# Patient Record
Sex: MALE | Race: WHITE | NOT HISPANIC OR LATINO | URBAN - METROPOLITAN AREA
[De-identification: names, ages, dates, MRNs, and addresses within clinical notes are randomized per-mention and may not be internally consistent; named-entity substitution may affect disease eponyms.]

---

## 2018-12-18 ENCOUNTER — INPATIENT (INPATIENT)
Facility: HOSPITAL | Age: 58
LOS: 8 days | Discharge: HOME | End: 2018-12-27
Attending: HOSPITALIST | Admitting: HOSPITALIST
Payer: COMMERCIAL

## 2018-12-18 VITALS
SYSTOLIC BLOOD PRESSURE: 127 MMHG | TEMPERATURE: 98 F | DIASTOLIC BLOOD PRESSURE: 80 MMHG | HEART RATE: 84 BPM | RESPIRATION RATE: 16 BRPM | OXYGEN SATURATION: 100 %

## 2018-12-18 LAB
ALBUMIN SERPL ELPH-MCNC: 3.3 G/DL — SIGNIFICANT CHANGE UP (ref 3.3–5)
ALP SERPL-CCNC: 82 U/L — SIGNIFICANT CHANGE UP (ref 40–120)
ALT FLD-CCNC: 21 U/L — SIGNIFICANT CHANGE UP (ref 4–41)
APTT BLD: 26.6 SEC — LOW (ref 27.5–36.3)
AST SERPL-CCNC: 19 U/L — SIGNIFICANT CHANGE UP (ref 4–40)
BASE EXCESS BLDV CALC-SCNC: 4.3 MMOL/L — SIGNIFICANT CHANGE UP
BASOPHILS # BLD AUTO: 0.05 K/UL — SIGNIFICANT CHANGE UP (ref 0–0.2)
BASOPHILS NFR BLD AUTO: 0.7 % — SIGNIFICANT CHANGE UP (ref 0–2)
BILIRUB SERPL-MCNC: < 0.2 MG/DL — LOW (ref 0.2–1.2)
BLOOD GAS VENOUS - CREATININE: 2.48 MG/DL — HIGH (ref 0.5–1.3)
BUN SERPL-MCNC: 49 MG/DL — HIGH (ref 7–23)
CALCIUM SERPL-MCNC: 8.9 MG/DL — SIGNIFICANT CHANGE UP (ref 8.4–10.5)
CHLORIDE BLDV-SCNC: 108 MMOL/L — SIGNIFICANT CHANGE UP (ref 96–108)
CHLORIDE SERPL-SCNC: 105 MMOL/L — SIGNIFICANT CHANGE UP (ref 98–107)
CO2 SERPL-SCNC: 25 MMOL/L — SIGNIFICANT CHANGE UP (ref 22–31)
CREAT SERPL-MCNC: 2.43 MG/DL — HIGH (ref 0.5–1.3)
CRP SERPL-MCNC: < 4 MG/L — SIGNIFICANT CHANGE UP
EOSINOPHIL # BLD AUTO: 0.25 K/UL — SIGNIFICANT CHANGE UP (ref 0–0.5)
EOSINOPHIL NFR BLD AUTO: 3.6 % — SIGNIFICANT CHANGE UP (ref 0–6)
ERYTHROCYTE [SEDIMENTATION RATE] IN BLOOD: 49 MM/HR — HIGH (ref 1–15)
GAS PNL BLDV: 137 MMOL/L — SIGNIFICANT CHANGE UP (ref 136–146)
GLUCOSE BLDV-MCNC: 178 — HIGH (ref 70–99)
GLUCOSE SERPL-MCNC: 176 MG/DL — HIGH (ref 70–99)
HCO3 BLDV-SCNC: 27 MMOL/L — SIGNIFICANT CHANGE UP (ref 20–27)
HCT VFR BLD CALC: 34.2 % — LOW (ref 39–50)
HCT VFR BLDV CALC: 33.5 % — LOW (ref 39–51)
HGB BLD-MCNC: 10.6 G/DL — LOW (ref 13–17)
HGB BLDV-MCNC: 10.9 G/DL — LOW (ref 13–17)
IMM GRANULOCYTES # BLD AUTO: 0.04 # — SIGNIFICANT CHANGE UP
IMM GRANULOCYTES NFR BLD AUTO: 0.6 % — SIGNIFICANT CHANGE UP (ref 0–1.5)
INR BLD: 1 — SIGNIFICANT CHANGE UP (ref 0.88–1.17)
LACTATE BLDV-MCNC: 1.5 MMOL/L — SIGNIFICANT CHANGE UP (ref 0.5–2)
LYMPHOCYTES # BLD AUTO: 1.35 K/UL — SIGNIFICANT CHANGE UP (ref 1–3.3)
LYMPHOCYTES # BLD AUTO: 19.2 % — SIGNIFICANT CHANGE UP (ref 13–44)
MCHC RBC-ENTMCNC: 30.3 PG — SIGNIFICANT CHANGE UP (ref 27–34)
MCHC RBC-ENTMCNC: 31 % — LOW (ref 32–36)
MCV RBC AUTO: 97.7 FL — SIGNIFICANT CHANGE UP (ref 80–100)
MONOCYTES # BLD AUTO: 0.93 K/UL — HIGH (ref 0–0.9)
MONOCYTES NFR BLD AUTO: 13.2 % — SIGNIFICANT CHANGE UP (ref 2–14)
NEUTROPHILS # BLD AUTO: 4.42 K/UL — SIGNIFICANT CHANGE UP (ref 1.8–7.4)
NEUTROPHILS NFR BLD AUTO: 62.7 % — SIGNIFICANT CHANGE UP (ref 43–77)
NRBC # FLD: 0 — SIGNIFICANT CHANGE UP
PCO2 BLDV: 51 MMHG — SIGNIFICANT CHANGE UP (ref 41–51)
PH BLDV: 7.38 PH — SIGNIFICANT CHANGE UP (ref 7.32–7.43)
PLATELET # BLD AUTO: 445 K/UL — HIGH (ref 150–400)
PMV BLD: 9.6 FL — SIGNIFICANT CHANGE UP (ref 7–13)
PO2 BLDV: < 24 MMHG — LOW (ref 35–40)
POTASSIUM BLDV-SCNC: 5 MMOL/L — HIGH (ref 3.4–4.5)
POTASSIUM SERPL-MCNC: 5.2 MMOL/L — SIGNIFICANT CHANGE UP (ref 3.5–5.3)
POTASSIUM SERPL-SCNC: 5.2 MMOL/L — SIGNIFICANT CHANGE UP (ref 3.5–5.3)
PROT SERPL-MCNC: 6.2 G/DL — SIGNIFICANT CHANGE UP (ref 6–8.3)
PROTHROM AB SERPL-ACNC: 11.1 SEC — SIGNIFICANT CHANGE UP (ref 9.8–13.1)
RBC # BLD: 3.5 M/UL — LOW (ref 4.2–5.8)
RBC # FLD: 18.5 % — HIGH (ref 10.3–14.5)
SAO2 % BLDV: 34.1 % — LOW (ref 60–85)
SODIUM SERPL-SCNC: 142 MMOL/L — SIGNIFICANT CHANGE UP (ref 135–145)
WBC # BLD: 7.08 K/UL — SIGNIFICANT CHANGE UP (ref 3.8–10.5)
WBC # FLD AUTO: 7.08 K/UL — SIGNIFICANT CHANGE UP (ref 3.8–10.5)

## 2018-12-18 PROCEDURE — 73620 X-RAY EXAM OF FOOT: CPT | Mod: 26,RT

## 2018-12-18 NOTE — ED ADULT NURSE NOTE - CHIEF COMPLAINT QUOTE
Pt was sent by podiatrist for evaluation of right foot ulcer.  Pt states that it has been there for at least a month, pt was admitted at [facility] for same and the wound was debrided and a bypass was done on leg, pt was initially told that the wound was healing well but today he was told that the wound is necrotic.  PMH CAD, DM

## 2018-12-18 NOTE — ED ADULT NURSE NOTE - CAS EDP DISCH DISPOSITION ADMI
[facility] O-T Advancement Flap Text: The defect edges were debeveled with a #15 scalpel blade.  Given the location of the defect, shape of the defect and the proximity to free margins an O-T advancement flap was deemed most appropriate.  Using a sterile surgical marker, an appropriate advancement flap was drawn incorporating the defect and placing the expected incisions within the relaxed skin tension lines where possible.    The area thus outlined was incised deep to adipose tissue with a #15 scalpel blade.  The skin margins were undermined to an appropriate distance in all directions utilizing iris scissors.

## 2018-12-18 NOTE — ED PROVIDER NOTE - MEDICAL DECISION MAKING DETAILS
Pt with clean dry gangrene ulcer, sent in by podiatrist for admission  - podiatry consult, xray, labs, likely admit Pt with clean dry gangrene ulcer, sent in by podiatrist for admission to OR.   - podiatry consult, xray, labs, likely admit

## 2018-12-18 NOTE — ED PROVIDER NOTE - PROGRESS NOTE DETAILS
Parmjit PGY-4: Pt seen by podiatry resident, hi attending is requesting admission. Admitted patient to [facility] hospitalist, text paged MAR. Parmjit PGY-4: Pt seen by podiatry resident, pods attending is requesting admission. [facility]

## 2018-12-18 NOTE — ED PROVIDER NOTE - OBJECTIVE STATEMENT
58M h/o CHF, cad, DM, HTN, HLD sent in by podiatrist for dry gangrene to R foot. Pt with bypass of R leg a month ago, foot healing well, went to podiatrist today and sent to ED. Denies fever, nausea, vomiting, purulence. 58M h/o CHF, cad, DM, HTN, HLD sent in by podiatrist for dry gangrene to R foot. Pt with bypass of R leg a month ago, foot healing well, went to podiatrist today and sent to ED. Denies fever, nausea, vomiting, purulence.    Podiatrist: Dr Sydnie Melissa 58M h/o CHF, cad, DM, HTN, HLD sent in by podiatrist for dry gangrene to R foot. Pt with bypass of R leg a month ago, foot healing well, went to podiatrist today and sent to ED for admission d/t OR planning. Denies fever, nausea, vomiting, purulence.  Well appearing and in NAD    Podiatrist: Dr Sydnie Melissa

## 2018-12-18 NOTE — ED PROVIDER NOTE - ATTENDING CONTRIBUTION TO CARE
Attending Attestation: Dr. Lara  I have personally performed a history and physical examination of the patient and discussed management with the resident as well as the patient.  I reviewed the resident's note and agree with the documented findings and plan of care.  I have authored and modified critical sections of the Provider Note, including but not limited to HPI, Physical Exam and MDM. Pt with clean dry gangrene ulcer, sent in by podiatrist for admission to OR.   - podiatry consult, xray, labs, likely admit

## 2018-12-18 NOTE — ED ADULT NURSE NOTE - OBJECTIVE STATEMENT
facilitator RN- pt sent in from podiatrist for necrotic wound to right lateral foot- had Bi-pass done on that last approx 1 month ago, on Eliquis- pt denies any fevers/chill, n/v/d, dizziness, weakness, palpitations. Wound is dry, no foul odor, dark in color, pt states he only has pain while walking on that foot. pt appears in NAD, vitals as noted, awake, a/ox3; IV 20g placed to left AC, blood work sent. MD at bedside, awaiting further orders from MD, will continue to monitor, pt safety maintained.

## 2018-12-18 NOTE — ED ADULT TRIAGE NOTE - CHIEF COMPLAINT QUOTE
Pt was sent by podiatrist for evaluation of right foot ulcer.  Pt states that it has been there for at least a month, pt was admitted at [facility] for same and the wound was debrided and a bypass was done on leg, pt was initially told that the wound was healing well but today he was told that the wound is necrotic.  PMH CAD, DM

## 2018-12-18 NOTE — ED PROVIDER NOTE - PMH
CHF (congestive heart failure)    DM (diabetes mellitus)    HLD (hyperlipidemia)    HTN (hypertension)

## 2018-12-19 DIAGNOSIS — T81.31XA DISRUPTION OF EXTERNAL OPERATION (SURGICAL) WOUND, NOT ELSEWHERE CLASSIFIED, INITIAL ENCOUNTER: ICD-10-CM

## 2018-12-19 DIAGNOSIS — I50.9 HEART FAILURE, UNSPECIFIED: ICD-10-CM

## 2018-12-19 DIAGNOSIS — I96 GANGRENE, NOT ELSEWHERE CLASSIFIED: ICD-10-CM

## 2018-12-19 DIAGNOSIS — I10 ESSENTIAL (PRIMARY) HYPERTENSION: ICD-10-CM

## 2018-12-19 DIAGNOSIS — E78.5 HYPERLIPIDEMIA, UNSPECIFIED: ICD-10-CM

## 2018-12-19 DIAGNOSIS — E11.8 TYPE 2 DIABETES MELLITUS WITH UNSPECIFIED COMPLICATIONS: ICD-10-CM

## 2018-12-19 DIAGNOSIS — Z79.899 OTHER LONG TERM (CURRENT) DRUG THERAPY: ICD-10-CM

## 2018-12-19 DIAGNOSIS — Z29.9 ENCOUNTER FOR PROPHYLACTIC MEASURES, UNSPECIFIED: ICD-10-CM

## 2018-12-19 DIAGNOSIS — L89.152 PRESSURE ULCER OF SACRAL REGION, STAGE 2: ICD-10-CM

## 2018-12-19 DIAGNOSIS — N18.4 CHRONIC KIDNEY DISEASE, STAGE 4 (SEVERE): ICD-10-CM

## 2018-12-19 DIAGNOSIS — Z95.828 PRESENCE OF OTHER VASCULAR IMPLANTS AND GRAFTS: Chronic | ICD-10-CM

## 2018-12-19 LAB
APPEARANCE UR: CLEAR — SIGNIFICANT CHANGE UP
BILIRUB UR-MCNC: NEGATIVE — SIGNIFICANT CHANGE UP
BLD GP AB SCN SERPL QL: NEGATIVE — SIGNIFICANT CHANGE UP
BLOOD UR QL VISUAL: NEGATIVE — SIGNIFICANT CHANGE UP
BUN SERPL-MCNC: 45 MG/DL — HIGH (ref 7–23)
CALCIUM SERPL-MCNC: 9.1 MG/DL — SIGNIFICANT CHANGE UP (ref 8.4–10.5)
CHLORIDE SERPL-SCNC: 109 MMOL/L — HIGH (ref 98–107)
CO2 SERPL-SCNC: 23 MMOL/L — SIGNIFICANT CHANGE UP (ref 22–31)
COLOR SPEC: COLORLESS — SIGNIFICANT CHANGE UP
CREAT ?TM UR-MCNC: 36.4 MG/DL — SIGNIFICANT CHANGE UP
CREAT SERPL-MCNC: 2.18 MG/DL — HIGH (ref 0.5–1.3)
GLUCOSE SERPL-MCNC: 91 MG/DL — SIGNIFICANT CHANGE UP (ref 70–99)
GLUCOSE UR-MCNC: NEGATIVE — SIGNIFICANT CHANGE UP
HCT VFR BLD CALC: 35.2 % — LOW (ref 39–50)
HGB BLD-MCNC: 11.1 G/DL — LOW (ref 13–17)
KETONES UR-MCNC: NEGATIVE — SIGNIFICANT CHANGE UP
LEUKOCYTE ESTERASE UR-ACNC: NEGATIVE — SIGNIFICANT CHANGE UP
MAGNESIUM SERPL-MCNC: 2.1 MG/DL — SIGNIFICANT CHANGE UP (ref 1.6–2.6)
MCHC RBC-ENTMCNC: 29.8 PG — SIGNIFICANT CHANGE UP (ref 27–34)
MCHC RBC-ENTMCNC: 31.5 % — LOW (ref 32–36)
MCV RBC AUTO: 94.6 FL — SIGNIFICANT CHANGE UP (ref 80–100)
NITRITE UR-MCNC: NEGATIVE — SIGNIFICANT CHANGE UP
NRBC # FLD: 0 — SIGNIFICANT CHANGE UP
PH UR: 6 — SIGNIFICANT CHANGE UP (ref 5–8)
PHOSPHATE SERPL-MCNC: 4.5 MG/DL — SIGNIFICANT CHANGE UP (ref 2.5–4.5)
PLATELET # BLD AUTO: 402 K/UL — HIGH (ref 150–400)
PMV BLD: 9.2 FL — SIGNIFICANT CHANGE UP (ref 7–13)
POTASSIUM SERPL-MCNC: 5 MMOL/L — SIGNIFICANT CHANGE UP (ref 3.5–5.3)
POTASSIUM SERPL-SCNC: 5 MMOL/L — SIGNIFICANT CHANGE UP (ref 3.5–5.3)
PROT UR-MCNC: NEGATIVE — SIGNIFICANT CHANGE UP
RBC # BLD: 3.72 M/UL — LOW (ref 4.2–5.8)
RBC # FLD: 18 % — HIGH (ref 10.3–14.5)
RH IG SCN BLD-IMP: POSITIVE — SIGNIFICANT CHANGE UP
SODIUM SERPL-SCNC: 145 MMOL/L — SIGNIFICANT CHANGE UP (ref 135–145)
SODIUM UR-SCNC: 87 MMOL/L — SIGNIFICANT CHANGE UP
SP GR SPEC: 1.01 — SIGNIFICANT CHANGE UP (ref 1–1.04)
UROBILINOGEN FLD QL: NORMAL — SIGNIFICANT CHANGE UP
WBC # BLD: 7.14 K/UL — SIGNIFICANT CHANGE UP (ref 3.8–10.5)
WBC # FLD AUTO: 7.14 K/UL — SIGNIFICANT CHANGE UP (ref 3.8–10.5)

## 2018-12-19 PROCEDURE — 93923 UPR/LXTR ART STDY 3+ LVLS: CPT | Mod: 26

## 2018-12-19 PROCEDURE — 99223 1ST HOSP IP/OBS HIGH 75: CPT

## 2018-12-19 RX ORDER — ATORVASTATIN CALCIUM 80 MG/1
40 TABLET, FILM COATED ORAL AT BEDTIME
Qty: 0 | Refills: 0 | Status: DISCONTINUED | OUTPATIENT
Start: 2018-12-19 | End: 2018-12-27

## 2018-12-19 RX ORDER — DEXTROSE 50 % IN WATER 50 %
15 SYRINGE (ML) INTRAVENOUS ONCE
Qty: 0 | Refills: 0 | Status: DISCONTINUED | OUTPATIENT
Start: 2018-12-19 | End: 2018-12-27

## 2018-12-19 RX ORDER — DEXTROSE 50 % IN WATER 50 %
25 SYRINGE (ML) INTRAVENOUS ONCE
Qty: 0 | Refills: 0 | Status: DISCONTINUED | OUTPATIENT
Start: 2018-12-19 | End: 2018-12-27

## 2018-12-19 RX ORDER — CARVEDILOL PHOSPHATE 80 MG/1
12.5 CAPSULE, EXTENDED RELEASE ORAL EVERY 12 HOURS
Qty: 0 | Refills: 0 | Status: DISCONTINUED | OUTPATIENT
Start: 2018-12-19 | End: 2018-12-27

## 2018-12-19 RX ORDER — DEXTROSE 50 % IN WATER 50 %
12.5 SYRINGE (ML) INTRAVENOUS ONCE
Qty: 0 | Refills: 0 | Status: DISCONTINUED | OUTPATIENT
Start: 2018-12-19 | End: 2018-12-27

## 2018-12-19 RX ORDER — INSULIN LISPRO 100/ML
VIAL (ML) SUBCUTANEOUS AT BEDTIME
Qty: 0 | Refills: 0 | Status: DISCONTINUED | OUTPATIENT
Start: 2018-12-19 | End: 2018-12-27

## 2018-12-19 RX ORDER — GLUCAGON INJECTION, SOLUTION 0.5 MG/.1ML
1 INJECTION, SOLUTION SUBCUTANEOUS ONCE
Qty: 0 | Refills: 0 | Status: DISCONTINUED | OUTPATIENT
Start: 2018-12-19 | End: 2018-12-27

## 2018-12-19 RX ORDER — INSULIN LISPRO 100/ML
VIAL (ML) SUBCUTANEOUS
Qty: 0 | Refills: 0 | Status: DISCONTINUED | OUTPATIENT
Start: 2018-12-19 | End: 2018-12-27

## 2018-12-19 RX ORDER — LISINOPRIL 2.5 MG/1
40 TABLET ORAL DAILY
Qty: 0 | Refills: 0 | Status: DISCONTINUED | OUTPATIENT
Start: 2018-12-19 | End: 2018-12-27

## 2018-12-19 RX ORDER — APIXABAN 2.5 MG/1
2.5 TABLET, FILM COATED ORAL EVERY 12 HOURS
Qty: 0 | Refills: 0 | Status: DISCONTINUED | OUTPATIENT
Start: 2018-12-19 | End: 2018-12-20

## 2018-12-19 RX ORDER — CIPROFLOXACIN LACTATE 400MG/40ML
500 VIAL (ML) INTRAVENOUS EVERY 12 HOURS
Qty: 0 | Refills: 0 | Status: DISCONTINUED | OUTPATIENT
Start: 2018-12-19 | End: 2018-12-19

## 2018-12-19 RX ORDER — ASPIRIN/CALCIUM CARB/MAGNESIUM 324 MG
81 TABLET ORAL DAILY
Qty: 0 | Refills: 0 | Status: DISCONTINUED | OUTPATIENT
Start: 2018-12-19 | End: 2018-12-27

## 2018-12-19 RX ORDER — GABAPENTIN 400 MG/1
100 CAPSULE ORAL DAILY
Qty: 0 | Refills: 0 | Status: DISCONTINUED | OUTPATIENT
Start: 2018-12-19 | End: 2018-12-27

## 2018-12-19 RX ORDER — SODIUM CHLORIDE 9 MG/ML
1000 INJECTION, SOLUTION INTRAVENOUS
Qty: 0 | Refills: 0 | Status: DISCONTINUED | OUTPATIENT
Start: 2018-12-19 | End: 2018-12-27

## 2018-12-19 RX ORDER — ACETAMINOPHEN 500 MG
650 TABLET ORAL ONCE
Qty: 0 | Refills: 0 | Status: COMPLETED | OUTPATIENT
Start: 2018-12-19 | End: 2018-12-19

## 2018-12-19 RX ADMIN — GABAPENTIN 100 MILLIGRAM(S): 400 CAPSULE ORAL at 12:00

## 2018-12-19 RX ADMIN — APIXABAN 2.5 MILLIGRAM(S): 2.5 TABLET, FILM COATED ORAL at 06:23

## 2018-12-19 RX ADMIN — LISINOPRIL 40 MILLIGRAM(S): 2.5 TABLET ORAL at 06:24

## 2018-12-19 RX ADMIN — CARVEDILOL PHOSPHATE 12.5 MILLIGRAM(S): 80 CAPSULE, EXTENDED RELEASE ORAL at 17:22

## 2018-12-19 RX ADMIN — Medication 650 MILLIGRAM(S): at 23:33

## 2018-12-19 RX ADMIN — Medication 81 MILLIGRAM(S): at 12:01

## 2018-12-19 RX ADMIN — CARVEDILOL PHOSPHATE 12.5 MILLIGRAM(S): 80 CAPSULE, EXTENDED RELEASE ORAL at 06:23

## 2018-12-19 RX ADMIN — APIXABAN 2.5 MILLIGRAM(S): 2.5 TABLET, FILM COATED ORAL at 17:22

## 2018-12-19 RX ADMIN — Medication 500 MILLIGRAM(S): at 09:24

## 2018-12-19 RX ADMIN — ATORVASTATIN CALCIUM 40 MILLIGRAM(S): 80 TABLET, FILM COATED ORAL at 22:17

## 2018-12-19 NOTE — PROGRESS NOTE ADULT - SUBJECTIVE AND OBJECTIVE BOX
Podiatry pager #: [phone]    Patient is a 58y old  Male who presents with a chief complaint of non-healing R foot wound (19 Dec 2018 04:00)       INTERVAL HPI/OVERNIGHT EVENTS:  Patient seen and evaluated at bedside.  Pt is resting comfortable in NAD. Denies N/V/F/C.  Pain rated at 0/10    Allergies    penicillin (Other)    Intolerances        Vital Signs Last 24 Hrs  T(C): 36.6 (19 Dec 2018 06:20), Max: 36.6 (19 Dec 2018 04:06)  T(F): 97.9 (19 Dec 2018 06:20), Max: 97.9 (19 Dec 2018 04:06)  HR: 80 (19 Dec 2018 06:20) (77 - 90)  BP: 153/94 (19 Dec 2018 06:20) (121/87 - 153/94)  BP(mean): --  RR: 17 (19 Dec 2018 06:20) (16 - 17)  SpO2: 100% (19 Dec 2018 06:20) (99% - 100%)    LABS:                        11.1   7.14  )-----------( 402      ( 19 Dec 2018 06:10 )             35.2     12-19    145  |  109<H>  |  45<H>  ----------------------------<  91  5.0   |  23  |  2.18<H>    Ca    9.1      19 Dec 2018 06:10  Phos  4.5     12-19  Mg     2.1     12-19    TPro  6.2  /  Alb  3.3  /  TBili  < 0.2<L>  /  DBili  x   /  AST  19  /  ALT  21  /  AlkPhos  82  12-18    PT/INR - ( 18 Dec 2018 22:15 )   PT: 11.1 SEC;   INR: 1.00          PTT - ( 18 Dec 2018 22:15 )  PTT:26.6 SEC    CAPILLARY BLOOD GLUCOSE      POCT Blood Glucose.: 150 mg/dL (19 Dec 2018 07:03)  POCT Blood Glucose.: 200 mg/dL (18 Dec 2018 19:23)      Lower Extremity Physical Exam:  Vasular: DP/PT non palp on right, DP palp on the left , B/L, CFT <3 seconds B/L, Temperature gradient WNL, B/L.   Neuro: Epicritic sensation intact to the level of toes, B/L  Skin: right foot ulceration on the dorsal lateral forefoot to subQ with overlying eschar, no probing to bone, no clinical signs of infection, no drainage    RADIOLOGY & ADDITIONAL TESTS:  < from: Xray Foot AP + Lateral, Right (12.18.18 @ 21:56) >    EXAM:  RAD FOOT 2 VIEWS RIGHT        PROCEDURE DATE:  Dec 18 2018         INTERPRETATION:  CLINICAL INFORMATION: Gangrene of the right foot.   Evaluate for osteomyelitis.    EXAM: 3 views of the right foot.    COMPARISON: No similar prior studies available for comparison.    FINDINGS:  No acute fracture or dislocation.     Soft tissue swelling is noted over the foot. A soft tissue defect is   noted over the lateral aspect of the midfoot consistent with ulceration.   No cortical erosion or periosteal reaction to suggest osteomyelitis.   There is no tracking soft tissue gas.Vascular calcifications are present.    IMPRESSION:  No cortical erosion or periosteal reactions to suggest osteomyelitis.   Ulceration noted along the lateral aspect of the midfoot.              MILTON MATHEWS M.D., RADIOLOGY RESIDENT  This document has been electronically signed.  AUDIE BROWNE M.D., ATTENDING RADIOLOGIST  This document has been electronically signed. Dec 19 2018  6:46AM    < end of copied text >

## 2018-12-19 NOTE — ADVANCED PRACTICE NURSE CONSULT - REASON FOR CONSULT
Patient seen on skin care rounds after wound care referral received for assessment of skin impairment and recommendations of topical management. Chart reviewed: Serum albumin 3.3g/dl, Ron 17-20, creatine 2.48, UA negative, WBC 7.14, SR 49. Patient interviewed, reports he had a bypass in [facility] in November 2018. Reports close f/u with vascular surgery for right groin surgical wound he was told by his surgeon to paint with Betadine. Patient reports receiving VNS three times a week for wound care.  Patient H/O of CHF (unknown EK), CAD (no stents), HTN, and HLD who presents to the hospital with complaints of a non-healing R foot wound. Said that this has been ongoing for a while. About 1 week prior to Thanksgiving, he had a RLE bypass surgery (at [facility]) to help improve the blood flow to his R foot wound and said that he was told his wound was healing well after that. Said that he continued to have pain in his R foot when ambulating and therefore he went to see a new podiatrist on 12/18 for an evaluation and while there was told he would need to come to the hospital for evaluation for possible OM of the R foot. Patient said that other than the pain on ambulation and the chronic foot he has not noted any significant fevers, chills, or discharge/foul smells from the R foot. Said that his RLE bypass incision site has dehisced a little and was told by his surgeon to continue with dressing changes. He also brought this up with his PCP and was prescribed a course of ciprofloxacin (currently on day 5/10). He denies any discharge from the RLE bypass incision site. Patient is being seeing by podiatry for right foot wound and vascular for non healing right foot wound and evaluation of vascular status.

## 2018-12-19 NOTE — H&P ADULT - HISTORY OF PRESENT ILLNESS
This is a 58M with history of CHF (unknown EK), CAD (no stents), HTN, and HLD who presents to the hospital with complaints of a non-healing R foot wound. Said that this has been ongoing for a while. About 1 week prior to Thanksgiving, he had a RLE bypass surgery (at [facility]) to help improve the blood flow to his R foot wound and said that he was told his wound was healing well after that. Said that he continued to have pain in his R foot when ambulating and therefore he went to see a new podiatrist on 12/18 for an evaluation and while there was told he would need to come to the hospital for evaluation for possible OM of the R foot. Patient said that other than the pain on ambulation and the chronic foot he has not noted any significant fevers, chills, or discharge/foul smells from the R foot. Said that his RLE bypass incision site has dehisced a little and was told by his surgeon to continue with dressing changes. He also brought this up with his PCP and was prescribed a course of ciprofloxacin (currently on day 5/10). He denies any discharge from the RLE bypass incision site. Of note, about 1 week after his bypass surgery he had an episode of melena and was rehospitalized at [facility] where he had an EGD with cauterization of a bleeding ulcer and was placed on triple therapy for 8 days (PPI, clarithromycin and metronidazole given his PCN allergy). He denies any further episodes of melena or hematochezia or BRBPR since then. No other complaints.    On arrival to the ED, his vitals were T T 97.7, P 84, /80, R 16, O2 sat 100% RA. His lab work showed elevated ESR with nl CRP, anemia and elevated BUN/Cr (unknown baseline). His lactate was 1.5. He had a xray of the foot that did not show any radiological signs of OM. He was evaluated by podiatry who did not find any signs of infection and said that the patient would need a possible wound debridement and graft placement. He was then admitted to medicine.

## 2018-12-19 NOTE — H&P ADULT - NSHPLABSRESULTS_GEN_ALL_CORE
LABS and ADDITIONAL STUDIES:                        10.6   7.08  )-----------( 445      ( 18 Dec 2018 22:15 )             34.2     Sedimentation Rate, Erythrocyte (12.18.18 @ 22:15)    Sedimentation Rate, Erythrocyte: 49 mm/hr  C-Reactive Protein, Serum (12.18.18 @ 22:15)    C-Reactive Protein, Serum: < 4.0 mg/L      12-18    142  |  105  |  49<H>  ----------------------------<  176<H>  5.2   |  25  |  2.43<H>    Ca    8.9      18 Dec 2018 22:15    TPro  6.2  /  Alb  3.3  /  TBili  < 0.2<L>  /  DBili  x   /  AST  19  /  ALT  21  /  AlkPhos  82  12-18    LIVER FUNCTIONS - ( 18 Dec 2018 22:15 )  Alb: 3.3 g/dL / Pro: 6.2 g/dL / ALK PHOS: 82 u/L / ALT: 21 u/L / AST: 19 u/L / GGT: x           PT/INR - ( 18 Dec 2018 22:15 )   PT: 11.1 SEC;   INR: 1.00     PTT - ( 18 Dec 2018 22:15 )  PTT:26.6 SEC    < from: Xray Foot AP + Lateral, Right (12.18.18 @ 21:56) >    ******PRELIMINARY REPORT******            INTERPRETATION:  soft tissue ulcer over the lateral aspesct of the   midfoot without radiographic evidence of osteomyelitis. No tracking soft   tissue gas away from ulceration .    < end of copied text >

## 2018-12-19 NOTE — PROGRESS NOTE ADULT - SUBJECTIVE AND OBJECTIVE BOX
VASCULAR SURGERY SERVICE (C Team - #[id number]) - CONSULT NOTE  --------------------------------------------------------------------------------------------  Patient is a 58y old  Male who presents with a chief complaint of non-healing R foot wound (19 Dec 2018 08:32)    HPI:  58M with history of CHF (unknown EK), CAD (no stents), HTN, and HLD who presents to the hospital with complaints of a non-healing R foot wound. Said that this has been ongoing for a while. About 1 week prior to , he had a RLE bypass surgery (at [facility]) to help improve the blood flow to his R foot wound and said that he was told his wound was healing well after that. Said that he continued to have pain in his R foot when ambulating and therefore he went to see a new podiatrist on  for an evaluation and while there was told he would need to come to the hospital for evaluation for possible OM of the R foot. Patient said that other than the pain on ambulation and the chronic foot he has not noted any significant fevers, chills, or discharge/foul smells from the R foot. Said that his RLE bypass incision site has dehisced a little and was told by his surgeon to continue with dressing changes. He also brought this up with his PCP and was prescribed a course of ciprofloxacin (currently on day 5/10). He denies any discharge from the RLE bypass incision site. Of note, about 1 week after his bypass surgery he had an episode of melena and was rehospitalized at [facility] where he had an EGD with cauterization of a bleeding ulcer and was placed on triple therapy for 8 days (PPI, clarithromycin and metronidazole given his PCN allergy). He denies any further episodes of melena or hematochezia or BRBPR since then. No other complaints.    On arrival to the ED, his vitals were T T 97.7, P 84, /80, R 16, O2 sat 100% RA. His lab work showed elevated ESR with nl CRP, anemia and elevated BUN/Cr (unknown baseline). His lactate was 1.5. He had a xray of the foot that did not show any radiological signs of OM. He was evaluated by podiatry who did not find any signs of infection and said that the patient would need a possible wound debridement and graft placement. He was then admitted to medicine. (19-Dec-2018 04:00)    Vascular consulted this morning for concern of nonpalpable pulses of right foot. As per patient, has right fem-pop bypass on 18 at [facility] by Dr. Omkar Bruce and last followed-up on 18 . At that time, pt states his groin incision was noted to be opening along distal edge so was told to continue dressing changes and his PMD started him on a 10 day course of Cipro. Otherwise was feeling well and recovering without issue at home and was sent in by his Podiatrist after being seen at the office yesterday and concern for osteomyelitis This morning patient stated he was just feeling paresthesia of right toes but this is unchanged from baseline otherwise denied changes in strength or sensation of RLE , fever, chills, CP or SOB.     ROS: 10-system review is otherwise negative except HPI above.      PAST MEDICAL & SURGICAL HISTORY:  CHF (congestive heart failure)  HLD (hyperlipidemia)  HTN (hypertension)  DM (diabetes mellitus)  S/P femoral-femoral bypass surgery    FAMILY HISTORY:  Family history of stroke (Father, Sibling): in father and sister    SOCIAL HISTORY: Admitted to smoking marijuana but quit 4 weeks ago. Never cigarette smoker, Non drinker.     ALLERGIES: penicillin (Other)      HOME MEDICATIONS:   apixaban 2.5 mg oral tablet: 1 tab(s) orally 2 times a day (19 Dec 2018 03:22)  aspirin 81 mg oral tablet: 1 tab(s) orally once a day (19 Dec 2018 03:22)  atorvastatin 40 mg oral tablet: 1 tab(s) orally once a day (19 Dec 2018 03:22)  carvedilol 12.5 mg oral tablet: 1 tab(s) orally 2 times a day (19 Dec 2018 03:22)  Cipro 500 mg oral tablet: 1 tab(s) orally every 12 hours (19 Dec 2018 03:22)  colchicine 0.6 mg oral tablet: 1 tab(s) orally 2 times a day (19 Dec 2018 03:22)  gabapentin 100 mg oral capsule: 1 cap(s) orally 3 times a day (19 Dec 2018 03:22)  Janumet 50 mg-1000 mg oral tablet: 1 tab(s) orally 2 times a day (19 Dec 2018 03:22)  Multiple Vitamins oral tablet: 1 tab(s) orally once a day (19 Dec 2018 03:22)  ramipril 10 mg oral capsule: 1 cap(s) orally once a day (19 Dec 2018 03:22)  saccharomyces boulardii lyo 250 mg oral capsule: 1 cap(s) orally 2 times a day (19 Dec 2018 03:22)  Zofran 4 mg oral tablet: 1 tab(s) orally every 8 hours, As Needed (19 Dec 2018 03:22)      CURRENT MEDICATIONS  MEDICATIONS (STANDING): apixaban 2.5 milliGRAM(s) Oral every 12 hours  aspirin enteric coated 81 milliGRAM(s) Oral daily  atorvastatin 40 milliGRAM(s) Oral at bedtime  carvedilol 12.5 milliGRAM(s) Oral every 12 hours  ciprofloxacin     Tablet 500 milliGRAM(s) Oral every 12 hours  dextrose 5%. 1000 milliLiter(s) IV Continuous <Continuous>  dextrose 50% Injectable 12.5 Gram(s) IV Push once  dextrose 50% Injectable 25 Gram(s) IV Push once  dextrose 50% Injectable 25 Gram(s) IV Push once  gabapentin 100 milliGRAM(s) Oral daily  insulin lispro (HumaLOG) corrective regimen sliding scale   SubCutaneous three times a day before meals  insulin lispro (HumaLOG) corrective regimen sliding scale   SubCutaneous at bedtime  lisinopril 40 milliGRAM(s) Oral daily    MEDICATIONS (PRN):dextrose 40% Gel 15 Gram(s) Oral once PRN Blood Glucose LESS THAN 70 milliGRAM(s)/deciliter  glucagon  Injectable 1 milliGRAM(s) IntraMuscular once PRN Glucose LESS THAN 70 milligrams/deciliter    --------------------------------------------------------------------------------------------    Vitals:   T(C): 36.6 (18 @ 06:20), Max: 36.6 (18 @ 04:06)  HR: 80 (18 @ 06:20) (77 - 90)  BP: 153/94 (18 @ 06:20) (121/87 - 153/94)  RR: 17 (18 @ 06:20) (16 - 17)  SpO2: 100% (18 @ 06:20) (99% - 100%)  CAPILLARY BLOOD GLUCOSE      POCT Blood Glucose.: 150 mg/dL (19 Dec 2018 07:03)  POCT Blood Glucose.: 200 mg/dL (18 Dec 2018 19:23)    CAPILLARY BLOOD GLUCOSE      POCT Blood Glucose.: 150 mg/dL (19 Dec 2018 07:03)  POCT Blood Glucose.: 200 mg/dL (18 Dec 2018 19:23)      Height (cm): 162.56 ( @ 08:42)  Weight (kg): 51.2 ( @ 08:42)  BMI (kg/m2): 19.4 ( @ 08:42)  BSA (m2): 1.53 ( 08:42)    PHYSICAL EXAM:   General: NAD  HEENT: Normocephalic, atraumatic, EOMI, PEERLA.  Neck: Soft, midline trachea.  Chest: No chest wall tenderness.   Cardiac: S1, S2, RRR  Respiratory: Breathing comfortably on RA   Abdomen: Soft, non-distended, non-tender   Groin: Normal appearing left groin,  right groin incision with dehiscence noted at 3 small areas, erythema noted along edge of incision but no drainage or tenderness to palpation .   Ext: R medial incision CDI. Right foot lateral ulcer with eschar overlying, no fluctuance or drainage PT and AT signal of right lower ext, PT signal of Left Lower ext only.     --------------------------------------------------------------------------------------------    LABS  CBC ( @ 06:10)                              11.1<L>                         7.14    )----------------(  402<H>     --    % Neutrophils, --    % Lymphocytes, ANC: --                                  35.2<L>  CBC ( 22:15)                              10.6<L>                         7.08    )----------------(  445<H>     62.7  % Neutrophils, 19.2  % Lymphocytes, ANC: 4.42                                34.2<L>    BMP ( @ 06:10)             145     |  109<H>  |  45<H> 		Ca++ --      Ca 9.1                ---------------------------------( 91    		Mg 2.1                5.0     |  23      |  2.18<H>			Ph 4.5     BMP (:15)             142     |  105     |  49<H> 		Ca++ --      Ca 8.9                ---------------------------------( 176<H>		Mg --                 5.2     |  25      |  2.43<H>			Ph --        LFTs ( 22:15)      TPro 6.2 / Alb 3.3 / TBili < 0.2<L> / DBili -- / AST 19 / ALT 21 / AlkPhos 82    Coags ( 22:15)  aPTT 26.6<L> / INR 1.00 / PT 11.1        VBG (:15)     7.38 / 51 / < 24<L> / 27 / 4.3 / 34.1<L>%     Lactate: 1.5    --------------------------------------------------------------------------------------------    MICROBIOLOGY  Urinalysis ( @ 09:00):     Color: COLORLESS / Appearance: CLEAR / S.011 / pH: 6.0 / Gluc: NEGATIVE / Ketones: NEGATIVE / Bili: NEGATIVE / Urobili: NORMAL / Protein :NEGATIVE / Nitrites: NEGATIVE / Leuk.Est: NEGATIVE / RBC:  / WBC:  / Sq Epi:  / Non Sq Epi:  / Bacteria          --------------------------------------------------------------------------------------------    IMAGING  < from: Xray Foot AP + Lateral, Right (12.18.18 @ 21:56) >  FINDINGS:  No acute fracture or dislocation.     Soft tissue swelling is noted over the foot. A soft tissue defect is   noted over the lateral aspect of the midfoot consistent with ulceration.   No cortical erosion or periosteal reaction to suggest osteomyelitis.   There is no tracking soft tissue gas.Vascular calcifications are present.    IMPRESSION:  No cortical erosion or periosteal reactions to suggest osteomyelitis.   Ulceration noted along the lateral aspect of the midfoot.    < end of copied text >      -------------------------------------------------------------------------------------------- VASCULAR SURGERY SERVICE (C Team - #[id number]) - CONSULT NOTE  --------------------------------------------------------------------------------------------  Patient is a 58y old  Male who presents with a chief complaint of non-healing R foot wound (19 Dec 2018 08:32)    HPI:  58M with history of CHF (unknown EK), CAD (no stents), HTN, and HLD who presents to the hospital with complaints of a non-healing R foot wound. Said that this has been ongoing for a while. About 1 week prior to , he had a RLE bypass surgery (at [facility]) to help improve the blood flow to his R foot wound and said that he was told his wound was healing well after that. Said that he continued to have pain in his R foot when ambulating and therefore he went to see a new podiatrist on  for an evaluation and while there was told he would need to come to the hospital for evaluation for possible OM of the R foot. Patient said that other than the pain on ambulation and the chronic foot he has not noted any significant fevers, chills, or discharge/foul smells from the R foot. Said that his RLE bypass incision site has dehisced a little and was told by his surgeon to continue with dressing changes. He also brought this up with his PCP and was prescribed a course of ciprofloxacin (currently on day 5/10). He denies any discharge from the RLE bypass incision site. Of note, about 1 week after his bypass surgery he had an episode of melena and was rehospitalized at [facility] where he had an EGD with cauterization of a bleeding ulcer and was placed on triple therapy for 8 days (PPI, clarithromycin and metronidazole given his PCN allergy). He denies any further episodes of melena or hematochezia or BRBPR since then. No other complaints.    On arrival to the ED, his vitals were T T 97.7, P 84, /80, R 16, O2 sat 100% RA. His lab work showed elevated ESR with nl CRP, anemia and elevated BUN/Cr (unknown baseline). His lactate was 1.5. He had a xray of the foot that did not show any radiological signs of OM. He was evaluated by podiatry who did not find any signs of infection and said that the patient would need a possible wound debridement and graft placement. He was then admitted to medicine. (19-Dec-2018 04:00)    Vascular consulted this morning for concern of nonpalpable pulses of right foot. As per patient, has right fem-pop bypass on 18 at [facility] by Dr. Omkar Bruce and last followed-up on 18 . At that time, pt states his groin incision was noted to be opening along distal edge so was told to continue dressing changes and his PMD started him on a 10 day course of Cipro. Otherwise was feeling well and recovering without issue at home and was sent in by his Podiatrist after being seen at the office yesterday and concern for osteomyelitis This morning patient stated he was just feeling paresthesia of right toes but this is unchanged from baseline otherwise denied changes in strength or sensation of RLE , fever, chills, CP or SOB.     ROS: 10-system review is otherwise negative except HPI above.      PAST MEDICAL & SURGICAL HISTORY:  CHF (congestive heart failure)  HLD (hyperlipidemia)  HTN (hypertension)  DM (diabetes mellitus)  S/P femoral-popliteal bypass surgery    FAMILY HISTORY:  Family history of stroke (Father, Sibling): in father and sister    SOCIAL HISTORY: Admitted to smoking marijuana but quit 4 weeks ago. Never cigarette smoker, Non drinker.     ALLERGIES: penicillin (Other)      HOME MEDICATIONS:   apixaban 2.5 mg oral tablet: 1 tab(s) orally 2 times a day (19 Dec 2018 03:22)  aspirin 81 mg oral tablet: 1 tab(s) orally once a day (19 Dec 2018 03:22)  atorvastatin 40 mg oral tablet: 1 tab(s) orally once a day (19 Dec 2018 03:22)  carvedilol 12.5 mg oral tablet: 1 tab(s) orally 2 times a day (19 Dec 2018 03:22)  Cipro 500 mg oral tablet: 1 tab(s) orally every 12 hours (19 Dec 2018 03:22)  colchicine 0.6 mg oral tablet: 1 tab(s) orally 2 times a day (19 Dec 2018 03:22)  gabapentin 100 mg oral capsule: 1 cap(s) orally 3 times a day (19 Dec 2018 03:22)  Janumet 50 mg-1000 mg oral tablet: 1 tab(s) orally 2 times a day (19 Dec 2018 03:22)  Multiple Vitamins oral tablet: 1 tab(s) orally once a day (19 Dec 2018 03:22)  ramipril 10 mg oral capsule: 1 cap(s) orally once a day (19 Dec 2018 03:22)  saccharomyces boulardii lyo 250 mg oral capsule: 1 cap(s) orally 2 times a day (19 Dec 2018 03:22)  Zofran 4 mg oral tablet: 1 tab(s) orally every 8 hours, As Needed (19 Dec 2018 03:22)      CURRENT MEDICATIONS  MEDICATIONS (STANDING): apixaban 2.5 milliGRAM(s) Oral every 12 hours  aspirin enteric coated 81 milliGRAM(s) Oral daily  atorvastatin 40 milliGRAM(s) Oral at bedtime  carvedilol 12.5 milliGRAM(s) Oral every 12 hours  ciprofloxacin     Tablet 500 milliGRAM(s) Oral every 12 hours  dextrose 5%. 1000 milliLiter(s) IV Continuous <Continuous>  dextrose 50% Injectable 12.5 Gram(s) IV Push once  dextrose 50% Injectable 25 Gram(s) IV Push once  dextrose 50% Injectable 25 Gram(s) IV Push once  gabapentin 100 milliGRAM(s) Oral daily  insulin lispro (HumaLOG) corrective regimen sliding scale   SubCutaneous three times a day before meals  insulin lispro (HumaLOG) corrective regimen sliding scale   SubCutaneous at bedtime  lisinopril 40 milliGRAM(s) Oral daily    MEDICATIONS (PRN):dextrose 40% Gel 15 Gram(s) Oral once PRN Blood Glucose LESS THAN 70 milliGRAM(s)/deciliter  glucagon  Injectable 1 milliGRAM(s) IntraMuscular once PRN Glucose LESS THAN 70 milligrams/deciliter    --------------------------------------------------------------------------------------------    Vitals:   T(C): 36.6 (18 @ 06:20), Max: 36.6 (18 @ 04:06)  HR: 80 (18 @ 06:20) (77 - 90)  BP: 153/94 (18 @ 06:20) (121/87 - 153/94)  RR: 17 (18 @ 06:20) (16 - 17)  SpO2: 100% (18 @ 06:20) (99% - 100%)  CAPILLARY BLOOD GLUCOSE      POCT Blood Glucose.: 150 mg/dL (19 Dec 2018 07:03)  POCT Blood Glucose.: 200 mg/dL (18 Dec 2018 19:23)    CAPILLARY BLOOD GLUCOSE      POCT Blood Glucose.: 150 mg/dL (19 Dec 2018 07:03)  POCT Blood Glucose.: 200 mg/dL (18 Dec 2018 19:23)      Height (cm): 162.56 ( @ 08:42)  Weight (kg): 51.2 ( @ 08:42)  BMI (kg/m2): 19.4 ( 08:42)  BSA (m2): 1.53 ( 08:42)    PHYSICAL EXAM:   General: NAD  HEENT: Normocephalic, atraumatic, EOMI, PEERLA.  Neck: Soft, midline trachea.  Chest: No chest wall tenderness.   Cardiac: S1, S2, RRR  Respiratory: Breathing comfortably on RA   Abdomen: Soft, non-distended, non-tender   Groin: Normal appearing left groin,  right groin incision with dehiscence noted at 3 small areas, erythema noted along edge of incision but no drainage or tenderness to palpation .   Ext: R medial incision CDI. Right foot lateral ulcer with eschar overlying, no fluctuance or drainage PT and AT signal of right lower ext, PT signal of Left Lower ext only.     --------------------------------------------------------------------------------------------    LABS  CBC ( @ 06:10)                              11.1<L>                         7.14    )----------------(  402<H>     --    % Neutrophils, --    % Lymphocytes, ANC: --                                  35.2<L>  CBC ( 22:15)                              10.6<L>                         7.08    )----------------(  445<H>     62.7  % Neutrophils, 19.2  % Lymphocytes, ANC: 4.42                                34.2<L>    BMP ( @ 06:10)             145     |  109<H>  |  45<H> 		Ca++ --      Ca 9.1                ---------------------------------( 91    		Mg 2.1                5.0     |  23      |  2.18<H>			Ph 4.5     BMP ( @ 22:15)             142     |  105     |  49<H> 		Ca++ --      Ca 8.9                ---------------------------------( 176<H>		Mg --                 5.2     |  25      |  2.43<H>			Ph --        LFTs ( 22:15)      TPro 6.2 / Alb 3.3 / TBili < 0.2<L> / DBili -- / AST 19 / ALT 21 / AlkPhos 82    Coags ( 22:15)  aPTT 26.6<L> / INR 1.00 / PT 11.1        VBG (:15)     7.38 / 51 / < 24<L> / 27 / 4.3 / 34.1<L>%     Lactate: 1.5    --------------------------------------------------------------------------------------------    MICROBIOLOGY  Urinalysis ( @ 09:00):     Color: COLORLESS / Appearance: CLEAR / S.011 / pH: 6.0 / Gluc: NEGATIVE / Ketones: NEGATIVE / Bili: NEGATIVE / Urobili: NORMAL / Protein :NEGATIVE / Nitrites: NEGATIVE / Leuk.Est: NEGATIVE / RBC:  / WBC:  / Sq Epi:  / Non Sq Epi:  / Bacteria          --------------------------------------------------------------------------------------------    IMAGING  < from: Xray Foot AP + Lateral, Right (1218.18 @ 21:56) >  FINDINGS:  No acute fracture or dislocation.     Soft tissue swelling is noted over the foot. A soft tissue defect is   noted over the lateral aspect of the midfoot consistent with ulceration.   No cortical erosion or periosteal reaction to suggest osteomyelitis.   There is no tracking soft tissue gas.Vascular calcifications are present.    IMPRESSION:  No cortical erosion or periosteal reactions to suggest osteomyelitis.   Ulceration noted along the lateral aspect of the midfoot.    < end of copied text >      --------------------------------------------------------------------------------------------

## 2018-12-19 NOTE — H&P ADULT - PROBLEM SELECTOR PLAN 4
- Unclear chronicity, would check a UA, urine sodium and urine creatinine for now  - Will monitor his BUN/Cr in AM

## 2018-12-19 NOTE — H&P ADULT - ASSESSMENT
This is a 58M with history as above who presents to the hospital with non-healing R foot wound concerning for dry gangrene. Also with dehiscence of his femoral bypass incision.

## 2018-12-19 NOTE — CONSULT NOTE ADULT - SUBJECTIVE AND OBJECTIVE BOX
Patient is a 58y old  Male who presents with a chief complaint of right foot ulcer    HPI:  58M h/o CHF, cad, DM, HTN, HLD sent in by podiatrist for dry gangrene to R foot. Pt with bypass of R leg a month ago, foot wound has been present for long time with no signs of healing, went to podiatrist today and sent to ED. Denies fever, nausea, vomiting, purulence. complains of pain around the wound site.    	Podiatrist: Dr Sydnie Melissa    PAST MEDICAL & SURGICAL HISTORY:  CHF (congestive heart failure)  HLD (hyperlipidemia)  HTN (hypertension)  DM (diabetes mellitus)      MEDICATIONS  (STANDING):    MEDICATIONS  (PRN):      Allergies    penicillin (Other)    Intolerances        VITALS:    Vital Signs Last 24 Hrs  T(C): 36.4 (18 Dec 2018 23:52), Max: 36.5 (18 Dec 2018 19:18)  T(F): 97.6 (18 Dec 2018 23:52), Max: 97.7 (18 Dec 2018 19:18)  HR: 90 (18 Dec 2018 23:52) (84 - 90)  BP: 121/87 (18 Dec 2018 23:52) (121/87 - 127/80)  BP(mean): --  RR: 17 (18 Dec 2018 23:52) (16 - 17)  SpO2: 99% (18 Dec 2018 23:52) (99% - 100%)    LABS:                          10.6   7.08  )-----------( 445      ( 18 Dec 2018 22:15 )             34.2       12-18    142  |  105  |  49<H>  ----------------------------<  176<H>  5.2   |  25  |  2.43<H>    Ca    8.9      18 Dec 2018 22:15    TPro  6.2  /  Alb  3.3  /  TBili  < 0.2<L>  /  DBili  x   /  AST  19  /  ALT  21  /  AlkPhos  82  12-18      CAPILLARY BLOOD GLUCOSE      POCT Blood Glucose.: 200 mg/dL (18 Dec 2018 19:23)      PT/INR - ( 18 Dec 2018 22:15 )   PT: 11.1 SEC;   INR: 1.00          PTT - ( 18 Dec 2018 22:15 )  PTT:26.6 SEC    LOWER EXTREMITY PHYSICAL EXAM:    Vasular: DP/PT non palp on right, DP palp on the left , B/L, CFT <3 seconds B/L, Temperature gradient WNL, B/L.   Neuro: Epicritic sensation intact to the level of toes, B/L  Skin: right foot ulceration on the dorsal lateral forefoot to subQ with overlying eschar, no probing to bone, no clinical signs of infection, no drainage    RADIOLOGY & ADDITIONAL STUDIES:  < from: Xray Foot AP + Lateral, Right (12.18.18 @ 21:56) >    ******PRELIMINARY REPORT******    ******PRELIMINARY REPORT******            EXAM:  RAD FOOT 2 VIEWS RIGHT        PROCEDURE DATE:  Dec 18 2018     ******PRELIMINARY REPORT******    ******PRELIMINARY REPORT******            INTERPRETATION:  soft tissue ulcer over the lateral aspesct of the   midfoot without radiographic evidence of osteomyelitis. No tracking soft   tissue gas away from ulceration .            ******PRELIMINARY REPORT******    ******PRELIMINARY REPORT******          MILTON MATHEWS M.D., RADIOLOGY RESIDENT    < end of copied text >

## 2018-12-19 NOTE — CONSULT NOTE ADULT - ASSESSMENT
Dry gangrene of foot  No acute infectious process evident  MRI being obtained, of limited value in absence of contrast  S/P Vascular bypass surgery with synthetic graft.  No concern of infection of either surgical site  No concern of systemic illness  S/P Rx vs. H. pylori, will need eventual OPD GI follow up for urea breath test  DM2 with neuropathy, PVD. ?Control. HbA1c will be artifically low after multiple units of PRBC for GIB  If there is underling osteomyelitis on MRI, unclear whether it would be active, or not. MRI can lag behind clinical picture, and patient did receive antibiotics presumably to treat osteomyelitis. Also unclear whether based on history it would be acute, or chronic osteomyelitis. The latter not curable with antibiotics alone.   Antibiotics will also not penetrate into dead tissue as present in the area of gangrene.  In a patient with a history of penicillin allergy, only 10-15% will have evidence of an IgE mediated allergy to penicillin when definitive skin testing is performed. These tend to dissipate over time (10 years later, 80% have resolved). He would eventually benefit from OPD allergy evaluation to risk-stratify.      Suggestions--  Stop Cipro  MRI  Vascular follow up  Podiatry follow up  Eventual OPD Gi f/u  Eventual OPD allergy eval re: PCN allergy  Need for any antibiotic TBD    Left messsage for Dr. Aragon.     Thank you for the courtesy of this referral.    Jaspreet Lazar MD  [phone]

## 2018-12-19 NOTE — H&P ADULT - PROBLEM SELECTOR PLAN 2
- Patient with dehiscence of his incision to subcutaneous tissue, was started on ciprofloxacin as outpatient, unclear currently if patient has an underlying infection, would therefore c/w cipro for now  - Consider ID eval in AM  - Will place wound care eval

## 2018-12-19 NOTE — H&P ADULT - PROBLEM SELECTOR PLAN 6
- Patient on janumet as outpatient, currently given his renal function he would not be the optimal candidate for this as an outpatient  - For now will hold the oral meds, will place on HISS, FS qAC  - c/w gabapentin but reduce dose to 100 daily given his current renal function, uptitrate as needed

## 2018-12-19 NOTE — ED ADULT NURSE REASSESSMENT NOTE - NS ED NURSE REASSESS COMMENT FT1
pt. asleep but arousable, seen by podiatrist and dressed wound. pt. to be admitted, awaits bed. will continue to monitor

## 2018-12-19 NOTE — ADVANCED PRACTICE NURSE CONSULT - ASSESSMENT
A&Ox4, currently bedbound, continent of urine and stool. Patient with bony prominence along dorsal spine, sacrum and bilateral trochanters. Skin warm, dry with increased moisture in intertriginous folds, scattered areas of hyperpigmentation and hypopigmentation, scattered areas of eccyhmosis on bilateral upper extremities. Blanchable erythema on bilateral heels. Reactive hyperemia in right trochanter. Right foot with intact dressing (follow by podiatry).     Sacral stage 3 pressure ulcer measuring 1.5czx7clz2.3cm. Measurements taking while patient laying in right side. Tissue type presents as 100% flat pale pink agranular and scattered fibrin tissue. Scant serosanguinous drainage. No odor. Periwound skin with blanching erythema that extends 0.3cm. No increased warmth, no erythema, no induration. Goals of care: monitor for tissue type changes, reduce/control bioburden, maintain a moist environment for wound healing. Linear scar in right medial lower thigh with two area of intact scab proximal measuring 6cmx0.0hss0up and distal intact scab measures 1.5cmx0.9gde9qs.) No increased warmth, no induration mild erythema circumferentially extends     Right groin extending to upper thigh surgical wound (from bypass in November, 2018). A&Ox4, currently bedbound, continent of urine and stool. Patient with bony prominence along dorsal spine, sacrum and bilateral trochanters. Skin warm, dry with increased moisture in intertriginous folds, scattered areas of hyperpigmentation and hypopigmentation, scattered areas of eccyhmosis on bilateral upper extremities. Blanchable erythema on bilateral heels. Reactive hyperemia in right trochanter. Right foot with intact dressing (follow by podiatry).     Sacral stage 3 pressure ulcer measuring 1.3kba3vpx0.3cm. Measurements taking while patient laying in right side. Tissue type presents as 100% flat pale pink agranular and scattered fibrin tissue. Scant serosanguinous drainage. No odor. Periwound skin with blanching erythema that extends 0.3cm. No increased warmth, no erythema, no induration. Goals of care: monitor for tissue type changes, reduce/control bioburden, maintain a moist environment for wound healing. Linear scar in right medial lower thigh with two area of intact scab proximal measuring 6cmx0.9dka9mm and distal intact scab measures 1.5cmx0.6ckb7gm.) No increased warmth, no induration mild erythema circumferentially extends     Right groin extending to upper thigh surgical wounds (from bypass in November, 2018) Entire area measures 8cmx1.8cmx2.4cm within entire area there 3 open ulcerations along surgical scar.   Most proximal surgical wound measures 2cmx0.0mry2ga. True anatomical depth unable to be determine secondary to necrotic tissue, 2 cm away ( by intact area of re-epithelization there is a second ulceration measuring 1cmx0.3cmx0.3cm. True anatomical depth unable to be determine secondary to necrotic tissue, 2cm away (over necrotic bridge) there is a third ulceration measuring 2cmx1.2cmx2.4cm. Tunneling at 3 o'clock extends 2.5cm. No drainage express. Scant serous drainage. No odor. All wounds presenting with 100% tan/brown yellow slough firmly attach to wound bed. In distal wound there is an area of necrotic tissue that is lifting hence the depth pf the wound. Periwound skin with palpated scar tissue and blanching erythema that extends 0.5cm. No increased warmth, no induration. Goals enzymatic debridement of necrotic tissue, maintain a moist environment for wound healing, monitor for tissue type changes, protect periwound skin. A&Ox4, currently bedbound, continent of urine and stool. Patient with bony prominence along dorsal spine, sacrum and bilateral trochanters. Skin warm, dry with increased moisture in intertriginous folds, scattered areas of hyperpigmentation and hypopigmentation, scattered areas of eccyhmosis on bilateral upper extremities. Blanchable erythema on bilateral heels. Reactive hyperemia in right trochanter. Right foot with intact dressing (follow by podiatry).     Sacral stage 3 pressure ulcer measuring 1.8nca1pab1.3cm. Measurements taking while patient laying in right side. Tissue type presents as 100% flat pale pink agranular and scattered fibrin tissue. Scant serosanguinous drainage. No odor. Periwound skin with blanching erythema that extends 0.3cm. No increased warmth, no erythema, no induration. Goals of care: monitor for tissue type changes, reduce/control bioburden, maintain a moist environment for wound healing. Linear scar in right medial lower thigh with two area of intact scab proximal measuring 6cmx0.7reu9vr and distal intact scab measures 1.5cmx0.0itd5sc.) No increased warmth, no induration mild erythema circumferentially extends     Right groin extending to upper thigh surgical wounds (from bypass in November, 2018) Entire area measures 8cmx1.8cmx2.4cm within entire area there 3 open ulcerations along surgical scar.   Most proximal surgical wound measures 2cmx0.2bcq2ny. True anatomical depth unable to be determine secondary to necrotic tissue, 2 cm away ( by intact area of re-epithelization there is a second ulceration measuring 1cmx0.3cmx0.3cm. True anatomical depth unable to be determine secondary to necrotic tissue, 2cm away (over necrotic bridge) there is a third ulceration measuring 2cmx1.2cmx2.4cm. Tunneling at 3 o'clock extends 2.5cm. No drainage express. Scant serous drainage. No odor. All wounds presenting with 100% tan/brown yellow slough firmly attach to wound bed. In distal wound there is an area of necrotic tissue that is lifting hence the depth pf the wound. Periwound skin with palpated scar tissue and blanching erythema that extends 0.5cm. No increased warmth, no induration. Goals enzymatic debridement of necrotic tissue, maintain a moist environment for wound healing, monitor for tissue type changes, protect periwound skin.     Findings discussed with NP

## 2018-12-19 NOTE — ADVANCED PRACTICE NURSE CONSULT - RECOMMEDATIONS
Pending CAMMY/PVR as per vascular recommendations   Obtain Hemoglobin A1C with next bloodwork  Patient has a follow up appointment with vascular surgeon on January 15, 2019    Topical Recommendations   Sacrum: Clean with NS. Pat dry. Apply Liquid barrier film to periwound skin. Apply Medihoney gel to wound bed. Cover with foam with borders. Change daily.     Right medial thigh: Betadine wipe, allow to dry. Apply daily. Pending CAMMY/PVR as per vascular recommendations   Obtain Hemoglobin A1C with next bloodwork  Patient has a follow up appointment with vascular surgeon on January 15, 2019  Podiatry should continue to follow right foot wound   Nutrition consult to optimize nutrition     Topical Recommendations   Sacrum: Clean with NS. Pat dry. Apply Liquid barrier film to periwound skin. Apply Medihoney gel to wound bed. Cover with foam with borders. Change daily.     Right medial thigh (over scab area): Betadine wipe, allow to dry. Apply daily.     Right groin extending to upper thigh: Clean with SAF-CLENS. Rinse with NS. Pat dry. Apply Liquid barrier film to periwound skin. Apply collagenase to wound bed, lightly pack hydrogel impregnated gauze, cover with foam with borders. Change daily or PRN.     Continue low air loss bed therapy, continue heel elevation with Z-flex fluidized positioning boots, continue to turn & reposition q2h with Z-anya fluidized positioning device, soft pillow between bony prominences, continue moisture management with barrier creams & single breathable pad, continue measures to decrease friction/shear/pressure. Continue with nutritional support as per dietary/orders.  Plan discussed with patient. Patient educated on topical wound therapy and on dietary recommendations (high protein, low sugar diet) to optimize wound healing. Questions answers.     Please contact Wound Care Service Line if we can be of further assistance (ext [phone]). Pending CAMMY/PVR as per vascular recommendations   Obtain Hemoglobin A1C with next bloodwork  Patient has a follow up appointment with vascular surgeon on January 15, 2019  Podiatry should continue to follow right foot wound   Nutrition consult to optimize nutrition     Topical Recommendations   Sacrum: Clean with NS. Pat dry. Apply Liquid barrier film to periwound skin. Apply Medihoney gel to wound bed. Cover with foam with borders. Change daily.     Right medial thigh (over scab area): Betadine wipe, allow to dry. Apply daily.     Right groin extending to upper thigh: Clean with SAF-CLENS. Rinse with NS. Pat dry. Apply Liquid barrier film to periwound skin. Apply collagenase to wound bed, lightly pack hydrogel impregnated gauze, cover with foam with borders. Change daily or PRN.     Bilateral trochanters and heels: Apply Liquid barrier film twice a day.     Continue low air loss bed therapy, continue heel elevation with Z-flex fluidized positioning boots, continue to turn & reposition q2h with Z-anya fluidized positioning device, soft pillow between bony prominences, continue moisture management with barrier creams & single breathable pad, continue measures to decrease friction/shear/pressure. Continue with nutritional support as per dietary/orders.  Plan discussed with patient. Patient educated on topical wound therapy and on dietary recommendations (high protein, low sugar diet) to optimize wound healing. Questions answers.     Please contact Wound Care Service Line if we can be of further assistance (ext [phone]).

## 2018-12-19 NOTE — ED ADULT NURSE REASSESSMENT NOTE - NS ED NURSE REASSESS COMMENT FT1
Break coverage RN: Pt is a/o x 3. Pt noted to have 6mxd9ny stage 2 pressure ulcer to sacrum. Ulcer was cleaned and bandaged. No complaints of chest pain, headache, nausea, dizziness, vomiting  SOB, fever, chills verbalized. Pt resting comfortably. NAD. Awaiting further orders Will continue to monitor. Break coverage RN: Pt is a/o x 3. Pt noted to have 6okx2zl stage 2 pressure ulcer to sacrum. As per pt he got the ulcer  from [facility]. Ulcer was cleaned and bandaged. No complaints of chest pain, headache, nausea, dizziness, vomiting  SOB, fever, chills verbalized. Pt resting comfortably. NAD. Awaiting further orders Will continue to monitor.

## 2018-12-19 NOTE — H&P ADULT - NSHPOUTPATIENTPROVIDERS_GEN_ALL_CORE
Vascular Surgeon - Dr. Omkar Bruce  PCP - Dr. Herson Feliciano  Cardiology - Dr. Gerald Sandra  GI - Dr. Rocael Chaidez Vascular Surgeon - Dr. Omkar Bruce [phone]  PCP - Dr. Herson Feliciano [phone]  Cardiology - Dr. Gerald Sandra [phone]  GI - Dr. Rocael Chaidez [phone]

## 2018-12-19 NOTE — CONSULT NOTE ADULT - SUBJECTIVE AND OBJECTIVE BOX
[facility], Division of Infectious Diseases  ARMINDA Wood A. Lee    DANNY, EVE  58y, Male  [id number]    HPI--  58M with DM2 x26 years, peripheral neuropathy, HTN, CHF injured his right foot "banging it on something I guess." He didn't notice anything with his neuropathy until he noted a large hematomna there that steadily worsened in appearance over "a couple of weeks." . He is a vague with respect to dates and events. He thinks all this started 6 weeks ago, but that doesn't quite correlate with him having RLE fem-pop bypass surgery (synthetic, not autologous/venous) at [facility] "a week before Thanksgiving." He states he recovered will from the surgery. He states that he was told he had no infection, but was sent home with IV antibiotics for "a couple of weeks." He doesn't know why. He does not know what IV medication he receive except that it was slightly yellowish in color once activated and he took it once a day. He was readmitted to [facility] with an UGIB, and sounds as if he was treated for Helicobacter pylori. He states he received 5U PRBC, and had a hemoglobin of "12" when he left [facility]. He has brown stools and denies any further hematochezia or melena. No abdominal pain.    He states he had followed up with the vascular surgeon, and that there was some breakdown of the incision in the groin. Local wound care was recommended. His PCP put him on PO cipro.    He had some discomfort in his foot after doing physical therapy. He then decided to see a new podiatrist who referred him to the ER for further workup.    He denies any fevers, chills, or rigors. Pain in the groin is not an issue. Foot without drainage or malodor.    He is being assessed by vascular surgery and MRI is plannned to exclude osteomyelitis.    PMH/PSH--  CHF (congestive heart failure)  HLD (hyperlipidemia)  HTN (hypertension)  DM (diabetes mellitus)  S/P femoral-femoral bypass surgery      Allergies-- Penicillin "as a baby... I turned green"      Medications--  Antibiotics: ciprofloxacin     Tablet 500 milliGRAM(s) Oral every 12 hours    Immunologic:   Other: apixaban  aspirin enteric coated  atorvastatin  carvedilol  dextrose 40% Gel PRN  dextrose 5%.  dextrose 50% Injectable  dextrose 50% Injectable  dextrose 50% Injectable  gabapentin  glucagon  Injectable PRN  insulin lispro (HumaLOG) corrective regimen sliding scale  insulin lispro (HumaLOG) corrective regimen sliding scale  lisinopril      Social History--  EtOH: denies   Tobacco: denies   Drug Use: denies     Family/Marital History--  Family history of stroke (Father, Sibling)    Remainder not relevant to clinical concern.      Review of Systems:  A >=10-point review of systems was obtained.     Pertinent positives and negatives--  Constitutional: No fevers. No Chills. No Rigors.   Eyes: denies.   ENMT: denies.  Cardiovascular: No chest pain. No palpitations.  Respiratory: No shortness of breath. No cough.  Gastrointestinal: No nausea or vomiting. Rare loose BM.   Genitourinary: denies.   Musculoskeletal: no further pain in foot  Skin: as above  Neurologic: as above  Psychiatric: Pleasant. Appropriate affect.  Endocrine: states compliance with DM, checks FS 3x/day 104-190 range. Does not know HbA1c  Heme/Lymphatic: denies.   Allergy/Immunologic: denies.     Review of systems otherwise negative except as previously noted.    Physical Exam--  Vital Signs: T(F): 97.9 (12-19-18 @ 06:20), Max: 97.9 (12-19-18 @ 04:06)  HR: 80 (12-19-18 @ 06:20)  BP: 153/94 (12-19-18 @ 06:20)  RR: 17 (12-19-18 @ 06:20)  SpO2: 100% (12-19-18 @ 06:20)  Wt(kg): --  General: Nontoxic-appearing Male in no acute distress.  HEENT: AT/NC. PERRL. EOMI. Anicteric. Conjunctiva pink and moist. Oropharynx clear. Dentition poor.  Neck: Not rigid. No sense of mass.  Nodes: None palpable.  Lungs: Clear bilaterally without rales, wheezing or rhonchi  Heart: Regular rate and rhythm. No Murmur. No rub. No gallop. No palpable thrill.  Abdomen: Bowel sounds present and normoactive. Soft. Nondistended. Nontender. No sense of mass. No organomegaly.  Back: No spinal tenderness. No costovertebral angle tenderness.   Extremities: No cyanosis or clubbing. R groin incision sl macerated with some areas of fibrinous material, but had been dressed with medihoney. Small areas of eschar remain. No pulsatile mass. No malodor. 1mm rim of nonblanching erythema around incision. Nontender. No increased calor. Distal incision dry eschar with 1mm rim of nonblanching erythema. Foot with dry gangrene ocer 5MTP area wrapping onto dorsum and plantar aspect. Relatively insensate (symmetric). No surrounding inflammatory changes.  Skin: Warm. Dry. Good turgor. No rash. No vasculitic stigmata.  Psychiatric: Appropriate affect and mood for situation.         Laboratory & Imaging Data--  CBC                        11.1   7.14  )-----------( 402      ( 19 Dec 2018 06:10 )             35.2     WBC Count: 7.08 K/uL (12-18-18 @ 22:15)    Sedimentation Rate, Erythrocyte: 49 mm/hr (12.18.18 @ 22:15)  C-Reactive Protein, Serum: < 4.0 mg/L (12.18.18 @ 22:15)      Chemistries  12-19    145  |  109<H>  |  45<H>  ----------------------------<  91  5.0   |  23  |  2.18<H>      Ca    9.1      19 Dec 2018 06:10  Phos  4.5     12-19  Mg     2.1     12-19    TPro  6.2  /  Alb  3.3  /  TBili  < 0.2<L>  /  DBili  x   /  AST  19  /  ALT  21  /  AlkPhos  82  12-18      Culture Data  None    < from: Xray Foot AP + Lateral, Right (12.18.18 @ 21:56) >    IMPRESSION:  No cortical erosion or periosteal reactions to suggest osteomyelitis.   Ulceration noted along the lateral aspect of the midfoot.    < end of copied text >

## 2018-12-19 NOTE — H&P ADULT - PROBLEM SELECTOR PLAN 7
- c/w lisinopril (as therapeutic interchange for his ramipril) and coreg with hold parameters - c/w lisinopril (as therapeutic interchange for his ramipril) and coreg with hold parameters  - Monitor BUN/Cr while on lisinopril

## 2018-12-19 NOTE — H&P ADULT - PROBLEM SELECTOR PLAN 5
- Patient states that he has a weak heart but currently is not on any diuretics  - Will need to clarify his history of CHF in AM with his cardiologist/PCP, for now continue with ACEI therapy and coreg  - Patient also states that he is supposed to have a heart cath later this month, clarify this with his cardiologist in AM

## 2018-12-19 NOTE — PROGRESS NOTE ADULT - ASSESSMENT
ASSESSMENT: Patient is a 58M w/ recent fem-pop bypass p/w chronic nonhealing RLE foot ulcer, otherwise hemodynamically stable. vascular consulted for nonpalp pulses, PT signals present blilaterally     PLAN:    - Obtain CAMMY/PVR  - f/u with pt vascualr surgeon to obtain outpatient records of prior vascular exam  - Plan discussed with vascular fellow on helf of Dr. Pandya  - Plan to be discussed with Dr. Elvie Enriquez PGY-2  C Team [id number] ASSESSMENT: Patient is a 58M w/ recent fem-pop bypass p/w chronic nonhealing RLE foot ulcer, otherwise hemodynamically stable. vascular consulted for nonpalp pulses, PT signals present blilaterally     PLAN:    - Obtain CAMMY/PVR  - f/u with pt vascualr surgeon to obtain outpatient records of prior vascular exam  - Pt seen and examined with vascular fellow  - Plan  discussed with Dr. Elvie Enriquez PGY-2  C Team [id number]

## 2018-12-19 NOTE — H&P ADULT - PROBLEM SELECTOR PLAN 1
- Dry gangrene of the R foot ulcer, seen by podiatry -> no signs of infection, would therefore hold off on IV abx for now  - Wound debridement and further management as per podiatry

## 2018-12-19 NOTE — H&P ADULT - NSHPPHYSICALEXAM_GEN_ALL_CORE
Vital Signs Last 24 Hrs  T(C): 36.6 (19 Dec 2018 04:06), Max: 36.6 (19 Dec 2018 04:06)  T(F): 97.9 (19 Dec 2018 04:06), Max: 97.9 (19 Dec 2018 04:06)  HR: 77 (19 Dec 2018 04:06) (77 - 90)  BP: 144/88 (19 Dec 2018 04:06) (121/87 - 144/88)  BP(mean): --  RR: 16 (19 Dec 2018 04:06) (16 - 17)  SpO2: 100% (19 Dec 2018 04:06) (99% - 100%)    GENERAL: No acute distress, well-developed  ENT: EOMI, PERRL, conjunctiva and sclera clear, Neck supple, No JVD, moist mucosa  CHEST/LUNG: Clear to auscultation bilaterally; No wheeze, equal breath sounds bilaterally   BACK: No spinal tenderness  HEART: Regular rate and rhythm; No murmurs, rubs, or gallops  ABDOMEN: Soft, Nontender, Nondistended; Bowel sounds present  EXTREMITIES:  No clubbing, cyanosis, or edema  PSYCH: Nl behavior, nl affect  NEUROLOGY: AAOx3, non-focal, FREEMAN x4  SKIN: R foot with large necrotic eschar without any surrounding erythema ro purulence, no TTP, no warmth, no foul odor; R groin with incision with 3 areas of wound dehiscence to subcutaneous tissue, some surrounding erythema but no warmth, no TTP, no discharge noted; Sacral stage 2 decubitus ulcer

## 2018-12-19 NOTE — CONSULT NOTE ADULT - ASSESSMENT
59 y/o male pt with right foot non healing ulcer  - pt seen and evaluated   - no signs of infection present at this time  - pt had bypass on RLE 1 month ago at outside hospital   - xray negative for osteo  - plan for possible wound debridement and graft application  - d/w attending

## 2018-12-19 NOTE — CHART NOTE - NSCHARTNOTEFT_GEN_A_CORE
attempted to call patients PCP at phone number listed, phone number is disconnected, will f/u with patient and family for PCP contact info

## 2018-12-19 NOTE — H&P ADULT - PROBLEM SELECTOR PLAN 9
- Patient on apixaban, states that he was started on this because of his "weak heart"  and prophylaxis but denies any knowledge clots in his heart or atrial fibrillation  - Will c/w apixaban for now but would need to clarify reasoning for this medication in AM with his cardiologist  - Patient s/p treatment of h pylori with 8 days of triple therapy (usual treatment length is 14 days so its possible he got the other days during his hospitalization at [facility]), denies any further bleeding episodes, will check a h pylori stool antigen to assess for clearance  - Patient on colchicine but no acute gout complaints at present, will hold for now

## 2018-12-20 LAB
APTT BLD: 30.5 SEC — SIGNIFICANT CHANGE UP (ref 27.5–36.3)
BUN SERPL-MCNC: 45 MG/DL — HIGH (ref 7–23)
CALCIUM SERPL-MCNC: 9.1 MG/DL — SIGNIFICANT CHANGE UP (ref 8.4–10.5)
CHLORIDE SERPL-SCNC: 103 MMOL/L — SIGNIFICANT CHANGE UP (ref 98–107)
CO2 SERPL-SCNC: 25 MMOL/L — SIGNIFICANT CHANGE UP (ref 22–31)
CREAT SERPL-MCNC: 1.86 MG/DL — HIGH (ref 0.5–1.3)
GLUCOSE SERPL-MCNC: 128 MG/DL — HIGH (ref 70–99)
HBA1C BLD-MCNC: 5.7 % — HIGH (ref 4–5.6)
HCT VFR BLD CALC: 34 % — LOW (ref 39–50)
HGB BLD-MCNC: 10.9 G/DL — LOW (ref 13–17)
INR BLD: 1.09 — SIGNIFICANT CHANGE UP (ref 0.88–1.17)
MCHC RBC-ENTMCNC: 29.9 PG — SIGNIFICANT CHANGE UP (ref 27–34)
MCHC RBC-ENTMCNC: 32.1 % — SIGNIFICANT CHANGE UP (ref 32–36)
MCV RBC AUTO: 93.2 FL — SIGNIFICANT CHANGE UP (ref 80–100)
NRBC # FLD: 0 — SIGNIFICANT CHANGE UP
PLATELET # BLD AUTO: 411 K/UL — HIGH (ref 150–400)
PMV BLD: 9.3 FL — SIGNIFICANT CHANGE UP (ref 7–13)
POTASSIUM SERPL-MCNC: 4.6 MMOL/L — SIGNIFICANT CHANGE UP (ref 3.5–5.3)
POTASSIUM SERPL-SCNC: 4.6 MMOL/L — SIGNIFICANT CHANGE UP (ref 3.5–5.3)
PROTHROM AB SERPL-ACNC: 12.1 SEC — SIGNIFICANT CHANGE UP (ref 9.8–13.1)
RBC # BLD: 3.65 M/UL — LOW (ref 4.2–5.8)
RBC # FLD: 17.4 % — HIGH (ref 10.3–14.5)
SODIUM SERPL-SCNC: 140 MMOL/L — SIGNIFICANT CHANGE UP (ref 135–145)
WBC # BLD: 7.46 K/UL — SIGNIFICANT CHANGE UP (ref 3.8–10.5)
WBC # FLD AUTO: 7.46 K/UL — SIGNIFICANT CHANGE UP (ref 3.8–10.5)

## 2018-12-20 PROCEDURE — 73718 MRI LOWER EXTREMITY W/O DYE: CPT | Mod: 26,RT

## 2018-12-20 RX ORDER — HEPARIN SODIUM 5000 [USP'U]/ML
4000 INJECTION INTRAVENOUS; SUBCUTANEOUS EVERY 6 HOURS
Qty: 0 | Refills: 0 | Status: DISCONTINUED | OUTPATIENT
Start: 2018-12-20 | End: 2018-12-26

## 2018-12-20 RX ORDER — HEPARIN SODIUM 5000 [USP'U]/ML
2000 INJECTION INTRAVENOUS; SUBCUTANEOUS EVERY 6 HOURS
Qty: 0 | Refills: 0 | Status: DISCONTINUED | OUTPATIENT
Start: 2018-12-20 | End: 2018-12-26

## 2018-12-20 RX ORDER — HEPARIN SODIUM 5000 [USP'U]/ML
4000 INJECTION INTRAVENOUS; SUBCUTANEOUS ONCE
Qty: 0 | Refills: 0 | Status: COMPLETED | OUTPATIENT
Start: 2018-12-20 | End: 2018-12-21

## 2018-12-20 RX ORDER — HEPARIN SODIUM 5000 [USP'U]/ML
INJECTION INTRAVENOUS; SUBCUTANEOUS
Qty: 25000 | Refills: 0 | Status: DISCONTINUED | OUTPATIENT
Start: 2018-12-20 | End: 2018-12-24

## 2018-12-20 RX ADMIN — APIXABAN 2.5 MILLIGRAM(S): 2.5 TABLET, FILM COATED ORAL at 17:04

## 2018-12-20 RX ADMIN — GABAPENTIN 100 MILLIGRAM(S): 400 CAPSULE ORAL at 11:14

## 2018-12-20 RX ADMIN — LISINOPRIL 40 MILLIGRAM(S): 2.5 TABLET ORAL at 05:35

## 2018-12-20 RX ADMIN — CARVEDILOL PHOSPHATE 12.5 MILLIGRAM(S): 80 CAPSULE, EXTENDED RELEASE ORAL at 17:04

## 2018-12-20 RX ADMIN — Medication 2: at 11:55

## 2018-12-20 RX ADMIN — Medication 81 MILLIGRAM(S): at 11:14

## 2018-12-20 RX ADMIN — Medication 1: at 17:03

## 2018-12-20 RX ADMIN — Medication 650 MILLIGRAM(S): at 01:00

## 2018-12-20 RX ADMIN — CARVEDILOL PHOSPHATE 12.5 MILLIGRAM(S): 80 CAPSULE, EXTENDED RELEASE ORAL at 05:32

## 2018-12-20 RX ADMIN — APIXABAN 2.5 MILLIGRAM(S): 2.5 TABLET, FILM COATED ORAL at 05:33

## 2018-12-20 NOTE — PROGRESS NOTE ADULT - SUBJECTIVE AND OBJECTIVE BOX
Podiatry pager #: [phone]    Patient is a 58y old  Male who presents with a chief complaint of non-healing R foot wound (19 Dec 2018 09:31)       INTERVAL HPI/OVERNIGHT EVENTS:  Patient seen and evaluated at bedside.  Pt is resting comfortable in NAD. Denies N/V/F/C.      Allergies    penicillin (Other)    Intolerances        Vital Signs Last 24 Hrs  T(C): 36.6 (20 Dec 2018 05:36), Max: 36.8 (19 Dec 2018 15:11)  T(F): 97.8 (20 Dec 2018 05:36), Max: 98.3 (19 Dec 2018 15:11)  HR: 66 (20 Dec 2018 05:36) (66 - 75)  BP: 148/83 (20 Dec 2018 05:36) (130/74 - 148/83)  BP(mean): --  RR: 17 (20 Dec 2018 05:36) (17 - 18)  SpO2: 100% (20 Dec 2018 05:36) (100% - 100%)    LABS:                        10.9   7.46  )-----------( 411      ( 20 Dec 2018 06:30 )             34.0     12-20    140  |  103  |  45<H>  ----------------------------<  128<H>  4.6   |  25  |  1.86<H>    Ca    9.1      20 Dec 2018 06:30  Phos  4.5     12-19  Mg     2.1     12-19    TPro  6.2  /  Alb  3.3  /  TBili  < 0.2<L>  /  DBili  x   /  AST  19  /  ALT  21  /  AlkPhos  82  12-18    PT/INR - ( 20 Dec 2018 06:30 )   PT: 12.1 SEC;   INR: 1.09          PTT - ( 20 Dec 2018 06:30 )  PTT:30.5 SEC  Urinalysis Basic - ( 19 Dec 2018 09:00 )    Color: COLORLESS / Appearance: CLEAR / S.011 / pH: 6.0  Gluc: NEGATIVE / Ketone: NEGATIVE  / Bili: NEGATIVE / Urobili: NORMAL   Blood: NEGATIVE / Protein: NEGATIVE / Nitrite: NEGATIVE   Leuk Esterase: NEGATIVE / RBC: x / WBC x   Sq Epi: x / Non Sq Epi: x / Bacteria: x      CAPILLARY BLOOD GLUCOSE      POCT Blood Glucose.: 201 mg/dL (20 Dec 2018 11:48)  POCT Blood Glucose.: 110 mg/dL (20 Dec 2018 07:46)  POCT Blood Glucose.: 144 mg/dL (19 Dec 2018 22:06)  POCT Blood Glucose.: 111 mg/dL (19 Dec 2018 16:51)      Lower Extremity Physical Exam:  Vasular: DP/PT non palp on right, DP palp on the left , B/L, CFT <3 seconds B/L, Temperature gradient WNL, B/L.   Neuro: Epicritic sensation intact to the level of toes, B/L  Skin: right foot ulceration on the dorsal lateral forefoot to subQ with overlying eschar, no probing to bone, no clinical signs of infection, no drainage    RADIOLOGY & ADDITIONAL TESTS:

## 2018-12-20 NOTE — PROGRESS NOTE ADULT - ASSESSMENT
· Assessment		  57 y/o male pt with right foot non healing ulcer  - pt seen and evaluated   - stable eschar, unstagable, no clinical signs of infection   - awaiting MRI to r/o OM, awaiting CAMMY/PVR  - non palpable R pulse, pt had bypass on RLE 1 month ago at outside hospital, recommend vascular consult.   - Possible OR pending MRI  - please document medical clearance for local with sedation   - d/w attending · Assessment		  57 y/o male pt with right foot non healing ulcer  - pt seen and evaluated   - stable eschar, unstagable, no clinical signs of infection   - awaiting MRI to r/o OM, awaiting CAMMY/PVR  - non palpable R pulse, pt had bypass on RLE 1 month ago at outside hospital   - Possible OR pending MRI  - please document medical clearance for local with sedation   - d/w attending

## 2018-12-20 NOTE — CHART NOTE - NSCHARTNOTEFT_GEN_A_CORE
Heparin drip ordered by Attending. As per Pharmacist Heparin drip should only be started 12 hrs after last dose of Apixaban which was administered at 5pm.

## 2018-12-20 NOTE — PROGRESS NOTE ADULT - SUBJECTIVE AND OBJECTIVE BOX
Patient is a 58y old  Male who presents with a chief complaint of non-healing R foot wound (20 Dec 2018 12:20)      SUBJECTIVE / OVERNIGHT EVENTS:  feels well.  no cp, no sob.  no leg pain.  no cp, no sob, no n/v/d. no abdominal pain.  no headache, no dizziness.   poor historian.       Vital Signs Last 24 Hrs  T(C): 36.9 (20 Dec 2018 14:28), Max: 36.9 (20 Dec 2018 14:28)  T(F): 98.4 (20 Dec 2018 14:28), Max: 98.4 (20 Dec 2018 14:28)  HR: 79 (20 Dec 2018 14:28) (66 - 79)  BP: 124/78 (20 Dec 2018 14:28) (124/78 - 148/83)  BP(mean): --  RR: 17 (20 Dec 2018 14:28) (17 - 18)  SpO2: 100% (20 Dec 2018 14:28) (100% - 100%)  I&O's Summary      PHYSICAL EXAM:  GENERAL: NAD, Comfortable  HEAD:  Atraumatic, Normocephalic  EYES: EOMI, PERRLA, conjunctiva and sclera clear  NECK: Supple, No JVD  CHEST/LUNG: Clear to auscultation bilaterally; No wheeze  HEART: Regular rate and rhythm; No murmurs, rubs, or gallops  ABDOMEN: Soft, Nontender, Nondistended; Bowel sounds present  Neuro: AAO x 3, no focal deficit, LE dressing d/c/i.  EXTREMITIES:  No clubbing, cyanosis, or edema, dressing in place. groin with a small dehiscence of his femoral bypass incision, no pus, no bleeding. healing. no erythema.   SKIN: No rashes or lesions    LABS:                        10.9   7.46  )-----------( 411      ( 20 Dec 2018 06:30 )             34.0     12-20    140  |  103  |  45<H>  ----------------------------<  128<H>  4.6   |  25  |  1.86<H>    Ca    9.1      20 Dec 2018 06:30  Phos  4.5     12-19  Mg     2.1     12-19    TPro  6.2  /  Alb  3.3  /  TBili  < 0.2<L>  /  DBili  x   /  AST  19  /  ALT  21  /  AlkPhos  82  12-18    PT/INR - ( 20 Dec 2018 06:30 )   PT: 12.1 SEC;   INR: 1.09          PTT - ( 20 Dec 2018 06:30 )  PTT:30.5 SEC  CAPILLARY BLOOD GLUCOSE      POCT Blood Glucose.: 176 mg/dL (20 Dec 2018 16:42)  POCT Blood Glucose.: 201 mg/dL (20 Dec 2018 11:48)  POCT Blood Glucose.: 110 mg/dL (20 Dec 2018 07:46)  POCT Blood Glucose.: 144 mg/dL (19 Dec 2018 22:06)        Urinalysis Basic - ( 19 Dec 2018 09:00 )    Color: COLORLESS / Appearance: CLEAR / S.011 / pH: 6.0  Gluc: NEGATIVE / Ketone: NEGATIVE  / Bili: NEGATIVE / Urobili: NORMAL   Blood: NEGATIVE / Protein: NEGATIVE / Nitrite: NEGATIVE   Leuk Esterase: NEGATIVE / RBC: x / WBC x   Sq Epi: x / Non Sq Epi: x / Bacteria: x        RADIOLOGY & ADDITIONAL TESTS:    Imaging Personally Reviewed:  [x] YES  [ ] NO    Consultant(s) Notes Reviewed:  [x] YES  [ ] NO      MEDICATIONS  (STANDING):  aspirin enteric coated 81 milliGRAM(s) Oral daily  atorvastatin 40 milliGRAM(s) Oral at bedtime  carvedilol 12.5 milliGRAM(s) Oral every 12 hours  dextrose 5%. 1000 milliLiter(s) (50 mL/Hr) IV Continuous <Continuous>  dextrose 50% Injectable 12.5 Gram(s) IV Push once  dextrose 50% Injectable 25 Gram(s) IV Push once  dextrose 50% Injectable 25 Gram(s) IV Push once  gabapentin 100 milliGRAM(s) Oral daily  heparin  Infusion.  Unit(s)/Hr (10 mL/Hr) IV Continuous <Continuous>  heparin  Injectable 4000 Unit(s) IV Push once  insulin lispro (HumaLOG) corrective regimen sliding scale   SubCutaneous three times a day before meals  insulin lispro (HumaLOG) corrective regimen sliding scale   SubCutaneous at bedtime  lisinopril 40 milliGRAM(s) Oral daily    MEDICATIONS  (PRN):  dextrose 40% Gel 15 Gram(s) Oral once PRN Blood Glucose LESS THAN 70 milliGRAM(s)/deciliter  glucagon  Injectable 1 milliGRAM(s) IntraMuscular once PRN Glucose LESS THAN 70 milligrams/deciliter  heparin  Injectable 4000 Unit(s) IV Push every 6 hours PRN For aPTT less than 40  heparin  Injectable 2000 Unit(s) IV Push every 6 hours PRN For aPTT between 40 - 57      Care Discussed with Consultants/Other Providers [x] YES  [ ] NO    HEALTH ISSUES - PROBLEM Dx:  Need for prophylactic measure: Need for prophylactic measure  Medication management: Medication management  Hyperlipidemia, unspecified hyperlipidemia type: Hyperlipidemia, unspecified hyperlipidemia type  Essential hypertension: Essential hypertension  Type 2 diabetes mellitus with complication, without long-term current use of insulin: Type 2 diabetes mellitus with complication, without long-term current use of insulin  Congestive heart failure, unspecified HF chronicity, unspecified heart failure type: Congestive heart failure, unspecified HF chronicity, unspecified heart failure type  CKD (chronic kidney disease) stage 4, GFR 15-29 ml/min: CKD (chronic kidney disease) stage 4, GFR 15-29 ml/min  Sacral decubitus ulcer, stage II: Sacral decubitus ulcer, stage II  Dehiscence of operative wound, initial encounter: Dehiscence of operative wound, initial encounter  Dry gangrene: Dry gangrene

## 2018-12-20 NOTE — PROGRESS NOTE ADULT - ASSESSMENT
58 M with PMHx of CHF (unknown EF), CAD (no stents, per pt recently had cath this year prior to his bypass procedure), HTN, and HLD who presents to the hospital with complaints of a non-healing R foot wound as well as small dehiscence of his femoral bypass incision.

## 2018-12-21 LAB
APTT BLD: 54.3 SEC — HIGH (ref 27.5–36.3)
APTT BLD: 64.6 SEC — HIGH (ref 27.5–36.3)
BUN SERPL-MCNC: 51 MG/DL — HIGH (ref 7–23)
CALCIUM SERPL-MCNC: 9.1 MG/DL — SIGNIFICANT CHANGE UP (ref 8.4–10.5)
CHLORIDE SERPL-SCNC: 105 MMOL/L — SIGNIFICANT CHANGE UP (ref 98–107)
CO2 SERPL-SCNC: 21 MMOL/L — LOW (ref 22–31)
CREAT SERPL-MCNC: 2.06 MG/DL — HIGH (ref 0.5–1.3)
GLUCOSE SERPL-MCNC: 156 MG/DL — HIGH (ref 70–99)
HCT VFR BLD CALC: 35.1 % — LOW (ref 39–50)
HCT VFR BLD CALC: 35.3 % — LOW (ref 39–50)
HGB BLD-MCNC: 11 G/DL — LOW (ref 13–17)
HGB BLD-MCNC: 11.1 G/DL — LOW (ref 13–17)
MCHC RBC-ENTMCNC: 29.9 PG — SIGNIFICANT CHANGE UP (ref 27–34)
MCHC RBC-ENTMCNC: 30.1 PG — SIGNIFICANT CHANGE UP (ref 27–34)
MCHC RBC-ENTMCNC: 31.3 % — LOW (ref 32–36)
MCHC RBC-ENTMCNC: 31.4 % — LOW (ref 32–36)
MCV RBC AUTO: 95.4 FL — SIGNIFICANT CHANGE UP (ref 80–100)
MCV RBC AUTO: 95.7 FL — SIGNIFICANT CHANGE UP (ref 80–100)
NRBC # FLD: 0 — SIGNIFICANT CHANGE UP
NRBC # FLD: 0 — SIGNIFICANT CHANGE UP
PLATELET # BLD AUTO: 373 K/UL — SIGNIFICANT CHANGE UP (ref 150–400)
PLATELET # BLD AUTO: 387 K/UL — SIGNIFICANT CHANGE UP (ref 150–400)
PMV BLD: 9.5 FL — SIGNIFICANT CHANGE UP (ref 7–13)
PMV BLD: 9.5 FL — SIGNIFICANT CHANGE UP (ref 7–13)
POTASSIUM SERPL-MCNC: 4.6 MMOL/L — SIGNIFICANT CHANGE UP (ref 3.5–5.3)
POTASSIUM SERPL-SCNC: 4.6 MMOL/L — SIGNIFICANT CHANGE UP (ref 3.5–5.3)
RBC # BLD: 3.68 M/UL — LOW (ref 4.2–5.8)
RBC # BLD: 3.69 M/UL — LOW (ref 4.2–5.8)
RBC # FLD: 17 % — HIGH (ref 10.3–14.5)
RBC # FLD: 17.2 % — HIGH (ref 10.3–14.5)
SODIUM SERPL-SCNC: 140 MMOL/L — SIGNIFICANT CHANGE UP (ref 135–145)
WBC # BLD: 7.47 K/UL — SIGNIFICANT CHANGE UP (ref 3.8–10.5)
WBC # BLD: 8.16 K/UL — SIGNIFICANT CHANGE UP (ref 3.8–10.5)
WBC # FLD AUTO: 7.47 K/UL — SIGNIFICANT CHANGE UP (ref 3.8–10.5)
WBC # FLD AUTO: 8.16 K/UL — SIGNIFICANT CHANGE UP (ref 3.8–10.5)

## 2018-12-21 PROCEDURE — 93306 TTE W/DOPPLER COMPLETE: CPT | Mod: 26

## 2018-12-21 PROCEDURE — 99222 1ST HOSP IP/OBS MODERATE 55: CPT

## 2018-12-21 RX ORDER — DOCUSATE SODIUM 100 MG
100 CAPSULE ORAL
Qty: 0 | Refills: 0 | Status: DISCONTINUED | OUTPATIENT
Start: 2018-12-21 | End: 2018-12-27

## 2018-12-21 RX ADMIN — CARVEDILOL PHOSPHATE 12.5 MILLIGRAM(S): 80 CAPSULE, EXTENDED RELEASE ORAL at 05:18

## 2018-12-21 RX ADMIN — HEPARIN SODIUM 4000 UNIT(S): 5000 INJECTION INTRAVENOUS; SUBCUTANEOUS at 05:18

## 2018-12-21 RX ADMIN — HEPARIN SODIUM 1100 UNIT(S)/HR: 5000 INJECTION INTRAVENOUS; SUBCUTANEOUS at 11:48

## 2018-12-21 RX ADMIN — ATORVASTATIN CALCIUM 40 MILLIGRAM(S): 80 TABLET, FILM COATED ORAL at 23:03

## 2018-12-21 RX ADMIN — CARVEDILOL PHOSPHATE 12.5 MILLIGRAM(S): 80 CAPSULE, EXTENDED RELEASE ORAL at 17:54

## 2018-12-21 RX ADMIN — GABAPENTIN 100 MILLIGRAM(S): 400 CAPSULE ORAL at 13:14

## 2018-12-21 RX ADMIN — ATORVASTATIN CALCIUM 40 MILLIGRAM(S): 80 TABLET, FILM COATED ORAL at 05:21

## 2018-12-21 RX ADMIN — Medication 2: at 17:24

## 2018-12-21 RX ADMIN — HEPARIN SODIUM 1100 UNIT(S)/HR: 5000 INJECTION INTRAVENOUS; SUBCUTANEOUS at 19:18

## 2018-12-21 RX ADMIN — Medication 3: at 11:46

## 2018-12-21 RX ADMIN — Medication 81 MILLIGRAM(S): at 13:14

## 2018-12-21 RX ADMIN — LISINOPRIL 40 MILLIGRAM(S): 2.5 TABLET ORAL at 05:17

## 2018-12-21 RX ADMIN — HEPARIN SODIUM 1000 UNIT(S)/HR: 5000 INJECTION INTRAVENOUS; SUBCUTANEOUS at 05:20

## 2018-12-21 RX ADMIN — Medication 100 MILLIGRAM(S): at 17:55

## 2018-12-21 NOTE — PROGRESS NOTE ADULT - SUBJECTIVE AND OBJECTIVE BOX
VASCULAR SURGERY DAILY PROGRESS NOTE:     Hospital Day: 2    Subjective: feels comfortable no acute events overnight       MEDICATIONS  (STANDING):  aspirin enteric coated 81 milliGRAM(s) Oral daily  atorvastatin 40 milliGRAM(s) Oral at bedtime  carvedilol 12.5 milliGRAM(s) Oral every 12 hours  dextrose 5%. 1000 milliLiter(s) (50 mL/Hr) IV Continuous <Continuous>  dextrose 50% Injectable 12.5 Gram(s) IV Push once  dextrose 50% Injectable 25 Gram(s) IV Push once  dextrose 50% Injectable 25 Gram(s) IV Push once  docusate sodium 100 milliGRAM(s) Oral two times a day  gabapentin 100 milliGRAM(s) Oral daily  heparin  Infusion.  Unit(s)/Hr (10 mL/Hr) IV Continuous <Continuous>  insulin lispro (HumaLOG) corrective regimen sliding scale   SubCutaneous three times a day before meals  insulin lispro (HumaLOG) corrective regimen sliding scale   SubCutaneous at bedtime  lisinopril 40 milliGRAM(s) Oral daily    MEDICATIONS  (PRN):  dextrose 40% Gel 15 Gram(s) Oral once PRN Blood Glucose LESS THAN 70 milliGRAM(s)/deciliter  glucagon  Injectable 1 milliGRAM(s) IntraMuscular once PRN Glucose LESS THAN 70 milligrams/deciliter  heparin  Injectable 4000 Unit(s) IV Push every 6 hours PRN For aPTT less than 40  heparin  Injectable 2000 Unit(s) IV Push every 6 hours PRN For aPTT between 40 - 57      Vital Signs Last 24 Hrs  T(C): 37.2 (21 Dec 2018 10:16), Max: 37.2 (21 Dec 2018 10:16)  T(F): 98.9 (21 Dec 2018 10:16), Max: 98.9 (21 Dec 2018 10:16)  HR: 69 (21 Dec 2018 10:16) (66 - 79)  BP: 133/77 (21 Dec 2018 10:16) (121/76 - 150/88)  BP(mean): --  RR: 18 (21 Dec 2018 10:16) (17 - 18)  SpO2: 100% (21 Dec 2018 10:16) (100% - 100%)    I&O's Detail    21 Dec 2018 07:01  -  21 Dec 2018 10:53  --------------------------------------------------------  IN:    Oral Fluid: 300 mL  Total IN: 300 mL    OUT:    Voided: 400 mL  Total OUT: 400 mL    Total NET: -100 mL    PHYSICAL EXAM:   General: NAD  Chest: No chest wall tenderness.   Cardiac: S1, S2, RRR  Respiratory: Breathing comfortably on RA   Abdomen: Soft, non-distended, non-tender   Groin: Normal appearing left groin,  right groin incision with dehiscence noted at 3 small areas, noted along edge of incision but no drainage or tenderness to palpation .   Ext: R medial incision CDI. Right foot lateral ulcer with eschar overlying, no fluctuance or drainage PT and AT signal of right lower ext, PT signal of Left Lower ext only.       Daily     LABS:                        11.0   8.16  )-----------( 387      ( 21 Dec 2018 05:40 )             35.1     12-21    140  |  105  |  51<H>  ----------------------------<  156<H>  4.6   |  21<L>  |  2.06<H>    Ca    9.1      21 Dec 2018 05:40      PT/INR - ( 20 Dec 2018 06:30 )   PT: 12.1 SEC;   INR: 1.09          PTT - ( 20 Dec 2018 06:30 )  PTT:30.5 SEC      RADIOLOGY & ADDITIONAL STUDIES:    CAMMY/PVR reviewed, normal waveform in RLE.       ASSESSMENT: Patient is a 58M w/ recent fem-pop bypass p/w chronic nonhealing RLE foot ulcer, otherwise hemodynamically stable. vascular consulted for nonpalp pulses, PT signals present blilaterally     PLAN:    - CAMMY/PVR reviewed, normal waveform in RLE. No need for further vascular surgery intervention, may proceed with podiatry intervention.    - Patient seen and examined with Dr. Pandya   [id number]

## 2018-12-21 NOTE — PROGRESS NOTE ADULT - SUBJECTIVE AND OBJECTIVE BOX
[facility], Division of Infectious Diseases  ARMINDA [place]. [place]  [phone]    Name: EVE DELGADO  Age: 58y  Gender: Male  MRN: [id number]    Interval History--  Notes reviewed. Wondering why he is on heparin drip. Eating well. No diarrhea. No fevers, chills, or rigors. Denies pain. Only complaint is that his michael phone is idead and that his  is not working.     Past Medical History--  CHF (congestive heart failure)  HLD (hyperlipidemia)  HTN (hypertension)  DM (diabetes mellitus)  S/P femoral-femoral bypass surgery      For details regarding the patient's social history, family history, and other miscellaneous elements, please refer the initial infectious diseases consultation and/or the admitting history and physical examination for this admission.    Allergies    penicillin (Other)    Intolerances        Medications--  Antibiotics:    Immunologic:    Other:  aspirin enteric coated  atorvastatin  carvedilol  dextrose 40% Gel PRN  dextrose 5%.  dextrose 50% Injectable  dextrose 50% Injectable  dextrose 50% Injectable  docusate sodium  gabapentin  glucagon  Injectable PRN  heparin  Infusion.  heparin  Injectable PRN  heparin  Injectable PRN  insulin lispro (HumaLOG) corrective regimen sliding scale  insulin lispro (HumaLOG) corrective regimen sliding scale  lisinopril      Review of Systems--  A 10-point review of systems was obtained.   Review of systems otherwise unchanged compared to prior visit except as previously noted.    Physical Examination--  Vital Signs: T(F): 98.1 (12-21-18 @ 05:09), Max: 98.4 (12-20-18 @ 14:28)  HR: 76 (12-21-18 @ 05:09)  BP: 150/88 (12-21-18 @ 05:09)  RR: 18 (12-21-18 @ 05:09)  SpO2: 100% (12-21-18 @ 05:09)  Wt(kg): --  General: Nontoxic-appearing Male in no acute distress.  HEENT: AT/NC. Anicteric. Conjunctiva pink and moist. Oropharynx clear. Dentition poor.  Neck: Not rigid. No sense of mass.  Nodes: None palpable.  Lungs: Clear bilaterally without rales, wheezing or rhonchi  Heart: Regular rate and rhythm. No Murmur. No rub. No gallop. No palpable thrill.  Abdomen: Bowel sounds present and normoactive. Soft. Nondistended. Nontender. No sense of mass. No organomegaly.  Extremities: No cyanosis or clubbing. R groin incision-areas of prior eschar are softening and detaching. No sign of periwound infection. Minimal serous discharge. Nontender.  No pulsatile mass. No malodor. 1mm rim of nonblanching erythema around incision. Nontender.  Distal incision dry eschar no change . Foot dressed.   Skin: Warm. Dry. Good turgor. No rash. No vasculitic stigmata.  Psychiatric: Appropriate affect and mood for situation.     Laboratory Studies--  CBC                        11.0   8.16  )-----------( 387      ( 21 Dec 2018 05:40 )             35.1       Chemistries  12-21    140  |  105  |  51<H>  ----------------------------<  156<H>  4.6   |  21<L>  |  2.06<H>    Ca    9.1      21 Dec 2018 05:40        Culture Data  None    < from: MR Foot No Cont, Right (12.20.18 @ 20:34) >  IMPRESSION: Large cutaneous ulceration along the dorsal and lateral   aspect of the fifth metatarsal and fifth metatarsal-phalangeal joint.   This extends down to the level of the bone and joint.    Scattered foci of serpiginous marrow signal alteration, likely   representing multifocal bone infarcts. No areas of confluent low T1   marrow signal to confidently suggest osteomyelitis, although, early   osteomyelitis cannot be excluded, particularly adjacent to the cutaneous   ulceration.    Muscular signal alteration, consistent with myositis and/or denervation.    < end of copied text >

## 2018-12-21 NOTE — PROGRESS NOTE ADULT - SUBJECTIVE AND OBJECTIVE BOX
Patient is a 58y old  Male who presents with a chief complaint of non-healing R foot wound (21 Dec 2018 08:22)       INTERVAL HPI/OVERNIGHT EVENTS:  Patient seen and evaluated at bedside.  Pt is resting comfortable in NAD. Denies N/V/F/C.      Allergies    penicillin (Other)    Intolerances        Vital Signs Last 24 Hrs  T(C): 36.7 (21 Dec 2018 05:09), Max: 36.9 (20 Dec 2018 14:28)  T(F): 98.1 (21 Dec 2018 05:09), Max: 98.4 (20 Dec 2018 14:28)  HR: 76 (21 Dec 2018 05:09) (66 - 79)  BP: 150/88 (21 Dec 2018 05:09) (121/76 - 150/88)  BP(mean): --  RR: 18 (21 Dec 2018 05:09) (17 - 18)  SpO2: 100% (21 Dec 2018 05:09) (100% - 100%)    LABS:                        11.0   8.16  )-----------( 387      ( 21 Dec 2018 05:40 )             35.1     12-21    140  |  105  |  51<H>  ----------------------------<  156<H>  4.6   |  21<L>  |  2.06<H>    Ca    9.1      21 Dec 2018 05:40      PT/INR - ( 20 Dec 2018 06:30 )   PT: 12.1 SEC;   INR: 1.09          PTT - ( 20 Dec 2018 06:30 )  PTT:30.5 SEC    CAPILLARY BLOOD GLUCOSE      POCT Blood Glucose.: 131 mg/dL (21 Dec 2018 07:55)  POCT Blood Glucose.: 210 mg/dL (20 Dec 2018 21:42)  POCT Blood Glucose.: 176 mg/dL (20 Dec 2018 16:42)  POCT Blood Glucose.: 201 mg/dL (20 Dec 2018 11:48)      Lower Extremity Physical Exam:  Vasular: DP/PT non palp on right, DP palp on the left , B/L, CFT <3 seconds B/L, Temperature gradient WNL, B/L.   Neuro: Epicritic sensation intact to the level of toes, B/L  Skin: right foot ulceration on the dorsal lateral forefoot to subQ with overlying eschar, no probing to bone, no clinical signs of infection, no drainage    RADIOLOGY & ADDITIONAL TESTS:  < from: MR Foot No Cont, Right (12.20.18 @ 20:34) >    EXAM:  MR FOOT RT        PROCEDURE DATE:  Dec 20 2018         INTERPRETATION:  EXAMINATION: MRI of the right foot without contrast    CLINICAL INFORMATION: Nonhealing foot wound    TECHNIQUE: Multiplanar, multisequential MR imaging was performed. Images   were obtained through the forefoot.    FINDINGS: There is a large cutaneous ulceration along the dorsal and   lateral aspect of the fifth metatarsal and fifth metatarsophalangeal   joint. This extends down to the level of the fifth metatarsal and fifth   MPJ which appear exposed. There are scattered foci of somewhat   serpiginous high T2 marrow signal alteration, having the appearance of   multifocal bone infarcts/areas of bone ischemia. There are no areas of   confluent low T1 marrow signal which confidently suggest osteomyelitis.   There is high T2 signal of the foot musculature, consistent with myositis   and/or denervation. There is nonspecific dorsal subcutaneous soft tissue   edema.      IMPRESSION: Large cutaneous ulceration along the dorsal and lateral   aspect of the fifth metatarsal and fifth metatarsal-phalangeal joint.   This extends down to the level of the bone and joint.    Scattered foci of serpiginous marrow signal alteration, likely   representing multifocal bone infarcts. No areas of confluent low T1   marrow signal to confidently suggest osteomyelitis, although, early   osteomyelitis cannot be excluded, particularly adjacent to the cutaneous   ulceration.    Muscular signal alteration, consistent with myositis and/or denervation.                  AUDREY SIMONS M.D., ATTENDING RADIOLOGIST  This document has been electronically signed. Dec 21 2018  7:39AM                  < end of copied text >

## 2018-12-21 NOTE — PROGRESS NOTE ADULT - ASSESSMENT
57 y/o male pt with right foot non healing ulcer  - pt seen and evaluated   - stable eschar, unstagable, no clinical signs of infection   - MRI reviewed - no OM   - Plan for OR this week - wound debridement w/ application of graft  - Pt consented   - please document medical clearance for local with sedation   - d/w attending

## 2018-12-21 NOTE — PROGRESS NOTE ADULT - ASSESSMENT
Dry gangrene of foot  No acute infectious process evident  No concern osteomyelitis at this time based on history and MR. Patient s/p course of IV antibiotics, would expect changes on MRI to be more pronounced if there was active infection, especially as changes of acute osteomyelitis can take months to resolve even after effective treatment.  S/P Vascular bypass surgery with synthetic graft.  No concern of infection of either surgical site  No concern of systemic illness  S/P Rx vs. H. pylori, will need eventual OPD GI follow up for urea breath test  DM2 with neuropathy, PVD. ?Control. HbA1c will be artifically low after multiple units of PRBC for GIB  In a patient with a history of penicillin allergy, only 10-15% will have evidence of an IgE mediated allergy to penicillin when definitive skin testing is performed. These tend to dissipate over time (10 years later, 80% have resolved). He would eventually benefit from OPD allergy evaluation to risk-stratify.      Suggestions--  Defer antibiotics at this time  Vascular follow up  Podiatry follow up  Eventual OPD Gi f/u  Eventual OPD allergy eval re: PCN allergy      Thank you for the courtesy of this referral.  I'll sign off at this time.     Jaspreet Lazar MD  [phone]

## 2018-12-21 NOTE — PROGRESS NOTE ADULT - SUBJECTIVE AND OBJECTIVE BOX
Patient is a 58y old  Male who presents with a chief complaint of non-healing R foot wound (20 Dec 2018 12:20)      SUBJECTIVE / OVERNIGHT EVENTS:  Pt seen and examined at bedside in [facility].  No overnight event.  no cp, no sob, no n/v/d.   pain controlled.       Vital Signs Last 24 Hrs  T(C): 36.9 (20 Dec 2018 14:28), Max: 36.9 (20 Dec 2018 14:28)  T(F): 98.4 (20 Dec 2018 14:28), Max: 98.4 (20 Dec 2018 14:28)  HR: 79 (20 Dec 2018 14:28) (66 - 79)  BP: 124/78 (20 Dec 2018 14:28) (124/78 - 148/83)  BP(mean): --  RR: 17 (20 Dec 2018 14:28) (17 - 18)  SpO2: 100% (20 Dec 2018 14:28) (100% - 100%)  I&O's Summary      PHYSICAL EXAM:  GENERAL: NAD, Comfortable  HEAD:  Atraumatic, Normocephalic  EYES: EOMI, PERRLA, conjunctiva and sclera clear  NECK: Supple, No JVD  CHEST/LUNG: Clear to auscultation bilaterally; No wheeze  HEART: Regular rate and rhythm; No murmurs, rubs, or gallops  ABDOMEN: Soft, Nontender, Nondistended; Bowel sounds present  Neuro: AAO x 3, no focal deficit, LE dressing d/c/i.  EXTREMITIES:  No clubbing, cyanosis, or edema, dressing in place. groin with a small dehiscence of his femoral bypass incision, no pus, no bleeding. healing. no erythema.   SKIN: No rashes or lesions    LABS:                        10.9   7.46  )-----------( 411      ( 20 Dec 2018 06:30 )             34.0     12-20    140  |  103  |  45<H>  ----------------------------<  128<H>  4.6   |  25  |  1.86<H>    Ca    9.1      20 Dec 2018 06:30  Phos  4.5     12-19  Mg     2.1     12-19    TPro  6.2  /  Alb  3.3  /  TBili  < 0.2<L>  /  DBili  x   /  AST  19  /  ALT  21  /  AlkPhos  82  12-18    PT/INR - ( 20 Dec 2018 06:30 )   PT: 12.1 SEC;   INR: 1.09          PTT - ( 20 Dec 2018 06:30 )  PTT:30.5 SEC  CAPILLARY BLOOD GLUCOSE      POCT Blood Glucose.: 176 mg/dL (20 Dec 2018 16:42)  POCT Blood Glucose.: 201 mg/dL (20 Dec 2018 11:48)  POCT Blood Glucose.: 110 mg/dL (20 Dec 2018 07:46)  POCT Blood Glucose.: 144 mg/dL (19 Dec 2018 22:06)        Urinalysis Basic - ( 19 Dec 2018 09:00 )    Color: COLORLESS / Appearance: CLEAR / S.011 / pH: 6.0  Gluc: NEGATIVE / Ketone: NEGATIVE  / Bili: NEGATIVE / Urobili: NORMAL   Blood: NEGATIVE / Protein: NEGATIVE / Nitrite: NEGATIVE   Leuk Esterase: NEGATIVE / RBC: x / WBC x   Sq Epi: x / Non Sq Epi: x / Bacteria: x        RADIOLOGY & ADDITIONAL TESTS:    Imaging Personally Reviewed:  [x] YES  [ ] NO    Consultant(s) Notes Reviewed:  [x] YES  [ ] NO      MEDICATIONS  (STANDING):  aspirin enteric coated 81 milliGRAM(s) Oral daily  atorvastatin 40 milliGRAM(s) Oral at bedtime  carvedilol 12.5 milliGRAM(s) Oral every 12 hours  dextrose 5%. 1000 milliLiter(s) (50 mL/Hr) IV Continuous <Continuous>  dextrose 50% Injectable 12.5 Gram(s) IV Push once  dextrose 50% Injectable 25 Gram(s) IV Push once  dextrose 50% Injectable 25 Gram(s) IV Push once  gabapentin 100 milliGRAM(s) Oral daily  heparin  Infusion.  Unit(s)/Hr (10 mL/Hr) IV Continuous <Continuous>  heparin  Injectable 4000 Unit(s) IV Push once  insulin lispro (HumaLOG) corrective regimen sliding scale   SubCutaneous three times a day before meals  insulin lispro (HumaLOG) corrective regimen sliding scale   SubCutaneous at bedtime  lisinopril 40 milliGRAM(s) Oral daily    MEDICATIONS  (PRN):  dextrose 40% Gel 15 Gram(s) Oral once PRN Blood Glucose LESS THAN 70 milliGRAM(s)/deciliter  glucagon  Injectable 1 milliGRAM(s) IntraMuscular once PRN Glucose LESS THAN 70 milligrams/deciliter  heparin  Injectable 4000 Unit(s) IV Push every 6 hours PRN For aPTT less than 40  heparin  Injectable 2000 Unit(s) IV Push every 6 hours PRN For aPTT between 40 - 57      Care Discussed with Consultants/Other Providers [x] YES  [ ] NO    HEALTH ISSUES - PROBLEM Dx:  Need for prophylactic measure: Need for prophylactic measure  Medication management: Medication management  Hyperlipidemia, unspecified hyperlipidemia type: Hyperlipidemia, unspecified hyperlipidemia type  Essential hypertension: Essential hypertension  Type 2 diabetes mellitus with complication, without long-term current use of insulin: Type 2 diabetes mellitus with complication, without long-term current use of insulin  Congestive heart failure, unspecified HF chronicity, unspecified heart failure type: Congestive heart failure, unspecified HF chronicity, unspecified heart failure type  CKD (chronic kidney disease) stage 4, GFR 15-29 ml/min: CKD (chronic kidney disease) stage 4, GFR 15-29 ml/min  Sacral decubitus ulcer, stage II: Sacral decubitus ulcer, stage II  Dehiscence of operative wound, initial encounter: Dehiscence of operative wound, initial encounter  Dry gangrene: Dry gangrene

## 2018-12-21 NOTE — CHART NOTE - NSCHARTNOTEFT_GEN_A_CORE
s/w podiatry patient planned for OR on monday pending cardiology clearance s/w podiatry patient planned for OR on monday pending cardiology clearance  HEPARIN DRIP needs to be held for 6 HOURS prior to OR as per podiatry resident

## 2018-12-21 NOTE — CONSULT NOTE ADULT - SUBJECTIVE AND OBJECTIVE BOX
CHIEF COMPLAINT: foot ulcer    HISTORY OF PRESENT ILLNESS:  58 M with PMH of CHF, CAD (70% lesion per patient, no stent), HTN, HLD and fem-pop bypass 11/2018 at OSH who presents for poorly healing R foot ulcer. Podiatry recommending debridement next week. Cardiology consulted for pre-op evaluation.     Patient's cardiologist is Dr. Gerald Sandra. Per patient, Dr. Sandra did a coronary angiogram before the fem-pop bypass in November to assess for CAD. He had a lesion, but it was not stented as it was not critical and the patient was not having CP. Patient has no chest pain, palpitations, SOB, JUAREZ, lightheadedness/dizziness, or LE edema. He is able to walk 1-2 blocks with a walker and climb 1 flight of stairs.     Allergies  penicillin (Other)  	  MEDICATIONS:  aspirin enteric coated 81 milliGRAM(s) Oral daily  carvedilol 12.5 milliGRAM(s) Oral every 12 hours  heparin  Infusion.  Unit(s)/Hr IV Continuous <Continuous>  heparin  Injectable 4000 Unit(s) IV Push every 6 hours PRN  heparin  Injectable 2000 Unit(s) IV Push every 6 hours PRN  lisinopril 40 milliGRAM(s) Oral daily  gabapentin 100 milliGRAM(s) Oral daily  docusate sodium 100 milliGRAM(s) Oral two times a day  atorvastatin 40 milliGRAM(s) Oral at bedtime  dextrose 40% Gel 15 Gram(s) Oral once PRN  dextrose 50% Injectable 12.5 Gram(s) IV Push once  dextrose 50% Injectable 25 Gram(s) IV Push once  dextrose 50% Injectable 25 Gram(s) IV Push once  glucagon  Injectable 1 milliGRAM(s) IntraMuscular once PRN  insulin lispro (HumaLOG) corrective regimen sliding scale   SubCutaneous three times a day before meals  insulin lispro (HumaLOG) corrective regimen sliding scale   SubCutaneous at bedtime  dextrose 5%. 1000 milliLiter(s) IV Continuous <Continuous>    PAST MEDICAL & SURGICAL HISTORY:  CHF (congestive heart failure)  HLD (hyperlipidemia)  HTN (hypertension)  DM (diabetes mellitus)  S/P femoral-femoral bypass surgery    FAMILY HISTORY:  Family history of stroke (Father, Sibling): in father and sister    SOCIAL HISTORY:    Non-smoker  Denies EtOH, illicit drugs    REVIEW OF SYSTEMS:  General: no fatigue/malaise, weight loss/gain.  Skin: foot ulcer  Ophthalmologic: no blurred vision, no loss of vision. 	  ENT: no sore throat, rhinorrhea, sinus congestion.  Respiratory: no SOB, cough or wheeze.  Gastrointestinal:  no N/V/D, no melena/hematemesis/hematochezia.  Genitourinary: no dysuria/hesitancy or hematuria.  Musculoskeletal: no myalgias or arthralgias.  Neurological: no changes in vision or hearing, no lightheadedness/dizziness, no syncope/near syncope	  Psychiatric: no unusual stress/anxiety.   Hematology/Lymphatics: no unusual bleeding, bruising and no lymphadenopathy.  Endocrine: no unusual thirst.   All others negative except as stated above and in HPI.    PHYSICAL EXAM:  T(C): 37.2 (12-21-18 @ 17:49), Max: 37.2 (12-21-18 @ 10:16)  HR: 78 (12-21-18 @ 17:49) (66 - 78)  BP: 132/83 (12-21-18 @ 17:49) (121/76 - 150/88)  RR: 18 (12-21-18 @ 17:49) (17 - 18)  SpO2: 100% (12-21-18 @ 17:49) (100% - 100%)  Wt(kg): --  I&O's Summary    21 Dec 2018 07:01  -  21 Dec 2018 20:35  --------------------------------------------------------  IN: 600 mL / OUT: 900 mL / NET: -300 mL        Appearance: Normal	  HEENT:   Normal oral mucosa, PERRL, EOMI	  Lymphatic: No lymphadenopathy  Cardiovascular: Normal S1 S2, No JVD, No murmurs, No edema  Respiratory: Lungs clear to auscultation	  Psychiatry: A & O x 3, Mood & affect appropriate  Gastrointestinal:  Soft, Non-tender, + BS	  Skin: R heel with dressing	  Neurologic: Non-focal  Extremities: Normal range of motion, No clubbing, cyanosis or edema  Vascular: Peripheral pulses palpable 2+ bilaterally      LABS:	 	    CBC Full  -  ( 21 Dec 2018 11:00 )  WBC Count : 7.47 K/uL  Hemoglobin : 11.1 g/dL  Hematocrit : 35.3 %  Platelet Count - Automated : 373 K/uL  Mean Cell Volume : 95.7 fL  Mean Cell Hemoglobin : 30.1 pg  Mean Cell Hemoglobin Concentration : 31.4 %  Auto Neutrophil # : x  Auto Lymphocyte # : x  Auto Monocyte # : x  Auto Eosinophil # : x  Auto Basophil # : x  Auto Neutrophil % : x  Auto Lymphocyte % : x  Auto Monocyte % : x  Auto Eosinophil % : x  Auto Basophil % : x    12-21    140  |  105  |  51<H>  ----------------------------<  156<H>  4.6   |  21<L>  |  2.06<H>  12-20    140  |  103  |  45<H>  ----------------------------<  128<H>  4.6   |  25  |  1.86<H>    Ca    9.1      21 Dec 2018 05:40  Ca    9.1      20 Dec 2018 06:30        EKG:  NSR, non-specific T wave flattening, no ischemic changes    ASSESSMENT/PLAN:  58 M with PMH of CHF, CAD (70% lesion per patient, no stent), HTN, HLD and fem-pop bypass 11/2018 at OSH who presents for poorly healing R foot ulcer. Podiatry recommending debridement next week. Cardiology consulted for pre-op evaluation. The patient has no chest pain, signs/symptoms of heart failure, arrhythmias, or valvular abnormalities (no murmur on exams). His RCRI is 1, that is he has 0.9% risk of a major cardiac event. No additional cardiac work-up is warranted and he may proceed with wound debridement.      GEOVANNI Mckeon  Cardiology Fellow   [id number]

## 2018-12-21 NOTE — CHART NOTE - NSCHARTNOTEFT_GEN_A_CORE
cardiology consult called for OR clearance , contacted patients outpatient cardiologist for cath records as per cardiology request awaiting call back

## 2018-12-22 LAB
APTT BLD: 63.4 SEC — HIGH (ref 27.5–36.3)
APTT BLD: 73.1 SEC — HIGH (ref 27.5–36.3)
BUN SERPL-MCNC: 59 MG/DL — HIGH (ref 7–23)
CALCIUM SERPL-MCNC: 9 MG/DL — SIGNIFICANT CHANGE UP (ref 8.4–10.5)
CHLORIDE SERPL-SCNC: 105 MMOL/L — SIGNIFICANT CHANGE UP (ref 98–107)
CO2 SERPL-SCNC: 23 MMOL/L — SIGNIFICANT CHANGE UP (ref 22–31)
CREAT SERPL-MCNC: 2.12 MG/DL — HIGH (ref 0.5–1.3)
GLUCOSE SERPL-MCNC: 180 MG/DL — HIGH (ref 70–99)
HCT VFR BLD CALC: 33.3 % — LOW (ref 39–50)
HGB BLD-MCNC: 10.7 G/DL — LOW (ref 13–17)
MAGNESIUM SERPL-MCNC: 2.1 MG/DL — SIGNIFICANT CHANGE UP (ref 1.6–2.6)
MCHC RBC-ENTMCNC: 29.9 PG — SIGNIFICANT CHANGE UP (ref 27–34)
MCHC RBC-ENTMCNC: 32.1 % — SIGNIFICANT CHANGE UP (ref 32–36)
MCV RBC AUTO: 93 FL — SIGNIFICANT CHANGE UP (ref 80–100)
NRBC # FLD: 0 — SIGNIFICANT CHANGE UP
PLATELET # BLD AUTO: 367 K/UL — SIGNIFICANT CHANGE UP (ref 150–400)
PMV BLD: 9.7 FL — SIGNIFICANT CHANGE UP (ref 7–13)
POTASSIUM SERPL-MCNC: 4.9 MMOL/L — SIGNIFICANT CHANGE UP (ref 3.5–5.3)
POTASSIUM SERPL-SCNC: 4.9 MMOL/L — SIGNIFICANT CHANGE UP (ref 3.5–5.3)
RBC # BLD: 3.58 M/UL — LOW (ref 4.2–5.8)
RBC # FLD: 17.2 % — HIGH (ref 10.3–14.5)
SODIUM SERPL-SCNC: 138 MMOL/L — SIGNIFICANT CHANGE UP (ref 135–145)
WBC # BLD: 8.99 K/UL — SIGNIFICANT CHANGE UP (ref 3.8–10.5)
WBC # FLD AUTO: 8.99 K/UL — SIGNIFICANT CHANGE UP (ref 3.8–10.5)

## 2018-12-22 RX ORDER — COLLAGENASE CLOSTRIDIUM HIST. 250 UNIT/G
1 OINTMENT (GRAM) TOPICAL DAILY
Qty: 0 | Refills: 0 | Status: DISCONTINUED | OUTPATIENT
Start: 2018-12-22 | End: 2018-12-27

## 2018-12-22 RX ADMIN — Medication 1 APPLICATION(S): at 12:54

## 2018-12-22 RX ADMIN — ATORVASTATIN CALCIUM 40 MILLIGRAM(S): 80 TABLET, FILM COATED ORAL at 21:40

## 2018-12-22 RX ADMIN — Medication 100 MILLIGRAM(S): at 06:16

## 2018-12-22 RX ADMIN — Medication 81 MILLIGRAM(S): at 12:54

## 2018-12-22 RX ADMIN — Medication 1: at 17:59

## 2018-12-22 RX ADMIN — Medication 2: at 12:54

## 2018-12-22 RX ADMIN — CARVEDILOL PHOSPHATE 12.5 MILLIGRAM(S): 80 CAPSULE, EXTENDED RELEASE ORAL at 06:16

## 2018-12-22 RX ADMIN — HEPARIN SODIUM 1100 UNIT(S)/HR: 5000 INJECTION INTRAVENOUS; SUBCUTANEOUS at 07:19

## 2018-12-22 RX ADMIN — HEPARIN SODIUM 1100 UNIT(S)/HR: 5000 INJECTION INTRAVENOUS; SUBCUTANEOUS at 01:33

## 2018-12-22 RX ADMIN — GABAPENTIN 100 MILLIGRAM(S): 400 CAPSULE ORAL at 12:54

## 2018-12-22 RX ADMIN — Medication 100 MILLIGRAM(S): at 17:20

## 2018-12-22 RX ADMIN — LISINOPRIL 40 MILLIGRAM(S): 2.5 TABLET ORAL at 06:16

## 2018-12-22 RX ADMIN — CARVEDILOL PHOSPHATE 12.5 MILLIGRAM(S): 80 CAPSULE, EXTENDED RELEASE ORAL at 17:20

## 2018-12-22 NOTE — PROGRESS NOTE ADULT - ASSESSMENT
· Assessment		  57 y/o male pt with right foot non healing ulcer  - pt seen and evaluated   - MRI reviewed - no OM   - Plan for OR Monday - wound debridement w/ application of graft  - Pt consented. medical cleared   - d/w attending

## 2018-12-22 NOTE — PROGRESS NOTE ADULT - SUBJECTIVE AND OBJECTIVE BOX
Patient is a 58y old  Male who presents with a chief complaint of non-healing R foot wound (22 Dec 2018 07:31)      SUBJECTIVE / OVERNIGHT EVENTS:  No overnight events.  Pt feels well.  Denied cp, sob, n/v/d.  no abdominal pain. No HA/dizziness.   await wound debridement on Monday.    Vital Signs Last 24 Hrs  T(C): 36.6 (22 Dec 2018 05:30), Max: 37.2 (21 Dec 2018 17:49)  T(F): 97.9 (22 Dec 2018 05:30), Max: 98.9 (21 Dec 2018 17:49)  HR: 69 (22 Dec 2018 05:30) (69 - 78)  BP: 121/79 (22 Dec 2018 05:30) (121/79 - 135/78)  BP(mean): --  RR: 17 (22 Dec 2018 05:30) (17 - 18)  SpO2: 100% (22 Dec 2018 05:30) (100% - 100%)  I&O's Summary    21 Dec 2018 07:01  -  22 Dec 2018 07:00  --------------------------------------------------------  IN: 600 mL / OUT: 1150 mL / NET: -550 mL      PHYSICAL EXAM:  GENERAL: NAD, Comfortable  HEAD:  Atraumatic, Normocephalic  EYES: EOMI, PERRLA, conjunctiva and sclera clear  NECK: Supple, No JVD  CHEST/LUNG: Clear to auscultation bilaterally; No wheeze  HEART: Regular rate and rhythm; No murmurs, rubs, or gallops  ABDOMEN: Soft, Nontender, Nondistended; Bowel sounds present  Neuro: AAO x 3, no focal deficit, LE dressing d/c/i.  EXTREMITIES:  No clubbing, cyanosis, or edema, dressing in place. right groin with a small dehiscence of his femoral bypass incision, elongated, no pus, no bleeding. healing. no erythema.   SKIN: No rashes or lesions      LABS:                        10.7   8.99  )-----------( 367      ( 22 Dec 2018 06:00 )             33.3     12-22    138  |  105  |  59<H>  ----------------------------<  180<H>  4.9   |  23  |  2.12<H>    Ca    9.0      22 Dec 2018 06:00  Mg     2.1     12-22      PTT - ( 22 Dec 2018 06:00 )  PTT:73.1 SEC  CAPILLARY BLOOD GLUCOSE      POCT Blood Glucose.: 149 mg/dL (22 Dec 2018 08:32)  POCT Blood Glucose.: 216 mg/dL (21 Dec 2018 22:12)  POCT Blood Glucose.: 228 mg/dL (21 Dec 2018 16:59)            RADIOLOGY & ADDITIONAL TESTS:    Imaging Personally Reviewed:  [x] YES  [ ] NO    Consultant(s) Notes Reviewed:  [x] YES  [ ] NO      MEDICATIONS  (STANDING):  aspirin enteric coated 81 milliGRAM(s) Oral daily  atorvastatin 40 milliGRAM(s) Oral at bedtime  carvedilol 12.5 milliGRAM(s) Oral every 12 hours  collagenase Ointment 1 Application(s) Topical daily  dextrose 5%. 1000 milliLiter(s) (50 mL/Hr) IV Continuous <Continuous>  dextrose 50% Injectable 12.5 Gram(s) IV Push once  dextrose 50% Injectable 25 Gram(s) IV Push once  dextrose 50% Injectable 25 Gram(s) IV Push once  docusate sodium 100 milliGRAM(s) Oral two times a day  gabapentin 100 milliGRAM(s) Oral daily  heparin  Infusion.  Unit(s)/Hr (10 mL/Hr) IV Continuous <Continuous>  insulin lispro (HumaLOG) corrective regimen sliding scale   SubCutaneous three times a day before meals  insulin lispro (HumaLOG) corrective regimen sliding scale   SubCutaneous at bedtime  lisinopril 40 milliGRAM(s) Oral daily    MEDICATIONS  (PRN):  dextrose 40% Gel 15 Gram(s) Oral once PRN Blood Glucose LESS THAN 70 milliGRAM(s)/deciliter  glucagon  Injectable 1 milliGRAM(s) IntraMuscular once PRN Glucose LESS THAN 70 milligrams/deciliter  heparin  Injectable 4000 Unit(s) IV Push every 6 hours PRN For aPTT less than 40  heparin  Injectable 2000 Unit(s) IV Push every 6 hours PRN For aPTT between 40 - 57      Care Discussed with Consultants/Other Providers [x] YES  [ ] NO    HEALTH ISSUES - PROBLEM Dx:  Need for prophylactic measure: Need for prophylactic measure  Medication management: Medication management  Hyperlipidemia, unspecified hyperlipidemia type: Hyperlipidemia, unspecified hyperlipidemia type  Essential hypertension: Essential hypertension  Type 2 diabetes mellitus with complication, without long-term current use of insulin: Type 2 diabetes mellitus with complication, without long-term current use of insulin  Congestive heart failure, unspecified HF chronicity, unspecified heart failure type: Congestive heart failure, unspecified HF chronicity, unspecified heart failure type  CKD (chronic kidney disease) stage 4, GFR 15-29 ml/min: CKD (chronic kidney disease) stage 4, GFR 15-29 ml/min  Sacral decubitus ulcer, stage II: Sacral decubitus ulcer, stage II  Dehiscence of operative wound, initial encounter: Dehiscence of operative wound, initial encounter  Dry gangrene: Dry gangrene

## 2018-12-22 NOTE — PROGRESS NOTE ADULT - ATTENDING COMMENTS
- Dr. PRATEEK Aragon ([facility])  - [phone]
Full note as above; discussed with surgery resident and vascular fellow.  The patient was also seen and examined with the surgery resident.  He has a deep and nonhealing wound in his right lateral foot.  He recently had a right femoral to popliteal artery bypass.  There was superficial breakdown of the groin incision.  It does not need any intervention.  The popliteal incision was healed.  There was a palpable popliteal pulse.  Pedal pulses were not palpable, but a PVR study noted a good blood supply in the right lower leg and foot.  He has enough blood supply to heal the foot wound and will undergo foot surgery per podiatry.
- Dr. PRATEEK Aragon ([facility])  - [phone]
Kenneth Anderson will be covering for me starting 12/23/18. He can be reached at  if needed.     - Dr. PRATEEK Aragon ([facility])  - [phone]

## 2018-12-22 NOTE — PROGRESS NOTE ADULT - SUBJECTIVE AND OBJECTIVE BOX
Podiatry pager #: [phone]/ [phone]    Patient is a 58y old  Male who presents with a chief complaint of non-healing R foot wound (21 Dec 2018 20:33)       INTERVAL HPI/OVERNIGHT EVENTS:  Patient seen and evaluated at bedside.  Pt is resting comfortable in NAD. Denies N/V/F/C.   Allergies    penicillin (Other)    Intolerances        Vital Signs Last 24 Hrs  T(C): 36.6 (22 Dec 2018 05:30), Max: 37.2 (21 Dec 2018 10:16)  T(F): 97.9 (22 Dec 2018 05:30), Max: 98.9 (21 Dec 2018 10:16)  HR: 69 (22 Dec 2018 05:30) (69 - 78)  BP: 121/79 (22 Dec 2018 05:30) (121/79 - 135/78)  BP(mean): --  RR: 17 (22 Dec 2018 05:30) (17 - 18)  SpO2: 100% (22 Dec 2018 05:30) (100% - 100%)    LABS:                        10.7   8.99  )-----------( 367      ( 22 Dec 2018 06:00 )             33.3     12-22    138  |  105  |  59<H>  ----------------------------<  180<H>  4.9   |  23  |  2.12<H>    Ca    9.0      22 Dec 2018 06:00  Mg     2.1     12-22      PTT - ( 22 Dec 2018 06:00 )  PTT:73.1 SEC    CAPILLARY BLOOD GLUCOSE      POCT Blood Glucose.: 216 mg/dL (21 Dec 2018 22:12)  POCT Blood Glucose.: 228 mg/dL (21 Dec 2018 16:59)  POCT Blood Glucose.: 251 mg/dL (21 Dec 2018 11:42)  POCT Blood Glucose.: 131 mg/dL (21 Dec 2018 07:55)      Lower Extremity Physical Exam:  dressing left CDI, wound care order in.     RADIOLOGY & ADDITIONAL TESTS:

## 2018-12-23 ENCOUNTER — TRANSCRIPTION ENCOUNTER (OUTPATIENT)
Age: 58
End: 2018-12-23

## 2018-12-23 LAB
APTT BLD: 70.5 SEC — HIGH (ref 27.5–36.3)
BUN SERPL-MCNC: 65 MG/DL — HIGH (ref 7–23)
CALCIUM SERPL-MCNC: 9 MG/DL — SIGNIFICANT CHANGE UP (ref 8.4–10.5)
CHLORIDE SERPL-SCNC: 105 MMOL/L — SIGNIFICANT CHANGE UP (ref 98–107)
CO2 SERPL-SCNC: 21 MMOL/L — LOW (ref 22–31)
CREAT SERPL-MCNC: 1.96 MG/DL — HIGH (ref 0.5–1.3)
GLUCOSE SERPL-MCNC: 185 MG/DL — HIGH (ref 70–99)
HCT VFR BLD CALC: 33.2 % — LOW (ref 39–50)
HGB BLD-MCNC: 10.5 G/DL — LOW (ref 13–17)
MAGNESIUM SERPL-MCNC: 2.4 MG/DL — SIGNIFICANT CHANGE UP (ref 1.6–2.6)
MCHC RBC-ENTMCNC: 30.3 PG — SIGNIFICANT CHANGE UP (ref 27–34)
MCHC RBC-ENTMCNC: 31.6 % — LOW (ref 32–36)
MCV RBC AUTO: 96 FL — SIGNIFICANT CHANGE UP (ref 80–100)
NRBC # FLD: 0 — SIGNIFICANT CHANGE UP
PLATELET # BLD AUTO: 340 K/UL — SIGNIFICANT CHANGE UP (ref 150–400)
PMV BLD: 9.8 FL — SIGNIFICANT CHANGE UP (ref 7–13)
POTASSIUM SERPL-MCNC: 4.5 MMOL/L — SIGNIFICANT CHANGE UP (ref 3.5–5.3)
POTASSIUM SERPL-SCNC: 4.5 MMOL/L — SIGNIFICANT CHANGE UP (ref 3.5–5.3)
RBC # BLD: 3.46 M/UL — LOW (ref 4.2–5.8)
RBC # FLD: 17.2 % — HIGH (ref 10.3–14.5)
SODIUM SERPL-SCNC: 138 MMOL/L — SIGNIFICANT CHANGE UP (ref 135–145)
WBC # BLD: 10.17 K/UL — SIGNIFICANT CHANGE UP (ref 3.8–10.5)
WBC # FLD AUTO: 10.17 K/UL — SIGNIFICANT CHANGE UP (ref 3.8–10.5)

## 2018-12-23 RX ORDER — SODIUM CHLORIDE 9 MG/ML
1000 INJECTION INTRAMUSCULAR; INTRAVENOUS; SUBCUTANEOUS
Qty: 0 | Refills: 0 | Status: DISCONTINUED | OUTPATIENT
Start: 2018-12-24 | End: 2018-12-25

## 2018-12-23 RX ADMIN — ATORVASTATIN CALCIUM 40 MILLIGRAM(S): 80 TABLET, FILM COATED ORAL at 22:55

## 2018-12-23 RX ADMIN — Medication 2: at 18:13

## 2018-12-23 RX ADMIN — CARVEDILOL PHOSPHATE 12.5 MILLIGRAM(S): 80 CAPSULE, EXTENDED RELEASE ORAL at 05:21

## 2018-12-23 RX ADMIN — Medication 1 APPLICATION(S): at 12:22

## 2018-12-23 RX ADMIN — LISINOPRIL 40 MILLIGRAM(S): 2.5 TABLET ORAL at 05:21

## 2018-12-23 RX ADMIN — Medication 1: at 08:53

## 2018-12-23 RX ADMIN — CARVEDILOL PHOSPHATE 12.5 MILLIGRAM(S): 80 CAPSULE, EXTENDED RELEASE ORAL at 17:21

## 2018-12-23 RX ADMIN — HEPARIN SODIUM 1100 UNIT(S)/HR: 5000 INJECTION INTRAVENOUS; SUBCUTANEOUS at 07:18

## 2018-12-23 RX ADMIN — Medication 81 MILLIGRAM(S): at 12:22

## 2018-12-23 RX ADMIN — GABAPENTIN 100 MILLIGRAM(S): 400 CAPSULE ORAL at 12:22

## 2018-12-23 NOTE — PROGRESS NOTE ADULT - ASSESSMENT
59 y/o male pt with right foot non healing ulcer  - pt seen and evaluated   - MRI reviewed - no OM   - Plan for OR Monday - wound debridement w/ application of graft  - cardiac cleared since 12/22  - d/w attending 59 y/o male pt with right foot non healing ulcer  - pt seen and evaluated   - MRI reviewed - no OM   - Plan for OR Monday @1:30 PM - wound debridement w/ application of graft  - cardiac cleared since 12/22  -Please stop heparin drip at 6:30 AM tomorrow 12/24  - d/w attending

## 2018-12-23 NOTE — PROGRESS NOTE ADULT - ASSESSMENT
57 yo male with Dry gangrene of foot  No acute infectious process evident  No concern osteomyelitis at this time based on history and MR. Patient s/p course of IV antibiotics, would expect changes on MRI to be more pronounced if there was active infection, especially as changes of acute osteomyelitis can take months to resolve even after effective treatment.  S/P Vascular bypass surgery with synthetic graft.  No concern of infection of either surgical site  No concern of systemic illness  S/P Rx vs. H. pylori, will need eventual OPD GI follow up for urea breath test  DM2 with neuropathy, PVD. ?Control. HbA1c will be artifically low after multiple units of PRBC for GIB  In a patient with a history of penicillin allergy, only 10-15% will have evidence of an IgE mediated allergy to penicillin when definitive skin testing is performed. These tend to dissipate over time (10 years later, 80% have resolved). He would eventually benefit from OPD allergy evaluation to risk-stratify.      Suggestions--  Defer antibiotics at this time-wounds do not appear as though antibiotics would be beneficiaal  Vascular follow up  Podiatry follow up  Eventual OPD Gi f/u  Eventual OPD allergy eval re: PCN allergy      Thank you for the courtesy of this referral.  I'll sign off at this time.

## 2018-12-23 NOTE — PROGRESS NOTE ADULT - SUBJECTIVE AND OBJECTIVE BOX
infectious diseases progress note:    EVE DELGADO is a 58y y. o. Male patient    Patient reports: no new issues    ROS:    EYES:  Negative  blurry vision or double vision  GASTROINTESTINAL:  Negative for nausea, vomiting, diarrhea  -otherwise negative except for subjective    Allergies    penicillin (Other)    Intolerances        ANTIBIOTICS/RELEVANT:  antimicrobials    immunologic:    OTHER:  aspirin enteric coated 81 milliGRAM(s) Oral daily  atorvastatin 40 milliGRAM(s) Oral at bedtime  carvedilol 12.5 milliGRAM(s) Oral every 12 hours  collagenase Ointment 1 Application(s) Topical daily  dextrose 40% Gel 15 Gram(s) Oral once PRN  dextrose 5%. 1000 milliLiter(s) IV Continuous <Continuous>  dextrose 50% Injectable 12.5 Gram(s) IV Push once  dextrose 50% Injectable 25 Gram(s) IV Push once  dextrose 50% Injectable 25 Gram(s) IV Push once  docusate sodium 100 milliGRAM(s) Oral two times a day  gabapentin 100 milliGRAM(s) Oral daily  glucagon  Injectable 1 milliGRAM(s) IntraMuscular once PRN  heparin  Infusion.  Unit(s)/Hr IV Continuous <Continuous>  heparin  Injectable 4000 Unit(s) IV Push every 6 hours PRN  heparin  Injectable 2000 Unit(s) IV Push every 6 hours PRN  insulin lispro (HumaLOG) corrective regimen sliding scale   SubCutaneous three times a day before meals  insulin lispro (HumaLOG) corrective regimen sliding scale   SubCutaneous at bedtime  lisinopril 40 milliGRAM(s) Oral daily      Objective:  Vital Signs Last 24 Hrs  T(C): 36.6 (23 Dec 2018 05:17), Max: 36.7 (22 Dec 2018 21:39)  T(F): 97.8 (23 Dec 2018 05:17), Max: 98 (22 Dec 2018 21:39)  HR: 63 (23 Dec 2018 05:17) (63 - 77)  BP: 126/78 (23 Dec 2018 05:17) (100/70 - 126/78)  BP(mean): --  RR: 18 (23 Dec 2018 05:17) (18 - 18)  SpO2: 100% (23 Dec 2018 05:17) (100% - 100%)    T(C): 36.6 (12-23-18 @ 05:17), Max: 37.2 (12-21-18 @ 17:49)  T(C): 36.6 (12-23-18 @ 05:17), Max: 37.2 (12-21-18 @ 10:16)  T(C): 36.6 (12-23-18 @ 05:17), Max: 37.2 (12-21-18 @ 10:16)    PHYSICAL EXAM:  Constitutional: Well-developed, well nourished  Eyes: PERRLA, EOMI  Ear/Nose/Throat: oropharynx normal	  Neck: no JVD, no lymphadenopathy, supple  Respiratory: no accessory muscle use  Cardiovascular: RRR,   Gastrointestinal: soft, NT  Extremities: no clubbing, no cyanosis, edema absent  Skin:  dry scabbed area on foot, skin breakdown on right anterior upper leg and buttocks with no sig drainage or surrounding erythema      LABS:                        10.5   10.17 )-----------( 340      ( 23 Dec 2018 06:20 )             33.2       10.17 12-23 @ 06:20  8.99 12-22 @ 06:00  7.47 12-21 @ 11:00  8.16 12-21 @ 05:40  7.46 12-20 @ 06:30  7.14 12-19 @ 06:10  7.08 12-18 @ 22:15      12-23    138  |  105  |  65<H>  ----------------------------<  185<H>  4.5   |  21<L>  |  1.96<H>    Ca    9.0      23 Dec 2018 06:20  Mg     2.4     12-23        Creatinine, Serum: 1.96 mg/dL (12-23-18 @ 06:20)  Creatinine, Serum: 2.12 mg/dL (12-22-18 @ 06:00)  Creatinine, Serum: 2.06 mg/dL (12-21-18 @ 05:40)  Creatinine, Serum: 1.86 mg/dL (12-20-18 @ 06:30)  Creatinine, Serum: 2.18 mg/dL (12-19-18 @ 06:10)  Creatinine, Serum: 2.43 mg/dL (12-18-18 @ 22:15)      PTT - ( 23 Dec 2018 06:20 )  PTT:70.5 SEC          MICROBIOLOGY:              RADIOLOGY & ADDITIONAL STUDIES:

## 2018-12-23 NOTE — PROGRESS NOTE ADULT - SUBJECTIVE AND OBJECTIVE BOX
Ambulating without too much difficulty or pain    Vital Signs Last 24 Hrs  T(C): 36.6 (23 Dec 2018 05:17), Max: 36.7 (22 Dec 2018 21:39)  T(F): 97.8 (23 Dec 2018 05:17), Max: 98 (22 Dec 2018 21:39)  HR: 63 (23 Dec 2018 05:17) (63 - 77)  BP: 126/78 (23 Dec 2018 05:17) (100/70 - 126/78)  BP(mean): --  RR: 18 (23 Dec 2018 05:17) (18 - 18)  SpO2: 100% (23 Dec 2018 05:17) (100% - 100%)    GENERAL: NAD, Comfortable  HEAD:  Atraumatic, Normocephalic  EYES: EOMI, PERRLA, conjunctiva and sclera clear  NECK: Supple, No JVD  CHEST/LUNG: Clear to auscultation bilaterally; No wheeze  HEART: Regular rate and rhythm; No murmurs, rubs, or gallops  ABDOMEN: Soft, Nontender, Nondistended; Bowel sounds present  Neuro: AAO x 3, no focal deficits, LE dressing d/c/i.  EXTREMITIES:  No clubbing, cyanosis, or edema, dressing in place.  no pus, no bleeding. healing. no erythema.   SKIN: No rashes or lesions    LABS:                        10.5   10.17 )-----------( 340      ( 23 Dec 2018 06:20 )             33.2     12-23    138  |  105  |  65<H>  ----------------------------<  185<H>  4.5   |  21<L>  |  1.96<H>    Ca    9.0      23 Dec 2018 06:20  Mg     2.4     12-23      PTT - ( 23 Dec 2018 06:20 )  PTT:70.5 SEC  CAPILLARY BLOOD GLUCOSE      POCT Blood Glucose.: 160 mg/dL (23 Dec 2018 08:40)  POCT Blood Glucose.: 188 mg/dL (22 Dec 2018 22:45)  POCT Blood Glucose.: 153 mg/dL (22 Dec 2018 17:50)  POCT Blood Glucose.: 218 mg/dL (22 Dec 2018 12:35)

## 2018-12-23 NOTE — PROGRESS NOTE ADULT - SUBJECTIVE AND OBJECTIVE BOX
Patient is a 58y old  Male who presents with a chief complaint of non-healing R foot wound (22 Dec 2018 11:50)       INTERVAL HPI/OVERNIGHT EVENTS:  Patient seen and evaluated at bedside.  Pt is resting comfortable in NAD. Denies N/V/F/C.     Allergies    penicillin (Other)    Intolerances        Vital Signs Last 24 Hrs  T(C): 36.6 (23 Dec 2018 05:17), Max: 36.7 (22 Dec 2018 21:39)  T(F): 97.8 (23 Dec 2018 05:17), Max: 98 (22 Dec 2018 21:39)  HR: 63 (23 Dec 2018 05:17) (63 - 77)  BP: 126/78 (23 Dec 2018 05:17) (100/70 - 126/78)  BP(mean): --  RR: 18 (23 Dec 2018 05:17) (18 - 18)  SpO2: 100% (23 Dec 2018 05:17) (100% - 100%)    LABS:                        10.5   10.17 )-----------( 340      ( 23 Dec 2018 06:20 )             33.2     12-23    138  |  105  |  65<H>  ----------------------------<  185<H>  4.5   |  21<L>  |  1.96<H>    Ca    9.0      23 Dec 2018 06:20  Mg     2.4     12-23      PTT - ( 23 Dec 2018 06:20 )  PTT:70.5 SEC    CAPILLARY BLOOD GLUCOSE      POCT Blood Glucose.: 160 mg/dL (23 Dec 2018 08:40)  POCT Blood Glucose.: 188 mg/dL (22 Dec 2018 22:45)  POCT Blood Glucose.: 153 mg/dL (22 Dec 2018 17:50)  POCT Blood Glucose.: 218 mg/dL (22 Dec 2018 12:35)      Lower Extremity Physical Exam:    dressing left CDI     RADIOLOGY & ADDITIONAL TESTS:

## 2018-12-24 ENCOUNTER — RESULT REVIEW (OUTPATIENT)
Age: 58
End: 2018-12-24

## 2018-12-24 LAB
APTT BLD: 64.7 SEC — HIGH (ref 27.5–36.3)
BLD GP AB SCN SERPL QL: NEGATIVE — SIGNIFICANT CHANGE UP
BUN SERPL-MCNC: 73 MG/DL — HIGH (ref 7–23)
CALCIUM SERPL-MCNC: 9.3 MG/DL — SIGNIFICANT CHANGE UP (ref 8.4–10.5)
CHLORIDE SERPL-SCNC: 106 MMOL/L — SIGNIFICANT CHANGE UP (ref 98–107)
CO2 SERPL-SCNC: 20 MMOL/L — LOW (ref 22–31)
CREAT SERPL-MCNC: 2.2 MG/DL — HIGH (ref 0.5–1.3)
GLUCOSE SERPL-MCNC: 173 MG/DL — HIGH (ref 70–99)
HCT VFR BLD CALC: 35 % — LOW (ref 39–50)
HGB BLD-MCNC: 10.7 G/DL — LOW (ref 13–17)
INR BLD: 0.95 — SIGNIFICANT CHANGE UP (ref 0.88–1.17)
MAGNESIUM SERPL-MCNC: 2.2 MG/DL — SIGNIFICANT CHANGE UP (ref 1.6–2.6)
MCHC RBC-ENTMCNC: 29.8 PG — SIGNIFICANT CHANGE UP (ref 27–34)
MCHC RBC-ENTMCNC: 30.6 % — LOW (ref 32–36)
MCV RBC AUTO: 97.5 FL — SIGNIFICANT CHANGE UP (ref 80–100)
NRBC # FLD: 0 — SIGNIFICANT CHANGE UP
PLATELET # BLD AUTO: 354 K/UL — SIGNIFICANT CHANGE UP (ref 150–400)
PMV BLD: 10.5 FL — SIGNIFICANT CHANGE UP (ref 7–13)
POTASSIUM SERPL-MCNC: 5 MMOL/L — SIGNIFICANT CHANGE UP (ref 3.5–5.3)
POTASSIUM SERPL-SCNC: 5 MMOL/L — SIGNIFICANT CHANGE UP (ref 3.5–5.3)
PROTHROM AB SERPL-ACNC: 10.5 SEC — SIGNIFICANT CHANGE UP (ref 9.8–13.1)
RBC # BLD: 3.59 M/UL — LOW (ref 4.2–5.8)
RBC # FLD: 17.4 % — HIGH (ref 10.3–14.5)
RH IG SCN BLD-IMP: POSITIVE — SIGNIFICANT CHANGE UP
SODIUM SERPL-SCNC: 138 MMOL/L — SIGNIFICANT CHANGE UP (ref 135–145)
WBC # BLD: 9.97 K/UL — SIGNIFICANT CHANGE UP (ref 3.8–10.5)
WBC # FLD AUTO: 9.97 K/UL — SIGNIFICANT CHANGE UP (ref 3.8–10.5)

## 2018-12-24 PROCEDURE — 88311 DECALCIFY TISSUE: CPT | Mod: 26

## 2018-12-24 PROCEDURE — 73630 X-RAY EXAM OF FOOT: CPT | Mod: 26,RT

## 2018-12-24 PROCEDURE — 88304 TISSUE EXAM BY PATHOLOGIST: CPT | Mod: 26

## 2018-12-24 RX ORDER — MORPHINE SULFATE 50 MG/1
2 CAPSULE, EXTENDED RELEASE ORAL EVERY 6 HOURS
Qty: 0 | Refills: 0 | Status: DISCONTINUED | OUTPATIENT
Start: 2018-12-24 | End: 2018-12-27

## 2018-12-24 RX ORDER — ACETAMINOPHEN 500 MG
650 TABLET ORAL EVERY 6 HOURS
Qty: 0 | Refills: 0 | Status: DISCONTINUED | OUTPATIENT
Start: 2018-12-24 | End: 2018-12-27

## 2018-12-24 RX ORDER — HEPARIN SODIUM 5000 [USP'U]/ML
INJECTION INTRAVENOUS; SUBCUTANEOUS
Qty: 25000 | Refills: 0 | Status: DISCONTINUED | OUTPATIENT
Start: 2018-12-24 | End: 2018-12-26

## 2018-12-24 RX ORDER — SODIUM CHLORIDE 9 MG/ML
1000 INJECTION INTRAMUSCULAR; INTRAVENOUS; SUBCUTANEOUS
Qty: 0 | Refills: 0 | Status: DISCONTINUED | OUTPATIENT
Start: 2018-12-24 | End: 2018-12-25

## 2018-12-24 RX ORDER — OXYCODONE AND ACETAMINOPHEN 5; 325 MG/1; MG/1
1 TABLET ORAL EVERY 6 HOURS
Qty: 0 | Refills: 0 | Status: DISCONTINUED | OUTPATIENT
Start: 2018-12-24 | End: 2018-12-27

## 2018-12-24 RX ORDER — OXYCODONE AND ACETAMINOPHEN 5; 325 MG/1; MG/1
1 TABLET ORAL ONCE
Qty: 0 | Refills: 0 | Status: DISCONTINUED | OUTPATIENT
Start: 2018-12-24 | End: 2018-12-24

## 2018-12-24 RX ADMIN — CARVEDILOL PHOSPHATE 12.5 MILLIGRAM(S): 80 CAPSULE, EXTENDED RELEASE ORAL at 18:26

## 2018-12-24 RX ADMIN — GABAPENTIN 100 MILLIGRAM(S): 400 CAPSULE ORAL at 11:54

## 2018-12-24 RX ADMIN — Medication 1: at 18:23

## 2018-12-24 RX ADMIN — SODIUM CHLORIDE 25 MILLILITER(S): 9 INJECTION INTRAMUSCULAR; INTRAVENOUS; SUBCUTANEOUS at 08:57

## 2018-12-24 RX ADMIN — MORPHINE SULFATE 2 MILLIGRAM(S): 50 CAPSULE, EXTENDED RELEASE ORAL at 22:06

## 2018-12-24 RX ADMIN — ATORVASTATIN CALCIUM 40 MILLIGRAM(S): 80 TABLET, FILM COATED ORAL at 21:51

## 2018-12-24 RX ADMIN — CARVEDILOL PHOSPHATE 12.5 MILLIGRAM(S): 80 CAPSULE, EXTENDED RELEASE ORAL at 06:13

## 2018-12-24 RX ADMIN — SODIUM CHLORIDE 25 MILLILITER(S): 9 INJECTION INTRAMUSCULAR; INTRAVENOUS; SUBCUTANEOUS at 00:53

## 2018-12-24 RX ADMIN — Medication 1: at 08:57

## 2018-12-24 RX ADMIN — MORPHINE SULFATE 2 MILLIGRAM(S): 50 CAPSULE, EXTENDED RELEASE ORAL at 21:51

## 2018-12-24 RX ADMIN — LISINOPRIL 40 MILLIGRAM(S): 2.5 TABLET ORAL at 06:13

## 2018-12-24 RX ADMIN — HEPARIN SODIUM 1000 UNIT(S)/HR: 5000 INJECTION INTRAVENOUS; SUBCUTANEOUS at 20:33

## 2018-12-24 RX ADMIN — Medication 1 APPLICATION(S): at 11:54

## 2018-12-24 NOTE — PROGRESS NOTE ADULT - PROBLEM SELECTOR PLAN 10
- on Hep drip.  - Fall precautions
- on Hep drip.  - Fall precautions
- Hep drip on hold for OR today  - Fall precautions
- on Hep drip.  - Fall precautions
- on Hep drip.  - Fall precautions

## 2018-12-24 NOTE — BRIEF OPERATIVE NOTE - OPERATION/FINDINGS
s/p RF wound debridement with application of stravix graft. Intr-op bone was necrotic and 5th metatarsal head was resected and sent to pathology. Low concern for residual infection.

## 2018-12-24 NOTE — PROGRESS NOTE ADULT - SUBJECTIVE AND OBJECTIVE BOX
Slept fitfully through the night but no CP Or SOB    Vital Signs Last 24 Hrs  T(C): 36.8 (24 Dec 2018 06:10), Max: 36.8 (23 Dec 2018 21:07)  T(F): 98.2 (24 Dec 2018 06:10), Max: 98.2 (23 Dec 2018 21:07)  HR: 67 (24 Dec 2018 06:10) (67 - 85)  BP: 119/60 (24 Dec 2018 06:10) (100/48 - 126/78)  BP(mean): --  RR: 18 (24 Dec 2018 06:10) (18 - 18)  SpO2: 100% (24 Dec 2018 06:10) (100% - 100%)    GENERAL: NAD, Comfortable  HEAD:  Atraumatic, Normocephalic  EYES: EOMI, PERRLA, conjunctiva and sclera clear  NECK: Supple, No JVD  CHEST/LUNG: Clear to auscultation bilaterally; No wheeze  HEART: Regular rate and rhythm; No murmurs, rubs, or gallops  ABDOMEN: Soft, Nontender, Nondistended; Bowel sounds present  Neuro: AAO x 3, no focal deficits, LE dressing d/c/i.  EXTREMITIES:  No clubbing, cyanosis, or edema, dressing in place.  no pus, no bleeding. healing. no erythema.   SKIN: No rashes or lesions    LABS:                        10.7   9.97  )-----------( 354      ( 24 Dec 2018 07:35 )             35.0     12-24    138  |  106  |  73<H>  ----------------------------<  173<H>  5.0   |  20<L>  |  2.20<H>    Ca    9.3      24 Dec 2018 07:35  Mg     2.2     12-24      PT/INR - ( 24 Dec 2018 07:35 )   PT: 10.5 SEC;   INR: 0.95          PTT - ( 24 Dec 2018 07:35 )  PTT:64.7 SEC  CAPILLARY BLOOD GLUCOSE      POCT Blood Glucose.: 157 mg/dL (24 Dec 2018 08:39)  POCT Blood Glucose.: 172 mg/dL (23 Dec 2018 22:18)  POCT Blood Glucose.: 207 mg/dL (23 Dec 2018 17:45)  POCT Blood Glucose.: 140 mg/dL (23 Dec 2018 12:31)

## 2018-12-24 NOTE — PROGRESS NOTE ADULT - ASSESSMENT
Plan:   To OR today  for right foot wound debridement with application of biologic graft with Dr. Melissa at 1:30 pm.  EKG on sunrise.  Cardiac clearance since 12/21 and documented in chart.  Consent signed and in chart.  Procedure was explained to patient in detail. All alternatives, risks and complications were discussed. All questions answered.

## 2018-12-25 LAB
ALBUMIN SERPL ELPH-MCNC: 3.3 G/DL — SIGNIFICANT CHANGE UP (ref 3.3–5)
ALP SERPL-CCNC: 102 U/L — SIGNIFICANT CHANGE UP (ref 40–120)
ALT FLD-CCNC: 42 U/L — HIGH (ref 4–41)
APTT BLD: 44.7 SEC — HIGH (ref 27.5–36.3)
APTT BLD: 62.1 SEC — HIGH (ref 27.5–36.3)
APTT BLD: 75 SEC — HIGH (ref 27.5–36.3)
AST SERPL-CCNC: 23 U/L — SIGNIFICANT CHANGE UP (ref 4–40)
BILIRUB SERPL-MCNC: < 0.2 MG/DL — LOW (ref 0.2–1.2)
BUN SERPL-MCNC: 66 MG/DL — HIGH (ref 7–23)
BUN SERPL-MCNC: 66 MG/DL — HIGH (ref 7–23)
CALCIUM SERPL-MCNC: 9 MG/DL — SIGNIFICANT CHANGE UP (ref 8.4–10.5)
CALCIUM SERPL-MCNC: 9 MG/DL — SIGNIFICANT CHANGE UP (ref 8.4–10.5)
CHLORIDE SERPL-SCNC: 106 MMOL/L — SIGNIFICANT CHANGE UP (ref 98–107)
CHLORIDE SERPL-SCNC: 106 MMOL/L — SIGNIFICANT CHANGE UP (ref 98–107)
CO2 SERPL-SCNC: 23 MMOL/L — SIGNIFICANT CHANGE UP (ref 22–31)
CO2 SERPL-SCNC: 23 MMOL/L — SIGNIFICANT CHANGE UP (ref 22–31)
CREAT SERPL-MCNC: 1.9 MG/DL — HIGH (ref 0.5–1.3)
CREAT SERPL-MCNC: 1.9 MG/DL — HIGH (ref 0.5–1.3)
GLUCOSE SERPL-MCNC: 140 MG/DL — HIGH (ref 70–99)
GLUCOSE SERPL-MCNC: 140 MG/DL — HIGH (ref 70–99)
HCT VFR BLD CALC: 33.1 % — LOW (ref 39–50)
HGB BLD-MCNC: 10.3 G/DL — LOW (ref 13–17)
MAGNESIUM SERPL-MCNC: 2.1 MG/DL — SIGNIFICANT CHANGE UP (ref 1.6–2.6)
MCHC RBC-ENTMCNC: 30.1 PG — SIGNIFICANT CHANGE UP (ref 27–34)
MCHC RBC-ENTMCNC: 31.1 % — LOW (ref 32–36)
MCV RBC AUTO: 96.8 FL — SIGNIFICANT CHANGE UP (ref 80–100)
NRBC # FLD: 0 — SIGNIFICANT CHANGE UP
PLATELET # BLD AUTO: 337 K/UL — SIGNIFICANT CHANGE UP (ref 150–400)
PMV BLD: 10.4 FL — SIGNIFICANT CHANGE UP (ref 7–13)
POTASSIUM SERPL-MCNC: 4.7 MMOL/L — SIGNIFICANT CHANGE UP (ref 3.5–5.3)
POTASSIUM SERPL-MCNC: 4.7 MMOL/L — SIGNIFICANT CHANGE UP (ref 3.5–5.3)
POTASSIUM SERPL-SCNC: 4.7 MMOL/L — SIGNIFICANT CHANGE UP (ref 3.5–5.3)
POTASSIUM SERPL-SCNC: 4.7 MMOL/L — SIGNIFICANT CHANGE UP (ref 3.5–5.3)
PROT SERPL-MCNC: 6.2 G/DL — SIGNIFICANT CHANGE UP (ref 6–8.3)
RBC # BLD: 3.42 M/UL — LOW (ref 4.2–5.8)
RBC # FLD: 17.3 % — HIGH (ref 10.3–14.5)
SODIUM SERPL-SCNC: 142 MMOL/L — SIGNIFICANT CHANGE UP (ref 135–145)
SODIUM SERPL-SCNC: 142 MMOL/L — SIGNIFICANT CHANGE UP (ref 135–145)
WBC # BLD: 11.28 K/UL — HIGH (ref 3.8–10.5)
WBC # FLD AUTO: 11.28 K/UL — HIGH (ref 3.8–10.5)

## 2018-12-25 RX ADMIN — OXYCODONE AND ACETAMINOPHEN 1 TABLET(S): 5; 325 TABLET ORAL at 15:49

## 2018-12-25 RX ADMIN — HEPARIN SODIUM 1100 UNIT(S)/HR: 5000 INJECTION INTRAVENOUS; SUBCUTANEOUS at 03:15

## 2018-12-25 RX ADMIN — Medication 1 APPLICATION(S): at 11:03

## 2018-12-25 RX ADMIN — OXYCODONE AND ACETAMINOPHEN 1 TABLET(S): 5; 325 TABLET ORAL at 00:09

## 2018-12-25 RX ADMIN — OXYCODONE AND ACETAMINOPHEN 1 TABLET(S): 5; 325 TABLET ORAL at 08:00

## 2018-12-25 RX ADMIN — Medication 2: at 08:49

## 2018-12-25 RX ADMIN — ATORVASTATIN CALCIUM 40 MILLIGRAM(S): 80 TABLET, FILM COATED ORAL at 21:13

## 2018-12-25 RX ADMIN — OXYCODONE AND ACETAMINOPHEN 1 TABLET(S): 5; 325 TABLET ORAL at 22:47

## 2018-12-25 RX ADMIN — OXYCODONE AND ACETAMINOPHEN 1 TABLET(S): 5; 325 TABLET ORAL at 21:47

## 2018-12-25 RX ADMIN — CARVEDILOL PHOSPHATE 12.5 MILLIGRAM(S): 80 CAPSULE, EXTENDED RELEASE ORAL at 17:20

## 2018-12-25 RX ADMIN — HEPARIN SODIUM 1100 UNIT(S)/HR: 5000 INJECTION INTRAVENOUS; SUBCUTANEOUS at 17:19

## 2018-12-25 RX ADMIN — Medication 2: at 12:17

## 2018-12-25 RX ADMIN — Medication 81 MILLIGRAM(S): at 11:03

## 2018-12-25 RX ADMIN — CARVEDILOL PHOSPHATE 12.5 MILLIGRAM(S): 80 CAPSULE, EXTENDED RELEASE ORAL at 06:22

## 2018-12-25 RX ADMIN — GABAPENTIN 100 MILLIGRAM(S): 400 CAPSULE ORAL at 11:03

## 2018-12-25 RX ADMIN — HEPARIN SODIUM 1100 UNIT(S)/HR: 5000 INJECTION INTRAVENOUS; SUBCUTANEOUS at 10:47

## 2018-12-25 RX ADMIN — LISINOPRIL 40 MILLIGRAM(S): 2.5 TABLET ORAL at 06:22

## 2018-12-25 RX ADMIN — HEPARIN SODIUM 2000 UNIT(S): 5000 INJECTION INTRAVENOUS; SUBCUTANEOUS at 03:18

## 2018-12-25 RX ADMIN — Medication 100 MILLIGRAM(S): at 17:20

## 2018-12-25 RX ADMIN — OXYCODONE AND ACETAMINOPHEN 1 TABLET(S): 5; 325 TABLET ORAL at 07:29

## 2018-12-25 RX ADMIN — Medication 2: at 18:08

## 2018-12-25 RX ADMIN — OXYCODONE AND ACETAMINOPHEN 1 TABLET(S): 5; 325 TABLET ORAL at 01:05

## 2018-12-25 RX ADMIN — OXYCODONE AND ACETAMINOPHEN 1 TABLET(S): 5; 325 TABLET ORAL at 14:48

## 2018-12-25 NOTE — PROVIDER CONTACT NOTE (OTHER) - REASON
Patient has positive anterior tibial and posterior tibial pulse with doppler R foot
Pt c/o 5/10 pain in right foot
Pt's pain unresolved- 5/10

## 2018-12-25 NOTE — PROVIDER CONTACT NOTE (OTHER) - ACTION/TREATMENT ORDERED:
NP Ellie Jordan notified and ordered 1x dose 1 tab Percocet PO. Will administer and reassess.
JAYNE Buck notified. Awaiting further orders at this time.
nothing new ordered at this time

## 2018-12-25 NOTE — PROGRESS NOTE ADULT - SUBJECTIVE AND OBJECTIVE BOX
Patient is a 58y old  Male who presents with a chief complaint of non-healing R foot wound (25 Dec 2018 08:47)       INTERVAL HPI/OVERNIGHT EVENTS:  Patient seen and evaluated at bedside.  Pt is resting comfortable in NAD. Denies N/V/F/C.      Allergies    penicillin (Other)    Intolerances        Vital Signs Last 24 Hrs  T(C): 36.6 (25 Dec 2018 05:21), Max: 36.7 (24 Dec 2018 12:59)  T(F): 97.9 (25 Dec 2018 05:21), Max: 98.1 (24 Dec 2018 12:59)  HR: 70 (25 Dec 2018 05:21) (56 - 74)  BP: 120/69 (25 Dec 2018 05:21) (105/50 - 146/75)  BP(mean): 76 (24 Dec 2018 16:00) (65 - 79)  RR: 18 (25 Dec 2018 05:21) (13 - 22)  SpO2: 100% (25 Dec 2018 05:21) (99% - 100%)    LABS:                        10.3   11.28 )-----------( 337      ( 25 Dec 2018 02:03 )             33.1     12-25    142  |  106  |  66<H>  ----------------------------<  140<H>  4.7   |  23  |  1.90<H>    Ca    9.0      25 Dec 2018 02:10  Mg     2.1     12-25    TPro  6.2  /  Alb  3.3  /  TBili  < 0.2<L>  /  DBili  x   /  AST  23  /  ALT  42<H>  /  AlkPhos  102  12-25    PT/INR - ( 24 Dec 2018 07:35 )   PT: 10.5 SEC;   INR: 0.95          PTT - ( 25 Dec 2018 08:45 )  PTT:75.0 SEC    CAPILLARY BLOOD GLUCOSE      POCT Blood Glucose.: 222 mg/dL (25 Dec 2018 08:36)  POCT Blood Glucose.: 227 mg/dL (24 Dec 2018 22:09)  POCT Blood Glucose.: 177 mg/dL (24 Dec 2018 18:08)  POCT Blood Glucose.: 113 mg/dL (24 Dec 2018 12:34)      Lower Extremity Physical Exam:  Dressing left clean dry and intact    RADIOLOGY & ADDITIONAL TESTS:  < from: Xray Foot AP + Lateral + Oblique, Right (12.24.18 @ 15:38) >    EXAM:  RAD FOOT MIN 3 VIEWS RIGHT        PROCEDURE DATE:  Dec 24 2018         INTERPRETATION:  CLINICAL INDICATION: Baseline postoperative evaluation   status post partial right 5th ray resection for infection    EXAM:  Portable frontal oblique andlateral right foot from 12/24/2018 at 1530.   Compared to preoperative right foot radiograph from 12/18/2018.    IMPRESSION:  Status post resection of distal half of right 5th metatarsal and base of   5th proximal phalanx with post surgical change inthe adjacent soft   tissues. Extensive bandaging material surrounds the foot.    No proximally tracking gas collections beyond the amputation margins and   no focal areas of osteomyelitis.    Remainder of foot unchanged.    Correlate with intraoperative findings.                  ERI PHILLIPS M.D., ATTENDING RADIOLOGIST  This document has been electronically signed. Dec 24 2018  4:32PM                  < end of copied text >

## 2018-12-25 NOTE — PROGRESS NOTE ADULT - ASSESSMENT
59 y/o male s/p RF wound debridement w/ stravix + 5th MPJ resection (DOS 12/24/18)   ·	Pt seen and evaluated   ·	dressing left CDI   ·	VAC likely to be placed tomorrow   ·	low concern for residual infxn  ·	appreciate ID recs   ·	discussed w/ attending

## 2018-12-25 NOTE — PROGRESS NOTE ADULT - SUBJECTIVE AND OBJECTIVE BOX
GENERAL: NAD, Comfortable  HEAD:  Atraumatic, Normocephalic  EYES: EOMI, PERRLA, conjunctiva and sclera clear  NECK: Supple, No JVD  CHEST/LUNG: Clear to auscultation bilaterally; No wheeze  HEART: Regular rate and rhythm; No murmurs, rubs, or gallops  ABDOMEN: Soft, Nontender, Nondistended; Bowel sounds present  Neuro: AAO x 3, no focal deficits, LE dressing d/c/i.  EXTREMITIES:  No clubbing, cyanosis, or edema, dressing in place.  no pus, no bleeding. healing. no erythema.   SKIN: No rashes or lesions Minimal pain right foot  NO fevers or chills    afebrile VSS    GENERAL: NAD, Comfortable  HEAD:  Atraumatic, Normocephalic  EYES: EOMI, PERRLA, conjunctiva and sclera clear  NECK: Supple, No JVD  CHEST/LUNG: Clear to auscultation bilaterally; No wheeze  HEART: Regular rate and rhythm; No murmurs, rubs, or gallops  ABDOMEN: Soft, Nontender, Nondistended; Bowel sounds present  Neuro: AAO x 3, no focal deficits, LE dressing d/c/i.  EXTREMITIES:  No clubbing, cyanosis, or edema, rt foot dressing in place.  no pus, no bleeding. healing. no erythema.   SKIN: No rashes or lesions    Labs reviewed

## 2018-12-25 NOTE — PROVIDER CONTACT NOTE (OTHER) - ASSESSMENT
Pt c/o persistent right foot pain 5/10.
Pt c/o moderate pain in right foot. Pt previously received 2mg Morphine IVP 1hr prior. Pt states, "Morphine relieved the pain, but now the pain is resurfacing."
Patient has positive anterior tibial and posterior tibial pulse with doppler R foot

## 2018-12-26 ENCOUNTER — TRANSCRIPTION ENCOUNTER (OUTPATIENT)
Age: 58
End: 2018-12-26

## 2018-12-26 LAB
APTT BLD: 64.2 SEC — HIGH (ref 27.5–36.3)
BUN SERPL-MCNC: 67 MG/DL — HIGH (ref 7–23)
CALCIUM SERPL-MCNC: 9.3 MG/DL — SIGNIFICANT CHANGE UP (ref 8.4–10.5)
CHLORIDE SERPL-SCNC: 110 MMOL/L — HIGH (ref 98–107)
CO2 SERPL-SCNC: 21 MMOL/L — LOW (ref 22–31)
CREAT SERPL-MCNC: 1.69 MG/DL — HIGH (ref 0.5–1.3)
GLUCOSE SERPL-MCNC: 174 MG/DL — HIGH (ref 70–99)
HCT VFR BLD CALC: 31.5 % — LOW (ref 39–50)
HGB BLD-MCNC: 9.9 G/DL — LOW (ref 13–17)
MAGNESIUM SERPL-MCNC: 2 MG/DL — SIGNIFICANT CHANGE UP (ref 1.6–2.6)
MCHC RBC-ENTMCNC: 30.4 PG — SIGNIFICANT CHANGE UP (ref 27–34)
MCHC RBC-ENTMCNC: 31.4 % — LOW (ref 32–36)
MCV RBC AUTO: 96.6 FL — SIGNIFICANT CHANGE UP (ref 80–100)
NRBC # FLD: 0 — SIGNIFICANT CHANGE UP
PLATELET # BLD AUTO: 280 K/UL — SIGNIFICANT CHANGE UP (ref 150–400)
PMV BLD: 10.1 FL — SIGNIFICANT CHANGE UP (ref 7–13)
POTASSIUM SERPL-MCNC: 4.9 MMOL/L — SIGNIFICANT CHANGE UP (ref 3.5–5.3)
POTASSIUM SERPL-SCNC: 4.9 MMOL/L — SIGNIFICANT CHANGE UP (ref 3.5–5.3)
RBC # BLD: 3.26 M/UL — LOW (ref 4.2–5.8)
RBC # FLD: 17.2 % — HIGH (ref 10.3–14.5)
SODIUM SERPL-SCNC: 142 MMOL/L — SIGNIFICANT CHANGE UP (ref 135–145)
WBC # BLD: 9.61 K/UL — SIGNIFICANT CHANGE UP (ref 3.8–10.5)
WBC # FLD AUTO: 9.61 K/UL — SIGNIFICANT CHANGE UP (ref 3.8–10.5)

## 2018-12-26 PROCEDURE — 99232 SBSQ HOSP IP/OBS MODERATE 35: CPT

## 2018-12-26 RX ORDER — APIXABAN 2.5 MG/1
2.5 TABLET, FILM COATED ORAL EVERY 12 HOURS
Qty: 0 | Refills: 0 | Status: DISCONTINUED | OUTPATIENT
Start: 2018-12-26 | End: 2018-12-27

## 2018-12-26 RX ADMIN — LISINOPRIL 40 MILLIGRAM(S): 2.5 TABLET ORAL at 05:23

## 2018-12-26 RX ADMIN — Medication 81 MILLIGRAM(S): at 11:24

## 2018-12-26 RX ADMIN — HEPARIN SODIUM 1100 UNIT(S)/HR: 5000 INJECTION INTRAVENOUS; SUBCUTANEOUS at 07:41

## 2018-12-26 RX ADMIN — APIXABAN 2.5 MILLIGRAM(S): 2.5 TABLET, FILM COATED ORAL at 21:26

## 2018-12-26 RX ADMIN — CARVEDILOL PHOSPHATE 12.5 MILLIGRAM(S): 80 CAPSULE, EXTENDED RELEASE ORAL at 05:23

## 2018-12-26 RX ADMIN — Medication 2: at 12:45

## 2018-12-26 RX ADMIN — ATORVASTATIN CALCIUM 40 MILLIGRAM(S): 80 TABLET, FILM COATED ORAL at 21:26

## 2018-12-26 RX ADMIN — Medication 1 APPLICATION(S): at 10:28

## 2018-12-26 RX ADMIN — Medication 100 MILLIGRAM(S): at 05:23

## 2018-12-26 RX ADMIN — Medication 1: at 17:58

## 2018-12-26 RX ADMIN — GABAPENTIN 100 MILLIGRAM(S): 400 CAPSULE ORAL at 11:24

## 2018-12-26 RX ADMIN — CARVEDILOL PHOSPHATE 12.5 MILLIGRAM(S): 80 CAPSULE, EXTENDED RELEASE ORAL at 17:58

## 2018-12-26 RX ADMIN — Medication 1: at 08:41

## 2018-12-26 NOTE — DISCHARGE NOTE ADULT - ADDITIONAL INSTRUCTIONS
Please follow up with your PCP within 1 week of discharge from hospital   Please follow up with Cardiologist  Dr. Gerald Sandra [phone] within 1 week of discharge from hospital  Please follow up with podiatry within 1 week of discharge from hospital, call to make an appointment

## 2018-12-26 NOTE — PROGRESS NOTE ADULT - PROBLEM SELECTOR PROBLEM 3
Sacral decubitus ulcer, stage II

## 2018-12-26 NOTE — DISCHARGE NOTE ADULT - SECONDARY DIAGNOSIS.
Essential hypertension CKD (chronic kidney disease) stage 4, GFR 15-29 ml/min Congestive heart failure, unspecified HF chronicity, unspecified heart failure type Hyperlipidemia, unspecified hyperlipidemia type S/P femoral-femoral bypass surgery

## 2018-12-26 NOTE — DISCHARGE NOTE ADULT - MEDICATION SUMMARY - MEDICATIONS TO STOP TAKING
I will STOP taking the medications listed below when I get home from the hospital:    Cipro 500 mg oral tablet  -- 1 tab(s) by mouth every 12 hours    colchicine 0.6 mg oral tablet  -- 1 tab(s) by mouth 2 times a day    Zofran 4 mg oral tablet  -- 1 tab(s) by mouth every 8 hours, As Needed    saccharomyces boulardii lyo 250 mg oral capsule  -- 1 cap(s) by mouth 2 times a day

## 2018-12-26 NOTE — PROGRESS NOTE ADULT - PROBLEM SELECTOR PROBLEM 2
Dehiscence of operative wound, initial encounter

## 2018-12-26 NOTE — PHYSICAL THERAPY INITIAL EVALUATION ADULT - CRITERIA FOR SKILLED THERAPEUTIC INTERVENTIONS
impairments found/therapy frequency/predicted duration of therapy intervention/anticipated discharge recommendation/risk reduction/prevention/rehab potential

## 2018-12-26 NOTE — PHYSICAL THERAPY INITIAL EVALUATION ADULT - PERTINENT HX OF CURRENT PROBLEM, REHAB EVAL
Pt. admitted for non-healing right foot wound. s/p right foot wound debridement with stravix + 5th MPJ resection on 12/24/18. PMH of CHF, CAD, HTN, and HLD.

## 2018-12-26 NOTE — PROGRESS NOTE ADULT - PROBLEM SELECTOR PLAN 9
- Patient on apixaban, states that he was started on this because of his "weak heart"  and prophylaxis but denies any knowledge clots in his heart or atrial fibrillation  c/w Apixaban on discharge, switch to Hep drip temporarily for wound debridgement.  - Patient s/p treatment of h pylori with 8 days of triple therapy (usual treatment length is 14 days so its possible he got the other days during his hospitalization at [facility]), denies any further bleeding episodes, will check a h pylori stool antigen to assess for clearance  - Patient on colchicine but no acute gout complaints at present, will hold for now
- Patient on apixaban, states that he was started on this because of his "weak heart"  and prophylaxis but denies any knowledge clots in his heart or atrial fibrillation  c/w Apixaban on discharge, switch to Hep drip temporarily for wound debridgement.  - Patient s/p treatment of h pylori with 8 days of triple therapy (usual treatment length is 14 days so its possible he got the other days during his hospitalization at [facility]), denies any further bleeding episodes, will check a h pylori stool antigen to assess for clearance  - Patient on colchicine but no acute gout complaints at present, will hold for now
- Patient on apixaban, states that he was started on this because of his "weak heart"  and prophylaxis but denies any knowledge clots in his heart or atrial fibrillation  c/w Apixaban on discharge, switch to Hep drip temporarily for wound debridgement.  - Patient s/p treatment of h pylori with 8 days of triple therapy (usual treatment length is 14 days so its possible he got the other days during his hospitalization at [facility]), denies any further bleeding episodes, will check a h pylori stool antigen to assess for clearance  - Patient on colchicine but no acute gout complaints at present, will hold for now
- Patient on apixaban, states that he was started on this because of his "weak heart"  and prophylaxis but denies any knowledge clots in his heart or atrial fibrillation  c/w Apixaban on discharge, switch to Hep drip temporarily for wound debridgement.  - Patient s/p treatment of h pylori with 8 days of triple therapy (usual treatment length is 14 days so its possible he got the other days during his hospitalization at [facility]), denies any further bleeding episodes, will check a h pylori stool antigen to assess for clearance  - Patient on colchicine but no acute gout complaints at present, will hold for now
- Patient on apixaban, states that he was started on this because of his "weak heart"  and prophylaxis but denies any knowledge clots in his heart or atrial fibrillation  c/w Apixaban on discharge, switch to Hep drip temporarily for wound debridgement.  - Patient s/p treatment of h pylori with 8 days of triple therapy (usual treatment length is 14 days so its possible he got the other days during his hospitalization at [facility]), denies any further bleeding episodes, will check a h pylori stool antigen to assess for clearance  - Patient on colchicine but no acute gout complaints at present, will hold for now
- continue heparin; will switch to apixaban likely tomorrow morning  - Fall precautions
- switch over to eliquis tonight

## 2018-12-26 NOTE — PHYSICAL THERAPY INITIAL EVALUATION ADULT - DISCHARGE DISPOSITION, PT EVAL
Home with Home Physical Therapy secondary to decreased functional mobility, impairments in balance and strength.

## 2018-12-26 NOTE — DISCHARGE NOTE ADULT - CARE PLAN
Goal:	healing of surgical wound  Assessment and plan of treatment:	Podiatry Discharge Instructions:  Follow up: Please follow up with Dr. tao within 1 week of discharge from the hospital, please call [phone] for appointment and discuss that you recently were seen in the hospital.  Wound Care: Please have visiting nurse to change the wound vac 3 times a week   Weight bearing: Please weight bear as tolerated in a surgical shoe.  Antibiotics: Please continue as instructed. Principal Discharge DX:	Dry gangrene  Goal:	healing of surgical wound  Assessment and plan of treatment:	Podiatry Discharge Instructions:  Follow up: Please follow up with Dr. tao within 1 week of discharge from the hospital, please call [phone] for appointment and discuss that you recently were seen in the hospital.  Wound Care: Please have visiting nurse to change the wound vac 3 times a week   Weight bearing: Please weight bear as tolerated in a surgical shoe.  Antibiotics: Please continue as instructed.  Secondary Diagnosis:	Essential hypertension  Assessment and plan of treatment:	Continue current blood pressure medication regimen as directed. Monitor for any visual changes, headaches or dizziness.  Monitor blood pressure regularly.  Follow up with your PCP for further management for high blood pressure, please call to make appointment within 1 week of discharge  Secondary Diagnosis:	CKD (chronic kidney disease) stage 4, GFR 15-29 ml/min  Assessment and plan of treatment:	Please follow up with your PCP within 1 week of discharge from hospital.  Secondary Diagnosis:	Congestive heart failure, unspecified HF chronicity, unspecified heart failure type  Assessment and plan of treatment:	Continue recommended medication regimen. Monitor for signs/symptoms of fluid overload and electrolyte abnormalities, such as, shortness of breath, cough, swelling, chest discomfort, changes in heart rate, dizziness, fainting, or changes in menrtal status. Follow-up with your PCP/cardiologist outpatient after you've been discharged from the hospital.  Secondary Diagnosis:	Hyperlipidemia, unspecified hyperlipidemia type  Assessment and plan of treatment:	Continue cholesterol control medications. Continue DASH diet. Follow up with your PCP within 1 week of discharge for further management and monitoring of lipid and cholesterol panels. Please call to make an appointment  Secondary Diagnosis:	S/P femoral-femoral bypass surgery  Assessment and plan of treatment:	Continue eliquis. Please follow up with outpatient cardiologist Principal Discharge DX:	Dry gangrene  Goal:	healing of surgical wound  Assessment and plan of treatment:	Podiatry Discharge Instructions:  Follow up: Please follow up with Dr. tao within 1 week of discharge from the hospital, please call [phone] for appointment and discuss that you recently were seen in the hospital.  Wound Care: Please have visiting nurse to change the wound vac 3 times a week   Weight bearing: Please weight bear as tolerated in a surgical shoe.  Secondary Diagnosis:	Essential hypertension  Assessment and plan of treatment:	Continue current blood pressure medication regimen as directed. Monitor for any visual changes, headaches or dizziness.  Monitor blood pressure regularly.  Follow up with your PCP for further management for high blood pressure, please call to make appointment within 1 week of discharge  Secondary Diagnosis:	CKD (chronic kidney disease) stage 4, GFR 15-29 ml/min  Assessment and plan of treatment:	Please follow up with your PCP within 1 week of discharge from hospital.  Secondary Diagnosis:	Congestive heart failure, unspecified HF chronicity, unspecified heart failure type  Assessment and plan of treatment:	Continue recommended medication regimen. Monitor for signs/symptoms of fluid overload and electrolyte abnormalities, such as, shortness of breath, cough, swelling, chest discomfort, changes in heart rate, dizziness, fainting, or changes in menrtal status. Follow-up with your PCP/cardiologist outpatient after you've been discharged from the hospital.  Secondary Diagnosis:	Hyperlipidemia, unspecified hyperlipidemia type  Assessment and plan of treatment:	Continue cholesterol control medications. Continue DASH diet. Follow up with your PCP within 1 week of discharge for further management and monitoring of lipid and cholesterol panels. Please call to make an appointment  Secondary Diagnosis:	S/P femoral-femoral bypass surgery  Assessment and plan of treatment:	Continue eliquis. Please follow up with outpatient cardiologist

## 2018-12-26 NOTE — DISCHARGE NOTE ADULT - PLAN OF CARE
healing of surgical wound Podiatry Discharge Instructions:  Follow up: Please follow up with Dr. tao within 1 week of discharge from the hospital, please call [phone] for appointment and discuss that you recently were seen in the hospital.  Wound Care: Please have visiting nurse to change the wound vac 3 times a week   Weight bearing: Please weight bear as tolerated in a surgical shoe.  Antibiotics: Please continue as instructed. Continue current blood pressure medication regimen as directed. Monitor for any visual changes, headaches or dizziness.  Monitor blood pressure regularly.  Follow up with your PCP for further management for high blood pressure, please call to make appointment within 1 week of discharge Please follow up with your PCP within 1 week of discharge from hospital. Continue recommended medication regimen. Monitor for signs/symptoms of fluid overload and electrolyte abnormalities, such as, shortness of breath, cough, swelling, chest discomfort, changes in heart rate, dizziness, fainting, or changes in menrtal status. Follow-up with your PCP/cardiologist outpatient after you've been discharged from the hospital. Continue cholesterol control medications. Continue DASH diet. Follow up with your PCP within 1 week of discharge for further management and monitoring of lipid and cholesterol panels. Please call to make an appointment Continue eliquis. Please follow up with outpatient cardiologist Podiatry Discharge Instructions:  Follow up: Please follow up with Dr. tao within 1 week of discharge from the hospital, please call [phone] for appointment and discuss that you recently were seen in the hospital.  Wound Care: Please have visiting nurse to change the wound vac 3 times a week   Weight bearing: Please weight bear as tolerated in a surgical shoe.

## 2018-12-26 NOTE — PROGRESS NOTE ADULT - PROBLEM SELECTOR PLAN 4
- Unclear chronicity, but Cr trending down.   - continue to monitor. f/u outpt with PCP Dr. Feliciano

## 2018-12-26 NOTE — PROGRESS NOTE ADULT - PROBLEM SELECTOR PLAN 3
- Wound care f/u
- Wound care f/u appreciated.
- Wound care eval in AM
- Wound care f/u appreciated.

## 2018-12-26 NOTE — PROGRESS NOTE ADULT - ASSESSMENT
· Assessment		  57 y/o male s/p RF wound debridement w/ stravix + 5th MPJ resection (DOS 12/24/18)   ·	Pt seen and evaluated   ·	graft intact, no signs of infection, no periwound cellulitis  ·	VAC paperwork in chart, patient is ready for wound vac application   ·	low concern for residual infxn  ·	appreciate ID recs   ·	patient is stable to be discharged from podiatry's standpoint once wound vac is applied   ·	patient to follow up with Dr. Melissa in office next week (detailed in discharge paperwork)  ·	discussed w/ attending · Assessment		  57 y/o male s/p RF wound debridement w/ stravix + 5th MPJ resection (DOS 12/24/18)   ·	Pt seen and evaluated   ·	graft intact, no signs of infection, no periwound cellulitis  ·	VAC paperwork in chart, patient is ready for wound vac application   ·	low concern for residual infxn  ·	appreciate ID recs   ·	patient is stable to be discharged from podiatry's standpoint once wound vac is applied   ·	patient to need home nursing to change the wound vac 3 times a week   ·	patient to follow up with Dr. Melissa in office next week (detailed in discharge paperwork)  ·	discussed w/ attending

## 2018-12-26 NOTE — PROGRESS NOTE ADULT - PROBLEM SELECTOR PROBLEM 6
Type 2 diabetes mellitus with complication, without long-term current use of insulin

## 2018-12-26 NOTE — PROGRESS NOTE ADULT - REASON FOR ADMISSION
non-healing R foot wound

## 2018-12-26 NOTE — PROGRESS NOTE ADULT - SUBJECTIVE AND OBJECTIVE BOX
No acute symptoms or events overnight    Vital Signs Last 24 Hrs  T(C): 36.7 (26 Dec 2018 13:35), Max: 36.9 (25 Dec 2018 21:41)  T(F): 98.1 (26 Dec 2018 13:35), Max: 98.4 (25 Dec 2018 21:41)  HR: 73 (26 Dec 2018 13:35) (68 - 81)  BP: 129/73 (26 Dec 2018 13:35) (124/70 - 129/73)  BP(mean): --  RR: 18 (26 Dec 2018 13:35) (18 - 18)  SpO2: 100% (26 Dec 2018 13:35) (99% - 100%)    GENERAL: NAD, Comfortable  HEAD:  Atraumatic, Normocephalic  EYES: EOMI, PERRLA, conjunctiva and sclera clear  NECK: Supple, No JVD  CHEST/LUNG: Clear to auscultation bilaterally; No wheeze  HEART: Regular rate and rhythm; No murmurs, rubs, or gallops  ABDOMEN: Soft, Nontender, Nondistended; Bowel sounds present  Neuro: AAO x 3, no focal deficits, LE dressing d/c/i.  EXTREMITIES:  No clubbing, cyanosis, or edema, rt foot dressing in place, c/d/i  SKIN: No rashes or lesions    LABS:                        9.9    9.61  )-----------( 280      ( 26 Dec 2018 06:00 )             31.5     12-26    142  |  110<H>  |  67<H>  ----------------------------<  174<H>  4.9   |  21<L>  |  1.69<H>    Ca    9.3      26 Dec 2018 06:00  Mg     2.0     12-26    TPro  6.2  /  Alb  3.3  /  TBili  < 0.2<L>  /  DBili  x   /  AST  23  /  ALT  42<H>  /  AlkPhos  102  12-25    PTT - ( 26 Dec 2018 06:00 )  PTT:64.2 SEC  CAPILLARY BLOOD GLUCOSE      POCT Blood Glucose.: 167 mg/dL (26 Dec 2018 17:43)  POCT Blood Glucose.: 218 mg/dL (26 Dec 2018 12:35)  POCT Blood Glucose.: 161 mg/dL (26 Dec 2018 08:34)  POCT Blood Glucose.: 174 mg/dL (25 Dec 2018 21:53)

## 2018-12-26 NOTE — PHYSICAL THERAPY INITIAL EVALUATION ADULT - ADDITIONAL COMMENTS
Pt. reports owning DME of straight cane, rolling walker.    Pt. was left supine in bed post PT Evaluation, NAD, all lines intact, call monson within reach. NICK Montilla made aware of pt. status and participation in PT.

## 2018-12-26 NOTE — PROGRESS NOTE ADULT - PROBLEM SELECTOR PROBLEM 5
Congestive heart failure, unspecified HF chronicity, unspecified heart failure type

## 2018-12-26 NOTE — PROGRESS NOTE ADULT - ASSESSMENT
58 M with PMH of CHF, CAD (70% lesion per patient, no stent), HTN, HLD and fem-pop bypass 11/2018 at OSH who presents for poorly healing R foot ulcer. Cardiology consulted for pre-op evaluation. He is s/p wound debridement and remains hemodynamically stable.     - Patient should f/u with his outpatient cardiologist  - Cardiology will sign off. Please call if additional questions arise.     Discussed with Dr. Zheng.     GEOVANNI Mckeon  Cardiology Fellow   [id number]

## 2018-12-26 NOTE — DISCHARGE NOTE ADULT - MEDICATION SUMMARY - MEDICATIONS TO TAKE
I will START or STAY ON the medications listed below when I get home from the hospital:    aspirin 81 mg oral tablet  -- 1 tab(s) by mouth once a day  -- Indication: For Need for prophylactic measure    acetaminophen 325 mg oral tablet  -- 2 tab(s) by mouth every 6 hours, As needed, Mild Pain (1 - 3)  -- Indication: For Mild pain     oxycodone-acetaminophen 5 mg-325 mg oral tablet  -- 1 tab(s) by mouth every 8 hours, As Needed -Moderate Pain (4 - 6) MDD:3 tab, hold for oversedation/lethargy ISTOP ref: [id number]   -- Indication: For  severe pain     ramipril 10 mg oral capsule  -- 1 cap(s) by mouth once a day, hold if SBP <110   -- Indication: For HTN (hypertension)    apixaban 2.5 mg oral tablet  -- 1 tab(s) by mouth 2 times a day  -- Indication: For S/P femoral-femoral bypass surgery    gabapentin 100 mg oral capsule  -- 1 cap(s) by mouth once a day  -- Indication: For Neuropathic pain     Janumet 50 mg-1000 mg oral tablet  -- 1 tab(s) by mouth 2 times a day  -- Indication: For Type 2 diabetes mellitus with complication, without long-term current use of insulin    atorvastatin 40 mg oral tablet  -- 1 tab(s) by mouth once a day  -- Indication: For Hyperlipidemia, unspecified hyperlipidemia type    carvedilol 12.5 mg oral tablet  -- 1 tab(s) by mouth 2 times a day, hold if SBP <110 or HR <60  -- Indication: For HTN (hypertension)/CHF     docusate sodium 100 mg oral capsule  -- 1 cap(s) by mouth 2 times a day  -- Indication: For Constipation     Multiple Vitamins oral tablet  -- 1 tab(s) by mouth once a day  -- Indication: For Supplement

## 2018-12-26 NOTE — DISCHARGE NOTE ADULT - MEDICATION SUMMARY - MEDICATIONS TO CHANGE
I will SWITCH the dose or number of times a day I take the medications listed below when I get home from the hospital:    gabapentin 100 mg oral capsule  -- 1 cap(s) by mouth 3 times a day

## 2018-12-26 NOTE — PROGRESS NOTE ADULT - PROBLEM SELECTOR PLAN 1
- Dry gangrene of the R foot ulcer  - s/p right foot wound debridement with application of biologic graft 12/24, podiatric intervention appreciated  - await VAC placement  - PT in progress
- Dry gangrene of the R foot ulcer, seen by podiatry -> no signs of infection, would therefore hold off on IV abx for now  - Wound debridement and further management as per podiatry     ***** Risk Stratification for planned procedure: ****  Vitals/Labs/Chart reviewed. EKG reviewed NSR.   Pt feels well. No Chest pain, No shortness of breath. Euvolemic.   No signs of acute ischemia nor acute cardio/pulmonary decompensation.    Patient is at intermediate risk for intermediate risk procedure.  No further medical workup needed for debridement under local sedation.   (TTE is ordered but can be done as outpt)  c/w Apixaban after procedure but will switch to Hep drip temporarily for planned wound debridement.  His outpt cardio is Dr. Gerald Sandra [phone] in [place] who recently cleared him for his bypass surgery. Left messages with [place] office.
- Dry gangrene of the R foot ulcer, seen by podiatry -> no signs of infection, would therefore hold off on IV abx for now  - Wound debridement and further management as per podiatry     ***** Risk Stratification for planned procedure: ****  Vitals/Labs/Chart reviewed. EKG reviewed NSR.   Pt feels well. No Chest pain, No shortness of breath. Euvolemic.   No signs of acute ischemia nor acute cardio/pulmonary decompensation.    Patient is at intermediate risk for intermediate risk procedure.  No further medical workup needed for debridement under local sedation.   TTE reviewed. normal LV. EF 60-65%.  c/w Apixaban after procedure but will switch to Hep drip temporarily for planned wound debridement.  His outpt cardio is Dr. Gerald Sandra [phone] in [place] who recently cleared him for his bypass surgery. Left messages with [place] office. await return call.
- Dry gangrene of the R foot ulcer, seen by podiatry -> no signs of infection, would therefore hold off on IV abx for now  - Wound debridement and further management as per podiatry   - pt is medically cleared for procedure today    ***** Risk Stratification for planned procedure: ****  Vitals/Labs/Chart reviewed. EKG reviewed NSR.   Pt feels well. No Chest pain, No shortness of breath. Euvolemic.   No signs of acute ischemia nor acute cardio/pulmonary decompensation.    Patient is at intermediate risk for intermediate risk procedure.  No further medical workup needed for debridement under local sedation.   TTE reviewed. normal LV. EF 60-65%.  c/w Apixaban after procedure but will continue with Heparin drip temporarily for planned wound debridement.  His outpt cardio is Dr. Gerald Sandra [phone] in [place]
- Dry gangrene of the R foot ulcer, seen by podiatry -> no signs of infection, would therefore hold off on IV abx for now  - s/p right foot wound debridement with application of biologic graft 12/24, podiatric intervention appreciated
- Dry gangrene of the R foot ulcer, seen by podiatry -> no signs of infection, would therefore hold off on IV abx for now  - Wound debridement and further management as per podiatry     ***** Risk Stratification for planned procedure: ****  Vitals/Labs/Chart reviewed. EKG reviewed NSR.   Pt feels well. No Chest pain, No shortness of breath. Euvolemic.   No signs of acute ischemia nor acute cardio/pulmonary decompensation.    Patient is at intermediate risk for intermediate risk procedure.  No further medical workup needed for debridement under local sedation.   (TTE is ordered but can be done as outpt)  c/w Apixaban after procedure but willswitch to Hep drip temporarily for planned wound debridement.  His outpt cardio is Dr. Gerald Sandra [phone] in [place] who recently cleared him for his bypass surgery. Left messages with [place] office.
- Dry gangrene of the R foot ulcer, seen by podiatry -> no signs of infection, would therefore hold off on IV abx for now  - Wound debridement and further management as per podiatry     ***** Risk Stratification for planned procedure: ****  Vitals/Labs/Chart reviewed. EKG reviewed NSR.   Pt feels well. No Chest pain, No shortness of breath. Euvolemic.   No signs of acute ischemia nor acute cardio/pulmonary decompensation.    Patient is at intermediate risk for intermediate risk procedure.  No further medical workup needed for debridement under local sedation.   TTE reviewed. normal LV. EF 60-65%.  c/w Apixaban after procedure but will continue with Heparin drip temporarily for planned wound debridement.  His outpt cardio is Dr. Gerald Sandra [phone] in [place]

## 2018-12-26 NOTE — PROGRESS NOTE ADULT - PROBLEM SELECTOR PROBLEM 9
Medication management
Need for prophylactic measure
Need for prophylactic measure
Medication management

## 2018-12-26 NOTE — PROGRESS NOTE ADULT - PROBLEM SELECTOR PROBLEM 4
CKD (chronic kidney disease) stage 4, GFR 15-29 ml/min

## 2018-12-26 NOTE — PROGRESS NOTE ADULT - PROBLEM SELECTOR PLAN 5
- Patient states that he has a weak heart but currently is not on any diuretics  - Dr. Aragon paged his cardiologist Dr. Gerald Sandra [phone] numerous times. He also paged Dr. Feliciano, spoke with him who states he would call back after reviewing his chart, but has not returned calls.   - per pt, he recently had cath this year and has some coronary disease in LAD but no stent required.  - TTE reviewed. as above. normal LV. not on diuretics.
- Patient states that he has a weak heart but currently is not on any diuretics  - I paged his cardiologist Dr. Gerald Sandra [phone] numerous times. I also paged Dr. Feliciano, spoke with him who states he will call me back after reviewing his chart, but has not returned calls.   - per pt, he recently had cath this year and has some coronary disease in LAD but no stent required.
- Patient states that he has a weak heart but currently is not on any diuretics  - I paged his cardiologist Dr. Gerald Sandra [phone] numerous times. I also paged Dr. Feliciano, spoke with him who states he will call me back after reviewing his chart, but has not returned calls.   - per pt, he recently had cath this year and has some coronary disease in LAD but no stent required.  - TTE reviewed. as above. normal LV. not on diuretics.
- Patient states that he has a weak heart but currently is not on any diuretics  - outside cardiologist is Dr. Gerald Sandra [phone] numerous times.   - per pt, he recently had cath this year and has some coronary disease in LAD but no stent required.  - TTE reviewed. normal LV. Remains euvolemic
- Patient states that he has a weak heart but currently is not on any diuretics  - outside cardiologist is Dr. Gerald Sandra [phone] numerous times.   - per pt, he recently had cath this year and has some coronary disease in LAD but no stent required.  - TTE reviewed. normal LV. Remains euvolemic
- Patient states that he has a weak heart but currently is not on any diuretics  - Dr. Aragon paged his cardiologist Dr. Gerald Sandra [phone] numerous times. He also paged Dr. Feliciano, spoke with him who states he would call back after reviewing his chart, but has not returned calls.   - per pt, he recently had cath this year and has some coronary disease in LAD but no stent required.  - TTE reviewed. as above. normal LV. not on diuretics.
- Patient states that he has a weak heart but currently is not on any diuretics  - I paged his cardiologist Dr. Gerald Sandra [phone] numerous times. I also paged Dr. Feliciano, spoke with him who states he will call me back after reviewing his chart, but has not returned calls.   - per pt, he recently had cath this year and has some coronary disease in LAD but no stent required.

## 2018-12-26 NOTE — DISCHARGE NOTE ADULT - HOSPITAL COURSE
59yo h/o recent fem-pop bypass with PTFE for nonhealing RLE ulcer (11/12) at [facility] (Dr. Omkar Bruce). Admitted from PODS d/t concern for osteo    Hospital course:     Dry gangrene  - Dry gangrene of the R foot ulcer, seen by podiatry -> no signs of infection  - held off on IV abx   - 12/24 pt s/p RF wound debridement with application of stravix graft. Intr-op bone was necrotic and 5th metatarsal head was resected and sent to pathology  - Podiatry recs MRI R. foot-No OM  - Vascular consulted- CAMMY/PVR-CAMMY/PVR reviewed, normal waveform in RLE. No need for further vascular surgery intervention.    Dehiscence of operative wound, initial encounter  - Pt with dehiscence of his incision to subcutaneous tissue, was started on ciprofloxacin as outpatient, unclear currently if patient has an underlying infection  - ID consulted      Sacral decubitus ulcer, stage II  - Wound eval-->Local wound care as per recommendation  - podiatry placed wound vac on 12/26, wound care services to be set up at home by case mgmt     CKD (chronic kidney disease) stage 4, GFR 15-29 ml/min  - Unclear chronicity,   - Monitored BUN / Cr    Congestive heart failure  - Patient states that he has a weak heart but currently is not on any diuretics  - c/w  with ACEI therapy and coreg  - Patient also states that he is supposed to have a heart cath later this month   - Echo-N.LV function, elevated Left ventricular filling pressure, EF 60-65%  - outpatient follow up with cardiology     DM2  - Patient on janumet as outpatient, currently given his renal function he would not be the optimal candidate for this as an outpatient  - For now will hold the oral meds, will place on HISS, FS qAC  - c/w gabapentin but reduce dose to 100 daily given his current renal function  - A1C 5.7%    Essential hypertension  - c/w lisinopril (as therapeutic interchange for his ramipril) and coreg with hold parameters  - Monitor BUN/Cr while on lisinopril.     Hyperlipidemia  - c/w home lipitor    Medication management.     - Patient on apixaban, states that he was started on this because of his "weak heart"  and prophylaxis but denies any knowledge clots in his heart or atrial fibrillation  - Will c/w apixaban for now but would need to clarify reasoning for this medication in AM with his cardiologist  - Patient s/p treatment of h pylori with 8 days of triple therapy (usual treatment length is 14 days so its possible he got the other days during his hospitalization at [facility]), denies any further bleeding episodes  - Patient on colchicine but no acute gout complaints at present, will hold for now.     Need for prophylactic measure.    - On eliquis  - Fall precautions.    Dispo: 59yo h/o recent fem-pop bypass with PTFE for nonhealing RLE ulcer (11/12) at [facility] (Dr. Omkar Bruce). Admitted from PODS d/t concern for osteo    Hospital course:     Dry gangrene  - Dry gangrene of the R foot ulcer, seen by podiatry -> no signs of infection  - held off on IV abx   - 12/24 pt s/p RF wound debridement with application of stravix graft. Intr-op bone was necrotic and 5th metatarsal head was resected and sent to pathology  - Podiatry recs MRI R. foot-No OM  - Vascular consulted- CAMMY/PVR-CAMMY/PVR reviewed, normal waveform in RLE. No need for further vascular surgery intervention.  - podiatry placed wound vac on 12/26, wound care services set up at home by case mgmt   - pain control-- ISTOP ref: [id number] sent pt on 3 day supply of 5-325mg  percocet prn q8h x 3 days       Dehiscence of operative wound, initial encounter  - Pt with dehiscence of his incision to subcutaneous tissue, was started on ciprofloxacin as outpatient, unclear currently if patient has an underlying infection  - ID consulted      Sacral decubitus ulcer, stage II  - Wound eval-->Local wound care as per recommendation      CKD (chronic kidney disease) stage 4, GFR 15-29 ml/min  - Unclear chronicity,   - Monitored BUN / Cr    Congestive heart failure  - Patient states that he has a weak heart but currently is not on any diuretics  - c/w  with ACEI therapy and coreg  - Patient also states that he is supposed to have a heart cath later this month   - Echo-N.LV function, elevated Left ventricular filling pressure, EF 60-65%  - outpatient follow up with cardiology     Dm2  - on janumet as outpatient, may continue on d/c per medical attg   - For now will hold the oral meds, will place on HISS, FS qAC  - c/w gabapentin but reduce dose to 100 daily given his current renal function  - A1C 5.7%    Essential hypertension  - c/w lisinopril (as therapeutic interchange for his ramipril) and coreg with hold parameters  - Monitor BUN/Cr while on lisinopril.     Hyperlipidemia  - c/w home lipitor    Medication management.     - Patient on apixaban, states that he was started on this because of his "weak heart"  and prophylaxis but denies any knowledge clots in his heart or atrial fibrillation  - Will c/w apixaban for now but would need to clarify reasoning for this medication in AM with his cardiologist  - Patient s/p treatment of h pylori with 8 days of triple therapy (usual treatment length is 14 days so its possible he got the other days during his hospitalization at [facility]), denies any further bleeding episodes  - Patient on colchicine but no acute gout complaints at present, will hold for now.     Need for prophylactic measure.    - Cont. Eliquis  - Fall precautions.    Dispo: Pt medically stable for discharge with wound care services per Dr. Zhou. Outpatient

## 2018-12-26 NOTE — PROGRESS NOTE ADULT - PROBLEM SELECTOR PLAN 7
- c/w lisinopril (as therapeutic interchange for his ramipril) and coreg with hold parameters
- c/w lisinopril (as therapeutic interchange for his ramipril) and coreg with hold parameters  - Monitor BUN/Cr while on lisinopril
- c/w lisinopril (as therapeutic interchange for his ramipril) and coreg with hold parameters
- c/w lisinopril (as therapeutic interchange for his ramipril) and coreg with hold parameters  - Monitor BUN/Cr while on lisinopril

## 2018-12-26 NOTE — PROGRESS NOTE ADULT - PROBLEM SELECTOR PLAN 8
- c/w home lipitor

## 2018-12-26 NOTE — PROGRESS NOTE ADULT - PROBLEM SELECTOR PLAN 2
- Patient with dehiscence of his incision to subcutaneous tissue, was started on ciprofloxacin as outpatient, unclear currently if patient has an underlying infection.  - ID on the case. off abx.   - c/w dressing care per wound care   - ID f/u
- Patient with dehiscence of his incision to subcutaneous tissue, was started on ciprofloxacin as outpatient, unclear currently if patient has an underlying infection.  - ID on the case. will hold off abx for now.   - wound care eval
- Patient with dehiscence of his incision to subcutaneous tissue, was started on ciprofloxacin as outpatient, unclear currently if patient has an underlying infection.  - off abx.   - c/w dressing care per wound care   - ID f/u appreciated
- Patient with dehiscence of his incision to subcutaneous tissue, was started on ciprofloxacin as outpatient, unclear currently if patient has an underlying infection.  - ID on the case. off abx.   - c/w dressing care per wound care   - ID f/u appreciated
- Patient with dehiscence of his incision to subcutaneous tissue, was started on ciprofloxacin as outpatient, unclear currently if patient has an underlying infection.  - ID on the case. will hold off abx for now.   - wound care eval

## 2018-12-26 NOTE — DISCHARGE NOTE ADULT - CARE PROVIDER_API CALL
Sydnie Melissa (DPM), Podiatric Medicine and Surgery  [address]  [place] [address]  Phone: [phone]  Fax: [phone]    Dr. Gerald Sandra,   [phone] Cardiologist  Phone: (   )    -  Fax: (   )    -    PCP,   Dr. Bains  Phone: (   )    -  Fax: (   )    -

## 2018-12-26 NOTE — DISCHARGE NOTE ADULT - PATIENT PORTAL LINK FT
You can access the [facility] Patient Portal, offered by [facility], by registering with the following website: [email or web address]

## 2018-12-26 NOTE — PROGRESS NOTE ADULT - PROVIDER SPECIALTY LIST ADULT
Cardiology
Infectious Disease
Infectious Disease
Internal Medicine
Podiatry
Vascular Surgery
Vascular Surgery
Internal Medicine
Internal Medicine

## 2018-12-26 NOTE — PHYSICAL THERAPY INITIAL EVALUATION ADULT - GENERAL OBSERVATIONS, REHAB EVAL
Consult received, chart reviewed. Patient received supine in bed, NAD, ace wrap right foot c/d/i, +wound vac. Patient agreed to Evaluation from Physical Therapist.

## 2018-12-26 NOTE — PROGRESS NOTE ADULT - SUBJECTIVE AND OBJECTIVE BOX
Podiatry pager #:  [phone]    Patient is a 58y old  Male who presents with a chief complaint of non-healing R foot wound (25 Dec 2018 10:55)       INTERVAL HPI/OVERNIGHT EVENTS:  Patient seen and evaluated at bedside.  Pt is resting comfortable in NAD. Denies N/V/F/C.      Allergies    penicillin (Other)    Intolerances        Vital Signs Last 24 Hrs  T(C): 36.6 (26 Dec 2018 06:41), Max: 36.9 (25 Dec 2018 21:41)  T(F): 97.8 (26 Dec 2018 06:41), Max: 98.4 (25 Dec 2018 21:41)  HR: 68 (26 Dec 2018 06:41) (68 - 84)  BP: 127/76 (26 Dec 2018 06:41) (115/69 - 133/85)  BP(mean): --  RR: 18 (26 Dec 2018 06:41) (18 - 18)  SpO2: 100% (26 Dec 2018 06:41) (99% - 100%)    LABS:                        9.9    9.61  )-----------( 280      ( 26 Dec 2018 06:00 )             31.5     12-26    142  |  110<H>  |  67<H>  ----------------------------<  174<H>  4.9   |  21<L>  |  1.69<H>    Ca    9.3      26 Dec 2018 06:00  Mg     2.0     12-26    TPro  6.2  /  Alb  3.3  /  TBili  < 0.2<L>  /  DBili  x   /  AST  23  /  ALT  42<H>  /  AlkPhos  102  12-25    PTT - ( 26 Dec 2018 06:00 )  PTT:64.2 SEC    CAPILLARY BLOOD GLUCOSE      POCT Blood Glucose.: 161 mg/dL (26 Dec 2018 08:34)  POCT Blood Glucose.: 174 mg/dL (25 Dec 2018 21:53)  POCT Blood Glucose.: 215 mg/dL (25 Dec 2018 18:00)      Lower Extremity Physical Exam:  right foot lateral foot wound, no drainage, graft intact. No signs of infection. No periwound cellulitis.     RADIOLOGY & ADDITIONAL TESTS:

## 2018-12-26 NOTE — PROGRESS NOTE ADULT - PROBLEM SELECTOR PLAN 6
- Patient on Janumet as outpatient, currently given his renal function he would not be the optimal candidate for this as an outpatient  - For now will hold the oral meds, will continue with HISS, FS qAC  - c/w gabapentin but reduced dose to 100 daily given his current renal function, uptitrate as needed
- Patient on Janumet as outpatient, currently given his renal function he would not be the optimal candidate for this as an outpatient  - For now will hold the oral meds, will place on HISS, FS qAC  - c/w gabapentin but reduce dose to 100 daily given his current renal function, uptitrate as needed

## 2018-12-26 NOTE — PROGRESS NOTE ADULT - SUBJECTIVE AND OBJECTIVE BOX
Patient seen and examined at bedside.    Overnight Events: Doing well. Denies CP, SOB, LE edema, palpitations.       REVIEW OF SYSTEMS:  Constitutional:     No fevers, chills, weight loss, weight gain  HEENT:                  No dry eyes, nasal congestion, postnasal drip  CV:                         No chest pain, palpitations, orthopnea, PND  Resp:                     No cough, SOB, dyspnea, wheezing, sputum  GI:                          No nausea, vomiting, abdominal pain, diarrhea, constipation  :                        No dysuria, nocturia, hematuria, increased urinary frequency  Musculoskeletal: No back pain, myalgias, arthralgias   Skin:                       No rash, pruritus, ecchymoses  Neurological:        No headache, dizziness, syncope, weakness, numbness  Psychiatric:           No anxiety, depression   Endocrine:            No hot/cold intolerance, polydipsia  Heme/Lymph:      No bleeding, easy bruising  Allergic/Immune: No itchy eyes, rhinorrhea, hives angioedema      Current Meds:  acetaminophen   Tablet .. 650 milliGRAM(s) Oral every 6 hours PRN  apixaban 2.5 milliGRAM(s) Oral every 12 hours  aspirin enteric coated 81 milliGRAM(s) Oral daily  atorvastatin 40 milliGRAM(s) Oral at bedtime  carvedilol 12.5 milliGRAM(s) Oral every 12 hours  collagenase Ointment 1 Application(s) Topical daily  dextrose 40% Gel 15 Gram(s) Oral once PRN  dextrose 5%. 1000 milliLiter(s) IV Continuous <Continuous>  dextrose 50% Injectable 12.5 Gram(s) IV Push once  dextrose 50% Injectable 25 Gram(s) IV Push once  dextrose 50% Injectable 25 Gram(s) IV Push once  docusate sodium 100 milliGRAM(s) Oral two times a day  gabapentin 100 milliGRAM(s) Oral daily  glucagon  Injectable 1 milliGRAM(s) IntraMuscular once PRN  insulin lispro (HumaLOG) corrective regimen sliding scale   SubCutaneous three times a day before meals  insulin lispro (HumaLOG) corrective regimen sliding scale   SubCutaneous at bedtime  lisinopril 40 milliGRAM(s) Oral daily  morphine  - Injectable 2 milliGRAM(s) IV Push every 6 hours PRN  oxyCODONE    5 mG/acetaminophen 325 mG 1 Tablet(s) Oral every 6 hours PRN      PAST MEDICAL & SURGICAL HISTORY:  CHF (congestive heart failure)  HLD (hyperlipidemia)  HTN (hypertension)  DM (diabetes mellitus)  S/P femoral-femoral bypass surgery      Vitals:  T(F): 98.1 (12-26), Max: 98.4 (12-25)  HR: 73 (12-26) (68 - 82)  BP: 129/73 (12-26) (124/70 - 133/85)  RR: 18 (12-26)  SpO2: 100% (12-26)  I&O's Summary      Physical Exam:  Appearance: No acute distress; well appearing  Eyes: PERRL, EOMI, pink conjunctiva  HENT: Normal oral mucosa  Cardiovascular: RRR, S1, S2, no murmurs, rubs, or gallops; no edema; no JVD  Respiratory: Clear to auscultation bilaterally  Gastrointestinal: soft, non-tender, non-distended with normal bowel sounds  Musculoskeletal: No clubbing; no joint deformity   Neurologic: Non-focal  Lymphatic: No lymphadenopathy  Psychiatry: AAOx3, mood & affect appropriate  Skin: R foot with ACE bandage                          9.9    9.61  )-----------( 280      ( 26 Dec 2018 06:00 )             31.5     12-26    142  |  110<H>  |  67<H>  ----------------------------<  174<H>  4.9   |  21<L>  |  1.69<H>    Ca    9.3      26 Dec 2018 06:00  Mg     2.0     12-26    TPro  6.2  /  Alb  3.3  /  TBili  < 0.2<L>  /  DBili  x   /  AST  23  /  ALT  42<H>  /  AlkPhos  102  12-25    PTT - ( 26 Dec 2018 06:00 )  PTT:64.2 SEC    < from: Transthoracic Echocardiogram (12.21.18 @ 20:39) >  DIMENSIONS:  Dimensions:     Normal Values:  LA:     3.0 cm    2.0 - 4.0 cm  Ao:     3.5 cm    2.0 - 3.8 cm  SEPTUM: 0.7 cm    0.6 - 1.2 cm  PWT:    0.9 cm    0.6 - 1.1 cm  LVIDd:  4.3 cm    3.0 - 5.6 cm  LVIDs:  2.8 cm    1.8 - 4.0 cm  Derived Variables:  LVMI: 68 g/m2  RWT: 0.41  Fractional short: 35 %  Ejection Fraction (Visual Estimate): 60-65 %  Ejection Fraction (Teicholtz): 64 %  Peak Velocity (m/sec): AoV=1.1  ------------------------------------------------------------------------    CONCLUSIONS:  1. Normal left ventricular internal dimensions and wall  thicknesses.  2. Normal left ventricular systolic function. No segmental  wall motion abnormalities.  3. Increased E/e'  is consistent with elevated left  ventricular filling pressure.  4. Normal right ventricular size and function.  *** No previous Echo exam.    < end of copied text >

## 2018-12-26 NOTE — DISCHARGE NOTE ADULT - PROVIDER TOKENS
TOKEN:'[id number]',FREE:[LAST:[Dr. Gerald Sandra],PHONE:[(   )    -],FAX:[(   )    -],ADDRESS:[[phone] Cardiologist]],FREE:[LAST:[PCP],PHONE:[(   )    -],FAX:[(   )    -],ADDRESS:[Dr. Bains]]

## 2018-12-27 VITALS — HEART RATE: 74 BPM | SYSTOLIC BLOOD PRESSURE: 122 MMHG | DIASTOLIC BLOOD PRESSURE: 74 MMHG

## 2018-12-27 LAB
APTT BLD: 27.9 SEC — SIGNIFICANT CHANGE UP (ref 27.5–36.3)
BUN SERPL-MCNC: 61 MG/DL — HIGH (ref 7–23)
CALCIUM SERPL-MCNC: 9.4 MG/DL — SIGNIFICANT CHANGE UP (ref 8.4–10.5)
CHLORIDE SERPL-SCNC: 107 MMOL/L — SIGNIFICANT CHANGE UP (ref 98–107)
CO2 SERPL-SCNC: 22 MMOL/L — SIGNIFICANT CHANGE UP (ref 22–31)
CREAT SERPL-MCNC: 1.51 MG/DL — HIGH (ref 0.5–1.3)
GLUCOSE SERPL-MCNC: 111 MG/DL — HIGH (ref 70–99)
H PYLORI AG STL QL: SIGNIFICANT CHANGE UP
HCT VFR BLD CALC: 31.8 % — LOW (ref 39–50)
HGB BLD-MCNC: 9.8 G/DL — LOW (ref 13–17)
MCHC RBC-ENTMCNC: 29.9 PG — SIGNIFICANT CHANGE UP (ref 27–34)
MCHC RBC-ENTMCNC: 30.8 % — LOW (ref 32–36)
MCV RBC AUTO: 97 FL — SIGNIFICANT CHANGE UP (ref 80–100)
NRBC # FLD: 0 — SIGNIFICANT CHANGE UP
PLATELET # BLD AUTO: 296 K/UL — SIGNIFICANT CHANGE UP (ref 150–400)
PMV BLD: 10.2 FL — SIGNIFICANT CHANGE UP (ref 7–13)
POTASSIUM SERPL-MCNC: 5.1 MMOL/L — SIGNIFICANT CHANGE UP (ref 3.5–5.3)
POTASSIUM SERPL-SCNC: 5.1 MMOL/L — SIGNIFICANT CHANGE UP (ref 3.5–5.3)
RBC # BLD: 3.28 M/UL — LOW (ref 4.2–5.8)
RBC # FLD: 17.2 % — HIGH (ref 10.3–14.5)
SODIUM SERPL-SCNC: 139 MMOL/L — SIGNIFICANT CHANGE UP (ref 135–145)
WBC # BLD: 8.67 K/UL — SIGNIFICANT CHANGE UP (ref 3.8–10.5)
WBC # FLD AUTO: 8.67 K/UL — SIGNIFICANT CHANGE UP (ref 3.8–10.5)

## 2018-12-27 RX ORDER — MOXIFLOXACIN HYDROCHLORIDE TABLETS, 400 MG 400 MG/1
1 TABLET, FILM COATED ORAL
Qty: 0 | Refills: 0 | COMMUNITY

## 2018-12-27 RX ORDER — GABAPENTIN 400 MG/1
1 CAPSULE ORAL
Qty: 0 | Refills: 0 | COMMUNITY

## 2018-12-27 RX ORDER — SACCHAROMYCES BOULARDII 250 MG
1 POWDER IN PACKET (EA) ORAL
Qty: 0 | Refills: 0 | COMMUNITY

## 2018-12-27 RX ORDER — RAMIPRIL 5 MG
1 CAPSULE ORAL
Qty: 0 | Refills: 0 | COMMUNITY

## 2018-12-27 RX ORDER — CARVEDILOL PHOSPHATE 80 MG/1
1 CAPSULE, EXTENDED RELEASE ORAL
Qty: 0 | Refills: 0 | COMMUNITY

## 2018-12-27 RX ORDER — OXYCODONE AND ACETAMINOPHEN 5; 325 MG/1; MG/1
1 TABLET ORAL EVERY 6 HOURS
Qty: 0 | Refills: 0 | Status: DISCONTINUED | OUTPATIENT
Start: 2018-12-27 | End: 2018-12-27

## 2018-12-27 RX ORDER — GABAPENTIN 400 MG/1
1 CAPSULE ORAL
Qty: 0 | Refills: 0 | DISCHARGE
Start: 2018-12-27

## 2018-12-27 RX ORDER — ONDANSETRON 8 MG/1
1 TABLET, FILM COATED ORAL
Qty: 0 | Refills: 0 | COMMUNITY

## 2018-12-27 RX ORDER — COLCHICINE 0.6 MG
1 TABLET ORAL
Qty: 0 | Refills: 0 | COMMUNITY

## 2018-12-27 RX ORDER — ACETAMINOPHEN 500 MG
2 TABLET ORAL
Qty: 0 | Refills: 0 | DISCHARGE
Start: 2018-12-27

## 2018-12-27 RX ORDER — DOCUSATE SODIUM 100 MG
1 CAPSULE ORAL
Qty: 0 | Refills: 0 | DISCHARGE
Start: 2018-12-27

## 2018-12-27 RX ORDER — MORPHINE SULFATE 50 MG/1
2 CAPSULE, EXTENDED RELEASE ORAL EVERY 6 HOURS
Qty: 0 | Refills: 0 | Status: DISCONTINUED | OUTPATIENT
Start: 2018-12-27 | End: 2018-12-27

## 2018-12-27 RX ADMIN — APIXABAN 2.5 MILLIGRAM(S): 2.5 TABLET, FILM COATED ORAL at 10:31

## 2018-12-27 RX ADMIN — GABAPENTIN 100 MILLIGRAM(S): 400 CAPSULE ORAL at 11:39

## 2018-12-27 RX ADMIN — OXYCODONE AND ACETAMINOPHEN 1 TABLET(S): 5; 325 TABLET ORAL at 00:34

## 2018-12-27 RX ADMIN — LISINOPRIL 40 MILLIGRAM(S): 2.5 TABLET ORAL at 06:37

## 2018-12-27 RX ADMIN — Medication 1 APPLICATION(S): at 11:39

## 2018-12-27 RX ADMIN — CARVEDILOL PHOSPHATE 12.5 MILLIGRAM(S): 80 CAPSULE, EXTENDED RELEASE ORAL at 18:12

## 2018-12-27 RX ADMIN — Medication 1: at 12:59

## 2018-12-27 RX ADMIN — CARVEDILOL PHOSPHATE 12.5 MILLIGRAM(S): 80 CAPSULE, EXTENDED RELEASE ORAL at 06:37

## 2018-12-27 RX ADMIN — OXYCODONE AND ACETAMINOPHEN 1 TABLET(S): 5; 325 TABLET ORAL at 01:25

## 2018-12-27 RX ADMIN — Medication 81 MILLIGRAM(S): at 11:39

## 2019-12-10 NOTE — H&P ADULT - NSHPREVIEWOFSYSTEMS_GEN_ALL_CORE
REVIEW OF SYSTEMS:    CONSTITUTIONAL: No weakness, fevers or chills  EYES: No visual changes or eye discharge  ENT: No rhinorrhea or sore throat  NECK: No pain or stiffness  RESPIRATORY: No cough, wheezing, hemoptysis; No shortness of breath  CARDIOVASCULAR: No chest pain or palpitations; No lower extremity edema  GASTROINTESTINAL: No abdominal or epigastric pain. No nausea, vomiting, or hematemesis; No diarrhea or constipation. No melena or hematochezia.  MSK: R foot pain on ambulating, no back pain  GENITOURINARY: No dysuria, frequency or hematuria  NEUROLOGICAL: No numbness or weakness  SKIN: R foot nonhealing wound, R groin/thigh bypass incision with some wound dehiscence
No

## 2021-09-25 ENCOUNTER — INPATIENT (INPATIENT)
Facility: HOSPITAL | Age: 61
LOS: 23 days | Discharge: NURSING, REHAB OR LONG-TERM CARE | End: 2021-10-19
Attending: SURGERY | Admitting: SURGERY
Payer: MEDICARE

## 2021-09-25 VITALS
OXYGEN SATURATION: 100 % | RESPIRATION RATE: 16 BRPM | DIASTOLIC BLOOD PRESSURE: 100 MMHG | HEIGHT: 64 IN | TEMPERATURE: 97 F | HEART RATE: 68 BPM | SYSTOLIC BLOOD PRESSURE: 148 MMHG

## 2021-09-25 DIAGNOSIS — L08.9 LOCAL INFECTION OF THE SKIN AND SUBCUTANEOUS TISSUE, UNSPECIFIED: ICD-10-CM

## 2021-09-25 DIAGNOSIS — I50.9 HEART FAILURE, UNSPECIFIED: ICD-10-CM

## 2021-09-25 DIAGNOSIS — N18.9 CHRONIC KIDNEY DISEASE, UNSPECIFIED: ICD-10-CM

## 2021-09-25 DIAGNOSIS — I10 ESSENTIAL (PRIMARY) HYPERTENSION: ICD-10-CM

## 2021-09-25 DIAGNOSIS — Z95.828 PRESENCE OF OTHER VASCULAR IMPLANTS AND GRAFTS: Chronic | ICD-10-CM

## 2021-09-25 DIAGNOSIS — E11.9 TYPE 2 DIABETES MELLITUS WITHOUT COMPLICATIONS: ICD-10-CM

## 2021-09-25 DIAGNOSIS — E78.5 HYPERLIPIDEMIA, UNSPECIFIED: ICD-10-CM

## 2021-09-25 DIAGNOSIS — I73.9 PERIPHERAL VASCULAR DISEASE, UNSPECIFIED: ICD-10-CM

## 2021-09-25 DIAGNOSIS — Z29.9 ENCOUNTER FOR PROPHYLACTIC MEASURES, UNSPECIFIED: ICD-10-CM

## 2021-09-25 LAB
ALBUMIN SERPL ELPH-MCNC: 4.2 G/DL — SIGNIFICANT CHANGE UP (ref 3.3–5)
ALP SERPL-CCNC: 88 U/L — SIGNIFICANT CHANGE UP (ref 40–120)
ALT FLD-CCNC: 6 U/L — SIGNIFICANT CHANGE UP (ref 4–41)
ANION GAP SERPL CALC-SCNC: 16 MMOL/L — HIGH (ref 7–14)
APPEARANCE UR: CLEAR — SIGNIFICANT CHANGE UP
APTT BLD: 30 SEC — SIGNIFICANT CHANGE UP (ref 27–36.3)
AST SERPL-CCNC: 14 U/L — SIGNIFICANT CHANGE UP (ref 4–40)
BACTERIA # UR AUTO: NEGATIVE — SIGNIFICANT CHANGE UP
BASE EXCESS BLDV CALC-SCNC: -1.5 MMOL/L — SIGNIFICANT CHANGE UP (ref -2–3)
BASOPHILS # BLD AUTO: 0.05 K/UL — SIGNIFICANT CHANGE UP (ref 0–0.2)
BASOPHILS NFR BLD AUTO: 0.4 % — SIGNIFICANT CHANGE UP (ref 0–2)
BILIRUB SERPL-MCNC: 0.3 MG/DL — SIGNIFICANT CHANGE UP (ref 0.2–1.2)
BILIRUB UR-MCNC: NEGATIVE — SIGNIFICANT CHANGE UP
BLD GP AB SCN SERPL QL: NEGATIVE — SIGNIFICANT CHANGE UP
BLOOD GAS VENOUS COMPREHENSIVE RESULT: SIGNIFICANT CHANGE UP
BUN SERPL-MCNC: 42 MG/DL — HIGH (ref 7–23)
CALCIUM SERPL-MCNC: 9.8 MG/DL — SIGNIFICANT CHANGE UP (ref 8.4–10.5)
CHLORIDE BLDV-SCNC: 103 MMOL/L — SIGNIFICANT CHANGE UP (ref 96–108)
CHLORIDE SERPL-SCNC: 101 MMOL/L — SIGNIFICANT CHANGE UP (ref 98–107)
CO2 BLDV-SCNC: 27.2 MMOL/L — HIGH (ref 22–26)
CO2 SERPL-SCNC: 20 MMOL/L — LOW (ref 22–31)
COLOR SPEC: YELLOW — SIGNIFICANT CHANGE UP
CREAT SERPL-MCNC: 1.99 MG/DL — HIGH (ref 0.5–1.3)
CRP SERPL-MCNC: 9 MG/L — HIGH
DIFF PNL FLD: NEGATIVE — SIGNIFICANT CHANGE UP
EOSINOPHIL # BLD AUTO: 0.13 K/UL — SIGNIFICANT CHANGE UP (ref 0–0.5)
EOSINOPHIL NFR BLD AUTO: 1 % — SIGNIFICANT CHANGE UP (ref 0–6)
EPI CELLS # UR: 1 /HPF — SIGNIFICANT CHANGE UP (ref 0–5)
ERYTHROCYTE [SEDIMENTATION RATE] IN BLOOD: 23 MM/HR — HIGH (ref 1–15)
GAS PNL BLDV: 136 MMOL/L — SIGNIFICANT CHANGE UP (ref 136–145)
GAS PNL BLDV: SIGNIFICANT CHANGE UP
GLUCOSE BLDV-MCNC: 87 MG/DL — SIGNIFICANT CHANGE UP (ref 70–99)
GLUCOSE SERPL-MCNC: 96 MG/DL — SIGNIFICANT CHANGE UP (ref 70–99)
GLUCOSE UR QL: NEGATIVE — SIGNIFICANT CHANGE UP
HCO3 BLDV-SCNC: 26 MMOL/L — SIGNIFICANT CHANGE UP (ref 22–29)
HCT VFR BLD CALC: 40.9 % — SIGNIFICANT CHANGE UP (ref 39–50)
HCT VFR BLDA CALC: 40 % — SIGNIFICANT CHANGE UP (ref 39–51)
HGB BLD CALC-MCNC: 13.4 G/DL — SIGNIFICANT CHANGE UP (ref 13–17)
HGB BLD-MCNC: 14 G/DL — SIGNIFICANT CHANGE UP (ref 13–17)
HYALINE CASTS # UR AUTO: 1 /LPF — SIGNIFICANT CHANGE UP (ref 0–7)
IANC: 9.74 K/UL — HIGH (ref 1.5–8.5)
IMM GRANULOCYTES NFR BLD AUTO: 0.6 % — SIGNIFICANT CHANGE UP (ref 0–1.5)
INR BLD: 0.95 RATIO — SIGNIFICANT CHANGE UP (ref 0.88–1.16)
KETONES UR-MCNC: NEGATIVE — SIGNIFICANT CHANGE UP
LACTATE BLDV-MCNC: 1.3 MMOL/L — SIGNIFICANT CHANGE UP (ref 0.5–2)
LEUKOCYTE ESTERASE UR-ACNC: NEGATIVE — SIGNIFICANT CHANGE UP
LYMPHOCYTES # BLD AUTO: 1.43 K/UL — SIGNIFICANT CHANGE UP (ref 1–3.3)
LYMPHOCYTES # BLD AUTO: 11.3 % — LOW (ref 13–44)
MCHC RBC-ENTMCNC: 31.5 PG — SIGNIFICANT CHANGE UP (ref 27–34)
MCHC RBC-ENTMCNC: 34.2 GM/DL — SIGNIFICANT CHANGE UP (ref 32–36)
MCV RBC AUTO: 91.9 FL — SIGNIFICANT CHANGE UP (ref 80–100)
MONOCYTES # BLD AUTO: 1.25 K/UL — HIGH (ref 0–0.9)
MONOCYTES NFR BLD AUTO: 9.9 % — SIGNIFICANT CHANGE UP (ref 2–14)
NEUTROPHILS # BLD AUTO: 9.74 K/UL — HIGH (ref 1.8–7.4)
NEUTROPHILS NFR BLD AUTO: 76.8 % — SIGNIFICANT CHANGE UP (ref 43–77)
NITRITE UR-MCNC: NEGATIVE — SIGNIFICANT CHANGE UP
NRBC # BLD: 0 /100 WBCS — SIGNIFICANT CHANGE UP
NRBC # FLD: 0 K/UL — SIGNIFICANT CHANGE UP
PCO2 BLDV: 52 MMHG — SIGNIFICANT CHANGE UP (ref 42–55)
PH BLDV: 7.3 — LOW (ref 7.32–7.43)
PH UR: 5.5 — SIGNIFICANT CHANGE UP (ref 5–8)
PLATELET # BLD AUTO: 253 K/UL — SIGNIFICANT CHANGE UP (ref 150–400)
PO2 BLDV: 27 MMHG — SIGNIFICANT CHANGE UP
POTASSIUM BLDV-SCNC: 3.8 MMOL/L — SIGNIFICANT CHANGE UP (ref 3.5–5.1)
POTASSIUM SERPL-MCNC: 3.9 MMOL/L — SIGNIFICANT CHANGE UP (ref 3.5–5.3)
POTASSIUM SERPL-SCNC: 3.9 MMOL/L — SIGNIFICANT CHANGE UP (ref 3.5–5.3)
PROT SERPL-MCNC: 7.3 G/DL — SIGNIFICANT CHANGE UP (ref 6–8.3)
PROT UR-MCNC: ABNORMAL
PROTHROM AB SERPL-ACNC: 11 SEC — SIGNIFICANT CHANGE UP (ref 10.6–13.6)
RBC # BLD: 4.45 M/UL — SIGNIFICANT CHANGE UP (ref 4.2–5.8)
RBC # FLD: 13.5 % — SIGNIFICANT CHANGE UP (ref 10.3–14.5)
RBC CASTS # UR COMP ASSIST: 2 /HPF — SIGNIFICANT CHANGE UP (ref 0–4)
RH IG SCN BLD-IMP: POSITIVE — SIGNIFICANT CHANGE UP
SAO2 % BLDV: 42 % — SIGNIFICANT CHANGE UP
SARS-COV-2 RNA SPEC QL NAA+PROBE: SIGNIFICANT CHANGE UP
SODIUM SERPL-SCNC: 137 MMOL/L — SIGNIFICANT CHANGE UP (ref 135–145)
SP GR SPEC: 1.02 — SIGNIFICANT CHANGE UP (ref 1–1.05)
UROBILINOGEN FLD QL: SIGNIFICANT CHANGE UP
WBC # BLD: 12.67 K/UL — HIGH (ref 3.8–10.5)
WBC # FLD AUTO: 12.67 K/UL — HIGH (ref 3.8–10.5)
WBC UR QL: 3 /HPF — SIGNIFICANT CHANGE UP (ref 0–5)

## 2021-09-25 PROCEDURE — 99285 EMERGENCY DEPT VISIT HI MDM: CPT

## 2021-09-25 PROCEDURE — 73660 X-RAY EXAM OF TOE(S): CPT | Mod: 26,RT

## 2021-09-25 PROCEDURE — 71046 X-RAY EXAM CHEST 2 VIEWS: CPT | Mod: 26

## 2021-09-25 PROCEDURE — 99223 1ST HOSP IP/OBS HIGH 75: CPT

## 2021-09-25 PROCEDURE — 99221 1ST HOSP IP/OBS SF/LOW 40: CPT

## 2021-09-25 RX ORDER — INSULIN LISPRO 100/ML
VIAL (ML) SUBCUTANEOUS
Refills: 0 | Status: DISCONTINUED | OUTPATIENT
Start: 2021-09-25 | End: 2021-10-14

## 2021-09-25 RX ORDER — DEXTROSE 50 % IN WATER 50 %
12.5 SYRINGE (ML) INTRAVENOUS ONCE
Refills: 0 | Status: DISCONTINUED | OUTPATIENT
Start: 2021-09-25 | End: 2021-10-14

## 2021-09-25 RX ORDER — SODIUM CHLORIDE 9 MG/ML
1000 INJECTION, SOLUTION INTRAVENOUS
Refills: 0 | Status: DISCONTINUED | OUTPATIENT
Start: 2021-09-25 | End: 2021-10-08

## 2021-09-25 RX ORDER — ASPIRIN/CALCIUM CARB/MAGNESIUM 324 MG
81 TABLET ORAL DAILY
Refills: 0 | Status: DISCONTINUED | OUTPATIENT
Start: 2021-09-25 | End: 2021-10-19

## 2021-09-25 RX ORDER — ATORVASTATIN CALCIUM 80 MG/1
40 TABLET, FILM COATED ORAL AT BEDTIME
Refills: 0 | Status: DISCONTINUED | OUTPATIENT
Start: 2021-09-25 | End: 2021-10-19

## 2021-09-25 RX ORDER — INSULIN LISPRO 100/ML
VIAL (ML) SUBCUTANEOUS AT BEDTIME
Refills: 0 | Status: DISCONTINUED | OUTPATIENT
Start: 2021-09-25 | End: 2021-10-14

## 2021-09-25 RX ORDER — ACETAMINOPHEN 500 MG
650 TABLET ORAL ONCE
Refills: 0 | Status: COMPLETED | OUTPATIENT
Start: 2021-09-25 | End: 2021-09-25

## 2021-09-25 RX ORDER — DEXTROSE 50 % IN WATER 50 %
25 SYRINGE (ML) INTRAVENOUS ONCE
Refills: 0 | Status: DISCONTINUED | OUTPATIENT
Start: 2021-09-25 | End: 2021-10-14

## 2021-09-25 RX ORDER — INFLUENZA VIRUS VACCINE 15; 15; 15; 15 UG/.5ML; UG/.5ML; UG/.5ML; UG/.5ML
0.5 SUSPENSION INTRAMUSCULAR ONCE
Refills: 0 | Status: DISCONTINUED | OUTPATIENT
Start: 2021-09-25 | End: 2021-10-19

## 2021-09-25 RX ORDER — CARVEDILOL PHOSPHATE 80 MG/1
25 CAPSULE, EXTENDED RELEASE ORAL EVERY 12 HOURS
Refills: 0 | Status: DISCONTINUED | OUTPATIENT
Start: 2021-09-25 | End: 2021-10-19

## 2021-09-25 RX ORDER — DEXTROSE 50 % IN WATER 50 %
15 SYRINGE (ML) INTRAVENOUS ONCE
Refills: 0 | Status: DISCONTINUED | OUTPATIENT
Start: 2021-09-25 | End: 2021-10-08

## 2021-09-25 RX ORDER — ACETAMINOPHEN 500 MG
650 TABLET ORAL EVERY 6 HOURS
Refills: 0 | Status: DISCONTINUED | OUTPATIENT
Start: 2021-09-25 | End: 2021-10-08

## 2021-09-25 RX ORDER — APIXABAN 2.5 MG/1
2.5 TABLET, FILM COATED ORAL
Refills: 0 | Status: DISCONTINUED | OUTPATIENT
Start: 2021-09-25 | End: 2021-09-26

## 2021-09-25 RX ORDER — GLUCAGON INJECTION, SOLUTION 0.5 MG/.1ML
1 INJECTION, SOLUTION SUBCUTANEOUS ONCE
Refills: 0 | Status: DISCONTINUED | OUTPATIENT
Start: 2021-09-25 | End: 2021-10-08

## 2021-09-25 RX ORDER — LISINOPRIL 2.5 MG/1
10 TABLET ORAL DAILY
Refills: 0 | Status: DISCONTINUED | OUTPATIENT
Start: 2021-09-25 | End: 2021-10-19

## 2021-09-25 RX ORDER — CARVEDILOL PHOSPHATE 80 MG/1
1 CAPSULE, EXTENDED RELEASE ORAL
Qty: 0 | Refills: 0 | DISCHARGE

## 2021-09-25 RX ORDER — RAMIPRIL 5 MG
1 CAPSULE ORAL
Qty: 0 | Refills: 0 | DISCHARGE

## 2021-09-25 RX ORDER — CADEXOMER IODINE 0.9 %
1 PADS, MEDICATED (EA) TOPICAL DAILY
Refills: 0 | Status: DISCONTINUED | OUTPATIENT
Start: 2021-09-25 | End: 2021-10-19

## 2021-09-25 RX ADMIN — Medication 650 MILLIGRAM(S): at 20:02

## 2021-09-25 RX ADMIN — Medication 600 MILLIGRAM(S): at 14:08

## 2021-09-25 RX ADMIN — Medication 100 MILLIGRAM(S): at 22:52

## 2021-09-25 RX ADMIN — CARVEDILOL PHOSPHATE 25 MILLIGRAM(S): 80 CAPSULE, EXTENDED RELEASE ORAL at 17:23

## 2021-09-25 RX ADMIN — Medication 1 TABLET(S): at 17:23

## 2021-09-25 RX ADMIN — LISINOPRIL 10 MILLIGRAM(S): 2.5 TABLET ORAL at 17:23

## 2021-09-25 RX ADMIN — Medication 100 MILLIGRAM(S): at 13:47

## 2021-09-25 RX ADMIN — Medication 81 MILLIGRAM(S): at 17:23

## 2021-09-25 RX ADMIN — APIXABAN 2.5 MILLIGRAM(S): 2.5 TABLET, FILM COATED ORAL at 17:23

## 2021-09-25 RX ADMIN — Medication 650 MILLIGRAM(S): at 12:25

## 2021-09-25 RX ADMIN — Medication 650 MILLIGRAM(S): at 21:02

## 2021-09-25 RX ADMIN — Medication 650 MILLIGRAM(S): at 14:08

## 2021-09-25 RX ADMIN — ATORVASTATIN CALCIUM 40 MILLIGRAM(S): 80 TABLET, FILM COATED ORAL at 22:52

## 2021-09-25 NOTE — ED PROVIDER NOTE - NSICDXFAMILYHX_GEN_ALL_CORE_FT
FAMILY HISTORY:  Father  Still living? Unknown  Family history of stroke, Age at diagnosis: Age Unknown    Sibling  Still living? Unknown  Family history of stroke, Age at diagnosis: Age Unknown

## 2021-09-25 NOTE — PATIENT PROFILE ADULT - FALL HARM RISK
coagulation(Bleeding disorder R/T clinical cond/anti-coags) coagulation(Bleeding disorder R/T clinical cond/anti-coags)/other

## 2021-09-25 NOTE — H&P ADULT - NSICDXFAMILYHX_GEN_ALL_CORE_FT
FAMILY HISTORY:  Family history of depression, Brother  Family history of MI (myocardial infarction), Father  FH: heart disease, Mother  FH: renal cell carcinoma, Sister - s/p nephrectomy  FHx: diabetes mellitus, Paternal Grandfather

## 2021-09-25 NOTE — ED PROVIDER NOTE - ATTENDING CONTRIBUTION TO CARE
agree with resident note    "60 y.o M CHF (unknown EF), CAD (no stents), HTN, and HLD, DM, PVD p/w right toe pain x 1 week. pt states he was intially told it was gout by PMD 1 week ago, pain worseend and went to podiatrist today. was told that he may have necrosis of right toe and to come to ed.   	pt denies any fever, cp, palpitations, sob, abdominal pain, n/v/d, trauma or falls  +pain with walking +decrease sensation over right 5th toe."    PE: well appearing; VSS: CTAB/L; s1 s2 no m/r/g abd soft/NT/ND ext: right necrotic 1st toe; dry gangrene, slightly moist interdigital space; no palpable pulse; normal cap refill; sensation intact    Imp: dry gangrene of 5th toe; pre op labs, podiatry/vascular for possible amputation

## 2021-09-25 NOTE — H&P ADULT - ASSESSMENT
59 y/o male, with a PmHx of CHF, HTN, CAD, HLD, DM, CKD, PVD with a Right Femoral-Femoral Bypass in 2018 on apixiban, presented to the [facility] ED with worsening Right Toe pain x 1 week. Admitted to medicine for gangrene of right foot 5th digit.

## 2021-09-25 NOTE — H&P ADULT - NSICDXPASTMEDICALHX_GEN_ALL_CORE_FT
PAST MEDICAL HISTORY:  CHF (congestive heart failure)     CKD (chronic kidney disease)     DM (diabetes mellitus)     HLD (hyperlipidemia)     HTN (hypertension)     PVD (peripheral vascular disease)

## 2021-09-25 NOTE — CONSULT NOTE ADULT - ASSESSMENT
59yo M with Hx CHF, CAD (no stents), HTN, HLD, DM, PVD who presents with non-healing R toe wound, found to have dry gangrene with small area of wet conversion. Vascular surgery consulted for possible revascularization.     PLAN:   - Per pods, no emergent need for OR despite small area of dry gangrene with wet conversion, as patient is hemodynamically stable and has a WBC of 12.67  - Patient has palpable pedal pulses, no urgent need for revascularization intervention at this time  - Please obtain an arterial Duplex of the bypass to r/o bypass stenosis  - No need for CAMMY/PVRs at this time    Discussed with vascular surgery fellow, Dr. Tao, on behalf of Dr. Lundberg & Dr. Pandya    full note to follow      Tennille Galan, PGY-2  C Team Surgery  #[id number]  61yo M with Hx CHF, CAD (no stents), HTN, HLD, DM, PVD who presents with non-healing R toe wound, found to have dry gangrene with small area of wet conversion. Vascular surgery consulted for possible revascularization.     PLAN:   - Per pods, no emergent need for OR for small area of dry gangrene with wet conversion, as patient is hemodynamically stable and has a WBC of 12.67; bedside debridement performed   - Patient has palpable pedal pulses, no urgent need for revascularization intervention at this time  - Please obtain an arterial Duplex of the bypass to r/o bypass stenosis  - No need for CAMMY/PVRs at this time    Discussed with vascular surgery fellow, Dr. Tao, on behalf of Dr. Lundberg & Dr. Ya Galan, PGY-2  C Team Surgery  #[id number]  61yo M with Hx CHF, CAD (no stents), HTN, HLD, DM, PVD who presents with non-healing R toe wound, found to have dry gangrene with small area of wet conversion. Concern for right femoral-popliteal bypass stenosis or occlusion    PLAN:   -Change AC from eliquis to heparin drip with bolus  - Per pods, no emergent need for OR for small area of dry gangrene with wet conversion, as patient is hemodynamically stable and has a WBC of 12.67; bedside debridement performed   - Please obtain an arterial Duplex of the bypass to r/o bypass stenosis; or CTA       Discussed with vascular surgery fellow, Dr. Tao, on behalf of Dr. Lundberg & Dr. Ya Galan, PGY-2  C Team Surgery  #[id number]

## 2021-09-25 NOTE — ED ADULT TRIAGE NOTE - CHIEF COMPLAINT QUOTE
c/o pain to right foot 5th toe x 3 weeks. Seen by podiatry and dx with gout but then returned to podiatry today and dx with gangrene to toe. Redness noted to right foot. Toes discolored. Ulcerated wound noted to area between 4th /5th toe. [id number]

## 2021-09-25 NOTE — ED ADULT NURSE NOTE - OBJECTIVE STATEMENT
pt is a 60 yr old male with known right toe necrotic wound. pulses dopplered by mds, pt ambulatory with cane at baseline. pt denies fever/ chills/ n/v/d, changes in resp/ gi/gu. piv placed, labs sent, see emar for tx. will ctm

## 2021-09-25 NOTE — ED PROVIDER NOTE - CLINICAL SUMMARY MEDICAL DECISION MAKING FREE TEXT BOX
60 y.o M CHF (unknown EF), CAD (no stents), HTN, and HLD, DM, PVD p/w right toe pain x 1 week.   vss, on exam area of dry and wet necrosis between webspace of 4th and 5th digit. ddx dry vs wet grangrene. eval for osteo, nec fasc with imaging, labs, and speak with podiatry regarding intervention

## 2021-09-25 NOTE — CONSULT NOTE ADULT - ASSESSMENT
61 y/o M presents with R foot 4th interspace wound  - Patient seen and evaluated   - Afebile, WBC 12.67  - right foot wound to 4th interspace to subQ, tracking distally 1cm to 4th digit, malodor noted, scant purulence expressed, periwound eschar noted, mild wet conversion noted to the lateral 4th digit, erythema noted from lateral forefoot to anterior ankle  - Right foot XR: no gas, no OM   - Excisional debridement of right foot 4th interspace eschar to subQ and not beyond subQ. Irrigated wound with copious amount of sterile saline and applied betadine, followed by DSD.   - Rec admit to medicine   - Rec IV ABx Clinda   - Rec vascular consult   - Ordered CAMMY/PVRs  - Right foot wound culture obtained   - Pod plan for right foot partial 5th ray resection pending [facility] recs   - Discussed with attending  59 y/o M presents with R foot 4th interspace wound  - Patient seen and evaluated   - Afebile, WBC 12.67  - right foot wound to 4th interspace to subQ, tracking distally 1cm to 4th digit, malodor noted, scant purulence expressed, periwound eschar noted, early wet conversion noted to the lateral 4th digit, erythema noted from lateral forefoot to anterior ankle  - Right foot XR: no gas, no OM   - Excisional debridement of right foot 4th interspace eschar to subQ and not beyond subQ. Irrigated wound with copious amount of sterile saline and applied betadine, followed by DSD.   - Rec admit to medicine   - Rec IV ABx Clinda   - Rec vascular consult   - Ordered CAMMY/PVRs  - Right foot wound culture obtained   - Pod plan for right foot partial 5th ray resection pending [facility] recs   - Discussed with attending

## 2021-09-25 NOTE — CONSULT NOTE ADULT - SUBJECTIVE AND OBJECTIVE BOX
Podiatry pager #: [phone]/ [phone]    Patient is a 60y old  Male who presents with a chief complaint of right 5th digit wound    HPI:  60 y.o M CHF (unknown EF), CAD (no stents), HTN, and HLD, DM, PVD p/w right toe pain x 1 week. pt states he was intially told it was gout by PMD 1 week ago, pain worseend and went to podiatrist today. was told that he may have necrosis of right toe and to come to ed.   	pt denies any fever, cp, palpitations, sob, abdominal pain, n/v/d, trauma or falls  +pain with walking +decrease sensation over right 5th toe.    PAST MEDICAL & SURGICAL HISTORY:  DM (diabetes mellitus)    HTN (hypertension)    HLD (hyperlipidemia)    CHF (congestive heart failure)    S/P femoral-femoral bypass surgery        MEDICATIONS  (STANDING):  clindamycin IVPB 600 milliGRAM(s) IV Intermittent once    MEDICATIONS  (PRN):      Allergies    penicillin (Other)    Intolerances        VITALS:    Vital Signs Last 24 Hrs  T(C): 36 (25 Sep 2021 13:02), Max: 36.2 (25 Sep 2021 11:18)  T(F): 96.8 (25 Sep 2021 13:02), Max: 97.1 (25 Sep 2021 11:18)  HR: 74 (25 Sep 2021 13:02) (68 - 82)  BP: 146/79 (25 Sep 2021 13:02) (146/79 - 176/87)  BP(mean): --  RR: 15 (25 Sep 2021 13:02) (15 - 16)  SpO2: 100% (25 Sep 2021 13:02) (100% - 100%)    LABS:                          14.0   12.67 )-----------( 253      ( 25 Sep 2021 12:42 )             40.9               CAPILLARY BLOOD GLUCOSE      POCT Blood Glucose.: 127 mg/dL (25 Sep 2021 10:55)      PT/INR - ( 25 Sep 2021 12:42 )   PT: 11.0 sec;   INR: 0.95 ratio         PTT - ( 25 Sep 2021 12:42 )  PTT:30.0 sec    LOWER EXTREMITY PHYSICAL EXAM:    Vasular: DP/PT 0/4, B/L, CFT <3 seconds B/L, Temperature gradient warm to warm on left, warm to cold on right   Neuro: Epicritic sensation diminished to the level of ankle, B/L.  Musculoskeletal/Ortho: s/p right foot 5th base of proximal phalanx and met head resection, pain on palpation to the right 5th digit   Skin: right foot wound to 4th interspace to subQ, tracking distally 1cm to 4th digit, malodor noted, scant purulence expressed, periwound eschar noted, mild wet conversion noted to the lateral 4th digit, erythem noted from lateral forefoot to anterior ankle      RADIOLOGY & ADDITIONAL STUDIES:    < from: Xray Toes, Right Foot (09.25.21 @ 12:02) >    EXAM:  XR TOE(S) MIN 2 VIEWS RT        PROCEDURE DATE:  Sep 25 2021         INTERPRETATION:  Radiographs of the fifth digit of the right foot    CPT 62160    CLINICAL INFORMATION: Toe pain.    TECHNIQUE:  Frontal and lateral views of the foot were obtained.    FINDINGS:   Prior study dated 12/24/2018 was available for review.    There is resection of the fifth metatarsal head and proximal shaft of the proximal phalanx of the fifth digit of the right foot. There is mild soft tissue swelling of the fifth digit of the right foot. No periosteal reaction or soft tissue air lucency is seen to suspect osteomyelitis. If clinically suspect, further evaluation with MRI examination is recommended. Dystrophic calcifications are seen in the region of the fifth digit of the right foot and along the shaft of the fifth metatarsal. Vascular calcifications are present.        IMPRESSION:    Postsurgical changes noted involving the fifth digit of the right foot and fifth metatarsal head as noted above. Noradiographic evidence for osteomyelitis at this time. If however, clinically suspect, further evaluation with MRI examination of the right foot can be obtained.    --- End of Report ---              ARCHANA NGUYEN MD; Attending Radiologist  This document has been electronically signed. Sep 25 2021 12:27PM    < end of copied text >     Podiatry pager #: [phone]/ [phone]    Patient is a 60y old  Male who presents with a chief complaint of right 5th digit wound    HPI:  60 y.o M CHF (unknown EF), CAD (no stents), HTN, and HLD, DM, PVD p/w right toe pain x 1 week. pt states he was intially told it was gout by PMD 1 week ago, pain worseend and went to podiatrist today. was told that he may have necrosis of right toe and to come to ed.   	pt denies any fever, cp, palpitations, sob, abdominal pain, n/v/d, trauma or falls  +pain with walking +decrease sensation over right 5th toe.    PAST MEDICAL & SURGICAL HISTORY:  DM (diabetes mellitus)    HTN (hypertension)    HLD (hyperlipidemia)    CHF (congestive heart failure)    S/P femoral-femoral bypass surgery        MEDICATIONS  (STANDING):  clindamycin IVPB 600 milliGRAM(s) IV Intermittent once    MEDICATIONS  (PRN):      Allergies    penicillin (Other)    Intolerances        VITALS:    Vital Signs Last 24 Hrs  T(C): 36 (25 Sep 2021 13:02), Max: 36.2 (25 Sep 2021 11:18)  T(F): 96.8 (25 Sep 2021 13:02), Max: 97.1 (25 Sep 2021 11:18)  HR: 74 (25 Sep 2021 13:02) (68 - 82)  BP: 146/79 (25 Sep 2021 13:02) (146/79 - 176/87)  BP(mean): --  RR: 15 (25 Sep 2021 13:02) (15 - 16)  SpO2: 100% (25 Sep 2021 13:02) (100% - 100%)    LABS:                          14.0   12.67 )-----------( 253      ( 25 Sep 2021 12:42 )             40.9               CAPILLARY BLOOD GLUCOSE      POCT Blood Glucose.: 127 mg/dL (25 Sep 2021 10:55)      PT/INR - ( 25 Sep 2021 12:42 )   PT: 11.0 sec;   INR: 0.95 ratio         PTT - ( 25 Sep 2021 12:42 )  PTT:30.0 sec    LOWER EXTREMITY PHYSICAL EXAM:    Vasular: DP/PT 0/4, B/L, CFT <3 seconds B/L, Temperature gradient warm to warm on left, warm to cold on right   Neuro: Epicritic sensation diminished to the level of ankle, B/L.  Musculoskeletal/Ortho: s/p right foot 5th base of proximal phalanx and met head resection, pain on palpation to the right 5th digit   Skin: right foot wound to 4th interspace to subQ, tracking distally 1cm to 4th digit, malodor noted, scant purulence expressed, periwound eschar noted, early wet conversion noted to the lateral 4th digit, erythem noted from lateral forefoot to anterior ankle      RADIOLOGY & ADDITIONAL STUDIES:    < from: Xray Toes, Right Foot (09.25.21 @ 12:02) >    EXAM:  XR TOE(S) MIN 2 VIEWS RT        PROCEDURE DATE:  Sep 25 2021         INTERPRETATION:  Radiographs of the fifth digit of the right foot    CPT 63764    CLINICAL INFORMATION: Toe pain.    TECHNIQUE:  Frontal and lateral views of the foot were obtained.    FINDINGS:   Prior study dated 12/24/2018 was available for review.    There is resection of the fifth metatarsal head and proximal shaft of the proximal phalanx of the fifth digit of the right foot. There is mild soft tissue swelling of the fifth digit of the right foot. No periosteal reaction or soft tissue air lucency is seen to suspect osteomyelitis. If clinically suspect, further evaluation with MRI examination is recommended. Dystrophic calcifications are seen in the region of the fifth digit of the right foot and along the shaft of the fifth metatarsal. Vascular calcifications are present.        IMPRESSION:    Postsurgical changes noted involving the fifth digit of the right foot and fifth metatarsal head as noted above. Noradiographic evidence for osteomyelitis at this time. If however, clinically suspect, further evaluation with MRI examination of the right foot can be obtained.    --- End of Report ---              ARCHANA NGUYEN MD; Attending Radiologist  This document has been electronically signed. Sep 25 2021 12:27PM    < end of copied text >

## 2021-09-25 NOTE — CONSULT NOTE ADULT - SUBJECTIVE AND OBJECTIVE BOX
GENERAL SURGERY CONSULT NOTE  Consulting surgical team: C Team Surgery (Vascular)  Consulting attending: Dr. Pandya    HPI:  61 y/o male, with a PmHx of CHF, HTN, CAD, HLD, DM, CKD, PVD with a Right Femoral-Femoral Bypass in 2018 on apixiban, presented to the [facility] ED with worsening Right Toe pain x 1 week.  Pt states about 1 month ago he started to get pain in his right foot 5th digit. He states he wife had  from Lung Cancer a few days prior but waited to see his PCP until a week later. He states when he finally went into see his PCP, his doctor states he was likely Gout and was prescribed Indomethacin. Pt states his foot wasn't getting any better and the other day he noticed some blood coming from the bottom of his toe so he had gone today to see his Podiatrist (had trouble getting an appointment over this past week). In the Podiatrist office, he was told to go to the ED because he could have a necrotic toe and since he had a history of a bypass in that leg before, there is a high risk of it getting worse. Pt denies any fever, chills, chest pain, HA, dizziness, blurred vision, abd pain, sick contacts, recent travel. The only complaint he was having is pain to the right 5th toe. In the [facility] ED today, he was seen by Podiatry and a bedside debridement was done and then wrapped in a dry sterile dressing. 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 Pt was started on Clindamycin and was then admitted to medicine for further medical management and treatment. (25 Sep 2021 15:50)    full note to follow      PAST MEDICAL HISTORY:  DM (diabetes mellitus)  HTN (hypertension)  HLD (hyperlipidemia)  CHF (congestive heart failure)  CKD (chronic kidney disease)  PVD (peripheral vascular disease)      PAST SURGICAL HISTORY:  S/P femoral-femoral bypass surgery      MEDICATIONS:  acetaminophen   Tablet .. 650 milliGRAM(s) Oral every 6 hours PRN  apixaban 2.5 milliGRAM(s) Oral two times a day  aspirin enteric coated 81 milliGRAM(s) Oral daily  atorvastatin 40 milliGRAM(s) Oral at bedtime  cadexomer iodine 0.9% Gel 1 Application(s) Topical daily  carvedilol 25 milliGRAM(s) Oral every 12 hours  clindamycin IVPB 600 milliGRAM(s) IV Intermittent every 8 hours  dextrose 40% Gel 15 Gram(s) Oral once  dextrose 5%. 1000 milliLiter(s) IV Continuous <Continuous>  dextrose 5%. 1000 milliLiter(s) IV Continuous <Continuous>  dextrose 50% Injectable 25 Gram(s) IV Push once  dextrose 50% Injectable 12.5 Gram(s) IV Push once  dextrose 50% Injectable 25 Gram(s) IV Push once  glucagon  Injectable 1 milliGRAM(s) IntraMuscular once  insulin lispro (ADMELOG) corrective regimen sliding scale   SubCutaneous three times a day before meals  insulin lispro (ADMELOG) corrective regimen sliding scale   SubCutaneous at bedtime  lisinopril 10 milliGRAM(s) Oral daily  multivitamin 1 Tablet(s) Oral daily      ALLERGIES:  penicillin (Other)      VITALS & I/Os:  Vital Signs Last 24 Hrs  T(C): 36 (25 Sep 2021 13:02), Max: 36.2 (25 Sep 2021 11:18)  T(F): 96.8 (25 Sep 2021 13:02), Max: 97.1 (25 Sep 2021 11:18)  HR: 74 (25 Sep 2021 13:02) (68 - 82)  BP: 146/79 (25 Sep 2021 13:02) (146/79 - 176/87)  BP(mean): --  RR: 15 (25 Sep 2021 13:02) (15 - 16)  SpO2: 100% (25 Sep 2021 13:02) (100% - 100%)    I&O's Summary      PHYSICAL EXAM:  GEN: resting in bed comfortably in NAD  RESP: no increased WOB  ABD: soft, non-distended, non-tender without rebound tenderness or guarding  EXTR: RLE warm, well-perfused; R 5th toe with dry gangrene, small area of wet conversion in the interdigit space between the 4th & 5th digits; palpable DP, PT, femoral pulses, pulsatile flow palpable over distal bypass scar   NEURO: awake, alert       LABS:             14.0   12.67 )-----------( 253      ( 25 Sep 2021 12:42 )             40.9     137  |  101  |  42<H>  ----------------------------<  96  3.9   |  20<L>  |  1.99<H>    Ca    9.8      25 Sep 2021 12:42  Phos  3.2       Mg     1.90         TPro  7.3  /  Alb  4.2  /  TBili  0.3  /  DBili  x   /  AST  14  /  ALT  6   /  AlkPhos  88      Lactate:   @ 12:42  1.3    PT/INR - ( 25 Sep 2021 12:42 )   PT: 11.0 sec;   INR: 0.95 ratio    PTT - ( 25 Sep 2021 12:42 )  PTT:30.0 sec    Urinalysis Basic - ( 25 Sep 2021 15:23 )    Color: Yellow / Appearance: Clear / S.021 / pH: x  Gluc: x / Ketone: Negative  / Bili: Negative / Urobili: <2 mg/dL   Blood: x / Protein: Trace / Nitrite: Negative   Leuk Esterase: Negative / RBC: 2 /HPF / WBC 3 /HPF   Sq Epi: x / Non Sq Epi: 1 /HPF / Bacteria: Negative        IMAGING:   GENERAL SURGERY CONSULT NOTE  Consulting surgical team: C Team Surgery (Vascular)  Consulting attending: Dr. Pandya    HPI:  59yo M with Hx CHF, HTN, CAD, HLD, DM, CKD, PVD (s/p R fem-pop bypass in 2018, on Eliquis), who presented with worsening R 5th toe pain for 1 week.     61 y/o male, with a PmHx of CHF, HTN, CAD, HLD, DM, CKD, PVD with a Right Femoral-Femoral Bypass in 2018 on apixiban, presented to the [facility] ED with worsening Right Toe pain x 1 week.  Pt states about 1 month ago he started to get pain in his right foot 5th digit. He states he wife had  from Lung Cancer a few days prior but waited to see his PCP until a week later. He states when he finally went into see his PCP, his doctor states he was likely Gout and was prescribed Indomethacin. Pt states his foot wasn't getting any better and the other day he noticed some blood coming from the bottom of his toe so he had gone today to see his Podiatrist (had trouble getting an appointment over this past week). In the Podiatrist office, he was told to go to the ED because he could have a necrotic toe and since he had a history of a bypass in that leg before, there is a high risk of it getting worse. Pt denies any fever, chills, chest pain, HA, dizziness, blurred vision, abd pain, sick contacts, recent travel. The only complaint he was having is pain to the right 5th toe. In the [facility] ED today, he was seen by Podiatry and a bedside debridement was done and then wrapped in a dry sterile dressing. 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 Pt was started on Clindamycin and was then admitted to medicine for further medical management and treatment. (25 Sep 2021 15:50)    full note to follow      PAST MEDICAL HISTORY:  DM (diabetes mellitus)  HTN (hypertension)  HLD (hyperlipidemia)  CHF (congestive heart failure)  CKD (chronic kidney disease)  PVD (peripheral vascular disease)      PAST SURGICAL HISTORY:  S/P femoral-femoral bypass surgery      MEDICATIONS:  acetaminophen   Tablet .. 650 milliGRAM(s) Oral every 6 hours PRN  apixaban 2.5 milliGRAM(s) Oral two times a day  aspirin enteric coated 81 milliGRAM(s) Oral daily  atorvastatin 40 milliGRAM(s) Oral at bedtime  cadexomer iodine 0.9% Gel 1 Application(s) Topical daily  carvedilol 25 milliGRAM(s) Oral every 12 hours  clindamycin IVPB 600 milliGRAM(s) IV Intermittent every 8 hours  dextrose 40% Gel 15 Gram(s) Oral once  dextrose 5%. 1000 milliLiter(s) IV Continuous <Continuous>  dextrose 5%. 1000 milliLiter(s) IV Continuous <Continuous>  dextrose 50% Injectable 25 Gram(s) IV Push once  dextrose 50% Injectable 12.5 Gram(s) IV Push once  dextrose 50% Injectable 25 Gram(s) IV Push once  glucagon  Injectable 1 milliGRAM(s) IntraMuscular once  insulin lispro (ADMELOG) corrective regimen sliding scale   SubCutaneous three times a day before meals  insulin lispro (ADMELOG) corrective regimen sliding scale   SubCutaneous at bedtime  lisinopril 10 milliGRAM(s) Oral daily  multivitamin 1 Tablet(s) Oral daily      ALLERGIES:  penicillin (Other)      VITALS & I/Os:  Vital Signs Last 24 Hrs  T(C): 36 (25 Sep 2021 13:02), Max: 36.2 (25 Sep 2021 11:18)  T(F): 96.8 (25 Sep 2021 13:02), Max: 97.1 (25 Sep 2021 11:18)  HR: 74 (25 Sep 2021 13:02) (68 - 82)  BP: 146/79 (25 Sep 2021 13:02) (146/79 - 176/87)  BP(mean): --  RR: 15 (25 Sep 2021 13:02) (15 - 16)  SpO2: 100% (25 Sep 2021 13:02) (100% - 100%)    I&O's Summary      PHYSICAL EXAM:  GEN: resting in bed comfortably in NAD  RESP: no increased WOB  ABD: soft, non-distended, non-tender without rebound tenderness or guarding  EXTR: RLE warm, well-perfused; R 5th toe with dry gangrene, small area of wet conversion in the interdigit space between the 4th & 5th digits; palpable DP, PT, femoral pulses, pulsatile flow palpable over distal bypass scar   NEURO: awake, alert       LABS:             14.0   12.67 )-----------( 253      ( 25 Sep 2021 12:42 )             40.9     137  |  101  |  42<H>  ----------------------------<  96  3.9   |  20<L>  |  1.99<H>    Ca    9.8      25 Sep 2021 12:42  Phos  3.2       Mg     1.90         TPro  7.3  /  Alb  4.2  /  TBili  0.3  /  DBili  x   /  AST  14  /  ALT  6   /  AlkPhos  88      Lactate:   @ 12:42  1.3    PT/INR - ( 25 Sep 2021 12:42 )   PT: 11.0 sec;   INR: 0.95 ratio    PTT - ( 25 Sep 2021 12:42 )  PTT:30.0 sec    Urinalysis Basic - ( 25 Sep 2021 15:23 )    Color: Yellow / Appearance: Clear / S.021 / pH: x  Gluc: x / Ketone: Negative  / Bili: Negative / Urobili: <2 mg/dL   Blood: x / Protein: Trace / Nitrite: Negative   Leuk Esterase: Negative / RBC: 2 /HPF / WBC 3 /HPF   Sq Epi: x / Non Sq Epi: 1 /HPF / Bacteria: Negative        IMAGING:   GENERAL SURGERY CONSULT NOTE  Consulting surgical team: C Team Surgery (Vascular)  Consulting attending: Dr. Pandya    HPI:  59yo M with Hx CHF, HTN, CAD, HLD, DM, CKD, PVD (s/p R fem-pop bypass in 2018, on Eliquis), who presented with worsening R 5th toe pain for 1 week. He saw his Podiatrist today, who sent him to the ED due to c/f necrotic R toe. On presentation, patient was afebrile, hemodynamically stable. Labs significant for leukocytosis to 12.67. XR R foot negative for osteomyelitis. Podiatry evaluated patient at bedside s/p excisional debridement of R foot 4th interspace eschar to subQ space. Vascular surgery consulted for possible revascularization.     PAST MEDICAL HISTORY:  DM (diabetes mellitus)  HTN (hypertension)  HLD (hyperlipidemia)  CHF (congestive heart failure)  CKD (chronic kidney disease)  PVD (peripheral vascular disease)    PAST SURGICAL HISTORY:  S/P femoral-pop bypass surgery    MEDICATIONS:  acetaminophen   Tablet .. 650 milliGRAM(s) Oral every 6 hours PRN  apixaban 2.5 milliGRAM(s) Oral two times a day  aspirin enteric coated 81 milliGRAM(s) Oral daily  atorvastatin 40 milliGRAM(s) Oral at bedtime  cadexomer iodine 0.9% Gel 1 Application(s) Topical daily  carvedilol 25 milliGRAM(s) Oral every 12 hours  clindamycin IVPB 600 milliGRAM(s) IV Intermittent every 8 hours  dextrose 40% Gel 15 Gram(s) Oral once  dextrose 5%. 1000 milliLiter(s) IV Continuous <Continuous>  dextrose 5%. 1000 milliLiter(s) IV Continuous <Continuous>  dextrose 50% Injectable 25 Gram(s) IV Push once  dextrose 50% Injectable 12.5 Gram(s) IV Push once  dextrose 50% Injectable 25 Gram(s) IV Push once  glucagon  Injectable 1 milliGRAM(s) IntraMuscular once  insulin lispro (ADMELOG) corrective regimen sliding scale   SubCutaneous three times a day before meals  insulin lispro (ADMELOG) corrective regimen sliding scale   SubCutaneous at bedtime  lisinopril 10 milliGRAM(s) Oral daily  multivitamin 1 Tablet(s) Oral daily      ALLERGIES:  penicillin (Other)      VITALS & I/Os:  Vital Signs Last 24 Hrs  T(C): 36 (25 Sep 2021 13:02), Max: 36.2 (25 Sep 2021 11:18)  T(F): 96.8 (25 Sep 2021 13:02), Max: 97.1 (25 Sep 2021 11:18)  HR: 74 (25 Sep 2021 13:02) (68 - 82)  BP: 146/79 (25 Sep 2021 13:02) (146/79 - 176/87)  RR: 15 (25 Sep 2021 13:02) (15 - 16)  SpO2: 100% (25 Sep 2021 13:02) (100% - 100%)      PHYSICAL EXAM:  GEN: resting in bed comfortably in NAD  RESP: no increased WOB  ABD: soft, non-distended, non-tender without rebound tenderness or guarding  EXTR: RLE warm, well-perfused; R 5th toe with dry gangrene, small area of wet conversion in the interdigit space between the 4th & 5th digits; palpable DP, PT, femoral pulses, pulsatile flow palpable over distal bypass scar   NEURO: awake, alert       LABS:             14.0   12.67 )-----------( 253      ( 25 Sep 2021 12:42 )             40.9     137  |  101  |  42<H>  ----------------------------<  96  3.9   |  20<L>  |  1.99<H>    Ca    9.8      25 Sep 2021 12:42  Phos  3.2       Mg     1.90     -25    TPro  7.3  /  Alb  4.2  /  TBili  0.3  /  DBili  x   /  AST  14  /  ALT  6   /  AlkPhos  88  09-25    Lactate:  - @ 12:42  1.3    PT/INR - ( 25 Sep 2021 12:42 )   PT: 11.0 sec;   INR: 0.95 ratio    PTT - ( 25 Sep 2021 12:42 )  PTT:30.0 sec    Urinalysis Basic - ( 25 Sep 2021 15:23 )    Color: Yellow / Appearance: Clear / S.021 / pH: x  Gluc: x / Ketone: Negative  / Bili: Negative / Urobili: <2 mg/dL   Blood: x / Protein: Trace / Nitrite: Negative   Leuk Esterase: Negative / RBC: 2 /HPF / WBC 3 /HPF   Sq Epi: x / Non Sq Epi: 1 /HPF / Bacteria: Negative      IMAGING:    XR R Foot - :     FINDINGS:   Prior study dated 2018 was available for review.    There is resection of the fifth metatarsal head and proximal shaft of the proximal phalanx of the fifth digit of the right foot. There is mild soft tissue swelling of the fifth digit of the right foot. No periosteal reaction or soft tissue air lucency is seen to suspect osteomyelitis. If clinically suspect, further evaluation with MRI examination is recommended. Dystrophic calcifications are seen in the region of the fifth digit of the right foot and along the shaft of the fifth metatarsal. Vascular calcifications are present.      IMPRESSION:    Postsurgical changes noted involving the fifth digit of the right foot and fifth metatarsal head as noted above. No radiographic evidence for osteomyelitis at this time. If however, clinically suspect, further evaluation with MRI examination of the right foot can be obtained. GENERAL SURGERY CONSULT NOTE  Consulting surgical team: C Team Surgery (Vascular)  Consulting attending: Dr. Pandya    HPI:  61yo M with Hx CHF, HTN, CAD, HLD, DM, CKD, PVD (s/p R fem-pop bypass in 2018, on Eliquis), who presented with worsening R 5th toe pain for 1 week. He saw his Podiatrist today, who sent him to the ED due to c/f necrotic R toe. He notes that for the last month of so has been having intermittent claudication (>1 city block) but some days its worse. Denies rest pain, sensory changes and motor changes.  He regularly follows with his vascular surgeon at [facility] last CAMMY/PVI and duplex was done 1 year ago he is due for a follow up soon. He states he's missed a few of his medical appointments because of recent death of his wife one month ago. On presentation, patient was afebrile, hemodynamically stable. Labs significant for leukocytosis to 12.67. XR R foot negative for osteomyelitis. Podiatry evaluated patient at bedside s/p excisional debridement of R foot 4th interspace eschar to subQ space.     PAST MEDICAL HISTORY:  DM (diabetes mellitus)  HTN (hypertension)  HLD (hyperlipidemia)  CHF (congestive heart failure)  CKD (chronic kidney disease)  PVD (peripheral vascular disease)    PAST SURGICAL HISTORY:  S/P femoral-pop bypass surgery 2018 in [facility]    MEDICATIONS:  acetaminophen   Tablet .. 650 milliGRAM(s) Oral every 6 hours PRN  apixaban 2.5 milliGRAM(s) Oral two times a day  aspirin enteric coated 81 milliGRAM(s) Oral daily  atorvastatin 40 milliGRAM(s) Oral at bedtime  cadexomer iodine 0.9% Gel 1 Application(s) Topical daily  carvedilol 25 milliGRAM(s) Oral every 12 hours  clindamycin IVPB 600 milliGRAM(s) IV Intermittent every 8 hours  dextrose 40% Gel 15 Gram(s) Oral once  dextrose 5%. 1000 milliLiter(s) IV Continuous <Continuous>  dextrose 5%. 1000 milliLiter(s) IV Continuous <Continuous>  dextrose 50% Injectable 25 Gram(s) IV Push once  dextrose 50% Injectable 12.5 Gram(s) IV Push once  dextrose 50% Injectable 25 Gram(s) IV Push once  glucagon  Injectable 1 milliGRAM(s) IntraMuscular once  insulin lispro (ADMELOG) corrective regimen sliding scale   SubCutaneous three times a day before meals  insulin lispro (ADMELOG) corrective regimen sliding scale   SubCutaneous at bedtime  lisinopril 10 milliGRAM(s) Oral daily  multivitamin 1 Tablet(s) Oral daily      ALLERGIES:  penicillin (Other)      VITALS & I/Os:  Vital Signs Last 24 Hrs  T(C): 36 (25 Sep 2021 13:02), Max: 36.2 (25 Sep 2021 11:18)  T(F): 96.8 (25 Sep 2021 13:02), Max: 97.1 (25 Sep 2021 11:18)  HR: 74 (25 Sep 2021 13:02) (68 - 82)  BP: 146/79 (25 Sep 2021 13:02) (146/79 - 176/87)  RR: 15 (25 Sep 2021 13:02) (15 - 16)  SpO2: 100% (25 Sep 2021 13:02) (100% - 100%)      PHYSICAL EXAM:  GEN: resting in bed comfortably in NAD  RESP: no increased WOB  ABD: soft, non-distended, non-tender without rebound tenderness or guarding  EXTR: RLE PULSES: PALPABLE femoral, weak doppler PT, 0 DP, weak signal peroneal; signal over bypass graft stopps approx 3-4cm distal to anastomosis; R 5th toe with dry gangrene, small area of wet conversion in the interdigit space between the 4th & 5th digits  NEURO: awake, alert       LABS:             14.0   12.67 )-----------( 253      ( 25 Sep 2021 12:42 )             40.9     137  |  101  |  42<H>  ----------------------------<  96  3.9   |  20<L>  |  1.99<H>    Ca    9.8      25 Sep 2021 12:42  Phos  3.2     09-  Mg     1.90         TPro  7.3  /  Alb  4.2  /  TBili  0.3  /  DBili  x   /  AST  14  /  ALT  6   /  AlkPhos  88  09-25    Lactate:  09-25 @ 12:42  1.3    PT/INR - ( 25 Sep 2021 12:42 )   PT: 11.0 sec;   INR: 0.95 ratio    PTT - ( 25 Sep 2021 12:42 )  PTT:30.0 sec    Urinalysis Basic - ( 25 Sep 2021 15:23 )    Color: Yellow / Appearance: Clear / S.021 / pH: x  Gluc: x / Ketone: Negative  / Bili: Negative / Urobili: <2 mg/dL   Blood: x / Protein: Trace / Nitrite: Negative   Leuk Esterase: Negative / RBC: 2 /HPF / WBC 3 /HPF   Sq Epi: x / Non Sq Epi: 1 /HPF / Bacteria: Negative      IMAGING:    XR R Foot - :     FINDINGS:   Prior study dated 2018 was available for review.    There is resection of the fifth metatarsal head and proximal shaft of the proximal phalanx of the fifth digit of the right foot. There is mild soft tissue swelling of the fifth digit of the right foot. No periosteal reaction or soft tissue air lucency is seen to suspect osteomyelitis. If clinically suspect, further evaluation with MRI examination is recommended. Dystrophic calcifications are seen in the region of the fifth digit of the right foot and along the shaft of the fifth metatarsal. Vascular calcifications are present.      IMPRESSION:    Postsurgical changes noted involving the fifth digit of the right foot and fifth metatarsal head as noted above. No radiographic evidence for osteomyelitis at this time. If however, clinically suspect, further evaluation with MRI examination of the right foot can be obtained.

## 2021-09-25 NOTE — H&P ADULT - PROBLEM SELECTOR PLAN 4
- currently not in heart failure at this time  - Echocardiogram ordered to evaluate EF for poss cardiac clearance for the OR for a possible toe amputation  - daily wts, strict I & O's

## 2021-09-25 NOTE — H&P ADULT - ATTENDING COMMENTS
Patient is 60 years old male with PMHx of DM2, severe PAD, s/p bypass graft 2018, HTN, HLD with c/o RT toe vascular wound, seen by Podiatry.  Wound infected, started on atb by ED, Clindamycin , R/O osteomyelitis, low suspicion per Podiatry.   F/u vascular surgery recommendations  Consider ID consult.  CKD 3, avoid nephrotoxics

## 2021-09-25 NOTE — ED PROVIDER NOTE - NS ED ROS FT
Constitutional: No fever, chills.  Eyes:  No visual changes  ENMT:  No neck pain  Cardiac:  No chest pain  Respiratory:  No cough, SOB  GI:  No nausea, vomiting, diarrhea, abdominal pain.  :  No dysuria, hematuria  MS:  +right toe pain   Neuro:  No headache or lightheadedness  Skin:  +necrosis

## 2021-09-25 NOTE — ED PROVIDER NOTE - NSICDXPASTMEDICALHX_GEN_ALL_CORE_FT
PAST MEDICAL HISTORY:  CHF (congestive heart failure)     DM (diabetes mellitus)     HLD (hyperlipidemia)     HTN (hypertension)

## 2021-09-25 NOTE — H&P ADULT - NSHPSOCIALHISTORY_GEN_ALL_CORE
Marital Status:  - wife  x 1 month ago from Lung Cancer    Occupation: Retired - used to work for a [employer]    Tobacco Use: d/c smoking x 40 years ago - used to smoke 1 pk/day x 1 year    ETOH Use: denies    Drug Use: denies    Flu Vaccine: 2020       Pneumonia Vaccine:      neg               COVID Vaccine: Completed Pfizer Vaccine 21

## 2021-09-25 NOTE — H&P ADULT - NSHPPHYSICALEXAM_GEN_ALL_CORE
Vital Signs Last 24 Hrs  T(C): 36 (25 Sep 2021 13:02), Max: 36.2 (25 Sep 2021 11:18)  T(F): 96.8 (25 Sep 2021 13:02), Max: 97.1 (25 Sep 2021 11:18)  HR: 74 (25 Sep 2021 13:02) (68 - 82)  BP: 146/79 (25 Sep 2021 13:02) (146/79 - 176/87)  BP(mean): --  RR: 15 (25 Sep 2021 13:02) (15 - 16)  SpO2: 100% (25 Sep 2021 13:02) (100% - 100%)

## 2021-09-25 NOTE — PATIENT PROFILE ADULT - NSPROPTRIGHTSUPPORTPERSON_GEN_A_NUR
The patient returned call to the clinic. He states his face and nose is scaling again for the last week. It is almost as bad as it was when he saw Dr. Morgan on 08/20/2018.  He states the end of August 2018 his face was completely clear. Redness, scaling and pimples were clear. He states he looked normal for the first time in a long time. But over the last week his face has worsened.  He notes he has a few pimples on the forehead but he is not concerned about the pimples. He is concerned about the scaling and redness.      He is currently taking Prednisone 10 mg taking 3 tablets each morning which equals Prednisone 30 mg daily.   (Monday 9/17/2018 is when he would decrease to Prednisone 10 mg taking 2 tablets each morning.)    The patient uses [facility] in [place].      The patient experienced the following side effects from Prednisone:    Upset stomach  []  YES    [x]  NO  Mood changes  []  YES    [x]  NO  Vaginal yeast infection []  YES    []  NO   [x]  NOT APPLICABLE    The patient is done with work at 4 pm today or it is okay to leave a detailed message.       Dr. Morgan please advise.        yes

## 2021-09-25 NOTE — H&P ADULT - SKIN COMMENTS
unable to assess right foot due to bandages in place - s/p surgical debridement at bedside by Podiatry

## 2021-09-25 NOTE — H&P ADULT - PROBLEM SELECTOR PLAN 1
- Podiatry c/s following - s/p excisional debridement at bedside  - dry dressing in place  - Vascular c/s following  - Arterial Duplex of RLE ordered  - BCX testing  - Right Foot Xray done - Postsurgical changes noted involving the fifth digit of the right foot and fifth metatarsal head as noted above. No radiographic evidence for osteomyelitis at this time. If however, clinically suspect, further evaluation with MRI examination of the right foot can be obtained.  - On clindamycin iv  - will eventually need to hold Apixaban and Aspirin If needed to go to the OR

## 2021-09-25 NOTE — H&P ADULT - HISTORY OF PRESENT ILLNESS
61 y/o male, with a PmHx of CHF, HTN, CAD, HLD, DM, CKD, PVD with a Right Femoral-Femoral Bypass in 2018 on apixiban, presented to the [facility] ED with worsening Right Toe pain x 1 week.  Pt states about 1 month ago he started to get pain in his right foot 5th digit. He states he wife had  from Lung Cancer a few days prior but waited to see his PCP until a week later. He states when he finally went into see his PCP, his doctor states he was likely Gout and was prescribed Indomethacin. Pt states his foot wasn't getting any better and the other day he noticed some blood coming from the bottom of his toe so he had gone today to see his Podiatrist (had trouble getting an appointment over this past week). In the Podiatrist office, he was told to go to the ED because he could have a necrotic toe and since he had a history of a bypass in that leg before, there is a high risk of it getting worse. Pt denies any fever, chills, chest pain, HA, dizziness, blurred vision, abd pain, sick contacts, recent travel. The only complaint he was having is pain to the right 5th toe. In the [facility] ED today, he was seen by Podiatry and a bedside debridement was done and then wrapped in a dry sterile dressing. 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 Pt was started on Clindamycin and was then admitted to medicine for further medical management and treatment.

## 2021-09-25 NOTE — ED ADULT NURSE NOTE - CHIEF COMPLAINT QUOTE
c/o pain to right foot 5th toe x 3 weeks. Seen by podiatry and dx with gout but then returned to podiatry today and dx with gangrene to toe. Redness noted to right foot. Toes discolored. Ulcerated wound noted to area between 4th /5th toe. [id number]

## 2021-09-25 NOTE — ED PROVIDER NOTE - PROGRESS NOTE DETAILS
Sky MACKEY (PGY-3): Patient resting comfortably in bed, vascular evaluated patient, appreciated distal pulses, will follow-up on floor, appreciate recs.

## 2021-09-25 NOTE — ED ADULT NURSE NOTE - NSIMPLEMENTINTERV_GEN_ALL_ED
Implemented All Fall Risk Interventions:  Fred to call system. Call bell, personal items and telephone within reach. Instruct patient to call for assistance. Room bathroom lighting operational. Non-slip footwear when patient is off stretcher. Physically safe environment: no spills, clutter or unnecessary equipment. Stretcher in lowest position, wheels locked, appropriate side rails in place. Provide visual cue, wrist band, yellow gown, etc. Monitor gait and stability. Monitor for mental status changes and reorient to person, place, and time. Review medications for side effects contributing to fall risk. Reinforce activity limits and safety measures with patient and family.

## 2021-09-26 LAB
ALBUMIN SERPL ELPH-MCNC: 3.9 G/DL — SIGNIFICANT CHANGE UP (ref 3.3–5)
ALP SERPL-CCNC: 85 U/L — SIGNIFICANT CHANGE UP (ref 40–120)
ALT FLD-CCNC: 8 U/L — SIGNIFICANT CHANGE UP (ref 4–41)
ANION GAP SERPL CALC-SCNC: 13 MMOL/L — SIGNIFICANT CHANGE UP (ref 7–14)
APTT BLD: 32.7 SEC — SIGNIFICANT CHANGE UP (ref 27–36.3)
AST SERPL-CCNC: 14 U/L — SIGNIFICANT CHANGE UP (ref 4–40)
BASOPHILS # BLD AUTO: 0.04 K/UL — SIGNIFICANT CHANGE UP (ref 0–0.2)
BASOPHILS NFR BLD AUTO: 0.4 % — SIGNIFICANT CHANGE UP (ref 0–2)
BILIRUB SERPL-MCNC: 0.2 MG/DL — SIGNIFICANT CHANGE UP (ref 0.2–1.2)
BUN SERPL-MCNC: 32 MG/DL — HIGH (ref 7–23)
CALCIUM SERPL-MCNC: 9.5 MG/DL — SIGNIFICANT CHANGE UP (ref 8.4–10.5)
CHLORIDE SERPL-SCNC: 102 MMOL/L — SIGNIFICANT CHANGE UP (ref 98–107)
CHOLEST SERPL-MCNC: 139 MG/DL — SIGNIFICANT CHANGE UP
CO2 SERPL-SCNC: 23 MMOL/L — SIGNIFICANT CHANGE UP (ref 22–31)
COVID-19 SPIKE DOMAIN AB INTERP: POSITIVE
COVID-19 SPIKE DOMAIN ANTIBODY RESULT: 148 U/ML — HIGH
CREAT SERPL-MCNC: 1.27 MG/DL — SIGNIFICANT CHANGE UP (ref 0.5–1.3)
EOSINOPHIL # BLD AUTO: 0.07 K/UL — SIGNIFICANT CHANGE UP (ref 0–0.5)
EOSINOPHIL NFR BLD AUTO: 0.7 % — SIGNIFICANT CHANGE UP (ref 0–6)
GLUCOSE SERPL-MCNC: 126 MG/DL — HIGH (ref 70–99)
HCT VFR BLD CALC: 38.3 % — LOW (ref 39–50)
HDLC SERPL-MCNC: 26 MG/DL — LOW
HGB BLD-MCNC: 13.4 G/DL — SIGNIFICANT CHANGE UP (ref 13–17)
IANC: 7.96 K/UL — SIGNIFICANT CHANGE UP (ref 1.5–8.5)
IMM GRANULOCYTES NFR BLD AUTO: 0.3 % — SIGNIFICANT CHANGE UP (ref 0–1.5)
LIPID PNL WITH DIRECT LDL SERPL: 77 MG/DL — SIGNIFICANT CHANGE UP
LYMPHOCYTES # BLD AUTO: 1.22 K/UL — SIGNIFICANT CHANGE UP (ref 1–3.3)
LYMPHOCYTES # BLD AUTO: 11.8 % — LOW (ref 13–44)
MAGNESIUM SERPL-MCNC: 1.8 MG/DL — SIGNIFICANT CHANGE UP (ref 1.6–2.6)
MCHC RBC-ENTMCNC: 31.8 PG — SIGNIFICANT CHANGE UP (ref 27–34)
MCHC RBC-ENTMCNC: 35 GM/DL — SIGNIFICANT CHANGE UP (ref 32–36)
MCV RBC AUTO: 91 FL — SIGNIFICANT CHANGE UP (ref 80–100)
MONOCYTES # BLD AUTO: 0.99 K/UL — HIGH (ref 0–0.9)
MONOCYTES NFR BLD AUTO: 9.6 % — SIGNIFICANT CHANGE UP (ref 2–14)
NEUTROPHILS # BLD AUTO: 7.96 K/UL — HIGH (ref 1.8–7.4)
NEUTROPHILS NFR BLD AUTO: 77.2 % — HIGH (ref 43–77)
NON HDL CHOLESTEROL: 113 MG/DL — SIGNIFICANT CHANGE UP
NRBC # BLD: 0 /100 WBCS — SIGNIFICANT CHANGE UP
NRBC # FLD: 0 K/UL — SIGNIFICANT CHANGE UP
PHOSPHATE SERPL-MCNC: 2.8 MG/DL — SIGNIFICANT CHANGE UP (ref 2.5–4.5)
PLATELET # BLD AUTO: 249 K/UL — SIGNIFICANT CHANGE UP (ref 150–400)
POTASSIUM SERPL-MCNC: 3.4 MMOL/L — LOW (ref 3.5–5.3)
POTASSIUM SERPL-SCNC: 3.4 MMOL/L — LOW (ref 3.5–5.3)
PROT SERPL-MCNC: 6.9 G/DL — SIGNIFICANT CHANGE UP (ref 6–8.3)
RBC # BLD: 4.21 M/UL — SIGNIFICANT CHANGE UP (ref 4.2–5.8)
RBC # FLD: 13.2 % — SIGNIFICANT CHANGE UP (ref 10.3–14.5)
SARS-COV-2 IGG+IGM SERPL QL IA: 148 U/ML — HIGH
SARS-COV-2 IGG+IGM SERPL QL IA: POSITIVE
SODIUM SERPL-SCNC: 138 MMOL/L — SIGNIFICANT CHANGE UP (ref 135–145)
TRIGL SERPL-MCNC: 182 MG/DL — HIGH
WBC # BLD: 10.31 K/UL — SIGNIFICANT CHANGE UP (ref 3.8–10.5)
WBC # FLD AUTO: 10.31 K/UL — SIGNIFICANT CHANGE UP (ref 3.8–10.5)

## 2021-09-26 PROCEDURE — 75635 CT ANGIO ABDOMINAL ARTERIES: CPT | Mod: 26

## 2021-09-26 RX ORDER — POTASSIUM CHLORIDE 20 MEQ
40 PACKET (EA) ORAL ONCE
Refills: 0 | Status: COMPLETED | OUTPATIENT
Start: 2021-09-26 | End: 2021-09-26

## 2021-09-26 RX ORDER — SODIUM CHLORIDE 9 MG/ML
1000 INJECTION INTRAMUSCULAR; INTRAVENOUS; SUBCUTANEOUS
Refills: 0 | Status: COMPLETED | OUTPATIENT
Start: 2021-09-26 | End: 2021-09-26

## 2021-09-26 RX ORDER — SODIUM CHLORIDE 9 MG/ML
1000 INJECTION INTRAMUSCULAR; INTRAVENOUS; SUBCUTANEOUS
Refills: 0 | Status: DISCONTINUED | OUTPATIENT
Start: 2021-09-26 | End: 2021-09-26

## 2021-09-26 RX ORDER — HEPARIN SODIUM 5000 [USP'U]/ML
INJECTION INTRAVENOUS; SUBCUTANEOUS
Qty: 25000 | Refills: 0 | Status: DISCONTINUED | OUTPATIENT
Start: 2021-09-26 | End: 2021-09-29

## 2021-09-26 RX ORDER — KETOROLAC TROMETHAMINE 30 MG/ML
15 SYRINGE (ML) INJECTION ONCE
Refills: 0 | Status: DISCONTINUED | OUTPATIENT
Start: 2021-09-26 | End: 2021-09-26

## 2021-09-26 RX ORDER — POTASSIUM CHLORIDE 20 MEQ
40 PACKET (EA) ORAL ONCE
Refills: 0 | Status: DISCONTINUED | OUTPATIENT
Start: 2021-09-26 | End: 2021-09-26

## 2021-09-26 RX ORDER — HEPARIN SODIUM 5000 [USP'U]/ML
2000 INJECTION INTRAVENOUS; SUBCUTANEOUS EVERY 6 HOURS
Refills: 0 | Status: DISCONTINUED | OUTPATIENT
Start: 2021-09-26 | End: 2021-09-29

## 2021-09-26 RX ORDER — HEPARIN SODIUM 5000 [USP'U]/ML
4000 INJECTION INTRAVENOUS; SUBCUTANEOUS EVERY 6 HOURS
Refills: 0 | Status: DISCONTINUED | OUTPATIENT
Start: 2021-09-26 | End: 2021-09-29

## 2021-09-26 RX ADMIN — Medication 100 MILLIGRAM(S): at 05:57

## 2021-09-26 RX ADMIN — Medication 650 MILLIGRAM(S): at 15:55

## 2021-09-26 RX ADMIN — Medication 15 MILLIGRAM(S): at 21:32

## 2021-09-26 RX ADMIN — Medication 650 MILLIGRAM(S): at 16:40

## 2021-09-26 RX ADMIN — Medication 100 MILLIGRAM(S): at 13:30

## 2021-09-26 RX ADMIN — Medication 1 APPLICATION(S): at 05:00

## 2021-09-26 RX ADMIN — Medication 1 TABLET(S): at 11:17

## 2021-09-26 RX ADMIN — SODIUM CHLORIDE 75 MILLILITER(S): 9 INJECTION INTRAMUSCULAR; INTRAVENOUS; SUBCUTANEOUS at 14:05

## 2021-09-26 RX ADMIN — CARVEDILOL PHOSPHATE 25 MILLIGRAM(S): 80 CAPSULE, EXTENDED RELEASE ORAL at 05:57

## 2021-09-26 RX ADMIN — Medication 81 MILLIGRAM(S): at 11:16

## 2021-09-26 RX ADMIN — HEPARIN SODIUM 900 UNIT(S)/HR: 5000 INJECTION INTRAVENOUS; SUBCUTANEOUS at 18:03

## 2021-09-26 RX ADMIN — LISINOPRIL 10 MILLIGRAM(S): 2.5 TABLET ORAL at 05:56

## 2021-09-26 RX ADMIN — ATORVASTATIN CALCIUM 40 MILLIGRAM(S): 80 TABLET, FILM COATED ORAL at 21:03

## 2021-09-26 RX ADMIN — APIXABAN 2.5 MILLIGRAM(S): 2.5 TABLET, FILM COATED ORAL at 05:56

## 2021-09-26 RX ADMIN — Medication 1: at 12:34

## 2021-09-26 RX ADMIN — CARVEDILOL PHOSPHATE 25 MILLIGRAM(S): 80 CAPSULE, EXTENDED RELEASE ORAL at 17:18

## 2021-09-26 RX ADMIN — SODIUM CHLORIDE 75 MILLILITER(S): 9 INJECTION INTRAMUSCULAR; INTRAVENOUS; SUBCUTANEOUS at 15:52

## 2021-09-26 RX ADMIN — Medication 40 MILLIEQUIVALENT(S): at 10:34

## 2021-09-26 RX ADMIN — SODIUM CHLORIDE 60 MILLILITER(S): 9 INJECTION INTRAMUSCULAR; INTRAVENOUS; SUBCUTANEOUS at 10:35

## 2021-09-26 RX ADMIN — Medication 15 MILLIGRAM(S): at 21:02

## 2021-09-26 RX ADMIN — Medication 100 MILLIGRAM(S): at 21:02

## 2021-09-26 NOTE — PROGRESS NOTE ADULT - ASSESSMENT
59yo M with Hx CHF, CAD (no stents), HTN, HLD, DM, PVD who presents with non-healing R toe wound, found to have dry gangrene with small area of wet conversion. Concern for right femoral-popliteal bypass stenosis or occlusion    PLAN:   - start heparin drip today  - CTA urgently  concerning for  bypass occlusion in RLE  -Pod plan for right foot partial 5th ray resection, will discuss after the CTA result  - vascular surgery will follow up    C Team Vascular Surgery  [id number]

## 2021-09-26 NOTE — PROGRESS NOTE ADULT - SUBJECTIVE AND OBJECTIVE BOX
Surgery Progress Note    SUBJECTIVE: Pt seen and examined at bedside. Patient complains of right foot. Podiatry changed dressing this morning.  No nausea, vomiting, diarrhea.  Tolerating diet.    Vital Signs Last 24 Hrs  T(C): 36.9 (26 Sep 2021 05:54), Max: 36.9 (26 Sep 2021 05:54)  T(F): 98.5 (26 Sep 2021 05:54), Max: 98.5 (26 Sep 2021 05:54)  HR: 70 (26 Sep 2021 05:54) (65 - 76)  BP: 150/76 (26 Sep 2021 05:54) (137/75 - 167/98)  BP(mean): --  RR: 17 (26 Sep 2021 05:54) (15 - 17)  SpO2: 100% (26 Sep 2021 05:54) (99% - 100%)    Physical Exam:  GEN: resting in bed comfortably in NAD  RESP: no increased WOB  ABD: soft, non-distended, non-tender without rebound tenderness or guarding  EXTR: RLE PULSES: PALPABLE femoral, no doppler PT, 0 DP, 0;  R 5th toe with dry gangrene, small area of wet conversion in the interdigit space between the 4th & 5th digits  NEURO: awake, alert     LABS:                        13.4   10.31 )-----------( 249      ( 26 Sep 2021 08:30 )             38.3     09-    138  |  102  |  32<H>  ----------------------------<  126<H>  3.4<L>   |  23  |  1.27    Ca    9.5      26 Sep 2021 08:30  Phos  2.8       Mg     1.80         TPro  6.9  /  Alb  3.9  /  TBili  0.2  /  DBili  x   /  AST  14  /  ALT  8   /  AlkPhos  85      PT/INR - ( 25 Sep 2021 12:42 )   PT: 11.0 sec;   INR: 0.95 ratio         PTT - ( 26 Sep 2021 11:13 )  PTT:32.7 sec  Urinalysis Basic - ( 25 Sep 2021 15:23 )    Color: Yellow / Appearance: Clear / S.021 / pH: x  Gluc: x / Ketone: Negative  / Bili: Negative / Urobili: <2 mg/dL   Blood: x / Protein: Trace / Nitrite: Negative   Leuk Esterase: Negative / RBC: 2 /HPF / WBC 3 /HPF   Sq Epi: x / Non Sq Epi: 1 /HPF / Bacteria: Negative        INs and OUTs:    21 @ 07:01  -  21 @ 13:09  --------------------------------------------------------  IN: 240 mL / OUT: 0 mL / NET: 240 mL

## 2021-09-26 NOTE — PROGRESS NOTE ADULT - ASSESSMENT
59 y/o M presents with R foot 4th interspace wound  - Patient seen and evaluated   - Afebrile, WBC 12.67, ESR 23, 9.0  - right foot wound to 4th interspace to subQ, tracking distally 1cm to 4th digit, no mal odor, no purulence expressed today, periwound eschar noted, early wet conversion noted to the lateral 4th digit, improving erythema noted from lateral forefoot to anterior ankle  - Pending CAMMY/PVR and culture results   - Pod plan for right foot partial 5th ray resection pending [facility]s   - Discussed with attending

## 2021-09-26 NOTE — CHART NOTE - NSCHARTNOTEFT_GEN_A_CORE
CTA RLE w/ IV contrast pending per vascular recs to r/o acute limb. Also recommend transitioning to heparin gtt. Last dose of Eliquis this am, per pharmacy, rec initiating heparin gtt when next scheduled dose of Eliquis is due. Will obtain baseline PTT.     Pt with hx of CKD- Cr 1.29 today. Also hx of CHF- last echo on file 2018 EF 64% with normal LV function. Per Dr. Vaughan- recommend initiating IVF NS 60cc/h pending cta and attending to call nephro consult for further recommendations. Will continue to monitor closely.

## 2021-09-26 NOTE — PROGRESS NOTE ADULT - SUBJECTIVE AND OBJECTIVE BOX
Patient is a 60y old  Male who presents with a chief complaint of Right Toe Necrosis (26 Sep 2021 10:35)      HPI:  Right little toe painful    PAST MEDICAL & SURGICAL HISTORY:  DM (diabetes mellitus)    HTN (hypertension)    HLD (hyperlipidemia)    CHF (congestive heart failure)    CKD (chronic kidney disease)    PVD (peripheral vascular disease)    S/P femoral-femoral bypass surgery  2018 - right leg        Review of Systems:   CONSTITUTIONAL: No fever, weight loss, or fatigue  EYES: No eye pain, visual disturbances, or discharge  ENMT:  No difficulty hearing, tinnitus, vertigo; No sinus or throat pain  NECK: No pain or stiffness  BREASTS: No pain, masses, or nipple discharge  RESPIRATORY: No cough, wheezing, chills or hemoptysis; No shortness of breath  CARDIOVASCULAR: No chest pain, palpitations, dizziness, or leg swelling  GASTROINTESTINAL: No abdominal or epigastric pain. No nausea, vomiting, or hematemesis; No diarrhea or constipation. No melena or hematochezia.  GENITOURINARY: No dysuria, frequency, hematuria, or incontinence  NEUROLOGICAL: No headaches, memory loss, loss of strength, numbness, or tremors  SKIN: No itching, burning, rashes, or lesions   LYMPH NODES: No enlarged glands  ENDOCRINE: No heat or cold intolerance; No hair loss  MUSCULOSKELETAL: No joint pain or swelling; Right foot dressed  PSYCHIATRIC: No depression, anxiety, mood swings, or difficulty sleeping  HEME/LYMPH: No easy bruising, or bleeding gums  ALLERY AND IMMUNOLOGIC: No hives or eczema    Allergies    penicillin (Other)    Intolerances        Social History:     FAMILY HISTORY:  Family history of MI (myocardial infarction)  Father    FH: renal cell carcinoma  Sister - s/p nephrectomy    Family history of depression  Brother    FHx: diabetes mellitus  Paternal Grandfather    FH: heart disease  Mother        MEDICATIONS  (STANDING):  aspirin enteric coated 81 milliGRAM(s) Oral daily  atorvastatin 40 milliGRAM(s) Oral at bedtime  cadexomer iodine 0.9% Gel 1 Application(s) Topical daily  carvedilol 25 milliGRAM(s) Oral every 12 hours  clindamycin IVPB 600 milliGRAM(s) IV Intermittent every 8 hours  dextrose 40% Gel 15 Gram(s) Oral once  dextrose 5%. 1000 milliLiter(s) (50 mL/Hr) IV Continuous <Continuous>  dextrose 5%. 1000 milliLiter(s) (100 mL/Hr) IV Continuous <Continuous>  dextrose 50% Injectable 25 Gram(s) IV Push once  dextrose 50% Injectable 12.5 Gram(s) IV Push once  dextrose 50% Injectable 25 Gram(s) IV Push once  glucagon  Injectable 1 milliGRAM(s) IntraMuscular once  heparin  Infusion.  Unit(s)/Hr (9 mL/Hr) IV Continuous <Continuous>  influenza   Vaccine 0.5 milliLiter(s) IntraMuscular once  insulin lispro (ADMELOG) corrective regimen sliding scale   SubCutaneous three times a day before meals  insulin lispro (ADMELOG) corrective regimen sliding scale   SubCutaneous at bedtime  lisinopril 10 milliGRAM(s) Oral daily  multivitamin 1 Tablet(s) Oral daily  sodium chloride 0.9%. 1000 milliLiter(s) (60 mL/Hr) IV Continuous <Continuous>    MEDICATIONS  (PRN):  acetaminophen   Tablet .. 650 milliGRAM(s) Oral every 6 hours PRN Temp greater or equal to 38C (100.4F), Mild Pain (1 - 3), Moderate Pain (4 - 6)  heparin   Injectable 4000 Unit(s) IV Push every 6 hours PRN For aPTT less than 40  heparin   Injectable 2000 Unit(s) IV Push every 6 hours PRN For aPTT between 40 - 57        CAPILLARY BLOOD GLUCOSE      POCT Blood Glucose.: 170 mg/dL (26 Sep 2021 12:31)  POCT Blood Glucose.: 116 mg/dL (26 Sep 2021 08:25)  POCT Blood Glucose.: 114 mg/dL (25 Sep 2021 22:52)  POCT Blood Glucose.: 101 mg/dL (25 Sep 2021 17:19)    I&O's Summary    26 Sep 2021 07:01  -  26 Sep 2021 12:36  --------------------------------------------------------  IN: 240 mL / OUT: 0 mL / NET: 240 mL        PHYSICAL EXAM:  Vital Signs Last 24 Hrs  T(C): 36.9 (26 Sep 2021 05:54), Max: 36.9 (26 Sep 2021 05:54)  T(F): 98.5 (26 Sep 2021 05:54), Max: 98.5 (26 Sep 2021 05:54)  HR: 70 (26 Sep 2021 05:54) (65 - 76)  BP: 150/76 (26 Sep 2021 05:54) (137/75 - 167/98)  BP(mean): --  RR: 17 (26 Sep 2021 05:54) (15 - 17)  SpO2: 100% (26 Sep 2021 05:54) (99% - 100%)    GENERAL: NAD, well-developed  HEAD:  Atraumatic, Normocephalic  EYES: EOMI, PERRLA, conjunctiva and sclera clear  NECK: Supple, No JVD  CHEST/LUNG: Clear to auscultation bilaterally; No wheeze  HEART: Regular rate and rhythm; No murmurs, rubs, or gallops  ABDOMEN: Soft, Nontender, Nondistended; Bowel sounds present  EXTREMITIES:  2+ Peripheral Pulses, No clubbing, cyanosis, or edema. right foot dressed  PSYCH: AAOx3  NEUROLOGY: non-focal  SKIN: No rashes or lesions    LABS:                        13.4   10.31 )-----------( 249      ( 26 Sep 2021 08:30 )             38.3     09-26    138  |  102  |  32<H>  ----------------------------<  126<H>  3.4<L>   |  23  |  1.27    Ca    9.5      26 Sep 2021 08:30  Phos  2.8       Mg     1.80         TPro  6.9  /  Alb  3.9  /  TBili  0.2  /  DBili  x   /  AST  14  /  ALT  8   /  AlkPhos  85  26    PT/INR - ( 25 Sep 2021 12:42 )   PT: 11.0 sec;   INR: 0.95 ratio         PTT - ( 26 Sep 2021 11:13 )  PTT:32.7 sec      Urinalysis Basic - ( 25 Sep 2021 15:23 )    Color: Yellow / Appearance: Clear / S.021 / pH: x  Gluc: x / Ketone: Negative  / Bili: Negative / Urobili: <2 mg/dL   Blood: x / Protein: Trace / Nitrite: Negative   Leuk Esterase: Negative / RBC: 2 /HPF / WBC 3 /HPF   Sq Epi: x / Non Sq Epi: 1 /HPF / Bacteria: Negative        RADIOLOGY & ADDITIONAL TESTS:    Imaging Personally Reviewed:    Consultant(s) Notes Reviewed:      Care Discussed with Consultants/Other Providers:

## 2021-09-26 NOTE — PROGRESS NOTE ADULT - SUBJECTIVE AND OBJECTIVE BOX
Patient is a 60y old  Male who presents with a chief complaint of Right Toe Necrosis (25 Sep 2021 15:50)       INTERVAL HPI/OVERNIGHT EVENTS:  Patient seen and evaluated at bedside.  Pt is resting comfortable in NAD. Denies N/V/F/C.  Pain rated at X/10    Allergies    penicillin (Other)    Intolerances        Vital Signs Last 24 Hrs  T(C): 36.9 (26 Sep 2021 05:54), Max: 36.9 (26 Sep 2021 05:54)  T(F): 98.5 (26 Sep 2021 05:54), Max: 98.5 (26 Sep 2021 05:54)  HR: 70 (26 Sep 2021 05:54) (65 - 82)  BP: 150/76 (26 Sep 2021 05:54) (137/75 - 176/87)  BP(mean): --  RR: 17 (26 Sep 2021 05:54) (15 - 17)  SpO2: 100% (26 Sep 2021 05:54) (99% - 100%)    LABS:                        14.0   12.67 )-----------( 253      ( 25 Sep 2021 12:42 )             40.9     09-25    137  |  101  |  42<H>  ----------------------------<  96  3.9   |  20<L>  |  1.99<H>    Ca    9.8      25 Sep 2021 12:42  Phos  3.2     09-25  Mg     1.90     09-25    TPro  7.3  /  Alb  4.2  /  TBili  0.3  /  DBili  x   /  AST  14  /  ALT  6   /  AlkPhos  88  09-25    PT/INR - ( 25 Sep 2021 12:42 )   PT: 11.0 sec;   INR: 0.95 ratio         PTT - ( 25 Sep 2021 12:42 )  PTT:30.0 sec  Urinalysis Basic - ( 25 Sep 2021 15:23 )    Color: Yellow / Appearance: Clear / S.021 / pH: x  Gluc: x / Ketone: Negative  / Bili: Negative / Urobili: <2 mg/dL   Blood: x / Protein: Trace / Nitrite: Negative   Leuk Esterase: Negative / RBC: 2 /HPF / WBC 3 /HPF   Sq Epi: x / Non Sq Epi: 1 /HPF / Bacteria: Negative      CAPILLARY BLOOD GLUCOSE      POCT Blood Glucose.: 116 mg/dL (26 Sep 2021 08:25)  POCT Blood Glucose.: 114 mg/dL (25 Sep 2021 22:52)  POCT Blood Glucose.: 101 mg/dL (25 Sep 2021 17:19)  POCT Blood Glucose.: 127 mg/dL (25 Sep 2021 10:55)      Lower Extremity Physical Exam:  Vascular: DP/PT 0/4, B/L, CFT <3 seconds B/L, Temperature gradient warm to warm on left, warm to cold on right   Neuro: Epicritic sensation diminished to the level of ankle, B/L.  Musculoskeletal/Ortho: s/p right foot 5th base of proximal phalanx and met head resection, pain on palpation to the right 5th digit   Skin: right foot wound to 4th interspace to subQ, tracking distally 1cm to 4th digit, no mal odor, no purulence expressed today, periwound eschar noted, early wet conversion noted to the lateral 4th digit, improving erythema noted from lateral forefoot to anterior ankle      RADIOLOGY & ADDITIONAL TESTS:

## 2021-09-26 NOTE — CONSULT NOTE ADULT - SUBJECTIVE AND OBJECTIVE BOX
[facility]   MD RAKAN SHEPHERD MD RUORU WONG, PA    TEL:  OFFICE: [phone]  DR FELICIANO CELL: [phone]  JAYNE WILDER CELL: [phone]  DR. PATIÑO CELL: [phone]      FROM 5 PM- 7 AM PLEASE CALL ANSWERING SERVICE AT [phone]    -- INITIAL RENAL CONSULT NOTE --- Date Of service 09-26-21 @ 10:36  --------------------------------------------------------------------------------  HPI:    59 y/o male, with a PmHx of CHF, HTN, CAD, HLD, DM, CKD, PVD with a Right Femoral-Femoral Bypass in 2018 on apixiban, presented to the [facility] ED with worsening Right Toe pain x 1 week.        PAST HISTORY  --------------------------------------------------------------------------------  PAST MEDICAL & SURGICAL HISTORY:  DM (diabetes mellitus)    HTN (hypertension)    HLD (hyperlipidemia)    CHF (congestive heart failure)    CKD (chronic kidney disease)    PVD (peripheral vascular disease)    S/P femoral-femoral bypass surgery  2018 - right leg      FAMILY HISTORY:  Family history of MI (myocardial infarction)  Father    FH: renal cell carcinoma  Sister - s/p nephrectomy    Family history of depression  Brother    FHx: diabetes mellitus  Paternal Grandfather    FH: heart disease  Mother      PAST SOCIAL HISTORY:    ALLERGIES & MEDICATIONS  --------------------------------------------------------------------------------  Allergies    penicillin (Other)    Intolerances      Standing Inpatient Medications  apixaban 2.5 milliGRAM(s) Oral two times a day  aspirin enteric coated 81 milliGRAM(s) Oral daily  atorvastatin 40 milliGRAM(s) Oral at bedtime  cadexomer iodine 0.9% Gel 1 Application(s) Topical daily  carvedilol 25 milliGRAM(s) Oral every 12 hours  clindamycin IVPB 600 milliGRAM(s) IV Intermittent every 8 hours  dextrose 40% Gel 15 Gram(s) Oral once  dextrose 5%. 1000 milliLiter(s) IV Continuous <Continuous>  dextrose 5%. 1000 milliLiter(s) IV Continuous <Continuous>  dextrose 50% Injectable 25 Gram(s) IV Push once  dextrose 50% Injectable 12.5 Gram(s) IV Push once  dextrose 50% Injectable 25 Gram(s) IV Push once  glucagon  Injectable 1 milliGRAM(s) IntraMuscular once  influenza   Vaccine 0.5 milliLiter(s) IntraMuscular once  insulin lispro (ADMELOG) corrective regimen sliding scale   SubCutaneous three times a day before meals  insulin lispro (ADMELOG) corrective regimen sliding scale   SubCutaneous at bedtime  lisinopril 10 milliGRAM(s) Oral daily  multivitamin 1 Tablet(s) Oral daily  sodium chloride 0.9%. 1000 milliLiter(s) IV Continuous <Continuous>    PRN Inpatient Medications  acetaminophen   Tablet .. 650 milliGRAM(s) Oral every 6 hours PRN      REVIEW OF SYSTEMS  --------------------------------------------------------------------------------  Gen: No fevers/chills  Skin: No rashes  Head/Eyes/Ears: Normal hearing,  Normal vision   Respiratory: No dyspnea, cough  CV: No chest pain  GI: No abdominal pain, diarrhea, constipation, nausea, vomiting  : No dysuria, hematuria  MSK: No  edema  Heme: No easy bruising or bleeding  Psych: No significant depression    All other systems were reviewed and are negative, except as noted.    VITALS/PHYSICAL EXAM  --------------------------------------------------------------------------------  T(C): 36.9 (09-26-21 @ 05:54), Max: 36.9 (09-26-21 @ 05:54)  HR: 70 (09-26-21 @ 05:54) (65 - 82)  BP: 150/76 (09-26-21 @ 05:54) (137/75 - 176/87)  RR: 17 (09-26-21 @ 05:54) (15 - 17)  SpO2: 100% (09-26-21 @ 05:54) (99% - 100%)  Wt(kg): --  Height (cm): 162.6 (09-25-21 @ 15:50)  Weight (kg): 50 (09-25-21 @ 17:58)  BMI (kg/m2): 18.9 (09-25-21 @ 17:58)  BSA (m2): 1.52 (09-25-21 @ 17:58)      Physical Exam:  	Gen: NAD  	HEENT: MMM  	Pulm: CTA B/L  	CV: S1S2  	Abd: Soft, +BS   	Ext: No LE edema B/L  	Neuro: Awake, alert  	Skin: Warm and dry  	Vascular access: No HD catheter           : roly  LABS/STUDIES  --------------------------------------------------------------------------------              13.4   10.31 >-----------<  249      [09-26-21 @ 08:30]              38.3     138  |  102  |  32  ----------------------------<  126      [09-26-21 @ 08:30]  3.4   |  23  |  1.27        Ca     9.5     [09-26-21 @ 08:30]      Mg     1.80     [09-26-21 @ 08:30]      Phos  2.8     [09-26-21 @ 08:30]    TPro  6.9  /  Alb  3.9  /  TBili  0.2  /  DBili  x   /  AST  14  /  ALT  8   /  AlkPhos  85  [09-26-21 @ 08:30]    PT/INR: PT 11.0 , INR 0.95       [09-25-21 @ 12:42]  PTT: 30.0       [09-25-21 @ 12:42]      Creatinine Trend:  SCr 1.27 [09-26 @ 08:30]  SCr 1.99 [09-25 @ 12:42]    Urinalysis - [09-25-21 @ 15:23]      Color Yellow / Appearance Clear / SG 1.021 / pH 5.5      Gluc Negative / Ketone Negative  / Bili Negative / Urobili <2 mg/dL       Blood Negative / Protein Trace / Leuk Est Negative / Nitrite Negative      RBC 2 / WBC 3 / Hyaline 1 / Gran  / Sq Epi  / Non Sq Epi 1 / Bacteria Negative      HbA1c 5.7      [12-20-18 @ 06:30]  TSH 0.76      [09-25-21 @ 12:52]  Lipid: chol 139, , HDL 26, LDL --      [09-26-21 @ 08:30]       [facility]   MD RAKAN SHEPHERD MD RUORU WONG, PA    TEL:  OFFICE: [phone]  DR FELICIANO CELL: [phone]  JAYNE WILDER CELL: [phone]  DR. PATIÑO CELL: [phone]      FROM 5 PM- 7 AM PLEASE CALL ANSWERING SERVICE AT [phone]    -- INITIAL RENAL CONSULT NOTE --- Date Of service 09-26-21 @ 10:36  --------------------------------------------------------------------------------  HPI:    61 y/o male, with a PmHx of CHF, HTN, CAD, HLD, DM, CKD, PVD with a Right Femoral-Femoral Bypass in 2018 on apixiban, presented to the [facility] ED with worsening Right Toe pain x 1 week.        PAST HISTORY  --------------------------------------------------------------------------------  PAST MEDICAL & SURGICAL HISTORY:  DM (diabetes mellitus)    HTN (hypertension)    HLD (hyperlipidemia)    CHF (congestive heart failure)    CKD (chronic kidney disease)    PVD (peripheral vascular disease)    S/P femoral-femoral bypass surgery  2018 - right leg      FAMILY HISTORY:  Family history of MI (myocardial infarction)  Father    FH: renal cell carcinoma  Sister - s/p nephrectomy    Family history of depression  Brother    FHx: diabetes mellitus  Paternal Grandfather    FH: heart disease  Mother      PAST SOCIAL HISTORY:    ALLERGIES & MEDICATIONS  --------------------------------------------------------------------------------  Allergies    penicillin (Other)    Intolerances      Standing Inpatient Medications  apixaban 2.5 milliGRAM(s) Oral two times a day  aspirin enteric coated 81 milliGRAM(s) Oral daily  atorvastatin 40 milliGRAM(s) Oral at bedtime  cadexomer iodine 0.9% Gel 1 Application(s) Topical daily  carvedilol 25 milliGRAM(s) Oral every 12 hours  clindamycin IVPB 600 milliGRAM(s) IV Intermittent every 8 hours  dextrose 40% Gel 15 Gram(s) Oral once  dextrose 5%. 1000 milliLiter(s) IV Continuous <Continuous>  dextrose 5%. 1000 milliLiter(s) IV Continuous <Continuous>  dextrose 50% Injectable 25 Gram(s) IV Push once  dextrose 50% Injectable 12.5 Gram(s) IV Push once  dextrose 50% Injectable 25 Gram(s) IV Push once  glucagon  Injectable 1 milliGRAM(s) IntraMuscular once  influenza   Vaccine 0.5 milliLiter(s) IntraMuscular once  insulin lispro (ADMELOG) corrective regimen sliding scale   SubCutaneous three times a day before meals  insulin lispro (ADMELOG) corrective regimen sliding scale   SubCutaneous at bedtime  lisinopril 10 milliGRAM(s) Oral daily  multivitamin 1 Tablet(s) Oral daily  sodium chloride 0.9%. 1000 milliLiter(s) IV Continuous <Continuous>    PRN Inpatient Medications  acetaminophen   Tablet .. 650 milliGRAM(s) Oral every 6 hours PRN      REVIEW OF SYSTEMS  --------------------------------------------------------------------------------  Gen: No fevers/chills  Skin: No rashes  Head/Eyes/Ears: Normal hearing,  Normal vision   Respiratory: No dyspnea, cough  CV: No chest pain  GI: No abdominal pain, diarrhea, constipation, nausea, vomiting  : No dysuria, hematuria  MSK: No  edema  Heme: No easy bruising or bleeding  Psych: No significant depression    All other systems were reviewed and are negative, except as noted.    VITALS/PHYSICAL EXAM  --------------------------------------------------------------------------------  T(C): 36.9 (09-26-21 @ 05:54), Max: 36.9 (09-26-21 @ 05:54)  HR: 70 (09-26-21 @ 05:54) (65 - 82)  BP: 150/76 (09-26-21 @ 05:54) (137/75 - 176/87)  RR: 17 (09-26-21 @ 05:54) (15 - 17)  SpO2: 100% (09-26-21 @ 05:54) (99% - 100%)  Wt(kg): --  Height (cm): 162.6 (09-25-21 @ 15:50)  Weight (kg): 50 (09-25-21 @ 17:58)  BMI (kg/m2): 18.9 (09-25-21 @ 17:58)  BSA (m2): 1.52 (09-25-21 @ 17:58)      Physical Exam:  	Gen: NAD  	HEENT: MMM  	Pulm: CTA B/L  	CV: S1S2  	Abd: Soft, +BS   	Ext: No LE edema B/L  	Neuro: Awake, alert  	Skin: Warm and dry  	Vascular access: No HD catheter           : no  roly  LABS/STUDIES  --------------------------------------------------------------------------------              13.4   10.31 >-----------<  249      [09-26-21 @ 08:30]              38.3     138  |  102  |  32  ----------------------------<  126      [09-26-21 @ 08:30]  3.4   |  23  |  1.27        Ca     9.5     [09-26-21 @ 08:30]      Mg     1.80     [09-26-21 @ 08:30]      Phos  2.8     [09-26-21 @ 08:30]    TPro  6.9  /  Alb  3.9  /  TBili  0.2  /  DBili  x   /  AST  14  /  ALT  8   /  AlkPhos  85  [09-26-21 @ 08:30]    PT/INR: PT 11.0 , INR 0.95       [09-25-21 @ 12:42]  PTT: 30.0       [09-25-21 @ 12:42]      Creatinine Trend:  SCr 1.27 [09-26 @ 08:30]  SCr 1.99 [09-25 @ 12:42]    Urinalysis - [09-25-21 @ 15:23]      Color Yellow / Appearance Clear / SG 1.021 / pH 5.5      Gluc Negative / Ketone Negative  / Bili Negative / Urobili <2 mg/dL       Blood Negative / Protein Trace / Leuk Est Negative / Nitrite Negative      RBC 2 / WBC 3 / Hyaline 1 / Gran  / Sq Epi  / Non Sq Epi 1 / Bacteria Negative      HbA1c 5.7      [12-20-18 @ 06:30]  TSH 0.76      [09-25-21 @ 12:52]  Lipid: chol 139, , HDL 26, LDL --      [09-26-21 @ 08:30]

## 2021-09-26 NOTE — PROGRESS NOTE ADULT - ASSESSMENT
59 y/o male, with a PmHx of CHF, HTN, CAD, HLD, DM, CKD, PVD with a Right Femoral-Femoral Bypass in 2018 on apixiban, presented to the [facility] ED with worsening Right Toe pain x 1 week. Admitted to medicine for gangrene of right foot 5th digit.

## 2021-09-26 NOTE — CONSULT NOTE ADULT - ASSESSMENT
61 y/o male, with a PmHx of CHF, HTN, CAD, HLD, DM, CKD, PVD with a Right Femoral-Femoral Bypass in 2018 on apixiban, presented to the [facility] ED with worsening Right Toe pain x 1 week.      Ravin on CKD  BAseline unclear, pt with multiple Ravin in past   Renal function improving   Monitor BMP  AVoid further nephrotoxics, NSAID RCA    Hypokalemia  Repelte KCl   MOnitor  serum K    Acidosis   improving   MOnitor serum Co2 59 y/o male, with a PmHx of CHF, HTN, CAD, HLD, DM, CKD, PVD with a Right Femoral-Femoral Bypass in 2018 on apixiban, presented to the [facility] ED with worsening Right Toe pain x 1 week.      Ravin on CKD  BAseline unclear, pt with multiple Ravin in past   CKD likely sec to lognstandg HTN/DM  Renal function improving   On ACE Monitor closley  Monitor BMP  AVoid further nephrotoxics, NSAID RCA    Hypokalemia  Repelte KCl  today  MOnitor  serum K    Acidosis   improving   MOnitor serum Co2

## 2021-09-27 LAB
-  AMIKACIN: SIGNIFICANT CHANGE UP
-  AMIKACIN: SIGNIFICANT CHANGE UP
-  AMOXICILLIN/CLAVULANIC ACID: SIGNIFICANT CHANGE UP
-  AMPICILLIN/SULBACTAM: SIGNIFICANT CHANGE UP
-  AMPICILLIN: SIGNIFICANT CHANGE UP
-  AZTREONAM: SIGNIFICANT CHANGE UP
-  AZTREONAM: SIGNIFICANT CHANGE UP
-  CEFAZOLIN: SIGNIFICANT CHANGE UP
-  CEFEPIME: SIGNIFICANT CHANGE UP
-  CEFEPIME: SIGNIFICANT CHANGE UP
-  CEFOXITIN: SIGNIFICANT CHANGE UP
-  CEFTAZIDIME: SIGNIFICANT CHANGE UP
-  CEFTRIAXONE: SIGNIFICANT CHANGE UP
-  CIPROFLOXACIN: SIGNIFICANT CHANGE UP
-  CIPROFLOXACIN: SIGNIFICANT CHANGE UP
-  ERTAPENEM: SIGNIFICANT CHANGE UP
-  GENTAMICIN: SIGNIFICANT CHANGE UP
-  GENTAMICIN: SIGNIFICANT CHANGE UP
-  IMIPENEM: SIGNIFICANT CHANGE UP
-  IMIPENEM: SIGNIFICANT CHANGE UP
-  LEVOFLOXACIN: SIGNIFICANT CHANGE UP
-  LEVOFLOXACIN: SIGNIFICANT CHANGE UP
-  MEROPENEM: SIGNIFICANT CHANGE UP
-  MEROPENEM: SIGNIFICANT CHANGE UP
-  PIPERACILLIN/TAZOBACTAM: SIGNIFICANT CHANGE UP
-  PIPERACILLIN/TAZOBACTAM: SIGNIFICANT CHANGE UP
-  TOBRAMYCIN: SIGNIFICANT CHANGE UP
-  TOBRAMYCIN: SIGNIFICANT CHANGE UP
-  TRIMETHOPRIM/SULFAMETHOXAZOLE: SIGNIFICANT CHANGE UP
ANION GAP SERPL CALC-SCNC: 12 MMOL/L — SIGNIFICANT CHANGE UP (ref 7–14)
APTT BLD: 67.1 SEC — HIGH (ref 27–36.3)
APTT BLD: 67.4 SEC — HIGH (ref 27–36.3)
BUN SERPL-MCNC: 21 MG/DL — SIGNIFICANT CHANGE UP (ref 7–23)
CALCIUM SERPL-MCNC: 8.7 MG/DL — SIGNIFICANT CHANGE UP (ref 8.4–10.5)
CHLORIDE SERPL-SCNC: 100 MMOL/L — SIGNIFICANT CHANGE UP (ref 98–107)
CO2 SERPL-SCNC: 23 MMOL/L — SIGNIFICANT CHANGE UP (ref 22–31)
CREAT SERPL-MCNC: 0.84 MG/DL — SIGNIFICANT CHANGE UP (ref 0.5–1.3)
CULTURE RESULTS: SIGNIFICANT CHANGE UP
GLUCOSE SERPL-MCNC: 139 MG/DL — HIGH (ref 70–99)
HCT VFR BLD CALC: 36 % — LOW (ref 39–50)
HCT VFR BLD CALC: 39.3 % — SIGNIFICANT CHANGE UP (ref 39–50)
HCV AB S/CO SERPL IA: 0.09 S/CO — SIGNIFICANT CHANGE UP (ref 0–0.99)
HCV AB SERPL-IMP: SIGNIFICANT CHANGE UP
HGB BLD-MCNC: 12.8 G/DL — LOW (ref 13–17)
HGB BLD-MCNC: 13.7 G/DL — SIGNIFICANT CHANGE UP (ref 13–17)
MAGNESIUM SERPL-MCNC: 1.5 MG/DL — LOW (ref 1.6–2.6)
MCHC RBC-ENTMCNC: 31.5 PG — SIGNIFICANT CHANGE UP (ref 27–34)
MCHC RBC-ENTMCNC: 32 PG — SIGNIFICANT CHANGE UP (ref 27–34)
MCHC RBC-ENTMCNC: 34.9 GM/DL — SIGNIFICANT CHANGE UP (ref 32–36)
MCHC RBC-ENTMCNC: 35.6 GM/DL — SIGNIFICANT CHANGE UP (ref 32–36)
MCV RBC AUTO: 88.7 FL — SIGNIFICANT CHANGE UP (ref 80–100)
MCV RBC AUTO: 91.8 FL — SIGNIFICANT CHANGE UP (ref 80–100)
METHOD TYPE: SIGNIFICANT CHANGE UP
METHOD TYPE: SIGNIFICANT CHANGE UP
NRBC # BLD: 0 /100 WBCS — SIGNIFICANT CHANGE UP
NRBC # BLD: 0 /100 WBCS — SIGNIFICANT CHANGE UP
NRBC # FLD: 0 K/UL — SIGNIFICANT CHANGE UP
NRBC # FLD: 0 K/UL — SIGNIFICANT CHANGE UP
ORGANISM # SPEC MICROSCOPIC CNT: SIGNIFICANT CHANGE UP
PHOSPHATE SERPL-MCNC: 2.1 MG/DL — LOW (ref 2.5–4.5)
PLATELET # BLD AUTO: 213 K/UL — SIGNIFICANT CHANGE UP (ref 150–400)
PLATELET # BLD AUTO: 219 K/UL — SIGNIFICANT CHANGE UP (ref 150–400)
POTASSIUM SERPL-MCNC: 3.7 MMOL/L — SIGNIFICANT CHANGE UP (ref 3.5–5.3)
POTASSIUM SERPL-SCNC: 3.7 MMOL/L — SIGNIFICANT CHANGE UP (ref 3.5–5.3)
RBC # BLD: 4.06 M/UL — LOW (ref 4.2–5.8)
RBC # BLD: 4.28 M/UL — SIGNIFICANT CHANGE UP (ref 4.2–5.8)
RBC # FLD: 13.1 % — SIGNIFICANT CHANGE UP (ref 10.3–14.5)
RBC # FLD: 13.2 % — SIGNIFICANT CHANGE UP (ref 10.3–14.5)
SODIUM SERPL-SCNC: 135 MMOL/L — SIGNIFICANT CHANGE UP (ref 135–145)
SPECIMEN SOURCE: SIGNIFICANT CHANGE UP
WBC # BLD: 10.4 K/UL — SIGNIFICANT CHANGE UP (ref 3.8–10.5)
WBC # BLD: 13.85 K/UL — HIGH (ref 3.8–10.5)
WBC # FLD AUTO: 10.4 K/UL — SIGNIFICANT CHANGE UP (ref 3.8–10.5)
WBC # FLD AUTO: 13.85 K/UL — HIGH (ref 3.8–10.5)

## 2021-09-27 PROCEDURE — 93010 ELECTROCARDIOGRAM REPORT: CPT

## 2021-09-27 RX ORDER — SENNA PLUS 8.6 MG/1
2 TABLET ORAL AT BEDTIME
Refills: 0 | Status: DISCONTINUED | OUTPATIENT
Start: 2021-09-27 | End: 2021-10-11

## 2021-09-27 RX ORDER — OXYCODONE HYDROCHLORIDE 5 MG/1
5 TABLET ORAL ONCE
Refills: 0 | Status: DISCONTINUED | OUTPATIENT
Start: 2021-09-27 | End: 2021-09-27

## 2021-09-27 RX ORDER — POLYETHYLENE GLYCOL 3350 17 G/17G
17 POWDER, FOR SOLUTION ORAL DAILY
Refills: 0 | Status: DISCONTINUED | OUTPATIENT
Start: 2021-09-27 | End: 2021-10-11

## 2021-09-27 RX ORDER — MAGNESIUM SULFATE 500 MG/ML
1 VIAL (ML) INJECTION ONCE
Refills: 0 | Status: COMPLETED | OUTPATIENT
Start: 2021-09-27 | End: 2021-09-27

## 2021-09-27 RX ADMIN — HEPARIN SODIUM 900 UNIT(S)/HR: 5000 INJECTION INTRAVENOUS; SUBCUTANEOUS at 09:17

## 2021-09-27 RX ADMIN — Medication 100 MILLIGRAM(S): at 05:20

## 2021-09-27 RX ADMIN — Medication 650 MILLIGRAM(S): at 23:21

## 2021-09-27 RX ADMIN — Medication 100 MILLIGRAM(S): at 14:11

## 2021-09-27 RX ADMIN — Medication 1 TABLET(S): at 12:40

## 2021-09-27 RX ADMIN — Medication 650 MILLIGRAM(S): at 15:49

## 2021-09-27 RX ADMIN — OXYCODONE HYDROCHLORIDE 5 MILLIGRAM(S): 5 TABLET ORAL at 12:39

## 2021-09-27 RX ADMIN — Medication 100 GRAM(S): at 10:24

## 2021-09-27 RX ADMIN — ATORVASTATIN CALCIUM 40 MILLIGRAM(S): 80 TABLET, FILM COATED ORAL at 22:28

## 2021-09-27 RX ADMIN — Medication 100 MILLIGRAM(S): at 22:28

## 2021-09-27 RX ADMIN — OXYCODONE HYDROCHLORIDE 5 MILLIGRAM(S): 5 TABLET ORAL at 19:54

## 2021-09-27 RX ADMIN — LISINOPRIL 10 MILLIGRAM(S): 2.5 TABLET ORAL at 05:20

## 2021-09-27 RX ADMIN — Medication 1: at 17:47

## 2021-09-27 RX ADMIN — SENNA PLUS 2 TABLET(S): 8.6 TABLET ORAL at 22:28

## 2021-09-27 RX ADMIN — Medication 63.75 MILLIMOLE(S): at 14:15

## 2021-09-27 RX ADMIN — HEPARIN SODIUM 900 UNIT(S)/HR: 5000 INJECTION INTRAVENOUS; SUBCUTANEOUS at 01:24

## 2021-09-27 RX ADMIN — Medication 81 MILLIGRAM(S): at 12:40

## 2021-09-27 RX ADMIN — Medication 650 MILLIGRAM(S): at 16:40

## 2021-09-27 RX ADMIN — OXYCODONE HYDROCHLORIDE 5 MILLIGRAM(S): 5 TABLET ORAL at 13:30

## 2021-09-27 RX ADMIN — Medication 1: at 12:40

## 2021-09-27 RX ADMIN — OXYCODONE HYDROCHLORIDE 5 MILLIGRAM(S): 5 TABLET ORAL at 20:54

## 2021-09-27 RX ADMIN — CARVEDILOL PHOSPHATE 25 MILLIGRAM(S): 80 CAPSULE, EXTENDED RELEASE ORAL at 17:47

## 2021-09-27 RX ADMIN — CARVEDILOL PHOSPHATE 25 MILLIGRAM(S): 80 CAPSULE, EXTENDED RELEASE ORAL at 05:20

## 2021-09-27 NOTE — CONSULT NOTE ADULT - SUBJECTIVE AND OBJECTIVE BOX
Date of service 2021    HISTORY OF PRESENT ILLNESS: HPI:    59 y/o male, with a PmHx of HTN, CAD, reported years ago, no hx PCI, managed medically, HLD, DM, CKD, PVD with a Right Femoral-Femoral Bypass in 2018 on apixiban, presented to the [facility] ED with worsening Right Toe pain x 1 week.      Pt states about 1 month ago he started to get pain in his right foot 5th digit. He states he wife had  from Lung Cancer a few days prior but waited to see his PCP until a week later. He states when he finally went into see his PCP, his doctor states he was likely Gout and was prescribed Indomethacin. Pt states his foot wasn't getting any better and the other day he noticed some blood coming from the bottom of his toe so he had gone today to see his Podiatrist (had trouble getting an appointment over this past week). In the Podiatrist office, he was told to go to the ED because he could have a necrotic toe and since he had a history of a bypass in that leg before, there is a high risk of it getting worse. Pt denies any fever, chills, chest pain, HA, dizziness, blurred vision, abd pain, sick contacts, recent travel. The only complaint he was having is pain to the right 5th toe. In the [facility] ED today, he was seen by Podiatry and a bedside debridement was done and then wrapped in a dry sterile dressing. 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 Pt was started on Clindamycin and was then admitted to medicine for further medical management and treatment. (25 Sep 2021 15:50)    Pt reports OP Cardiologist is Dr french from [facility].  His last visit and work up with him was prior to covid pandemic, so >2 years ago.    PAST MEDICAL & SURGICAL HISTORY:  DM (diabetes mellitus)    HTN (hypertension)    HLD (hyperlipidemia)    CHF (congestive heart failure)    CKD (chronic kidney disease)    PVD (peripheral vascular disease)    S/P femoral-femoral bypass surgery  2018 - right leg      MEDICATIONS  (STANDING):  aspirin enteric coated 81 milliGRAM(s) Oral daily  atorvastatin 40 milliGRAM(s) Oral at bedtime  cadexomer iodine 0.9% Gel 1 Application(s) Topical daily  carvedilol 25 milliGRAM(s) Oral every 12 hours  clindamycin IVPB 600 milliGRAM(s) IV Intermittent every 8 hours  dextrose 40% Gel 15 Gram(s) Oral once  dextrose 5%. 1000 milliLiter(s) (50 mL/Hr) IV Continuous <Continuous>  dextrose 5%. 1000 milliLiter(s) (100 mL/Hr) IV Continuous <Continuous>  dextrose 50% Injectable 25 Gram(s) IV Push once  dextrose 50% Injectable 12.5 Gram(s) IV Push once  dextrose 50% Injectable 25 Gram(s) IV Push once  glucagon  Injectable 1 milliGRAM(s) IntraMuscular once  heparin  Infusion.  Unit(s)/Hr (9 mL/Hr) IV Continuous <Continuous>  influenza   Vaccine 0.5 milliLiter(s) IntraMuscular once  insulin lispro (ADMELOG) corrective regimen sliding scale   SubCutaneous three times a day before meals  insulin lispro (ADMELOG) corrective regimen sliding scale   SubCutaneous at bedtime  lisinopril 10 milliGRAM(s) Oral daily  multivitamin 1 Tablet(s) Oral daily  polyethylene glycol 3350 17 Gram(s) Oral daily  senna 2 Tablet(s) Oral at bedtime  sodium chloride 0.9%. 1000 milliLiter(s) (75 mL/Hr) IV Continuous <Continuous>      Allergies  penicillin (Other)      FAMILY HISTORY:  Family history of MI (myocardial infarction)  Father  FH: renal cell carcinoma  Sister - s/p nephrectomy  Family history of depression  Brother  FHx: diabetes mellitus  Paternal Grandfather  FH: heart disease  Mother    SOCIAL HISTORY:    [z ] Non-smoker  [ ] Smoker  [ ] Alcohol    FLU VACCINE THIS YEAR STARTS IN AUGUST:  [ ] Yes    [ ] No    IF OVER 65 HAVE YOU EVER HAD A PNA VACCINE:  [ ] Yes    [ ] No       [ ] N/A      REVIEW OF SYSTEMS:  [ ]chest pain  [  ]shortness of breath  [  ]palpitations  [  ]syncope  [ ]near syncope [ ]upper extremity weakness   [ ] lower extremity weakness  [  ]diplopia  [  ]altered mental status   [  ]fevers  [ ]chills [ ]nausea  [ ]vomiting  [  ]dysphagia    [ ]abdominal pain  [ ]melena  [ ]BRBPR    [  ]epistaxis  [  ]rash    [ ]lower extremity edema        [x ] All others negative	  [ ] Unable to obtain      LABS:	 	                        13.7   13.85 )-----------( 213      ( 27 Sep 2021 08:06 )             39.3     135  |  100  |  21  ----------------------------<  139<H>  3.7   |  23  |  0.84    Ca    8.7      27 Sep 2021 08:06  Phos  2.1       Mg     1.50         TPro  6.9  /  Alb  3.9  /  TBili  0.2  /  DBili  x   /  AST  14  /  ALT  8   /  AlkPhos  85      Creatinine Trend: 0.84<--, 1.27<--, 1.99<--    Coags:  PTT - ( 27 Sep 2021 08:06 )  PTT:67.4 sec      PHYSICAL EXAM:  T(C): 36.8 (21 @ 13:39), Max: 36.8 (21 @ 13:39)  HR: 72 (21 @ 13:39) (67 - 75)  BP: 139/75 (21 @ 13:39) (131/78 - 166/71)  RR: 16 (21 @ 05:18) (16 - 17)  SpO2: 100% (21 @ 13:39) (97% - 100%)  Wt(kg): --   BMI (kg/m2): 18.9 (21 @ 17:58)    Gen: Appears well in NAD  HEENT:  (-)icterus (-)pallor  CV: N S1 S2 1/6 GIANA (+)2 Pulses B/l  Resp:  Clear to ausculatation B/L, normal effort  GI: (+) BS Soft, NT, ND  Lymph:  (-)Edema, (-)obvious lymphadenopathy  Skin: Warm to touch, Normal turgor  Psych: Appropriate mood and affect      ECG:  pending	    < from: CT Angio Abd Aorta w/run-off w/ IV Cont (21 @ 15:37) >  IMPRESSION:  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    Occlusion of both the right SFA and a femoropopliteal bypass. Occlusion of the proximal right popliteal artery.    Occlusion of the left SFA.    Two-vessel runoff in both calves.    < end of copied text >      ASSESSMENT/PLAN: 	60y Male with a PmHx of HTN, CAD, reported years ago, no hx PCI, managed medically, HLD, DM, CKD, PVD with a Right Femoral-Femoral Bypass in 2018 on apixiban, presented to the [facility] ED with worsening Right Toe pain x 1 week, CT with occluded bypass.  Now awaiting LE angiogram and further work up from Vascular.    --check 12 lead EKG  --Pt optimized for LE angiogram  --if vascular or pod surgery is planned, recommend check TTE and NST

## 2021-09-27 NOTE — PROGRESS NOTE ADULT - ASSESSMENT
61yo M with Hx CHF, CAD (no stents), HTN, HLD, DM, PVD with h/o right fem-pop bypass ([facility] 2018) who presents with non-healing R toe wound, found to have dry gangrene with small area of wet conversion and bypass occlusion.    Plan/Recommendations:  - CTA results reviewed. Bypass occluded.  - Will start with RLE angiogram, will update primary team when there is availability for procedure  - Please document medical and cardiac optimization for procedure    JORGE Sol, PGY2  C Team Surgery   [id number]      61yo M with Hx CHF, CAD (no stents), HTN, HLD, DM, PVD with h/o right fem-pop bypass ([facility] 2018) who presents with non-healing R toe wound, found to have dry gangrene with small area of wet conversion and bypass occlusion.    Plan/Recommendations:  - CTA results reviewed. Bypass occluded.  - Will start with RLE angiogram, will update primary team when there is availability for procedure  - Please document medical and cardiac optimization for procedure    JORGE Sol, PGY2  C Team Surgery   [id number]     UPDATE:  - Patient scheduled for angiogram on WEDNESDAY 9/29.

## 2021-09-27 NOTE — PROGRESS NOTE ADULT - SUBJECTIVE AND OBJECTIVE BOX
Patient is a 60y old  Male who presents with a chief complaint of Right Toe Necrosis (26 Sep 2021 10:35)      HPI:  Right little toe painful  Afebrile.     PAST MEDICAL & SURGICAL HISTORY:  DM (diabetes mellitus)    HTN (hypertension)    HLD (hyperlipidemia)    CHF (congestive heart failure)    CKD (chronic kidney disease)    PVD (peripheral vascular disease)    S/P femoral-femoral bypass surgery  2018 - right leg        Review of Systems:   CONSTITUTIONAL: No fever, weight loss, or fatigue  EYES: No eye pain, visual disturbances, or discharge  ENMT:  No difficulty hearing, tinnitus, vertigo; No sinus or throat pain  NECK: No pain or stiffness  BREASTS: No pain, masses, or nipple discharge  RESPIRATORY: No cough, wheezing, chills or hemoptysis; No shortness of breath  CARDIOVASCULAR: No chest pain, palpitations, dizziness, or leg swelling  GASTROINTESTINAL: No abdominal or epigastric pain. No nausea, vomiting, or hematemesis; No diarrhea or constipation. No melena or hematochezia.  GENITOURINARY: No dysuria, frequency, hematuria, or incontinence  NEUROLOGICAL: No headaches, memory loss, loss of strength, numbness, or tremors  SKIN: No itching, burning, rashes, or lesions   LYMPH NODES: No enlarged glands  ENDOCRINE: No heat or cold intolerance; No hair loss  MUSCULOSKELETAL: No joint pain or swelling; Right foot dressed  PSYCHIATRIC: No depression, anxiety, mood swings, or difficulty sleeping  HEME/LYMPH: No easy bruising, or bleeding gums  ALLERY AND IMMUNOLOGIC: No hives or eczema    Allergies    penicillin (Other)    Intolerances        Social History:     FAMILY HISTORY:  Family history of MI (myocardial infarction)  Father    FH: renal cell carcinoma  Sister - s/p nephrectomy    Family history of depression  Brother    FHx: diabetes mellitus  Paternal Grandfather    FH: heart disease  Mother        MEDICATIONS  (STANDING):  aspirin enteric coated 81 milliGRAM(s) Oral daily  atorvastatin 40 milliGRAM(s) Oral at bedtime  cadexomer iodine 0.9% Gel 1 Application(s) Topical daily  carvedilol 25 milliGRAM(s) Oral every 12 hours  clindamycin IVPB 600 milliGRAM(s) IV Intermittent every 8 hours  dextrose 40% Gel 15 Gram(s) Oral once  dextrose 5%. 1000 milliLiter(s) (50 mL/Hr) IV Continuous <Continuous>  dextrose 5%. 1000 milliLiter(s) (100 mL/Hr) IV Continuous <Continuous>  dextrose 50% Injectable 25 Gram(s) IV Push once  dextrose 50% Injectable 12.5 Gram(s) IV Push once  dextrose 50% Injectable 25 Gram(s) IV Push once  glucagon  Injectable 1 milliGRAM(s) IntraMuscular once  heparin  Infusion.  Unit(s)/Hr (9 mL/Hr) IV Continuous <Continuous>  influenza   Vaccine 0.5 milliLiter(s) IntraMuscular once  insulin lispro (ADMELOG) corrective regimen sliding scale   SubCutaneous three times a day before meals  insulin lispro (ADMELOG) corrective regimen sliding scale   SubCutaneous at bedtime  lisinopril 10 milliGRAM(s) Oral daily  multivitamin 1 Tablet(s) Oral daily  sodium chloride 0.9%. 1000 milliLiter(s) (60 mL/Hr) IV Continuous <Continuous>    MEDICATIONS  (PRN):  acetaminophen   Tablet .. 650 milliGRAM(s) Oral every 6 hours PRN Temp greater or equal to 38C (100.4F), Mild Pain (1 - 3), Moderate Pain (4 - 6)  heparin   Injectable 4000 Unit(s) IV Push every 6 hours PRN For aPTT less than 40  heparin   Injectable 2000 Unit(s) IV Push every 6 hours PRN For aPTT between 40 - 57        CAPILLARY BLOOD GLUCOSE      POCT Blood Glucose.: 170 mg/dL (26 Sep 2021 12:31)  POCT Blood Glucose.: 116 mg/dL (26 Sep 2021 08:25)  POCT Blood Glucose.: 114 mg/dL (25 Sep 2021 22:52)  POCT Blood Glucose.: 101 mg/dL (25 Sep 2021 17:19)    I&O's Summary    26 Sep 2021 07:01  -  26 Sep 2021 12:36  --------------------------------------------------------  IN: 240 mL / OUT: 0 mL / NET: 240 mL        PHYSICAL EXAM:  Vital Signs Last 24 Hrs  T(C): 36.9 (26 Sep 2021 05:54), Max: 36.9 (26 Sep 2021 05:54)  T(F): 98.5 (26 Sep 2021 05:54), Max: 98.5 (26 Sep 2021 05:54)  HR: 70 (26 Sep 2021 05:54) (65 - 76)  BP: 150/76 (26 Sep 2021 05:54) (137/75 - 167/98)  BP(mean): --  RR: 17 (26 Sep 2021 05:54) (15 - 17)  SpO2: 100% (26 Sep 2021 05:54) (99% - 100%)    GENERAL: NAD, well-developed  HEAD:  Atraumatic, Normocephalic  EYES: EOMI, PERRLA, conjunctiva and sclera clear  NECK: Supple, No JVD  CHEST/LUNG: Clear to auscultation bilaterally; No wheeze  HEART: Regular rate and rhythm; No murmurs, rubs, or gallops  ABDOMEN: Soft, Nontender, Nondistended; Bowel sounds present  EXTREMITIES:  2+ Peripheral Pulses, No clubbing, cyanosis, or edema. right foot dressed  PSYCH: AAOx3  NEUROLOGY: non-focal  SKIN: No rashes or lesions    LABS:                        13.4   10.31 )-----------( 249      ( 26 Sep 2021 08:30 )             38.3     09-26    138  |  102  |  32<H>  ----------------------------<  126<H>  3.4<L>   |  23  |  1.27    Ca    9.5      26 Sep 2021 08:30  Phos  2.8       Mg     1.80         TPro  6.9  /  Alb  3.9  /  TBili  0.2  /  DBili  x   /  AST  14  /  ALT  8   /  AlkPhos  85  09-26    PT/INR - ( 25 Sep 2021 12:42 )   PT: 11.0 sec;   INR: 0.95 ratio         PTT - ( 26 Sep 2021 11:13 )  PTT:32.7 sec      Urinalysis Basic - ( 25 Sep 2021 15:23 )    Color: Yellow / Appearance: Clear / S.021 / pH: x  Gluc: x / Ketone: Negative  / Bili: Negative / Urobili: <2 mg/dL   Blood: x / Protein: Trace / Nitrite: Negative   Leuk Esterase: Negative / RBC: 2 /HPF / WBC 3 /HPF   Sq Epi: x / Non Sq Epi: 1 /HPF / Bacteria: Negative        RADIOLOGY & ADDITIONAL TESTS:    Imaging Personally Reviewed:    Consultant(s) Notes Reviewed:      Care Discussed with Consultants/Other Providers:

## 2021-09-27 NOTE — PROGRESS NOTE ADULT - ASSESSMENT
61 y/o male, with a PmHx of CHF, HTN, CAD, HLD, DM, CKD, PVD with a Right Femoral-Femoral Bypass in 2018 on apixiban, presented to the [facility] ED with worsening Right Toe pain x 1 week. Admitted to medicine for gangrene of right foot 5th digit.

## 2021-09-27 NOTE — PROGRESS NOTE ADULT - SUBJECTIVE AND OBJECTIVE BOX
Patient is a 60y old  Male who presents with a chief complaint of Right Toe Necrosis (26 Sep 2021 13:08)       INTERVAL HPI/OVERNIGHT EVENTS:  Patient seen and evaluated at bedside.  Pt is resting comfortable in NAD. Denies N/V/F/C.  Pain rated at X/10    Allergies    penicillin (Other)    Intolerances        Vital Signs Last 24 Hrs  T(C): 36.7 (26 Sep 2021 20:51), Max: 37 (26 Sep 2021 14:15)  T(F): 98.1 (26 Sep 2021 20:51), Max: 98.6 (26 Sep 2021 14:15)  HR: 67 (27 Sep 2021 05:18) (67 - 75)  BP: 166/71 (27 Sep 2021 05:18) (131/78 - 166/71)  BP(mean): --  RR: 16 (27 Sep 2021 05:18) (16 - 17)  SpO2: 100% (27 Sep 2021 05:18) (97% - 100%)    LABS:                        13.7   13.85 )-----------( 213      ( 27 Sep 2021 08:06 )             39.3     09-27    135  |  100  |  21  ----------------------------<  139<H>  3.7   |  23  |  0.84    Ca    8.7      27 Sep 2021 08:06  Phos  2.1     09-27  Mg     1.50     09-27    TPro  6.9  /  Alb  3.9  /  TBili  0.2  /  DBili  x   /  AST  14  /  ALT  8   /  AlkPhos  85  09-26    PT/INR - ( 25 Sep 2021 12:42 )   PT: 11.0 sec;   INR: 0.95 ratio         PTT - ( 27 Sep 2021 08:06 )  PTT:67.4 sec  Urinalysis Basic - ( 25 Sep 2021 15:23 )    Color: Yellow / Appearance: Clear / S.021 / pH: x  Gluc: x / Ketone: Negative  / Bili: Negative / Urobili: <2 mg/dL   Blood: x / Protein: Trace / Nitrite: Negative   Leuk Esterase: Negative / RBC: 2 /HPF / WBC 3 /HPF   Sq Epi: x / Non Sq Epi: 1 /HPF / Bacteria: Negative      CAPILLARY BLOOD GLUCOSE      POCT Blood Glucose.: 146 mg/dL (27 Sep 2021 08:23)  POCT Blood Glucose.: 131 mg/dL (26 Sep 2021 23:43)  POCT Blood Glucose.: 122 mg/dL (26 Sep 2021 18:01)  POCT Blood Glucose.: 170 mg/dL (26 Sep 2021 12:31)      Lower Extremity Physical Exam:  Vascular: DP/PT 0/4, B/L, CFT <3 seconds B/L, Temperature gradient warm to warm on left, warm to cold on right   Neuro: Epicritic sensation diminished to the level of ankle, B/L.  Musculoskeletal/Ortho: s/p right foot 5th base of proximal phalanx and met head resection, pain on palpation to the right 5th digit   Skin: right foot wound to 4th interspace to subQ, tracking distally 1cm to 4th digit, no mal odor, improving erythema today       RADIOLOGY & ADDITIONAL TESTS:

## 2021-09-27 NOTE — PROGRESS NOTE ADULT - SUBJECTIVE AND OBJECTIVE BOX
[facility]   MD RAKAN SHEPHERD PA    TEL:  OFFICE: [phone]  DR FELICIANO CELL: [phone]  DR. PATIÑO CELL: [phone]  JANNETH SÁNCHEZ CELL: [phone]    From 5pm-7am Answering Service [phone]    -- RENAL FOLLOW UP NOTE ---Date of Service 09-27-21 @ 12:02    Patient is a 60y old  Male who presents with a chief complaint of Right Toe Necrosis (27 Sep 2021 11:59)      Patient seen and examined at bedside. No chest pain/sob    VITALS:  T(F): 98.1 (09-26-21 @ 20:51), Max: 98.6 (09-26-21 @ 14:15)  HR: 75 (09-27-21 @ 10:26)  BP: 135/75 (09-27-21 @ 10:26)  RR: 16 (09-27-21 @ 05:18)  SpO2: 100% (09-27-21 @ 05:18)  Wt(kg): --    09-26 @ 07:01  -  09-27 @ 07:00  --------------------------------------------------------  IN: 1574 mL / OUT: 975 mL / NET: 599 mL          PHYSICAL EXAM:  Constitutional: NAD  Neck: No JVD  Respiratory: CTAB, no wheezes, rales or rhonchi  Cardiovascular: S1, S2, RRR  Gastrointestinal: BS+, soft, NT/ND  Extremities: No peripheral edema    Hospital Medications:   MEDICATIONS  (STANDING):  aspirin enteric coated 81 milliGRAM(s) Oral daily  atorvastatin 40 milliGRAM(s) Oral at bedtime  cadexomer iodine 0.9% Gel 1 Application(s) Topical daily  carvedilol 25 milliGRAM(s) Oral every 12 hours  clindamycin IVPB 600 milliGRAM(s) IV Intermittent every 8 hours  dextrose 40% Gel 15 Gram(s) Oral once  dextrose 5%. 1000 milliLiter(s) (50 mL/Hr) IV Continuous <Continuous>  dextrose 5%. 1000 milliLiter(s) (100 mL/Hr) IV Continuous <Continuous>  dextrose 50% Injectable 25 Gram(s) IV Push once  dextrose 50% Injectable 12.5 Gram(s) IV Push once  dextrose 50% Injectable 25 Gram(s) IV Push once  glucagon  Injectable 1 milliGRAM(s) IntraMuscular once  heparin  Infusion.  Unit(s)/Hr (9 mL/Hr) IV Continuous <Continuous>  influenza   Vaccine 0.5 milliLiter(s) IntraMuscular once  insulin lispro (ADMELOG) corrective regimen sliding scale   SubCutaneous three times a day before meals  insulin lispro (ADMELOG) corrective regimen sliding scale   SubCutaneous at bedtime  lisinopril 10 milliGRAM(s) Oral daily  multivitamin 1 Tablet(s) Oral daily  oxyCODONE    IR 5 milliGRAM(s) Oral once  polyethylene glycol 3350 17 Gram(s) Oral daily  senna 2 Tablet(s) Oral at bedtime  sodium chloride 0.9%. 1000 milliLiter(s) (75 mL/Hr) IV Continuous <Continuous>  sodium phosphate IVPB 15 milliMole(s) IV Intermittent once      LABS:  09-27    135  |  100  |  21  ----------------------------<  139<H>  3.7   |  23  |  0.84    Ca    8.7      27 Sep 2021 08:06  Phos  2.1     09-27  Mg     1.50     09-27    TPro  6.9  /  Alb  3.9  /  TBili  0.2  /  DBili      /  AST  14  /  ALT  8   /  AlkPhos  85  09-26    Creatinine Trend: 0.84 <--, 1.27 <--, 1.99 <--    Phosphorus Level, Serum: 2.1 mg/dL (09-27 @ 08:06)                              13.7   13.85 )-----------( 213      ( 27 Sep 2021 08:06 )             39.3     Urine Studies:  Urinalysis - [09-25-21 @ 15:23]      Color Yellow / Appearance Clear / SG 1.021 / pH 5.5      Gluc Negative / Ketone Negative  / Bili Negative / Urobili <2 mg/dL       Blood Negative / Protein Trace / Leuk Est Negative / Nitrite Negative      RBC 2 / WBC 3 / Hyaline 1 / Gran  / Sq Epi  / Non Sq Epi 1 / Bacteria Negative      HbA1c 5.7      [12-20-18 @ 06:30]  TSH 0.76      [09-25-21 @ 12:52]  Lipid: chol 139, , HDL 26, LDL --      [09-26-21 @ 08:30]    HCV 0.09, Nonreact      [09-26-21 @ 19:55]      RADIOLOGY & ADDITIONAL STUDIES:

## 2021-09-27 NOTE — PROGRESS NOTE ADULT - ASSESSMENT
61 y/o male, with a PmHx of CHF, HTN, CAD, HLD, DM, CKD, PVD with a Right Femoral-Femoral Bypass in 2018 on apixiban, presented to the [facility] ED with worsening Right Toe pain x 1 week.      Ravin on CKD  BAseline unclear, pt with multiple Ravin in past   CKD likely sec to lognstandg HTN/DM  RAVIN possible prerenal as SG is high s/p IVF  Renal function improving   On ACE Monitor closley  Monitor BMP  AVoid further nephrotoxics, NSAID RCA    Hypokalemia  Repelted KCl  e  MOnitor  serum K    Acidosis   improving   MOnitor serum Co2    hypomagnesemia  supplemented by team  monitor    hypophosphatemia  supplemented by team  monitor

## 2021-09-27 NOTE — CONSULT NOTE ADULT - ATTENDING COMMENTS
Patient seen and examined.  Agree with above.   Pt. with no clinical heart failure or anginal symptoms  Optimized from cv perspective for planned LE angio     Imani Cesar MD
Patient seen/examined. P/w RLE PAD, with occluded bypass with tissue loss. Femoral pulses palpable, distal pulses non-palpable. Patient scheduled for angiography on 9/29. Anticoagulate for iliac thrombus. Needs hypercoagulable workup.

## 2021-09-27 NOTE — PROGRESS NOTE ADULT - SUBJECTIVE AND OBJECTIVE BOX
Overnight events:   - No acute events    SUBJECTIVE:  Denies foot pain. Overall feels ok.    OBJECTIVE:  Vital Signs Last 24 Hrs  T(C): 36.7 (26 Sep 2021 20:51), Max: 37 (26 Sep 2021 14:15)  T(F): 98.1 (26 Sep 2021 20:51), Max: 98.6 (26 Sep 2021 14:15)  HR: 67 (27 Sep 2021 05:18) (67 - 75)  BP: 166/71 (27 Sep 2021 05:18) (131/78 - 166/71)  BP(mean): --  RR: 16 (27 Sep 2021 05:18) (16 - 17)  SpO2: 100% (27 Sep 2021 05:18) (97% - 100%)      09-26-21 @ 07:01  -  09-27-21 @ 07:00  --------------------------------------------------------  IN: 1574 mL / OUT: 975 mL / NET: 599 mL        Physical Examination:  GEN: NAD, resting quietly  PULM: symmetric chest rise bilaterally, no increased WOB  ABD: soft, nontender, nondistended, no rebound or guarding  EXTR: R 5th toe with dry gangrene, small area of wet conversion in the interdigit space between the 4th & 5th digits, slightly malodorous with small amount of drainage      LABS:                        13.7   13.85 )-----------( 213      ( 27 Sep 2021 08:06 )             39.3       09-27    135  |  100  |  21  ----------------------------<  139<H>  3.7   |  23  |  0.84    Ca    8.7      27 Sep 2021 08:06  Phos  2.1     09-27  Mg     1.50     09-27    TPro  6.9  /  Alb  3.9  /  TBili  0.2  /  DBili  x   /  AST  14  /  ALT  8   /  AlkPhos  85  09-26

## 2021-09-27 NOTE — PROGRESS NOTE ADULT - ASSESSMENT
61 y/o M presents with R foot 4th interspace wound  - Patient seen and evaluated   - Afebrile, WBC 12.67, ESR 23, 9.0  - right foot wound to 4th interspace to subQ, tracking distally 1cm to 4th digit, no mal odor, improving erythema today   - Pending CAMMY/PVR  - Prelim cultures pseudomonas and providencia   - Pod plan for right foot partial 5th ray resection pending vasc recs   - Discussed with attending

## 2021-09-28 LAB
ANION GAP SERPL CALC-SCNC: 11 MMOL/L — SIGNIFICANT CHANGE UP (ref 7–14)
APTT BLD: 72.1 SEC — HIGH (ref 27–36.3)
BASOPHILS # BLD AUTO: 0.05 K/UL — SIGNIFICANT CHANGE UP (ref 0–0.2)
BASOPHILS NFR BLD AUTO: 0.5 % — SIGNIFICANT CHANGE UP (ref 0–2)
BLD GP AB SCN SERPL QL: NEGATIVE — SIGNIFICANT CHANGE UP
BUN SERPL-MCNC: 12 MG/DL — SIGNIFICANT CHANGE UP (ref 7–23)
CALCIUM SERPL-MCNC: 8.3 MG/DL — LOW (ref 8.4–10.5)
CHLORIDE SERPL-SCNC: 99 MMOL/L — SIGNIFICANT CHANGE UP (ref 98–107)
CO2 SERPL-SCNC: 25 MMOL/L — SIGNIFICANT CHANGE UP (ref 22–31)
CREAT SERPL-MCNC: 0.74 MG/DL — SIGNIFICANT CHANGE UP (ref 0.5–1.3)
EOSINOPHIL # BLD AUTO: 0.06 K/UL — SIGNIFICANT CHANGE UP (ref 0–0.5)
EOSINOPHIL NFR BLD AUTO: 0.6 % — SIGNIFICANT CHANGE UP (ref 0–6)
GLUCOSE SERPL-MCNC: 129 MG/DL — HIGH (ref 70–99)
HCT VFR BLD CALC: 35.6 % — LOW (ref 39–50)
HGB BLD-MCNC: 12.8 G/DL — LOW (ref 13–17)
IANC: 6.97 K/UL — SIGNIFICANT CHANGE UP (ref 1.5–8.5)
IMM GRANULOCYTES NFR BLD AUTO: 0.4 % — SIGNIFICANT CHANGE UP (ref 0–1.5)
LYMPHOCYTES # BLD AUTO: 1.66 K/UL — SIGNIFICANT CHANGE UP (ref 1–3.3)
LYMPHOCYTES # BLD AUTO: 17.1 % — SIGNIFICANT CHANGE UP (ref 13–44)
MAGNESIUM SERPL-MCNC: 1.6 MG/DL — SIGNIFICANT CHANGE UP (ref 1.6–2.6)
MCHC RBC-ENTMCNC: 32 PG — SIGNIFICANT CHANGE UP (ref 27–34)
MCHC RBC-ENTMCNC: 36 GM/DL — SIGNIFICANT CHANGE UP (ref 32–36)
MCV RBC AUTO: 89 FL — SIGNIFICANT CHANGE UP (ref 80–100)
MONOCYTES # BLD AUTO: 0.93 K/UL — HIGH (ref 0–0.9)
MONOCYTES NFR BLD AUTO: 9.6 % — SIGNIFICANT CHANGE UP (ref 2–14)
NEUTROPHILS # BLD AUTO: 6.97 K/UL — SIGNIFICANT CHANGE UP (ref 1.8–7.4)
NEUTROPHILS NFR BLD AUTO: 71.8 % — SIGNIFICANT CHANGE UP (ref 43–77)
NRBC # BLD: 0 /100 WBCS — SIGNIFICANT CHANGE UP
NRBC # FLD: 0 K/UL — SIGNIFICANT CHANGE UP
PHOSPHATE SERPL-MCNC: 1.9 MG/DL — LOW (ref 2.5–4.5)
PLATELET # BLD AUTO: 225 K/UL — SIGNIFICANT CHANGE UP (ref 150–400)
POTASSIUM SERPL-MCNC: 3.4 MMOL/L — LOW (ref 3.5–5.3)
POTASSIUM SERPL-SCNC: 3.4 MMOL/L — LOW (ref 3.5–5.3)
RBC # BLD: 4 M/UL — LOW (ref 4.2–5.8)
RBC # FLD: 13.1 % — SIGNIFICANT CHANGE UP (ref 10.3–14.5)
RH IG SCN BLD-IMP: POSITIVE — SIGNIFICANT CHANGE UP
SARS-COV-2 RNA SPEC QL NAA+PROBE: SIGNIFICANT CHANGE UP
SODIUM SERPL-SCNC: 135 MMOL/L — SIGNIFICANT CHANGE UP (ref 135–145)
WBC # BLD: 9.71 K/UL — SIGNIFICANT CHANGE UP (ref 3.8–10.5)
WBC # FLD AUTO: 9.71 K/UL — SIGNIFICANT CHANGE UP (ref 3.8–10.5)

## 2021-09-28 PROCEDURE — 93010 ELECTROCARDIOGRAM REPORT: CPT

## 2021-09-28 RX ORDER — POTASSIUM CHLORIDE 20 MEQ
40 PACKET (EA) ORAL ONCE
Refills: 0 | Status: COMPLETED | OUTPATIENT
Start: 2021-09-28 | End: 2021-09-28

## 2021-09-28 RX ORDER — OXYCODONE HYDROCHLORIDE 5 MG/1
5 TABLET ORAL ONCE
Refills: 0 | Status: DISCONTINUED | OUTPATIENT
Start: 2021-09-28 | End: 2021-09-28

## 2021-09-28 RX ORDER — OXYCODONE HYDROCHLORIDE 5 MG/1
5 TABLET ORAL EVERY 4 HOURS
Refills: 0 | Status: DISCONTINUED | OUTPATIENT
Start: 2021-09-28 | End: 2021-09-30

## 2021-09-28 RX ORDER — METRONIDAZOLE 500 MG
500 TABLET ORAL EVERY 8 HOURS
Refills: 0 | Status: DISCONTINUED | OUTPATIENT
Start: 2021-09-28 | End: 2021-10-13

## 2021-09-28 RX ORDER — SODIUM CHLORIDE 9 MG/ML
1000 INJECTION INTRAMUSCULAR; INTRAVENOUS; SUBCUTANEOUS
Refills: 0 | Status: DISCONTINUED | OUTPATIENT
Start: 2021-09-29 | End: 2021-10-05

## 2021-09-28 RX ORDER — POTASSIUM PHOSPHATE, MONOBASIC POTASSIUM PHOSPHATE, DIBASIC 236; 224 MG/ML; MG/ML
30 INJECTION, SOLUTION INTRAVENOUS ONCE
Refills: 0 | Status: COMPLETED | OUTPATIENT
Start: 2021-09-28 | End: 2021-09-28

## 2021-09-28 RX ORDER — CEFEPIME 1 G/1
2000 INJECTION, POWDER, FOR SOLUTION INTRAMUSCULAR; INTRAVENOUS EVERY 8 HOURS
Refills: 0 | Status: DISCONTINUED | OUTPATIENT
Start: 2021-09-28 | End: 2021-10-13

## 2021-09-28 RX ADMIN — Medication 1: at 12:52

## 2021-09-28 RX ADMIN — SENNA PLUS 2 TABLET(S): 8.6 TABLET ORAL at 22:03

## 2021-09-28 RX ADMIN — Medication 100 MILLIGRAM(S): at 05:42

## 2021-09-28 RX ADMIN — OXYCODONE HYDROCHLORIDE 5 MILLIGRAM(S): 5 TABLET ORAL at 05:42

## 2021-09-28 RX ADMIN — Medication 1: at 18:04

## 2021-09-28 RX ADMIN — POTASSIUM PHOSPHATE, MONOBASIC POTASSIUM PHOSPHATE, DIBASIC 83.33 MILLIMOLE(S): 236; 224 INJECTION, SOLUTION INTRAVENOUS at 12:56

## 2021-09-28 RX ADMIN — HEPARIN SODIUM 900 UNIT(S)/HR: 5000 INJECTION INTRAVENOUS; SUBCUTANEOUS at 10:41

## 2021-09-28 RX ADMIN — OXYCODONE HYDROCHLORIDE 5 MILLIGRAM(S): 5 TABLET ORAL at 22:01

## 2021-09-28 RX ADMIN — OXYCODONE HYDROCHLORIDE 5 MILLIGRAM(S): 5 TABLET ORAL at 10:30

## 2021-09-28 RX ADMIN — CARVEDILOL PHOSPHATE 25 MILLIGRAM(S): 80 CAPSULE, EXTENDED RELEASE ORAL at 18:06

## 2021-09-28 RX ADMIN — Medication 650 MILLIGRAM(S): at 00:21

## 2021-09-28 RX ADMIN — Medication 500 MILLIGRAM(S): at 22:07

## 2021-09-28 RX ADMIN — OXYCODONE HYDROCHLORIDE 5 MILLIGRAM(S): 5 TABLET ORAL at 06:42

## 2021-09-28 RX ADMIN — Medication 650 MILLIGRAM(S): at 15:24

## 2021-09-28 RX ADMIN — Medication 1 TABLET(S): at 12:51

## 2021-09-28 RX ADMIN — Medication 40 MILLIEQUIVALENT(S): at 12:51

## 2021-09-28 RX ADMIN — OXYCODONE HYDROCHLORIDE 5 MILLIGRAM(S): 5 TABLET ORAL at 17:35

## 2021-09-28 RX ADMIN — CARVEDILOL PHOSPHATE 25 MILLIGRAM(S): 80 CAPSULE, EXTENDED RELEASE ORAL at 05:42

## 2021-09-28 RX ADMIN — Medication 1 APPLICATION(S): at 18:05

## 2021-09-28 RX ADMIN — Medication 650 MILLIGRAM(S): at 23:40

## 2021-09-28 RX ADMIN — OXYCODONE HYDROCHLORIDE 5 MILLIGRAM(S): 5 TABLET ORAL at 23:00

## 2021-09-28 RX ADMIN — ATORVASTATIN CALCIUM 40 MILLIGRAM(S): 80 TABLET, FILM COATED ORAL at 22:03

## 2021-09-28 RX ADMIN — OXYCODONE HYDROCHLORIDE 5 MILLIGRAM(S): 5 TABLET ORAL at 09:59

## 2021-09-28 RX ADMIN — Medication 650 MILLIGRAM(S): at 14:41

## 2021-09-28 RX ADMIN — CEFEPIME 100 MILLIGRAM(S): 1 INJECTION, POWDER, FOR SOLUTION INTRAMUSCULAR; INTRAVENOUS at 23:04

## 2021-09-28 RX ADMIN — POLYETHYLENE GLYCOL 3350 17 GRAM(S): 17 POWDER, FOR SOLUTION ORAL at 12:50

## 2021-09-28 RX ADMIN — OXYCODONE HYDROCHLORIDE 5 MILLIGRAM(S): 5 TABLET ORAL at 16:57

## 2021-09-28 RX ADMIN — LISINOPRIL 10 MILLIGRAM(S): 2.5 TABLET ORAL at 05:42

## 2021-09-28 RX ADMIN — Medication 81 MILLIGRAM(S): at 12:51

## 2021-09-28 NOTE — CONSULT NOTE ADULT - ASSESSMENT
Patient is a 60 year old male, with PMH of CHF, HTN, CAD, HLD, DM, CKD, PVD with a Right Femoral-Femoral Bypass in 2018 on apixiban, presented to the [facility] ED with worsening R toe pain for 1 week and was admitted for R 5th toe gangrene.     R toe gangrene d/t PVD with cellulitis  Leukocytosis due to above    - had wound for a month, worsening over last week with erythema and pain - outpt podiatrist started clindamycin and referred to ER   - R foot xray reviewed - no evidence of OM noted, consider MRI   - CTA reviewed as above, noted with stenosis and occlusion   - Vascular surgery following - planning for angiogram tomorrow    - Podiatry following - s/p excisional debridement at bedside    -- plan for partial 5th ray resection pending vascular recs - please send for bone culture and biopsy    - R foot wound culture with CRE Pseudomonas, Providencia and Bacteroides - sensitivities reviewed as above    - discontinue clindamycin   - start on cefepime 2g IV 8h and metronidazole 500mg PO Q8h    -- h/o PCN allergy - d/w pt and brother - exact reaction unknown, reports graying of skin, denies anaphylaxis. D/w above regimen and to notify staff if any symptoms develop such as rash or difficulty breathing, patient agrees    - monitor clinically, temps, wbc    CKD    - monitor Cr, renally adjusted meds/abx    DM   - Blood glucose management per primary team.      D/w RN at bedside.  Brett Kruse M.D.  [facility], Division of Infectious Diseases  [phone]  After 5pm on weekdays and all day on weekends - please call [phone]

## 2021-09-28 NOTE — PROGRESS NOTE ADULT - SUBJECTIVE AND OBJECTIVE BOX
[facility]   MD RAKAN SHEPHERD PA    TEL:  OFFICE: [phone]  DR FELICIANO CELL: [phone]  DR. PATIÑO CELL: [phone]  JANNETH SÁNCHEZ CELL: [phone]    From 5pm-7am Answering Service [phone]    -- RENAL FOLLOW UP NOTE ---Date of Service 09-28-21 @ 13:00    Patient is a 60y old  Male who presents with a chief complaint of Right Toe Necrosis (28 Sep 2021 07:27)      Patient seen and examined at bedside. No chest pain/sob    VITALS:  T(F): 98.7 (09-28-21 @ 05:38), Max: 98.7 (09-28-21 @ 05:38)  HR: 67 (09-28-21 @ 05:38)  BP: 147/75 (09-28-21 @ 05:38)  RR: 16 (09-28-21 @ 05:38)  SpO2: 100% (09-28-21 @ 05:38)  Wt(kg): --    09-27 @ 07:01  -  09-28 @ 07:00  --------------------------------------------------------  IN: 0 mL / OUT: 400 mL / NET: -400 mL          PHYSICAL EXAM:  Constitutional: NAD  Neck: No JVD  Respiratory: CTAB, no wheezes, rales or rhonchi  Cardiovascular: S1, S2, RRR  Gastrointestinal: BS+, soft, NT/ND  Extremities: No peripheral edema    Hospital Medications:   MEDICATIONS  (STANDING):  aspirin enteric coated 81 milliGRAM(s) Oral daily  atorvastatin 40 milliGRAM(s) Oral at bedtime  cadexomer iodine 0.9% Gel 1 Application(s) Topical daily  carvedilol 25 milliGRAM(s) Oral every 12 hours  clindamycin IVPB 600 milliGRAM(s) IV Intermittent every 8 hours  dextrose 40% Gel 15 Gram(s) Oral once  dextrose 5%. 1000 milliLiter(s) (50 mL/Hr) IV Continuous <Continuous>  dextrose 5%. 1000 milliLiter(s) (100 mL/Hr) IV Continuous <Continuous>  dextrose 50% Injectable 25 Gram(s) IV Push once  dextrose 50% Injectable 12.5 Gram(s) IV Push once  dextrose 50% Injectable 25 Gram(s) IV Push once  glucagon  Injectable 1 milliGRAM(s) IntraMuscular once  heparin  Infusion.  Unit(s)/Hr (9 mL/Hr) IV Continuous <Continuous>  influenza   Vaccine 0.5 milliLiter(s) IntraMuscular once  insulin lispro (ADMELOG) corrective regimen sliding scale   SubCutaneous three times a day before meals  insulin lispro (ADMELOG) corrective regimen sliding scale   SubCutaneous at bedtime  lisinopril 10 milliGRAM(s) Oral daily  multivitamin 1 Tablet(s) Oral daily  polyethylene glycol 3350 17 Gram(s) Oral daily  potassium phosphate IVPB 30 milliMole(s) IV Intermittent once  senna 2 Tablet(s) Oral at bedtime  sodium chloride 0.9%. 1000 milliLiter(s) (75 mL/Hr) IV Continuous <Continuous>      LABS:  09-28    135  |  99  |  12  ----------------------------<  129<H>  3.4<L>   |  25  |  0.74    Ca    8.3<L>      28 Sep 2021 08:39  Phos  1.9     09-28  Mg     1.60     09-28      Creatinine Trend: 0.74 <--, 0.84 <--, 1.27 <--, 1.99 <--    Phosphorus Level, Serum: 1.9 mg/dL (09-28 @ 08:39)                              12.8   9.71  )-----------( 225      ( 28 Sep 2021 08:39 )             35.6     Urine Studies:  Urinalysis - [09-25-21 @ 15:23]      Color Yellow / Appearance Clear / SG 1.021 / pH 5.5      Gluc Negative / Ketone Negative  / Bili Negative / Urobili <2 mg/dL       Blood Negative / Protein Trace / Leuk Est Negative / Nitrite Negative      RBC 2 / WBC 3 / Hyaline 1 / Gran  / Sq Epi  / Non Sq Epi 1 / Bacteria Negative      HbA1c 5.7      [12-20-18 @ 06:30]  TSH 0.76      [09-25-21 @ 12:52]  Lipid: chol 139, , HDL 26, LDL --      [09-26-21 @ 08:30]    HCV 0.09, Nonreact      [09-26-21 @ 19:55]      RADIOLOGY & ADDITIONAL STUDIES:

## 2021-09-28 NOTE — CHART NOTE - NSCHARTNOTEFT_GEN_A_CORE
Pre-operative Note    Preop Dx: Right Toe Necrosis  Surgeon: Dr. Lundberg  Procedure: RLL angiogram, possible angioplasty, possible stent    Vital Signs Last 24 Hrs  T(C): 37.1 (28 Sep 2021 05:38), Max: 37.1 (28 Sep 2021 05:38)  T(F): 98.7 (28 Sep 2021 05:38), Max: 98.7 (28 Sep 2021 05:38)  HR: 67 (28 Sep 2021 05:38) (63 - 72)  BP: 147/75 (28 Sep 2021 05:38) (139/75 - 147/75)  BP(mean): --  RR: 16 (28 Sep 2021 05:38) (16 - 17)  SpO2: 100% (28 Sep 2021 05:38) (100% - 100%)                        12.8   9.71  )-----------( 225      ( 28 Sep 2021 08:39 )             35.6     09-28    135  |  99  |  12  ----------------------------<  129<H>  3.4<L>   |  25  |  0.74    Ca    8.3<L>      28 Sep 2021 08:39  Phos  1.9     09-28  Mg     1.60     09-28      PTT - ( 28 Sep 2021 08:39 )  PTT:72.1 sec  Daily     Daily     EKG: pending.  CXR: 9/25 Clear lungs.  Type and Screen: done, two result 9/25 and 9/28.        A/P: 60y Male, complains of right lower leg and foot pain, hemodynamically table, tolerating diet, ready for OR tomorrow.    - OR 09/29/21 for _RLL angiogram, possible angioplasty, possible stent____________ with  _Tamika____  - NPO past midnight, except medications  - IVF while NPO  - Consent signed and in chart    C team vascular  [id number]

## 2021-09-28 NOTE — PROGRESS NOTE ADULT - ASSESSMENT
61 y/o male, with a PmHx of CHF, HTN, CAD, HLD, DM, CKD, PVD with a Right Femoral-Femoral Bypass in 2018 on apixiban, presented to the [facility] ED with worsening Right Toe pain x 1 week. Admitted to medicine for gangrene of right foot 5th digit.

## 2021-09-28 NOTE — PROGRESS NOTE ADULT - SUBJECTIVE AND OBJECTIVE BOX
Date of service: 09/28/21    chief complaint:  right toe pain    extended hpi: 59 y/o male, with a PmHx of HTN, CAD, reported years ago, no hx PCI, managed medically, HLD, DM, CKD, PVD with a Right Femoral-Femoral Bypass in 2018 on apixiban, presented to the [facility] ED with worsening Right Toe pain x 1 week.      S: no chest pain or sob; ros otherwise negative.     Review of Systems:   Constitutional: [ ] fevers, [ ] chills.   Skin: [ ] dry skin. [ ] rashes.  Psychiatric: [ ] depression, [ ] anxiety.   Gastrointestinal: [ ] BRBPR, [ ] melena.   Neurological: [ ] confusion. [ ] seizures. [ ] shuffling gait.   Ears,Nose,Mouth and Throat: [ ] ear pain [ ] sore throat.   Eyes: [ ] diplopia.   Respiratory: [ ] hemoptysis. [ ] shortness of breath  Cardiovascular: See HPI above  Hematologic/Lymphatic: [ ] anemia. [ ] painful nodes. [ ] prolonged bleeding.   Genitourinary: [ ] hematuria. [ ] flank pain.   Endocrine: [ ] significant change in weight. [ ] intolerance to heat and cold.     Review of systems [x ] otherwise negative, [ ] otherwise unable to obtain    FH: no family history of sudden cardiac death in first degree relatives    SH: [ ] tobacco, [ ] alcohol, [ ] drugs    acetaminophen   Tablet .. 650 milliGRAM(s) Oral every 6 hours PRN  aspirin enteric coated 81 milliGRAM(s) Oral daily  atorvastatin 40 milliGRAM(s) Oral at bedtime  cadexomer iodine 0.9% Gel 1 Application(s) Topical daily  carvedilol 25 milliGRAM(s) Oral every 12 hours  clindamycin IVPB 600 milliGRAM(s) IV Intermittent every 8 hours  dextrose 40% Gel 15 Gram(s) Oral once  dextrose 5%. 1000 milliLiter(s) IV Continuous <Continuous>  dextrose 5%. 1000 milliLiter(s) IV Continuous <Continuous>  dextrose 50% Injectable 25 Gram(s) IV Push once  dextrose 50% Injectable 12.5 Gram(s) IV Push once  dextrose 50% Injectable 25 Gram(s) IV Push once  glucagon  Injectable 1 milliGRAM(s) IntraMuscular once  heparin   Injectable 4000 Unit(s) IV Push every 6 hours PRN  heparin   Injectable 2000 Unit(s) IV Push every 6 hours PRN  heparin  Infusion.  Unit(s)/Hr IV Continuous <Continuous>  influenza   Vaccine 0.5 milliLiter(s) IntraMuscular once  insulin lispro (ADMELOG) corrective regimen sliding scale   SubCutaneous three times a day before meals  insulin lispro (ADMELOG) corrective regimen sliding scale   SubCutaneous at bedtime  lisinopril 10 milliGRAM(s) Oral daily  multivitamin 1 Tablet(s) Oral daily  oxyCODONE    IR 5 milliGRAM(s) Oral every 4 hours PRN  polyethylene glycol 3350 17 Gram(s) Oral daily  senna 2 Tablet(s) Oral at bedtime  sodium chloride 0.9%. 1000 milliLiter(s) IV Continuous <Continuous>                            12.8   9.71  )-----------( 225      ( 28 Sep 2021 08:39 )             35.6       09-28    135  |  99  |  12  ----------------------------<  129<H>  3.4<L>   |  25  |  0.74    Ca    8.3<L>      28 Sep 2021 08:39  Phos  1.9     09-28  Mg     1.60     09-28              T(C): 37.1 (09-28-21 @ 05:38), Max: 37.1 (09-28-21 @ 05:38)  HR: 67 (09-28-21 @ 05:38) (63 - 72)  BP: 147/75 (09-28-21 @ 05:38) (139/75 - 147/75)  RR: 16 (09-28-21 @ 05:38) (16 - 17)  SpO2: 100% (09-28-21 @ 05:38) (100% - 100%)  Wt(kg): --    I&O's Summary    27 Sep 2021 07:01  -  28 Sep 2021 07:00  --------------------------------------------------------  IN: 0 mL / OUT: 400 mL / NET: -400 mL        General: Well nourished in no acute distress. Alert and Oriented * 3.   Head: Normocephalic and atraumatic.   Neck: No JVD. No bruits. Supple. Does not appear to be enlarged.   Cardiovascular: + S1,S2 ; RRR Soft systolic murmur at the left lower sternal border. No rubs noted.    Lungs: CTA b/l. No rhonchi, rales or wheezes.   Abdomen: + BS, soft. Non tender. Non distended. No rebound. No guarding.   Extremities: No clubbing/cyanosis/edema.   Neurologic: Moves all four extremities. Full range of motion.   Skin: Warm and moist. The patient's skin has normal elasticity and good skin turgor.   Psychiatric: Appropriate mood and affect.  Musculoskeletal: Normal range of motion, normal strength    Tele: not on tele    TTE: < from: Transthoracic Echocardiogram (12.21.18 @ 20:39) >  1. Normal left ventricular internal dimensions and wall  thicknesses.  2. Normal left ventricular systolic function. No segmental  wall motion abnormalities.  3. Increased E/e'  is consistent with elevated left  ventricular filling pressure.  4. Normal right ventricular size and function.  *** No previous Echo exam.  -------------------------------------    < end of copied text >    A/P: 59 yo male with a PmHx of HTN, CAD, reported years ago, no hx PCI, managed medically, HLD, DM, CKD, PVD with a Right Femoral-Femoral Bypass in 2018 on apixiban, presented to the [facility] ED with worsening Right Toe pain x 1 week, CT with occluded bypass.  Now awaiting LE angiogram and further work up from Vascular.    --check 12 lead EKG  --Pt optimized for LE angiogram  --if vascular or pod surgery is planned, recommend check TTE and NST   --follow up vascular and podiatry     Imani Cesar MD

## 2021-09-28 NOTE — CONSULT NOTE ADULT - SUBJECTIVE AND OBJECTIVE BOX
[facility], Division of Infectious Diseases  CHINEDU Fernandes, TRAVIS Harris  [phone]  ([phone] - weekdays after 5pm and weekends)    EVE DELGADO  60y, Male  [id number]    HPI:  Patient is a 60 year old male, with PMH of CHF, HTN, CAD, HLD, DM, CKD, PVD with a Right Femoral-Femoral Bypass in 2018 on apixiban, presented to the [facility] ED with worsening Right Toe pain x 1 week.  Pt states about 1 month ago he started to get pain in his right foot 5th digit. He states he wife had  from Lung Cancer a few days prior but waited to see his PCP until a week later. He states when he finally went into see his PCP, his doctor states he was likely Gout and was prescribed Indomethacin. Pt states his foot wasn't getting any better and the other day he noticed some blood coming from the bottom of his toe so he had gone today to see his Podiatrist (had trouble getting an appointment over this past week). In the Podiatrist office, he was told to go to the ED because he could have a necrotic toe and since he had a history of a bypass in that leg before, there is a high risk of it getting worse. Pt denies any fever, chills, chest pain, HA, dizziness, blurred vision, abd pain, sick contacts, recent travel. The only complaint he was having is pain to the right 5th toe. In the [facility] ED today, he was seen by Podiatry and a bedside debridement was done and then wrapped in a dry sterile dressing. 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 Pt was started on Clindamycin and was then admitted to medicine for further medical management and treatment. (25 Sep 2021 15:50)  Patient seen and examined at bedside this afternoon, brother and RN at bedside. Patient confirms above history, states right foot erythema and pain has improved. Denies fever, chills, cough, dyspnea, abdominal pain, nausea, vomiting, diarrhea.   ROS: 14 point review of systems completed, pertinent positives and negatives as per HPI.    Allergies: penicillin (Other)    PMH -- DM (diabetes mellitus)  HTN (hypertension)  HLD (hyperlipidemia)  CHF (congestive heart failure)  CKD (chronic kidney disease)  PVD (peripheral vascular disease)    PSH -- S/P femoral-femoral bypass surgery    FH -- Family history of stroke (Father, Sibling)  Family history of MI (myocardial infarction)  FH: renal cell carcinoma  Family history of depression  FHx: diabetes mellitus  FH: heart disease    Social History -- former smoker, quit 40 years ago, denies alcohol or illicit drug use, recently , retired, used to work for [employer]     Physical Exam--  Vital Signs Last 24 Hrs  T(F): 98.7 (28 Sep 2021 05:38), Max: 98.7 (28 Sep 2021 05:38)  HR: 67 (28 Sep 2021 05:38) (63 - 70)  BP: 147/75 (28 Sep 2021 05:38) (140/66 - 147/75)  RR: 16 (28 Sep 2021 05:38) (16 - 17)  SpO2: 100% (28 Sep 2021 05:38) (100% - 100%)  General: no acute distress  HEENT: NC/AT, EOMI, anicteric, neck supple  Lungs: Clear bilaterally without rales, wheezing or rhonchi  Heart: S1 and S2 present, normal rate   Abdomen: Soft. Nondistended. Nontender.  Neuro: AAOx3, no obvious focal deficits   Extremities: R foot clean dressing. No edema.   Skin: Warm. Dry. Good turgor. Mild R foot erythema with no TTP  Psychiatric: Appropriate affect and mood for situation.   Lines: PIV    Laboratory & Imaging Data--  CBC:                       12.8   9.71  )-----------( 225      ( 28 Sep 2021 08:39 )             35.6     WBC Count: 9.71 K/uL (21 @ 08:39)  WBC Count: 13.85 K/uL (21 @ 08:06)  WBC Count: 10.40 K/uL (21 @ 23:52)  WBC Count: 10.31 K/uL (21 @ 08:30)  WBC Count: 12.67 K/uL (21 @ 12:42)    CMP:     135  |  99  |  12  ----------------------------<  129<H>  3.4<L>   |  25  |  0.74    Ca    8.3<L>      28 Sep 2021 08:39  Phos  1.9       Mg     1.60         Microbiology:   Culture - Abscess with Gram Stain (collected 21 @ 18:21)  Source: .Abscess Right foot 5th digit wound  Final Report (21 @ 22:07):    Few Pseudomonas aeruginosa (Carbapenem Resistant)    Moderate Providencia rettgeri    Moderate Bacteroides fragilis "Susceptibilities not performed"    Normal skin uriah isolated  Organism: Pseudomonas aeruginosa (Carbapenem Resistant)  Providencia rettgeri (21 @ 22:07)  Organism: Providencia rettgeri (21 @ 22:07)      -  Amikacin: S <=16      -  Amoxicillin/Clavulanic Acid: R 16/8      -  Ampicillin: R >16 These ampicillin results predict results for amoxicillin      -  Ampicillin/Sulbactam: S <=4/2 Enterobacter, Citrobacter, and Serratia may develop resistance during prolonged therapy (3-4 days)      -  Aztreonam: I 16      -  Cefazolin: R >16 Enterobacter, Citrobacter, and Serratia may develop resistance during prolonged therapy (3-4 days)      -  Cefepime: S <=2      -  Cefoxitin: S <=8      -  Ceftriaxone: S <=1 Enterobacter, Citrobacter, and Serratia may develop resistance during prolonged therapy      -  Ciprofloxacin: S <=0.25      -  Ertapenem: S <=0.5      -  Gentamicin: S <=2      -  Imipenem: S <=1      -  Levofloxacin: S <=0.5      -  Meropenem: S <=1      -  Piperacillin/Tazobactam: S <=8      -  Tobramycin: S <=2      -  Trimethoprim/Sulfamethoxazole: S <=0.5/9.5      Method Type: CAM  Organism: Pseudomonas aeruginosa (Carbapenem Resistant) (21 @ 22:07)      -  Amikacin: S <=16      -  Aztreonam: I 16      -  Cefepime: S 8      -  Ceftazidime: S 4      -  Ciprofloxacin: S 0.5      -  Gentamicin: S <=2      -  Imipenem: R >8      -  Levofloxacin: I 2      -  Meropenem: R >8      -  Piperacillin/Tazobactam: S 16      -  Tobramycin: S <=2      Method Type: CAM    Culture - Blood (collected 21 @ 16:19)  Source: .Blood Blood-Peripheral  Preliminary Report (21 @ 17:01):    No growth to date.    Culture - Blood (collected 21 @ 16:19)  Source: .Blood Blood-Peripheral  Preliminary Report (21 @ 17:01):    No growth to date.    Radiology--  CT Angio Abd Aorta w/run-off w/ IV Cont (21 @ 15:37) >IMPRESSION: 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 Occlusion of both the right SFA and a femoropopliteal bypass. Occlusion of the proximal right popliteal artery. Occlusion of the left SFA. Two-vessel runoff in both calves.    Xray Chest 2 Views PA/Lat (21 @ 17:07) >IMPRESSION:  Clear lungs.    Xray Toes, Right Foot (21 @ 12:02) >IMPRESSION:    Postsurgical changes noted involving the fifth digit of the right foot and fifth metatarsal head as noted above. No radiographic evidence for osteomyelitis at this time. If however, clinically suspect, further evaluation with MRI examination of the right foot can be obtained.    Active Medications--  acetaminophen   Tablet .. 650 milliGRAM(s) Oral every 6 hours PRN  aspirin enteric coated 81 milliGRAM(s) Oral daily  atorvastatin 40 milliGRAM(s) Oral at bedtime  cadexomer iodine 0.9% Gel 1 Application(s) Topical daily  carvedilol 25 milliGRAM(s) Oral every 12 hours  cefepime   IVPB 2000 milliGRAM(s) IV Intermittent every 8 hours  dextrose 40% Gel 15 Gram(s) Oral once  dextrose 5%. 1000 milliLiter(s) IV Continuous <Continuous>  dextrose 5%. 1000 milliLiter(s) IV Continuous <Continuous>  dextrose 50% Injectable 25 Gram(s) IV Push once  dextrose 50% Injectable 12.5 Gram(s) IV Push once  dextrose 50% Injectable 25 Gram(s) IV Push once  glucagon  Injectable 1 milliGRAM(s) IntraMuscular once  heparin   Injectable 4000 Unit(s) IV Push every 6 hours PRN  heparin   Injectable 2000 Unit(s) IV Push every 6 hours PRN  heparin  Infusion.  Unit(s)/Hr IV Continuous <Continuous>  influenza   Vaccine 0.5 milliLiter(s) IntraMuscular once  insulin lispro (ADMELOG) corrective regimen sliding scale   SubCutaneous three times a day before meals  insulin lispro (ADMELOG) corrective regimen sliding scale   SubCutaneous at bedtime  lisinopril 10 milliGRAM(s) Oral daily  metroNIDAZOLE    Tablet 500 milliGRAM(s) Oral every 8 hours  multivitamin 1 Tablet(s) Oral daily  oxyCODONE    IR 5 milliGRAM(s) Oral every 4 hours PRN  polyethylene glycol 3350 17 Gram(s) Oral daily  senna 2 Tablet(s) Oral at bedtime  sodium chloride 0.9%. 1000 milliLiter(s) IV Continuous <Continuous>    Antimicrobials:   s/p clindamycin - discontinued   cefepime   IVPB 2000 milliGRAM(s) IV Intermittent every 8 hours  metroNIDAZOLE    Tablet 500 milliGRAM(s) Oral every 8 hours    Immunologic: influenza   Vaccine 0.5 milliLiter(s) IntraMuscular once

## 2021-09-28 NOTE — PROGRESS NOTE ADULT - SUBJECTIVE AND OBJECTIVE BOX
Patient is a 60y old  Male who presents with a chief complaint of Right Toe Necrosis (26 Sep 2021 10:35)  2021    HPI:  Right little toe painful  Afebrile.     PAST MEDICAL & SURGICAL HISTORY:  DM (diabetes mellitus)    HTN (hypertension)    HLD (hyperlipidemia)    CHF (congestive heart failure)    CKD (chronic kidney disease)    PVD (peripheral vascular disease)    S/P femoral-femoral bypass surgery  2018 - right leg        Review of Systems:   CONSTITUTIONAL: No fever, weight loss, or fatigue  EYES: No eye pain, visual disturbances, or discharge  ENMT:  No difficulty hearing, tinnitus, vertigo; No sinus or throat pain  NECK: No pain or stiffness  BREASTS: No pain, masses, or nipple discharge  RESPIRATORY: No cough, wheezing, chills or hemoptysis; No shortness of breath  CARDIOVASCULAR: No chest pain, palpitations, dizziness, or leg swelling  GASTROINTESTINAL: No abdominal or epigastric pain. No nausea, vomiting, or hematemesis; No diarrhea or constipation. No melena or hematochezia.  GENITOURINARY: No dysuria, frequency, hematuria, or incontinence  NEUROLOGICAL: No headaches, memory loss, loss of strength, numbness, or tremors  SKIN: No itching, burning, rashes, or lesions   LYMPH NODES: No enlarged glands  ENDOCRINE: No heat or cold intolerance; No hair loss  MUSCULOSKELETAL: No joint pain or swelling; Right foot dressed  PSYCHIATRIC: No depression, anxiety, mood swings, or difficulty sleeping  HEME/LYMPH: No easy bruising, or bleeding gums  ALLERY AND IMMUNOLOGIC: No hives or eczema    Allergies    penicillin (Other)    Intolerances        Social History:     FAMILY HISTORY:  Family history of MI (myocardial infarction)  Father    FH: renal cell carcinoma  Sister - s/p nephrectomy    Family history of depression  Brother    FHx: diabetes mellitus  Paternal Grandfather    FH: heart disease  Mother        MEDICATIONS  (STANDING):  aspirin enteric coated 81 milliGRAM(s) Oral daily  atorvastatin 40 milliGRAM(s) Oral at bedtime  cadexomer iodine 0.9% Gel 1 Application(s) Topical daily  carvedilol 25 milliGRAM(s) Oral every 12 hours  clindamycin IVPB 600 milliGRAM(s) IV Intermittent every 8 hours  dextrose 40% Gel 15 Gram(s) Oral once  dextrose 5%. 1000 milliLiter(s) (50 mL/Hr) IV Continuous <Continuous>  dextrose 5%. 1000 milliLiter(s) (100 mL/Hr) IV Continuous <Continuous>  dextrose 50% Injectable 25 Gram(s) IV Push once  dextrose 50% Injectable 12.5 Gram(s) IV Push once  dextrose 50% Injectable 25 Gram(s) IV Push once  glucagon  Injectable 1 milliGRAM(s) IntraMuscular once  heparin  Infusion.  Unit(s)/Hr (9 mL/Hr) IV Continuous <Continuous>  influenza   Vaccine 0.5 milliLiter(s) IntraMuscular once  insulin lispro (ADMELOG) corrective regimen sliding scale   SubCutaneous three times a day before meals  insulin lispro (ADMELOG) corrective regimen sliding scale   SubCutaneous at bedtime  lisinopril 10 milliGRAM(s) Oral daily  multivitamin 1 Tablet(s) Oral daily  sodium chloride 0.9%. 1000 milliLiter(s) (60 mL/Hr) IV Continuous <Continuous>    MEDICATIONS  (PRN):  acetaminophen   Tablet .. 650 milliGRAM(s) Oral every 6 hours PRN Temp greater or equal to 38C (100.4F), Mild Pain (1 - 3), Moderate Pain (4 - 6)  heparin   Injectable 4000 Unit(s) IV Push every 6 hours PRN For aPTT less than 40  heparin   Injectable 2000 Unit(s) IV Push every 6 hours PRN For aPTT between 40 - 57        CAPILLARY BLOOD GLUCOSE      POCT Blood Glucose.: 170 mg/dL (26 Sep 2021 12:31)  POCT Blood Glucose.: 116 mg/dL (26 Sep 2021 08:25)  POCT Blood Glucose.: 114 mg/dL (25 Sep 2021 22:52)  POCT Blood Glucose.: 101 mg/dL (25 Sep 2021 17:19)    I&O's Summary    26 Sep 2021 07:01  -  26 Sep 2021 12:36  --------------------------------------------------------  IN: 240 mL / OUT: 0 mL / NET: 240 mL        PHYSICAL EXAM:  Vital Signs Last 24 Hrs  T(C): 36.9 (26 Sep 2021 05:54), Max: 36.9 (26 Sep 2021 05:54)  T(F): 98.5 (26 Sep 2021 05:54), Max: 98.5 (26 Sep 2021 05:54)  HR: 70 (26 Sep 2021 05:54) (65 - 76)  BP: 150/76 (26 Sep 2021 05:54) (137/75 - 167/98)  BP(mean): --  RR: 17 (26 Sep 2021 05:54) (15 - 17)  SpO2: 100% (26 Sep 2021 05:54) (99% - 100%)    GENERAL: NAD, well-developed  HEAD:  Atraumatic, Normocephalic  EYES: EOMI, PERRLA, conjunctiva and sclera clear  NECK: Supple, No JVD  CHEST/LUNG: Clear to auscultation bilaterally; No wheeze  HEART: Regular rate and rhythm; No murmurs, rubs, or gallops  ABDOMEN: Soft, Nontender, Nondistended; Bowel sounds present  EXTREMITIES:  2+ Peripheral Pulses, No clubbing, cyanosis, or edema. right foot dressed  PSYCH: AAOx3  NEUROLOGY: non-focal  SKIN: No rashes or lesions    LABS:                        13.4   10.31 )-----------( 249      ( 26 Sep 2021 08:30 )             38.3     09-26    138  |  102  |  32<H>  ----------------------------<  126<H>  3.4<L>   |  23  |  1.27    Ca    9.5      26 Sep 2021 08:30  Phos  2.8     -  Mg     1.80     -    TPro  6.9  /  Alb  3.9  /  TBili  0.2  /  DBili  x   /  AST  14  /  ALT  8   /  AlkPhos  85  09-26    PT/INR - ( 25 Sep 2021 12:42 )   PT: 11.0 sec;   INR: 0.95 ratio         PTT - ( 26 Sep 2021 11:13 )  PTT:32.7 sec      Urinalysis Basic - ( 25 Sep 2021 15:23 )    Color: Yellow / Appearance: Clear / S.021 / pH: x  Gluc: x / Ketone: Negative  / Bili: Negative / Urobili: <2 mg/dL   Blood: x / Protein: Trace / Nitrite: Negative   Leuk Esterase: Negative / RBC: 2 /HPF / WBC 3 /HPF   Sq Epi: x / Non Sq Epi: 1 /HPF / Bacteria: Negative        RADIOLOGY & ADDITIONAL TESTS:    Imaging Personally Reviewed:    Consultant(s) Notes Reviewed:      Care Discussed with Consultants/Other Providers:

## 2021-09-28 NOTE — PROGRESS NOTE ADULT - SUBJECTIVE AND OBJECTIVE BOX
Overnight events:   - No acute events    SUBJECTIVE:  Feels well. No acute complaints.    OBJECTIVE:  Vital Signs Last 24 Hrs  T(C): 37.1 (28 Sep 2021 05:38), Max: 37.1 (28 Sep 2021 05:38)  T(F): 98.7 (28 Sep 2021 05:38), Max: 98.7 (28 Sep 2021 05:38)  HR: 67 (28 Sep 2021 05:38) (63 - 75)  BP: 147/75 (28 Sep 2021 05:38) (135/75 - 147/75)  BP(mean): --  RR: 16 (28 Sep 2021 05:38) (16 - 17)  SpO2: 100% (28 Sep 2021 05:38) (100% - 100%)      09-27-21 @ 07:01  -  09-28-21 @ 07:00  --------------------------------------------------------  IN: 0 mL / OUT: 400 mL / NET: -400 mL        Physical Examination:  GEN: NAD, resting quietly  PULM: symmetric chest rise bilaterally, no increased WOB  ABD: soft, nontender  EXTR:       LABS:                        13.7   13.85 )-----------( 213      ( 27 Sep 2021 08:06 )             39.3       09-27    135  |  100  |  21  ----------------------------<  139<H>  3.7   |  23  |  0.84    Ca    8.7      27 Sep 2021 08:06  Phos  2.1     09-27  Mg     1.50     09-27    TPro  6.9  /  Alb  3.9  /  TBili  0.2  /  DBili  x   /  AST  14  /  ALT  8   /  AlkPhos  85  09-26         Overnight events:   - No acute events    SUBJECTIVE:  Feels well. No acute complaints.    OBJECTIVE:  Vital Signs Last 24 Hrs  T(C): 37.1 (28 Sep 2021 05:38), Max: 37.1 (28 Sep 2021 05:38)  T(F): 98.7 (28 Sep 2021 05:38), Max: 98.7 (28 Sep 2021 05:38)  HR: 67 (28 Sep 2021 05:38) (63 - 75)  BP: 147/75 (28 Sep 2021 05:38) (135/75 - 147/75)  BP(mean): --  RR: 16 (28 Sep 2021 05:38) (16 - 17)  SpO2: 100% (28 Sep 2021 05:38) (100% - 100%)      09-27-21 @ 07:01  -  09-28-21 @ 07:00  --------------------------------------------------------  IN: 0 mL / OUT: 400 mL / NET: -400 mL        Physical Examination:  GEN: NAD, resting quietly  PULM: symmetric chest rise bilaterally, no increased WOB  ABD: soft, nontender  EXTR: R foot with discoloration of toes, 5th toe with dry gangrene and small area of wet conversion in the interdigital space, no malodor or drainage      LABS:                        13.7   13.85 )-----------( 213      ( 27 Sep 2021 08:06 )             39.3       09-27    135  |  100  |  21  ----------------------------<  139<H>  3.7   |  23  |  0.84    Ca    8.7      27 Sep 2021 08:06  Phos  2.1     09-27  Mg     1.50     09-27    TPro  6.9  /  Alb  3.9  /  TBili  0.2  /  DBili  x   /  AST  14  /  ALT  8   /  AlkPhos  85  09-26

## 2021-09-28 NOTE — PROGRESS NOTE ADULT - ASSESSMENT
61yo M with Hx CHF, CAD (no stents), HTN, HLD, DM, PVD with h/o right fem-pop bypass ([facility] 2018) who presents with non-healing R toe wound, found to have dry gangrene with small area of wet conversion and bypass occlusion.    Plan/Recommendations:  - CTA results reviewed. Bypass occluded.  - Plan for RLE angiogram tomorrow  - Appreciate cardiac optimization for procedure: ok to proceed with angio however if further interventions warranted, will require TTE and NST  - Please document medical optimization for procedure as well  - Preop orders: repeat COVID swab STAT, NPO@MN, AM CBC, BMP, mg, phos, T&S    C Team Surgery   [id number]

## 2021-09-28 NOTE — PROGRESS NOTE ADULT - ASSESSMENT
61 y/o male, with a PmHx of CHF, HTN, CAD, HLD, DM, CKD, PVD with a Right Femoral-Femoral Bypass in 2018 on apixiban, presented to the [facility] ED with worsening Right Toe pain x 1 week.      Ravin   BAseline unclear, pt with multiple Ravin in past   RAVIN possible prerenal as SG is high s/p IVF  Renal function improving   On ACE Monitor tia  planning for angio tmr. start NS @ 75cc/hr 6 hrs before and after cath  Monitor BMP  AVoid further nephrotoxics, NSAID RCA    Hypokalemia  Repelted KCl    MOnitor  serum K    Acidosis   improving   MOnitor serum Co2    hypomagnesemia  supplemented by team  monitor    hypophosphatemia  supplemented by team  monitor

## 2021-09-29 LAB
ANION GAP SERPL CALC-SCNC: 11 MMOL/L — SIGNIFICANT CHANGE UP (ref 7–14)
APTT BLD: 54.6 SEC — HIGH (ref 27–36.3)
APTT BLD: 81.9 SEC — HIGH (ref 27–36.3)
BASOPHILS # BLD AUTO: 0.04 K/UL — SIGNIFICANT CHANGE UP (ref 0–0.2)
BASOPHILS NFR BLD AUTO: 0.3 % — SIGNIFICANT CHANGE UP (ref 0–2)
BLD GP AB SCN SERPL QL: NEGATIVE — SIGNIFICANT CHANGE UP
BUN SERPL-MCNC: 16 MG/DL — SIGNIFICANT CHANGE UP (ref 7–23)
CALCIUM SERPL-MCNC: 9.1 MG/DL — SIGNIFICANT CHANGE UP (ref 8.4–10.5)
CHLORIDE SERPL-SCNC: 97 MMOL/L — LOW (ref 98–107)
CO2 SERPL-SCNC: 27 MMOL/L — SIGNIFICANT CHANGE UP (ref 22–31)
CREAT SERPL-MCNC: 0.83 MG/DL — SIGNIFICANT CHANGE UP (ref 0.5–1.3)
EOSINOPHIL # BLD AUTO: 0.08 K/UL — SIGNIFICANT CHANGE UP (ref 0–0.5)
EOSINOPHIL NFR BLD AUTO: 0.7 % — SIGNIFICANT CHANGE UP (ref 0–6)
GLUCOSE SERPL-MCNC: 145 MG/DL — HIGH (ref 70–99)
HCT VFR BLD CALC: 36.9 % — LOW (ref 39–50)
HCT VFR BLD CALC: 38.5 % — LOW (ref 39–50)
HGB BLD-MCNC: 13.2 G/DL — SIGNIFICANT CHANGE UP (ref 13–17)
HGB BLD-MCNC: 13.4 G/DL — SIGNIFICANT CHANGE UP (ref 13–17)
IANC: 9.26 K/UL — HIGH (ref 1.5–8.5)
IMM GRANULOCYTES NFR BLD AUTO: 0.5 % — SIGNIFICANT CHANGE UP (ref 0–1.5)
INR BLD: 1.01 RATIO — SIGNIFICANT CHANGE UP (ref 0.88–1.16)
LYMPHOCYTES # BLD AUTO: 1.38 K/UL — SIGNIFICANT CHANGE UP (ref 1–3.3)
LYMPHOCYTES # BLD AUTO: 11.6 % — LOW (ref 13–44)
MAGNESIUM SERPL-MCNC: 1.7 MG/DL — SIGNIFICANT CHANGE UP (ref 1.6–2.6)
MCHC RBC-ENTMCNC: 31.2 PG — SIGNIFICANT CHANGE UP (ref 27–34)
MCHC RBC-ENTMCNC: 31.7 PG — SIGNIFICANT CHANGE UP (ref 27–34)
MCHC RBC-ENTMCNC: 34.8 GM/DL — SIGNIFICANT CHANGE UP (ref 32–36)
MCHC RBC-ENTMCNC: 35.8 GM/DL — SIGNIFICANT CHANGE UP (ref 32–36)
MCV RBC AUTO: 88.7 FL — SIGNIFICANT CHANGE UP (ref 80–100)
MCV RBC AUTO: 89.7 FL — SIGNIFICANT CHANGE UP (ref 80–100)
MONOCYTES # BLD AUTO: 1.06 K/UL — HIGH (ref 0–0.9)
MONOCYTES NFR BLD AUTO: 8.9 % — SIGNIFICANT CHANGE UP (ref 2–14)
NEUTROPHILS # BLD AUTO: 9.26 K/UL — HIGH (ref 1.8–7.4)
NEUTROPHILS NFR BLD AUTO: 78 % — HIGH (ref 43–77)
NRBC # BLD: 0 /100 WBCS — SIGNIFICANT CHANGE UP
NRBC # BLD: 0 /100 WBCS — SIGNIFICANT CHANGE UP
NRBC # FLD: 0 K/UL — SIGNIFICANT CHANGE UP
NRBC # FLD: 0 K/UL — SIGNIFICANT CHANGE UP
PHOSPHATE SERPL-MCNC: 2.1 MG/DL — LOW (ref 2.5–4.5)
PLATELET # BLD AUTO: 204 K/UL — SIGNIFICANT CHANGE UP (ref 150–400)
PLATELET # BLD AUTO: 232 K/UL — SIGNIFICANT CHANGE UP (ref 150–400)
POTASSIUM SERPL-MCNC: 4.3 MMOL/L — SIGNIFICANT CHANGE UP (ref 3.5–5.3)
POTASSIUM SERPL-SCNC: 4.3 MMOL/L — SIGNIFICANT CHANGE UP (ref 3.5–5.3)
PROTHROM AB SERPL-ACNC: 11.5 SEC — SIGNIFICANT CHANGE UP (ref 10.6–13.6)
RBC # BLD: 4.16 M/UL — LOW (ref 4.2–5.8)
RBC # BLD: 4.29 M/UL — SIGNIFICANT CHANGE UP (ref 4.2–5.8)
RBC # FLD: 13 % — SIGNIFICANT CHANGE UP (ref 10.3–14.5)
RBC # FLD: 13.1 % — SIGNIFICANT CHANGE UP (ref 10.3–14.5)
RH IG SCN BLD-IMP: POSITIVE — SIGNIFICANT CHANGE UP
SODIUM SERPL-SCNC: 135 MMOL/L — SIGNIFICANT CHANGE UP (ref 135–145)
WBC # BLD: 11.7 K/UL — HIGH (ref 3.8–10.5)
WBC # BLD: 11.88 K/UL — HIGH (ref 3.8–10.5)
WBC # FLD AUTO: 11.7 K/UL — HIGH (ref 3.8–10.5)
WBC # FLD AUTO: 11.88 K/UL — HIGH (ref 3.8–10.5)

## 2021-09-29 PROCEDURE — 76937 US GUIDE VASCULAR ACCESS: CPT | Mod: 26

## 2021-09-29 PROCEDURE — 93306 TTE W/DOPPLER COMPLETE: CPT | Mod: 26

## 2021-09-29 PROCEDURE — 99152 MOD SED SAME PHYS/QHP 5/>YRS: CPT

## 2021-09-29 PROCEDURE — 75710 ARTERY X-RAYS ARM/LEG: CPT | Mod: 26

## 2021-09-29 RX ORDER — HEPARIN SODIUM 5000 [USP'U]/ML
INJECTION INTRAVENOUS; SUBCUTANEOUS
Qty: 25000 | Refills: 0 | Status: DISCONTINUED | OUTPATIENT
Start: 2021-09-29 | End: 2021-10-06

## 2021-09-29 RX ORDER — HEPARIN SODIUM 5000 [USP'U]/ML
2000 INJECTION INTRAVENOUS; SUBCUTANEOUS EVERY 6 HOURS
Refills: 0 | Status: DISCONTINUED | OUTPATIENT
Start: 2021-09-29 | End: 2021-10-06

## 2021-09-29 RX ORDER — HEPARIN SODIUM 5000 [USP'U]/ML
4000 INJECTION INTRAVENOUS; SUBCUTANEOUS EVERY 6 HOURS
Refills: 0 | Status: DISCONTINUED | OUTPATIENT
Start: 2021-09-29 | End: 2021-10-06

## 2021-09-29 RX ADMIN — OXYCODONE HYDROCHLORIDE 5 MILLIGRAM(S): 5 TABLET ORAL at 04:20

## 2021-09-29 RX ADMIN — Medication 500 MILLIGRAM(S): at 14:49

## 2021-09-29 RX ADMIN — Medication 85 MILLIMOLE(S): at 08:59

## 2021-09-29 RX ADMIN — CARVEDILOL PHOSPHATE 25 MILLIGRAM(S): 80 CAPSULE, EXTENDED RELEASE ORAL at 17:45

## 2021-09-29 RX ADMIN — HEPARIN SODIUM 900 UNIT(S)/HR: 5000 INJECTION INTRAVENOUS; SUBCUTANEOUS at 14:48

## 2021-09-29 RX ADMIN — CEFEPIME 100 MILLIGRAM(S): 1 INJECTION, POWDER, FOR SOLUTION INTRAMUSCULAR; INTRAVENOUS at 22:17

## 2021-09-29 RX ADMIN — HEPARIN SODIUM 2000 UNIT(S): 5000 INJECTION INTRAVENOUS; SUBCUTANEOUS at 22:19

## 2021-09-29 RX ADMIN — OXYCODONE HYDROCHLORIDE 5 MILLIGRAM(S): 5 TABLET ORAL at 14:50

## 2021-09-29 RX ADMIN — HEPARIN SODIUM 1000 UNIT(S)/HR: 5000 INJECTION INTRAVENOUS; SUBCUTANEOUS at 22:18

## 2021-09-29 RX ADMIN — SODIUM CHLORIDE 75 MILLILITER(S): 9 INJECTION INTRAMUSCULAR; INTRAVENOUS; SUBCUTANEOUS at 14:53

## 2021-09-29 RX ADMIN — OXYCODONE HYDROCHLORIDE 5 MILLIGRAM(S): 5 TABLET ORAL at 05:20

## 2021-09-29 RX ADMIN — Medication 2: at 17:46

## 2021-09-29 RX ADMIN — LISINOPRIL 10 MILLIGRAM(S): 2.5 TABLET ORAL at 05:12

## 2021-09-29 RX ADMIN — Medication 81 MILLIGRAM(S): at 14:49

## 2021-09-29 RX ADMIN — SENNA PLUS 2 TABLET(S): 8.6 TABLET ORAL at 22:18

## 2021-09-29 RX ADMIN — OXYCODONE HYDROCHLORIDE 5 MILLIGRAM(S): 5 TABLET ORAL at 23:27

## 2021-09-29 RX ADMIN — CEFEPIME 100 MILLIGRAM(S): 1 INJECTION, POWDER, FOR SOLUTION INTRAMUSCULAR; INTRAVENOUS at 13:58

## 2021-09-29 RX ADMIN — Medication 500 MILLIGRAM(S): at 22:17

## 2021-09-29 RX ADMIN — CEFEPIME 100 MILLIGRAM(S): 1 INJECTION, POWDER, FOR SOLUTION INTRAMUSCULAR; INTRAVENOUS at 05:13

## 2021-09-29 RX ADMIN — OXYCODONE HYDROCHLORIDE 5 MILLIGRAM(S): 5 TABLET ORAL at 19:58

## 2021-09-29 RX ADMIN — Medication 1 TABLET(S): at 14:50

## 2021-09-29 RX ADMIN — OXYCODONE HYDROCHLORIDE 5 MILLIGRAM(S): 5 TABLET ORAL at 19:25

## 2021-09-29 RX ADMIN — SODIUM CHLORIDE 75 MILLILITER(S): 9 INJECTION INTRAMUSCULAR; INTRAVENOUS; SUBCUTANEOUS at 14:46

## 2021-09-29 RX ADMIN — Medication 500 MILLIGRAM(S): at 05:12

## 2021-09-29 RX ADMIN — OXYCODONE HYDROCHLORIDE 5 MILLIGRAM(S): 5 TABLET ORAL at 13:55

## 2021-09-29 RX ADMIN — SODIUM CHLORIDE 75 MILLILITER(S): 9 INJECTION INTRAMUSCULAR; INTRAVENOUS; SUBCUTANEOUS at 06:01

## 2021-09-29 RX ADMIN — Medication 650 MILLIGRAM(S): at 00:25

## 2021-09-29 RX ADMIN — ATORVASTATIN CALCIUM 40 MILLIGRAM(S): 80 TABLET, FILM COATED ORAL at 22:18

## 2021-09-29 RX ADMIN — CARVEDILOL PHOSPHATE 25 MILLIGRAM(S): 80 CAPSULE, EXTENDED RELEASE ORAL at 05:12

## 2021-09-29 NOTE — PROGRESS NOTE ADULT - SUBJECTIVE AND OBJECTIVE BOX
[facility]   MD RAKAN SHEPHERD PA    TEL:  OFFICE: [phone]  DR FELICIANO CELL: [phone]  DR. PATIÑO CELL: [phone]  JANNETH SÁNCHEZ CELL: [phone]    From 5pm-7am Answering Service [phone]    -- RENAL FOLLOW UP NOTE ---Date of Service 09-29-21 @ 11:34    Patient is a 60y old  Male who presents with a chief complaint of Right Toe Necrosis (28 Sep 2021 14:35)      Patient seen and examined at bedside. No chest pain/sob    VITALS:  T(F): 98.8 (09-29-21 @ 05:15), Max: 98.8 (09-28-21 @ 13:33)  HR: 74 (09-29-21 @ 05:15)  BP: 150/74 (09-29-21 @ 05:15)  RR: 17 (09-29-21 @ 05:15)  SpO2: 98% (09-29-21 @ 05:15)  Wt(kg): --    09-28 @ 07:01  -  09-29 @ 07:00  --------------------------------------------------------  IN: 708 mL / OUT: 350 mL / NET: 358 mL    09-29 @ 07:01  -  09-29 @ 11:34  --------------------------------------------------------  IN: 0 mL / OUT: 100 mL / NET: -100 mL          PHYSICAL EXAM:  Constitutional: NAD  Neck: No JVD  Respiratory: CTAB, no wheezes, rales or rhonchi  Cardiovascular: S1, S2, RRR  Gastrointestinal: BS+, soft, NT/ND  Extremities: No peripheral edema    Hospital Medications:   MEDICATIONS  (STANDING):  aspirin enteric coated 81 milliGRAM(s) Oral daily  atorvastatin 40 milliGRAM(s) Oral at bedtime  cadexomer iodine 0.9% Gel 1 Application(s) Topical daily  carvedilol 25 milliGRAM(s) Oral every 12 hours  cefepime   IVPB 2000 milliGRAM(s) IV Intermittent every 8 hours  dextrose 40% Gel 15 Gram(s) Oral once  dextrose 5%. 1000 milliLiter(s) (50 mL/Hr) IV Continuous <Continuous>  dextrose 5%. 1000 milliLiter(s) (100 mL/Hr) IV Continuous <Continuous>  dextrose 50% Injectable 25 Gram(s) IV Push once  dextrose 50% Injectable 12.5 Gram(s) IV Push once  dextrose 50% Injectable 25 Gram(s) IV Push once  glucagon  Injectable 1 milliGRAM(s) IntraMuscular once  influenza   Vaccine 0.5 milliLiter(s) IntraMuscular once  insulin lispro (ADMELOG) corrective regimen sliding scale   SubCutaneous three times a day before meals  insulin lispro (ADMELOG) corrective regimen sliding scale   SubCutaneous at bedtime  lisinopril 10 milliGRAM(s) Oral daily  metroNIDAZOLE    Tablet 500 milliGRAM(s) Oral every 8 hours  multivitamin 1 Tablet(s) Oral daily  polyethylene glycol 3350 17 Gram(s) Oral daily  senna 2 Tablet(s) Oral at bedtime  sodium chloride 0.9%. 1000 milliLiter(s) (75 mL/Hr) IV Continuous <Continuous>      LABS:  09-29    135  |  97<L>  |  16  ----------------------------<  145<H>  4.3   |  27  |  0.83    Ca    9.1      29 Sep 2021 04:50  Phos  2.1     09-29  Mg     1.70     09-29      Creatinine Trend: 0.83 <--, 0.74 <--, 0.84 <--, 1.27 <--, 1.99 <--    Phosphorus Level, Serum: 2.1 mg/dL (09-29 @ 04:50)                              13.4   11.88 )-----------( 232      ( 29 Sep 2021 04:50 )             38.5     Urine Studies:  Urinalysis - [09-25-21 @ 15:23]      Color Yellow / Appearance Clear / SG 1.021 / pH 5.5      Gluc Negative / Ketone Negative  / Bili Negative / Urobili <2 mg/dL       Blood Negative / Protein Trace / Leuk Est Negative / Nitrite Negative      RBC 2 / WBC 3 / Hyaline 1 / Gran  / Sq Epi  / Non Sq Epi 1 / Bacteria Negative      HbA1c 5.7      [12-20-18 @ 06:30]  TSH 0.76      [09-25-21 @ 12:52]  Lipid: chol 139, , HDL 26, LDL --      [09-26-21 @ 08:30]    HCV 0.09, Nonreact      [09-26-21 @ 19:55]      RADIOLOGY & ADDITIONAL STUDIES:

## 2021-09-29 NOTE — PROGRESS NOTE ADULT - SUBJECTIVE AND OBJECTIVE BOX
Date of service: 09/29/21    chief complaint:  right toe pain    extended hpi: 61 y/o male, with a PmHx of HTN, CAD, reported years ago, no hx PCI, managed medically, HLD, DM, CKD, PVD with a Right Femoral-Femoral Bypass in 2018 on apixiban, presented to the [facility] ED with worsening Right Toe pain x 1 week.      S: no chest pain or sob; ros otherwise negative.     Review of Systems:   Constitutional: [ ] fevers, [ ] chills.   Skin: [ ] dry skin. [ ] rashes.  Psychiatric: [ ] depression, [ ] anxiety.   Gastrointestinal: [ ] BRBPR, [ ] melena.   Neurological: [ ] confusion. [ ] seizures. [ ] shuffling gait.   Ears,Nose,Mouth and Throat: [ ] ear pain [ ] sore throat.   Eyes: [ ] diplopia.   Respiratory: [ ] hemoptysis. [ ] shortness of breath  Cardiovascular: See HPI above  Hematologic/Lymphatic: [ ] anemia. [ ] painful nodes. [ ] prolonged bleeding.   Genitourinary: [ ] hematuria. [ ] flank pain.   Endocrine: [ ] significant change in weight. [ ] intolerance to heat and cold.     Review of systems [x ] otherwise negative, [ ] otherwise unable to obtain    FH: no family history of sudden cardiac death in first degree relatives    SH: [ ] tobacco, [ ] alcohol, [ ] drugs    acetaminophen   Tablet .. 650 milliGRAM(s) Oral every 6 hours PRN  aspirin enteric coated 81 milliGRAM(s) Oral daily  atorvastatin 40 milliGRAM(s) Oral at bedtime  cadexomer iodine 0.9% Gel 1 Application(s) Topical daily  carvedilol 25 milliGRAM(s) Oral every 12 hours  cefepime   IVPB 2000 milliGRAM(s) IV Intermittent every 8 hours  dextrose 40% Gel 15 Gram(s) Oral once  dextrose 5%. 1000 milliLiter(s) IV Continuous <Continuous>  dextrose 5%. 1000 milliLiter(s) IV Continuous <Continuous>  dextrose 50% Injectable 25 Gram(s) IV Push once  dextrose 50% Injectable 12.5 Gram(s) IV Push once  dextrose 50% Injectable 25 Gram(s) IV Push once  glucagon  Injectable 1 milliGRAM(s) IntraMuscular once  heparin   Injectable 4000 Unit(s) IV Push every 6 hours PRN  heparin   Injectable 2000 Unit(s) IV Push every 6 hours PRN  heparin  Infusion.  Unit(s)/Hr IV Continuous <Continuous>  influenza   Vaccine 0.5 milliLiter(s) IntraMuscular once  insulin lispro (ADMELOG) corrective regimen sliding scale   SubCutaneous three times a day before meals  insulin lispro (ADMELOG) corrective regimen sliding scale   SubCutaneous at bedtime  lisinopril 10 milliGRAM(s) Oral daily  metroNIDAZOLE    Tablet 500 milliGRAM(s) Oral every 8 hours  multivitamin 1 Tablet(s) Oral daily  oxyCODONE    IR 5 milliGRAM(s) Oral every 4 hours PRN  polyethylene glycol 3350 17 Gram(s) Oral daily  senna 2 Tablet(s) Oral at bedtime  sodium chloride 0.9%. 1000 milliLiter(s) IV Continuous <Continuous>                            13.4   11.88 )-----------( 232      ( 29 Sep 2021 04:50 )             38.5       09-29    135  |  97<L>  |  16  ----------------------------<  145<H>  4.3   |  27  |  0.83    Ca    9.1      29 Sep 2021 04:50  Phos  2.1     09-29  Mg     1.70     09-29      T(C): 36.9 (09-29-21 @ 15:29), Max: 37.1 (09-29-21 @ 05:15)  HR: 75 (09-29-21 @ 15:29) (74 - 77)  BP: 145/80 (09-29-21 @ 15:29) (120/69 - 150/74)  RR: 18 (09-29-21 @ 15:29) (17 - 18)  SpO2: 98% (09-29-21 @ 15:29) (98% - 100%)  Wt(kg): --    General: Well nourished in no acute distress. Alert and Oriented * 3.   Head: Normocephalic and atraumatic.   Neck: No JVD. No bruits. Supple. Does not appear to be enlarged.   Cardiovascular: + S1,S2 ; RRR Soft systolic murmur at the left lower sternal border. No rubs noted.    Lungs: CTA b/l. No rhonchi, rales or wheezes.   Abdomen: + BS, soft. Non tender. Non distended. No rebound. No guarding.   Extremities: No clubbing/cyanosis/edema.   Neurologic: Moves all four extremities. Full range of motion.   Skin: Warm and moist. The patient's skin has normal elasticity and good skin turgor.   Psychiatric: Appropriate mood and affect.  Musculoskeletal: Normal range of motion, normal strength    Tele: not on tele    TTE: < from: Transthoracic Echocardiogram (12.21.18 @ 20:39) >  1. Normal left ventricular internal dimensions and wall  thicknesses.  2. Normal left ventricular systolic function. No segmental  wall motion abnormalities.  3. Increased E/e'  is consistent with elevated left  ventricular filling pressure.  4. Normal right ventricular size and function.  *** No previous Echo exam.  -------------------------------------    < end of copied text >    A/P: 59 yo male with a PmHx of HTN, CAD, reported years ago, no hx PCI, managed medically, HLD, DM, CKD, PVD with a Right Femoral-Femoral Bypass in 2018 on apixiban, presented to the [facility] ED with worsening Right Toe pain x 1 week, CT with occluded bypass.  Now awaiting LE angiogram and further work up from Vascular.    --s/p LE angio  --vascular sx is being planned (Le bypass)  --recommend check TTE and NST

## 2021-09-29 NOTE — PROGRESS NOTE ADULT - SUBJECTIVE AND OBJECTIVE BOX
Patient is a 60y old  Male who presents with a chief complaint of Right Toe Necrosis (29 Sep 2021 16:32)       INTERVAL HPI/OVERNIGHT EVENTS:  Patient seen and evaluated at bedside.  Pt is resting comfortable in NAD. Denies N/V/F/C.    Allergies    penicillin (Other)    Intolerances        Vital Signs Last 24 Hrs  T(C): 37.1 (29 Sep 2021 17:45), Max: 37.1 (29 Sep 2021 05:15)  T(F): 98.7 (29 Sep 2021 17:45), Max: 98.8 (29 Sep 2021 05:15)  HR: 87 (29 Sep 2021 17:45) (74 - 87)  BP: 137/80 (29 Sep 2021 17:45) (120/69 - 150/74)  BP(mean): --  RR: 17 (29 Sep 2021 17:45) (17 - 18)  SpO2: 100% (29 Sep 2021 17:45) (98% - 100%)    LABS:                        13.4   11.88 )-----------( 232      ( 29 Sep 2021 04:50 )             38.5     09-29    135  |  97<L>  |  16  ----------------------------<  145<H>  4.3   |  27  |  0.83    Ca    9.1      29 Sep 2021 04:50  Phos  2.1     09-29  Mg     1.70     09-29      PT/INR - ( 29 Sep 2021 04:50 )   PT: 11.5 sec;   INR: 1.01 ratio         PTT - ( 29 Sep 2021 04:50 )  PTT:81.9 sec    CAPILLARY BLOOD GLUCOSE      POCT Blood Glucose.: 212 mg/dL (29 Sep 2021 17:33)  POCT Blood Glucose.: 162 mg/dL (29 Sep 2021 10:57)  POCT Blood Glucose.: 150 mg/dL (29 Sep 2021 08:22)  POCT Blood Glucose.: 168 mg/dL (28 Sep 2021 21:59)      Lower Extremity Physical Exam:  Vascular: DP/PT 0/4, B/L, CFT <3 seconds B/L, Temperature gradient warm to warm on left, warm to cold on right   Neuro: Epicritic sensation diminished to the level of ankle, B/L.  Musculoskeletal/Ortho: s/p right foot 5th base of proximal phalanx and met head resection, pain on palpation to the right 5th digit   Skin: right foot wound to 4th interspace to subQ, tracking distally 1cm to 4th digit, no mal odor, improving erythema today     RADIOLOGY & ADDITIONAL TESTS:

## 2021-09-29 NOTE — CHART NOTE - NSCHARTNOTEFT_GEN_A_CORE
Pt s/p right lower extremity angiogram via right femoral artery access, closed with a Mynx closure device. Spoke with Dr. Lundberg and vascular resident, Miley, who recommend resuming Heparin gtt 1 hour post procedure. Right femoral site was checked. Dressing remains clean, dry and intact with no evidence of bleeding, edema or hematoma. Site is soft and non-tender. Right lower extremity with good pulses and sensation. Heparin gtt resumed at same dosing as prior to procedure with no bolus. Signout given to medicine ACP service, right femoral site should be monitored closely while on Heparin gtt. Case discussed with vascular team.

## 2021-09-29 NOTE — PROGRESS NOTE ADULT - ASSESSMENT
59 y/o M presents with R foot 4th interspace wound  - Patient seen and evaluated   - Afebrile, WBC 11.8, ESR 23, 9.0  - right foot wound to 4th interspace to subQ, tracking distally 1cm to 4th digit, no mal odor, improving erythema today   - Pending CAMMY/PVR  - Prelim cultures pseudomonas and providencia   - Vasc RLE Angio shows SFA, Pop, Ant Tib, Tib-Per, PT, Peroneal occluded, Com Fem patent, vasc plans illiac stent + bypass on 10/6.  - Pod plan for right foot partial 5th ray resection pending vasc recs/ bypass  - Discussed with attending

## 2021-09-29 NOTE — PROGRESS NOTE ADULT - ASSESSMENT
59 y/o male, with a PmHx of CHF, HTN, CAD, HLD, DM, CKD, PVD with a Right Femoral-Femoral Bypass in 2018 on apixiban, presented to the [facility] ED with worsening Right Toe pain x 1 week.      Ravin   BAseline unclear, pt with multiple Ravin in past   RAVIN possible prerenal as SG is high s/p IVF  Renal function improving   On ACE Monitor tia  planning for angio today. IVF as ordered  Monitor BMP  AVoid further nephrotoxics, NSAID RCA    Hypokalemia  Repelted KCl    MOnitor  serum K    Acidosis   improving   MOnitor serum Co2    hypomagnesemia  supplemented by team  monitor    hypophosphatemia  supplemented by team  monitor

## 2021-09-29 NOTE — PROGRESS NOTE ADULT - ASSESSMENT
59 y/o male, with a PmHx of CHF, HTN, CAD, HLD, DM, CKD, PVD with a Right Femoral-Femoral Bypass in 2018 on apixiban, presented to the [facility] ED with worsening Right Toe pain x 1 week. Admitted to medicine for gangrene of right foot 5th digit.

## 2021-09-29 NOTE — CHART NOTE - NSCHARTNOTEFT_GEN_A_CORE
POST-OPERATIVE NOTE    Patient is s/p RLE angiogram     Subjective:  Patient reports mild pain at the right foot, stable from prior. Denies pain of groin.     Vital Signs Last 24 Hrs  T(C): 36.9 (29 Sep 2021 15:29), Max: 37.1 (29 Sep 2021 05:15)  T(F): 98.5 (29 Sep 2021 15:29), Max: 98.8 (29 Sep 2021 05:15)  HR: 75 (29 Sep 2021 15:29) (74 - 77)  BP: 145/80 (29 Sep 2021 15:29) (120/69 - 150/74)  BP(mean): --  RR: 18 (29 Sep 2021 15:29) (17 - 18)  SpO2: 98% (29 Sep 2021 15:29) (98% - 100%)    I&O's Detail    28 Sep 2021 07:01  -  29 Sep 2021 07:00  --------------------------------------------------------  IN:    Oral Fluid: 708 mL  Total IN: 708 mL    OUT:    Voided (mL): 350 mL  Total OUT: 350 mL    Total NET: 358 mL      29 Sep 2021 07:01  -  29 Sep 2021 16:57  --------------------------------------------------------  IN:  Total IN: 0 mL    OUT:    Voided (mL): 100 mL  Total OUT: 100 mL    Total NET: -100 mL          Physical Exam:  General: NAD, resting comfortably in bed  Pulmonary: Nonlabored breathing, no respiratory distress  Abdominal: soft, nontender, nondistended  Extremities: right groin puncture site soft, nontender with no hematoma or bleeding, dressing c/d/i, palpable femoral pulse, right foot cool to touch, with dressing in place, toes slightly discolored      LABS:                        13.4   11.88 )-----------( 232      ( 29 Sep 2021 04:50 )             38.5     09-29    135  |  97<L>  |  16  ----------------------------<  145<H>  4.3   |  27  |  0.83    Ca    9.1      29 Sep 2021 04:50  Phos  2.1     09-29  Mg     1.70     09-29      PT/INR - ( 29 Sep 2021 04:50 )   PT: 11.5 sec;   INR: 1.01 ratio         PTT - ( 29 Sep 2021 04:50 )  PTT:81.9 sec      Assessment:  The patient is a 60y Male who is now several hours post-op from a RLE diagnostic angiogram. Patient stable with no bleeding or hematoma at the right groin access site.    Plan:  - Angiogram findings:  CFA patent, SFA occluded, profunda patent, popliteal occluded, AT occluded, TP trunk occluded, PT occluded, peroneal occluded  - Groin site without hematoma or bleeding. Please continue heparin gtt without initial bolus to achieve therapeutic anticoagulation  - Tentatively scheduled for iliac stents and possible bypass next Wednesday (10/6)  - Please obtain cardiac optimization for procedure. Will require TTE and NST per cards note.  - Ordered lower extremity vein mapping    Plan discussed with primary team.    C Team Surgery   [id number]

## 2021-09-29 NOTE — PROGRESS NOTE ADULT - SUBJECTIVE AND OBJECTIVE BOX
Patient is a 60y old  Male who presents with a chief complaint of Right Toe Necrosis (26 Sep 2021 10:35)  2021    HPI:    Afebrile.     PAST MEDICAL & SURGICAL HISTORY:  DM (diabetes mellitus)    HTN (hypertension)    HLD (hyperlipidemia)    CHF (congestive heart failure)    CKD (chronic kidney disease)    PVD (peripheral vascular disease)    S/P femoral-femoral bypass surgery  2018 - right leg        Review of Systems:   CONSTITUTIONAL: No fever, weight loss, or fatigue  EYES: No eye pain, visual disturbances, or discharge  ENMT:  No difficulty hearing, tinnitus, vertigo; No sinus or throat pain  NECK: No pain or stiffness  BREASTS: No pain, masses, or nipple discharge  RESPIRATORY: No cough, wheezing, chills or hemoptysis; No shortness of breath  CARDIOVASCULAR: No chest pain, palpitations, dizziness, or leg swelling  GASTROINTESTINAL: No abdominal or epigastric pain. No nausea, vomiting, or hematemesis; No diarrhea or constipation. No melena or hematochezia.  GENITOURINARY: No dysuria, frequency, hematuria, or incontinence  NEUROLOGICAL: No headaches, memory loss, loss of strength, numbness, or tremors  SKIN: No itching, burning, rashes, or lesions   LYMPH NODES: No enlarged glands  ENDOCRINE: No heat or cold intolerance; No hair loss  MUSCULOSKELETAL: No joint pain or swelling; Right foot dressed  PSYCHIATRIC: No depression, anxiety, mood swings, or difficulty sleeping  HEME/LYMPH: No easy bruising, or bleeding gums  ALLERY AND IMMUNOLOGIC: No hives or eczema    Allergies    penicillin (Other)    Intolerances        Social History:     FAMILY HISTORY:  Family history of MI (myocardial infarction)  Father    FH: renal cell carcinoma  Sister - s/p nephrectomy    Family history of depression  Brother    FHx: diabetes mellitus  Paternal Grandfather    FH: heart disease  Mother        MEDICATIONS  (STANDING):  aspirin enteric coated 81 milliGRAM(s) Oral daily  atorvastatin 40 milliGRAM(s) Oral at bedtime  cadexomer iodine 0.9% Gel 1 Application(s) Topical daily  carvedilol 25 milliGRAM(s) Oral every 12 hours  clindamycin IVPB 600 milliGRAM(s) IV Intermittent every 8 hours  dextrose 40% Gel 15 Gram(s) Oral once  dextrose 5%. 1000 milliLiter(s) (50 mL/Hr) IV Continuous <Continuous>  dextrose 5%. 1000 milliLiter(s) (100 mL/Hr) IV Continuous <Continuous>  dextrose 50% Injectable 25 Gram(s) IV Push once  dextrose 50% Injectable 12.5 Gram(s) IV Push once  dextrose 50% Injectable 25 Gram(s) IV Push once  glucagon  Injectable 1 milliGRAM(s) IntraMuscular once  heparin  Infusion.  Unit(s)/Hr (9 mL/Hr) IV Continuous <Continuous>  influenza   Vaccine 0.5 milliLiter(s) IntraMuscular once  insulin lispro (ADMELOG) corrective regimen sliding scale   SubCutaneous three times a day before meals  insulin lispro (ADMELOG) corrective regimen sliding scale   SubCutaneous at bedtime  lisinopril 10 milliGRAM(s) Oral daily  multivitamin 1 Tablet(s) Oral daily  sodium chloride 0.9%. 1000 milliLiter(s) (60 mL/Hr) IV Continuous <Continuous>    MEDICATIONS  (PRN):  acetaminophen   Tablet .. 650 milliGRAM(s) Oral every 6 hours PRN Temp greater or equal to 38C (100.4F), Mild Pain (1 - 3), Moderate Pain (4 - 6)  heparin   Injectable 4000 Unit(s) IV Push every 6 hours PRN For aPTT less than 40  heparin   Injectable 2000 Unit(s) IV Push every 6 hours PRN For aPTT between 40 - 57        CAPILLARY BLOOD GLUCOSE      POCT Blood Glucose.: 170 mg/dL (26 Sep 2021 12:31)  POCT Blood Glucose.: 116 mg/dL (26 Sep 2021 08:25)  POCT Blood Glucose.: 114 mg/dL (25 Sep 2021 22:52)  POCT Blood Glucose.: 101 mg/dL (25 Sep 2021 17:19)    I&O's Summary    26 Sep 2021 07:01  -  26 Sep 2021 12:36  --------------------------------------------------------  IN: 240 mL / OUT: 0 mL / NET: 240 mL        PHYSICAL EXAM:  Vital Signs Last 24 Hrs  T(C): 36.9 (26 Sep 2021 05:54), Max: 36.9 (26 Sep 2021 05:54)  T(F): 98.5 (26 Sep 2021 05:54), Max: 98.5 (26 Sep 2021 05:54)  HR: 70 (26 Sep 2021 05:54) (65 - 76)  BP: 150/76 (26 Sep 2021 05:54) (137/75 - 167/98)  BP(mean): --  RR: 17 (26 Sep 2021 05:54) (15 - 17)  SpO2: 100% (26 Sep 2021 05:54) (99% - 100%)    GENERAL: NAD, well-developed  HEAD:  Atraumatic, Normocephalic  EYES: EOMI, PERRLA, conjunctiva and sclera clear  NECK: Supple, No JVD  CHEST/LUNG: Clear to auscultation bilaterally; No wheeze  HEART: Regular rate and rhythm; No murmurs, rubs, or gallops  ABDOMEN: Soft, Nontender, Nondistended; Bowel sounds present  EXTREMITIES:  2+ Peripheral Pulses, No clubbing, cyanosis, or edema. right foot dressed  PSYCH: AAOx3  NEUROLOGY: non-focal  SKIN: No rashes or lesions    LABS:                        13.4   10.31 )-----------( 249      ( 26 Sep 2021 08:30 )             38.3     09-26    138  |  102  |  32<H>  ----------------------------<  126<H>  3.4<L>   |  23  |  1.27    Ca    9.5      26 Sep 2021 08:30  Phos  2.8       Mg     1.80         TPro  6.9  /  Alb  3.9  /  TBili  0.2  /  DBili  x   /  AST  14  /  ALT  8   /  AlkPhos  85  -26    PT/INR - ( 25 Sep 2021 12:42 )   PT: 11.0 sec;   INR: 0.95 ratio         PTT - ( 26 Sep 2021 11:13 )  PTT:32.7 sec      Urinalysis Basic - ( 25 Sep 2021 15:23 )    Color: Yellow / Appearance: Clear / S.021 / pH: x  Gluc: x / Ketone: Negative  / Bili: Negative / Urobili: <2 mg/dL   Blood: x / Protein: Trace / Nitrite: Negative   Leuk Esterase: Negative / RBC: 2 /HPF / WBC 3 /HPF   Sq Epi: x / Non Sq Epi: 1 /HPF / Bacteria: Negative        RADIOLOGY & ADDITIONAL TESTS:    Imaging Personally Reviewed:    Consultant(s) Notes Reviewed:      Care Discussed with Consultants/Other Providers:

## 2021-09-29 NOTE — CHART NOTE - NSCHARTNOTEFT_GEN_A_CORE
R. side groin check done. No erythema, bleeding or hematoma at site. Femoral pulse palpated. Toes wrapped.

## 2021-09-30 LAB
ANION GAP SERPL CALC-SCNC: 14 MMOL/L — SIGNIFICANT CHANGE UP (ref 7–14)
APTT BLD: 135.9 SEC — CRITICAL HIGH (ref 27–36.3)
APTT BLD: 81.4 SEC — HIGH (ref 27–36.3)
APTT BLD: 81.7 SEC — HIGH (ref 27–36.3)
BASOPHILS # BLD AUTO: 0.05 K/UL — SIGNIFICANT CHANGE UP (ref 0–0.2)
BASOPHILS NFR BLD AUTO: 0.4 % — SIGNIFICANT CHANGE UP (ref 0–2)
BUN SERPL-MCNC: 15 MG/DL — SIGNIFICANT CHANGE UP (ref 7–23)
CALCIUM SERPL-MCNC: 8.9 MG/DL — SIGNIFICANT CHANGE UP (ref 8.4–10.5)
CHLORIDE SERPL-SCNC: 97 MMOL/L — LOW (ref 98–107)
CO2 SERPL-SCNC: 23 MMOL/L — SIGNIFICANT CHANGE UP (ref 22–31)
CREAT SERPL-MCNC: 0.79 MG/DL — SIGNIFICANT CHANGE UP (ref 0.5–1.3)
CULTURE RESULTS: SIGNIFICANT CHANGE UP
CULTURE RESULTS: SIGNIFICANT CHANGE UP
EOSINOPHIL # BLD AUTO: 0.08 K/UL — SIGNIFICANT CHANGE UP (ref 0–0.5)
EOSINOPHIL NFR BLD AUTO: 0.7 % — SIGNIFICANT CHANGE UP (ref 0–6)
GLUCOSE SERPL-MCNC: 127 MG/DL — HIGH (ref 70–99)
HCT VFR BLD CALC: 37.3 % — LOW (ref 39–50)
HGB BLD-MCNC: 13.3 G/DL — SIGNIFICANT CHANGE UP (ref 13–17)
IANC: 9.06 K/UL — HIGH (ref 1.5–8.5)
IMM GRANULOCYTES NFR BLD AUTO: 0.4 % — SIGNIFICANT CHANGE UP (ref 0–1.5)
LYMPHOCYTES # BLD AUTO: 1.6 K/UL — SIGNIFICANT CHANGE UP (ref 1–3.3)
LYMPHOCYTES # BLD AUTO: 13.3 % — SIGNIFICANT CHANGE UP (ref 13–44)
MAGNESIUM SERPL-MCNC: 1.7 MG/DL — SIGNIFICANT CHANGE UP (ref 1.6–2.6)
MCHC RBC-ENTMCNC: 31.7 PG — SIGNIFICANT CHANGE UP (ref 27–34)
MCHC RBC-ENTMCNC: 35.7 GM/DL — SIGNIFICANT CHANGE UP (ref 32–36)
MCV RBC AUTO: 88.8 FL — SIGNIFICANT CHANGE UP (ref 80–100)
MONOCYTES # BLD AUTO: 1.17 K/UL — HIGH (ref 0–0.9)
MONOCYTES NFR BLD AUTO: 9.7 % — SIGNIFICANT CHANGE UP (ref 2–14)
NEUTROPHILS # BLD AUTO: 9.06 K/UL — HIGH (ref 1.8–7.4)
NEUTROPHILS NFR BLD AUTO: 75.5 % — SIGNIFICANT CHANGE UP (ref 43–77)
NRBC # BLD: 0 /100 WBCS — SIGNIFICANT CHANGE UP
NRBC # FLD: 0 K/UL — SIGNIFICANT CHANGE UP
PHOSPHATE SERPL-MCNC: 2 MG/DL — LOW (ref 2.5–4.5)
PLATELET # BLD AUTO: 209 K/UL — SIGNIFICANT CHANGE UP (ref 150–400)
POTASSIUM SERPL-MCNC: 3.9 MMOL/L — SIGNIFICANT CHANGE UP (ref 3.5–5.3)
POTASSIUM SERPL-SCNC: 3.9 MMOL/L — SIGNIFICANT CHANGE UP (ref 3.5–5.3)
RBC # BLD: 4.2 M/UL — SIGNIFICANT CHANGE UP (ref 4.2–5.8)
RBC # FLD: 13 % — SIGNIFICANT CHANGE UP (ref 10.3–14.5)
SODIUM SERPL-SCNC: 134 MMOL/L — LOW (ref 135–145)
SPECIMEN SOURCE: SIGNIFICANT CHANGE UP
SPECIMEN SOURCE: SIGNIFICANT CHANGE UP
WBC # BLD: 12.01 K/UL — HIGH (ref 3.8–10.5)
WBC # FLD AUTO: 12.01 K/UL — HIGH (ref 3.8–10.5)

## 2021-09-30 PROCEDURE — 93016 CV STRESS TEST SUPVJ ONLY: CPT | Mod: GC

## 2021-09-30 PROCEDURE — 99231 SBSQ HOSP IP/OBS SF/LOW 25: CPT

## 2021-09-30 PROCEDURE — 78452 HT MUSCLE IMAGE SPECT MULT: CPT | Mod: 26

## 2021-09-30 PROCEDURE — 93018 CV STRESS TEST I&R ONLY: CPT | Mod: GC

## 2021-09-30 PROCEDURE — 36246 INS CATH ABD/L-EXT ART 2ND: CPT

## 2021-09-30 RX ORDER — OXYCODONE HYDROCHLORIDE 5 MG/1
5 TABLET ORAL EVERY 4 HOURS
Refills: 0 | Status: DISCONTINUED | OUTPATIENT
Start: 2021-09-30 | End: 2021-10-07

## 2021-09-30 RX ADMIN — HEPARIN SODIUM 0 UNIT(S)/HR: 5000 INJECTION INTRAVENOUS; SUBCUTANEOUS at 05:42

## 2021-09-30 RX ADMIN — HEPARIN SODIUM 900 UNIT(S)/HR: 5000 INJECTION INTRAVENOUS; SUBCUTANEOUS at 19:51

## 2021-09-30 RX ADMIN — OXYCODONE HYDROCHLORIDE 5 MILLIGRAM(S): 5 TABLET ORAL at 19:00

## 2021-09-30 RX ADMIN — Medication 2: at 18:12

## 2021-09-30 RX ADMIN — OXYCODONE HYDROCHLORIDE 5 MILLIGRAM(S): 5 TABLET ORAL at 00:27

## 2021-09-30 RX ADMIN — CEFEPIME 100 MILLIGRAM(S): 1 INJECTION, POWDER, FOR SOLUTION INTRAMUSCULAR; INTRAVENOUS at 13:16

## 2021-09-30 RX ADMIN — HEPARIN SODIUM 900 UNIT(S)/HR: 5000 INJECTION INTRAVENOUS; SUBCUTANEOUS at 13:31

## 2021-09-30 RX ADMIN — OXYCODONE HYDROCHLORIDE 5 MILLIGRAM(S): 5 TABLET ORAL at 18:11

## 2021-09-30 RX ADMIN — CARVEDILOL PHOSPHATE 25 MILLIGRAM(S): 80 CAPSULE, EXTENDED RELEASE ORAL at 18:11

## 2021-09-30 RX ADMIN — OXYCODONE HYDROCHLORIDE 5 MILLIGRAM(S): 5 TABLET ORAL at 06:41

## 2021-09-30 RX ADMIN — OXYCODONE HYDROCHLORIDE 5 MILLIGRAM(S): 5 TABLET ORAL at 23:00

## 2021-09-30 RX ADMIN — Medication 500 MILLIGRAM(S): at 21:28

## 2021-09-30 RX ADMIN — CARVEDILOL PHOSPHATE 25 MILLIGRAM(S): 80 CAPSULE, EXTENDED RELEASE ORAL at 05:42

## 2021-09-30 RX ADMIN — CEFEPIME 100 MILLIGRAM(S): 1 INJECTION, POWDER, FOR SOLUTION INTRAMUSCULAR; INTRAVENOUS at 05:41

## 2021-09-30 RX ADMIN — ATORVASTATIN CALCIUM 40 MILLIGRAM(S): 80 TABLET, FILM COATED ORAL at 21:28

## 2021-09-30 RX ADMIN — Medication 1 TABLET(S): at 13:16

## 2021-09-30 RX ADMIN — OXYCODONE HYDROCHLORIDE 5 MILLIGRAM(S): 5 TABLET ORAL at 13:16

## 2021-09-30 RX ADMIN — Medication 2: at 13:16

## 2021-09-30 RX ADMIN — Medication 500 MILLIGRAM(S): at 13:16

## 2021-09-30 RX ADMIN — LISINOPRIL 10 MILLIGRAM(S): 2.5 TABLET ORAL at 05:42

## 2021-09-30 RX ADMIN — Medication 500 MILLIGRAM(S): at 05:42

## 2021-09-30 RX ADMIN — OXYCODONE HYDROCHLORIDE 5 MILLIGRAM(S): 5 TABLET ORAL at 05:41

## 2021-09-30 RX ADMIN — Medication 81 MILLIGRAM(S): at 13:16

## 2021-09-30 RX ADMIN — SENNA PLUS 2 TABLET(S): 8.6 TABLET ORAL at 21:28

## 2021-09-30 RX ADMIN — HEPARIN SODIUM 900 UNIT(S)/HR: 5000 INJECTION INTRAVENOUS; SUBCUTANEOUS at 06:42

## 2021-09-30 RX ADMIN — OXYCODONE HYDROCHLORIDE 5 MILLIGRAM(S): 5 TABLET ORAL at 22:12

## 2021-09-30 RX ADMIN — CEFEPIME 100 MILLIGRAM(S): 1 INJECTION, POWDER, FOR SOLUTION INTRAMUSCULAR; INTRAVENOUS at 21:29

## 2021-09-30 RX ADMIN — OXYCODONE HYDROCHLORIDE 5 MILLIGRAM(S): 5 TABLET ORAL at 14:16

## 2021-09-30 NOTE — PROVIDER CONTACT NOTE (CRITICAL VALUE NOTIFICATION) - SITUATION
Positive wound culture on right foot collected 9/25 grew Pseudomonas aeruginosa (Carbapenem Resistant), Providencia rettgeri, Few Pseudomonas aeruginosa (Carbapenem Resistant), Moderate Providencia rettgeri, Moderate Bacteroides fragilis
aPTT 135.9

## 2021-09-30 NOTE — PROGRESS NOTE ADULT - SUBJECTIVE AND OBJECTIVE BOX
[facility] NEPHROLOGY [facility]ACT[facility]   MD RAKAN SHEPHERD PA    TEL:  OFFICE: [phone]  DR FELICIANO CELL: [phone]  DR. PATIÑO CELL: [phone]  JANNETH SÁNCHEZ CELL: [phone]    From 5pm-7am Answering Service [phone]    -- RENAL FOLLOW UP NOTE ---Date of Service 09-30-21 @ 11:57    Patient is a 60y old  Male who presents with a chief complaint of Right Toe Necrosis (29 Sep 2021 18:01)      Patient seen and examined at bedside. No chest pain/sob    VITALS:  T(F): 98.2 (09-30-21 @ 05:40), Max: 98.7 (09-29-21 @ 17:45)  HR: 67 (09-30-21 @ 05:40)  BP: 150/67 (09-30-21 @ 05:40)  RR: 16 (09-30-21 @ 05:40)  SpO2: 100% (09-30-21 @ 05:40)  Wt(kg): --    09-29 @ 07:01  -  09-30 @ 07:00  --------------------------------------------------------  IN: 701 mL / OUT: 1100 mL / NET: -399 mL          PHYSICAL EXAM:  Constitutional: NAD  Neck: No JVD  Respiratory: CTAB, no wheezes, rales or rhonchi  Cardiovascular: S1, S2, RRR  Gastrointestinal: BS+, soft, NT/ND  Extremities: No peripheral edema, right foot dressing d/c/i    Hospital Medications:   MEDICATIONS  (STANDING):  aspirin enteric coated 81 milliGRAM(s) Oral daily  atorvastatin 40 milliGRAM(s) Oral at bedtime  cadexomer iodine 0.9% Gel 1 Application(s) Topical daily  carvedilol 25 milliGRAM(s) Oral every 12 hours  cefepime   IVPB 2000 milliGRAM(s) IV Intermittent every 8 hours  dextrose 40% Gel 15 Gram(s) Oral once  dextrose 5%. 1000 milliLiter(s) (50 mL/Hr) IV Continuous <Continuous>  dextrose 5%. 1000 milliLiter(s) (100 mL/Hr) IV Continuous <Continuous>  dextrose 50% Injectable 25 Gram(s) IV Push once  dextrose 50% Injectable 12.5 Gram(s) IV Push once  dextrose 50% Injectable 25 Gram(s) IV Push once  glucagon  Injectable 1 milliGRAM(s) IntraMuscular once  heparin  Infusion.  Unit(s)/Hr (9 mL/Hr) IV Continuous <Continuous>  influenza   Vaccine 0.5 milliLiter(s) IntraMuscular once  insulin lispro (ADMELOG) corrective regimen sliding scale   SubCutaneous three times a day before meals  insulin lispro (ADMELOG) corrective regimen sliding scale   SubCutaneous at bedtime  lisinopril 10 milliGRAM(s) Oral daily  metroNIDAZOLE    Tablet 500 milliGRAM(s) Oral every 8 hours  multivitamin 1 Tablet(s) Oral daily  polyethylene glycol 3350 17 Gram(s) Oral daily  senna 2 Tablet(s) Oral at bedtime  sodium chloride 0.9%. 1000 milliLiter(s) (75 mL/Hr) IV Continuous <Continuous>      LABS:  09-30    134<L>  |  97<L>  |  15  ----------------------------<  127<H>  3.9   |  23  |  0.79    Ca    8.9      30 Sep 2021 04:43  Phos  2.0     09-30  Mg     1.70     09-30      Creatinine Trend: 0.79 <--, 0.83 <--, 0.74 <--, 0.84 <--, 1.27 <--, 1.99 <--    Phosphorus Level, Serum: 2.0 mg/dL (09-30 @ 04:43)                              13.3   12.01 )-----------( 209      ( 30 Sep 2021 04:43 )             37.3     Urine Studies:  Urinalysis - [09-25-21 @ 15:23]      Color Yellow / Appearance Clear / SG 1.021 / pH 5.5      Gluc Negative / Ketone Negative  / Bili Negative / Urobili <2 mg/dL       Blood Negative / Protein Trace / Leuk Est Negative / Nitrite Negative      RBC 2 / WBC 3 / Hyaline 1 / Gran  / Sq Epi  / Non Sq Epi 1 / Bacteria Negative      HbA1c 5.7      [12-20-18 @ 06:30]  TSH 0.76      [09-25-21 @ 12:52]  Lipid: chol 139, , HDL 26, LDL --      [09-26-21 @ 08:30]    HCV 0.09, Nonreact      [09-26-21 @ 19:55]      RADIOLOGY & ADDITIONAL STUDIES:

## 2021-09-30 NOTE — PROGRESS NOTE ADULT - ASSESSMENT
59yo M with Hx CHF, CAD (no stents), HTN, HLD, DM, PVD with h/o right fem-pop bypass ([facility] 2018) who presents with non-healing R toe wound, found to have dry gangrene with small area of wet conversion and bypass occlusion now PPD1 s/p diagnostic RLE angiogram.    Plan/Recommendations:  - Angiogram findings:  CFA patent, SFA occluded, profunda patent, popliteal occluded, AT occluded, TP trunk occluded, PT occluded, peroneal occluded  - Continue heparin gtt for therapeutic AC  - Tentatively scheduled for iliac stents and possible bypass next Wednesday (10/6)  - Undergoing cardiac evaluation for preoperative risk stratification for surgery. TTE and NST pending.  - Lower extremity vein mapping pending    C Team Surgery   [id number]

## 2021-09-30 NOTE — PROGRESS NOTE ADULT - ASSESSMENT
Attending addendum:   Agree with above  Plans for LE bypass per vascular surgery  Check TTE and NST to assess cardiac risk  Further workup pending above    Imani Cesar MD

## 2021-09-30 NOTE — PROGRESS NOTE ADULT - SUBJECTIVE AND OBJECTIVE BOX
[facility], Division of Infectious Diseases  CHINEDU Fernandes Y. Patel, S. Shah  [phone]  ([phone] - weekdays after 5pm and weekends)    Name: EVE DELGADO  Age/Gender: 60y Male  MRN: [id number]    Interval History:  Patient seen this morning, feels fine.   No new complaints. Notes reviewed  Afebrile. S/p angiogram yesterday.     Allergies: penicillin (Other)    Objective:  Vitals:   T(F): 98.3 (09-30-21 @ 13:13), Max: 98.7 (09-29-21 @ 17:45)  HR: 84 (09-30-21 @ 13:13) (67 - 87)  BP: 113/65 (09-30-21 @ 13:13) (113/65 - 150/67)  RR: 16 (09-30-21 @ 13:13) (16 - 18)  SpO2: 100% (09-30-21 @ 13:13) (98% - 100%)  Physical Examination:  General: no acute distress, nontoxic  HEENT: NC/AT, anicteric, neck supple  Respiratory: no acc muscle use, breathing comfortably  Cardiovascular: S1 and S2 present  Gastrointestinal: normal appearing, nondistended  Extremities: R foot with clean dressing, no edema  Skin: no visible rash    Laboratory Studies:  CBC:                       13.3   12.01 )-----------( 209      ( 30 Sep 2021 04:43 )             37.3     WBC Trend:  12.01 09-30-21 @ 04:43  11.70 09-29-21 @ 21:14  11.88 09-29-21 @ 04:50  9.71 09-28-21 @ 08:39  13.85 09-27-21 @ 08:06  10.40 09-26-21 @ 23:52  10.31 09-26-21 @ 08:30  12.67 09-25-21 @ 12:42    CMP: 09-30    134<L>  |  97<L>  |  15  ----------------------------<  127<H>  3.9   |  23  |  0.79    Ca    8.9      30 Sep 2021 04:43  Phos  2.0     09-30  Mg     1.70     09-30    Microbiology: reviewed   Culture - Abscess with Gram Stain (collected 09-25-21 @ 18:21)  Source: .Abscess Right foot 5th digit wound  Final Report (09-27-21 @ 22:07):    Few Pseudomonas aeruginosa (Carbapenem Resistant)    Moderate Providencia rettgeri    Moderate Bacteroides fragilis "Susceptibilities not performed"    Normal skin uriah isolated  Organism: Pseudomonas aeruginosa (Carbapenem Resistant)  Providencia rettgeri (09-27-21 @ 22:07)  Organism: Providencia rettgeri (09-27-21 @ 22:07)      -  Amikacin: S <=16      -  Amoxicillin/Clavulanic Acid: R 16/8      -  Ampicillin: R >16 These ampicillin results predict results for amoxicillin      -  Ampicillin/Sulbactam: S <=4/2 Enterobacter, Citrobacter, and Serratia may develop resistance during prolonged therapy (3-4 days)      -  Aztreonam: I 16      -  Cefazolin: R >16 Enterobacter, Citrobacter, and Serratia may develop resistance during prolonged therapy (3-4 days)      -  Cefepime: S <=2      -  Cefoxitin: S <=8      -  Ceftriaxone: S <=1 Enterobacter, Citrobacter, and Serratia may develop resistance during prolonged therapy      -  Ciprofloxacin: S <=0.25      -  Ertapenem: S <=0.5      -  Gentamicin: S <=2      -  Imipenem: S <=1      -  Levofloxacin: S <=0.5      -  Meropenem: S <=1      -  Piperacillin/Tazobactam: S <=8      -  Tobramycin: S <=2      -  Trimethoprim/Sulfamethoxazole: S <=0.5/9.5      Method Type: CAM  Organism: Pseudomonas aeruginosa (Carbapenem Resistant) (09-27-21 @ 22:07)      -  Amikacin: S <=16      -  Aztreonam: I 16      -  Cefepime: S 8      -  Ceftazidime: S 4      -  Ciprofloxacin: S 0.5      -  Gentamicin: S <=2      -  Imipenem: R >8      -  Levofloxacin: I 2      -  Meropenem: R >8      -  Piperacillin/Tazobactam: S 16      -  Tobramycin: S <=2      Method Type: CAM    Culture - Blood (collected 09-25-21 @ 16:19)  Source: .Blood Blood-Peripheral  Preliminary Report (09-26-21 @ 17:01):    No growth to date.    Culture - Blood (collected 09-25-21 @ 16:19)  Source: .Blood Blood-Peripheral  Preliminary Report (09-26-21 @ 17:01):    No growth to date.    Radiology: reviewed     Medications:  acetaminophen   Tablet .. 650 milliGRAM(s) Oral every 6 hours PRN  aspirin enteric coated 81 milliGRAM(s) Oral daily  atorvastatin 40 milliGRAM(s) Oral at bedtime  cadexomer iodine 0.9% Gel 1 Application(s) Topical daily  carvedilol 25 milliGRAM(s) Oral every 12 hours  cefepime   IVPB 2000 milliGRAM(s) IV Intermittent every 8 hours  dextrose 40% Gel 15 Gram(s) Oral once  dextrose 5%. 1000 milliLiter(s) IV Continuous <Continuous>  dextrose 5%. 1000 milliLiter(s) IV Continuous <Continuous>  dextrose 50% Injectable 25 Gram(s) IV Push once  dextrose 50% Injectable 12.5 Gram(s) IV Push once  dextrose 50% Injectable 25 Gram(s) IV Push once  glucagon  Injectable 1 milliGRAM(s) IntraMuscular once  heparin   Injectable 4000 Unit(s) IV Push every 6 hours PRN  heparin   Injectable 2000 Unit(s) IV Push every 6 hours PRN  heparin  Infusion.  Unit(s)/Hr IV Continuous <Continuous>  influenza   Vaccine 0.5 milliLiter(s) IntraMuscular once  insulin lispro (ADMELOG) corrective regimen sliding scale   SubCutaneous three times a day before meals  insulin lispro (ADMELOG) corrective regimen sliding scale   SubCutaneous at bedtime  lisinopril 10 milliGRAM(s) Oral daily  metroNIDAZOLE    Tablet 500 milliGRAM(s) Oral every 8 hours  multivitamin 1 Tablet(s) Oral daily  oxyCODONE    IR 5 milliGRAM(s) Oral every 4 hours PRN  polyethylene glycol 3350 17 Gram(s) Oral daily  senna 2 Tablet(s) Oral at bedtime  sodium chloride 0.9%. 1000 milliLiter(s) IV Continuous <Continuous>    Antimicrobials:  cefepime   IVPB 2000 milliGRAM(s) IV Intermittent every 8 hours  metroNIDAZOLE    Tablet 500 milliGRAM(s) Oral every 8 hours

## 2021-09-30 NOTE — PROVIDER CONTACT NOTE (CRITICAL VALUE NOTIFICATION) - BACKGROUND
Pt on heparin drip, currently running at 10cc/hr
Patient admitted with right gangrene. Currently on antibiotics

## 2021-09-30 NOTE — PROGRESS NOTE ADULT - ASSESSMENT
59 y/o male, with a PmHx of CHF, HTN, CAD, HLD, DM, CKD, PVD with a Right Femoral-Femoral Bypass in 2018 on apixiban, presented to the [facility] ED with worsening Right Toe pain x 1 week. Admitted to medicine for gangrene of right foot 5th digit.

## 2021-09-30 NOTE — PROGRESS NOTE ADULT - SUBJECTIVE AND OBJECTIVE BOX
Patient is a 60y old  Male who presents with a chief complaint of Right Toe Necrosis (26 Sep 2021 10:35)  2021    HPI:    Afebrile.   S/p peripheral angiogram.    PAST MEDICAL & SURGICAL HISTORY:  DM (diabetes mellitus)    HTN (hypertension)    HLD (hyperlipidemia)    CHF (congestive heart failure)    CKD (chronic kidney disease)    PVD (peripheral vascular disease)    S/P femoral-femoral bypass surgery  2018 - right leg        Review of Systems:   CONSTITUTIONAL: No fever, weight loss, or fatigue  EYES: No eye pain, visual disturbances, or discharge  ENMT:  No difficulty hearing, tinnitus, vertigo; No sinus or throat pain  NECK: No pain or stiffness  BREASTS: No pain, masses, or nipple discharge  RESPIRATORY: No cough, wheezing, chills or hemoptysis; No shortness of breath  CARDIOVASCULAR: No chest pain, palpitations, dizziness, or leg swelling  GASTROINTESTINAL: No abdominal or epigastric pain. No nausea, vomiting, or hematemesis; No diarrhea or constipation. No melena or hematochezia.  GENITOURINARY: No dysuria, frequency, hematuria, or incontinence  NEUROLOGICAL: No headaches, memory loss, loss of strength, numbness, or tremors  SKIN: No itching, burning, rashes, or lesions   LYMPH NODES: No enlarged glands  ENDOCRINE: No heat or cold intolerance; No hair loss  MUSCULOSKELETAL: No joint pain or swelling; Right foot dressed  PSYCHIATRIC: No depression, anxiety, mood swings, or difficulty sleeping  HEME/LYMPH: No easy bruising, or bleeding gums  ALLERY AND IMMUNOLOGIC: No hives or eczema    Allergies    penicillin (Other)    Intolerances        Social History:     FAMILY HISTORY:  Family history of MI (myocardial infarction)  Father    FH: renal cell carcinoma  Sister - s/p nephrectomy    Family history of depression  Brother    FHx: diabetes mellitus  Paternal Grandfather    FH: heart disease  Mother        MEDICATIONS  (STANDING):  aspirin enteric coated 81 milliGRAM(s) Oral daily  atorvastatin 40 milliGRAM(s) Oral at bedtime  cadexomer iodine 0.9% Gel 1 Application(s) Topical daily  carvedilol 25 milliGRAM(s) Oral every 12 hours  clindamycin IVPB 600 milliGRAM(s) IV Intermittent every 8 hours  dextrose 40% Gel 15 Gram(s) Oral once  dextrose 5%. 1000 milliLiter(s) (50 mL/Hr) IV Continuous <Continuous>  dextrose 5%. 1000 milliLiter(s) (100 mL/Hr) IV Continuous <Continuous>  dextrose 50% Injectable 25 Gram(s) IV Push once  dextrose 50% Injectable 12.5 Gram(s) IV Push once  dextrose 50% Injectable 25 Gram(s) IV Push once  glucagon  Injectable 1 milliGRAM(s) IntraMuscular once  heparin  Infusion.  Unit(s)/Hr (9 mL/Hr) IV Continuous <Continuous>  influenza   Vaccine 0.5 milliLiter(s) IntraMuscular once  insulin lispro (ADMELOG) corrective regimen sliding scale   SubCutaneous three times a day before meals  insulin lispro (ADMELOG) corrective regimen sliding scale   SubCutaneous at bedtime  lisinopril 10 milliGRAM(s) Oral daily  multivitamin 1 Tablet(s) Oral daily  sodium chloride 0.9%. 1000 milliLiter(s) (60 mL/Hr) IV Continuous <Continuous>    MEDICATIONS  (PRN):  acetaminophen   Tablet .. 650 milliGRAM(s) Oral every 6 hours PRN Temp greater or equal to 38C (100.4F), Mild Pain (1 - 3), Moderate Pain (4 - 6)  heparin   Injectable 4000 Unit(s) IV Push every 6 hours PRN For aPTT less than 40  heparin   Injectable 2000 Unit(s) IV Push every 6 hours PRN For aPTT between 40 - 57        CAPILLARY BLOOD GLUCOSE      POCT Blood Glucose.: 170 mg/dL (26 Sep 2021 12:31)  POCT Blood Glucose.: 116 mg/dL (26 Sep 2021 08:25)  POCT Blood Glucose.: 114 mg/dL (25 Sep 2021 22:52)  POCT Blood Glucose.: 101 mg/dL (25 Sep 2021 17:19)    I&O's Summary    26 Sep 2021 07:01  -  26 Sep 2021 12:36  --------------------------------------------------------  IN: 240 mL / OUT: 0 mL / NET: 240 mL        PHYSICAL EXAM:  Vital Signs Last 24 Hrs  T(C): 36.9 (26 Sep 2021 05:54), Max: 36.9 (26 Sep 2021 05:54)  T(F): 98.5 (26 Sep 2021 05:54), Max: 98.5 (26 Sep 2021 05:54)  HR: 70 (26 Sep 2021 05:54) (65 - 76)  BP: 150/76 (26 Sep 2021 05:54) (137/75 - 167/98)  BP(mean): --  RR: 17 (26 Sep 2021 05:54) (15 - 17)  SpO2: 100% (26 Sep 2021 05:54) (99% - 100%)    GENERAL: NAD, well-developed  HEAD:  Atraumatic, Normocephalic  EYES: EOMI, PERRLA, conjunctiva and sclera clear  NECK: Supple, No JVD  CHEST/LUNG: Clear to auscultation bilaterally; No wheeze  HEART: Regular rate and rhythm; No murmurs, rubs, or gallops  ABDOMEN: Soft, Nontender, Nondistended; Bowel sounds present  EXTREMITIES:  2+ Peripheral Pulses, No clubbing, cyanosis, or edema. right foot dressed  PSYCH: AAOx3  NEUROLOGY: non-focal  SKIN: No rashes or lesions    LABS:                        13.4   10.31 )-----------( 249      ( 26 Sep 2021 08:30 )             38.3     09-    138  |  102  |  32<H>  ----------------------------<  126<H>  3.4<L>   |  23  |  1.27    Ca    9.5      26 Sep 2021 08:30  Phos  2.8       Mg     1.80         TPro  6.9  /  Alb  3.9  /  TBili  0.2  /  DBili  x   /  AST  14  /  ALT  8   /  AlkPhos  85  09-26    PT/INR - ( 25 Sep 2021 12:42 )   PT: 11.0 sec;   INR: 0.95 ratio         PTT - ( 26 Sep 2021 11:13 )  PTT:32.7 sec      Urinalysis Basic - ( 25 Sep 2021 15:23 )    Color: Yellow / Appearance: Clear / S.021 / pH: x  Gluc: x / Ketone: Negative  / Bili: Negative / Urobili: <2 mg/dL   Blood: x / Protein: Trace / Nitrite: Negative   Leuk Esterase: Negative / RBC: 2 /HPF / WBC 3 /HPF   Sq Epi: x / Non Sq Epi: 1 /HPF / Bacteria: Negative        RADIOLOGY & ADDITIONAL TESTS:    Imaging Personally Reviewed:    Consultant(s) Notes Reviewed:      Care Discussed with Consultants/Other Providers:

## 2021-09-30 NOTE — PROGRESS NOTE ADULT - SUBJECTIVE AND OBJECTIVE BOX
POD #1    SUBJECTIVE:  No acute complaints. Endorses mild R foot pain. Denies groin pain.    OBJECTIVE:  Vital Signs Last 24 Hrs  T(C): 36.8 (30 Sep 2021 13:13), Max: 36.8 (29 Sep 2021 21:50)  T(F): 98.3 (30 Sep 2021 13:13), Max: 98.3 (29 Sep 2021 21:50)  HR: 84 (30 Sep 2021 13:13) (67 - 84)  BP: 113/65 (30 Sep 2021 13:13) (113/65 - 150/67)  BP(mean): --  RR: 16 (30 Sep 2021 13:13) (16 - 18)  SpO2: 100% (30 Sep 2021 13:13) (100% - 100%)      09-29-21 @ 07:01  -  09-30-21 @ 07:00  --------------------------------------------------------  IN: 701 mL / OUT: 1100 mL / NET: -399 mL        Physical Examination:  GEN: NAD, resting quietly  PULM: symmetric chest rise bilaterally, no increased WOB  ABD: soft, nontender  EXTR: right groin access site c/d/i without hematoma or palpable mass, palpable femoral pulse      LABS:                        13.3   12.01 )-----------( 209      ( 30 Sep 2021 04:43 )             37.3       09-30    134<L>  |  97<L>  |  15  ----------------------------<  127<H>  3.9   |  23  |  0.79    Ca    8.9      30 Sep 2021 04:43  Phos  2.0     09-30  Mg     1.70     09-30

## 2021-09-30 NOTE — PROGRESS NOTE ADULT - ASSESSMENT
59 y/o male, with a PmHx of CHF, HTN, CAD, HLD, DM, CKD, PVD with a Right Femoral-Femoral Bypass in 2018 on apixiban, presented to the [facility] ED with worsening Right Toe pain x 1 week.      Ravin   pt with multiple Ravin in past   RAVIN possible prerenal as SG is high s/p IVF  Renal function improving   On ACE Monitor closley  s/p angio 9/29. renal function stable  Monitor BMP  AVoid further nephrotoxics, NSAID RCA    Hypokalemia  Repelted KCl    MOnitor  serum K    Acidosis   improving   MOnitor serum Co2    hypomagnesemia  supplemented by team  monitor    hypophosphatemia  supplemented by team  monitor    hyponatremia  monitor for know  further work up if worsen

## 2021-09-30 NOTE — PROGRESS NOTE ADULT - SUBJECTIVE AND OBJECTIVE BOX
Date of service: 09/30/21    chief complaint:  right toe pain    extended hpi: 59 y/o male, with a PmHx of HTN, CAD, reported years ago, no hx PCI, managed medically, HLD, DM, CKD, PVD with a Right Femoral-Femoral Bypass in 2018 on apixiban, presented to the [facility] ED with worsening Right Toe pain x 1 week.      S: no chest pain or sob; ros otherwise negative.     Review of Systems:   Constitutional: [ ] fevers, [ ] chills.   Skin: [ ] dry skin. [ ] rashes.  Psychiatric: [ ] depression, [ ] anxiety.   Gastrointestinal: [ ] BRBPR, [ ] melena.   Neurological: [ ] confusion. [ ] seizures. [ ] shuffling gait.   Ears,Nose,Mouth and Throat: [ ] ear pain [ ] sore throat.   Eyes: [ ] diplopia.   Respiratory: [ ] hemoptysis. [ ] shortness of breath  Cardiovascular: See HPI above  Hematologic/Lymphatic: [ ] anemia. [ ] painful nodes. [ ] prolonged bleeding.   Genitourinary: [ ] hematuria. [ ] flank pain.   Endocrine: [ ] significant change in weight. [ ] intolerance to heat and cold.     Review of systems [x ] otherwise negative, [ ] otherwise unable to obtain    FH: no family history of sudden cardiac death in first degree relatives    SH: [ ] tobacco, [ ] alcohol, [ ] drugs    acetaminophen   Tablet .. 650 milliGRAM(s) Oral every 6 hours PRN  aspirin enteric coated 81 milliGRAM(s) Oral daily  atorvastatin 40 milliGRAM(s) Oral at bedtime  cadexomer iodine 0.9% Gel 1 Application(s) Topical daily  carvedilol 25 milliGRAM(s) Oral every 12 hours  cefepime   IVPB 2000 milliGRAM(s) IV Intermittent every 8 hours  dextrose 40% Gel 15 Gram(s) Oral once  dextrose 5%. 1000 milliLiter(s) IV Continuous <Continuous>  dextrose 5%. 1000 milliLiter(s) IV Continuous <Continuous>  dextrose 50% Injectable 25 Gram(s) IV Push once  dextrose 50% Injectable 12.5 Gram(s) IV Push once  dextrose 50% Injectable 25 Gram(s) IV Push once  glucagon  Injectable 1 milliGRAM(s) IntraMuscular once  heparin   Injectable 4000 Unit(s) IV Push every 6 hours PRN  heparin   Injectable 2000 Unit(s) IV Push every 6 hours PRN  heparin  Infusion.  Unit(s)/Hr IV Continuous <Continuous>  influenza   Vaccine 0.5 milliLiter(s) IntraMuscular once  insulin lispro (ADMELOG) corrective regimen sliding scale   SubCutaneous three times a day before meals  insulin lispro (ADMELOG) corrective regimen sliding scale   SubCutaneous at bedtime  lisinopril 10 milliGRAM(s) Oral daily  metroNIDAZOLE    Tablet 500 milliGRAM(s) Oral every 8 hours  multivitamin 1 Tablet(s) Oral daily  oxyCODONE    IR 5 milliGRAM(s) Oral every 4 hours PRN  polyethylene glycol 3350 17 Gram(s) Oral daily  senna 2 Tablet(s) Oral at bedtime  sodium chloride 0.9%. 1000 milliLiter(s) IV Continuous <Continuous>                            13.4   11.88 )-----------( 232      ( 29 Sep 2021 04:50 )             38.5       09-29    135  |  97<L>  |  16  ----------------------------<  145<H>  4.3   |  27  |  0.83    Ca    9.1      29 Sep 2021 04:50  Phos  2.1     09-29  Mg     1.70     09-29      T(C): 36.9 (09-29-21 @ 15:29), Max: 37.1 (09-29-21 @ 05:15)  HR: 75 (09-29-21 @ 15:29) (74 - 77)  BP: 145/80 (09-29-21 @ 15:29) (120/69 - 150/74)  RR: 18 (09-29-21 @ 15:29) (17 - 18)  SpO2: 98% (09-29-21 @ 15:29) (98% - 100%)  Wt(kg): --    General: Well nourished in no acute distress. Alert and Oriented * 3.   Head: Normocephalic and atraumatic.   Neck: No JVD. No bruits. Supple. Does not appear to be enlarged.   Cardiovascular: + S1,S2 ; RRR Soft systolic murmur at the left lower sternal border. No rubs noted.    Lungs: CTA b/l. No rhonchi, rales or wheezes.   Abdomen: + BS, soft. Non tender. Non distended. No rebound. No guarding.   Extremities: No clubbing/cyanosis/edema.   Neurologic: Moves all four extremities. Full range of motion.   Skin: Warm and moist. The patient's skin has normal elasticity and good skin turgor.   Psychiatric: Appropriate mood and affect.  Musculoskeletal: Normal range of motion, normal strength    Tele: not on tele    TTE: < from: Transthoracic Echocardiogram (12.21.18 @ 20:39) >  1. Normal left ventricular internal dimensions and wall  thicknesses.  2. Normal left ventricular systolic function. No segmental  wall motion abnormalities.  3. Increased E/e'  is consistent with elevated left  ventricular filling pressure.  4. Normal right ventricular size and function.  *** No previous Echo exam.  -------------------------------------    < end of copied text >    A/P: 59 yo male with a PmHx of HTN, CAD, reported years ago, no hx PCI, managed medically, HLD, DM, CKD, PVD with a Right Femoral-Femoral Bypass in 2018 on apixiban, presented to the [facility] ED with worsening Right Toe pain x 1 week, CT with occluded bypass.  Now awaiting LE angiogram and further work up from Vascular.    --s/p LE angio  --vascular sx is being planned (Le bypass)  --recommend check TTE and NST

## 2021-09-30 NOTE — PROGRESS NOTE ADULT - ASSESSMENT
Patient is a 60 year old male, with PMH of CHF, HTN, CAD, HLD, DM, CKD, PVD with a Right Femoral-Femoral Bypass in 2018 on apixiban, presented to the [facility] ED with worsening R toe pain for 1 week and was admitted for R 5th toe gangrene.     R toe gangrene d/t PVD with cellulitis  Leukocytosis due to above    - had wound for a month, worsening over last week with erythema and pain - outpt podiatrist started clindamycin and referred to ER   - R foot xray reviewed - no evidence of OM noted, consider MRI   - R foot wound culture with CRE Pseudomonas, Providencia and Bacteroides - sensitivities as above    - CTA reviewed as above, noted with stenosis and occlusion   - Vascular surgery following - s/p angiogram 9/29 -- plan for iliac stents and possible bypass next Wed 10/6   - Podiatry following - s/p excisional debridement at bedside    -- plan for partial 5th ray resection pending vascular recs - please send for bone culture and biopsy    - continue cefepime 2g IV 8h and metronidazole 500mg PO Q8h   - monitor clinically, temps, wbc    CKD    - monitor Cr, renally adjusted meds/abx    DM   - Blood glucose management per primary team.     H/o PCN allergy    - d/w pt and brother - exact reaction unknown, reports graying of skin, denies anaphylaxis   - tolerated cefepime without issues         Brett Kruse M.D.  [facility], Division of Infectious Diseases  [phone]  After 5pm on weekdays and all day on weekends - please call [phone]

## 2021-10-01 LAB
ANION GAP SERPL CALC-SCNC: 13 MMOL/L — SIGNIFICANT CHANGE UP (ref 7–14)
APTT BLD: 62.7 SEC — HIGH (ref 27–36.3)
BUN SERPL-MCNC: 22 MG/DL — SIGNIFICANT CHANGE UP (ref 7–23)
CALCIUM SERPL-MCNC: 9 MG/DL — SIGNIFICANT CHANGE UP (ref 8.4–10.5)
CHLORIDE SERPL-SCNC: 93 MMOL/L — LOW (ref 98–107)
CO2 SERPL-SCNC: 25 MMOL/L — SIGNIFICANT CHANGE UP (ref 22–31)
CREAT SERPL-MCNC: 0.98 MG/DL — SIGNIFICANT CHANGE UP (ref 0.5–1.3)
GLUCOSE SERPL-MCNC: 157 MG/DL — HIGH (ref 70–99)
HCT VFR BLD CALC: 37.1 % — LOW (ref 39–50)
HGB BLD-MCNC: 12.9 G/DL — LOW (ref 13–17)
MAGNESIUM SERPL-MCNC: 2 MG/DL — SIGNIFICANT CHANGE UP (ref 1.6–2.6)
MCHC RBC-ENTMCNC: 31.9 PG — SIGNIFICANT CHANGE UP (ref 27–34)
MCHC RBC-ENTMCNC: 34.8 GM/DL — SIGNIFICANT CHANGE UP (ref 32–36)
MCV RBC AUTO: 91.6 FL — SIGNIFICANT CHANGE UP (ref 80–100)
NRBC # BLD: 0 /100 WBCS — SIGNIFICANT CHANGE UP
NRBC # FLD: 0 K/UL — SIGNIFICANT CHANGE UP
OSMOLALITY UR: 708 MOSM/KG — SIGNIFICANT CHANGE UP (ref 50–1200)
PHOSPHATE SERPL-MCNC: 1.7 MG/DL — LOW (ref 2.5–4.5)
PLATELET # BLD AUTO: 224 K/UL — SIGNIFICANT CHANGE UP (ref 150–400)
POTASSIUM SERPL-MCNC: 3.5 MMOL/L — SIGNIFICANT CHANGE UP (ref 3.5–5.3)
POTASSIUM SERPL-SCNC: 3.5 MMOL/L — SIGNIFICANT CHANGE UP (ref 3.5–5.3)
RBC # BLD: 4.05 M/UL — LOW (ref 4.2–5.8)
RBC # FLD: 13.2 % — SIGNIFICANT CHANGE UP (ref 10.3–14.5)
SODIUM SERPL-SCNC: 131 MMOL/L — LOW (ref 135–145)
WBC # BLD: 13.14 K/UL — HIGH (ref 3.8–10.5)
WBC # FLD AUTO: 13.14 K/UL — HIGH (ref 3.8–10.5)

## 2021-10-01 PROCEDURE — 93970 EXTREMITY STUDY: CPT | Mod: 26

## 2021-10-01 RX ADMIN — OXYCODONE HYDROCHLORIDE 5 MILLIGRAM(S): 5 TABLET ORAL at 04:00

## 2021-10-01 RX ADMIN — OXYCODONE HYDROCHLORIDE 5 MILLIGRAM(S): 5 TABLET ORAL at 18:14

## 2021-10-01 RX ADMIN — Medication 2: at 12:37

## 2021-10-01 RX ADMIN — Medication 1: at 09:00

## 2021-10-01 RX ADMIN — Medication 500 MILLIGRAM(S): at 05:12

## 2021-10-01 RX ADMIN — OXYCODONE HYDROCHLORIDE 5 MILLIGRAM(S): 5 TABLET ORAL at 23:22

## 2021-10-01 RX ADMIN — HEPARIN SODIUM 900 UNIT(S)/HR: 5000 INJECTION INTRAVENOUS; SUBCUTANEOUS at 07:30

## 2021-10-01 RX ADMIN — Medication 500 MILLIGRAM(S): at 21:13

## 2021-10-01 RX ADMIN — Medication 500 MILLIGRAM(S): at 14:43

## 2021-10-01 RX ADMIN — OXYCODONE HYDROCHLORIDE 5 MILLIGRAM(S): 5 TABLET ORAL at 07:30

## 2021-10-01 RX ADMIN — ATORVASTATIN CALCIUM 40 MILLIGRAM(S): 80 TABLET, FILM COATED ORAL at 21:13

## 2021-10-01 RX ADMIN — Medication 85 MILLIMOLE(S): at 18:46

## 2021-10-01 RX ADMIN — OXYCODONE HYDROCHLORIDE 5 MILLIGRAM(S): 5 TABLET ORAL at 12:36

## 2021-10-01 RX ADMIN — LISINOPRIL 10 MILLIGRAM(S): 2.5 TABLET ORAL at 05:12

## 2021-10-01 RX ADMIN — Medication 81 MILLIGRAM(S): at 14:43

## 2021-10-01 RX ADMIN — SENNA PLUS 2 TABLET(S): 8.6 TABLET ORAL at 21:13

## 2021-10-01 RX ADMIN — OXYCODONE HYDROCHLORIDE 5 MILLIGRAM(S): 5 TABLET ORAL at 13:20

## 2021-10-01 RX ADMIN — CARVEDILOL PHOSPHATE 25 MILLIGRAM(S): 80 CAPSULE, EXTENDED RELEASE ORAL at 05:12

## 2021-10-01 RX ADMIN — Medication 1 TABLET(S): at 14:43

## 2021-10-01 RX ADMIN — CEFEPIME 100 MILLIGRAM(S): 1 INJECTION, POWDER, FOR SOLUTION INTRAMUSCULAR; INTRAVENOUS at 05:12

## 2021-10-01 RX ADMIN — OXYCODONE HYDROCHLORIDE 5 MILLIGRAM(S): 5 TABLET ORAL at 08:00

## 2021-10-01 RX ADMIN — OXYCODONE HYDROCHLORIDE 5 MILLIGRAM(S): 5 TABLET ORAL at 18:55

## 2021-10-01 RX ADMIN — Medication 1 APPLICATION(S): at 14:45

## 2021-10-01 RX ADMIN — CARVEDILOL PHOSPHATE 25 MILLIGRAM(S): 80 CAPSULE, EXTENDED RELEASE ORAL at 18:19

## 2021-10-01 RX ADMIN — POLYETHYLENE GLYCOL 3350 17 GRAM(S): 17 POWDER, FOR SOLUTION ORAL at 14:44

## 2021-10-01 RX ADMIN — OXYCODONE HYDROCHLORIDE 5 MILLIGRAM(S): 5 TABLET ORAL at 03:10

## 2021-10-01 RX ADMIN — CEFEPIME 100 MILLIGRAM(S): 1 INJECTION, POWDER, FOR SOLUTION INTRAMUSCULAR; INTRAVENOUS at 17:16

## 2021-10-01 NOTE — PROGRESS NOTE ADULT - SUBJECTIVE AND OBJECTIVE BOX
CARDIOLOGY ATTENDING    he denies palpitations, syncope, nor angina, ROS otherwise -    DATE OF SERVICE - 10-01-21     Review of Systems:   Constitutional: [ ] fevers, [ ] chills.   Skin: [ ] dry skin. [ ] rashes.  Psychiatric: [ ] depression, [ ] anxiety.   Gastrointestinal: [ ] BRBPR, [ ] melena.   Neurological: [ ] confusion. [ ] seizures. [ ] shuffling gait.   Ears,Nose,Mouth and Throat: [ ] ear pain [ ] sore throat.   Eyes: [ ] diplopia.   Respiratory: [ ] hemoptysis. [ ] shortness of breath  Cardiovascular: See HPI above  Hematologic/Lymphatic: [ ] anemia. [ ] painful nodes. [ ] prolonged bleeding.   Genitourinary: [ ] hematuria. [ ] flank pain.   Endocrine: [ ] significant change in weight. [ ] intolerance to heat and cold.     Review of systems [ x] otherwise negative, [ ] otherwise unable to obtain    FH: no family history of sudden cardiac death in first degree relatives    SH: [ ] tobacco, [ ] alcohol, [ ] drugs    acetaminophen   Tablet .. 650 milliGRAM(s) Oral every 6 hours PRN  aspirin enteric coated 81 milliGRAM(s) Oral daily  atorvastatin 40 milliGRAM(s) Oral at bedtime  cadexomer iodine 0.9% Gel 1 Application(s) Topical daily  carvedilol 25 milliGRAM(s) Oral every 12 hours  cefepime   IVPB 2000 milliGRAM(s) IV Intermittent every 8 hours  dextrose 40% Gel 15 Gram(s) Oral once  dextrose 5%. 1000 milliLiter(s) IV Continuous <Continuous>  dextrose 5%. 1000 milliLiter(s) IV Continuous <Continuous>  dextrose 50% Injectable 25 Gram(s) IV Push once  dextrose 50% Injectable 12.5 Gram(s) IV Push once  dextrose 50% Injectable 25 Gram(s) IV Push once  glucagon  Injectable 1 milliGRAM(s) IntraMuscular once  heparin   Injectable 4000 Unit(s) IV Push every 6 hours PRN  heparin   Injectable 2000 Unit(s) IV Push every 6 hours PRN  heparin  Infusion.  Unit(s)/Hr IV Continuous <Continuous>  influenza   Vaccine 0.5 milliLiter(s) IntraMuscular once  insulin lispro (ADMELOG) corrective regimen sliding scale   SubCutaneous three times a day before meals  insulin lispro (ADMELOG) corrective regimen sliding scale   SubCutaneous at bedtime  lisinopril 10 milliGRAM(s) Oral daily  metroNIDAZOLE    Tablet 500 milliGRAM(s) Oral every 8 hours  multivitamin 1 Tablet(s) Oral daily  oxyCODONE    IR 5 milliGRAM(s) Oral every 4 hours PRN  polyethylene glycol 3350 17 Gram(s) Oral daily  senna 2 Tablet(s) Oral at bedtime  sodium chloride 0.9%. 1000 milliLiter(s) IV Continuous <Continuous>                            12.9   13.14 )-----------( 224      ( 01 Oct 2021 06:55 )             37.1       10-01    131<L>  |  93<L>  |  22  ----------------------------<  157<H>  3.5   |  25  |  0.98    Ca    9.0      01 Oct 2021 06:55  Phos  1.7     10-01  Mg     2.00     10-01              T(C): 36.9 (10-01-21 @ 13:04), Max: 36.9 (10-01-21 @ 13:04)  HR: 90 (10-01-21 @ 13:04) (78 - 90)  BP: 141/73 (10-01-21 @ 13:04) (107/68 - 143/79)  RR: 17 (10-01-21 @ 13:04) (17 - 17)  SpO2: 100% (10-01-21 @ 13:04) (100% - 100%)  Wt(kg): --    I&O's Summary      General: Well nourished in no acute distress. Alert and Oriented * 3.   Head: Normocephalic and atraumatic.   Neck: No JVD. No bruits. Supple. Does not appear to be enlarged.   Cardiovascular: + S1,S2 ; RRR Soft systolic murmur at the left lower sternal border. No rubs noted.    Lungs: CTA b/l. No rhonchi, rales or wheezes.   Abdomen: + BS, soft. Non tender. Non distended. No rebound. No guarding.   Extremities: No clubbing/cyanosis/edema.   Neurologic: Moves all four extremities. Full range of motion.   Skin: Warm and moist. The patient's skin has normal elasticity and good skin turgor.   Psychiatric: Appropriate mood and affect.  Musculoskeletal: Normal range of motion, normal strength      echo normal  NST pending      A/P) 59 y/o male PMH HTN, HLD, DM, CKD, PVD with a Right Femoral-Femoral Bypass in 2018 on apixiban, presented to the [facility] ED with worsening Right Toe pain x 1 week, CT with occluded bypass.  Now awaiting further work up from Vascular. Cardiology called for preop evaluation.    -f/u NST  -regardless of NST results his RCRI score is already 3 making him high risk for intermediate risk surgery, and would continue perioperative asa, lipitor, coreg  -no further inpatient cardiac workup expected if NST unremarkable

## 2021-10-01 NOTE — PROGRESS NOTE ADULT - SUBJECTIVE AND OBJECTIVE BOX
[facility], Division of Infectious Diseases  CHINEDU Fernandes Y. Patel, S. Shah  [phone]  ([phone] - weekdays after 5pm and weekends)    Name: EVE DELGADO  Age/Gender: 60y Male  MRN: [id number]    Interval History:  Patient seen this afternoon, no new complaints.   Notes reviewed. No concerning overnight events  Afebrile     Allergies: penicillin (Other)    Objective:  Vitals:   T(F): 98.5 (10-01-21 @ 13:04), Max: 98.5 (10-01-21 @ 13:04)  HR: 90 (10-01-21 @ 13:04) (78 - 90)  BP: 141/73 (10-01-21 @ 13:04) (107/68 - 143/79)  RR: 17 (10-01-21 @ 13:04) (16 - 17)  SpO2: 100% (10-01-21 @ 13:04) (100% - 100%)  Physical Examination:  General: no acute distress  HEENT: NC/AT, anicteric, neck supple  Respiratory: no acc muscle use, breathing comfortably  Cardiovascular: S1 and S2 present  Gastrointestinal: normal appearing, nondistended  Extremities: RLE in dressing, no edema, no cyanosis  Skin: no visible rash    Laboratory Studies:  CBC:                       12.9   13.14 )-----------( 224      ( 01 Oct 2021 06:55 )             37.1     WBC Trend:  13.14 10-01-21 @ 06:55  12.01 09-30-21 @ 04:43  11.70 09-29-21 @ 21:14  11.88 09-29-21 @ 04:50  9.71 09-28-21 @ 08:39  13.85 09-27-21 @ 08:06  10.40 09-26-21 @ 23:52  10.31 09-26-21 @ 08:30  12.67 09-25-21 @ 12:42    CMP: 10-01    131<L>  |  93<L>  |  22  ----------------------------<  157<H>  3.5   |  25  |  0.98    Ca    9.0      01 Oct 2021 06:55  Phos  1.7     10-01  Mg     2.00     10-01    Microbiology: reviewed   Culture - Abscess with Gram Stain (collected 09-25-21 @ 18:21)  Source: .Abscess Right foot 5th digit wound  Final Report (09-27-21 @ 22:07):    Few Pseudomonas aeruginosa (Carbapenem Resistant)    Moderate Providencia rettgeri    Moderate Bacteroides fragilis "Susceptibilities not performed"    Normal skin uriah isolated  Organism: Pseudomonas aeruginosa (Carbapenem Resistant)  Providencia rettgeri (09-27-21 @ 22:07)  Organism: Providencia rettgeri (09-27-21 @ 22:07)      -  Amikacin: S <=16      -  Amoxicillin/Clavulanic Acid: R 16/8      -  Ampicillin: R >16 These ampicillin results predict results for amoxicillin      -  Ampicillin/Sulbactam: S <=4/2 Enterobacter, Citrobacter, and Serratia may develop resistance during prolonged therapy (3-4 days)      -  Aztreonam: I 16      -  Cefazolin: R >16 Enterobacter, Citrobacter, and Serratia may develop resistance during prolonged therapy (3-4 days)      -  Cefepime: S <=2      -  Cefoxitin: S <=8      -  Ceftriaxone: S <=1 Enterobacter, Citrobacter, and Serratia may develop resistance during prolonged therapy      -  Ciprofloxacin: S <=0.25      -  Ertapenem: S <=0.5      -  Gentamicin: S <=2      -  Imipenem: S <=1      -  Levofloxacin: S <=0.5      -  Meropenem: S <=1      -  Piperacillin/Tazobactam: S <=8      -  Tobramycin: S <=2      -  Trimethoprim/Sulfamethoxazole: S <=0.5/9.5      Method Type: CAM  Organism: Pseudomonas aeruginosa (Carbapenem Resistant) (09-27-21 @ 22:07)      -  Amikacin: S <=16      -  Aztreonam: I 16      -  Cefepime: S 8      -  Ceftazidime: S 4      -  Ciprofloxacin: S 0.5      -  Gentamicin: S <=2      -  Imipenem: R >8      -  Levofloxacin: I 2      -  Meropenem: R >8      -  Piperacillin/Tazobactam: S 16      -  Tobramycin: S <=2      Method Type: CAM    Culture - Blood (collected 09-25-21 @ 11:40)  Source: .Blood Blood-Peripheral  Final Report (09-30-21 @ 17:01):    No Growth Final    Culture - Blood (collected 09-25-21 @ 11:40)  Source: .Blood Blood-Peripheral  Final Report (09-30-21 @ 17:01):    No Growth Final      Radiology: reviewed     Medications:  acetaminophen   Tablet .. 650 milliGRAM(s) Oral every 6 hours PRN  aspirin enteric coated 81 milliGRAM(s) Oral daily  atorvastatin 40 milliGRAM(s) Oral at bedtime  cadexomer iodine 0.9% Gel 1 Application(s) Topical daily  carvedilol 25 milliGRAM(s) Oral every 12 hours  cefepime   IVPB 2000 milliGRAM(s) IV Intermittent every 8 hours  dextrose 40% Gel 15 Gram(s) Oral once  dextrose 5%. 1000 milliLiter(s) IV Continuous <Continuous>  dextrose 5%. 1000 milliLiter(s) IV Continuous <Continuous>  dextrose 50% Injectable 25 Gram(s) IV Push once  dextrose 50% Injectable 12.5 Gram(s) IV Push once  dextrose 50% Injectable 25 Gram(s) IV Push once  glucagon  Injectable 1 milliGRAM(s) IntraMuscular once  heparin   Injectable 4000 Unit(s) IV Push every 6 hours PRN  heparin   Injectable 2000 Unit(s) IV Push every 6 hours PRN  heparin  Infusion.  Unit(s)/Hr IV Continuous <Continuous>  influenza   Vaccine 0.5 milliLiter(s) IntraMuscular once  insulin lispro (ADMELOG) corrective regimen sliding scale   SubCutaneous three times a day before meals  insulin lispro (ADMELOG) corrective regimen sliding scale   SubCutaneous at bedtime  lisinopril 10 milliGRAM(s) Oral daily  metroNIDAZOLE    Tablet 500 milliGRAM(s) Oral every 8 hours  multivitamin 1 Tablet(s) Oral daily  oxyCODONE    IR 5 milliGRAM(s) Oral every 4 hours PRN  polyethylene glycol 3350 17 Gram(s) Oral daily  senna 2 Tablet(s) Oral at bedtime  sodium chloride 0.9%. 1000 milliLiter(s) IV Continuous <Continuous>    Antimicrobials:  cefepime   IVPB 2000 milliGRAM(s) IV Intermittent every 8 hours  metroNIDAZOLE    Tablet 500 milliGRAM(s) Oral every 8 hours

## 2021-10-01 NOTE — PROGRESS NOTE ADULT - ASSESSMENT
Patient is a 60 year old male, with PMH of CHF, HTN, CAD, HLD, DM, CKD, PVD with a Right Femoral-Femoral Bypass in 2018 on apixiban, presented to the [facility] ED with worsening R toe pain for 1 week and was admitted for R 5th toe gangrene.     R toe gangrene d/t PVD with cellulitis  Leukocytosis likely reactive and due to above    - had wound for a month, worsening over last week with erythema and pain - outpt podiatrist started clindamycin and referred to ER   - R foot xray reviewed - no evidence of OM, consider MRI   - R foot wound culture with CRE Pseudomonas, Providencia and Bacteroides - sensitivities as above    - CTA reviewed as above, noted with stenosis and occlusion   - Vascular surgery following - s/p angiogram 9/29 -- plan for iliac stents and possible bypass next Wed 10/6   - Podiatry following - s/p excisional debridement at bedside    -- plan for partial 5th ray resection pending vascular recs    -- please send for bone culture and biopsy    - continue cefepime and metronidazole   - monitor clinically, temps, wbc    CKD    - monitor Cr, renally adjusted meds/abx    DM   - Blood glucose management per primary team.     H/o PCN allergy    - d/w pt and brother - exact reaction unknown, reports graying of skin, denies anaphylaxis   - tolerating cefepime without issues         Brett Kruse M.D.  [facility], Division of Infectious Diseases  [phone]  After 5pm on weekdays and all day on weekends - please call [phone]

## 2021-10-01 NOTE — PROGRESS NOTE ADULT - ASSESSMENT
59 y/o male, with a PmHx of CHF, HTN, CAD, HLD, DM, CKD, PVD with a Right Femoral-Femoral Bypass in 2018 on apixiban, presented to the [facility] ED with worsening Right Toe pain x 1 week.      Ravin   pt with multiple Ravin in past   RAVIN possible prerenal as SG is high s/p IVF  Renal function improving   On ACE Monitor closley  s/p angio 9/29. renal function stable  Monitor BMP  AVoid further nephrotoxics, NSAID RCA    Hypokalemia  Repelted KCl    MOnitor  serum K    Acidosis   improving   MOnitor serum Co2    hypomagnesemia  supplemented by team  monitor    hypophosphatemia  supplemented by team  monitor    hyponatremia  worsen today  sent urine osmo, na  sent cortisol and tsh level  free water restriction <1.2L  monitor

## 2021-10-01 NOTE — PROGRESS NOTE ADULT - SUBJECTIVE AND OBJECTIVE BOX
[facility]   MD RAKAN SHEPHERD PA    TEL:  OFFICE: [phone]  DR FELICIANO CELL: [phone]  DR. PATIÑO CELL: [phone]  JANNETH SÁNCHEZ CELL: [phone]    From 5pm-7am Answering Service [phone]    -- RENAL FOLLOW UP NOTE ---Date of Service 10-01-21 @ 13:25    Patient is a 60y old  Male who presents with a chief complaint of Right Toe Necrosis (01 Oct 2021 13:11)      Patient seen and examined at bedside. No chest pain/sob    VITALS:  T(F): 98.5 (10-01-21 @ 13:04), Max: 98.5 (10-01-21 @ 13:04)  HR: 90 (10-01-21 @ 13:04)  BP: 141/73 (10-01-21 @ 13:04)  RR: 17 (10-01-21 @ 13:04)  SpO2: 100% (10-01-21 @ 13:04)  Wt(kg): --        PHYSICAL EXAM:  Constitutional: NAD  Neck: No JVD  Respiratory: CTAB, no wheezes, rales or rhonchi  Cardiovascular: S1, S2, RRR  Gastrointestinal: BS+, soft, NT/ND  Extremities: No peripheral edema    Hospital Medications:   MEDICATIONS  (STANDING):  aspirin enteric coated 81 milliGRAM(s) Oral daily  atorvastatin 40 milliGRAM(s) Oral at bedtime  cadexomer iodine 0.9% Gel 1 Application(s) Topical daily  carvedilol 25 milliGRAM(s) Oral every 12 hours  cefepime   IVPB 2000 milliGRAM(s) IV Intermittent every 8 hours  dextrose 40% Gel 15 Gram(s) Oral once  dextrose 5%. 1000 milliLiter(s) (50 mL/Hr) IV Continuous <Continuous>  dextrose 5%. 1000 milliLiter(s) (100 mL/Hr) IV Continuous <Continuous>  dextrose 50% Injectable 25 Gram(s) IV Push once  dextrose 50% Injectable 12.5 Gram(s) IV Push once  dextrose 50% Injectable 25 Gram(s) IV Push once  glucagon  Injectable 1 milliGRAM(s) IntraMuscular once  heparin  Infusion.  Unit(s)/Hr (9 mL/Hr) IV Continuous <Continuous>  influenza   Vaccine 0.5 milliLiter(s) IntraMuscular once  insulin lispro (ADMELOG) corrective regimen sliding scale   SubCutaneous three times a day before meals  insulin lispro (ADMELOG) corrective regimen sliding scale   SubCutaneous at bedtime  lisinopril 10 milliGRAM(s) Oral daily  metroNIDAZOLE    Tablet 500 milliGRAM(s) Oral every 8 hours  multivitamin 1 Tablet(s) Oral daily  polyethylene glycol 3350 17 Gram(s) Oral daily  senna 2 Tablet(s) Oral at bedtime  sodium chloride 0.9%. 1000 milliLiter(s) (75 mL/Hr) IV Continuous <Continuous>      LABS:  10-01    131<L>  |  93<L>  |  22  ----------------------------<  157<H>  3.5   |  25  |  0.98    Ca    9.0      01 Oct 2021 06:55  Phos  1.7     10-01  Mg     2.00     10-01      Creatinine Trend: 0.98 <--, 0.79 <--, 0.83 <--, 0.74 <--, 0.84 <--, 1.27 <--, 1.99 <--    Phosphorus Level, Serum: 1.7 mg/dL (10-01 @ 06:55)                              12.9   13.14 )-----------( 224      ( 01 Oct 2021 06:55 )             37.1     Urine Studies:  Urinalysis - [09-25-21 @ 15:23]      Color Yellow / Appearance Clear / SG 1.021 / pH 5.5      Gluc Negative / Ketone Negative  / Bili Negative / Urobili <2 mg/dL       Blood Negative / Protein Trace / Leuk Est Negative / Nitrite Negative      RBC 2 / WBC 3 / Hyaline 1 / Gran  / Sq Epi  / Non Sq Epi 1 / Bacteria Negative      HbA1c 5.7      [12-20-18 @ 06:30]  TSH 0.76      [09-25-21 @ 12:52]  Lipid: chol 139, , HDL 26, LDL --      [09-26-21 @ 08:30]    HCV 0.09, Nonreact      [09-26-21 @ 19:55]      RADIOLOGY & ADDITIONAL STUDIES:

## 2021-10-01 NOTE — PROGRESS NOTE ADULT - ASSESSMENT
61 y/o male, with a PmHx of CHF, HTN, CAD, HLD, DM, CKD, PVD with a Right Femoral-Femoral Bypass in 2018 on apixiban, presented to the [facility] ED with worsening Right Toe pain x 1 week. Admitted to medicine for gangrene of right foot 5th digit.

## 2021-10-01 NOTE — PROGRESS NOTE ADULT - SUBJECTIVE AND OBJECTIVE BOX
Patient is a 60y old  Male who presents with a chief complaint of Right Toe Necrosis (26 Sep 2021 10:35)  10/1/2021    HPI:    Afebrile.   Scheduled for bypass grafting of PE next wekk    PAST MEDICAL & SURGICAL HISTORY:  DM (diabetes mellitus)    HTN (hypertension)    HLD (hyperlipidemia)    CHF (congestive heart failure)    CKD (chronic kidney disease)    PVD (peripheral vascular disease)    S/P femoral-femoral bypass surgery  2018 - right leg        Review of Systems:   CONSTITUTIONAL: No fever, weight loss, or fatigue  EYES: No eye pain, visual disturbances, or discharge  ENMT:  No difficulty hearing, tinnitus, vertigo; No sinus or throat pain  NECK: No pain or stiffness  BREASTS: No pain, masses, or nipple discharge  RESPIRATORY: No cough, wheezing, chills or hemoptysis; No shortness of breath  CARDIOVASCULAR: No chest pain, palpitations, dizziness, or leg swelling  GASTROINTESTINAL: No abdominal or epigastric pain. No nausea, vomiting, or hematemesis; No diarrhea or constipation. No melena or hematochezia.  GENITOURINARY: No dysuria, frequency, hematuria, or incontinence  NEUROLOGICAL: No headaches, memory loss, loss of strength, numbness, or tremors  SKIN: No itching, burning, rashes, or lesions   LYMPH NODES: No enlarged glands  ENDOCRINE: No heat or cold intolerance; No hair loss  MUSCULOSKELETAL: No joint pain or swelling; Right foot dressed  PSYCHIATRIC: No depression, anxiety, mood swings, or difficulty sleeping  HEME/LYMPH: No easy bruising, or bleeding gums  ALLERY AND IMMUNOLOGIC: No hives or eczema    Allergies    penicillin (Other)    Intolerances        Social History:     FAMILY HISTORY:  Family history of MI (myocardial infarction)  Father    FH: renal cell carcinoma  Sister - s/p nephrectomy    Family history of depression  Brother    FHx: diabetes mellitus  Paternal Grandfather    FH: heart disease  Mother        MEDICATIONS  (STANDING):  aspirin enteric coated 81 milliGRAM(s) Oral daily  atorvastatin 40 milliGRAM(s) Oral at bedtime  cadexomer iodine 0.9% Gel 1 Application(s) Topical daily  carvedilol 25 milliGRAM(s) Oral every 12 hours  clindamycin IVPB 600 milliGRAM(s) IV Intermittent every 8 hours  dextrose 40% Gel 15 Gram(s) Oral once  dextrose 5%. 1000 milliLiter(s) (50 mL/Hr) IV Continuous <Continuous>  dextrose 5%. 1000 milliLiter(s) (100 mL/Hr) IV Continuous <Continuous>  dextrose 50% Injectable 25 Gram(s) IV Push once  dextrose 50% Injectable 12.5 Gram(s) IV Push once  dextrose 50% Injectable 25 Gram(s) IV Push once  glucagon  Injectable 1 milliGRAM(s) IntraMuscular once  heparin  Infusion.  Unit(s)/Hr (9 mL/Hr) IV Continuous <Continuous>  influenza   Vaccine 0.5 milliLiter(s) IntraMuscular once  insulin lispro (ADMELOG) corrective regimen sliding scale   SubCutaneous three times a day before meals  insulin lispro (ADMELOG) corrective regimen sliding scale   SubCutaneous at bedtime  lisinopril 10 milliGRAM(s) Oral daily  multivitamin 1 Tablet(s) Oral daily  sodium chloride 0.9%. 1000 milliLiter(s) (60 mL/Hr) IV Continuous <Continuous>    MEDICATIONS  (PRN):  acetaminophen   Tablet .. 650 milliGRAM(s) Oral every 6 hours PRN Temp greater or equal to 38C (100.4F), Mild Pain (1 - 3), Moderate Pain (4 - 6)  heparin   Injectable 4000 Unit(s) IV Push every 6 hours PRN For aPTT less than 40  heparin   Injectable 2000 Unit(s) IV Push every 6 hours PRN For aPTT between 40 - 57        CAPILLARY BLOOD GLUCOSE      POCT Blood Glucose.: 170 mg/dL (26 Sep 2021 12:31)  POCT Blood Glucose.: 116 mg/dL (26 Sep 2021 08:25)  POCT Blood Glucose.: 114 mg/dL (25 Sep 2021 22:52)  POCT Blood Glucose.: 101 mg/dL (25 Sep 2021 17:19)    I&O's Summary    26 Sep 2021 07:01  -  26 Sep 2021 12:36  --------------------------------------------------------  IN: 240 mL / OUT: 0 mL / NET: 240 mL        PHYSICAL EXAM:  Vital Signs Last 24 Hrs  T(C): 36.9 (26 Sep 2021 05:54), Max: 36.9 (26 Sep 2021 05:54)  T(F): 98.5 (26 Sep 2021 05:54), Max: 98.5 (26 Sep 2021 05:54)  HR: 70 (26 Sep 2021 05:54) (65 - 76)  BP: 150/76 (26 Sep 2021 05:54) (137/75 - 167/98)  BP(mean): --  RR: 17 (26 Sep 2021 05:54) (15 - 17)  SpO2: 100% (26 Sep 2021 05:54) (99% - 100%)    GENERAL: NAD, well-developed  HEAD:  Atraumatic, Normocephalic  EYES: EOMI, PERRLA, conjunctiva and sclera clear  NECK: Supple, No JVD  CHEST/LUNG: Clear to auscultation bilaterally; No wheeze  HEART: Regular rate and rhythm; No murmurs, rubs, or gallops  ABDOMEN: Soft, Nontender, Nondistended; Bowel sounds present  EXTREMITIES:  2+ Peripheral Pulses, No clubbing, cyanosis, or edema. right foot dressed  PSYCH: AAOx3  NEUROLOGY: non-focal  SKIN: No rashes or lesions    LABS:                        13.4   10.31 )-----------( 249      ( 26 Sep 2021 08:30 )             38.3     09-26    138  |  102  |  32<H>  ----------------------------<  126<H>  3.4<L>   |  23  |  1.27    Ca    9.5      26 Sep 2021 08:30  Phos  2.8       Mg     1.80         TPro  6.9  /  Alb  3.9  /  TBili  0.2  /  DBili  x   /  AST  14  /  ALT  8   /  AlkPhos  85  09-26    PT/INR - ( 25 Sep 2021 12:42 )   PT: 11.0 sec;   INR: 0.95 ratio         PTT - ( 26 Sep 2021 11:13 )  PTT:32.7 sec      Urinalysis Basic - ( 25 Sep 2021 15:23 )    Color: Yellow / Appearance: Clear / S.021 / pH: x  Gluc: x / Ketone: Negative  / Bili: Negative / Urobili: <2 mg/dL   Blood: x / Protein: Trace / Nitrite: Negative   Leuk Esterase: Negative / RBC: 2 /HPF / WBC 3 /HPF   Sq Epi: x / Non Sq Epi: 1 /HPF / Bacteria: Negative        RADIOLOGY & ADDITIONAL TESTS:    Imaging Personally Reviewed:    Consultant(s) Notes Reviewed:      Care Discussed with Consultants/Other Providers:

## 2021-10-02 LAB
ANION GAP SERPL CALC-SCNC: 12 MMOL/L — SIGNIFICANT CHANGE UP (ref 7–14)
APTT BLD: 75.1 SEC — HIGH (ref 27–36.3)
BUN SERPL-MCNC: 18 MG/DL — SIGNIFICANT CHANGE UP (ref 7–23)
CALCIUM SERPL-MCNC: 8.6 MG/DL — SIGNIFICANT CHANGE UP (ref 8.4–10.5)
CHLORIDE SERPL-SCNC: 97 MMOL/L — LOW (ref 98–107)
CO2 SERPL-SCNC: 25 MMOL/L — SIGNIFICANT CHANGE UP (ref 22–31)
CREAT SERPL-MCNC: 0.83 MG/DL — SIGNIFICANT CHANGE UP (ref 0.5–1.3)
GLUCOSE SERPL-MCNC: 150 MG/DL — HIGH (ref 70–99)
HCT VFR BLD CALC: 32.3 % — LOW (ref 39–50)
HGB BLD-MCNC: 11 G/DL — LOW (ref 13–17)
MAGNESIUM SERPL-MCNC: 1.8 MG/DL — SIGNIFICANT CHANGE UP (ref 1.6–2.6)
MCHC RBC-ENTMCNC: 31.3 PG — SIGNIFICANT CHANGE UP (ref 27–34)
MCHC RBC-ENTMCNC: 34.1 GM/DL — SIGNIFICANT CHANGE UP (ref 32–36)
MCV RBC AUTO: 92 FL — SIGNIFICANT CHANGE UP (ref 80–100)
NRBC # BLD: 0 /100 WBCS — SIGNIFICANT CHANGE UP
NRBC # FLD: 0 K/UL — SIGNIFICANT CHANGE UP
PHOSPHATE SERPL-MCNC: 2.5 MG/DL — SIGNIFICANT CHANGE UP (ref 2.5–4.5)
PLATELET # BLD AUTO: 206 K/UL — SIGNIFICANT CHANGE UP (ref 150–400)
POTASSIUM SERPL-MCNC: 3.5 MMOL/L — SIGNIFICANT CHANGE UP (ref 3.5–5.3)
POTASSIUM SERPL-SCNC: 3.5 MMOL/L — SIGNIFICANT CHANGE UP (ref 3.5–5.3)
RBC # BLD: 3.51 M/UL — LOW (ref 4.2–5.8)
RBC # FLD: 13.6 % — SIGNIFICANT CHANGE UP (ref 10.3–14.5)
SODIUM SERPL-SCNC: 134 MMOL/L — LOW (ref 135–145)
WBC # BLD: 11.61 K/UL — HIGH (ref 3.8–10.5)
WBC # FLD AUTO: 11.61 K/UL — HIGH (ref 3.8–10.5)

## 2021-10-02 RX ADMIN — OXYCODONE HYDROCHLORIDE 5 MILLIGRAM(S): 5 TABLET ORAL at 23:30

## 2021-10-02 RX ADMIN — CEFEPIME 100 MILLIGRAM(S): 1 INJECTION, POWDER, FOR SOLUTION INTRAMUSCULAR; INTRAVENOUS at 01:22

## 2021-10-02 RX ADMIN — Medication 1: at 08:50

## 2021-10-02 RX ADMIN — OXYCODONE HYDROCHLORIDE 5 MILLIGRAM(S): 5 TABLET ORAL at 00:15

## 2021-10-02 RX ADMIN — CEFEPIME 100 MILLIGRAM(S): 1 INJECTION, POWDER, FOR SOLUTION INTRAMUSCULAR; INTRAVENOUS at 17:38

## 2021-10-02 RX ADMIN — OXYCODONE HYDROCHLORIDE 5 MILLIGRAM(S): 5 TABLET ORAL at 10:30

## 2021-10-02 RX ADMIN — CEFEPIME 100 MILLIGRAM(S): 1 INJECTION, POWDER, FOR SOLUTION INTRAMUSCULAR; INTRAVENOUS at 09:20

## 2021-10-02 RX ADMIN — Medication 500 MILLIGRAM(S): at 13:06

## 2021-10-02 RX ADMIN — Medication 1 APPLICATION(S): at 12:53

## 2021-10-02 RX ADMIN — ATORVASTATIN CALCIUM 40 MILLIGRAM(S): 80 TABLET, FILM COATED ORAL at 21:13

## 2021-10-02 RX ADMIN — Medication 2: at 12:54

## 2021-10-02 RX ADMIN — LISINOPRIL 10 MILLIGRAM(S): 2.5 TABLET ORAL at 05:50

## 2021-10-02 RX ADMIN — Medication 500 MILLIGRAM(S): at 05:50

## 2021-10-02 RX ADMIN — OXYCODONE HYDROCHLORIDE 5 MILLIGRAM(S): 5 TABLET ORAL at 10:09

## 2021-10-02 RX ADMIN — Medication 81 MILLIGRAM(S): at 12:53

## 2021-10-02 RX ADMIN — HEPARIN SODIUM 900 UNIT(S)/HR: 5000 INJECTION INTRAVENOUS; SUBCUTANEOUS at 09:31

## 2021-10-02 RX ADMIN — OXYCODONE HYDROCHLORIDE 5 MILLIGRAM(S): 5 TABLET ORAL at 18:30

## 2021-10-02 RX ADMIN — Medication 1 TABLET(S): at 12:53

## 2021-10-02 RX ADMIN — OXYCODONE HYDROCHLORIDE 5 MILLIGRAM(S): 5 TABLET ORAL at 22:36

## 2021-10-02 RX ADMIN — CARVEDILOL PHOSPHATE 25 MILLIGRAM(S): 80 CAPSULE, EXTENDED RELEASE ORAL at 05:50

## 2021-10-02 RX ADMIN — CARVEDILOL PHOSPHATE 25 MILLIGRAM(S): 80 CAPSULE, EXTENDED RELEASE ORAL at 17:38

## 2021-10-02 RX ADMIN — Medication 500 MILLIGRAM(S): at 21:13

## 2021-10-02 RX ADMIN — OXYCODONE HYDROCHLORIDE 5 MILLIGRAM(S): 5 TABLET ORAL at 18:01

## 2021-10-02 NOTE — PROGRESS NOTE ADULT - ASSESSMENT
59 y/o male, with a PmHx of CHF, HTN, CAD, HLD, DM, CKD, PVD with a Right Femoral-Femoral Bypass in 2018 on apixiban, presented to the [facility] ED with worsening Right Toe pain x 1 week. Admitted to medicine for gangrene of right foot 5th digit.

## 2021-10-02 NOTE — PROGRESS NOTE ADULT - SUBJECTIVE AND OBJECTIVE BOX
CARDIOLOGY    he denies palpitations, syncope, nor angina, ROS otherwise -    DATE OF SERVICE - 10-02-21     Review of Systems:   Constitutional: [ ] fevers, [ ] chills.   Skin: [ ] dry skin. [ ] rashes.  Psychiatric: [ ] depression, [ ] anxiety.   Gastrointestinal: [ ] BRBPR, [ ] melena.   Neurological: [ ] confusion. [ ] seizures. [ ] shuffling gait.   Ears,Nose,Mouth and Throat: [ ] ear pain [ ] sore throat.   Eyes: [ ] diplopia.   Respiratory: [ ] hemoptysis. [ ] shortness of breath  Cardiovascular: See HPI above  Hematologic/Lymphatic: [ ] anemia. [ ] painful nodes. [ ] prolonged bleeding.   Genitourinary: [ ] hematuria. [ ] flank pain.   Endocrine: [ ] significant change in weight. [ ] intolerance to heat and cold.     Review of systems [x ] otherwise negative, [ ] otherwise unable to obtain    FH: no family history of sudden cardiac death in first degree relatives    SH: [ ] tobacco, [ ] alcohol, [ ] drugs    acetaminophen   Tablet .. 650 milliGRAM(s) Oral every 6 hours PRN  aspirin enteric coated 81 milliGRAM(s) Oral daily  atorvastatin 40 milliGRAM(s) Oral at bedtime  cadexomer iodine 0.9% Gel 1 Application(s) Topical daily  carvedilol 25 milliGRAM(s) Oral every 12 hours  cefepime   IVPB 2000 milliGRAM(s) IV Intermittent every 8 hours  dextrose 40% Gel 15 Gram(s) Oral once  dextrose 5%. 1000 milliLiter(s) IV Continuous <Continuous>  dextrose 5%. 1000 milliLiter(s) IV Continuous <Continuous>  dextrose 50% Injectable 25 Gram(s) IV Push once  dextrose 50% Injectable 12.5 Gram(s) IV Push once  dextrose 50% Injectable 25 Gram(s) IV Push once  glucagon  Injectable 1 milliGRAM(s) IntraMuscular once  heparin   Injectable 4000 Unit(s) IV Push every 6 hours PRN  heparin   Injectable 2000 Unit(s) IV Push every 6 hours PRN  heparin  Infusion.  Unit(s)/Hr IV Continuous <Continuous>  influenza   Vaccine 0.5 milliLiter(s) IntraMuscular once  insulin lispro (ADMELOG) corrective regimen sliding scale   SubCutaneous three times a day before meals  insulin lispro (ADMELOG) corrective regimen sliding scale   SubCutaneous at bedtime  lisinopril 10 milliGRAM(s) Oral daily  metroNIDAZOLE    Tablet 500 milliGRAM(s) Oral every 8 hours  multivitamin 1 Tablet(s) Oral daily  oxyCODONE    IR 5 milliGRAM(s) Oral every 4 hours PRN  polyethylene glycol 3350 17 Gram(s) Oral daily  senna 2 Tablet(s) Oral at bedtime  sodium chloride 0.9%. 1000 milliLiter(s) IV Continuous <Continuous>                          11.0   11.61 )-----------( 206      ( 02 Oct 2021 08:47 )             32.3       134<L>  |  97<L>  |  18  ----------------------------<  150<H>  3.5   |  25  |  0.83    Ca    8.6      02 Oct 2021 08:47  Phos  2.5     10-02  Mg     1.80     10-02    T(C): 36.9 (10-02-21 @ 13:06), Max: 36.9 (10-02-21 @ 05:49)  HR: 87 (10-02-21 @ 13:06) (76 - 87)  BP: 148/80 (10-02-21 @ 13:06) (127/76 - 148/80)  RR: 18 (10-02-21 @ 13:06) (18 - 18)  SpO2: 100% (10-02-21 @ 13:06) (100% - 100%)      General: Well nourished in no acute distress. Alert and Oriented * 3.   Head: Normocephalic and atraumatic.   Neck: No JVD. No bruits. Supple. Does not appear to be enlarged.   Cardiovascular: + S1,S2 ; RRR Soft systolic murmur at the left lower sternal border. No rubs noted.    Lungs: CTA b/l. No rhonchi, rales or wheezes.   Abdomen: + BS, soft. Non tender. Non distended. No rebound. No guarding.   Extremities: No clubbing/cyanosis/edema.   Neurologic: Moves all four extremities. Full range of motion.   Skin: Warm and moist. The patient's skin has normal elasticity and good skin turgor.   Psychiatric: Appropriate mood and affect.  Musculoskeletal: Normal range of motion, normal strength      General: Well nourished in no acute distress. Alert and Oriented * 3.   Head: Normocephalic and atraumatic.   Neck: No JVD. No bruits. Supple. Does not appear to be enlarged.   Cardiovascular: + S1,S2 ; RRR Soft systolic murmur at the left lower sternal border. No rubs noted.    Lungs: CTA b/l. No rhonchi, rales or wheezes.   Abdomen: + BS, soft. Non tender. Non distended. No rebound. No guarding.   Extremities: No clubbing/cyanosis/edema.   Neurologic: Moves all four extremities. Full range of motion.   Skin: Warm and moist. The patient's skin has normal elasticity and good skin turgor.   Psychiatric: Appropriate mood and affect.  Musculoskeletal: Normal range of motion, normal strength    < from: Transthoracic Echocardiogram (09.29.21 @ 17:35) >  CONCLUSIONS:  1. Calcified trileaflet aortic valve with normal opening.  2. Normal left ventricular internal dimensions and wall  thicknesses.  3. Normal left ventricular systolic function. No segmental  wall motion abnormalities.  4. Normal right ventricular size and function.  ------------------------------------------------------------------------  Confirmed on  9/29/2021 - 18:38:12 by MARIELOS Valencia    < end of copied text >    < from: Nuclear Stress Test-Pharmacologic (Nuclear Stress Test-Pharmacologic .) (09.30.21 @ 16:16) >  ------------------------------------------------------------------------  GATED ANALYSIS:  Post-stress gated wall motion analysis was performed (LVEF  = 66 %;LVEDV = 66 ml.), revealing normal LV function.  There was no segmental wall motion abnormality. Septal  wall motion appeared normal. RV function appeared normal.  ------------------------------------------------------------------------  IMPRESSIONS:Mildly Abnormal Study  * Myocardial Perfusion SPECT results are mildly abnormal.  * Review of raw data shows: The study is of fair technical  quality with adjacent bowel tracer uptake  * The left ventricle was normal in size. There is a small,  mild defect in septal wall that is fixed, suggestive of  mild scarring  * No clear evidence of ischemia.  * Post-stress gated wall motion analysis was performed  (LVEF = 66 %;LVEDV = 66 ml.), revealing normal LV  function. There was no segmental wall motion abnormality.  Septal wall motion appeared normal. RV function appeared  normal.  ------------------------------------------------------------------------  Confirmed on  10/1/2021 - 17:43:39 by Momo Deluna M.D.  < end of copied text >      A/P) 61 y/o male PMH HTN, HLD, DM, CKD, PVD with a Right Femoral-Femoral Bypass in 2018 on apixiban, presented to the [facility] ED with worsening Right Toe pain x 1 week, CT with occluded bypass.  Now awaiting further work up from Vascular. Cardiology called for preop evaluation.    -NST noted, no ischemia or infarct  -regardless of NST results his RCRI score is already 3 making him high risk for intermediate risk surgery, and would continue perioperative asa, lipitor, coreg  -no further inpatient cardiac workup planned

## 2021-10-02 NOTE — PROGRESS NOTE ADULT - SUBJECTIVE AND OBJECTIVE BOX
Patient is a 60y old  Male who presents with a chief complaint of Right Toe Necrosis (26 Sep 2021 10:35)  10/2/2021    HPI:    Afebrile.   Scheduled for bypass grafting next week.    PAST MEDICAL & SURGICAL HISTORY:  DM (diabetes mellitus)    HTN (hypertension)    HLD (hyperlipidemia)    CHF (congestive heart failure)    CKD (chronic kidney disease)    PVD (peripheral vascular disease)    S/P femoral-femoral bypass surgery  2018 - right leg        Review of Systems:   CONSTITUTIONAL: No fever, weight loss, or fatigue  EYES: No eye pain, visual disturbances, or discharge  ENMT:  No difficulty hearing, tinnitus, vertigo; No sinus or throat pain  NECK: No pain or stiffness  BREASTS: No pain, masses, or nipple discharge  RESPIRATORY: No cough, wheezing, chills or hemoptysis; No shortness of breath  CARDIOVASCULAR: No chest pain, palpitations, dizziness, or leg swelling  GASTROINTESTINAL: No abdominal or epigastric pain. No nausea, vomiting, or hematemesis; No diarrhea or constipation. No melena or hematochezia.  GENITOURINARY: No dysuria, frequency, hematuria, or incontinence  NEUROLOGICAL: No headaches, memory loss, loss of strength, numbness, or tremors  SKIN: No itching, burning, rashes, or lesions   LYMPH NODES: No enlarged glands  ENDOCRINE: No heat or cold intolerance; No hair loss  MUSCULOSKELETAL: No joint pain or swelling; Right foot dressed  PSYCHIATRIC: No depression, anxiety, mood swings, or difficulty sleeping  HEME/LYMPH: No easy bruising, or bleeding gums  ALLERY AND IMMUNOLOGIC: No hives or eczema    Allergies    penicillin (Other)    Intolerances        Social History:     FAMILY HISTORY:  Family history of MI (myocardial infarction)  Father    FH: renal cell carcinoma  Sister - s/p nephrectomy    Family history of depression  Brother    FHx: diabetes mellitus  Paternal Grandfather    FH: heart disease  Mother        MEDICATIONS  (STANDING):  aspirin enteric coated 81 milliGRAM(s) Oral daily  atorvastatin 40 milliGRAM(s) Oral at bedtime  cadexomer iodine 0.9% Gel 1 Application(s) Topical daily  carvedilol 25 milliGRAM(s) Oral every 12 hours  clindamycin IVPB 600 milliGRAM(s) IV Intermittent every 8 hours  dextrose 40% Gel 15 Gram(s) Oral once  dextrose 5%. 1000 milliLiter(s) (50 mL/Hr) IV Continuous <Continuous>  dextrose 5%. 1000 milliLiter(s) (100 mL/Hr) IV Continuous <Continuous>  dextrose 50% Injectable 25 Gram(s) IV Push once  dextrose 50% Injectable 12.5 Gram(s) IV Push once  dextrose 50% Injectable 25 Gram(s) IV Push once  glucagon  Injectable 1 milliGRAM(s) IntraMuscular once  heparin  Infusion.  Unit(s)/Hr (9 mL/Hr) IV Continuous <Continuous>  influenza   Vaccine 0.5 milliLiter(s) IntraMuscular once  insulin lispro (ADMELOG) corrective regimen sliding scale   SubCutaneous three times a day before meals  insulin lispro (ADMELOG) corrective regimen sliding scale   SubCutaneous at bedtime  lisinopril 10 milliGRAM(s) Oral daily  multivitamin 1 Tablet(s) Oral daily  sodium chloride 0.9%. 1000 milliLiter(s) (60 mL/Hr) IV Continuous <Continuous>    MEDICATIONS  (PRN):  acetaminophen   Tablet .. 650 milliGRAM(s) Oral every 6 hours PRN Temp greater or equal to 38C (100.4F), Mild Pain (1 - 3), Moderate Pain (4 - 6)  heparin   Injectable 4000 Unit(s) IV Push every 6 hours PRN For aPTT less than 40  heparin   Injectable 2000 Unit(s) IV Push every 6 hours PRN For aPTT between 40 - 57        CAPILLARY BLOOD GLUCOSE      POCT Blood Glucose.: 170 mg/dL (26 Sep 2021 12:31)  POCT Blood Glucose.: 116 mg/dL (26 Sep 2021 08:25)  POCT Blood Glucose.: 114 mg/dL (25 Sep 2021 22:52)  POCT Blood Glucose.: 101 mg/dL (25 Sep 2021 17:19)    I&O's Summary    26 Sep 2021 07:01  -  26 Sep 2021 12:36  --------------------------------------------------------  IN: 240 mL / OUT: 0 mL / NET: 240 mL        PHYSICAL EXAM:  Vital Signs Last 24 Hrs  T(C): 36.9 (26 Sep 2021 05:54), Max: 36.9 (26 Sep 2021 05:54)  T(F): 98.5 (26 Sep 2021 05:54), Max: 98.5 (26 Sep 2021 05:54)  HR: 70 (26 Sep 2021 05:54) (65 - 76)  BP: 150/76 (26 Sep 2021 05:54) (137/75 - 167/98)  BP(mean): --  RR: 17 (26 Sep 2021 05:54) (15 - 17)  SpO2: 100% (26 Sep 2021 05:54) (99% - 100%)    GENERAL: NAD, well-developed  HEAD:  Atraumatic, Normocephalic  EYES: EOMI, PERRLA, conjunctiva and sclera clear  NECK: Supple, No JVD  CHEST/LUNG: Clear to auscultation bilaterally; No wheeze  HEART: Regular rate and rhythm; No murmurs, rubs, or gallops  ABDOMEN: Soft, Nontender, Nondistended; Bowel sounds present  EXTREMITIES:  2+ Peripheral Pulses, No clubbing, cyanosis, or edema. right foot dressed  PSYCH: AAOx3  NEUROLOGY: non-focal  SKIN: No rashes or lesions    LABS:                        13.4   10.31 )-----------( 249      ( 26 Sep 2021 08:30 )             38.3     09-    138  |  102  |  32<H>  ----------------------------<  126<H>  3.4<L>   |  23  |  1.27    Ca    9.5      26 Sep 2021 08:30  Phos  2.8       Mg     1.80         TPro  6.9  /  Alb  3.9  /  TBili  0.2  /  DBili  x   /  AST  14  /  ALT  8   /  AlkPhos  85  09-26    PT/INR - ( 25 Sep 2021 12:42 )   PT: 11.0 sec;   INR: 0.95 ratio         PTT - ( 26 Sep 2021 11:13 )  PTT:32.7 sec      Urinalysis Basic - ( 25 Sep 2021 15:23 )    Color: Yellow / Appearance: Clear / S.021 / pH: x  Gluc: x / Ketone: Negative  / Bili: Negative / Urobili: <2 mg/dL   Blood: x / Protein: Trace / Nitrite: Negative   Leuk Esterase: Negative / RBC: 2 /HPF / WBC 3 /HPF   Sq Epi: x / Non Sq Epi: 1 /HPF / Bacteria: Negative        RADIOLOGY & ADDITIONAL TESTS:    Imaging Personally Reviewed:    Consultant(s) Notes Reviewed:      Care Discussed with Consultants/Other Providers:

## 2021-10-03 LAB
ANION GAP SERPL CALC-SCNC: 12 MMOL/L — SIGNIFICANT CHANGE UP (ref 7–14)
APTT BLD: 66 SEC — HIGH (ref 27–36.3)
BUN SERPL-MCNC: 17 MG/DL — SIGNIFICANT CHANGE UP (ref 7–23)
CALCIUM SERPL-MCNC: 8.9 MG/DL — SIGNIFICANT CHANGE UP (ref 8.4–10.5)
CHLORIDE SERPL-SCNC: 95 MMOL/L — LOW (ref 98–107)
CO2 SERPL-SCNC: 24 MMOL/L — SIGNIFICANT CHANGE UP (ref 22–31)
CREAT SERPL-MCNC: 0.86 MG/DL — SIGNIFICANT CHANGE UP (ref 0.5–1.3)
GLUCOSE SERPL-MCNC: 147 MG/DL — HIGH (ref 70–99)
HCT VFR BLD CALC: 32.5 % — LOW (ref 39–50)
HGB BLD-MCNC: 11.3 G/DL — LOW (ref 13–17)
MAGNESIUM SERPL-MCNC: 1.8 MG/DL — SIGNIFICANT CHANGE UP (ref 1.6–2.6)
MCHC RBC-ENTMCNC: 31.5 PG — SIGNIFICANT CHANGE UP (ref 27–34)
MCHC RBC-ENTMCNC: 34.8 GM/DL — SIGNIFICANT CHANGE UP (ref 32–36)
MCV RBC AUTO: 90.5 FL — SIGNIFICANT CHANGE UP (ref 80–100)
NRBC # BLD: 0 /100 WBCS — SIGNIFICANT CHANGE UP
NRBC # FLD: 0 K/UL — SIGNIFICANT CHANGE UP
PHOSPHATE SERPL-MCNC: 2 MG/DL — LOW (ref 2.5–4.5)
PLATELET # BLD AUTO: 243 K/UL — SIGNIFICANT CHANGE UP (ref 150–400)
POTASSIUM SERPL-MCNC: 3.8 MMOL/L — SIGNIFICANT CHANGE UP (ref 3.5–5.3)
POTASSIUM SERPL-SCNC: 3.8 MMOL/L — SIGNIFICANT CHANGE UP (ref 3.5–5.3)
RBC # BLD: 3.59 M/UL — LOW (ref 4.2–5.8)
RBC # FLD: 13.4 % — SIGNIFICANT CHANGE UP (ref 10.3–14.5)
SODIUM SERPL-SCNC: 131 MMOL/L — LOW (ref 135–145)
WBC # BLD: 11.78 K/UL — HIGH (ref 3.8–10.5)
WBC # FLD AUTO: 11.78 K/UL — HIGH (ref 3.8–10.5)

## 2021-10-03 RX ADMIN — CEFEPIME 100 MILLIGRAM(S): 1 INJECTION, POWDER, FOR SOLUTION INTRAMUSCULAR; INTRAVENOUS at 17:33

## 2021-10-03 RX ADMIN — OXYCODONE HYDROCHLORIDE 5 MILLIGRAM(S): 5 TABLET ORAL at 19:45

## 2021-10-03 RX ADMIN — Medication 1 TABLET(S): at 13:05

## 2021-10-03 RX ADMIN — OXYCODONE HYDROCHLORIDE 5 MILLIGRAM(S): 5 TABLET ORAL at 04:29

## 2021-10-03 RX ADMIN — HEPARIN SODIUM 900 UNIT(S)/HR: 5000 INJECTION INTRAVENOUS; SUBCUTANEOUS at 08:28

## 2021-10-03 RX ADMIN — CARVEDILOL PHOSPHATE 25 MILLIGRAM(S): 80 CAPSULE, EXTENDED RELEASE ORAL at 17:34

## 2021-10-03 RX ADMIN — Medication 1: at 08:49

## 2021-10-03 RX ADMIN — LISINOPRIL 10 MILLIGRAM(S): 2.5 TABLET ORAL at 05:29

## 2021-10-03 RX ADMIN — OXYCODONE HYDROCHLORIDE 5 MILLIGRAM(S): 5 TABLET ORAL at 18:53

## 2021-10-03 RX ADMIN — Medication 1: at 17:34

## 2021-10-03 RX ADMIN — CARVEDILOL PHOSPHATE 25 MILLIGRAM(S): 80 CAPSULE, EXTENDED RELEASE ORAL at 05:30

## 2021-10-03 RX ADMIN — Medication 500 MILLIGRAM(S): at 05:30

## 2021-10-03 RX ADMIN — Medication 1: at 13:04

## 2021-10-03 RX ADMIN — ATORVASTATIN CALCIUM 40 MILLIGRAM(S): 80 TABLET, FILM COATED ORAL at 21:06

## 2021-10-03 RX ADMIN — CEFEPIME 100 MILLIGRAM(S): 1 INJECTION, POWDER, FOR SOLUTION INTRAMUSCULAR; INTRAVENOUS at 08:49

## 2021-10-03 RX ADMIN — OXYCODONE HYDROCHLORIDE 5 MILLIGRAM(S): 5 TABLET ORAL at 14:35

## 2021-10-03 RX ADMIN — Medication 1 APPLICATION(S): at 13:06

## 2021-10-03 RX ADMIN — OXYCODONE HYDROCHLORIDE 5 MILLIGRAM(S): 5 TABLET ORAL at 05:30

## 2021-10-03 RX ADMIN — OXYCODONE HYDROCHLORIDE 5 MILLIGRAM(S): 5 TABLET ORAL at 15:05

## 2021-10-03 RX ADMIN — OXYCODONE HYDROCHLORIDE 5 MILLIGRAM(S): 5 TABLET ORAL at 10:39

## 2021-10-03 RX ADMIN — Medication 500 MILLIGRAM(S): at 13:05

## 2021-10-03 RX ADMIN — Medication 81 MILLIGRAM(S): at 13:05

## 2021-10-03 RX ADMIN — CEFEPIME 100 MILLIGRAM(S): 1 INJECTION, POWDER, FOR SOLUTION INTRAMUSCULAR; INTRAVENOUS at 02:01

## 2021-10-03 RX ADMIN — OXYCODONE HYDROCHLORIDE 5 MILLIGRAM(S): 5 TABLET ORAL at 10:02

## 2021-10-03 RX ADMIN — Medication 500 MILLIGRAM(S): at 21:06

## 2021-10-03 NOTE — PROGRESS NOTE ADULT - ASSESSMENT
61 y/o male, with a PmHx of CHF, HTN, CAD, HLD, DM, CKD, PVD with a Right Femoral-Femoral Bypass in 2018 on apixiban, presented to the [facility] ED with worsening Right Toe pain x 1 week.      Ravin   Improved now.  - Monitor BMP  - AVoid further nephrotoxics, NSAID RCA    Hypokalemia  Improved.  - Monitor BMP.    Acidosis   Improved.    Hypomagnesemia  supplemented by team  monitor    Hypophosphatemia  supplemented by team  monitor    Hyponatremia  Minimal and asymptomatic.  - Monitor BMP

## 2021-10-03 NOTE — CHART NOTE - NSCHARTNOTEFT_GEN_A_CORE
Chart reviewed.    Planning for OR Wednesday for kissing iliac stents and possible bypass.    Cardiac evaluation noted. Patient high risk for moderate risk procedure.   Please document medical optimization.  Vein mapping results noted.    C Team Surgery   [id number] Chart reviewed.    Planning for OR Wednesday for kissing iliac stents and possible bypass.    Cardiac evaluation noted. Patient high risk for moderate risk procedure.   Please document medical optimization.  Vein mapping results noted.  Will obtain serum Pro-BNP in AM.    C Team Surgery   [id number]

## 2021-10-03 NOTE — PROGRESS NOTE ADULT - ASSESSMENT
59 y/o male, with a PmHx of CHF, HTN, CAD, HLD, DM, CKD, PVD with a Right Femoral-Femoral Bypass in 2018 on apixiban, presented to the [facility] ED with worsening Right Toe pain x 1 week. Admitted to medicine for gangrene of right foot 5th digit.

## 2021-10-03 NOTE — PROGRESS NOTE ADULT - SUBJECTIVE AND OBJECTIVE BOX
Patient is a 60y old  Male who presents with a chief complaint of Right Toe Necrosis (26 Sep 2021 10:35)  10/3/2021    HPI:    Afebrile. No new symptoms  Scheduled for bypass grafting next week.    PAST MEDICAL & SURGICAL HISTORY:  DM (diabetes mellitus)    HTN (hypertension)    HLD (hyperlipidemia)    CHF (congestive heart failure)    CKD (chronic kidney disease)    PVD (peripheral vascular disease)    S/P femoral-femoral bypass surgery  2018 - right leg        Review of Systems:   CONSTITUTIONAL: No fever, weight loss, or fatigue  EYES: No eye pain, visual disturbances, or discharge  ENMT:  No difficulty hearing, tinnitus, vertigo; No sinus or throat pain  NECK: No pain or stiffness  BREASTS: No pain, masses, or nipple discharge  RESPIRATORY: No cough, wheezing, chills or hemoptysis; No shortness of breath  CARDIOVASCULAR: No chest pain, palpitations, dizziness, or leg swelling  GASTROINTESTINAL: No abdominal or epigastric pain. No nausea, vomiting, or hematemesis; No diarrhea or constipation. No melena or hematochezia.  GENITOURINARY: No dysuria, frequency, hematuria, or incontinence  NEUROLOGICAL: No headaches, memory loss, loss of strength, numbness, or tremors  SKIN: No itching, burning, rashes, or lesions   LYMPH NODES: No enlarged glands  ENDOCRINE: No heat or cold intolerance; No hair loss  MUSCULOSKELETAL: No joint pain or swelling; Right foot dressed  PSYCHIATRIC: No depression, anxiety, mood swings, or difficulty sleeping  HEME/LYMPH: No easy bruising, or bleeding gums  ALLERY AND IMMUNOLOGIC: No hives or eczema    Allergies    penicillin (Other)    Intolerances        Social History:     FAMILY HISTORY:  Family history of MI (myocardial infarction)  Father    FH: renal cell carcinoma  Sister - s/p nephrectomy    Family history of depression  Brother    FHx: diabetes mellitus  Paternal Grandfather    FH: heart disease  Mother        MEDICATIONS  (STANDING):  aspirin enteric coated 81 milliGRAM(s) Oral daily  atorvastatin 40 milliGRAM(s) Oral at bedtime  cadexomer iodine 0.9% Gel 1 Application(s) Topical daily  carvedilol 25 milliGRAM(s) Oral every 12 hours  clindamycin IVPB 600 milliGRAM(s) IV Intermittent every 8 hours  dextrose 40% Gel 15 Gram(s) Oral once  dextrose 5%. 1000 milliLiter(s) (50 mL/Hr) IV Continuous <Continuous>  dextrose 5%. 1000 milliLiter(s) (100 mL/Hr) IV Continuous <Continuous>  dextrose 50% Injectable 25 Gram(s) IV Push once  dextrose 50% Injectable 12.5 Gram(s) IV Push once  dextrose 50% Injectable 25 Gram(s) IV Push once  glucagon  Injectable 1 milliGRAM(s) IntraMuscular once  heparin  Infusion.  Unit(s)/Hr (9 mL/Hr) IV Continuous <Continuous>  influenza   Vaccine 0.5 milliLiter(s) IntraMuscular once  insulin lispro (ADMELOG) corrective regimen sliding scale   SubCutaneous three times a day before meals  insulin lispro (ADMELOG) corrective regimen sliding scale   SubCutaneous at bedtime  lisinopril 10 milliGRAM(s) Oral daily  multivitamin 1 Tablet(s) Oral daily  sodium chloride 0.9%. 1000 milliLiter(s) (60 mL/Hr) IV Continuous <Continuous>    MEDICATIONS  (PRN):  acetaminophen   Tablet .. 650 milliGRAM(s) Oral every 6 hours PRN Temp greater or equal to 38C (100.4F), Mild Pain (1 - 3), Moderate Pain (4 - 6)  heparin   Injectable 4000 Unit(s) IV Push every 6 hours PRN For aPTT less than 40  heparin   Injectable 2000 Unit(s) IV Push every 6 hours PRN For aPTT between 40 - 57        CAPILLARY BLOOD GLUCOSE      POCT Blood Glucose.: 170 mg/dL (26 Sep 2021 12:31)  POCT Blood Glucose.: 116 mg/dL (26 Sep 2021 08:25)  POCT Blood Glucose.: 114 mg/dL (25 Sep 2021 22:52)  POCT Blood Glucose.: 101 mg/dL (25 Sep 2021 17:19)    I&O's Summary    26 Sep 2021 07:01  -  26 Sep 2021 12:36  --------------------------------------------------------  IN: 240 mL / OUT: 0 mL / NET: 240 mL        PHYSICAL EXAM:  Vital Signs Last 24 Hrs  T(C): 36.9 (26 Sep 2021 05:54), Max: 36.9 (26 Sep 2021 05:54)  T(F): 98.5 (26 Sep 2021 05:54), Max: 98.5 (26 Sep 2021 05:54)  HR: 70 (26 Sep 2021 05:54) (65 - 76)  BP: 150/76 (26 Sep 2021 05:54) (137/75 - 167/98)  BP(mean): --  RR: 17 (26 Sep 2021 05:54) (15 - 17)  SpO2: 100% (26 Sep 2021 05:54) (99% - 100%)    GENERAL: NAD, well-developed  HEAD:  Atraumatic, Normocephalic  EYES: EOMI, PERRLA, conjunctiva and sclera clear  NECK: Supple, No JVD  CHEST/LUNG: Clear to auscultation bilaterally; No wheeze  HEART: Regular rate and rhythm; No murmurs, rubs, or gallops  ABDOMEN: Soft, Nontender, Nondistended; Bowel sounds present  EXTREMITIES:  2+ Peripheral Pulses, No clubbing, cyanosis, or edema. right foot dressed  PSYCH: AAOx3  NEUROLOGY: non-focal  SKIN: No rashes or lesions    LABS:                        13.4   10.31 )-----------( 249      ( 26 Sep 2021 08:30 )             38.3     09-26    138  |  102  |  32<H>  ----------------------------<  126<H>  3.4<L>   |  23  |  1.27    Ca    9.5      26 Sep 2021 08:30  Phos  2.8       Mg     1.80         TPro  6.9  /  Alb  3.9  /  TBili  0.2  /  DBili  x   /  AST  14  /  ALT  8   /  AlkPhos  85  09-26    PT/INR - ( 25 Sep 2021 12:42 )   PT: 11.0 sec;   INR: 0.95 ratio         PTT - ( 26 Sep 2021 11:13 )  PTT:32.7 sec      Urinalysis Basic - ( 25 Sep 2021 15:23 )    Color: Yellow / Appearance: Clear / S.021 / pH: x  Gluc: x / Ketone: Negative  / Bili: Negative / Urobili: <2 mg/dL   Blood: x / Protein: Trace / Nitrite: Negative   Leuk Esterase: Negative / RBC: 2 /HPF / WBC 3 /HPF   Sq Epi: x / Non Sq Epi: 1 /HPF / Bacteria: Negative        RADIOLOGY & ADDITIONAL TESTS:    Imaging Personally Reviewed:    Consultant(s) Notes Reviewed:      Care Discussed with Consultants/Other Providers:

## 2021-10-03 NOTE — PROGRESS NOTE ADULT - SUBJECTIVE AND OBJECTIVE BOX
CARDIOLOGY    DATE OF SERVICE - 10-03-21     Review of Systems:   Constitutional: [ ] fevers, [ ] chills.   Skin: [ ] dry skin. [ ] rashes.  Psychiatric: [ ] depression, [ ] anxiety.   Gastrointestinal: [ ] BRBPR, [ ] melena.   Neurological: [ ] confusion. [ ] seizures. [ ] shuffling gait.   Ears,Nose,Mouth and Throat: [ ] ear pain [ ] sore throat.   Eyes: [ ] diplopia.   Respiratory: [ ] hemoptysis. [ ] shortness of breath  Cardiovascular: See HPI above  Hematologic/Lymphatic: [ ] anemia. [ ] painful nodes. [ ] prolonged bleeding.   Genitourinary: [ ] hematuria. [ ] flank pain.   Endocrine: [ ] significant change in weight. [ ] intolerance to heat and cold.     Review of systems [x ] otherwise negative, [ ] otherwise unable to obtain    FH: no family history of sudden cardiac death in first degree relatives    SH: [ ] tobacco, [ ] alcohol, [ ] drugs          acetaminophen   Tablet .. 650 milliGRAM(s) Oral every 6 hours PRN  aspirin enteric coated 81 milliGRAM(s) Oral daily  atorvastatin 40 milliGRAM(s) Oral at bedtime  cadexomer iodine 0.9% Gel 1 Application(s) Topical daily  carvedilol 25 milliGRAM(s) Oral every 12 hours  cefepime   IVPB 2000 milliGRAM(s) IV Intermittent every 8 hours  dextrose 40% Gel 15 Gram(s) Oral once  dextrose 5%. 1000 milliLiter(s) IV Continuous <Continuous>  dextrose 5%. 1000 milliLiter(s) IV Continuous <Continuous>  dextrose 50% Injectable 25 Gram(s) IV Push once  dextrose 50% Injectable 12.5 Gram(s) IV Push once  dextrose 50% Injectable 25 Gram(s) IV Push once  glucagon  Injectable 1 milliGRAM(s) IntraMuscular once  heparin   Injectable 4000 Unit(s) IV Push every 6 hours PRN  heparin   Injectable 2000 Unit(s) IV Push every 6 hours PRN  heparin  Infusion.  Unit(s)/Hr IV Continuous <Continuous>  influenza   Vaccine 0.5 milliLiter(s) IntraMuscular once  insulin lispro (ADMELOG) corrective regimen sliding scale   SubCutaneous three times a day before meals  insulin lispro (ADMELOG) corrective regimen sliding scale   SubCutaneous at bedtime  lisinopril 10 milliGRAM(s) Oral daily  metroNIDAZOLE    Tablet 500 milliGRAM(s) Oral every 8 hours  multivitamin 1 Tablet(s) Oral daily  oxyCODONE    IR 5 milliGRAM(s) Oral every 4 hours PRN  polyethylene glycol 3350 17 Gram(s) Oral daily  senna 2 Tablet(s) Oral at bedtime  sodium chloride 0.9%. 1000 milliLiter(s) IV Continuous <Continuous>                            11.3   11.78 )-----------( 243      ( 03 Oct 2021 07:36 )             32.5       Hemoglobin: 11.3 g/dL (10-03 @ 07:36)  Hemoglobin: 11.0 g/dL (10-02 @ 08:47)  Hemoglobin: 12.9 g/dL (10-01 @ 06:55)  Hemoglobin: 13.3 g/dL (09-30 @ 04:43)  Hemoglobin: 13.2 g/dL (09-29 @ 21:14)      10-03    131<L>  |  95<L>  |  17  ----------------------------<  147<H>  3.8   |  24  |  0.86    Ca    8.9      03 Oct 2021 07:34  Phos  2.0     10-03  Mg     1.80     10-03      Creatinine Trend: 0.86<--, 0.83<--, 0.98<--, 0.79<--, 0.83<--, 0.74<--    COAGS: PTT - ( 03 Oct 2021 08:08 )  PTT:66.0 sec          T(C): 36.7 (10-03-21 @ 05:26), Max: 36.9 (10-02-21 @ 13:06)  HR: 79 (10-03-21 @ 05:26) (79 - 87)  BP: 122/75 (10-03-21 @ 05:26) (122/75 - 148/80)  RR: 18 (10-03-21 @ 05:26) (18 - 18)  SpO2: 100% (10-03-21 @ 05:26) (100% - 100%)  Wt(kg): --    I&O's Summary        General: Well nourished in no acute distress. Alert and Oriented * 3.   Head: Normocephalic and atraumatic.   Neck: No JVD. No bruits. Supple. Does not appear to be enlarged.   Cardiovascular: + S1,S2 ; RRR Soft systolic murmur at the left lower sternal border. No rubs noted.    Lungs: CTA b/l. No rhonchi, rales or wheezes.   Abdomen: + BS, soft. Non tender. Non distended. No rebound. No guarding.   Extremities: No clubbing/cyanosis/edema.   Neurologic: Moves all four extremities. Full range of motion.   Skin: Warm and moist. The patient's skin has normal elasticity and good skin turgor.   Psychiatric: Appropriate mood and affect.  Musculoskeletal: Normal range of motion, normal strength      General: Well nourished in no acute distress. Alert and Oriented * 3.   Head: Normocephalic and atraumatic.   Neck: No JVD. No bruits. Supple. Does not appear to be enlarged.   Cardiovascular: + S1,S2 ; RRR Soft systolic murmur at the left lower sternal border. No rubs noted.    Lungs: CTA b/l. No rhonchi, rales or wheezes.   Abdomen: + BS, soft. Non tender. Non distended. No rebound. No guarding.   Extremities: No clubbing/cyanosis/edema.   Neurologic: Moves all four extremities. Full range of motion.   Skin: Warm and moist. The patient's skin has normal elasticity and good skin turgor.   Psychiatric: Appropriate mood and affect.  Musculoskeletal: Normal range of motion, normal strength    < from: Transthoracic Echocardiogram (09.29.21 @ 17:35) >  CONCLUSIONS:  1. Calcified trileaflet aortic valve with normal opening.  2. Normal left ventricular internal dimensions and wall  thicknesses.  3. Normal left ventricular systolic function. No segmental  wall motion abnormalities.  4. Normal right ventricular size and function.  ------------------------------------------------------------------------  Confirmed on  9/29/2021 - 18:38:12 by MARIELOS Valencia    < end of copied text >    < from: Nuclear Stress Test-Pharmacologic (Nuclear Stress Test-Pharmacologic .) (09.30.21 @ 16:16) >  ------------------------------------------------------------------------  GATED ANALYSIS:  Post-stress gated wall motion analysis was performed (LVEF  = 66 %;LVEDV = 66 ml.), revealing normal LV function.  There was no segmental wall motion abnormality. Septal  wall motion appeared normal. RV function appeared normal.  ------------------------------------------------------------------------  IMPRESSIONS:Mildly Abnormal Study  * Myocardial Perfusion SPECT results are mildly abnormal.  * Review of raw data shows: The study is of fair technical  quality with adjacent bowel tracer uptake  * The left ventricle was normal in size. There is a small,  mild defect in septal wall that is fixed, suggestive of  mild scarring  * No clear evidence of ischemia.  * Post-stress gated wall motion analysis was performed  (LVEF = 66 %;LVEDV = 66 ml.), revealing normal LV  function. There was no segmental wall motion abnormality.  Septal wall motion appeared normal. RV function appeared  normal.  ------------------------------------------------------------------------  Confirmed on  10/1/2021 - 17:43:39 by Momo Deluna M.D.  < end of copied text >      A/P) 61 y/o male PMH HTN, HLD, DM, CKD, PVD with a Right Femoral-Femoral Bypass in 2018 on apixiban, presented to the [facility] ED with worsening Right Toe pain x 1 week, CT with occluded bypass.  Now awaiting further work up from Vascular. Cardiology called for preop evaluation.    -NST noted, no ischemia or infarct  -regardless of NST results his RCRI score is already 3 making him high risk for intermediate risk surgery, and would continue perioperative asa, lipitor, coreg  -no further inpatient cardiac workup planned

## 2021-10-03 NOTE — PROGRESS NOTE ADULT - SUBJECTIVE AND OBJECTIVE BOX
EVE DELGADO  60y  Patient is a 60y old  Male who presents with a chief complaint of Right Toe Necrosis (03 Oct 2021 10:05)    HPI:  Admitted with foot infection. No new complaints.    HEALTH ISSUES - PROBLEM Dx:  HTN (hypertension)    DM (diabetes mellitus)    HLD (hyperlipidemia)    CHF (congestive heart failure)    CKD (chronic kidney disease)    Need for prophylactic measure    Gangrene due to peripheral vascular disease          MEDICATIONS  (STANDING):  aspirin enteric coated 81 milliGRAM(s) Oral daily  atorvastatin 40 milliGRAM(s) Oral at bedtime  cadexomer iodine 0.9% Gel 1 Application(s) Topical daily  carvedilol 25 milliGRAM(s) Oral every 12 hours  cefepime   IVPB 2000 milliGRAM(s) IV Intermittent every 8 hours  dextrose 40% Gel 15 Gram(s) Oral once  dextrose 5%. 1000 milliLiter(s) (50 mL/Hr) IV Continuous <Continuous>  dextrose 5%. 1000 milliLiter(s) (100 mL/Hr) IV Continuous <Continuous>  dextrose 50% Injectable 25 Gram(s) IV Push once  dextrose 50% Injectable 12.5 Gram(s) IV Push once  dextrose 50% Injectable 25 Gram(s) IV Push once  glucagon  Injectable 1 milliGRAM(s) IntraMuscular once  heparin  Infusion.  Unit(s)/Hr (9 mL/Hr) IV Continuous <Continuous>  influenza   Vaccine 0.5 milliLiter(s) IntraMuscular once  insulin lispro (ADMELOG) corrective regimen sliding scale   SubCutaneous three times a day before meals  insulin lispro (ADMELOG) corrective regimen sliding scale   SubCutaneous at bedtime  lisinopril 10 milliGRAM(s) Oral daily  metroNIDAZOLE    Tablet 500 milliGRAM(s) Oral every 8 hours  multivitamin 1 Tablet(s) Oral daily  polyethylene glycol 3350 17 Gram(s) Oral daily  senna 2 Tablet(s) Oral at bedtime  sodium chloride 0.9%. 1000 milliLiter(s) (75 mL/Hr) IV Continuous <Continuous>    MEDICATIONS  (PRN):  acetaminophen   Tablet .. 650 milliGRAM(s) Oral every 6 hours PRN Temp greater or equal to 38C (100.4F), Mild Pain (1 - 3), Moderate Pain (4 - 6)  heparin   Injectable 4000 Unit(s) IV Push every 6 hours PRN For aPTT less than 40  heparin   Injectable 2000 Unit(s) IV Push every 6 hours PRN For aPTT between 40 - 57  oxyCODONE    IR 5 milliGRAM(s) Oral every 4 hours PRN Severe Pain (7 - 10)    Vital Signs Last 24 Hrs  T(C): 36.6 (03 Oct 2021 13:10), Max: 36.9 (02 Oct 2021 21:04)  T(F): 97.8 (03 Oct 2021 13:10), Max: 98.5 (02 Oct 2021 21:04)  HR: 82 (03 Oct 2021 13:10) (79 - 82)  BP: 132/74 (03 Oct 2021 13:10) (122/75 - 133/82)  BP(mean): --  RR: 18 (03 Oct 2021 13:10) (18 - 18)  SpO2: 100% (03 Oct 2021 13:10) (100% - 100%)  Daily     Daily     PHYSICAL EXAM:  Constitutional:  he appears comfortable and not distressed. Not diaphoretic.    Respiratory: The lungs are clear to auscultation. No dullness and expansion is normal.    Cardiovascular: S1 and S2 are normal. No mummurs, rubs or gallops are present.    Gastrointestinal: The abdomen is soft. No tenderness is present. No masses are present. Bowel sounds are normal.    Genitourinary: The bladder is not distended. No CVA tenderness is present.    Extremities: No edema is noted. Dressing to foot.    Neurological: Cognition is normal. Tone, power and sensation are normal. Gait is steady.    Skin: No lesions are seen  or palpated.    Lymph Nodes: No lymphadenopathy is present.                              11.3   11.78 )-----------( 243      ( 03 Oct 2021 07:36 )             32.5     10-03    131<L>  |  95<L>  |  17  ----------------------------<  147<H>  3.8   |  24  |  0.86    Ca    8.9      03 Oct 2021 07:34  Phos  2.0     10-03  Mg     1.80     10-03

## 2021-10-04 LAB
ANION GAP SERPL CALC-SCNC: 18 MMOL/L — HIGH (ref 7–14)
APTT BLD: 66.6 SEC — HIGH (ref 27–36.3)
BUN SERPL-MCNC: 17 MG/DL — SIGNIFICANT CHANGE UP (ref 7–23)
CALCIUM SERPL-MCNC: 7.7 MG/DL — LOW (ref 8.4–10.5)
CHLORIDE SERPL-SCNC: 93 MMOL/L — LOW (ref 98–107)
CO2 SERPL-SCNC: 24 MMOL/L — SIGNIFICANT CHANGE UP (ref 22–31)
CREAT SERPL-MCNC: <0.2 MG/DL — LOW (ref 0.5–1.3)
GLUCOSE SERPL-MCNC: 134 MG/DL — HIGH (ref 70–99)
HCT VFR BLD CALC: 33.2 % — LOW (ref 39–50)
HGB BLD-MCNC: 11.7 G/DL — LOW (ref 13–17)
MAGNESIUM SERPL-MCNC: 1.9 MG/DL — SIGNIFICANT CHANGE UP (ref 1.6–2.6)
MCHC RBC-ENTMCNC: 32.1 PG — SIGNIFICANT CHANGE UP (ref 27–34)
MCHC RBC-ENTMCNC: 35.2 GM/DL — SIGNIFICANT CHANGE UP (ref 32–36)
MCV RBC AUTO: 91 FL — SIGNIFICANT CHANGE UP (ref 80–100)
NRBC # BLD: 0 /100 WBCS — SIGNIFICANT CHANGE UP
NRBC # FLD: 0 K/UL — SIGNIFICANT CHANGE UP
NT-PROBNP SERPL-SCNC: 270 PG/ML — SIGNIFICANT CHANGE UP
PHOSPHATE SERPL-MCNC: 2 MG/DL — LOW (ref 2.5–4.5)
PLATELET # BLD AUTO: 267 K/UL — SIGNIFICANT CHANGE UP (ref 150–400)
POTASSIUM SERPL-MCNC: 3.9 MMOL/L — SIGNIFICANT CHANGE UP (ref 3.5–5.3)
POTASSIUM SERPL-SCNC: 3.9 MMOL/L — SIGNIFICANT CHANGE UP (ref 3.5–5.3)
RBC # BLD: 3.65 M/UL — LOW (ref 4.2–5.8)
RBC # FLD: 13.4 % — SIGNIFICANT CHANGE UP (ref 10.3–14.5)
SODIUM SERPL-SCNC: 135 MMOL/L — SIGNIFICANT CHANGE UP (ref 135–145)
WBC # BLD: 10.61 K/UL — HIGH (ref 3.8–10.5)
WBC # FLD AUTO: 10.61 K/UL — HIGH (ref 3.8–10.5)

## 2021-10-04 RX ADMIN — CARVEDILOL PHOSPHATE 25 MILLIGRAM(S): 80 CAPSULE, EXTENDED RELEASE ORAL at 05:20

## 2021-10-04 RX ADMIN — OXYCODONE HYDROCHLORIDE 5 MILLIGRAM(S): 5 TABLET ORAL at 18:47

## 2021-10-04 RX ADMIN — Medication 3: at 12:37

## 2021-10-04 RX ADMIN — CEFEPIME 100 MILLIGRAM(S): 1 INJECTION, POWDER, FOR SOLUTION INTRAMUSCULAR; INTRAVENOUS at 08:54

## 2021-10-04 RX ADMIN — Medication 1: at 08:55

## 2021-10-04 RX ADMIN — Medication 81 MILLIGRAM(S): at 11:35

## 2021-10-04 RX ADMIN — OXYCODONE HYDROCHLORIDE 5 MILLIGRAM(S): 5 TABLET ORAL at 05:20

## 2021-10-04 RX ADMIN — Medication 500 MILLIGRAM(S): at 05:20

## 2021-10-04 RX ADMIN — OXYCODONE HYDROCHLORIDE 5 MILLIGRAM(S): 5 TABLET ORAL at 01:30

## 2021-10-04 RX ADMIN — Medication 500 MILLIGRAM(S): at 13:41

## 2021-10-04 RX ADMIN — Medication 1 TABLET(S): at 11:36

## 2021-10-04 RX ADMIN — LISINOPRIL 10 MILLIGRAM(S): 2.5 TABLET ORAL at 05:20

## 2021-10-04 RX ADMIN — CARVEDILOL PHOSPHATE 25 MILLIGRAM(S): 80 CAPSULE, EXTENDED RELEASE ORAL at 17:23

## 2021-10-04 RX ADMIN — OXYCODONE HYDROCHLORIDE 5 MILLIGRAM(S): 5 TABLET ORAL at 13:41

## 2021-10-04 RX ADMIN — OXYCODONE HYDROCHLORIDE 5 MILLIGRAM(S): 5 TABLET ORAL at 14:20

## 2021-10-04 RX ADMIN — CEFEPIME 100 MILLIGRAM(S): 1 INJECTION, POWDER, FOR SOLUTION INTRAMUSCULAR; INTRAVENOUS at 01:54

## 2021-10-04 RX ADMIN — ATORVASTATIN CALCIUM 40 MILLIGRAM(S): 80 TABLET, FILM COATED ORAL at 22:53

## 2021-10-04 RX ADMIN — OXYCODONE HYDROCHLORIDE 5 MILLIGRAM(S): 5 TABLET ORAL at 06:20

## 2021-10-04 RX ADMIN — Medication 1 APPLICATION(S): at 11:36

## 2021-10-04 RX ADMIN — OXYCODONE HYDROCHLORIDE 5 MILLIGRAM(S): 5 TABLET ORAL at 22:52

## 2021-10-04 RX ADMIN — Medication 1: at 17:22

## 2021-10-04 RX ADMIN — OXYCODONE HYDROCHLORIDE 5 MILLIGRAM(S): 5 TABLET ORAL at 00:38

## 2021-10-04 RX ADMIN — Medication 500 MILLIGRAM(S): at 22:53

## 2021-10-04 RX ADMIN — CEFEPIME 100 MILLIGRAM(S): 1 INJECTION, POWDER, FOR SOLUTION INTRAMUSCULAR; INTRAVENOUS at 17:22

## 2021-10-04 RX ADMIN — Medication 1: at 22:54

## 2021-10-04 RX ADMIN — HEPARIN SODIUM 900 UNIT(S)/HR: 5000 INJECTION INTRAVENOUS; SUBCUTANEOUS at 08:55

## 2021-10-04 RX ADMIN — OXYCODONE HYDROCHLORIDE 5 MILLIGRAM(S): 5 TABLET ORAL at 23:49

## 2021-10-04 NOTE — DIETITIAN INITIAL EVALUATION ADULT. - OTHER INFO
Per chart, 59 y/o male, with a PmHx of CHF, HTN, CAD, HLD, DM, CKD, PVD with a Right Femoral-Femoral Bypass in 2018 on apixiban, presented to the [facility] ED with worsening Right Toe pain x 1 week. Admitted to medicine for gangrene of right foot 5th digit.    Per patient, appetite decreased with poor-fair PO intake over the past 2-3 weeks in setting of R foot pain, usually took Glucerna Shake x2 bottles at home; however, current appetite "much improved", with PO % on in-house meals past 4 days per flowsheet record. Denies chewing/swallowing difficulties at meals. Denies GI distress (nausea/vomiting/diarrhea/constipation) at this time. NKFA per patient. No food preferences reported at this time. RDN encouraged PO intake as tolerated, patient verbalized understanding.     # reported. Current wt: 49.3kg (10/4), 48.4kg (9/27), 48.6kg (9/26), 50kg (9/25). Wt loss ~15% in a month likely related to decreased po intake PTA.    Skin: necrotic R toe - 5th digit, podiatry following. Also noted Vascular is following, planning for OR Wednesday for kissing iliac stents and possible bypass.    Edema: R foot 1+.

## 2021-10-04 NOTE — DIETITIAN INITIAL EVALUATION ADULT. - ADD RECOMMEND
1. Recommend liberalize diet and d/c Low Fat restriction. 2. Continue with [employer] Shake 240mls 2x daily (440kcals, 20g protein). 3. C/w Multivitamin for micronutrient coverage. 4. Monitor weights, labs, BM's, skin integrity, PO intake/tolerance. 5. Encourage po intake, assist with meals and menu selections, provide alternatives PRN.

## 2021-10-04 NOTE — PROGRESS NOTE ADULT - ASSESSMENT
59 y/o male, with a PmHx of CHF, HTN, CAD, HLD, DM, CKD, PVD with a Right Femoral-Femoral Bypass in 2018 on apixiban, presented to the [facility] ED with worsening Right Toe pain x 1 week.      Ravin   pt with multiple Ravin in past   RAVIN possible prerenal as SG is high s/p IVF  Renal function improved  On ACE Monitor closley  s/p angio 9/29. renal function stable  Monitor BMP  AVoid further nephrotoxics, NSAID RCA    Hypokalemia  Repelted KCl    MOnitor  serum K    Acidosis   improving   MOnitor serum Co2    hypomagnesemia  supplemented by team  monitor    hypophosphatemia  supplemented by team  monitor    hyponatremia  work up suggested SIADH  Na stable  monitor level  free water restriction <1.2L    hypocalcemia  check pth, vit d 25  monitor

## 2021-10-04 NOTE — PROGRESS NOTE ADULT - SUBJECTIVE AND OBJECTIVE BOX
CARDIOLOGY    he denies palpitations, syncope, nor angina, ROS otherwise -    DATE OF SERVICE - 10-04-21     Review of Systems:   Constitutional: [ ] fevers, [ ] chills.   Skin: [ ] dry skin. [ ] rashes.  Psychiatric: [ ] depression, [ ] anxiety.   Gastrointestinal: [ ] BRBPR, [ ] melena.   Neurological: [ ] confusion. [ ] seizures. [ ] shuffling gait.   Ears,Nose,Mouth and Throat: [ ] ear pain [ ] sore throat.   Eyes: [ ] diplopia.   Respiratory: [ ] hemoptysis. [ ] shortness of breath  Cardiovascular: See HPI above  Hematologic/Lymphatic: [ ] anemia. [ ] painful nodes. [ ] prolonged bleeding.   Genitourinary: [ ] hematuria. [ ] flank pain.   Endocrine: [ ] significant change in weight. [ ] intolerance to heat and cold.     Review of systems [x ] otherwise negative, [ ] otherwise unable to obtain    FH: no family history of sudden cardiac death in first degree relatives    SH: [ ] tobacco, [ ] alcohol, [ ] drugs    acetaminophen   Tablet .. 650 milliGRAM(s) Oral every 6 hours PRN  aspirin enteric coated 81 milliGRAM(s) Oral daily  atorvastatin 40 milliGRAM(s) Oral at bedtime  cadexomer iodine 0.9% Gel 1 Application(s) Topical daily  carvedilol 25 milliGRAM(s) Oral every 12 hours  cefepime   IVPB 2000 milliGRAM(s) IV Intermittent every 8 hours  dextrose 40% Gel 15 Gram(s) Oral once  dextrose 5%. 1000 milliLiter(s) IV Continuous <Continuous>  dextrose 5%. 1000 milliLiter(s) IV Continuous <Continuous>  dextrose 50% Injectable 25 Gram(s) IV Push once  dextrose 50% Injectable 12.5 Gram(s) IV Push once  dextrose 50% Injectable 25 Gram(s) IV Push once  glucagon  Injectable 1 milliGRAM(s) IntraMuscular once  heparin   Injectable 4000 Unit(s) IV Push every 6 hours PRN  heparin   Injectable 2000 Unit(s) IV Push every 6 hours PRN  heparin  Infusion.  Unit(s)/Hr IV Continuous <Continuous>  influenza   Vaccine 0.5 milliLiter(s) IntraMuscular once  insulin lispro (ADMELOG) corrective regimen sliding scale   SubCutaneous three times a day before meals  insulin lispro (ADMELOG) corrective regimen sliding scale   SubCutaneous at bedtime  lisinopril 10 milliGRAM(s) Oral daily  metroNIDAZOLE    Tablet 500 milliGRAM(s) Oral every 8 hours  multivitamin 1 Tablet(s) Oral daily  oxyCODONE    IR 5 milliGRAM(s) Oral every 4 hours PRN  polyethylene glycol 3350 17 Gram(s) Oral daily  senna 2 Tablet(s) Oral at bedtime  sodium chloride 0.9%. 1000 milliLiter(s) IV Continuous <Continuous>                            11.7   10.61 )-----------( 267      ( 04 Oct 2021 07:12 )             33.2       10-04    135  |  93<L>  |  17  ----------------------------<  134<H>  3.9   |  24  |  <0.20<L>    Ca    7.7<L>      04 Oct 2021 07:12  Phos  2.0     10-04  Mg     1.90     10-04      T(C): 36.8 (10-04-21 @ 11:41), Max: 37.1 (10-03-21 @ 20:57)  HR: 80 (10-04-21 @ 11:41) (76 - 81)  BP: 152/83 (10-04-21 @ 11:41) (134/76 - 152/83)  RR: 17 (10-04-21 @ 11:41) (17 - 18)  SpO2: 100% (10-04-21 @ 11:41) (100% - 100%)    General: Well nourished in no acute distress. Alert and Oriented * 3.   Head: Normocephalic and atraumatic.   Neck: No JVD. No bruits. Supple. Does not appear to be enlarged.   Cardiovascular: + S1,S2 ; RRR Soft systolic murmur at the left lower sternal border. No rubs noted.    Lungs: CTA b/l. No rhonchi, rales or wheezes.   Abdomen: + BS, soft. Non tender. Non distended. No rebound. No guarding.   Extremities: No clubbing/cyanosis/edema.   Neurologic: Moves all four extremities. Full range of motion.   Skin: Warm and moist. The patient's skin has normal elasticity and good skin turgor.   Psychiatric: Appropriate mood and affect.  Musculoskeletal: Normal range of motion, normal strength      < from: Transthoracic Echocardiogram (09.29.21 @ 17:35) >  CONCLUSIONS:  1. Calcified trileaflet aortic valve with normal opening.  2. Normal left ventricular internal dimensions and wall  thicknesses.  3. Normal left ventricular systolic function. No segmental  wall motion abnormalities.  4. Normal right ventricular size and function.  ------------------------------------------------------------------------  Confirmed on  9/29/2021 - 18:38:12 by Gage Garibay M.D. RPVI    < end of copied text >    < from: Nuclear Stress Test-Pharmacologic (Nuclear Stress Test-Pharmacologic .) (09.30.21 @ 16:16) >  ------------------------------------------------------------------------  GATED ANALYSIS:  Post-stress gated wall motion analysis was performed (LVEF  = 66 %;LVEDV = 66 ml.), revealing normal LV function.  There was no segmental wall motion abnormality. Septal  wall motion appeared normal. RV function appeared normal.  ------------------------------------------------------------------------  IMPRESSIONS:Mildly Abnormal Study  * Myocardial Perfusion SPECT results are mildly abnormal.  * Review of raw data shows: The study is of fair technical  quality with adjacent bowel tracer uptake  * The left ventricle was normal in size. There is a small,  mild defect in septal wall that is fixed, suggestive of  mild scarring  * No clear evidence of ischemia.  * Post-stress gated wall motion analysis was performed  (LVEF = 66 %;LVEDV = 66 ml.), revealing normal LV  function. There was no segmental wall motion abnormality.  Septal wall motion appeared normal. RV function appeared  normal.  ------------------------------------------------------------------------  Confirmed on  10/1/2021 - 17:43:39 by Momo Deluna M.D.  < end of copied text >      A/P) 61 y/o male PMH HTN, HLD, DM, CKD, PVD with a Right Femoral-Femoral Bypass in 2018 on apixiban, presented to the [facility] ED with worsening Right Toe pain x 1 week, CT with occluded bypass.  Now awaiting further work up from Vascular. Cardiology called for preop evaluation.    -NST noted, no ischemia or infarct  -regardless of NST results his RCRI score is already 3 making him high risk for intermediate risk surgery, and would continue perioperative asa, lipitor, coreg  -no further inpatient cardiac workup planned

## 2021-10-04 NOTE — DIETITIAN INITIAL EVALUATION ADULT. - PERTINENT LABORATORY DATA
10-04 Na135 mmol/L Glu 134 mg/dL<H> K+ 3.9 mmol/L Cr  <0.20 mg/dL<L> BUN 17 mg/dL 10-04 Phos 2.0 mg/dL<L> 09-26 Chol 139 mg/dL LDL --    HDL 26 mg/dL<L> Trig 182 mg/dL<H>  9/25/21 HgbA1C 5.7%

## 2021-10-04 NOTE — PROGRESS NOTE ADULT - SUBJECTIVE AND OBJECTIVE BOX
[facility]   MD RAKAN SHEPHERD PA    TEL:  OFFICE: [phone]  DR FELICIANO CELL: [phone]  DR. PATIÑO CELL: [phone]  JANNETH SÁNCHEZ CELL: [phone]    From 5pm-7am Answering Service [phone]    -- RENAL FOLLOW UP NOTE ---Date of Service 10-04-21 @ 12:53    Patient is a 60y old  Male who presents with a chief complaint of Right Toe Necrosis (04 Oct 2021 11:48)      Patient seen and examined at bedside. No chest pain/sob    VITALS:  T(F): 98.3 (10-04-21 @ 11:41), Max: 98.7 (10-03-21 @ 20:57)  HR: 80 (10-04-21 @ 11:41)  BP: 152/83 (10-04-21 @ 11:41)  RR: 17 (10-04-21 @ 11:41)  SpO2: 100% (10-04-21 @ 11:41)  Wt(kg): --        PHYSICAL EXAM:  Constitutional: NAD  Neck: No JVD  Respiratory: CTAB, no wheezes, rales or rhonchi  Cardiovascular: S1, S2, RRR  Gastrointestinal: BS+, soft, NT/ND  Extremities: No peripheral edema    Hospital Medications:   MEDICATIONS  (STANDING):  aspirin enteric coated 81 milliGRAM(s) Oral daily  atorvastatin 40 milliGRAM(s) Oral at bedtime  cadexomer iodine 0.9% Gel 1 Application(s) Topical daily  carvedilol 25 milliGRAM(s) Oral every 12 hours  cefepime   IVPB 2000 milliGRAM(s) IV Intermittent every 8 hours  dextrose 40% Gel 15 Gram(s) Oral once  dextrose 5%. 1000 milliLiter(s) (50 mL/Hr) IV Continuous <Continuous>  dextrose 5%. 1000 milliLiter(s) (100 mL/Hr) IV Continuous <Continuous>  dextrose 50% Injectable 25 Gram(s) IV Push once  dextrose 50% Injectable 12.5 Gram(s) IV Push once  dextrose 50% Injectable 25 Gram(s) IV Push once  glucagon  Injectable 1 milliGRAM(s) IntraMuscular once  heparin  Infusion.  Unit(s)/Hr (9 mL/Hr) IV Continuous <Continuous>  influenza   Vaccine 0.5 milliLiter(s) IntraMuscular once  insulin lispro (ADMELOG) corrective regimen sliding scale   SubCutaneous three times a day before meals  insulin lispro (ADMELOG) corrective regimen sliding scale   SubCutaneous at bedtime  lisinopril 10 milliGRAM(s) Oral daily  metroNIDAZOLE    Tablet 500 milliGRAM(s) Oral every 8 hours  multivitamin 1 Tablet(s) Oral daily  polyethylene glycol 3350 17 Gram(s) Oral daily  senna 2 Tablet(s) Oral at bedtime  sodium chloride 0.9%. 1000 milliLiter(s) (75 mL/Hr) IV Continuous <Continuous>      LABS:  10-04    135  |  93<L>  |  17  ----------------------------<  134<H>  3.9   |  24  |  <0.20<L>    Ca    7.7<L>      04 Oct 2021 07:12  Phos  2.0     10-04  Mg     1.90     10-04      Creatinine Trend: <0.20 <--, 0.86 <--, 0.83 <--, 0.98 <--, 0.79 <--, 0.83 <--, 0.74 <--    Phosphorus Level, Serum: 2.0 mg/dL (10-04 @ 07:12)                              11.7   10.61 )-----------( 267      ( 04 Oct 2021 07:12 )             33.2     Urine Studies:  Urinalysis - [09-25-21 @ 15:23]      Color Yellow / Appearance Clear / SG 1.021 / pH 5.5      Gluc Negative / Ketone Negative  / Bili Negative / Urobili <2 mg/dL       Blood Negative / Protein Trace / Leuk Est Negative / Nitrite Negative      RBC 2 / WBC 3 / Hyaline 1 / Gran  / Sq Epi  / Non Sq Epi 1 / Bacteria Negative    Urine Sodium 61      [10-01-21 @ 15:09]  Urine Osmolality 708      [10-01-21 @ 15:09]    HbA1c 5.7      [12-20-18 @ 06:30]  TSH 0.76      [09-25-21 @ 12:52]  Lipid: chol 139, , HDL 26, LDL --      [09-26-21 @ 08:30]    HCV 0.09, Nonreact      [09-26-21 @ 19:55]      RADIOLOGY & ADDITIONAL STUDIES:

## 2021-10-04 NOTE — PROGRESS NOTE ADULT - SUBJECTIVE AND OBJECTIVE BOX
[facility], Division of Infectious Diseases  CHINEDU Fernandes Y. Patel, S. Shah  [phone]  ([phone] - weekdays after 5pm and weekends)    Name: EVE DELGADO  Age/Gender: 60y Male  MRN: [id number]    Interval History:  Patient seen this morning.   Denies fever, pain or any new complaints.   Notes reviewed. No concerning overnight events  Afebrile     Allergies: penicillin (Other)    Objective:  Vitals:   T(F): 98.3 (10-04-21 @ 05:10), Max: 98.7 (10-03-21 @ 20:57)  HR: 76 (10-04-21 @ 05:10) (76 - 82)  BP: 134/76 (10-04-21 @ 05:10) (132/74 - 139/86)  RR: 18 (10-04-21 @ 05:10) (18 - 18)  SpO2: 100% (10-04-21 @ 05:10) (100% - 100%)  Physical Examination:  General: no acute distress  HEENT: NC/AT, anicteric, neck supple  Respiratory: no acc muscle use, breathing comfortably  Cardiovascular: S1 and S2 present  Gastrointestinal: normal appearing, nondistended  Extremities: no edema, no cyanosis  Skin: RLE in clean dressing     Laboratory Studies:  CBC:                       11.7   10.61 )-----------( 267      ( 04 Oct 2021 07:12 )             33.2     WBC Trend:  10.61 10-04-21 @ 07:12  11.78 10-03-21 @ 07:36  11.61 10-02-21 @ 08:47  13.14 10-01-21 @ 06:55  12.01 09-30-21 @ 04:43  11.70 09-29-21 @ 21:14  11.88 09-29-21 @ 04:50  9.71 09-28-21 @ 08:39    CMP: 10-04    135  |  93<L>  |  17  ----------------------------<  134<H>  3.9   |  24  |  <0.20<L>    Ca    7.7<L>      04 Oct 2021 07:12  Phos  2.0     10-04  Mg     1.90     10-04    Microbiology: reviewed   Culture - Abscess with Gram Stain (collected 09-25-21 @ 18:21)  Source: .Abscess Right foot 5th digit wound  Final Report (09-27-21 @ 22:07):    Few Pseudomonas aeruginosa (Carbapenem Resistant)    Moderate Providencia rettgeri    Moderate Bacteroides fragilis "Susceptibilities not performed"    Normal skin uriah isolated  Organism: Pseudomonas aeruginosa (Carbapenem Resistant)  Providencia rettgeri (09-27-21 @ 22:07)  Organism: Providencia rettgeri (09-27-21 @ 22:07)      -  Amikacin: S <=16      -  Amoxicillin/Clavulanic Acid: R 16/8      -  Ampicillin: R >16 These ampicillin results predict results for amoxicillin      -  Ampicillin/Sulbactam: S <=4/2 Enterobacter, Citrobacter, and Serratia may develop resistance during prolonged therapy (3-4 days)      -  Aztreonam: I 16      -  Cefazolin: R >16 Enterobacter, Citrobacter, and Serratia may develop resistance during prolonged therapy (3-4 days)      -  Cefepime: S <=2      -  Cefoxitin: S <=8      -  Ceftriaxone: S <=1 Enterobacter, Citrobacter, and Serratia may develop resistance during prolonged therapy      -  Ciprofloxacin: S <=0.25      -  Ertapenem: S <=0.5      -  Gentamicin: S <=2      -  Imipenem: S <=1      -  Levofloxacin: S <=0.5      -  Meropenem: S <=1      -  Piperacillin/Tazobactam: S <=8      -  Tobramycin: S <=2      -  Trimethoprim/Sulfamethoxazole: S <=0.5/9.5      Method Type: CAM  Organism: Pseudomonas aeruginosa (Carbapenem Resistant) (09-27-21 @ 22:07)      -  Amikacin: S <=16      -  Aztreonam: I 16      -  Cefepime: S 8      -  Ceftazidime: S 4      -  Ciprofloxacin: S 0.5      -  Gentamicin: S <=2      -  Imipenem: R >8      -  Levofloxacin: I 2      -  Meropenem: R >8      -  Piperacillin/Tazobactam: S 16      -  Tobramycin: S <=2      Method Type: CAM    Culture - Blood (collected 09-25-21 @ 11:40)  Source: .Blood Blood-Peripheral  Final Report (09-30-21 @ 17:01):    No Growth Final    Culture - Blood (collected 09-25-21 @ 11:40)  Source: .Blood Blood-Peripheral  Final Report (09-30-21 @ 17:01):    No Growth Final    Radiology: reviewed     Medications:  acetaminophen   Tablet .. 650 milliGRAM(s) Oral every 6 hours PRN  aspirin enteric coated 81 milliGRAM(s) Oral daily  atorvastatin 40 milliGRAM(s) Oral at bedtime  cadexomer iodine 0.9% Gel 1 Application(s) Topical daily  carvedilol 25 milliGRAM(s) Oral every 12 hours  cefepime   IVPB 2000 milliGRAM(s) IV Intermittent every 8 hours  dextrose 40% Gel 15 Gram(s) Oral once  dextrose 5%. 1000 milliLiter(s) IV Continuous <Continuous>  dextrose 5%. 1000 milliLiter(s) IV Continuous <Continuous>  dextrose 50% Injectable 25 Gram(s) IV Push once  dextrose 50% Injectable 25 Gram(s) IV Push once  dextrose 50% Injectable 12.5 Gram(s) IV Push once  glucagon  Injectable 1 milliGRAM(s) IntraMuscular once  heparin   Injectable 4000 Unit(s) IV Push every 6 hours PRN  heparin   Injectable 2000 Unit(s) IV Push every 6 hours PRN  heparin  Infusion.  Unit(s)/Hr IV Continuous <Continuous>  influenza   Vaccine 0.5 milliLiter(s) IntraMuscular once  insulin lispro (ADMELOG) corrective regimen sliding scale   SubCutaneous three times a day before meals  insulin lispro (ADMELOG) corrective regimen sliding scale   SubCutaneous at bedtime  lisinopril 10 milliGRAM(s) Oral daily  metroNIDAZOLE    Tablet 500 milliGRAM(s) Oral every 8 hours  multivitamin 1 Tablet(s) Oral daily  oxyCODONE    IR 5 milliGRAM(s) Oral every 4 hours PRN  polyethylene glycol 3350 17 Gram(s) Oral daily  senna 2 Tablet(s) Oral at bedtime  sodium chloride 0.9%. 1000 milliLiter(s) IV Continuous <Continuous>    Antimicrobials:  cefepime   IVPB 2000 milliGRAM(s) IV Intermittent every 8 hours  metroNIDAZOLE    Tablet 500 milliGRAM(s) Oral every 8 hours

## 2021-10-04 NOTE — PROGRESS NOTE ADULT - SUBJECTIVE AND OBJECTIVE BOX
Patient is a 60y old  Male who presents with a chief complaint of Right Toe Necrosis (26 Sep 2021 10:35)  10/4/2021    HPI:    Afebrile. No new symptoms  Scheduled for bypass grafting this week    PAST MEDICAL & SURGICAL HISTORY:  DM (diabetes mellitus)    HTN (hypertension)    HLD (hyperlipidemia)    CHF (congestive heart failure)    CKD (chronic kidney disease)    PVD (peripheral vascular disease)    S/P femoral-femoral bypass surgery  2018 - right leg        Review of Systems:   CONSTITUTIONAL: No fever, weight loss, or fatigue  EYES: No eye pain, visual disturbances, or discharge  ENMT:  No difficulty hearing, tinnitus, vertigo; No sinus or throat pain  NECK: No pain or stiffness  BREASTS: No pain, masses, or nipple discharge  RESPIRATORY: No cough, wheezing, chills or hemoptysis; No shortness of breath  CARDIOVASCULAR: No chest pain, palpitations, dizziness, or leg swelling  GASTROINTESTINAL: No abdominal or epigastric pain. No nausea, vomiting, or hematemesis; No diarrhea or constipation. No melena or hematochezia.  GENITOURINARY: No dysuria, frequency, hematuria, or incontinence  NEUROLOGICAL: No headaches, memory loss, loss of strength, numbness, or tremors  SKIN: No itching, burning, rashes, or lesions   LYMPH NODES: No enlarged glands  ENDOCRINE: No heat or cold intolerance; No hair loss  MUSCULOSKELETAL: No joint pain or swelling; Right foot dressed  PSYCHIATRIC: No depression, anxiety, mood swings, or difficulty sleeping  HEME/LYMPH: No easy bruising, or bleeding gums  ALLERY AND IMMUNOLOGIC: No hives or eczema    Allergies    penicillin (Other)    Intolerances        Social History:     FAMILY HISTORY:  Family history of MI (myocardial infarction)  Father    FH: renal cell carcinoma  Sister - s/p nephrectomy    Family history of depression  Brother    FHx: diabetes mellitus  Paternal Grandfather    FH: heart disease  Mother        MEDICATIONS  (STANDING):  aspirin enteric coated 81 milliGRAM(s) Oral daily  atorvastatin 40 milliGRAM(s) Oral at bedtime  cadexomer iodine 0.9% Gel 1 Application(s) Topical daily  carvedilol 25 milliGRAM(s) Oral every 12 hours  clindamycin IVPB 600 milliGRAM(s) IV Intermittent every 8 hours  dextrose 40% Gel 15 Gram(s) Oral once  dextrose 5%. 1000 milliLiter(s) (50 mL/Hr) IV Continuous <Continuous>  dextrose 5%. 1000 milliLiter(s) (100 mL/Hr) IV Continuous <Continuous>  dextrose 50% Injectable 25 Gram(s) IV Push once  dextrose 50% Injectable 12.5 Gram(s) IV Push once  dextrose 50% Injectable 25 Gram(s) IV Push once  glucagon  Injectable 1 milliGRAM(s) IntraMuscular once  heparin  Infusion.  Unit(s)/Hr (9 mL/Hr) IV Continuous <Continuous>  influenza   Vaccine 0.5 milliLiter(s) IntraMuscular once  insulin lispro (ADMELOG) corrective regimen sliding scale   SubCutaneous three times a day before meals  insulin lispro (ADMELOG) corrective regimen sliding scale   SubCutaneous at bedtime  lisinopril 10 milliGRAM(s) Oral daily  multivitamin 1 Tablet(s) Oral daily  sodium chloride 0.9%. 1000 milliLiter(s) (60 mL/Hr) IV Continuous <Continuous>    MEDICATIONS  (PRN):  acetaminophen   Tablet .. 650 milliGRAM(s) Oral every 6 hours PRN Temp greater or equal to 38C (100.4F), Mild Pain (1 - 3), Moderate Pain (4 - 6)  heparin   Injectable 4000 Unit(s) IV Push every 6 hours PRN For aPTT less than 40  heparin   Injectable 2000 Unit(s) IV Push every 6 hours PRN For aPTT between 40 - 57        CAPILLARY BLOOD GLUCOSE      POCT Blood Glucose.: 170 mg/dL (26 Sep 2021 12:31)  POCT Blood Glucose.: 116 mg/dL (26 Sep 2021 08:25)  POCT Blood Glucose.: 114 mg/dL (25 Sep 2021 22:52)  POCT Blood Glucose.: 101 mg/dL (25 Sep 2021 17:19)    I&O's Summary    26 Sep 2021 07:01  -  26 Sep 2021 12:36  --------------------------------------------------------  IN: 240 mL / OUT: 0 mL / NET: 240 mL        PHYSICAL EXAM:  Vital Signs Last 24 Hrs  T(C): 36.9 (26 Sep 2021 05:54), Max: 36.9 (26 Sep 2021 05:54)  T(F): 98.5 (26 Sep 2021 05:54), Max: 98.5 (26 Sep 2021 05:54)  HR: 70 (26 Sep 2021 05:54) (65 - 76)  BP: 150/76 (26 Sep 2021 05:54) (137/75 - 167/98)  BP(mean): --  RR: 17 (26 Sep 2021 05:54) (15 - 17)  SpO2: 100% (26 Sep 2021 05:54) (99% - 100%)    GENERAL: NAD, well-developed  HEAD:  Atraumatic, Normocephalic  EYES: EOMI, PERRLA, conjunctiva and sclera clear  NECK: Supple, No JVD  CHEST/LUNG: Clear to auscultation bilaterally; No wheeze  HEART: Regular rate and rhythm; No murmurs, rubs, or gallops  ABDOMEN: Soft, Nontender, Nondistended; Bowel sounds present  EXTREMITIES:  2+ Peripheral Pulses, No clubbing, cyanosis, or edema. right foot dressed  PSYCH: AAOx3  NEUROLOGY: non-focal  SKIN: No rashes or lesions    LABS:                        13.4   10.31 )-----------( 249      ( 26 Sep 2021 08:30 )             38.3     09-26    138  |  102  |  32<H>  ----------------------------<  126<H>  3.4<L>   |  23  |  1.27    Ca    9.5      26 Sep 2021 08:30  Phos  2.8       Mg     1.80         TPro  6.9  /  Alb  3.9  /  TBili  0.2  /  DBili  x   /  AST  14  /  ALT  8   /  AlkPhos  85  09-26    PT/INR - ( 25 Sep 2021 12:42 )   PT: 11.0 sec;   INR: 0.95 ratio         PTT - ( 26 Sep 2021 11:13 )  PTT:32.7 sec      Urinalysis Basic - ( 25 Sep 2021 15:23 )    Color: Yellow / Appearance: Clear / S.021 / pH: x  Gluc: x / Ketone: Negative  / Bili: Negative / Urobili: <2 mg/dL   Blood: x / Protein: Trace / Nitrite: Negative   Leuk Esterase: Negative / RBC: 2 /HPF / WBC 3 /HPF   Sq Epi: x / Non Sq Epi: 1 /HPF / Bacteria: Negative        RADIOLOGY & ADDITIONAL TESTS:    Imaging Personally Reviewed:    Consultant(s) Notes Reviewed:      Care Discussed with Consultants/Other Providers:

## 2021-10-04 NOTE — DIETITIAN INITIAL EVALUATION ADULT. - PERTINENT MEDS FT
MEDICATIONS  (STANDING):  aspirin enteric coated 81 milliGRAM(s) Oral daily  atorvastatin 40 milliGRAM(s) Oral at bedtime  cadexomer iodine 0.9% Gel 1 Application(s) Topical daily  carvedilol 25 milliGRAM(s) Oral every 12 hours  cefepime   IVPB 2000 milliGRAM(s) IV Intermittent every 8 hours  dextrose 40% Gel 15 Gram(s) Oral once  dextrose 5%. 1000 milliLiter(s) (50 mL/Hr) IV Continuous <Continuous>  dextrose 5%. 1000 milliLiter(s) (100 mL/Hr) IV Continuous <Continuous>  dextrose 50% Injectable 25 Gram(s) IV Push once  dextrose 50% Injectable 12.5 Gram(s) IV Push once  dextrose 50% Injectable 25 Gram(s) IV Push once  glucagon  Injectable 1 milliGRAM(s) IntraMuscular once  heparin  Infusion.  Unit(s)/Hr (9 mL/Hr) IV Continuous <Continuous>  influenza   Vaccine 0.5 milliLiter(s) IntraMuscular once  insulin lispro (ADMELOG) corrective regimen sliding scale   SubCutaneous three times a day before meals  insulin lispro (ADMELOG) corrective regimen sliding scale   SubCutaneous at bedtime  lisinopril 10 milliGRAM(s) Oral daily  metroNIDAZOLE    Tablet 500 milliGRAM(s) Oral every 8 hours  multivitamin 1 Tablet(s) Oral daily  polyethylene glycol 3350 17 Gram(s) Oral daily  senna 2 Tablet(s) Oral at bedtime  sodium chloride 0.9%. 1000 milliLiter(s) (75 mL/Hr) IV Continuous <Continuous>    MEDICATIONS  (PRN):  acetaminophen   Tablet .. 650 milliGRAM(s) Oral every 6 hours PRN Temp greater or equal to 38C (100.4F), Mild Pain (1 - 3), Moderate Pain (4 - 6)  heparin   Injectable 4000 Unit(s) IV Push every 6 hours PRN For aPTT less than 40  heparin   Injectable 2000 Unit(s) IV Push every 6 hours PRN For aPTT between 40 - 57  oxyCODONE    IR 5 milliGRAM(s) Oral every 4 hours PRN Severe Pain (7 - 10)

## 2021-10-04 NOTE — PROGRESS NOTE ADULT - SUBJECTIVE AND OBJECTIVE BOX
SUBJECTIVE:  Endorses right foot pain, stable from prior. Denies groin pain.    OBJECTIVE:  Vital Signs Last 24 Hrs  T(C): 36.8 (04 Oct 2021 11:41), Max: 37.1 (03 Oct 2021 20:57)  T(F): 98.3 (04 Oct 2021 11:41), Max: 98.7 (03 Oct 2021 20:57)  HR: 80 (04 Oct 2021 11:41) (76 - 81)  BP: 152/83 (04 Oct 2021 11:41) (134/76 - 152/83)  BP(mean): 101 (04 Oct 2021 11:41) (101 - 101)  RR: 17 (04 Oct 2021 11:41) (17 - 18)  SpO2: 100% (04 Oct 2021 11:41) (100% - 100%)        Physical Examination:  GEN: NAD, resting quietly  PULM: symmetric chest rise bilaterally, no increased WOB  EXTR: left groin soft, nontender, no hematoma or mass palpated over angio access site, right foot wrapped with dressing c/d/i      LABS:                        11.7   10.61 )-----------( 267      ( 04 Oct 2021 07:12 )             33.2       10-04    135  |  93<L>  |  17  ----------------------------<  134<H>  3.9   |  24  |  <0.20<L>    Ca    7.7<L>      04 Oct 2021 07:12  Phos  2.0     10-04  Mg     1.90     10-04

## 2021-10-04 NOTE — PROGRESS NOTE ADULT - ASSESSMENT
Patient is a 60 year old male, with PMH of CHF, HTN, CAD, HLD, DM, CKD, PVD with a Right Femoral-Femoral Bypass in 2018 on apixiban, presented to the [facility] ED with worsening R toe pain for 1 week and was admitted for R 5th toe gangrene.     R toe gangrene d/t PVD with cellulitis  Leukocytosis likely reactive and due to above, improving   - had wound for a month, worsening over last week with erythema and pain - outpt podiatrist started clindamycin and referred to ER   - R foot xray reviewed - no evidence of OM, consider MRI   - R foot wound culture with CRE Pseudomonas, Providencia and Bacteroides - sensitivities reviewed    - CTA reviewed as above, noted with stenosis and occlusion   - Vascular surgery following - s/p angiogram 9/29 -- plan for iliac stents and possible bypass Wed 10/6   - Podiatry following - s/p excisional debridement at bedside    -- plan for partial 5th ray resection pending vascular recs    -- please send for bone culture and biopsy    - continue cefepime and metronidazole   - monitor clinically, temps, wbc    CKD    - monitor Cr, renally adjusted meds/abx    DM   - Blood glucose management per primary team.     H/o PCN allergy    - d/w pt and brother - exact reaction unknown, reports graying of skin, denies anaphylaxis   - tolerating cefepime without issues         Brett Kruse M.D.  [facility], Division of Infectious Diseases  [phone]  After 5pm on weekdays and all day on weekends - please call [phone]

## 2021-10-04 NOTE — PROGRESS NOTE ADULT - ASSESSMENT
59yo M with Hx CHF, CAD (no stents), HTN, HLD, DM, PVD with h/o right fem-pop bypass ([facility] 2018) who presents with non-healing R toe wound, found to have dry gangrene with small area of wet conversion and bypass occlusion now s/p diagnostic RLE angiogram.    Plan/Recommendations:  - Kissing iliac stents and possible RLE bypass planned for Thursday 10/7  - Medical and cardiac optimization noted  - Will preop and consent for procedure    C Team Surgery   [id number]    59yo M with Hx CHF, CAD (no stents), HTN, HLD, DM, PVD with h/o right fem-pop bypass ([facility] 2018) who presents with non-healing R toe wound, found to have dry gangrene with small area of wet conversion and bypass occlusion now s/p diagnostic RLE angiogram.    Plan/Recommendations:  - Bilateral iliac stents and possible right femoral to PT bypass planned for Wednesday 10/6  - Medical and cardiac optimization noted  - Will preop and consent for procedure    C Team Surgery   [id number]

## 2021-10-04 NOTE — PROGRESS NOTE ADULT - ASSESSMENT
59 y/o male, with a PmHx of CHF, HTN, CAD, HLD, DM, CKD, PVD with a Right Femoral-Femoral Bypass in 2018 on apixiban, presented to the [facility] ED with worsening Right Toe pain x 1 week. Admitted to medicine for gangrene of right foot 5th digit.

## 2021-10-05 ENCOUNTER — TRANSCRIPTION ENCOUNTER (OUTPATIENT)
Age: 61
End: 2021-10-05

## 2021-10-05 LAB
APTT BLD: 87 SEC — HIGH (ref 27–36.3)
HCT VFR BLD CALC: 34.7 % — LOW (ref 39–50)
HGB BLD-MCNC: 12.2 G/DL — LOW (ref 13–17)
MCHC RBC-ENTMCNC: 32.2 PG — SIGNIFICANT CHANGE UP (ref 27–34)
MCHC RBC-ENTMCNC: 35.2 GM/DL — SIGNIFICANT CHANGE UP (ref 32–36)
MCV RBC AUTO: 91.6 FL — SIGNIFICANT CHANGE UP (ref 80–100)
NRBC # BLD: 0 /100 WBCS — SIGNIFICANT CHANGE UP
NRBC # FLD: 0 K/UL — SIGNIFICANT CHANGE UP
PLATELET # BLD AUTO: 291 K/UL — SIGNIFICANT CHANGE UP (ref 150–400)
RBC # BLD: 3.79 M/UL — LOW (ref 4.2–5.8)
RBC # FLD: 13.6 % — SIGNIFICANT CHANGE UP (ref 10.3–14.5)
SARS-COV-2 RNA SPEC QL NAA+PROBE: SIGNIFICANT CHANGE UP
WBC # BLD: 11.6 K/UL — HIGH (ref 3.8–10.5)
WBC # FLD AUTO: 11.6 K/UL — HIGH (ref 3.8–10.5)

## 2021-10-05 RX ORDER — LANOLIN ALCOHOL/MO/W.PET/CERES
3 CREAM (GRAM) TOPICAL AT BEDTIME
Refills: 0 | Status: DISCONTINUED | OUTPATIENT
Start: 2021-10-05 | End: 2021-10-19

## 2021-10-05 RX ORDER — ACETAMINOPHEN 500 MG
1000 TABLET ORAL ONCE
Refills: 0 | Status: COMPLETED | OUTPATIENT
Start: 2021-10-05 | End: 2021-10-05

## 2021-10-05 RX ADMIN — Medication 500 MILLIGRAM(S): at 05:40

## 2021-10-05 RX ADMIN — OXYCODONE HYDROCHLORIDE 5 MILLIGRAM(S): 5 TABLET ORAL at 15:46

## 2021-10-05 RX ADMIN — CEFEPIME 100 MILLIGRAM(S): 1 INJECTION, POWDER, FOR SOLUTION INTRAMUSCULAR; INTRAVENOUS at 00:57

## 2021-10-05 RX ADMIN — Medication 3: at 12:27

## 2021-10-05 RX ADMIN — OXYCODONE HYDROCHLORIDE 5 MILLIGRAM(S): 5 TABLET ORAL at 06:30

## 2021-10-05 RX ADMIN — HEPARIN SODIUM 900 UNIT(S)/HR: 5000 INJECTION INTRAVENOUS; SUBCUTANEOUS at 07:42

## 2021-10-05 RX ADMIN — Medication 3 MILLIGRAM(S): at 23:18

## 2021-10-05 RX ADMIN — Medication 500 MILLIGRAM(S): at 12:26

## 2021-10-05 RX ADMIN — Medication 1: at 21:51

## 2021-10-05 RX ADMIN — Medication 1000 MILLIGRAM(S): at 01:15

## 2021-10-05 RX ADMIN — CARVEDILOL PHOSPHATE 25 MILLIGRAM(S): 80 CAPSULE, EXTENDED RELEASE ORAL at 18:01

## 2021-10-05 RX ADMIN — OXYCODONE HYDROCHLORIDE 5 MILLIGRAM(S): 5 TABLET ORAL at 16:20

## 2021-10-05 RX ADMIN — Medication 1: at 17:15

## 2021-10-05 RX ADMIN — OXYCODONE HYDROCHLORIDE 5 MILLIGRAM(S): 5 TABLET ORAL at 22:45

## 2021-10-05 RX ADMIN — ATORVASTATIN CALCIUM 40 MILLIGRAM(S): 80 TABLET, FILM COATED ORAL at 21:52

## 2021-10-05 RX ADMIN — OXYCODONE HYDROCHLORIDE 5 MILLIGRAM(S): 5 TABLET ORAL at 22:02

## 2021-10-05 RX ADMIN — CEFEPIME 100 MILLIGRAM(S): 1 INJECTION, POWDER, FOR SOLUTION INTRAMUSCULAR; INTRAVENOUS at 18:01

## 2021-10-05 RX ADMIN — Medication 81 MILLIGRAM(S): at 12:27

## 2021-10-05 RX ADMIN — Medication 1: at 08:37

## 2021-10-05 RX ADMIN — Medication 1 TABLET(S): at 12:27

## 2021-10-05 RX ADMIN — CEFEPIME 100 MILLIGRAM(S): 1 INJECTION, POWDER, FOR SOLUTION INTRAMUSCULAR; INTRAVENOUS at 10:29

## 2021-10-05 RX ADMIN — Medication 1 APPLICATION(S): at 12:26

## 2021-10-05 RX ADMIN — OXYCODONE HYDROCHLORIDE 5 MILLIGRAM(S): 5 TABLET ORAL at 05:40

## 2021-10-05 RX ADMIN — Medication 400 MILLIGRAM(S): at 00:26

## 2021-10-05 RX ADMIN — LISINOPRIL 10 MILLIGRAM(S): 2.5 TABLET ORAL at 05:41

## 2021-10-05 RX ADMIN — Medication 500 MILLIGRAM(S): at 21:52

## 2021-10-05 RX ADMIN — CARVEDILOL PHOSPHATE 25 MILLIGRAM(S): 80 CAPSULE, EXTENDED RELEASE ORAL at 05:40

## 2021-10-05 NOTE — PROGRESS NOTE ADULT - ASSESSMENT
61 y/o male, with a PmHx of CHF, HTN, CAD, HLD, DM, CKD, PVD with a Right Femoral-Femoral Bypass in 2018 on apixiban, presented to the [facility] ED with worsening Right Toe pain x 1 week.      Ravin   pt with multiple Ravin in past   RAVIN possible prerenal as SG is high s/p IVF  Renal function improved  On ACE Monitor closley  s/p angio 9/29. renal function stable  Monitor BMP  AVoid further nephrotoxics, NSAID RCA    Hypokalemia  Repelted KCl    MOnitor  serum K    Acidosis   improving   MOnitor serum Co2    hypomagnesemia  supplemented by team  monitor    hypophosphatemia  supplemented by team  monitor    hyponatremia  work up suggested SIADH  Na stable  monitor level  free water restriction <1.2L    hypocalcemia  check pth, vit d 25  monitor

## 2021-10-05 NOTE — PROGRESS NOTE ADULT - ASSESSMENT
59yo M with Hx CHF, CAD (no stents), HTN, HLD, DM, PVD with h/o right fem-pop bypass ([facility] 2018) who presents with non-healing R toe wound, found to have dry gangrene with small area of wet conversion and bypass occlusion now s/p diagnostic RLE angiogram.    Plan/Recommendations:  - Bilateral iliac stents and possible right femoral to PT bypass planned for Wednesday 10/6  - Medical and cardiac optimization noted  - consented, document in chart  - covid pending  - preop today, NPO after midnight    C Team Surgery   [id number]

## 2021-10-05 NOTE — PROGRESS NOTE ADULT - ASSESSMENT
Patient is a 60 year old male, with PMH of CHF, HTN, CAD, HLD, DM, CKD, PVD with a Right Femoral-Femoral Bypass in 2018 on apixiban, presented to the [facility] ED with worsening R toe pain for 1 week and was admitted for R 5th toe gangrene.     R toe gangrene d/t PVD with cellulitis  Leukocytosis likely reactive and due to above, stable   - had wound for a month, worsening over last week with erythema and pain - outpt podiatrist started clindamycin and referred to ER   - R foot xray reviewed - no evidence of OM   - R foot wound culture with CRE Pseudomonas, Providencia and Bacteroides - sensitivities reviewed    - Vascular surgery following - s/p angiogram 9/29      -- plan for iliac stents and possible bypass Wed 10/6   - Podiatry following - s/p excisional debridement at bedside    -- plan for partial 5th ray resection pending vascular recs    -- please send for bone culture and biopsy    - continue cefepime and metronidazole   - monitor clinically, temps, wbc    CKD    - monitor Cr, renally adjusted meds/abx    DM   - Blood glucose management per primary team.     H/o PCN allergy    - d/w pt and brother - exact reaction unknown, reports graying of skin, denies anaphylaxis   - tolerating cefepime without issues         Brett Kruse M.D.  [facility], Division of Infectious Diseases  [phone]  After 5pm on weekdays and all day on weekends - please call [phone]

## 2021-10-05 NOTE — PROGRESS NOTE ADULT - SUBJECTIVE AND OBJECTIVE BOX
Patient is a 60y old  Male who presents with a chief complaint of Right Toe Necrosis (26 Sep 2021 10:35)    10/5/2021    HPI:  Afebrile. No new symptoms  Scheduled for bypass grafting     PAST MEDICAL & SURGICAL HISTORY:  DM (diabetes mellitus)    HTN (hypertension)    HLD (hyperlipidemia)    CHF (congestive heart failure)    CKD (chronic kidney disease)    PVD (peripheral vascular disease)    S/P femoral-femoral bypass surgery  2018 - right leg        Review of Systems:   CONSTITUTIONAL: No fever, weight loss, or fatigue  EYES: No eye pain, visual disturbances, or discharge  ENMT:  No difficulty hearing, tinnitus, vertigo; No sinus or throat pain  NECK: No pain or stiffness  BREASTS: No pain, masses, or nipple discharge  RESPIRATORY: No cough, wheezing, chills or hemoptysis; No shortness of breath  CARDIOVASCULAR: No chest pain, palpitations, dizziness, or leg swelling  GASTROINTESTINAL: No abdominal or epigastric pain. No nausea, vomiting, or hematemesis; No diarrhea or constipation. No melena or hematochezia.  GENITOURINARY: No dysuria, frequency, hematuria, or incontinence  NEUROLOGICAL: No headaches, memory loss, loss of strength, numbness, or tremors  SKIN: No itching, burning, rashes, or lesions   LYMPH NODES: No enlarged glands  ENDOCRINE: No heat or cold intolerance; No hair loss  MUSCULOSKELETAL: No joint pain or swelling; Right foot dressed  PSYCHIATRIC: No depression, anxiety, mood swings, or difficulty sleeping  HEME/LYMPH: No easy bruising, or bleeding gums  ALLERY AND IMMUNOLOGIC: No hives or eczema    Allergies    penicillin (Other)    Intolerances        Social History:     FAMILY HISTORY:  Family history of MI (myocardial infarction)  Father    FH: renal cell carcinoma  Sister - s/p nephrectomy    Family history of depression  Brother    FHx: diabetes mellitus  Paternal Grandfather    FH: heart disease  Mother        MEDICATIONS  (STANDING):  aspirin enteric coated 81 milliGRAM(s) Oral daily  atorvastatin 40 milliGRAM(s) Oral at bedtime  cadexomer iodine 0.9% Gel 1 Application(s) Topical daily  carvedilol 25 milliGRAM(s) Oral every 12 hours  clindamycin IVPB 600 milliGRAM(s) IV Intermittent every 8 hours  dextrose 40% Gel 15 Gram(s) Oral once  dextrose 5%. 1000 milliLiter(s) (50 mL/Hr) IV Continuous <Continuous>  dextrose 5%. 1000 milliLiter(s) (100 mL/Hr) IV Continuous <Continuous>  dextrose 50% Injectable 25 Gram(s) IV Push once  dextrose 50% Injectable 12.5 Gram(s) IV Push once  dextrose 50% Injectable 25 Gram(s) IV Push once  glucagon  Injectable 1 milliGRAM(s) IntraMuscular once  heparin  Infusion.  Unit(s)/Hr (9 mL/Hr) IV Continuous <Continuous>  influenza   Vaccine 0.5 milliLiter(s) IntraMuscular once  insulin lispro (ADMELOG) corrective regimen sliding scale   SubCutaneous three times a day before meals  insulin lispro (ADMELOG) corrective regimen sliding scale   SubCutaneous at bedtime  lisinopril 10 milliGRAM(s) Oral daily  multivitamin 1 Tablet(s) Oral daily  sodium chloride 0.9%. 1000 milliLiter(s) (60 mL/Hr) IV Continuous <Continuous>    MEDICATIONS  (PRN):  acetaminophen   Tablet .. 650 milliGRAM(s) Oral every 6 hours PRN Temp greater or equal to 38C (100.4F), Mild Pain (1 - 3), Moderate Pain (4 - 6)  heparin   Injectable 4000 Unit(s) IV Push every 6 hours PRN For aPTT less than 40  heparin   Injectable 2000 Unit(s) IV Push every 6 hours PRN For aPTT between 40 - 57        CAPILLARY BLOOD GLUCOSE      POCT Blood Glucose.: 170 mg/dL (26 Sep 2021 12:31)  POCT Blood Glucose.: 116 mg/dL (26 Sep 2021 08:25)  POCT Blood Glucose.: 114 mg/dL (25 Sep 2021 22:52)  POCT Blood Glucose.: 101 mg/dL (25 Sep 2021 17:19)    I&O's Summary    26 Sep 2021 07:01  -  26 Sep 2021 12:36  --------------------------------------------------------  IN: 240 mL / OUT: 0 mL / NET: 240 mL        PHYSICAL EXAM:  Vital Signs Last 24 Hrs  T(C): 36.9 (26 Sep 2021 05:54), Max: 36.9 (26 Sep 2021 05:54)  T(F): 98.5 (26 Sep 2021 05:54), Max: 98.5 (26 Sep 2021 05:54)  HR: 70 (26 Sep 2021 05:54) (65 - 76)  BP: 150/76 (26 Sep 2021 05:54) (137/75 - 167/98)  BP(mean): --  RR: 17 (26 Sep 2021 05:54) (15 - 17)  SpO2: 100% (26 Sep 2021 05:54) (99% - 100%)    GENERAL: NAD, well-developed  HEAD:  Atraumatic, Normocephalic  EYES: EOMI, PERRLA, conjunctiva and sclera clear  NECK: Supple, No JVD  CHEST/LUNG: Clear to auscultation bilaterally; No wheeze  HEART: Regular rate and rhythm; No murmurs, rubs, or gallops  ABDOMEN: Soft, Nontender, Nondistended; Bowel sounds present  EXTREMITIES:  2+ Peripheral Pulses, No clubbing, cyanosis, or edema. right foot dressed  PSYCH: AAOx3  NEUROLOGY: non-focal  SKIN: No rashes or lesions    LABS:                        13.4   10.31 )-----------( 249      ( 26 Sep 2021 08:30 )             38.3     09-    138  |  102  |  32<H>  ----------------------------<  126<H>  3.4<L>   |  23  |  1.27    Ca    9.5      26 Sep 2021 08:30  Phos  2.8       Mg     1.80         TPro  6.9  /  Alb  3.9  /  TBili  0.2  /  DBili  x   /  AST  14  /  ALT  8   /  AlkPhos  85  09-26    PT/INR - ( 25 Sep 2021 12:42 )   PT: 11.0 sec;   INR: 0.95 ratio         PTT - ( 26 Sep 2021 11:13 )  PTT:32.7 sec      Urinalysis Basic - ( 25 Sep 2021 15:23 )    Color: Yellow / Appearance: Clear / S.021 / pH: x  Gluc: x / Ketone: Negative  / Bili: Negative / Urobili: <2 mg/dL   Blood: x / Protein: Trace / Nitrite: Negative   Leuk Esterase: Negative / RBC: 2 /HPF / WBC 3 /HPF   Sq Epi: x / Non Sq Epi: 1 /HPF / Bacteria: Negative        RADIOLOGY & ADDITIONAL TESTS:    Imaging Personally Reviewed:    Consultant(s) Notes Reviewed:      Care Discussed with Consultants/Other Providers:

## 2021-10-05 NOTE — CHART NOTE - NSCHARTNOTEFT_GEN_A_CORE
Surgery Preop Note    Diagnosis: right foot wound, occluded RLE bypass  Procedure: bilateral iliac stents, possible right femoral to posterior tibial bypass  Surgeon: Dr. Lundberg  Time: 10/6/2021 at 8AM                          12.2   11.60 )-----------( 291      ( 05 Oct 2021 06:46 )             34.7     10-04    135  |  93<L>  |  17  ----------------------------<  134<H>  3.9   |  24  |  <0.20<L>    Ca    7.7<L>      04 Oct 2021 07:12  Phos  2.0     10-04  Mg     1.90     10-04      PTT - ( 05 Oct 2021 06:46 )  PTT:87.0 sec    Preop Checklist:    [x]NPO after midnight  []IVF - per primary team  [x]AM labs (CBC, BMP, coags)  [x]Type and Screen x2 - on in chart, second for AM  []COVID - pending 10/5  [x]EKG  [x]Chest xray  [x]2u pRBC on hold  [x]Diabetes orders - adjust to q6h for NPO status  [x]Anticoagulation - stop heparin gtt at 4AM  [x]Medical/cardiac clearance  [x]Consent    C Team Surgery   [phone]

## 2021-10-05 NOTE — PROGRESS NOTE ADULT - SUBJECTIVE AND OBJECTIVE BOX
[facility], Division of Infectious Diseases  CHINEDU Fernandes Y. Patel, S. Shah  [phone]  ([phone] - weekdays after 5pm and weekends)    Name: EVE DELGADO  Age/Gender: 60y Male  MRN: [id number]    Interval History:  Patient seen this morning.   Feels fine, denies new complaints.  Notes reviewed  No concerning overnight events  Afebrile     Allergies: penicillin (Other)    Objective:  Vitals:   T(F): 98 (10-05-21 @ 05:44), Max: 98.4 (10-04-21 @ 23:06)  HR: 70 (10-05-21 @ 05:44) (70 - 80)  BP: 140/80 (10-05-21 @ 05:44) (136/73 - 152/83)  RR: 17 (10-05-21 @ 05:44) (17 - 17)  SpO2: 100% (10-05-21 @ 05:44) (99% - 100%)  Physical Examination:  General: no acute distress  HEENT: NC/AT, anicteric, neck supple  Respiratory: no acc muscle use, breathing comfortably  Cardiovascular: S1 and S2 present  Gastrointestinal: normal appearing, nondistended  Extremities: RLE with dressing  Skin: no visible rash    Laboratory Studies:  CBC:                       12.2   11.60 )-----------( 291      ( 05 Oct 2021 06:46 )             34.7     WBC Trend:  11.60 10-05-21 @ 06:46  10.61 10-04-21 @ 07:12  11.78 10-03-21 @ 07:36  11.61 10-02-21 @ 08:47  13.14 10-01-21 @ 06:55  12.01 09-30-21 @ 04:43  11.70 09-29-21 @ 21:14  11.88 09-29-21 @ 04:50    CMP: 10-04    135  |  93<L>  |  17  ----------------------------<  134<H>  3.9   |  24  |  <0.20<L>    Ca    7.7<L>      04 Oct 2021 07:12  Phos  2.0     10-04  Mg     1.90     10-04    Microbiology: reviewed   Culture - Abscess with Gram Stain (collected 09-25-21 @ 18:21)  Source: .Abscess Right foot 5th digit wound  Final Report (09-27-21 @ 22:07):    Few Pseudomonas aeruginosa (Carbapenem Resistant)    Moderate Providencia rettgeri    Moderate Bacteroides fragilis "Susceptibilities not performed"    Normal skin uriah isolated  Organism: Pseudomonas aeruginosa (Carbapenem Resistant)  Providencia rettgeri (09-27-21 @ 22:07)  Organism: Providencia rettgeri (09-27-21 @ 22:07)      -  Amikacin: S <=16      -  Amoxicillin/Clavulanic Acid: R 16/8      -  Ampicillin: R >16 These ampicillin results predict results for amoxicillin      -  Ampicillin/Sulbactam: S <=4/2 Enterobacter, Citrobacter, and Serratia may develop resistance during prolonged therapy (3-4 days)      -  Aztreonam: I 16      -  Cefazolin: R >16 Enterobacter, Citrobacter, and Serratia may develop resistance during prolonged therapy (3-4 days)      -  Cefepime: S <=2      -  Cefoxitin: S <=8      -  Ceftriaxone: S <=1 Enterobacter, Citrobacter, and Serratia may develop resistance during prolonged therapy      -  Ciprofloxacin: S <=0.25      -  Ertapenem: S <=0.5      -  Gentamicin: S <=2      -  Imipenem: S <=1      -  Levofloxacin: S <=0.5      -  Meropenem: S <=1      -  Piperacillin/Tazobactam: S <=8      -  Tobramycin: S <=2      -  Trimethoprim/Sulfamethoxazole: S <=0.5/9.5      Method Type: CAM  Organism: Pseudomonas aeruginosa (Carbapenem Resistant) (09-27-21 @ 22:07)      -  Amikacin: S <=16      -  Aztreonam: I 16      -  Cefepime: S 8      -  Ceftazidime: S 4      -  Ciprofloxacin: S 0.5      -  Gentamicin: S <=2      -  Imipenem: R >8      -  Levofloxacin: I 2      -  Meropenem: R >8      -  Piperacillin/Tazobactam: S 16      -  Tobramycin: S <=2      Method Type: CAM    Culture - Blood (collected 09-25-21 @ 11:40)  Source: .Blood Blood-Peripheral  Final Report (09-30-21 @ 17:01):    No Growth Final    Culture - Blood (collected 09-25-21 @ 11:40)  Source: .Blood Blood-Peripheral  Final Report (09-30-21 @ 17:01):    No Growth Final    Radiology: reviewed     Medications:  acetaminophen   Tablet .. 650 milliGRAM(s) Oral every 6 hours PRN  aspirin enteric coated 81 milliGRAM(s) Oral daily  atorvastatin 40 milliGRAM(s) Oral at bedtime  cadexomer iodine 0.9% Gel 1 Application(s) Topical daily  carvedilol 25 milliGRAM(s) Oral every 12 hours  cefepime   IVPB 2000 milliGRAM(s) IV Intermittent every 8 hours  dextrose 40% Gel 15 Gram(s) Oral once  dextrose 5%. 1000 milliLiter(s) IV Continuous <Continuous>  dextrose 5%. 1000 milliLiter(s) IV Continuous <Continuous>  dextrose 50% Injectable 25 Gram(s) IV Push once  dextrose 50% Injectable 12.5 Gram(s) IV Push once  dextrose 50% Injectable 25 Gram(s) IV Push once  glucagon  Injectable 1 milliGRAM(s) IntraMuscular once  heparin   Injectable 4000 Unit(s) IV Push every 6 hours PRN  heparin   Injectable 2000 Unit(s) IV Push every 6 hours PRN  heparin  Infusion.  Unit(s)/Hr IV Continuous <Continuous>  influenza   Vaccine 0.5 milliLiter(s) IntraMuscular once  insulin lispro (ADMELOG) corrective regimen sliding scale   SubCutaneous three times a day before meals  insulin lispro (ADMELOG) corrective regimen sliding scale   SubCutaneous at bedtime  lisinopril 10 milliGRAM(s) Oral daily  metroNIDAZOLE    Tablet 500 milliGRAM(s) Oral every 8 hours  multivitamin 1 Tablet(s) Oral daily  oxyCODONE    IR 5 milliGRAM(s) Oral every 4 hours PRN  polyethylene glycol 3350 17 Gram(s) Oral daily  senna 2 Tablet(s) Oral at bedtime  sodium chloride 0.9%. 1000 milliLiter(s) IV Continuous <Continuous>    Antimicrobials:  cefepime   IVPB 2000 milliGRAM(s) IV Intermittent every 8 hours  metroNIDAZOLE    Tablet 500 milliGRAM(s) Oral every 8 hours

## 2021-10-05 NOTE — PROGRESS NOTE ADULT - SUBJECTIVE AND OBJECTIVE BOX
Date of service: 10/05/21    chief complaint: right toe pain     extended hpi:  59 y/o male PMH HTN, HLD, DM, CKD, PVD with a Right Femoral-Femoral Bypass in 2018 on apixiban, presented to the [facility] ED with worsening Right Toe pain x 1 week, CT with occluded bypass.  Now awaiting further work up from Vascular. Cardiology called for preop evaluation.    S: no chest pain or sob; ros otherwise negative.     Review of Systems:   Constitutional: [ ] fevers, [ ] chills.   Skin: [ ] dry skin. [ ] rashes.  Psychiatric: [ ] depression, [ ] anxiety.   Gastrointestinal: [ ] BRBPR, [ ] melena.   Neurological: [ ] confusion. [ ] seizures. [ ] shuffling gait.   Ears,Nose,Mouth and Throat: [ ] ear pain [ ] sore throat.   Eyes: [ ] diplopia.   Respiratory: [ ] hemoptysis. [ ] shortness of breath  Cardiovascular: See HPI above  Hematologic/Lymphatic: [ ] anemia. [ ] painful nodes. [ ] prolonged bleeding.   Genitourinary: [ ] hematuria. [ ] flank pain.   Endocrine: [ ] significant change in weight. [ ] intolerance to heat and cold.     Review of systems [ x] otherwise negative, [ ] otherwise unable to obtain    FH: no family history of sudden cardiac death in first degree relatives    SH: [ ] tobacco, [ ] alcohol, [ ] drugs    acetaminophen   Tablet .. 650 milliGRAM(s) Oral every 6 hours PRN  aspirin enteric coated 81 milliGRAM(s) Oral daily  atorvastatin 40 milliGRAM(s) Oral at bedtime  cadexomer iodine 0.9% Gel 1 Application(s) Topical daily  carvedilol 25 milliGRAM(s) Oral every 12 hours  cefepime   IVPB 2000 milliGRAM(s) IV Intermittent every 8 hours  dextrose 40% Gel 15 Gram(s) Oral once  dextrose 5%. 1000 milliLiter(s) IV Continuous <Continuous>  dextrose 5%. 1000 milliLiter(s) IV Continuous <Continuous>  dextrose 50% Injectable 25 Gram(s) IV Push once  dextrose 50% Injectable 12.5 Gram(s) IV Push once  dextrose 50% Injectable 25 Gram(s) IV Push once  glucagon  Injectable 1 milliGRAM(s) IntraMuscular once  heparin   Injectable 4000 Unit(s) IV Push every 6 hours PRN  heparin   Injectable 2000 Unit(s) IV Push every 6 hours PRN  heparin  Infusion.  Unit(s)/Hr IV Continuous <Continuous>  influenza   Vaccine 0.5 milliLiter(s) IntraMuscular once  insulin lispro (ADMELOG) corrective regimen sliding scale   SubCutaneous three times a day before meals  insulin lispro (ADMELOG) corrective regimen sliding scale   SubCutaneous at bedtime  lisinopril 10 milliGRAM(s) Oral daily  metroNIDAZOLE    Tablet 500 milliGRAM(s) Oral every 8 hours  multivitamin 1 Tablet(s) Oral daily  oxyCODONE    IR 5 milliGRAM(s) Oral every 4 hours PRN  polyethylene glycol 3350 17 Gram(s) Oral daily  senna 2 Tablet(s) Oral at bedtime  sodium chloride 0.9%. 1000 milliLiter(s) IV Continuous <Continuous>                            12.2   11.60 )-----------( 291      ( 05 Oct 2021 06:46 )             34.7       10-04    135  |  93<L>  |  17  ----------------------------<  134<H>  3.9   |  24  |  <0.20<L>    Ca    7.7<L>      04 Oct 2021 07:12  Phos  2.0     10-04  Mg     1.90     10-04              T(C): 36.7 (10-05-21 @ 05:44), Max: 36.9 (10-04-21 @ 23:06)  HR: 76 (10-05-21 @ 12:34) (70 - 78)  BP: 130/72 (10-05-21 @ 12:34) (130/72 - 147/79)  RR: 18 (10-05-21 @ 12:34) (17 - 18)  SpO2: 100% (10-05-21 @ 12:34) (99% - 100%)  Wt(kg): --    I&O's Summary      General: Well nourished in no acute distress. Alert and Oriented * 3.   Head: Normocephalic and atraumatic.   Neck: No JVD. No bruits. Supple. Does not appear to be enlarged.   Cardiovascular: + S1,S2 ; RRR Soft systolic murmur at the left lower sternal border. No rubs noted.    Lungs: CTA b/l. No rhonchi, rales or wheezes.   Abdomen: + BS, soft. Non tender. Non distended. No rebound. No guarding.   Extremities: No clubbing/cyanosis/edema.     TTE: < from: Transthoracic Echocardiogram (09.29.21 @ 17:35) >  1. Calcified trileaflet aortic valve with normal opening.  2. Normal left ventricular internal dimensions and wall  thicknesses.  3. Normal left ventricular systolic function. No segmental  wall motion abnormalities.  4. Normal right ventricular size and function.    < end of copied text >      NST: < from: Nuclear Stress Test-Pharmacologic (Nuclear Stress Test-Pharmacologic .) (09.30.21 @ 16:16) >  * Myocardial Perfusion SPECT results are mildly abnormal.  * Review of raw data shows: The study is of fair technical  quality with adjacent bowel tracer uptake  * The left ventricle was normal in size. There is a small,  mild defect in septal wall that is fixed, suggestive of  mild scarring  * No clear evidence of ischemia.  * Post-stress gated wall motion analysis was performed  (LVEF = 66 %;LVEDV = 66 ml.), revealing normal LV  function. There was no segmental wall motion abnormality.  Septal wall motion appeared normal. RV function appeared  normal.    < end of copied text >      A/P: 59 y/o male PMH HTN, HLD, DM, CKD, PVD with a Right Femoral-Femoral Bypass in 2018 on apixiban, presented to the [facility] ED with worsening Right Toe pain x 1 week, CT with occluded bypass.  Now awaiting further work up from Vascular. Cardiology called for preop evaluation.    -Pt. with no chest pain or anginal symptoms  -No high risk features prior to OR  -TTE with normal LV functinon   -NST with no ischemia   -Would consider patient high risk based on RCRI score but otherwise optimized from cv perspective for surgery tomorrow  -Follow up vascular surgery    Imani Cesar MD

## 2021-10-05 NOTE — PROGRESS NOTE ADULT - SUBJECTIVE AND OBJECTIVE BOX
Surgery Progress Note    SUBJECTIVE: Patient seen and examined at bedside. Patient mentions mild pain in the right foot. The same as yesterday. No other complains. Tolerating diet. Regular bowel movement.     Vital Signs Last 24 Hrs  T(C): 36.7 (05 Oct 2021 05:44), Max: 36.9 (04 Oct 2021 23:06)  T(F): 98 (05 Oct 2021 05:44), Max: 98.4 (04 Oct 2021 23:06)  HR: 76 (05 Oct 2021 12:34) (70 - 78)  BP: 130/72 (05 Oct 2021 12:34) (130/72 - 147/79)  BP(mean): --  RR: 18 (05 Oct 2021 12:34) (17 - 18)  SpO2: 100% (05 Oct 2021 12:34) (99% - 100%)    Physical Exam:  GEN: NAD, resting quietly  PULM: symmetric chest rise bilaterally, no increased WOB  EXTR: left groin soft, nontender, no hematoma or mass palpated over angio access site, right foot wrapped with dressing c/d/i. No dopplerable PT/DT on the right foot.     LABS:                        12.2   11.60 )-----------( 291      ( 05 Oct 2021 06:46 )             34.7     10-04    135  |  93<L>  |  17  ----------------------------<  134<H>  3.9   |  24  |  <0.20<L>    Ca    7.7<L>      04 Oct 2021 07:12  Phos  2.0     10-04  Mg     1.90     10-04      PTT - ( 05 Oct 2021 06:46 )  PTT:87.0 sec      INs and OUTs:

## 2021-10-05 NOTE — PROGRESS NOTE ADULT - ASSESSMENT
59 y/o male, with a PmHx of CHF, HTN, CAD, HLD, DM, CKD, PVD with a Right Femoral-Femoral Bypass in 2018 on apixiban, presented to the [facility] ED with worsening Right Toe pain x 1 week. Admitted to medicine for gangrene of right foot 5th digit.

## 2021-10-05 NOTE — PROGRESS NOTE ADULT - SUBJECTIVE AND OBJECTIVE BOX
[facility]   MD RAKAN SHEPHERD PA    TEL:  OFFICE: [phone]  DR FELICIANO CELL: [phone]  DR. PATIÑO CELL: [phone]  JANNETH SÁNCHEZ CELL: [phone]    From 5pm-7am Answering Service [phone]    -- RENAL FOLLOW UP NOTE ---Date of Service 10-05-21 @ 14:02    Patient is a 60y old  Male who presents with a chief complaint of Right Toe Necrosis (05 Oct 2021 13:13)      Patient seen and examined at bedside. No chest pain/sob    VITALS:  T(F): 98 (10-05-21 @ 05:44), Max: 98.4 (10-04-21 @ 23:06)  HR: 76 (10-05-21 @ 12:34)  BP: 130/72 (10-05-21 @ 12:34)  RR: 18 (10-05-21 @ 12:34)  SpO2: 100% (10-05-21 @ 12:34)  Wt(kg): --        PHYSICAL EXAM:  Constitutional: NAD  Neck: No JVD  Respiratory: CTAB, no wheezes, rales or rhonchi  Cardiovascular: S1, S2, RRR  Gastrointestinal: BS+, soft, NT/ND  Extremities: No peripheral edema    Hospital Medications:   MEDICATIONS  (STANDING):  aspirin enteric coated 81 milliGRAM(s) Oral daily  atorvastatin 40 milliGRAM(s) Oral at bedtime  cadexomer iodine 0.9% Gel 1 Application(s) Topical daily  carvedilol 25 milliGRAM(s) Oral every 12 hours  cefepime   IVPB 2000 milliGRAM(s) IV Intermittent every 8 hours  dextrose 40% Gel 15 Gram(s) Oral once  dextrose 5%. 1000 milliLiter(s) (50 mL/Hr) IV Continuous <Continuous>  dextrose 5%. 1000 milliLiter(s) (100 mL/Hr) IV Continuous <Continuous>  dextrose 50% Injectable 25 Gram(s) IV Push once  dextrose 50% Injectable 12.5 Gram(s) IV Push once  dextrose 50% Injectable 25 Gram(s) IV Push once  glucagon  Injectable 1 milliGRAM(s) IntraMuscular once  heparin  Infusion.  Unit(s)/Hr (9 mL/Hr) IV Continuous <Continuous>  influenza   Vaccine 0.5 milliLiter(s) IntraMuscular once  insulin lispro (ADMELOG) corrective regimen sliding scale   SubCutaneous three times a day before meals  insulin lispro (ADMELOG) corrective regimen sliding scale   SubCutaneous at bedtime  lisinopril 10 milliGRAM(s) Oral daily  metroNIDAZOLE    Tablet 500 milliGRAM(s) Oral every 8 hours  multivitamin 1 Tablet(s) Oral daily  polyethylene glycol 3350 17 Gram(s) Oral daily  senna 2 Tablet(s) Oral at bedtime  sodium chloride 0.9%. 1000 milliLiter(s) (75 mL/Hr) IV Continuous <Continuous>      LABS:  10-04    135  |  93<L>  |  17  ----------------------------<  134<H>  3.9   |  24  |  <0.20<L>    Ca    7.7<L>      04 Oct 2021 07:12  Phos  2.0     10-04  Mg     1.90     10-04      Creatinine Trend: <0.20 <--, 0.86 <--, 0.83 <--, 0.98 <--, 0.79 <--, 0.83 <--                                12.2   11.60 )-----------( 291      ( 05 Oct 2021 06:46 )             34.7     Urine Studies:  Urinalysis - [09-25-21 @ 15:23]      Color Yellow / Appearance Clear / SG 1.021 / pH 5.5      Gluc Negative / Ketone Negative  / Bili Negative / Urobili <2 mg/dL       Blood Negative / Protein Trace / Leuk Est Negative / Nitrite Negative      RBC 2 / WBC 3 / Hyaline 1 / Gran  / Sq Epi  / Non Sq Epi 1 / Bacteria Negative    Urine Sodium 61      [10-01-21 @ 15:09]  Urine Osmolality 708      [10-01-21 @ 15:09]    HbA1c 5.7      [12-20-18 @ 06:30]  TSH 0.76      [09-25-21 @ 12:52]  Lipid: chol 139, , HDL 26, LDL --      [09-26-21 @ 08:30]    HCV 0.09, Nonreact      [09-26-21 @ 19:55]      RADIOLOGY & ADDITIONAL STUDIES:

## 2021-10-06 LAB
ANION GAP SERPL CALC-SCNC: 14 MMOL/L — SIGNIFICANT CHANGE UP (ref 7–14)
APTT BLD: 71.1 SEC — HIGH (ref 27–36.3)
BLD GP AB SCN SERPL QL: NEGATIVE — SIGNIFICANT CHANGE UP
BUN SERPL-MCNC: 20 MG/DL — SIGNIFICANT CHANGE UP (ref 7–23)
CALCIUM SERPL-MCNC: 9.4 MG/DL — SIGNIFICANT CHANGE UP (ref 8.4–10.5)
CHLORIDE SERPL-SCNC: 95 MMOL/L — LOW (ref 98–107)
CO2 SERPL-SCNC: 23 MMOL/L — SIGNIFICANT CHANGE UP (ref 22–31)
CREAT SERPL-MCNC: 0.89 MG/DL — SIGNIFICANT CHANGE UP (ref 0.5–1.3)
GLUCOSE SERPL-MCNC: 161 MG/DL — HIGH (ref 70–99)
HCT VFR BLD CALC: 33.7 % — LOW (ref 39–50)
HGB BLD-MCNC: 11.9 G/DL — LOW (ref 13–17)
INR BLD: 1.28 RATIO — HIGH (ref 0.88–1.16)
MAGNESIUM SERPL-MCNC: 2 MG/DL — SIGNIFICANT CHANGE UP (ref 1.6–2.6)
MCHC RBC-ENTMCNC: 32.2 PG — SIGNIFICANT CHANGE UP (ref 27–34)
MCHC RBC-ENTMCNC: 35.3 GM/DL — SIGNIFICANT CHANGE UP (ref 32–36)
MCV RBC AUTO: 91.1 FL — SIGNIFICANT CHANGE UP (ref 80–100)
NRBC # BLD: 0 /100 WBCS — SIGNIFICANT CHANGE UP
NRBC # FLD: 0 K/UL — SIGNIFICANT CHANGE UP
PHOSPHATE SERPL-MCNC: 2.6 MG/DL — SIGNIFICANT CHANGE UP (ref 2.5–4.5)
PLATELET # BLD AUTO: 318 K/UL — SIGNIFICANT CHANGE UP (ref 150–400)
POTASSIUM SERPL-MCNC: 4.1 MMOL/L — SIGNIFICANT CHANGE UP (ref 3.5–5.3)
POTASSIUM SERPL-SCNC: 4.1 MMOL/L — SIGNIFICANT CHANGE UP (ref 3.5–5.3)
PROTHROM AB SERPL-ACNC: 14.4 SEC — HIGH (ref 10.6–13.6)
RBC # BLD: 3.7 M/UL — LOW (ref 4.2–5.8)
RBC # FLD: 13.8 % — SIGNIFICANT CHANGE UP (ref 10.3–14.5)
RH IG SCN BLD-IMP: POSITIVE — SIGNIFICANT CHANGE UP
SODIUM SERPL-SCNC: 132 MMOL/L — LOW (ref 135–145)
WBC # BLD: 14.37 K/UL — HIGH (ref 3.8–10.5)
WBC # FLD AUTO: 14.37 K/UL — HIGH (ref 3.8–10.5)

## 2021-10-06 PROCEDURE — 37220: CPT | Mod: 50

## 2021-10-06 PROCEDURE — 75625 CONTRAST EXAM ABDOMINL AORTA: CPT | Mod: 26

## 2021-10-06 PROCEDURE — 76937 US GUIDE VASCULAR ACCESS: CPT | Mod: 26

## 2021-10-06 PROCEDURE — 97605 NEG PRS WND THER DME<=50SQCM: CPT

## 2021-10-06 RX ORDER — FENTANYL CITRATE 50 UG/ML
25 INJECTION INTRAVENOUS
Refills: 0 | Status: DISCONTINUED | OUTPATIENT
Start: 2021-10-06 | End: 2021-10-06

## 2021-10-06 RX ORDER — OXYCODONE HYDROCHLORIDE 5 MG/1
5 TABLET ORAL EVERY 4 HOURS
Refills: 0 | Status: DISCONTINUED | OUTPATIENT
Start: 2021-10-06 | End: 2021-10-06

## 2021-10-06 RX ORDER — OXYCODONE HYDROCHLORIDE 5 MG/1
10 TABLET ORAL EVERY 4 HOURS
Refills: 0 | Status: DISCONTINUED | OUTPATIENT
Start: 2021-10-06 | End: 2021-10-13

## 2021-10-06 RX ORDER — HEPARIN SODIUM 5000 [USP'U]/ML
5000 INJECTION INTRAVENOUS; SUBCUTANEOUS EVERY 8 HOURS
Refills: 0 | Status: DISCONTINUED | OUTPATIENT
Start: 2021-10-06 | End: 2021-10-19

## 2021-10-06 RX ORDER — ONDANSETRON 8 MG/1
4 TABLET, FILM COATED ORAL ONCE
Refills: 0 | Status: DISCONTINUED | OUTPATIENT
Start: 2021-10-06 | End: 2021-10-06

## 2021-10-06 RX ADMIN — Medication 1: at 22:49

## 2021-10-06 RX ADMIN — OXYCODONE HYDROCHLORIDE 5 MILLIGRAM(S): 5 TABLET ORAL at 04:22

## 2021-10-06 RX ADMIN — OXYCODONE HYDROCHLORIDE 5 MILLIGRAM(S): 5 TABLET ORAL at 16:12

## 2021-10-06 RX ADMIN — CARVEDILOL PHOSPHATE 25 MILLIGRAM(S): 80 CAPSULE, EXTENDED RELEASE ORAL at 21:47

## 2021-10-06 RX ADMIN — Medication 500 MILLIGRAM(S): at 05:45

## 2021-10-06 RX ADMIN — ATORVASTATIN CALCIUM 40 MILLIGRAM(S): 80 TABLET, FILM COATED ORAL at 21:46

## 2021-10-06 RX ADMIN — OXYCODONE HYDROCHLORIDE 10 MILLIGRAM(S): 5 TABLET ORAL at 22:47

## 2021-10-06 RX ADMIN — LISINOPRIL 10 MILLIGRAM(S): 2.5 TABLET ORAL at 05:45

## 2021-10-06 RX ADMIN — CEFEPIME 100 MILLIGRAM(S): 1 INJECTION, POWDER, FOR SOLUTION INTRAMUSCULAR; INTRAVENOUS at 17:51

## 2021-10-06 RX ADMIN — HEPARIN SODIUM 5000 UNIT(S): 5000 INJECTION INTRAVENOUS; SUBCUTANEOUS at 21:47

## 2021-10-06 RX ADMIN — CARVEDILOL PHOSPHATE 25 MILLIGRAM(S): 80 CAPSULE, EXTENDED RELEASE ORAL at 05:45

## 2021-10-06 RX ADMIN — Medication 81 MILLIGRAM(S): at 15:51

## 2021-10-06 RX ADMIN — CEFEPIME 100 MILLIGRAM(S): 1 INJECTION, POWDER, FOR SOLUTION INTRAMUSCULAR; INTRAVENOUS at 01:20

## 2021-10-06 RX ADMIN — Medication 1: at 05:58

## 2021-10-06 RX ADMIN — SENNA PLUS 2 TABLET(S): 8.6 TABLET ORAL at 21:46

## 2021-10-06 RX ADMIN — Medication 500 MILLIGRAM(S): at 17:51

## 2021-10-06 RX ADMIN — OXYCODONE HYDROCHLORIDE 5 MILLIGRAM(S): 5 TABLET ORAL at 05:00

## 2021-10-06 RX ADMIN — Medication 2: at 18:06

## 2021-10-06 RX ADMIN — OXYCODONE HYDROCHLORIDE 5 MILLIGRAM(S): 5 TABLET ORAL at 17:00

## 2021-10-06 RX ADMIN — OXYCODONE HYDROCHLORIDE 10 MILLIGRAM(S): 5 TABLET ORAL at 21:47

## 2021-10-06 NOTE — PROGRESS NOTE ADULT - SUBJECTIVE AND OBJECTIVE BOX
[facility] [place] NEPHROLOGY PRACTICE   MD RAKAN SHEPHERD PA    TEL:  OFFICE: [phone]  DR FELICIANO CELL: [phone]  DR. PATIÑO CELL: [phone]  JANNETH SÁNCHEZ CELL: [phone]    From 5pm-7am Answering Service [phone]    -- RENAL FOLLOW UP NOTE ---Date of Service 10-06-21 @ 16:04    Patient is a 60y old  Male who presents with a chief complaint of Right Toe Necrosis (05 Oct 2021 16:52)      Patient seen and examined at bedside. No chest pain/sob    VITALS:  T(F): 97.9 (10-06-21 @ 15:00), Max: 98.6 (10-06-21 @ 13:15)  HR: 69 (10-06-21 @ 15:00)  BP: 153/64 (10-06-21 @ 15:00)  RR: 17 (10-06-21 @ 15:00)  SpO2: 99% (10-06-21 @ 15:00)  Wt(kg): --    10-06 @ 07:01  -  10-06 @ 16:04  --------------------------------------------------------  IN: 0 mL / OUT: 300 mL / NET: -300 mL      Height (cm): 162.6 (10-06 @ 06:00)  Weight (kg): 50 (10-06 @ 06:00)  BMI (kg/m2): 18.9 (10-06 @ 06:00)  BSA (m2): 1.52 (10-06 @ 06:00)    PHYSICAL EXAM:  Constitutional: NAD  Neck: No JVD  Respiratory: CTAB, no wheezes, rales or rhonchi  Cardiovascular: S1, S2, RRR  Gastrointestinal: BS+, soft, NT/ND  Extremities: No peripheral edema, right foot dressing d/c/i    Hospital Medications:   MEDICATIONS  (STANDING):  aspirin enteric coated 81 milliGRAM(s) Oral daily  atorvastatin 40 milliGRAM(s) Oral at bedtime  cadexomer iodine 0.9% Gel 1 Application(s) Topical daily  carvedilol 25 milliGRAM(s) Oral every 12 hours  cefepime   IVPB 2000 milliGRAM(s) IV Intermittent every 8 hours  dextrose 40% Gel 15 Gram(s) Oral once  dextrose 5%. 1000 milliLiter(s) (50 mL/Hr) IV Continuous <Continuous>  dextrose 5%. 1000 milliLiter(s) (100 mL/Hr) IV Continuous <Continuous>  dextrose 50% Injectable 25 Gram(s) IV Push once  dextrose 50% Injectable 12.5 Gram(s) IV Push once  dextrose 50% Injectable 25 Gram(s) IV Push once  glucagon  Injectable 1 milliGRAM(s) IntraMuscular once  heparin   Injectable 5000 Unit(s) SubCutaneous every 8 hours  influenza   Vaccine 0.5 milliLiter(s) IntraMuscular once  insulin lispro (ADMELOG) corrective regimen sliding scale   SubCutaneous three times a day before meals  insulin lispro (ADMELOG) corrective regimen sliding scale   SubCutaneous at bedtime  lisinopril 10 milliGRAM(s) Oral daily  metroNIDAZOLE    Tablet 500 milliGRAM(s) Oral every 8 hours  multivitamin 1 Tablet(s) Oral daily  polyethylene glycol 3350 17 Gram(s) Oral daily  senna 2 Tablet(s) Oral at bedtime      LABS:  10-06    132<L>  |  95<L>  |  20  ----------------------------<  161<H>  4.1   |  23  |  0.89    Ca    9.4      06 Oct 2021 04:47  Phos  2.6     10-06  Mg     2.00     10-06      Creatinine Trend: 0.89 <--, <0.20 <--, 0.86 <--, 0.83 <--, 0.98 <--, 0.79 <--    Phosphorus Level, Serum: 2.6 mg/dL (10-06 @ 04:47)                              11.9   14.37 )-----------( 318      ( 06 Oct 2021 04:47 )             33.7     Urine Studies:  Urinalysis - [09-25-21 @ 15:23]      Color Yellow / Appearance Clear / SG 1.021 / pH 5.5      Gluc Negative / Ketone Negative  / Bili Negative / Urobili <2 mg/dL       Blood Negative / Protein Trace / Leuk Est Negative / Nitrite Negative      RBC 2 / WBC 3 / Hyaline 1 / Gran  / Sq Epi  / Non Sq Epi 1 / Bacteria Negative    Urine Sodium 61      [10-01-21 @ 15:09]  Urine Osmolality 708      [10-01-21 @ 15:09]    HbA1c 5.7      [12-20-18 @ 06:30]  TSH 0.76      [09-25-21 @ 12:52]  Lipid: chol 139, , HDL 26, LDL --      [09-26-21 @ 08:30]    HCV 0.09, Nonreact      [09-26-21 @ 19:55]      RADIOLOGY & ADDITIONAL STUDIES:

## 2021-10-06 NOTE — CHART NOTE - NSCHARTNOTEFT_GEN_A_CORE
Surgery Post op Note    Procedure: b/l kissing iliac stents, right groin cutdown an PT exploration    SUBJECTIVE: Pt seen and examined at bed side around 4 hours after surgery. Patient feels depressed about the finding in the surgery that there are no signal on the distal artery of his right foot. Patient complains of mild right foot pain, which is similar to before surgery.   SOB:  [ ] YES [ *] NO  Chest Discomfort: [ ] YES [ *] NO    Nausea: [ ] YES [* ] NO           Vomiting: [ ] YES [* ] NO  Void: with dunham        Pain Control Adequate: [* ] YES [ ] NO        Vital Signs Last 24 Hrs  T(C): 36.6 (06 Oct 2021 16:00), Max: 37 (06 Oct 2021 13:15)  T(F): 97.9 (06 Oct 2021 16:00), Max: 98.6 (06 Oct 2021 13:15)  HR: 79 (06 Oct 2021 16:00) (69 - 85)  BP: 156/72 (06 Oct 2021 16:00) (125/71 - 156/72)  BP(mean): 91 (06 Oct 2021 16:00) (85 - 95)  RR: 15 (06 Oct 2021 16:00) (15 - 25)  SpO2: 100% (06 Oct 2021 16:00) (99% - 100%)  I&O's Summary    06 Oct 2021 07:01  -  06 Oct 2021 17:45  --------------------------------------------------------  IN: 120 mL / OUT: 300 mL / NET: -180 mL      I&O's Detail    06 Oct 2021 07:01  -  06 Oct 2021 17:45  --------------------------------------------------------  IN:    Oral Fluid: 120 mL  Total IN: 120 mL    OUT:    Indwelling Catheter - Urethral (mL): 300 mL  Total OUT: 300 mL    Total NET: -180 mL          MEDICATIONS  (STANDING):  aspirin enteric coated 81 milliGRAM(s) Oral daily  atorvastatin 40 milliGRAM(s) Oral at bedtime  cadexomer iodine 0.9% Gel 1 Application(s) Topical daily  carvedilol 25 milliGRAM(s) Oral every 12 hours  cefepime   IVPB 2000 milliGRAM(s) IV Intermittent every 8 hours  dextrose 40% Gel 15 Gram(s) Oral once  dextrose 5%. 1000 milliLiter(s) (50 mL/Hr) IV Continuous <Continuous>  dextrose 5%. 1000 milliLiter(s) (100 mL/Hr) IV Continuous <Continuous>  dextrose 50% Injectable 25 Gram(s) IV Push once  dextrose 50% Injectable 12.5 Gram(s) IV Push once  dextrose 50% Injectable 25 Gram(s) IV Push once  glucagon  Injectable 1 milliGRAM(s) IntraMuscular once  heparin   Injectable 5000 Unit(s) SubCutaneous every 8 hours  influenza   Vaccine 0.5 milliLiter(s) IntraMuscular once  insulin lispro (ADMELOG) corrective regimen sliding scale   SubCutaneous three times a day before meals  insulin lispro (ADMELOG) corrective regimen sliding scale   SubCutaneous at bedtime  lisinopril 10 milliGRAM(s) Oral daily  metroNIDAZOLE    Tablet 500 milliGRAM(s) Oral every 8 hours  multivitamin 1 Tablet(s) Oral daily  polyethylene glycol 3350 17 Gram(s) Oral daily  senna 2 Tablet(s) Oral at bedtime    MEDICATIONS  (PRN):  acetaminophen   Tablet .. 650 milliGRAM(s) Oral every 6 hours PRN Temp greater or equal to 38C (100.4F), Mild Pain (1 - 3), Moderate Pain (4 - 6)  fentaNYL    Injectable 25 MICROGram(s) IV Push every 5 minutes PRN Moderate Pain (4 - 6)  melatonin 3 milliGRAM(s) Oral at bedtime PRN Insomnia  ondansetron Injectable 4 milliGRAM(s) IV Push once PRN Nausea and/or Vomiting  oxyCODONE    IR 5 milliGRAM(s) Oral every 4 hours PRN Severe Pain (7 - 10)  oxyCODONE    IR 5 milliGRAM(s) Oral every 4 hours PRN Moderate Pain (4 - 6)  oxyCODONE    IR 10 milliGRAM(s) Oral every 4 hours PRN Severe Pain (7 - 10)      LABS:                        11.9   14.37 )-----------( 318      ( 06 Oct 2021 04:47 )             33.7     10-06    132<L>  |  95<L>  |  20  ----------------------------<  161<H>  4.1   |  23  |  0.89    Ca    9.4      06 Oct 2021 04:47  Phos  2.6     10-06  Mg     2.00     10-06      PT/INR - ( 06 Oct 2021 04:47 )   PT: 14.4 sec;   INR: 1.28 ratio         PTT - ( 06 Oct 2021 04:47 )  PTT:71.1 sec          Physical exam  General Appearance: Appears well, NAD  Respiratory: No labored breathing  Abdomen: Soft, nontender  Extremity: left groin access dressing clear and dry, right groin with PREVENA, no edema or hematoma; RLE: no dopplerable PT/DP, dressing on the foot is clean, did not take the dressing off.     Plan:  - back to the floor  - continue abx  - diet, DASH/TLC  - pain control  - heparin ppx      C team vascular  [id number] Surgery Post op Note    Procedure: b/l kissing iliac stents, right groin cutdown an PT exploration    SUBJECTIVE: Pt seen and examined at bed side around 4 hours after surgery. Patient feels disappointed about the finding in the surgery that there are no signal on the distal artery of his right foot. Patient complains of mild right foot pain, which is similar to before surgery.   SOB:  [ ] YES [ *] NO  Chest Discomfort: [ ] YES [ *] NO    Nausea: [ ] YES [* ] NO           Vomiting: [ ] YES [* ] NO  Void: with dunham        Pain Control Adequate: [* ] YES [ ] NO        Vital Signs Last 24 Hrs  T(C): 36.6 (06 Oct 2021 16:00), Max: 37 (06 Oct 2021 13:15)  T(F): 97.9 (06 Oct 2021 16:00), Max: 98.6 (06 Oct 2021 13:15)  HR: 79 (06 Oct 2021 16:00) (69 - 85)  BP: 156/72 (06 Oct 2021 16:00) (125/71 - 156/72)  BP(mean): 91 (06 Oct 2021 16:00) (85 - 95)  RR: 15 (06 Oct 2021 16:00) (15 - 25)  SpO2: 100% (06 Oct 2021 16:00) (99% - 100%)  I&O's Summary    06 Oct 2021 07:01  -  06 Oct 2021 17:45  --------------------------------------------------------  IN: 120 mL / OUT: 300 mL / NET: -180 mL      I&O's Detail    06 Oct 2021 07:01  -  06 Oct 2021 17:45  --------------------------------------------------------  IN:    Oral Fluid: 120 mL  Total IN: 120 mL    OUT:    Indwelling Catheter - Urethral (mL): 300 mL  Total OUT: 300 mL    Total NET: -180 mL          MEDICATIONS  (STANDING):  aspirin enteric coated 81 milliGRAM(s) Oral daily  atorvastatin 40 milliGRAM(s) Oral at bedtime  cadexomer iodine 0.9% Gel 1 Application(s) Topical daily  carvedilol 25 milliGRAM(s) Oral every 12 hours  cefepime   IVPB 2000 milliGRAM(s) IV Intermittent every 8 hours  dextrose 40% Gel 15 Gram(s) Oral once  dextrose 5%. 1000 milliLiter(s) (50 mL/Hr) IV Continuous <Continuous>  dextrose 5%. 1000 milliLiter(s) (100 mL/Hr) IV Continuous <Continuous>  dextrose 50% Injectable 25 Gram(s) IV Push once  dextrose 50% Injectable 12.5 Gram(s) IV Push once  dextrose 50% Injectable 25 Gram(s) IV Push once  glucagon  Injectable 1 milliGRAM(s) IntraMuscular once  heparin   Injectable 5000 Unit(s) SubCutaneous every 8 hours  influenza   Vaccine 0.5 milliLiter(s) IntraMuscular once  insulin lispro (ADMELOG) corrective regimen sliding scale   SubCutaneous three times a day before meals  insulin lispro (ADMELOG) corrective regimen sliding scale   SubCutaneous at bedtime  lisinopril 10 milliGRAM(s) Oral daily  metroNIDAZOLE    Tablet 500 milliGRAM(s) Oral every 8 hours  multivitamin 1 Tablet(s) Oral daily  polyethylene glycol 3350 17 Gram(s) Oral daily  senna 2 Tablet(s) Oral at bedtime    MEDICATIONS  (PRN):  acetaminophen   Tablet .. 650 milliGRAM(s) Oral every 6 hours PRN Temp greater or equal to 38C (100.4F), Mild Pain (1 - 3), Moderate Pain (4 - 6)  fentaNYL    Injectable 25 MICROGram(s) IV Push every 5 minutes PRN Moderate Pain (4 - 6)  melatonin 3 milliGRAM(s) Oral at bedtime PRN Insomnia  ondansetron Injectable 4 milliGRAM(s) IV Push once PRN Nausea and/or Vomiting  oxyCODONE    IR 5 milliGRAM(s) Oral every 4 hours PRN Severe Pain (7 - 10)  oxyCODONE    IR 5 milliGRAM(s) Oral every 4 hours PRN Moderate Pain (4 - 6)  oxyCODONE    IR 10 milliGRAM(s) Oral every 4 hours PRN Severe Pain (7 - 10)      LABS:                        11.9   14.37 )-----------( 318      ( 06 Oct 2021 04:47 )             33.7     10-06    132<L>  |  95<L>  |  20  ----------------------------<  161<H>  4.1   |  23  |  0.89    Ca    9.4      06 Oct 2021 04:47  Phos  2.6     10-06  Mg     2.00     10-06      PT/INR - ( 06 Oct 2021 04:47 )   PT: 14.4 sec;   INR: 1.28 ratio         PTT - ( 06 Oct 2021 04:47 )  PTT:71.1 sec          Physical exam  General Appearance: Appears well, NAD  Respiratory: No labored breathing  Abdomen: Soft, nontender  Extremity: left groin access dressing clear and dry, right groin with PREVENA, no edema or hematoma; RLE: no dopplerable PT/DP, dressing on the foot is clean, did not take the dressing off.     Plan:  - back to the floor  - continue abx  - diet, DASH/TLC  - pain control  - heparin ppx      C team vascular  [id number]

## 2021-10-06 NOTE — CHART NOTE - NSCHARTNOTEFT_GEN_A_CORE
Noted attending transferred to Dr Lundberg. Spoke with covering vascular provider who noted vascular service will resume care as primary team. Dr Vaughan notified

## 2021-10-06 NOTE — PROGRESS NOTE ADULT - ASSESSMENT
61 y/o male, with a PmHx of CHF, HTN, CAD, HLD, DM, CKD, PVD with a Right Femoral-Femoral Bypass in 2018 on apixiban, presented to the [facility] ED with worsening Right Toe pain x 1 week.      Ravin   pt with multiple Ravin in past   RAVIN possible prerenal as SG is high s/p IVF  Renal function improved  On ACE Monitor closley  s/p angio 9/29. renal function stable  Monitor BMP  AVoid further nephrotoxics, NSAID RCA    Hypokalemia  Repelted KCl    MOnitor  serum K    Acidosis   improving   MOnitor serum Co2    hypomagnesemia  supplemented by team  monitor    hypophosphatemia  supplemented by team  monitor    hyponatremia  work up suggested SIADH  Na slightly worsen today  continue free water restriction  monitor level    hypocalcemia  check pth, vit d 25  monitor

## 2021-10-06 NOTE — PRE-OP CHECKLIST - 3.
Pt examined and no skin breakdowns noted, blanchable redness noted on buttocks, healed wound on sacrum. Pt examined and no skin breakdowns noted, blanchable redness noted on buttocks, healed wound on sacrum. Right foot dressing intact

## 2021-10-06 NOTE — PRE-OP CHECKLIST - SELECT TESTS ORDERED
CBC/CMP/PT/PTT/Type and Screen BMP/CBC/CMP/PT/PTT/INR/Type and Screen/EKG/POCT Blood Glucose/COVID-19

## 2021-10-07 ENCOUNTER — TRANSCRIPTION ENCOUNTER (OUTPATIENT)
Age: 61
End: 2021-10-07

## 2021-10-07 LAB
ANION GAP SERPL CALC-SCNC: 9 MMOL/L — SIGNIFICANT CHANGE UP (ref 7–14)
APTT BLD: 30 SEC — SIGNIFICANT CHANGE UP (ref 27–36.3)
BUN SERPL-MCNC: 15 MG/DL — SIGNIFICANT CHANGE UP (ref 7–23)
CALCIUM SERPL-MCNC: 9 MG/DL — SIGNIFICANT CHANGE UP (ref 8.4–10.5)
CHLORIDE SERPL-SCNC: 96 MMOL/L — LOW (ref 98–107)
CO2 SERPL-SCNC: 26 MMOL/L — SIGNIFICANT CHANGE UP (ref 22–31)
CREAT SERPL-MCNC: 0.84 MG/DL — SIGNIFICANT CHANGE UP (ref 0.5–1.3)
GLUCOSE SERPL-MCNC: 168 MG/DL — HIGH (ref 70–99)
HCT VFR BLD CALC: 33.2 % — LOW (ref 39–50)
HGB BLD-MCNC: 11.5 G/DL — LOW (ref 13–17)
INR BLD: 1.15 RATIO — SIGNIFICANT CHANGE UP (ref 0.88–1.16)
MAGNESIUM SERPL-MCNC: 1.8 MG/DL — SIGNIFICANT CHANGE UP (ref 1.6–2.6)
MCHC RBC-ENTMCNC: 31.8 PG — SIGNIFICANT CHANGE UP (ref 27–34)
MCHC RBC-ENTMCNC: 34.6 GM/DL — SIGNIFICANT CHANGE UP (ref 32–36)
MCV RBC AUTO: 91.7 FL — SIGNIFICANT CHANGE UP (ref 80–100)
NRBC # BLD: 0 /100 WBCS — SIGNIFICANT CHANGE UP
NRBC # FLD: 0 K/UL — SIGNIFICANT CHANGE UP
PLATELET # BLD AUTO: 338 K/UL — SIGNIFICANT CHANGE UP (ref 150–400)
POTASSIUM SERPL-MCNC: 3.7 MMOL/L — SIGNIFICANT CHANGE UP (ref 3.5–5.3)
POTASSIUM SERPL-SCNC: 3.7 MMOL/L — SIGNIFICANT CHANGE UP (ref 3.5–5.3)
PROTHROM AB SERPL-ACNC: 13.1 SEC — SIGNIFICANT CHANGE UP (ref 10.6–13.6)
RBC # BLD: 3.62 M/UL — LOW (ref 4.2–5.8)
RBC # FLD: 13.8 % — SIGNIFICANT CHANGE UP (ref 10.3–14.5)
SARS-COV-2 RNA SPEC QL NAA+PROBE: SIGNIFICANT CHANGE UP
SODIUM SERPL-SCNC: 131 MMOL/L — LOW (ref 135–145)
WBC # BLD: 16.08 K/UL — HIGH (ref 3.8–10.5)
WBC # FLD AUTO: 16.08 K/UL — HIGH (ref 3.8–10.5)

## 2021-10-07 RX ORDER — SODIUM CHLORIDE 9 MG/ML
1 INJECTION INTRAMUSCULAR; INTRAVENOUS; SUBCUTANEOUS THREE TIMES A DAY
Refills: 0 | Status: COMPLETED | OUTPATIENT
Start: 2021-10-07 | End: 2021-10-09

## 2021-10-07 RX ORDER — HYDROMORPHONE HYDROCHLORIDE 2 MG/ML
0.5 INJECTION INTRAMUSCULAR; INTRAVENOUS; SUBCUTANEOUS ONCE
Refills: 0 | Status: DISCONTINUED | OUTPATIENT
Start: 2021-10-07 | End: 2021-10-07

## 2021-10-07 RX ORDER — ONDANSETRON 8 MG/1
4 TABLET, FILM COATED ORAL ONCE
Refills: 0 | Status: COMPLETED | OUTPATIENT
Start: 2021-10-07 | End: 2021-10-07

## 2021-10-07 RX ADMIN — Medication 3 MILLIGRAM(S): at 22:41

## 2021-10-07 RX ADMIN — HEPARIN SODIUM 5000 UNIT(S): 5000 INJECTION INTRAVENOUS; SUBCUTANEOUS at 05:17

## 2021-10-07 RX ADMIN — CEFEPIME 100 MILLIGRAM(S): 1 INJECTION, POWDER, FOR SOLUTION INTRAMUSCULAR; INTRAVENOUS at 00:56

## 2021-10-07 RX ADMIN — Medication 4: at 11:24

## 2021-10-07 RX ADMIN — HEPARIN SODIUM 5000 UNIT(S): 5000 INJECTION INTRAVENOUS; SUBCUTANEOUS at 22:41

## 2021-10-07 RX ADMIN — LISINOPRIL 10 MILLIGRAM(S): 2.5 TABLET ORAL at 05:18

## 2021-10-07 RX ADMIN — Medication 81 MILLIGRAM(S): at 12:12

## 2021-10-07 RX ADMIN — Medication 1: at 17:22

## 2021-10-07 RX ADMIN — Medication 500 MILLIGRAM(S): at 22:40

## 2021-10-07 RX ADMIN — Medication 1 TABLET(S): at 12:12

## 2021-10-07 RX ADMIN — OXYCODONE HYDROCHLORIDE 10 MILLIGRAM(S): 5 TABLET ORAL at 11:22

## 2021-10-07 RX ADMIN — CARVEDILOL PHOSPHATE 25 MILLIGRAM(S): 80 CAPSULE, EXTENDED RELEASE ORAL at 05:17

## 2021-10-07 RX ADMIN — OXYCODONE HYDROCHLORIDE 10 MILLIGRAM(S): 5 TABLET ORAL at 06:17

## 2021-10-07 RX ADMIN — Medication 1 APPLICATION(S): at 12:11

## 2021-10-07 RX ADMIN — CEFEPIME 100 MILLIGRAM(S): 1 INJECTION, POWDER, FOR SOLUTION INTRAMUSCULAR; INTRAVENOUS at 17:24

## 2021-10-07 RX ADMIN — HEPARIN SODIUM 5000 UNIT(S): 5000 INJECTION INTRAVENOUS; SUBCUTANEOUS at 13:42

## 2021-10-07 RX ADMIN — Medication 500 MILLIGRAM(S): at 13:42

## 2021-10-07 RX ADMIN — OXYCODONE HYDROCHLORIDE 10 MILLIGRAM(S): 5 TABLET ORAL at 12:22

## 2021-10-07 RX ADMIN — ONDANSETRON 4 MILLIGRAM(S): 8 TABLET, FILM COATED ORAL at 20:48

## 2021-10-07 RX ADMIN — ATORVASTATIN CALCIUM 40 MILLIGRAM(S): 80 TABLET, FILM COATED ORAL at 22:41

## 2021-10-07 RX ADMIN — Medication 500 MILLIGRAM(S): at 00:56

## 2021-10-07 RX ADMIN — CARVEDILOL PHOSPHATE 25 MILLIGRAM(S): 80 CAPSULE, EXTENDED RELEASE ORAL at 17:25

## 2021-10-07 RX ADMIN — HYDROMORPHONE HYDROCHLORIDE 0.5 MILLIGRAM(S): 2 INJECTION INTRAMUSCULAR; INTRAVENOUS; SUBCUTANEOUS at 23:23

## 2021-10-07 RX ADMIN — OXYCODONE HYDROCHLORIDE 10 MILLIGRAM(S): 5 TABLET ORAL at 19:34

## 2021-10-07 RX ADMIN — SODIUM CHLORIDE 1 GRAM(S): 9 INJECTION INTRAMUSCULAR; INTRAVENOUS; SUBCUTANEOUS at 13:47

## 2021-10-07 RX ADMIN — Medication 1: at 07:37

## 2021-10-07 RX ADMIN — POLYETHYLENE GLYCOL 3350 17 GRAM(S): 17 POWDER, FOR SOLUTION ORAL at 12:12

## 2021-10-07 RX ADMIN — Medication 500 MILLIGRAM(S): at 05:17

## 2021-10-07 RX ADMIN — CEFEPIME 100 MILLIGRAM(S): 1 INJECTION, POWDER, FOR SOLUTION INTRAMUSCULAR; INTRAVENOUS at 09:10

## 2021-10-07 RX ADMIN — SODIUM CHLORIDE 1 GRAM(S): 9 INJECTION INTRAMUSCULAR; INTRAVENOUS; SUBCUTANEOUS at 22:40

## 2021-10-07 RX ADMIN — SENNA PLUS 2 TABLET(S): 8.6 TABLET ORAL at 22:41

## 2021-10-07 RX ADMIN — OXYCODONE HYDROCHLORIDE 10 MILLIGRAM(S): 5 TABLET ORAL at 05:17

## 2021-10-07 NOTE — PROGRESS NOTE ADULT - SUBJECTIVE AND OBJECTIVE BOX
[facility], Division of Infectious Diseases  CHINEDU Fernandes Y. Patel, S. Shah  [phone]  ([phone] - weekdays after 5pm and weekends)    Name: EVE DELGADO  Age/Gender: 60y Male  MRN: [id number]    Interval History:  Patient seen this morning, feels ok.  Has no new complaints  Notes reviewed  No concerning overnight events  Afebrile     Allergies: penicillin (Other)    Objective:  Vitals:   T(F): 99 (10-07-21 @ 05:16), Max: 99 (10-07-21 @ 05:16)  HR: 76 (10-07-21 @ 05:16) (69 - 85)  BP: 161/79 (10-07-21 @ 05:16) (130/71 - 170/83)  RR: 18 (10-07-21 @ 05:16) (12 - 25)  SpO2: 99% (10-07-21 @ 05:16) (99% - 100%)  Physical Examination:  General: no acute distress  HEENT: NC/AT, anicteric, neck supple  Respiratory: no acc muscle use, breathing comfortably  Cardiovascular: S1 and S2 present  Gastrointestinal: normal appearing, nondistended  Extremities: R foot in clean dressing, no edema  Skin: no visible rash, R>L feet cool to touch    Laboratory Studies:  CBC:                       11.5   16.08 )-----------( 338      ( 07 Oct 2021 06:32 )             33.2     WBC Trend:  16.08 10-07-21 @ 06:32  14.37 10-06-21 @ 04:47  11.60 10-05-21 @ 06:46  10.61 10-04-21 @ 07:12  11.78 10-03-21 @ 07:36  11.61 10-02-21 @ 08:47  13.14 10-01-21 @ 06:55    CMP: 10-07    131<L>  |  96<L>  |  15  ----------------------------<  168<H>  3.7   |  26  |  0.84    Ca    9.0      07 Oct 2021 06:32  Phos  2.6     10-06  Mg     1.80     10-07    Microbiology: reviewed   Culture - Abscess with Gram Stain (collected 09-25-21 @ 18:21)  Source: .Abscess Right foot 5th digit wound  Final Report (09-27-21 @ 22:07):    Few Pseudomonas aeruginosa (Carbapenem Resistant)    Moderate Providencia rettgeri    Moderate Bacteroides fragilis "Susceptibilities not performed"    Normal skin uriah isolated  Organism: Pseudomonas aeruginosa (Carbapenem Resistant)  Providencia rettgeri (09-27-21 @ 22:07)  Organism: Providencia rettgeri (09-27-21 @ 22:07)      -  Amikacin: S <=16      -  Amoxicillin/Clavulanic Acid: R 16/8      -  Ampicillin: R >16 These ampicillin results predict results for amoxicillin      -  Ampicillin/Sulbactam: S <=4/2 Enterobacter, Citrobacter, and Serratia may develop resistance during prolonged therapy (3-4 days)      -  Aztreonam: I 16      -  Cefazolin: R >16 Enterobacter, Citrobacter, and Serratia may develop resistance during prolonged therapy (3-4 days)      -  Cefepime: S <=2      -  Cefoxitin: S <=8      -  Ceftriaxone: S <=1 Enterobacter, Citrobacter, and Serratia may develop resistance during prolonged therapy      -  Ciprofloxacin: S <=0.25      -  Ertapenem: S <=0.5      -  Gentamicin: S <=2      -  Imipenem: S <=1      -  Levofloxacin: S <=0.5      -  Meropenem: S <=1      -  Piperacillin/Tazobactam: S <=8      -  Tobramycin: S <=2      -  Trimethoprim/Sulfamethoxazole: S <=0.5/9.5      Method Type: CAM  Organism: Pseudomonas aeruginosa (Carbapenem Resistant) (09-27-21 @ 22:07)      -  Amikacin: S <=16      -  Aztreonam: I 16      -  Cefepime: S 8      -  Ceftazidime: S 4      -  Ciprofloxacin: S 0.5      -  Gentamicin: S <=2      -  Imipenem: R >8      -  Levofloxacin: I 2      -  Meropenem: R >8      -  Piperacillin/Tazobactam: S 16      -  Tobramycin: S <=2      Method Type: CAM    Culture - Blood (collected 09-25-21 @ 11:40)  Source: .Blood Blood-Peripheral  Final Report (09-30-21 @ 17:01):    No Growth Final    Culture - Blood (collected 09-25-21 @ 11:40)  Source: .Blood Blood-Peripheral  Final Report (09-30-21 @ 17:01):    No Growth Final    Radiology: reviewed     Medications:  acetaminophen   Tablet .. 650 milliGRAM(s) Oral every 6 hours PRN  aspirin enteric coated 81 milliGRAM(s) Oral daily  atorvastatin 40 milliGRAM(s) Oral at bedtime  cadexomer iodine 0.9% Gel 1 Application(s) Topical daily  carvedilol 25 milliGRAM(s) Oral every 12 hours  cefepime   IVPB 2000 milliGRAM(s) IV Intermittent every 8 hours  dextrose 40% Gel 15 Gram(s) Oral once  dextrose 5%. 1000 milliLiter(s) IV Continuous <Continuous>  dextrose 5%. 1000 milliLiter(s) IV Continuous <Continuous>  dextrose 50% Injectable 25 Gram(s) IV Push once  dextrose 50% Injectable 12.5 Gram(s) IV Push once  dextrose 50% Injectable 25 Gram(s) IV Push once  glucagon  Injectable 1 milliGRAM(s) IntraMuscular once  heparin   Injectable 5000 Unit(s) SubCutaneous every 8 hours  influenza   Vaccine 0.5 milliLiter(s) IntraMuscular once  insulin lispro (ADMELOG) corrective regimen sliding scale   SubCutaneous three times a day before meals  insulin lispro (ADMELOG) corrective regimen sliding scale   SubCutaneous at bedtime  lisinopril 10 milliGRAM(s) Oral daily  melatonin 3 milliGRAM(s) Oral at bedtime PRN  metroNIDAZOLE    Tablet 500 milliGRAM(s) Oral every 8 hours  multivitamin 1 Tablet(s) Oral daily  oxyCODONE    IR 5 milliGRAM(s) Oral every 4 hours PRN  oxyCODONE    IR 5 milliGRAM(s) Oral every 4 hours PRN  oxyCODONE    IR 10 milliGRAM(s) Oral every 4 hours PRN  polyethylene glycol 3350 17 Gram(s) Oral daily  senna 2 Tablet(s) Oral at bedtime    Antimicrobials:  cefepime   IVPB 2000 milliGRAM(s) IV Intermittent every 8 hours  metroNIDAZOLE    Tablet 500 milliGRAM(s) Oral every 8 hours

## 2021-10-07 NOTE — PROGRESS NOTE ADULT - ASSESSMENT
59 y/o male, with a PmHx of CHF, HTN, CAD, HLD, DM, CKD, PVD with a Right Femoral-Femoral Bypass in 2018 on apixiban, presented to the [facility] ED with worsening Right Toe pain x 1 week.      Ravin   pt with multiple Ravin in past   RAVIN possible prerenal as SG is high s/p IVF  Renal function improved  On ACE Monitor closley  s/p angio 9/29. renal function stable  Monitor BMP  AVoid further nephrotoxics, NSAID RCA    Hypokalemia  Repelted KCl    MOnitor  serum K    Acidosis   improving   MOnitor serum Co2    hypomagnesemia  supplemented by team  monitor    hypophosphatemia  supplemented by team  monitor    hyponatremia  work up suggested SIADH  Na slightly worsen today  start Na tab 1g tid  continue free water restriction  monitor level    hypocalcemia  check pth, vit d 25  monitor

## 2021-10-07 NOTE — PROGRESS NOTE ADULT - ASSESSMENT
Patient is a 60 year old male, with PMH of CHF, HTN, CAD, HLD, DM, CKD, PVD with a Right Femoral-Femoral Bypass in 2018 on apixiban, presented to the [facility] ED with worsening R toe pain for 1 week and was admitted for R 5th toe gangrene.     R toe gangrene d/t PVD  Leukocytosis likely reactive and due to above   - had wound for a month, noted with cellulitis - now resolved    - R foot xray reviewed - no evidence of OM   - R foot wound culture with CRE Pseudomonas, Providencia and Bacteroides - sensitivities reviewed    - Vascular surgery following     -- s/p angiogram 9/29, s/p iliac stents and R groin cutdown and PT exploration 10/6   - afebrile, leukocytosis trended up likely reactive to procedure yesterday    - Podiatry following - s/p excisional debridement at bedside    -- planning for partial 5th ray resection    -- please send for bone culture and biopsy    - continue cefepime and metronidazole   - monitor temps, wbc    CKD    - monitor Cr, renally adjusted meds/abx    DM   - Blood glucose management per primary team.     H/o PCN allergy    - d/w pt and brother - exact reaction unknown, reports graying of skin, denies anaphylaxis   - tolerating cefepime without issues         Brett Kruse M.D.  [facility], Division of Infectious Diseases  [phone]  After 5pm on weekdays and all day on weekends - please call [phone]

## 2021-10-07 NOTE — PROGRESS NOTE ADULT - SUBJECTIVE AND OBJECTIVE BOX
VASCULAR SURGERY PROGRESS NOTE    Subjective:     Vital Signs:  Vital Signs Last 24 Hrs  T(C): 36.9 (06 Oct 2021 21:36), Max: 37 (06 Oct 2021 13:15)  T(F): 98.5 (06 Oct 2021 21:36), Max: 98.6 (06 Oct 2021 13:15)  HR: 78 (06 Oct 2021 21:36) (69 - 85)  BP: 152/78 (06 Oct 2021 21:36) (125/71 - 170/83)  BP(mean): 100 (06 Oct 2021 19:00) (85 - 106)  RR: 18 (06 Oct 2021 21:36) (12 - 25)  SpO2: 99% (06 Oct 2021 21:36) (99% - 100%)    Physical Exam:  General:   Lungs:   CV:   Abdomen:  Extremities:          VASCULAR SURGERY PROGRESS NOTE    Subjective: Seen and examined the patient at bed in the morning round, patient feels sad about his situation but expresses understanding for our plan. Still has pain in the right foot.    Vital Signs:  Vital Signs Last 24 Hrs  T(C): 36.9 (06 Oct 2021 21:36), Max: 37 (06 Oct 2021 13:15)  T(F): 98.5 (06 Oct 2021 21:36), Max: 98.6 (06 Oct 2021 13:15)  HR: 78 (06 Oct 2021 21:36) (69 - 85)  BP: 152/78 (06 Oct 2021 21:36) (125/71 - 170/83)  BP(mean): 100 (06 Oct 2021 19:00) (85 - 106)  RR: 18 (06 Oct 2021 21:36) (12 - 25)  SpO2: 99% (06 Oct 2021 21:36) (99% - 100%)    Physical Exam:  GEN: NAD, resting quietly  PULM: symmetric chest rise bilaterally, no increased WOB  EXTR: left groin access dressing clear and dry, right groin with PREVENA, no edema or hematoma; RLE: no dopplerable PT/DP, changed dressing of right foot in the am round

## 2021-10-07 NOTE — PROGRESS NOTE ADULT - SUBJECTIVE AND OBJECTIVE BOX
Patient is a 60y old  Male who presents with a chief complaint of Right Toe Necrosis (26 Sep 2021 10:35)    10/7/2021    HPI:  Afebrile. No new symptoms  BKA planned    PAST MEDICAL & SURGICAL HISTORY:  DM (diabetes mellitus)    HTN (hypertension)    HLD (hyperlipidemia)    CHF (congestive heart failure)    CKD (chronic kidney disease)    PVD (peripheral vascular disease)    S/P femoral-femoral bypass surgery  2018 - right leg        Review of Systems:   CONSTITUTIONAL: No fever, weight loss, or fatigue  EYES: No eye pain, visual disturbances, or discharge  ENMT:  No difficulty hearing, tinnitus, vertigo; No sinus or throat pain  NECK: No pain or stiffness  BREASTS: No pain, masses, or nipple discharge  RESPIRATORY: No cough, wheezing, chills or hemoptysis; No shortness of breath  CARDIOVASCULAR: No chest pain, palpitations, dizziness, or leg swelling  GASTROINTESTINAL: No abdominal or epigastric pain. No nausea, vomiting, or hematemesis; No diarrhea or constipation. No melena or hematochezia.  GENITOURINARY: No dysuria, frequency, hematuria, or incontinence  NEUROLOGICAL: No headaches, memory loss, loss of strength, numbness, or tremors  SKIN: No itching, burning, rashes, or lesions   LYMPH NODES: No enlarged glands  ENDOCRINE: No heat or cold intolerance; No hair loss  MUSCULOSKELETAL: No joint pain or swelling; Right foot dressed  PSYCHIATRIC: No depression, anxiety, mood swings, or difficulty sleeping  HEME/LYMPH: No easy bruising, or bleeding gums  ALLERY AND IMMUNOLOGIC: No hives or eczema    Allergies    penicillin (Other)    Intolerances        Social History:     FAMILY HISTORY:  Family history of MI (myocardial infarction)  Father    FH: renal cell carcinoma  Sister - s/p nephrectomy    Family history of depression  Brother    FHx: diabetes mellitus  Paternal Grandfather    FH: heart disease  Mother        MEDICATIONS  (STANDING):  aspirin enteric coated 81 milliGRAM(s) Oral daily  atorvastatin 40 milliGRAM(s) Oral at bedtime  cadexomer iodine 0.9% Gel 1 Application(s) Topical daily  carvedilol 25 milliGRAM(s) Oral every 12 hours  clindamycin IVPB 600 milliGRAM(s) IV Intermittent every 8 hours  dextrose 40% Gel 15 Gram(s) Oral once  dextrose 5%. 1000 milliLiter(s) (50 mL/Hr) IV Continuous <Continuous>  dextrose 5%. 1000 milliLiter(s) (100 mL/Hr) IV Continuous <Continuous>  dextrose 50% Injectable 25 Gram(s) IV Push once  dextrose 50% Injectable 12.5 Gram(s) IV Push once  dextrose 50% Injectable 25 Gram(s) IV Push once  glucagon  Injectable 1 milliGRAM(s) IntraMuscular once  heparin  Infusion.  Unit(s)/Hr (9 mL/Hr) IV Continuous <Continuous>  influenza   Vaccine 0.5 milliLiter(s) IntraMuscular once  insulin lispro (ADMELOG) corrective regimen sliding scale   SubCutaneous three times a day before meals  insulin lispro (ADMELOG) corrective regimen sliding scale   SubCutaneous at bedtime  lisinopril 10 milliGRAM(s) Oral daily  multivitamin 1 Tablet(s) Oral daily  sodium chloride 0.9%. 1000 milliLiter(s) (60 mL/Hr) IV Continuous <Continuous>    MEDICATIONS  (PRN):  acetaminophen   Tablet .. 650 milliGRAM(s) Oral every 6 hours PRN Temp greater or equal to 38C (100.4F), Mild Pain (1 - 3), Moderate Pain (4 - 6)  heparin   Injectable 4000 Unit(s) IV Push every 6 hours PRN For aPTT less than 40  heparin   Injectable 2000 Unit(s) IV Push every 6 hours PRN For aPTT between 40 - 57        CAPILLARY BLOOD GLUCOSE      POCT Blood Glucose.: 170 mg/dL (26 Sep 2021 12:31)  POCT Blood Glucose.: 116 mg/dL (26 Sep 2021 08:25)  POCT Blood Glucose.: 114 mg/dL (25 Sep 2021 22:52)  POCT Blood Glucose.: 101 mg/dL (25 Sep 2021 17:19)    I&O's Summary    26 Sep 2021 07:01  -  26 Sep 2021 12:36  --------------------------------------------------------  IN: 240 mL / OUT: 0 mL / NET: 240 mL        PHYSICAL EXAM:  Vital Signs Last 24 Hrs  T(C): 36.9 (26 Sep 2021 05:54), Max: 36.9 (26 Sep 2021 05:54)  T(F): 98.5 (26 Sep 2021 05:54), Max: 98.5 (26 Sep 2021 05:54)  HR: 70 (26 Sep 2021 05:54) (65 - 76)  BP: 150/76 (26 Sep 2021 05:54) (137/75 - 167/98)  BP(mean): --  RR: 17 (26 Sep 2021 05:54) (15 - 17)  SpO2: 100% (26 Sep 2021 05:54) (99% - 100%)    GENERAL: NAD, well-developed  HEAD:  Atraumatic, Normocephalic  EYES: EOMI, PERRLA, conjunctiva and sclera clear  NECK: Supple, No JVD  CHEST/LUNG: Clear to auscultation bilaterally; No wheeze  HEART: Regular rate and rhythm; No murmurs, rubs, or gallops  ABDOMEN: Soft, Nontender, Nondistended; Bowel sounds present  EXTREMITIES:  2+ Peripheral Pulses, No clubbing, cyanosis, or edema. right foot dressed  PSYCH: AAOx3  NEUROLOGY: non-focal  SKIN: No rashes or lesions    LABS:                        13.4   10.31 )-----------( 249      ( 26 Sep 2021 08:30 )             38.3     09-    138  |  102  |  32<H>  ----------------------------<  126<H>  3.4<L>   |  23  |  1.27    Ca    9.5      26 Sep 2021 08:30  Phos  2.8       Mg     1.80     -    TPro  6.9  /  Alb  3.9  /  TBili  0.2  /  DBili  x   /  AST  14  /  ALT  8   /  AlkPhos  85  09-26    PT/INR - ( 25 Sep 2021 12:42 )   PT: 11.0 sec;   INR: 0.95 ratio         PTT - ( 26 Sep 2021 11:13 )  PTT:32.7 sec      Urinalysis Basic - ( 25 Sep 2021 15:23 )    Color: Yellow / Appearance: Clear / S.021 / pH: x  Gluc: x / Ketone: Negative  / Bili: Negative / Urobili: <2 mg/dL   Blood: x / Protein: Trace / Nitrite: Negative   Leuk Esterase: Negative / RBC: 2 /HPF / WBC 3 /HPF   Sq Epi: x / Non Sq Epi: 1 /HPF / Bacteria: Negative        RADIOLOGY & ADDITIONAL TESTS:    Imaging Personally Reviewed:    Consultant(s) Notes Reviewed:      Care Discussed with Consultants/Other Providers:

## 2021-10-07 NOTE — PROGRESS NOTE ADULT - SUBJECTIVE AND OBJECTIVE BOX
Date of service: 10/07/21    chief complaint: right toe pain     extended hpi:  59 y/o male PMH HTN, HLD, DM, CKD, PVD with a Right Femoral-Femoral Bypass in 2018 on apixiban, presented to the [facility] ED with worsening Right Toe pain x 1 week, CT with occluded bypass.  Now awaiting further work up from Vascular. Cardiology called for preop evaluation.    S: no chest pain or sob; ros otherwise negative.     Review of Systems:   Constitutional: [ ] fevers, [ ] chills.   Skin: [ ] dry skin. [ ] rashes.  Psychiatric: [ ] depression, [ ] anxiety.   Gastrointestinal: [ ] BRBPR, [ ] melena.   Neurological: [ ] confusion. [ ] seizures. [ ] shuffling gait.   Ears,Nose,Mouth and Throat: [ ] ear pain [ ] sore throat.   Eyes: [ ] diplopia.   Respiratory: [ ] hemoptysis. [ ] shortness of breath  Cardiovascular: See HPI above  Hematologic/Lymphatic: [ ] anemia. [ ] painful nodes. [ ] prolonged bleeding.   Genitourinary: [ ] hematuria. [ ] flank pain.   Endocrine: [ ] significant change in weight. [ ] intolerance to heat and cold.     Review of systems [ x] otherwise negative, [ ] otherwise unable to obtain    FH: no family history of sudden cardiac death in first degree relatives    SH: [ ] tobacco, [ ] alcohol, [ ] drugs    acetaminophen   Tablet .. 650 milliGRAM(s) Oral every 6 hours PRN  aspirin enteric coated 81 milliGRAM(s) Oral daily  atorvastatin 40 milliGRAM(s) Oral at bedtime  cadexomer iodine 0.9% Gel 1 Application(s) Topical daily  carvedilol 25 milliGRAM(s) Oral every 12 hours  cefepime   IVPB 2000 milliGRAM(s) IV Intermittent every 8 hours  dextrose 40% Gel 15 Gram(s) Oral once  dextrose 5%. 1000 milliLiter(s) IV Continuous <Continuous>  dextrose 5%. 1000 milliLiter(s) IV Continuous <Continuous>  dextrose 50% Injectable 25 Gram(s) IV Push once  dextrose 50% Injectable 12.5 Gram(s) IV Push once  dextrose 50% Injectable 25 Gram(s) IV Push once  glucagon  Injectable 1 milliGRAM(s) IntraMuscular once  heparin   Injectable 5000 Unit(s) SubCutaneous every 8 hours  influenza   Vaccine 0.5 milliLiter(s) IntraMuscular once  insulin lispro (ADMELOG) corrective regimen sliding scale   SubCutaneous three times a day before meals  insulin lispro (ADMELOG) corrective regimen sliding scale   SubCutaneous at bedtime  lisinopril 10 milliGRAM(s) Oral daily  melatonin 3 milliGRAM(s) Oral at bedtime PRN  metroNIDAZOLE    Tablet 500 milliGRAM(s) Oral every 8 hours  multivitamin 1 Tablet(s) Oral daily  oxyCODONE    IR 5 milliGRAM(s) Oral every 4 hours PRN  oxyCODONE    IR 10 milliGRAM(s) Oral every 4 hours PRN  polyethylene glycol 3350 17 Gram(s) Oral daily  senna 2 Tablet(s) Oral at bedtime  sodium chloride 1 Gram(s) Oral three times a day                            11.5   16.08 )-----------( 338      ( 07 Oct 2021 06:32 )             33.2       10-07    131<L>  |  96<L>  |  15  ----------------------------<  168<H>  3.7   |  26  |  0.84    Ca    9.0      07 Oct 2021 06:32  Phos  2.6     10-06  Mg     1.80     10-07      T(C): 37.2 (10-07-21 @ 14:00), Max: 37.2 (10-07-21 @ 05:16)  HR: 87 (10-07-21 @ 14:00) (71 - 87)  BP: 153/78 (10-07-21 @ 14:00) (142/74 - 170/83)  RR: 18 (10-07-21 @ 14:00) (12 - 18)  SpO2: 100% (10-07-21 @ 14:00) (99% - 100%)      General: Well nourished in no acute distress. Alert and Oriented * 3.   Head: Normocephalic and atraumatic.   Neck: No JVD. No bruits. Supple. Does not appear to be enlarged.   Cardiovascular: + S1,S2 ; RRR Soft systolic murmur at the left lower sternal border. No rubs noted.    Lungs: CTA b/l. No rhonchi, rales or wheezes.   Abdomen: + BS, soft. Non tender. Non distended. No rebound. No guarding.   Extremities: No clubbing/cyanosis/edema.     TTE: < from: Transthoracic Echocardiogram (09.29.21 @ 17:35) >  1. Calcified trileaflet aortic valve with normal opening.  2. Normal left ventricular internal dimensions and wall  thicknesses.  3. Normal left ventricular systolic function. No segmental  wall motion abnormalities.  4. Normal right ventricular size and function.    < end of copied text >      NST: < from: Nuclear Stress Test-Pharmacologic (Nuclear Stress Test-Pharmacologic .) (09.30.21 @ 16:16) >  * Myocardial Perfusion SPECT results are mildly abnormal.  * Review of raw data shows: The study is of fair technical  quality with adjacent bowel tracer uptake  * The left ventricle was normal in size. There is a small,  mild defect in septal wall that is fixed, suggestive of  mild scarring  * No clear evidence of ischemia.  * Post-stress gated wall motion analysis was performed  (LVEF = 66 %;LVEDV = 66 ml.), revealing normal LV  function. There was no segmental wall motion abnormality.  Septal wall motion appeared normal. RV function appeared  normal.    < end of copied text >      A/P: 59 y/o male PMH HTN, HLD, DM, CKD, PVD with a Right Femoral-Femoral Bypass in 2018 on apixiban, presented to the [facility] ED with worsening Right Toe pain x 1 week, CT with occluded bypass.  Now awaiting further work up from Vascular. Cardiology called for preop evaluation.      -TTE with normal LV functinon   -NST with no ischemia   -now s/p BL kissing matt stents 10/6  -tolerated procedure well from CV perspective  -pain management and post op care per vascular

## 2021-10-07 NOTE — PROGRESS NOTE ADULT - ASSESSMENT
61 y/o male, with a PmHx of CHF, HTN, CAD, HLD, DM, CKD, PVD with a Right Femoral-Femoral Bypass in 2018 on apixiban, presented to the [facility] ED with worsening Right Toe pain x 1 week. Admitted to medicine for gangrene of right foot 5th digit.

## 2021-10-07 NOTE — PROGRESS NOTE ADULT - SUBJECTIVE AND OBJECTIVE BOX
ANESTHESIA POSTOP CHECK    60y Male POSTOP DAY 1 S/P     Vital Signs Last 24 Hrs  T(C): 37.2 (07 Oct 2021 05:16), Max: 37.2 (07 Oct 2021 05:16)  T(F): 99 (07 Oct 2021 05:16), Max: 99 (07 Oct 2021 05:16)  HR: 76 (07 Oct 2021 05:16) (69 - 85)  BP: 161/79 (07 Oct 2021 05:16) (130/71 - 170/83)  BP(mean): 100 (06 Oct 2021 19:00) (85 - 106)  RR: 18 (07 Oct 2021 05:16) (12 - 25)  SpO2: 99% (07 Oct 2021 05:16) (99% - 100%)  I&O's Summary    06 Oct 2021 07:01  -  07 Oct 2021 07:00  --------------------------------------------------------  IN: 220 mL / OUT: 1515 mL / NET: -1295 mL        [x ] NO APPARENT ANESTHESIA COMPLICATIONS      Comments:

## 2021-10-07 NOTE — PROGRESS NOTE ADULT - SUBJECTIVE AND OBJECTIVE BOX
[facility]   MD RAKAN SHEPHERD PA    TEL:  OFFICE: [phone]  DR FELICIANO CELL: [phone]  DR. PATIÑO CELL: [phone]  JANNETH SÁNCHEZ CELL: [phone]    From 5pm-7am Answering Service [phone]    -- RENAL FOLLOW UP NOTE ---Date of Service 10-07-21 @ 13:24    Patient is a 60y old  Male who presents with a chief complaint of Right Toe Necrosis (07 Oct 2021 09:37)      Patient seen and examined at bedside. No chest pain/sob    VITALS:  T(F): 98.2 (10-07-21 @ 11:07), Max: 99 (10-07-21 @ 05:16)  HR: 83 (10-07-21 @ 11:07)  BP: 142/74 (10-07-21 @ 11:07)  RR: 18 (10-07-21 @ 11:07)  SpO2: 100% (10-07-21 @ 11:07)  Wt(kg): --    10-06 @ 07:01  -  10-07 @ 07:00  --------------------------------------------------------  IN: 220 mL / OUT: 1515 mL / NET: -1295 mL    10-07 @ 07:01  -  10-07 @ 13:24  --------------------------------------------------------  IN: 0 mL / OUT: 100 mL / NET: -100 mL          PHYSICAL EXAM:  Constitutional: NAD  Neck: No JVD  Respiratory: CTAB, no wheezes, rales or rhonchi  Cardiovascular: S1, S2, RRR  Gastrointestinal: BS+, soft, NT/ND  Extremities: No peripheral edema, right foot dressing d/c/i, right foot cool to touch    Hospital Medications:   MEDICATIONS  (STANDING):  aspirin enteric coated 81 milliGRAM(s) Oral daily  atorvastatin 40 milliGRAM(s) Oral at bedtime  cadexomer iodine 0.9% Gel 1 Application(s) Topical daily  carvedilol 25 milliGRAM(s) Oral every 12 hours  cefepime   IVPB 2000 milliGRAM(s) IV Intermittent every 8 hours  dextrose 40% Gel 15 Gram(s) Oral once  dextrose 5%. 1000 milliLiter(s) (50 mL/Hr) IV Continuous <Continuous>  dextrose 5%. 1000 milliLiter(s) (100 mL/Hr) IV Continuous <Continuous>  dextrose 50% Injectable 25 Gram(s) IV Push once  dextrose 50% Injectable 12.5 Gram(s) IV Push once  dextrose 50% Injectable 25 Gram(s) IV Push once  glucagon  Injectable 1 milliGRAM(s) IntraMuscular once  heparin   Injectable 5000 Unit(s) SubCutaneous every 8 hours  influenza   Vaccine 0.5 milliLiter(s) IntraMuscular once  insulin lispro (ADMELOG) corrective regimen sliding scale   SubCutaneous three times a day before meals  insulin lispro (ADMELOG) corrective regimen sliding scale   SubCutaneous at bedtime  lisinopril 10 milliGRAM(s) Oral daily  metroNIDAZOLE    Tablet 500 milliGRAM(s) Oral every 8 hours  multivitamin 1 Tablet(s) Oral daily  polyethylene glycol 3350 17 Gram(s) Oral daily  senna 2 Tablet(s) Oral at bedtime      LABS:  10-07    131<L>  |  96<L>  |  15  ----------------------------<  168<H>  3.7   |  26  |  0.84    Ca    9.0      07 Oct 2021 06:32  Phos  2.6     10-06  Mg     1.80     10-07      Creatinine Trend: 0.84 <--, 0.89 <--, <0.20 <--, 0.86 <--, 0.83 <--, 0.98 <--                                11.5   16.08 )-----------( 338      ( 07 Oct 2021 06:32 )             33.2     Urine Studies:  Urinalysis - [09-25-21 @ 15:23]      Color Yellow / Appearance Clear / SG 1.021 / pH 5.5      Gluc Negative / Ketone Negative  / Bili Negative / Urobili <2 mg/dL       Blood Negative / Protein Trace / Leuk Est Negative / Nitrite Negative      RBC 2 / WBC 3 / Hyaline 1 / Gran  / Sq Epi  / Non Sq Epi 1 / Bacteria Negative    Urine Sodium 61      [10-01-21 @ 15:09]  Urine Osmolality 708      [10-01-21 @ 15:09]    HbA1c 5.7      [12-20-18 @ 06:30]  TSH 0.76      [09-25-21 @ 12:52]  Lipid: chol 139, , HDL 26, LDL --      [09-26-21 @ 08:30]    HCV 0.09, Nonreact      [09-26-21 @ 19:55]      RADIOLOGY & ADDITIONAL STUDIES:

## 2021-10-07 NOTE — PROGRESS NOTE ADULT - ASSESSMENT
Assessment:          Plan: 61yo M with Hx CHF, CAD (no stents), HTN, HLD, DM, PVD with h/o right fem-pop bypass ([facility] 2018) who presents with non-healing R toe wound, found to have dry gangrene with small area of wet conversion and bypass occlusion now s/p diagnostic RLE angiogram, b/l kissing iliac stents, right groin cutdown an PT exploration      Plan/Recommendations:  - OR tomorrow for right gulliotine BKA  - preop today, NPO after midnight  - pain control    C Team Surgery   [id number]

## 2021-10-07 NOTE — CHART NOTE - NSCHARTNOTEFT_GEN_A_CORE
Pre-operative Note    Preop Dx: right foot wound, occluded RLE bypass  Surgeon:   Procedure:    Vital Signs Last 24 Hrs  T(C): 37.2 (07 Oct 2021 05:16), Max: 37.2 (07 Oct 2021 05:16)  T(F): 99 (07 Oct 2021 05:16), Max: 99 (07 Oct 2021 05:16)  HR: 76 (07 Oct 2021 05:16) (69 - 85)  BP: 161/79 (07 Oct 2021 05:16) (130/71 - 170/83)  BP(mean): 100 (06 Oct 2021 19:00) (85 - 106)  RR: 18 (07 Oct 2021 05:16) (12 - 25)  SpO2: 99% (07 Oct 2021 05:16) (99% - 100%)                        11.5   16.08 )-----------( 338      ( 07 Oct 2021 06:32 )             33.2     10-07    131<L>  |  96<L>  |  15  ----------------------------<  168<H>  3.7   |  26  |  0.84    Ca    9.0      07 Oct 2021 06:32  Phos  2.6     10-06  Mg     1.80     10-07      PT/INR - ( 07 Oct 2021 06:32 )   PT: 13.1 sec;   INR: 1.15 ratio         PTT - ( 07 Oct 2021 06:32 )  PTT:30.0 sec  Daily     Daily     EKG:  CXR:   Type and Screen:         A/P: 60y Male     - OR xx/xx/xx for _____________ with  _____  - NPO past midnight, except medications  - IVF while NPO  - Consent signed and in chart  - Medical clearance for OR Pre-operative Note    Preop Dx: right foot wound, occluded RLE bypass  Surgeon: Dr. Lundberg  Procedure: right gulliotine below knee amputation     Vital Signs Last 24 Hrs  T(C): 37.2 (07 Oct 2021 05:16), Max: 37.2 (07 Oct 2021 05:16)  T(F): 99 (07 Oct 2021 05:16), Max: 99 (07 Oct 2021 05:16)  HR: 76 (07 Oct 2021 05:16) (69 - 85)  BP: 161/79 (07 Oct 2021 05:16) (130/71 - 170/83)  BP(mean): 100 (06 Oct 2021 19:00) (85 - 106)  RR: 18 (07 Oct 2021 05:16) (12 - 25)  SpO2: 99% (07 Oct 2021 05:16) (99% - 100%)                        11.5   16.08 )-----------( 338      ( 07 Oct 2021 06:32 )             33.2     10-07    131<L>  |  96<L>  |  15  ----------------------------<  168<H>  3.7   |  26  |  0.84    Ca    9.0      07 Oct 2021 06:32  Phos  2.6     10-06  Mg     1.80     10-07      PT/INR - ( 07 Oct 2021 06:32 )   PT: 13.1 sec;   INR: 1.15 ratio         PTT - ( 07 Oct 2021 06:32 )  PTT:30.0 sec  Daily     Daily     EK/28 Normal sinus rhythm  Left anterior fascicular block  CXR:  Clear lungs.  Type and Screen: *2 resulted, one in am lab        A/P: 60y Male with right foot wound, occluded RLE bypass, no DP/PT pulse after kissing iliac stents, right groin cutdown an PT exploration    - OR 10/08/2021 for __right gulliotine below knee amputation ________ with  __Tamika___  - NPO past midnight, except medications  - IVF while NPO  - Consent signed and in chart  - covid swab sent and pending  - Medical clearance for OR      C team vascular  [id number]

## 2021-10-08 ENCOUNTER — RESULT REVIEW (OUTPATIENT)
Age: 61
End: 2021-10-08

## 2021-10-08 LAB
ANION GAP SERPL CALC-SCNC: 12 MMOL/L — SIGNIFICANT CHANGE UP (ref 7–14)
APTT BLD: 31.5 SEC — SIGNIFICANT CHANGE UP (ref 27–36.3)
BLD GP AB SCN SERPL QL: NEGATIVE — SIGNIFICANT CHANGE UP
BUN SERPL-MCNC: 13 MG/DL — SIGNIFICANT CHANGE UP (ref 7–23)
CALCIUM SERPL-MCNC: 8.9 MG/DL — SIGNIFICANT CHANGE UP (ref 8.4–10.5)
CHLORIDE SERPL-SCNC: 93 MMOL/L — LOW (ref 98–107)
CO2 SERPL-SCNC: 23 MMOL/L — SIGNIFICANT CHANGE UP (ref 22–31)
CREAT SERPL-MCNC: 0.75 MG/DL — SIGNIFICANT CHANGE UP (ref 0.5–1.3)
GLUCOSE SERPL-MCNC: 144 MG/DL — HIGH (ref 70–99)
HCT VFR BLD CALC: 33.2 % — LOW (ref 39–50)
HGB BLD-MCNC: 11.6 G/DL — LOW (ref 13–17)
INR BLD: 1.13 RATIO — SIGNIFICANT CHANGE UP (ref 0.88–1.16)
MAGNESIUM SERPL-MCNC: 1.7 MG/DL — SIGNIFICANT CHANGE UP (ref 1.6–2.6)
MCHC RBC-ENTMCNC: 31.6 PG — SIGNIFICANT CHANGE UP (ref 27–34)
MCHC RBC-ENTMCNC: 34.9 GM/DL — SIGNIFICANT CHANGE UP (ref 32–36)
MCV RBC AUTO: 90.5 FL — SIGNIFICANT CHANGE UP (ref 80–100)
NRBC # BLD: 0 /100 WBCS — SIGNIFICANT CHANGE UP
NRBC # FLD: 0 K/UL — SIGNIFICANT CHANGE UP
PHOSPHATE SERPL-MCNC: 2.1 MG/DL — LOW (ref 2.5–4.5)
PLATELET # BLD AUTO: 349 K/UL — SIGNIFICANT CHANGE UP (ref 150–400)
POTASSIUM SERPL-MCNC: 3.9 MMOL/L — SIGNIFICANT CHANGE UP (ref 3.5–5.3)
POTASSIUM SERPL-SCNC: 3.9 MMOL/L — SIGNIFICANT CHANGE UP (ref 3.5–5.3)
PROTHROM AB SERPL-ACNC: 12.8 SEC — SIGNIFICANT CHANGE UP (ref 10.6–13.6)
RBC # BLD: 3.67 M/UL — LOW (ref 4.2–5.8)
RBC # FLD: 13.9 % — SIGNIFICANT CHANGE UP (ref 10.3–14.5)
RH IG SCN BLD-IMP: POSITIVE — SIGNIFICANT CHANGE UP
SODIUM SERPL-SCNC: 128 MMOL/L — LOW (ref 135–145)
WBC # BLD: 16.11 K/UL — HIGH (ref 3.8–10.5)
WBC # FLD AUTO: 16.11 K/UL — HIGH (ref 3.8–10.5)

## 2021-10-08 PROCEDURE — 88307 TISSUE EXAM BY PATHOLOGIST: CPT | Mod: 26

## 2021-10-08 PROCEDURE — 88311 DECALCIFY TISSUE: CPT | Mod: 26

## 2021-10-08 PROCEDURE — 27882 AMPUTATION OF LOWER LEG: CPT | Mod: RT,58

## 2021-10-08 RX ORDER — ONDANSETRON 8 MG/1
4 TABLET, FILM COATED ORAL ONCE
Refills: 0 | Status: DISCONTINUED | OUTPATIENT
Start: 2021-10-08 | End: 2021-10-08

## 2021-10-08 RX ORDER — FENTANYL CITRATE 50 UG/ML
25 INJECTION INTRAVENOUS
Refills: 0 | Status: DISCONTINUED | OUTPATIENT
Start: 2021-10-08 | End: 2021-10-08

## 2021-10-08 RX ORDER — ACETAMINOPHEN 500 MG
975 TABLET ORAL EVERY 6 HOURS
Refills: 0 | Status: DISCONTINUED | OUTPATIENT
Start: 2021-10-08 | End: 2021-10-19

## 2021-10-08 RX ORDER — HYDROMORPHONE HYDROCHLORIDE 2 MG/ML
0.5 INJECTION INTRAMUSCULAR; INTRAVENOUS; SUBCUTANEOUS ONCE
Refills: 0 | Status: DISCONTINUED | OUTPATIENT
Start: 2021-10-08 | End: 2021-10-08

## 2021-10-08 RX ORDER — HYDROMORPHONE HYDROCHLORIDE 2 MG/ML
0.5 INJECTION INTRAMUSCULAR; INTRAVENOUS; SUBCUTANEOUS
Refills: 0 | Status: DISCONTINUED | OUTPATIENT
Start: 2021-10-08 | End: 2021-10-08

## 2021-10-08 RX ADMIN — CEFEPIME 100 MILLIGRAM(S): 1 INJECTION, POWDER, FOR SOLUTION INTRAMUSCULAR; INTRAVENOUS at 01:19

## 2021-10-08 RX ADMIN — HEPARIN SODIUM 5000 UNIT(S): 5000 INJECTION INTRAVENOUS; SUBCUTANEOUS at 22:43

## 2021-10-08 RX ADMIN — HYDROMORPHONE HYDROCHLORIDE 0.5 MILLIGRAM(S): 2 INJECTION INTRAMUSCULAR; INTRAVENOUS; SUBCUTANEOUS at 13:50

## 2021-10-08 RX ADMIN — OXYCODONE HYDROCHLORIDE 10 MILLIGRAM(S): 5 TABLET ORAL at 03:54

## 2021-10-08 RX ADMIN — OXYCODONE HYDROCHLORIDE 10 MILLIGRAM(S): 5 TABLET ORAL at 08:07

## 2021-10-08 RX ADMIN — SENNA PLUS 2 TABLET(S): 8.6 TABLET ORAL at 22:44

## 2021-10-08 RX ADMIN — HEPARIN SODIUM 5000 UNIT(S): 5000 INJECTION INTRAVENOUS; SUBCUTANEOUS at 06:13

## 2021-10-08 RX ADMIN — OXYCODONE HYDROCHLORIDE 10 MILLIGRAM(S): 5 TABLET ORAL at 08:40

## 2021-10-08 RX ADMIN — CEFEPIME 100 MILLIGRAM(S): 1 INJECTION, POWDER, FOR SOLUTION INTRAMUSCULAR; INTRAVENOUS at 17:05

## 2021-10-08 RX ADMIN — SODIUM CHLORIDE 1 GRAM(S): 9 INJECTION INTRAMUSCULAR; INTRAVENOUS; SUBCUTANEOUS at 06:13

## 2021-10-08 RX ADMIN — Medication 500 MILLIGRAM(S): at 22:44

## 2021-10-08 RX ADMIN — Medication 63.75 MILLIMOLE(S): at 06:49

## 2021-10-08 RX ADMIN — HYDROMORPHONE HYDROCHLORIDE 0.5 MILLIGRAM(S): 2 INJECTION INTRAMUSCULAR; INTRAVENOUS; SUBCUTANEOUS at 13:40

## 2021-10-08 RX ADMIN — LISINOPRIL 10 MILLIGRAM(S): 2.5 TABLET ORAL at 06:13

## 2021-10-08 RX ADMIN — CEFEPIME 100 MILLIGRAM(S): 1 INJECTION, POWDER, FOR SOLUTION INTRAMUSCULAR; INTRAVENOUS at 10:23

## 2021-10-08 RX ADMIN — Medication 500 MILLIGRAM(S): at 15:51

## 2021-10-08 RX ADMIN — CARVEDILOL PHOSPHATE 25 MILLIGRAM(S): 80 CAPSULE, EXTENDED RELEASE ORAL at 17:06

## 2021-10-08 RX ADMIN — HYDROMORPHONE HYDROCHLORIDE 0.5 MILLIGRAM(S): 2 INJECTION INTRAMUSCULAR; INTRAVENOUS; SUBCUTANEOUS at 13:53

## 2021-10-08 RX ADMIN — SODIUM CHLORIDE 1 GRAM(S): 9 INJECTION INTRAMUSCULAR; INTRAVENOUS; SUBCUTANEOUS at 15:52

## 2021-10-08 RX ADMIN — OXYCODONE HYDROCHLORIDE 10 MILLIGRAM(S): 5 TABLET ORAL at 23:43

## 2021-10-08 RX ADMIN — Medication 1: at 17:04

## 2021-10-08 RX ADMIN — Medication 975 MILLIGRAM(S): at 17:05

## 2021-10-08 RX ADMIN — HEPARIN SODIUM 5000 UNIT(S): 5000 INJECTION INTRAVENOUS; SUBCUTANEOUS at 15:52

## 2021-10-08 RX ADMIN — OXYCODONE HYDROCHLORIDE 10 MILLIGRAM(S): 5 TABLET ORAL at 22:43

## 2021-10-08 RX ADMIN — CARVEDILOL PHOSPHATE 25 MILLIGRAM(S): 80 CAPSULE, EXTENDED RELEASE ORAL at 06:13

## 2021-10-08 RX ADMIN — OXYCODONE HYDROCHLORIDE 10 MILLIGRAM(S): 5 TABLET ORAL at 03:24

## 2021-10-08 RX ADMIN — HYDROMORPHONE HYDROCHLORIDE 0.5 MILLIGRAM(S): 2 INJECTION INTRAMUSCULAR; INTRAVENOUS; SUBCUTANEOUS at 19:04

## 2021-10-08 RX ADMIN — SODIUM CHLORIDE 1 GRAM(S): 9 INJECTION INTRAMUSCULAR; INTRAVENOUS; SUBCUTANEOUS at 22:43

## 2021-10-08 RX ADMIN — ATORVASTATIN CALCIUM 40 MILLIGRAM(S): 80 TABLET, FILM COATED ORAL at 22:43

## 2021-10-08 RX ADMIN — OXYCODONE HYDROCHLORIDE 10 MILLIGRAM(S): 5 TABLET ORAL at 15:57

## 2021-10-08 RX ADMIN — OXYCODONE HYDROCHLORIDE 10 MILLIGRAM(S): 5 TABLET ORAL at 16:50

## 2021-10-08 RX ADMIN — HYDROMORPHONE HYDROCHLORIDE 0.5 MILLIGRAM(S): 2 INJECTION INTRAMUSCULAR; INTRAVENOUS; SUBCUTANEOUS at 00:00

## 2021-10-08 RX ADMIN — Medication 500 MILLIGRAM(S): at 06:13

## 2021-10-08 NOTE — CHART NOTE - NSCHARTNOTEFT_GEN_A_CORE
POST-OP NOTE    EVE DELGADO | [id number] | [facility] LT9T 923 A    Procedure: s/p Right Guillotine Amputation BKA    Subjective: Pt seen resting comfortably in their room. Pt endorses RLE pain and was being given oral pain medication at time of POC. Pt denies fevers, chills, n/v, CP, SOB. Pt voided shortly after arrival to PACU post-op.    Vital Signs Last 24 Hrs  T(C): 36.6 (08 Oct 2021 13:10), Max: 37.4 (08 Oct 2021 10:51)  T(F): 97.9 (08 Oct 2021 13:10), Max: 99.4 (08 Oct 2021 10:51)  HR: 88 (08 Oct 2021 15:48) (80 - 101)  BP: 179/88 (08 Oct 2021 15:48) (130/71 - 179/88)  BP(mean): 105 (08 Oct 2021 14:30) (85 - 107)  RR: 16 (08 Oct 2021 15:48) (13 - 18)  SpO2: 100% (08 Oct 2021 15:48) (96% - 100%)  I&O's Summary    07 Oct 2021 07:01  -  08 Oct 2021 07:00  --------------------------------------------------------  IN: 0 mL / OUT: 1350 mL / NET: -1350 mL    08 Oct 2021 07:01  -  08 Oct 2021 15:57  --------------------------------------------------------  IN: 250 mL / OUT: 300 mL / NET: -50 mL                            11.6   16.11 )-----------( 349      ( 08 Oct 2021 04:05 )             33.2     10-08    128<L>  |  93<L>  |  13  ----------------------------<  144<H>  3.9   |  23  |  0.75    Ca    8.9      08 Oct 2021 04:05  Phos  2.1     10-08  Mg     1.70     10-08     PT/INR - ( 08 Oct 2021 04:05 )   PT: 12.8 sec;   INR: 1.13 ratio         PTT - ( 08 Oct 2021 04:05 )  PTT:31.5 sec    PHYSICAL EXAM:  Gen: NAD  Resp: breathing easily, no stridor  CV: RRR  MSK: RLE Incision c/d/i. Normal color, no rashes or lesions. Dressing to be changed on AM rounds 10/9

## 2021-10-08 NOTE — PROGRESS NOTE ADULT - ASSESSMENT
Assessment:          Plan: 61yo M with Hx CHF, CAD (no stents), HTN, HLD, DM, PVD with h/o right fem-pop bypass ([facility] 2018) who presents with non-healing R toe wound, found to have dry gangrene with small area of wet conversion and bypass occlusion now s/p diagnostic RLE angiogram, b/l kissing iliac stents, right groin cutdown an PT exploration. Pt to undergo RLE angelita 10/8.      Plan/Recommendations:  - OR today for RLE angelita  - NPO for procedure  - pain control  - IV abx    C Team Surgery   [id number]

## 2021-10-08 NOTE — PROGRESS NOTE ADULT - SUBJECTIVE AND OBJECTIVE BOX
VASCULAR SURGERY PROGRESS NOTE    Subjective:     Vital Signs:  Vital Signs Last 24 Hrs  T(C): 37.2 (08 Oct 2021 01:11), Max: 37.2 (07 Oct 2021 05:16)  T(F): 98.9 (08 Oct 2021 01:11), Max: 99 (07 Oct 2021 05:16)  HR: 92 (08 Oct 2021 01:11) (76 - 97)  BP: 137/77 (08 Oct 2021 01:11) (130/71 - 162/86)  BP(mean): 85 (07 Oct 2021 22:10) (85 - 85)  RR: 16 (08 Oct 2021 01:11) (16 - 18)  SpO2: 99% (08 Oct 2021 01:11) (99% - 100%)    Physical Exam:  General:   Lungs:   CV:   Abdomen:  Extremities:          VASCULAR SURGERY PROGRESS NOTE    Subjective: No acute events overnight. Pt seen on AM rounds and pt denies fevers, chills, n/v, CP, SOB. Endorses RLE pain.    Vital Signs:  Vital Signs Last 24 Hrs  T(C): 37.2 (08 Oct 2021 01:11), Max: 37.2 (07 Oct 2021 05:16)  T(F): 98.9 (08 Oct 2021 01:11), Max: 99 (07 Oct 2021 05:16)  HR: 92 (08 Oct 2021 01:11) (76 - 97)  BP: 137/77 (08 Oct 2021 01:11) (130/71 - 162/86)  BP(mean): 85 (07 Oct 2021 22:10) (85 - 85)  RR: 16 (08 Oct 2021 01:11) (16 - 18)  SpO2: 99% (08 Oct 2021 01:11) (99% - 100%)    Physical Exam:  GEN: NAD, resting quietly  PULM: symmetric chest rise bilaterally, no increased WOB  EXTR: left groin access dressing clear and dry, right groin with PREVENA, no edema or hematoma; RLE: no dopplerable PT/DP    LABS:                         11.6   16.11 )-----------( 349      ( 08 Oct 2021 04:05 )             33.2     10-08    128<L>  |  93<L>  |  13  ----------------------------<  144<H>  3.9   |  23  |  0.75    Ca    8.9      08 Oct 2021 04:05  Phos  2.1     10-08  Mg     1.70     10-08      PT/INR - ( 08 Oct 2021 04:05 )   PT: 12.8 sec;   INR: 1.13 ratio         PTT - ( 08 Oct 2021 04:05 )  PTT:31.5 sec          RADIOLOGY, EKG & ADDITIONAL TESTS: Reviewed.

## 2021-10-08 NOTE — PROGRESS NOTE ADULT - SUBJECTIVE AND OBJECTIVE BOX
CARDIOLOGY ATTENDING    he denies palpitations, syncope, nor angina, ROS otherwise -    DATE OF SERVICE - 10-08-21     Review of Systems:   Constitutional: [ ] fevers, [ ] chills.   Skin: [ ] dry skin. [ ] rashes.  Psychiatric: [ ] depression, [ ] anxiety.   Gastrointestinal: [ ] BRBPR, [ ] melena.   Neurological: [ ] confusion. [ ] seizures. [ ] shuffling gait.   Ears,Nose,Mouth and Throat: [ ] ear pain [ ] sore throat.   Eyes: [ ] diplopia.   Respiratory: [ ] hemoptysis. [ ] shortness of breath  Cardiovascular: See HPI above  Hematologic/Lymphatic: [ ] anemia. [ ] painful nodes. [ ] prolonged bleeding.   Genitourinary: [ ] hematuria. [ ] flank pain.   Endocrine: [ ] significant change in weight. [ ] intolerance to heat and cold.     Review of systems [ x] otherwise negative, [ ] otherwise unable to obtain    FH: no family history of sudden cardiac death in first degree relatives    SH: [ ] tobacco, [ ] alcohol, [ ] drugs    acetaminophen   Tablet .. 650 milliGRAM(s) Oral every 6 hours PRN  aspirin enteric coated 81 milliGRAM(s) Oral daily  atorvastatin 40 milliGRAM(s) Oral at bedtime  cadexomer iodine 0.9% Gel 1 Application(s) Topical daily  carvedilol 25 milliGRAM(s) Oral every 12 hours  cefepime   IVPB 2000 milliGRAM(s) IV Intermittent every 8 hours  dextrose 40% Gel 15 Gram(s) Oral once  dextrose 5%. 1000 milliLiter(s) IV Continuous <Continuous>  dextrose 5%. 1000 milliLiter(s) IV Continuous <Continuous>  dextrose 50% Injectable 25 Gram(s) IV Push once  dextrose 50% Injectable 12.5 Gram(s) IV Push once  dextrose 50% Injectable 25 Gram(s) IV Push once  glucagon  Injectable 1 milliGRAM(s) IntraMuscular once  heparin   Injectable 5000 Unit(s) SubCutaneous every 8 hours  influenza   Vaccine 0.5 milliLiter(s) IntraMuscular once  insulin lispro (ADMELOG) corrective regimen sliding scale   SubCutaneous three times a day before meals  insulin lispro (ADMELOG) corrective regimen sliding scale   SubCutaneous at bedtime  lisinopril 10 milliGRAM(s) Oral daily  melatonin 3 milliGRAM(s) Oral at bedtime PRN  metroNIDAZOLE    Tablet 500 milliGRAM(s) Oral every 8 hours  multivitamin 1 Tablet(s) Oral daily  oxyCODONE    IR 5 milliGRAM(s) Oral every 4 hours PRN  oxyCODONE    IR 10 milliGRAM(s) Oral every 4 hours PRN  polyethylene glycol 3350 17 Gram(s) Oral daily  senna 2 Tablet(s) Oral at bedtime  sodium chloride 1 Gram(s) Oral three times a day                            11.6   16.11 )-----------( 349      ( 08 Oct 2021 04:05 )             33.2       10-08    128<L>  |  93<L>  |  13  ----------------------------<  144<H>  3.9   |  23  |  0.75    Ca    8.9      08 Oct 2021 04:05  Phos  2.1     10-08  Mg     1.70     10-08              T(C): 37.4 (10-08-21 @ 10:51), Max: 37.4 (10-08-21 @ 10:51)  HR: 101 (10-08-21 @ 10:51) (83 - 101)  BP: 148/80 (10-08-21 @ 10:51) (130/71 - 162/86)  RR: 16 (10-08-21 @ 10:51) (16 - 18)  SpO2: 97% (10-08-21 @ 10:51) (97% - 100%)  Wt(kg): --    I&O's Summary    07 Oct 2021 07:01  -  08 Oct 2021 07:00  --------------------------------------------------------  IN: 0 mL / OUT: 1350 mL / NET: -1350 mL    08 Oct 2021 07:01  -  08 Oct 2021 11:17  --------------------------------------------------------  IN: 250 mL / OUT: 300 mL / NET: -50 mL        General: Well nourished in no acute distress. Alert and Oriented * 3.   Head: Normocephalic and atraumatic.   Neck: No JVD. No bruits. Supple. Does not appear to be enlarged.   Cardiovascular: + S1,S2 ; RRR Soft systolic murmur at the left lower sternal border. No rubs noted.    Lungs: CTA b/l. No rhonchi, rales or wheezes.   Abdomen: + BS, soft. Non tender. Non distended. No rebound. No guarding.   Extremities: No clubbing/cyanosis/edema.   Neurologic: Moves all four extremities. Full range of motion.   Skin: Warm and moist. The patient's skin has normal elasticity and good skin turgor.   Psychiatric: Appropriate mood and affect.  Musculoskeletal: Normal range of motion, normal strength        A/P: 61 y/o male PMH HTN, HLD, DM, CKD, PVD with a Right Femoral-Femoral Bypass in 2018 on apixiban, presented to the [facility] ED with worsening Right Toe pain x 1 week, CT with occluded bypass.  Now awaiting further work up from Vascular. Cardiology called for preop evaluation.      -TTE with normal LV functinon   -NST with no ischemia   -now s/p BL kissing matt stents 10/6  -tolerated procedure well from CV perspective  -pain management and post op care per vascular  -continue medical therapy for known PAD including asa, lipitor, coreg

## 2021-10-08 NOTE — PROGRESS NOTE ADULT - ASSESSMENT
59 y/o male, with a PmHx of CHF, HTN, CAD, HLD, DM, CKD, PVD with a Right Femoral-Femoral Bypass in 2018 on apixiban, presented to the [facility] ED with worsening Right Toe pain x 1 week.      Ravin   pt with multiple Ravin in past   RAVIN possible prerenal as SG is high s/p IVF  Renal function improved  On ACE Monitor closley  s/p angio 9/29. renal function stable  Monitor BMP  AVoid further nephrotoxics, NSAID RCA    Hypokalemia  Repelted KCl    MOnitor  serum K    Acidosis   improving   MOnitor serum Co2    hypomagnesemia  supplemented by team  monitor    hypophosphatemia  supplemented by team  monitor    hyponatremia  work up suggested SIADH  Na slightly worsen today  start Na tab 1g tid  continue free water restriction  avoid hypotonic solutions  please repeat work up. sent urine na and osmo  monitor level    hypocalcemia  check pth, vit d 25  monitor

## 2021-10-08 NOTE — PROGRESS NOTE ADULT - SUBJECTIVE AND OBJECTIVE BOX
[facility]   MD RAKAN SHEPHERD PA    TEL:  OFFICE: [phone]  DR FELICIANO CELL: [phone]  DR. PATIÑO CELL: [phone]  JANNETH SÁNCHEZ CELL: [phone]    From 5pm-7am Answering Service [phone]    -- RENAL FOLLOW UP NOTE ---Date of Service 10-08-21 @ 16:41    Patient is a 60y old  Male who presents with a chief complaint of Right Toe Necrosis (08 Oct 2021 11:16)      pt still in vascular procedure     VITALS:  T(F): 97.9 (10-08-21 @ 13:10), Max: 99.4 (10-08-21 @ 10:51)  HR: 88 (10-08-21 @ 15:48)  BP: 179/88 (10-08-21 @ 15:48)  RR: 16 (10-08-21 @ 15:48)  SpO2: 100% (10-08-21 @ 15:48)  Wt(kg): --    10-07 @ 07:01  -  10-08 @ 07:00  --------------------------------------------------------  IN: 0 mL / OUT: 1350 mL / NET: -1350 mL    10-08 @ 07:01  -  10-08 @ 16:41  --------------------------------------------------------  IN: 250 mL / OUT: 300 mL / NET: -50 mL      Height (cm): 162.6 (10-08 @ 08:01)  Weight (kg): 50 (10-08 @ 08:01)  BMI (kg/m2): 18.9 (10-08 @ 08:01)  BSA (m2): 1.52 (10-08 @ 08:01)    PHYSICAL EXAM:  pt still in vascular procedure    Hospital Medications:   MEDICATIONS  (STANDING):  acetaminophen   Tablet .. 975 milliGRAM(s) Oral every 6 hours  aspirin enteric coated 81 milliGRAM(s) Oral daily  atorvastatin 40 milliGRAM(s) Oral at bedtime  cadexomer iodine 0.9% Gel 1 Application(s) Topical daily  carvedilol 25 milliGRAM(s) Oral every 12 hours  cefepime   IVPB 2000 milliGRAM(s) IV Intermittent every 8 hours  dextrose 50% Injectable 25 Gram(s) IV Push once  dextrose 50% Injectable 12.5 Gram(s) IV Push once  dextrose 50% Injectable 25 Gram(s) IV Push once  heparin   Injectable 5000 Unit(s) SubCutaneous every 8 hours  influenza   Vaccine 0.5 milliLiter(s) IntraMuscular once  insulin lispro (ADMELOG) corrective regimen sliding scale   SubCutaneous three times a day before meals  insulin lispro (ADMELOG) corrective regimen sliding scale   SubCutaneous at bedtime  lisinopril 10 milliGRAM(s) Oral daily  metroNIDAZOLE    Tablet 500 milliGRAM(s) Oral every 8 hours  multivitamin 1 Tablet(s) Oral daily  polyethylene glycol 3350 17 Gram(s) Oral daily  senna 2 Tablet(s) Oral at bedtime  sodium chloride 1 Gram(s) Oral three times a day      LABS:  10-08    128<L>  |  93<L>  |  13  ----------------------------<  144<H>  3.9   |  23  |  0.75    Ca    8.9      08 Oct 2021 04:05  Phos  2.1     10-08  Mg     1.70     10-08      Creatinine Trend: 0.75 <--, 0.84 <--, 0.89 <--, <0.20 <--, 0.86 <--, 0.83 <--    Phosphorus Level, Serum: 2.1 mg/dL (10-08 @ 04:05)                              11.6   16.11 )-----------( 349      ( 08 Oct 2021 04:05 )             33.2     Urine Studies:  Urinalysis - [09-25-21 @ 15:23]      Color Yellow / Appearance Clear / SG 1.021 / pH 5.5      Gluc Negative / Ketone Negative  / Bili Negative / Urobili <2 mg/dL       Blood Negative / Protein Trace / Leuk Est Negative / Nitrite Negative      RBC 2 / WBC 3 / Hyaline 1 / Gran  / Sq Epi  / Non Sq Epi 1 / Bacteria Negative      HbA1c 5.7      [12-20-18 @ 06:30]  TSH 0.76      [09-25-21 @ 12:52]  Lipid: chol 139, , HDL 26, LDL --      [09-26-21 @ 08:30]    HCV 0.09, Nonreact      [09-26-21 @ 19:55]      RADIOLOGY & ADDITIONAL STUDIES:

## 2021-10-09 LAB
ANION GAP SERPL CALC-SCNC: 12 MMOL/L — SIGNIFICANT CHANGE UP (ref 7–14)
BUN SERPL-MCNC: 12 MG/DL — SIGNIFICANT CHANGE UP (ref 7–23)
CALCIUM SERPL-MCNC: 8.1 MG/DL — LOW (ref 8.4–10.5)
CHLORIDE SERPL-SCNC: 96 MMOL/L — LOW (ref 98–107)
CO2 SERPL-SCNC: 24 MMOL/L — SIGNIFICANT CHANGE UP (ref 22–31)
CREAT SERPL-MCNC: 0.67 MG/DL — SIGNIFICANT CHANGE UP (ref 0.5–1.3)
GLUCOSE SERPL-MCNC: 134 MG/DL — HIGH (ref 70–99)
HCT VFR BLD CALC: 30.4 % — LOW (ref 39–50)
HGB BLD-MCNC: 10.9 G/DL — LOW (ref 13–17)
MAGNESIUM SERPL-MCNC: 1.7 MG/DL — SIGNIFICANT CHANGE UP (ref 1.6–2.6)
MCHC RBC-ENTMCNC: 32.4 PG — SIGNIFICANT CHANGE UP (ref 27–34)
MCHC RBC-ENTMCNC: 35.9 GM/DL — SIGNIFICANT CHANGE UP (ref 32–36)
MCV RBC AUTO: 90.5 FL — SIGNIFICANT CHANGE UP (ref 80–100)
NRBC # BLD: 0 /100 WBCS — SIGNIFICANT CHANGE UP
NRBC # FLD: 0 K/UL — SIGNIFICANT CHANGE UP
PHOSPHATE SERPL-MCNC: 1.7 MG/DL — LOW (ref 2.5–4.5)
PLATELET # BLD AUTO: 363 K/UL — SIGNIFICANT CHANGE UP (ref 150–400)
POTASSIUM SERPL-MCNC: 3.3 MMOL/L — LOW (ref 3.5–5.3)
POTASSIUM SERPL-SCNC: 3.3 MMOL/L — LOW (ref 3.5–5.3)
RBC # BLD: 3.36 M/UL — LOW (ref 4.2–5.8)
RBC # FLD: 13.8 % — SIGNIFICANT CHANGE UP (ref 10.3–14.5)
SODIUM SERPL-SCNC: 132 MMOL/L — LOW (ref 135–145)
WBC # BLD: 14.06 K/UL — HIGH (ref 3.8–10.5)
WBC # FLD AUTO: 14.06 K/UL — HIGH (ref 3.8–10.5)

## 2021-10-09 RX ORDER — POTASSIUM PHOSPHATE, MONOBASIC POTASSIUM PHOSPHATE, DIBASIC 236; 224 MG/ML; MG/ML
15 INJECTION, SOLUTION INTRAVENOUS ONCE
Refills: 0 | Status: COMPLETED | OUTPATIENT
Start: 2021-10-09 | End: 2021-10-09

## 2021-10-09 RX ORDER — SODIUM CHLORIDE 9 MG/ML
1 INJECTION INTRAMUSCULAR; INTRAVENOUS; SUBCUTANEOUS THREE TIMES A DAY
Refills: 0 | Status: COMPLETED | OUTPATIENT
Start: 2021-10-09 | End: 2021-10-12

## 2021-10-09 RX ORDER — MAGNESIUM SULFATE 500 MG/ML
2 VIAL (ML) INJECTION ONCE
Refills: 0 | Status: COMPLETED | OUTPATIENT
Start: 2021-10-09 | End: 2021-10-09

## 2021-10-09 RX ORDER — POTASSIUM CHLORIDE 20 MEQ
20 PACKET (EA) ORAL
Refills: 0 | Status: COMPLETED | OUTPATIENT
Start: 2021-10-09 | End: 2021-10-09

## 2021-10-09 RX ADMIN — Medication 500 MILLIGRAM(S): at 07:12

## 2021-10-09 RX ADMIN — CEFEPIME 100 MILLIGRAM(S): 1 INJECTION, POWDER, FOR SOLUTION INTRAMUSCULAR; INTRAVENOUS at 11:26

## 2021-10-09 RX ADMIN — SENNA PLUS 2 TABLET(S): 8.6 TABLET ORAL at 23:19

## 2021-10-09 RX ADMIN — Medication 975 MILLIGRAM(S): at 12:07

## 2021-10-09 RX ADMIN — HEPARIN SODIUM 5000 UNIT(S): 5000 INJECTION INTRAVENOUS; SUBCUTANEOUS at 23:20

## 2021-10-09 RX ADMIN — HEPARIN SODIUM 5000 UNIT(S): 5000 INJECTION INTRAVENOUS; SUBCUTANEOUS at 13:53

## 2021-10-09 RX ADMIN — Medication 50 GRAM(S): at 11:27

## 2021-10-09 RX ADMIN — SODIUM CHLORIDE 1 GRAM(S): 9 INJECTION INTRAMUSCULAR; INTRAVENOUS; SUBCUTANEOUS at 23:36

## 2021-10-09 RX ADMIN — Medication 81 MILLIGRAM(S): at 12:07

## 2021-10-09 RX ADMIN — CEFEPIME 100 MILLIGRAM(S): 1 INJECTION, POWDER, FOR SOLUTION INTRAMUSCULAR; INTRAVENOUS at 18:12

## 2021-10-09 RX ADMIN — HEPARIN SODIUM 5000 UNIT(S): 5000 INJECTION INTRAVENOUS; SUBCUTANEOUS at 07:12

## 2021-10-09 RX ADMIN — Medication 975 MILLIGRAM(S): at 07:11

## 2021-10-09 RX ADMIN — Medication 500 MILLIGRAM(S): at 23:19

## 2021-10-09 RX ADMIN — SODIUM CHLORIDE 1 GRAM(S): 9 INJECTION INTRAMUSCULAR; INTRAVENOUS; SUBCUTANEOUS at 07:12

## 2021-10-09 RX ADMIN — SODIUM CHLORIDE 1 GRAM(S): 9 INJECTION INTRAMUSCULAR; INTRAVENOUS; SUBCUTANEOUS at 13:53

## 2021-10-09 RX ADMIN — OXYCODONE HYDROCHLORIDE 10 MILLIGRAM(S): 5 TABLET ORAL at 07:11

## 2021-10-09 RX ADMIN — LISINOPRIL 10 MILLIGRAM(S): 2.5 TABLET ORAL at 07:12

## 2021-10-09 RX ADMIN — Medication 500 MILLIGRAM(S): at 13:53

## 2021-10-09 RX ADMIN — Medication 20 MILLIEQUIVALENT(S): at 11:28

## 2021-10-09 RX ADMIN — POTASSIUM PHOSPHATE, MONOBASIC POTASSIUM PHOSPHATE, DIBASIC 62.5 MILLIMOLE(S): 236; 224 INJECTION, SOLUTION INTRAVENOUS at 12:06

## 2021-10-09 RX ADMIN — Medication 1 TABLET(S): at 12:07

## 2021-10-09 RX ADMIN — OXYCODONE HYDROCHLORIDE 10 MILLIGRAM(S): 5 TABLET ORAL at 23:19

## 2021-10-09 RX ADMIN — Medication 20 MILLIEQUIVALENT(S): at 12:06

## 2021-10-09 RX ADMIN — CARVEDILOL PHOSPHATE 25 MILLIGRAM(S): 80 CAPSULE, EXTENDED RELEASE ORAL at 07:11

## 2021-10-09 RX ADMIN — ATORVASTATIN CALCIUM 40 MILLIGRAM(S): 80 TABLET, FILM COATED ORAL at 23:19

## 2021-10-09 RX ADMIN — Medication 1: at 18:11

## 2021-10-09 RX ADMIN — CARVEDILOL PHOSPHATE 25 MILLIGRAM(S): 80 CAPSULE, EXTENDED RELEASE ORAL at 18:13

## 2021-10-09 RX ADMIN — Medication 975 MILLIGRAM(S): at 01:20

## 2021-10-09 RX ADMIN — POLYETHYLENE GLYCOL 3350 17 GRAM(S): 17 POWDER, FOR SOLUTION ORAL at 12:07

## 2021-10-09 RX ADMIN — Medication 2: at 12:05

## 2021-10-09 RX ADMIN — CEFEPIME 100 MILLIGRAM(S): 1 INJECTION, POWDER, FOR SOLUTION INTRAMUSCULAR; INTRAVENOUS at 01:21

## 2021-10-09 RX ADMIN — OXYCODONE HYDROCHLORIDE 10 MILLIGRAM(S): 5 TABLET ORAL at 08:11

## 2021-10-09 RX ADMIN — Medication 975 MILLIGRAM(S): at 18:13

## 2021-10-09 RX ADMIN — Medication 20 MILLIEQUIVALENT(S): at 13:53

## 2021-10-09 NOTE — PROGRESS NOTE ADULT - ASSESSMENT
61 y/o male, with a PmHx of CHF, HTN, CAD, HLD, DM, CKD, PVD with a Right Femoral-Femoral Bypass in 2018 on apixiban, presented to the [facility] ED with worsening Right Toe pain x 1 week. Admitted to medicine for gangrene of right foot 5th digit.

## 2021-10-09 NOTE — PROGRESS NOTE ADULT - SUBJECTIVE AND OBJECTIVE BOX
Patient is a 60y old  Male who presents with a chief complaint of Right Toe Necrosis (26 Sep 2021 10:35)    10/9/2021    HPI:  Afebrile. No new symptoms  BKA done    PAST MEDICAL & SURGICAL HISTORY:  DM (diabetes mellitus)    HTN (hypertension)    HLD (hyperlipidemia)    CHF (congestive heart failure)    CKD (chronic kidney disease)    PVD (peripheral vascular disease)    S/P femoral-femoral bypass surgery  2018 - right leg        Review of Systems:   CONSTITUTIONAL: No fever, weight loss, or fatigue  EYES: No eye pain, visual disturbances, or discharge  ENMT:  No difficulty hearing, tinnitus, vertigo; No sinus or throat pain  NECK: No pain or stiffness  BREASTS: No pain, masses, or nipple discharge  RESPIRATORY: No cough, wheezing, chills or hemoptysis; No shortness of breath  CARDIOVASCULAR: No chest pain, palpitations, dizziness, or leg swelling  GASTROINTESTINAL: No abdominal or epigastric pain. No nausea, vomiting, or hematemesis; No diarrhea or constipation. No melena or hematochezia.  GENITOURINARY: No dysuria, frequency, hematuria, or incontinence  NEUROLOGICAL: No headaches, memory loss, loss of strength, numbness, or tremors  SKIN: No itching, burning, rashes, or lesions   LYMPH NODES: No enlarged glands  ENDOCRINE: No heat or cold intolerance; No hair loss  MUSCULOSKELETAL: No joint pain or swelling; Right foot dressed  PSYCHIATRIC: No depression, anxiety, mood swings, or difficulty sleeping  HEME/LYMPH: No easy bruising, or bleeding gums  ALLERY AND IMMUNOLOGIC: No hives or eczema    Allergies    penicillin (Other)    Intolerances        Social History:     FAMILY HISTORY:  Family history of MI (myocardial infarction)  Father    FH: renal cell carcinoma  Sister - s/p nephrectomy    Family history of depression  Brother    FHx: diabetes mellitus  Paternal Grandfather    FH: heart disease  Mother        MEDICATIONS  (STANDING):  aspirin enteric coated 81 milliGRAM(s) Oral daily  atorvastatin 40 milliGRAM(s) Oral at bedtime  cadexomer iodine 0.9% Gel 1 Application(s) Topical daily  carvedilol 25 milliGRAM(s) Oral every 12 hours  clindamycin IVPB 600 milliGRAM(s) IV Intermittent every 8 hours  dextrose 40% Gel 15 Gram(s) Oral once  dextrose 5%. 1000 milliLiter(s) (50 mL/Hr) IV Continuous <Continuous>  dextrose 5%. 1000 milliLiter(s) (100 mL/Hr) IV Continuous <Continuous>  dextrose 50% Injectable 25 Gram(s) IV Push once  dextrose 50% Injectable 12.5 Gram(s) IV Push once  dextrose 50% Injectable 25 Gram(s) IV Push once  glucagon  Injectable 1 milliGRAM(s) IntraMuscular once  heparin  Infusion.  Unit(s)/Hr (9 mL/Hr) IV Continuous <Continuous>  influenza   Vaccine 0.5 milliLiter(s) IntraMuscular once  insulin lispro (ADMELOG) corrective regimen sliding scale   SubCutaneous three times a day before meals  insulin lispro (ADMELOG) corrective regimen sliding scale   SubCutaneous at bedtime  lisinopril 10 milliGRAM(s) Oral daily  multivitamin 1 Tablet(s) Oral daily  sodium chloride 0.9%. 1000 milliLiter(s) (60 mL/Hr) IV Continuous <Continuous>    MEDICATIONS  (PRN):  acetaminophen   Tablet .. 650 milliGRAM(s) Oral every 6 hours PRN Temp greater or equal to 38C (100.4F), Mild Pain (1 - 3), Moderate Pain (4 - 6)  heparin   Injectable 4000 Unit(s) IV Push every 6 hours PRN For aPTT less than 40  heparin   Injectable 2000 Unit(s) IV Push every 6 hours PRN For aPTT between 40 - 57        CAPILLARY BLOOD GLUCOSE      POCT Blood Glucose.: 170 mg/dL (26 Sep 2021 12:31)  POCT Blood Glucose.: 116 mg/dL (26 Sep 2021 08:25)  POCT Blood Glucose.: 114 mg/dL (25 Sep 2021 22:52)  POCT Blood Glucose.: 101 mg/dL (25 Sep 2021 17:19)    I&O's Summary    26 Sep 2021 07:01  -  26 Sep 2021 12:36  --------------------------------------------------------  IN: 240 mL / OUT: 0 mL / NET: 240 mL        PHYSICAL EXAM:  Vital Signs Last 24 Hrs  T(C): 36.9 (26 Sep 2021 05:54), Max: 36.9 (26 Sep 2021 05:54)  T(F): 98.5 (26 Sep 2021 05:54), Max: 98.5 (26 Sep 2021 05:54)  HR: 70 (26 Sep 2021 05:54) (65 - 76)  BP: 150/76 (26 Sep 2021 05:54) (137/75 - 167/98)  BP(mean): --  RR: 17 (26 Sep 2021 05:54) (15 - 17)  SpO2: 100% (26 Sep 2021 05:54) (99% - 100%)    GENERAL: NAD, well-developed  HEAD:  Atraumatic, Normocephalic  EYES: EOMI, PERRLA, conjunctiva and sclera clear  NECK: Supple, No JVD  CHEST/LUNG: Clear to auscultation bilaterally; No wheeze  HEART: Regular rate and rhythm; No murmurs, rubs, or gallops  ABDOMEN: Soft, Nontender, Nondistended; Bowel sounds present  EXTREMITIES:  2+ Peripheral Pulses, No clubbing, cyanosis, or edema. right foot dressed  PSYCH: AAOx3  NEUROLOGY: non-focal  SKIN: No rashes or lesions    LABS:                        13.4   10.31 )-----------( 249      ( 26 Sep 2021 08:30 )             38.3     09-    138  |  102  |  32<H>  ----------------------------<  126<H>  3.4<L>   |  23  |  1.27    Ca    9.5      26 Sep 2021 08:30  Phos  2.8       Mg     1.80     -    TPro  6.9  /  Alb  3.9  /  TBili  0.2  /  DBili  x   /  AST  14  /  ALT  8   /  AlkPhos  85  09-26    PT/INR - ( 25 Sep 2021 12:42 )   PT: 11.0 sec;   INR: 0.95 ratio         PTT - ( 26 Sep 2021 11:13 )  PTT:32.7 sec      Urinalysis Basic - ( 25 Sep 2021 15:23 )    Color: Yellow / Appearance: Clear / S.021 / pH: x  Gluc: x / Ketone: Negative  / Bili: Negative / Urobili: <2 mg/dL   Blood: x / Protein: Trace / Nitrite: Negative   Leuk Esterase: Negative / RBC: 2 /HPF / WBC 3 /HPF   Sq Epi: x / Non Sq Epi: 1 /HPF / Bacteria: Negative        RADIOLOGY & ADDITIONAL TESTS:    Imaging Personally Reviewed:    Consultant(s) Notes Reviewed:      Care Discussed with Consultants/Other Providers:

## 2021-10-09 NOTE — PROGRESS NOTE ADULT - PROBLEM SELECTOR PLAN 6
- monitor bp, cont meds, adjust as needed

## 2021-10-09 NOTE — PROGRESS NOTE ADULT - PROBLEM SELECTOR PLAN 7
Start on IV heparin
Start on IV heparin
on IV heparin
Start on IV heparin
on IV heparin
Start on IV heparin
on IV heparin

## 2021-10-09 NOTE — PROGRESS NOTE ADULT - ASSESSMENT
Attending addendum:   Agree with above  NST with no ischemia   Tolerated procedure well in terms of cv perspective  No further inpatient cardiac workup needed at this time.     Imani Cesar MD

## 2021-10-09 NOTE — PROGRESS NOTE ADULT - PROBLEM SELECTOR PROBLEM 1
Gangrene due to peripheral vascular disease

## 2021-10-09 NOTE — PROGRESS NOTE ADULT - PROBLEM SELECTOR PROBLEM 5
CKD (chronic kidney disease)

## 2021-10-09 NOTE — PROGRESS NOTE ADULT - ASSESSMENT
59 y/o male, with a PmHx of CHF, HTN, CAD, HLD, DM, CKD, PVD with a Right Femoral-Femoral Bypass in 2018 on apixiban, presented to the [facility] ED with worsening Right Toe pain x 1 week.      Ravin   pt with multiple Ravin in past   RAVIN possible prerenal as SG is high s/p IVF  Renal function improved  On ACE Monitor closley  s/p angio 9/29. renal function stable  Monitor BMP  AVoid further nephrotoxics, NSAID RCA    Hypokalemia  Repelted KCl    MOnitor  serum K    Acidosis   improving   MOnitor serum Co2    hypomagnesemia  supplemented by team  monitor    hypophosphatemia  supplemented by team  monitor    hyponatremia  work up suggested SIADH  Na improving  continue Na tab 1g tid  continue free water restriction  avoid hypotonic solutions  please repeat work up. sent urine na and osmo  monitor level    hypocalcemia  check pth, vit d 25  monitor

## 2021-10-09 NOTE — PROGRESS NOTE ADULT - PROBLEM SELECTOR PLAN 2
- monitor fs, insulin ssc, HgbA1-c  - Hold oral medications at this time
- monitor fs, insulin ssc, HgbA1-c
- monitor fs, insulin ssc, HgbA1-c  - Hold oral medications at this time
- monitor fs, insulin ssc, HgbA1-c
- monitor fs, insulin ssc, HgbA1-c  - Hold oral medications at this time
- monitor fs, insulin ssc, HgbA1-c
- monitor fs, insulin ssc, HgbA1-c  - Hold oral medications at this time
- monitor fs, insulin ssc, HgbA1-c
- monitor fs, insulin ssc, HgbA1-c

## 2021-10-09 NOTE — PROGRESS NOTE ADULT - PROBLEM SELECTOR PLAN 5
Associated with DM, PAD and has hyponatremia.  - monitor bun/cr, avoid nephrotoxic agents  - renal consult noted  He was on NSAIDS recently
Associated with DM, PAD and has hyponatremia.  - monitor bun/cr, avoid nephrotoxic agents
- monitor bun/cr, avoid nephrotoxic agents  - renal consult called  He was on NSAIDS recently
- monitor bun/cr, avoid nephrotoxic agents  - renal consult called  He was on NSAIDS recently
Associated with DM, PAD and has hyponatremia.  - monitor bun/cr, avoid nephrotoxic agents  - renal consult noted  He was on NSAIDS recently
- monitor bun/cr, avoid nephrotoxic agents  - renal consult called  He was on NSAIDS recently
- monitor bun/cr, avoid nephrotoxic agents  - renal consult called  He was on NSAIDS recently
Associated with DM, PAD and has hyponatremia.  - monitor bun/cr, avoid nephrotoxic agents  - renal consult noted  He was on NSAIDS recently
- monitor bun/cr, avoid nephrotoxic agents  - renal consult called  He was on NSAIDS recently
Associated with DM, PAD and has hyponatremia.  - monitor bun/cr, avoid nephrotoxic agents  - renal consult noted  He was on NSAIDS recently

## 2021-10-09 NOTE — PROGRESS NOTE ADULT - SUBJECTIVE AND OBJECTIVE BOX
[facility] [place] [facility]   MD RAKAN SHEPHERD PA    TEL:  OFFICE: [phone]  DR FELICIANO CELL: [phone]  DR. PATIÑO CELL: [phone]  JANNETH SÁNCHEZ CELL: [phone]    From 5pm-7am Answering Service [phone]    -- RENAL FOLLOW UP NOTE ---Date of Service 10-09-21 @ 12:44    Patient is a 60y old  Male who presents with a chief complaint of Right Toe Necrosis (09 Oct 2021 11:29)      Patient seen and examined at bedside. No chest pain/sob    VITALS:  T(F): 98 (10-09-21 @ 11:21), Max: 98.2 (10-08-21 @ 22:41)  HR: 84 (10-09-21 @ 11:21)  BP: 126/66 (10-09-21 @ 11:21)  RR: 16 (10-09-21 @ 11:21)  SpO2: 100% (10-09-21 @ 11:21)  Wt(kg): --    10-08 @ 07:01  -  10-09 @ 07:00  --------------------------------------------------------  IN: 610 mL / OUT: 500 mL / NET: 110 mL    10-09 @ 07:01  -  10-09 @ 12:44  --------------------------------------------------------  IN: 660 mL / OUT: 320 mL / NET: 340 mL          PHYSICAL EXAM:  Constitutional: NAD  Neck: No JVD  Respiratory: CTAB, no wheezes, rales or rhonchi  Cardiovascular: S1, S2, RRR  Gastrointestinal: BS+, soft, NT/ND  Extremities: No peripheral edema, right bka dressing d/c/i    Hospital Medications:   MEDICATIONS  (STANDING):  acetaminophen   Tablet .. 975 milliGRAM(s) Oral every 6 hours  aspirin enteric coated 81 milliGRAM(s) Oral daily  atorvastatin 40 milliGRAM(s) Oral at bedtime  cadexomer iodine 0.9% Gel 1 Application(s) Topical daily  carvedilol 25 milliGRAM(s) Oral every 12 hours  cefepime   IVPB 2000 milliGRAM(s) IV Intermittent every 8 hours  dextrose 50% Injectable 25 Gram(s) IV Push once  dextrose 50% Injectable 12.5 Gram(s) IV Push once  dextrose 50% Injectable 25 Gram(s) IV Push once  heparin   Injectable 5000 Unit(s) SubCutaneous every 8 hours  influenza   Vaccine 0.5 milliLiter(s) IntraMuscular once  insulin lispro (ADMELOG) corrective regimen sliding scale   SubCutaneous at bedtime  insulin lispro (ADMELOG) corrective regimen sliding scale   SubCutaneous three times a day before meals  lisinopril 10 milliGRAM(s) Oral daily  metroNIDAZOLE    Tablet 500 milliGRAM(s) Oral every 8 hours  multivitamin 1 Tablet(s) Oral daily  polyethylene glycol 3350 17 Gram(s) Oral daily  potassium chloride    Tablet ER 20 milliEquivalent(s) Oral every 2 hours  senna 2 Tablet(s) Oral at bedtime  sodium chloride 1 Gram(s) Oral three times a day      LABS:  10-09    132<L>  |  96<L>  |  12  ----------------------------<  134<H>  3.3<L>   |  24  |  0.67    Ca    8.1<L>      09 Oct 2021 07:17  Phos  1.7     10-09  Mg     1.70     10-09      Creatinine Trend: 0.67 <--, 0.75 <--, 0.84 <--, 0.89 <--, <0.20 <--, 0.86 <--    Phosphorus Level, Serum: 1.7 mg/dL (10-09 @ 07:17)                              10.9   14.06 )-----------( 363      ( 09 Oct 2021 07:17 )             30.4     Urine Studies:  Urinalysis - [09-25-21 @ 15:23]      Color Yellow / Appearance Clear / SG 1.021 / pH 5.5      Gluc Negative / Ketone Negative  / Bili Negative / Urobili <2 mg/dL       Blood Negative / Protein Trace / Leuk Est Negative / Nitrite Negative      RBC 2 / WBC 3 / Hyaline 1 / Gran  / Sq Epi  / Non Sq Epi 1 / Bacteria Negative      HbA1c 5.7      [12-20-18 @ 06:30]  TSH 0.76      [09-25-21 @ 12:52]  Lipid: chol 139, , HDL 26, LDL --      [09-26-21 @ 08:30]    HCV 0.09, Nonreact      [09-26-21 @ 19:55]      RADIOLOGY & ADDITIONAL STUDIES:

## 2021-10-09 NOTE — PROGRESS NOTE ADULT - SUBJECTIVE AND OBJECTIVE BOX
ANESTHESIA POSTOP CHECK    60y Male POSTOP DAY 1 S/P     Vital Signs Last 24 Hrs  T(C): 36.7 (09 Oct 2021 11:21), Max: 36.8 (08 Oct 2021 22:41)  T(F): 98 (09 Oct 2021 11:21), Max: 98.2 (08 Oct 2021 22:41)  HR: 84 (09 Oct 2021 11:21) (80 - 97)  BP: 126/66 (09 Oct 2021 11:21) (126/66 - 179/88)  BP(mean): 106 (08 Oct 2021 15:00) (93 - 107)  RR: 16 (09 Oct 2021 11:21) (13 - 18)  SpO2: 100% (09 Oct 2021 11:21) (96% - 100%)  I&O's Summary    08 Oct 2021 07:01  -  09 Oct 2021 07:00  --------------------------------------------------------  IN: 610 mL / OUT: 500 mL / NET: 110 mL    09 Oct 2021 07:01  -  09 Oct 2021 11:29  --------------------------------------------------------  IN: 660 mL / OUT: 320 mL / NET: 340 mL        [X ] NO APPARENT ANESTHESIA COMPLICATIONS      Comments:

## 2021-10-09 NOTE — PROGRESS NOTE ADULT - SUBJECTIVE AND OBJECTIVE BOX
CARDIOLOGY      he denies palpitations, syncope, nor angina, ROS otherwise -    DATE OF SERVICE - 10-09-21     Review of Systems:   Constitutional: [ ] fevers, [ ] chills.   Skin: [ ] dry skin. [ ] rashes.  Psychiatric: [ ] depression, [ ] anxiety.   Gastrointestinal: [ ] BRBPR, [ ] melena.   Neurological: [ ] confusion. [ ] seizures. [ ] shuffling gait.   Ears,Nose,Mouth and Throat: [ ] ear pain [ ] sore throat.   Eyes: [ ] diplopia.   Respiratory: [ ] hemoptysis. [ ] shortness of breath  Cardiovascular: See HPI above  Hematologic/Lymphatic: [ ] anemia. [ ] painful nodes. [ ] prolonged bleeding.   Genitourinary: [ ] hematuria. [ ] flank pain.   Endocrine: [ ] significant change in weight. [ ] intolerance to heat and cold.     Review of systems [ x] otherwise negative, [ ] otherwise unable to obtain    FH: no family history of sudden cardiac death in first degree relatives    SH: [ ] tobacco, [ ] alcohol, [ ] drugs              acetaminophen   Tablet .. 975 milliGRAM(s) Oral every 6 hours  aspirin enteric coated 81 milliGRAM(s) Oral daily  atorvastatin 40 milliGRAM(s) Oral at bedtime  cadexomer iodine 0.9% Gel 1 Application(s) Topical daily  carvedilol 25 milliGRAM(s) Oral every 12 hours  cefepime   IVPB 2000 milliGRAM(s) IV Intermittent every 8 hours  dextrose 50% Injectable 25 Gram(s) IV Push once  dextrose 50% Injectable 12.5 Gram(s) IV Push once  dextrose 50% Injectable 25 Gram(s) IV Push once  heparin   Injectable 5000 Unit(s) SubCutaneous every 8 hours  influenza   Vaccine 0.5 milliLiter(s) IntraMuscular once  insulin lispro (ADMELOG) corrective regimen sliding scale   SubCutaneous three times a day before meals  insulin lispro (ADMELOG) corrective regimen sliding scale   SubCutaneous at bedtime  lisinopril 10 milliGRAM(s) Oral daily  melatonin 3 milliGRAM(s) Oral at bedtime PRN  metroNIDAZOLE    Tablet 500 milliGRAM(s) Oral every 8 hours  multivitamin 1 Tablet(s) Oral daily  oxyCODONE    IR 10 milliGRAM(s) Oral every 4 hours PRN  oxyCODONE    IR 5 milliGRAM(s) Oral every 4 hours PRN  polyethylene glycol 3350 17 Gram(s) Oral daily  senna 2 Tablet(s) Oral at bedtime                            11.6   16.11 )-----------( 349      ( 08 Oct 2021 04:05 )             33.2       Hemoglobin: 11.6 g/dL (10-08 @ 04:05)  Hemoglobin: 11.5 g/dL (10-07 @ 06:32)  Hemoglobin: 11.9 g/dL (10-06 @ 04:47)  Hemoglobin: 12.2 g/dL (10-05 @ 06:46)      10-08    128<L>  |  93<L>  |  13  ----------------------------<  144<H>  3.9   |  23  |  0.75    Ca    8.9      08 Oct 2021 04:05  Phos  2.1     10-08  Mg     1.70     10-08      Creatinine Trend: 0.75<--, 0.84<--, 0.89<--, <0.20<--, 0.86<--, 0.83<--    COAGS:           T(C): 36.7 (10-09-21 @ 06:50), Max: 37.4 (10-08-21 @ 10:51)  HR: 97 (10-09-21 @ 06:50) (80 - 101)  BP: 147/83 (10-09-21 @ 06:50) (130/79 - 179/88)  RR: 15 (10-09-21 @ 06:50) (13 - 18)  SpO2: 100% (10-09-21 @ 06:50) (96% - 100%)  Wt(kg): --    I&O's Summary    08 Oct 2021 07:01  -  09 Oct 2021 07:00  --------------------------------------------------------  IN: 610 mL / OUT: 500 mL / NET: 110 mL    09 Oct 2021 07:01  -  09 Oct 2021 07:27  --------------------------------------------------------  IN: 0 mL / OUT: 200 mL / NET: -200 mL      General: Well nourished in no acute distress. Alert and Oriented * 3.   Head: Normocephalic and atraumatic.   Neck: No JVD. No bruits. Supple. Does not appear to be enlarged.   Cardiovascular: + S1,S2 ; RRR Soft systolic murmur at the left lower sternal border. No rubs noted.    Lungs: CTA b/l. No rhonchi, rales or wheezes.   Abdomen: + BS, soft. Non tender. Non distended. No rebound. No guarding.   Extremities: No clubbing/cyanosis/edema.   Neurologic: Moves all four extremities. Full range of motion.   Skin: Warm and moist. The patient's skin has normal elasticity and good skin turgor.   Psychiatric: Appropriate mood and affect.  Musculoskeletal: Normal range of motion, normal strength        A/P: 61 y/o male PMH HTN, HLD, DM, CKD, PVD with a Right Femoral-Femoral Bypass in 2018 on apixiban, presented to the [facility] ED with worsening Right Toe pain x 1 week, CT with occluded bypass.  Now awaiting further work up from Vascular. Cardiology called for preop evaluation. s/p guillotine amputation  right foot.       -TTE with normal LV functinon   -NST with no ischemia   -tolerated procedure well from CV perspective  -pain management and post op care per vascular  -continue medical therapy for known PAD including asa, lipitor, coreg

## 2021-10-09 NOTE — PROGRESS NOTE ADULT - PROBLEM SELECTOR PLAN 3
- fasting lipid profile, cont statin

## 2021-10-09 NOTE — PROGRESS NOTE ADULT - ASSESSMENT
Assessment:          Plan: Assessment: 59yo M with Hx CHF, CAD (no stents), HTN, HLD, DM, PVD with h/o right fem-pop bypass ([facility] 2018) who presents with non-healing R toe wound, found to have dry gangrene with small area of wet conversion and bypass occlusion now s/p diagnostic RLE angiogram, b/l kissing iliac stents, right groin cutdown an PT exploration. Pt s/p 10/8 RLE angelita.      Plan/Recommendations:  - D: reg  - pain control  - IV abx (cefepime, flagyl)  - Daily dressing changes: Xeroform, 4x4, ABD, Kerlix, ACE  - Prevena to be taken off POD 5 from Right groin cutdown 10/6     C Team Surgery   [id number]

## 2021-10-09 NOTE — PROGRESS NOTE ADULT - SUBJECTIVE AND OBJECTIVE BOX
VASCULAR SURGERY PROGRESS NOTE    Subjective:     Vital Signs:  Vital Signs Last 24 Hrs  T(C): 36.8 (08 Oct 2021 22:41), Max: 37.4 (08 Oct 2021 10:51)  T(F): 98.2 (08 Oct 2021 22:41), Max: 99.4 (08 Oct 2021 10:51)  HR: 95 (08 Oct 2021 22:41) (80 - 101)  BP: 137/71 (08 Oct 2021 22:41) (130/79 - 179/88)  BP(mean): 106 (08 Oct 2021 15:00) (93 - 107)  RR: 15 (08 Oct 2021 22:41) (13 - 18)  SpO2: 100% (08 Oct 2021 22:41) (96% - 100%)    Physical Exam:  General:   Lungs:   CV:   Abdomen:  Extremities:          VASCULAR SURGERY PROGRESS NOTE    Subjective: No acute events overnight. Pt endorses RLE pain that is controlled my medication. Pt denies fevers, chills, n/v, SOB, CP. Dressing changed on AM round.    Vital Signs:  Vital Signs Last 24 Hrs  T(C): 36.8 (08 Oct 2021 22:41), Max: 37.4 (08 Oct 2021 10:51)  T(F): 98.2 (08 Oct 2021 22:41), Max: 99.4 (08 Oct 2021 10:51)  HR: 95 (08 Oct 2021 22:41) (80 - 101)  BP: 137/71 (08 Oct 2021 22:41) (130/79 - 179/88)  BP(mean): 106 (08 Oct 2021 15:00) (93 - 107)  RR: 15 (08 Oct 2021 22:41) (13 - 18)  SpO2: 100% (08 Oct 2021 22:41) (96% - 100%)    Physical Exam:  GEN: NAD, resting quietly  PULM: symmetric chest rise bilaterally, no increased WOB  EXTR: left groin access dressing clear and dry, right groin with PREVENA, no edema or hematoma; RLE: wound is c/d/i; Daily dressing changes    LABS:                         10.9   14.06 )-----------( 363      ( 09 Oct 2021 07:17 )             30.4     10-09    132<L>  |  96<L>  |  12  ----------------------------<  134<H>  3.3<L>   |  24  |  0.67    Ca    8.1<L>      09 Oct 2021 07:17  Phos  1.7     10-09  Mg     1.70     10-09      PT/INR - ( 08 Oct 2021 04:05 )   PT: 12.8 sec;   INR: 1.13 ratio         PTT - ( 08 Oct 2021 04:05 )  PTT:31.5 sec          RADIOLOGY, EKG & ADDITIONAL TESTS: Reviewed.

## 2021-10-09 NOTE — PROGRESS NOTE ADULT - PROBLEM SELECTOR PLAN 4
- currently not in heart failure at this time  - NST & Echocardiogram ordered to evaluate EF for cardiac clearance for the OR.  - daily wts, strict I & O's
- currently not in heart failure at this time  - Echocardiogram ordered to evaluate EF for poss cardiac clearance for the OR.  - daily wts, strict I & O's
- currently not in heart failure at this time  - NST & Echocardiogram ordered to evaluate EF for cardiac clearance for the OR.  - daily wts, strict I & O's
- currently not in heart failure at this time  - NST & Echocardiogram ordered to evaluate EF for cardiac clearance for the OR.  - daily wts, strict I & O's
- currently not in heart failure at this time  - Echocardiogram ordered to evaluate EF for poss cardiac clearance for the OR.  - daily wts, strict I & O's
- currently not in heart failure at this time  - Echocardiogram ordered to evaluate EF for poss cardiac clearance for the OR.  - daily wts, strict I & O's
- currently not in heart failure at this time  - NST & Echocardiogram ordered to evaluate EF for cardiac clearance for the OR.  - daily wts, strict I & O's
- currently not in heart failure at this time

## 2021-10-10 LAB
ANION GAP SERPL CALC-SCNC: 8 MMOL/L — SIGNIFICANT CHANGE UP (ref 7–14)
BUN SERPL-MCNC: 14 MG/DL — SIGNIFICANT CHANGE UP (ref 7–23)
CALCIUM SERPL-MCNC: 8.8 MG/DL — SIGNIFICANT CHANGE UP (ref 8.4–10.5)
CHLORIDE SERPL-SCNC: 103 MMOL/L — SIGNIFICANT CHANGE UP (ref 98–107)
CO2 SERPL-SCNC: 23 MMOL/L — SIGNIFICANT CHANGE UP (ref 22–31)
CREAT SERPL-MCNC: 0.76 MG/DL — SIGNIFICANT CHANGE UP (ref 0.5–1.3)
GLUCOSE SERPL-MCNC: 95 MG/DL — SIGNIFICANT CHANGE UP (ref 70–99)
HCT VFR BLD CALC: 31.7 % — LOW (ref 39–50)
HGB BLD-MCNC: 10.9 G/DL — LOW (ref 13–17)
MAGNESIUM SERPL-MCNC: 2.1 MG/DL — SIGNIFICANT CHANGE UP (ref 1.6–2.6)
MCHC RBC-ENTMCNC: 31.5 PG — SIGNIFICANT CHANGE UP (ref 27–34)
MCHC RBC-ENTMCNC: 34.4 GM/DL — SIGNIFICANT CHANGE UP (ref 32–36)
MCV RBC AUTO: 91.6 FL — SIGNIFICANT CHANGE UP (ref 80–100)
NRBC # BLD: 0 /100 WBCS — SIGNIFICANT CHANGE UP
NRBC # FLD: 0 K/UL — SIGNIFICANT CHANGE UP
PHOSPHATE SERPL-MCNC: 1.7 MG/DL — LOW (ref 2.5–4.5)
PLATELET # BLD AUTO: 460 K/UL — HIGH (ref 150–400)
POTASSIUM SERPL-MCNC: 4.1 MMOL/L — SIGNIFICANT CHANGE UP (ref 3.5–5.3)
POTASSIUM SERPL-SCNC: 4.1 MMOL/L — SIGNIFICANT CHANGE UP (ref 3.5–5.3)
RBC # BLD: 3.46 M/UL — LOW (ref 4.2–5.8)
RBC # FLD: 14.6 % — HIGH (ref 10.3–14.5)
SODIUM SERPL-SCNC: 134 MMOL/L — LOW (ref 135–145)
WBC # BLD: 16.13 K/UL — HIGH (ref 3.8–10.5)
WBC # FLD AUTO: 16.13 K/UL — HIGH (ref 3.8–10.5)

## 2021-10-10 RX ORDER — POTASSIUM PHOSPHATE, MONOBASIC POTASSIUM PHOSPHATE, DIBASIC 236; 224 MG/ML; MG/ML
15 INJECTION, SOLUTION INTRAVENOUS ONCE
Refills: 0 | Status: DISCONTINUED | OUTPATIENT
Start: 2021-10-10 | End: 2021-10-10

## 2021-10-10 RX ADMIN — OXYCODONE HYDROCHLORIDE 10 MILLIGRAM(S): 5 TABLET ORAL at 10:45

## 2021-10-10 RX ADMIN — CEFEPIME 100 MILLIGRAM(S): 1 INJECTION, POWDER, FOR SOLUTION INTRAMUSCULAR; INTRAVENOUS at 01:05

## 2021-10-10 RX ADMIN — OXYCODONE HYDROCHLORIDE 10 MILLIGRAM(S): 5 TABLET ORAL at 15:15

## 2021-10-10 RX ADMIN — CEFEPIME 100 MILLIGRAM(S): 1 INJECTION, POWDER, FOR SOLUTION INTRAMUSCULAR; INTRAVENOUS at 10:07

## 2021-10-10 RX ADMIN — OXYCODONE HYDROCHLORIDE 10 MILLIGRAM(S): 5 TABLET ORAL at 22:39

## 2021-10-10 RX ADMIN — CARVEDILOL PHOSPHATE 25 MILLIGRAM(S): 80 CAPSULE, EXTENDED RELEASE ORAL at 18:06

## 2021-10-10 RX ADMIN — OXYCODONE HYDROCHLORIDE 10 MILLIGRAM(S): 5 TABLET ORAL at 06:58

## 2021-10-10 RX ADMIN — CEFEPIME 100 MILLIGRAM(S): 1 INJECTION, POWDER, FOR SOLUTION INTRAMUSCULAR; INTRAVENOUS at 18:44

## 2021-10-10 RX ADMIN — Medication 2: at 18:00

## 2021-10-10 RX ADMIN — Medication 85 MILLIMOLE(S): at 14:50

## 2021-10-10 RX ADMIN — HEPARIN SODIUM 5000 UNIT(S): 5000 INJECTION INTRAVENOUS; SUBCUTANEOUS at 05:58

## 2021-10-10 RX ADMIN — Medication 500 MILLIGRAM(S): at 05:58

## 2021-10-10 RX ADMIN — Medication 975 MILLIGRAM(S): at 07:26

## 2021-10-10 RX ADMIN — OXYCODONE HYDROCHLORIDE 10 MILLIGRAM(S): 5 TABLET ORAL at 22:09

## 2021-10-10 RX ADMIN — HEPARIN SODIUM 5000 UNIT(S): 5000 INJECTION INTRAVENOUS; SUBCUTANEOUS at 13:13

## 2021-10-10 RX ADMIN — OXYCODONE HYDROCHLORIDE 10 MILLIGRAM(S): 5 TABLET ORAL at 05:58

## 2021-10-10 RX ADMIN — SODIUM CHLORIDE 1 GRAM(S): 9 INJECTION INTRAMUSCULAR; INTRAVENOUS; SUBCUTANEOUS at 05:57

## 2021-10-10 RX ADMIN — OXYCODONE HYDROCHLORIDE 10 MILLIGRAM(S): 5 TABLET ORAL at 00:19

## 2021-10-10 RX ADMIN — ATORVASTATIN CALCIUM 40 MILLIGRAM(S): 80 TABLET, FILM COATED ORAL at 22:20

## 2021-10-10 RX ADMIN — SODIUM CHLORIDE 1 GRAM(S): 9 INJECTION INTRAMUSCULAR; INTRAVENOUS; SUBCUTANEOUS at 13:13

## 2021-10-10 RX ADMIN — Medication 500 MILLIGRAM(S): at 22:10

## 2021-10-10 RX ADMIN — Medication 81 MILLIGRAM(S): at 13:12

## 2021-10-10 RX ADMIN — POLYETHYLENE GLYCOL 3350 17 GRAM(S): 17 POWDER, FOR SOLUTION ORAL at 13:13

## 2021-10-10 RX ADMIN — Medication 975 MILLIGRAM(S): at 13:13

## 2021-10-10 RX ADMIN — OXYCODONE HYDROCHLORIDE 10 MILLIGRAM(S): 5 TABLET ORAL at 10:07

## 2021-10-10 RX ADMIN — Medication 500 MILLIGRAM(S): at 13:13

## 2021-10-10 RX ADMIN — OXYCODONE HYDROCHLORIDE 10 MILLIGRAM(S): 5 TABLET ORAL at 14:50

## 2021-10-10 RX ADMIN — SODIUM CHLORIDE 1 GRAM(S): 9 INJECTION INTRAMUSCULAR; INTRAVENOUS; SUBCUTANEOUS at 22:09

## 2021-10-10 RX ADMIN — HEPARIN SODIUM 5000 UNIT(S): 5000 INJECTION INTRAVENOUS; SUBCUTANEOUS at 22:09

## 2021-10-10 RX ADMIN — CARVEDILOL PHOSPHATE 25 MILLIGRAM(S): 80 CAPSULE, EXTENDED RELEASE ORAL at 05:58

## 2021-10-10 RX ADMIN — LISINOPRIL 10 MILLIGRAM(S): 2.5 TABLET ORAL at 05:58

## 2021-10-10 RX ADMIN — Medication 975 MILLIGRAM(S): at 01:02

## 2021-10-10 RX ADMIN — Medication 1 TABLET(S): at 13:13

## 2021-10-10 RX ADMIN — Medication 1: at 12:05

## 2021-10-10 RX ADMIN — Medication 975 MILLIGRAM(S): at 18:06

## 2021-10-10 NOTE — PROGRESS NOTE ADULT - SUBJECTIVE AND OBJECTIVE BOX
CARDIOLOGY      he denies palpitations, syncope, nor angina, ROS otherwise -    DATE OF SERVICE - 10-10-21     Review of Systems:   Constitutional: [ ] fevers, [ ] chills.   Skin: [ ] dry skin. [ ] rashes.  Psychiatric: [ ] depression, [ ] anxiety.   Gastrointestinal: [ ] BRBPR, [ ] melena.   Neurological: [ ] confusion. [ ] seizures. [ ] shuffling gait.   Ears,Nose,Mouth and Throat: [ ] ear pain [ ] sore throat.   Eyes: [ ] diplopia.   Respiratory: [ ] hemoptysis. [ ] shortness of breath  Cardiovascular: See HPI above  Hematologic/Lymphatic: [ ] anemia. [ ] painful nodes. [ ] prolonged bleeding.   Genitourinary: [ ] hematuria. [ ] flank pain.   Endocrine: [ ] significant change in weight. [ ] intolerance to heat and cold.     Review of systems [ x] otherwise negative, [ ] otherwise unable to obtain    FH: no family history of sudden cardiac death in first degree relatives    SH: [ ] tobacco, [ ] alcohol, [ ] drugs                  acetaminophen   Tablet .. 975 milliGRAM(s) Oral every 6 hours  aspirin enteric coated 81 milliGRAM(s) Oral daily  atorvastatin 40 milliGRAM(s) Oral at bedtime  cadexomer iodine 0.9% Gel 1 Application(s) Topical daily  carvedilol 25 milliGRAM(s) Oral every 12 hours  cefepime   IVPB 2000 milliGRAM(s) IV Intermittent every 8 hours  dextrose 50% Injectable 25 Gram(s) IV Push once  dextrose 50% Injectable 12.5 Gram(s) IV Push once  dextrose 50% Injectable 25 Gram(s) IV Push once  heparin   Injectable 5000 Unit(s) SubCutaneous every 8 hours  influenza   Vaccine 0.5 milliLiter(s) IntraMuscular once  insulin lispro (ADMELOG) corrective regimen sliding scale   SubCutaneous at bedtime  insulin lispro (ADMELOG) corrective regimen sliding scale   SubCutaneous three times a day before meals  lisinopril 10 milliGRAM(s) Oral daily  melatonin 3 milliGRAM(s) Oral at bedtime PRN  metroNIDAZOLE    Tablet 500 milliGRAM(s) Oral every 8 hours  multivitamin 1 Tablet(s) Oral daily  oxyCODONE    IR 10 milliGRAM(s) Oral every 4 hours PRN  oxyCODONE    IR 5 milliGRAM(s) Oral every 4 hours PRN  polyethylene glycol 3350 17 Gram(s) Oral daily  senna 2 Tablet(s) Oral at bedtime  sodium chloride 1 Gram(s) Oral three times a day                            10.9   16.13 )-----------( 460      ( 10 Oct 2021 08:09 )             31.7       Hemoglobin: 10.9 g/dL (10-10 @ 08:09)  Hemoglobin: 10.9 g/dL (10-09 @ 07:17)  Hemoglobin: 11.6 g/dL (10-08 @ 04:05)  Hemoglobin: 11.5 g/dL (10-07 @ 06:32)  Hemoglobin: 11.9 g/dL (10-06 @ 04:47)      10-10    134<L>  |  103  |  14  ----------------------------<  95  4.1   |  23  |  0.76    Ca    8.8      10 Oct 2021 08:09  Phos  1.7     10-10  Mg     2.10     10-10      Creatinine Trend: 0.76<--, 0.67<--, 0.75<--, 0.84<--, 0.89<--, <0.20<--    COAGS:           T(C): 37.2 (10-10-21 @ 05:55), Max: 37.4 (10-09-21 @ 17:30)  HR: 92 (10-10-21 @ 05:55) (84 - 92)  BP: 161/85 (10-10-21 @ 05:55) (123/75 - 161/85)  RR: 15 (10-10-21 @ 05:55) (15 - 16)  SpO2: 100% (10-10-21 @ 05:55) (100% - 100%)  Wt(kg): --    I&O's Summary    09 Oct 2021 07:01  -  10 Oct 2021 07:00  --------------------------------------------------------  IN: 1020 mL / OUT: 1020 mL / NET: 0 mL        General: Well nourished in no acute distress. Alert and Oriented * 3.   Head: Normocephalic and atraumatic.   Neck: No JVD. No bruits. Supple. Does not appear to be enlarged.   Cardiovascular: + S1,S2 ; RRR Soft systolic murmur at the left lower sternal border. No rubs noted.    Lungs: CTA b/l. No rhonchi, rales or wheezes.   Abdomen: + BS, soft. Non tender. Non distended. No rebound. No guarding.   Extremities: No clubbing/cyanosis/edema.   Neurologic: Moves all four extremities. Full range of motion.   Skin: Warm and moist. The patient's skin has normal elasticity and good skin turgor.   Psychiatric: Appropriate mood and affect.  Musculoskeletal: Normal range of motion, normal strength        A/P: 59 y/o male PMH HTN, HLD, DM, CKD, PVD with a Right Femoral-Femoral Bypass in 2018 on apixiban, presented to the [facility] ED with worsening Right Toe pain x 1 week, CT with occluded bypass.  Now awaiting further work up from Vascular. Cardiology called for preop evaluation. s/p guillotine amputation  right foot.       -TTE with normal LV functinon   -NST with no ischemia   -tolerated procedure well from CV perspective  -pain management and post op care per vascular  -continue medical therapy for known PAD including asa, lipitor, coreg

## 2021-10-10 NOTE — PROGRESS NOTE ADULT - SUBJECTIVE AND OBJECTIVE BOX
[facility]   MD RAKAN SHEPHERD PA    TEL:  OFFICE: [phone]  DR FELICIANO CELL: [phone]  DR. PATIÑO CELL: [phone]  JANNETH SÁNCHEZ CELL: [phone]    From 5pm-7am Answering Service [phone]    -- RENAL FOLLOW UP NOTE ---Date of Service 10-10-21 @ 12:20    Patient is a 60y old  Male who presents with a chief complaint of Right Toe Necrosis (10 Oct 2021 09:24)      Patient seen and examined at bedside. No chest pain/sob    VITALS:  T(F): 99 (10-10-21 @ 05:55), Max: 99.4 (10-09-21 @ 17:30)  HR: 92 (10-10-21 @ 05:55)  BP: 161/85 (10-10-21 @ 05:55)  RR: 15 (10-10-21 @ 05:55)  SpO2: 100% (10-10-21 @ 05:55)  Wt(kg): --    10-09 @ 07:01  -  10-10 @ 07:00  --------------------------------------------------------  IN: 1020 mL / OUT: 1020 mL / NET: 0 mL          PHYSICAL EXAM:  Constitutional: NAD  Neck: No JVD  Respiratory: CTAB, no wheezes, rales or rhonchi  Cardiovascular: S1, S2, RRR  Gastrointestinal: BS+, soft, NT/ND  Extremities: No peripheral edema, right a    Hospital Medications:   MEDICATIONS  (STANDING):  acetaminophen   Tablet .. 975 milliGRAM(s) Oral every 6 hours  aspirin enteric coated 81 milliGRAM(s) Oral daily  atorvastatin 40 milliGRAM(s) Oral at bedtime  cadexomer iodine 0.9% Gel 1 Application(s) Topical daily  carvedilol 25 milliGRAM(s) Oral every 12 hours  cefepime   IVPB 2000 milliGRAM(s) IV Intermittent every 8 hours  dextrose 50% Injectable 25 Gram(s) IV Push once  dextrose 50% Injectable 12.5 Gram(s) IV Push once  dextrose 50% Injectable 25 Gram(s) IV Push once  heparin   Injectable 5000 Unit(s) SubCutaneous every 8 hours  influenza   Vaccine 0.5 milliLiter(s) IntraMuscular once  insulin lispro (ADMELOG) corrective regimen sliding scale   SubCutaneous three times a day before meals  insulin lispro (ADMELOG) corrective regimen sliding scale   SubCutaneous at bedtime  lisinopril 10 milliGRAM(s) Oral daily  metroNIDAZOLE    Tablet 500 milliGRAM(s) Oral every 8 hours  multivitamin 1 Tablet(s) Oral daily  polyethylene glycol 3350 17 Gram(s) Oral daily  senna 2 Tablet(s) Oral at bedtime  sodium chloride 1 Gram(s) Oral three times a day      LABS:  10-10    134<L>  |  103  |  14  ----------------------------<  95  4.1   |  23  |  0.76    Ca    8.8      10 Oct 2021 08:09  Phos  1.7     10-10  Mg     2.10     10-10      Creatinine Trend: 0.76 <--, 0.67 <--, 0.75 <--, 0.84 <--, 0.89 <--, <0.20 <--    Phosphorus Level, Serum: 1.7 mg/dL (10-10 @ 08:09)                              10.9   16.13 )-----------( 460      ( 10 Oct 2021 08:09 )             31.7     Urine Studies:  Urinalysis - [09-25-21 @ 15:23]      Color Yellow / Appearance Clear / SG 1.021 / pH 5.5      Gluc Negative / Ketone Negative  / Bili Negative / Urobili <2 mg/dL       Blood Negative / Protein Trace / Leuk Est Negative / Nitrite Negative      RBC 2 / WBC 3 / Hyaline 1 / Gran  / Sq Epi  / Non Sq Epi 1 / Bacteria Negative      HbA1c 5.7      [12-20-18 @ 06:30]  TSH 0.76      [09-25-21 @ 12:52]  Lipid: chol 139, , HDL 26, LDL --      [09-26-21 @ 08:30]    HCV 0.09, Nonreact      [09-26-21 @ 19:55]      RADIOLOGY & ADDITIONAL STUDIES:

## 2021-10-10 NOTE — PROGRESS NOTE ADULT - SUBJECTIVE AND OBJECTIVE BOX
Vascular Surgery Progress Note    Overnight: No acute events overnight.    Subjective:   Patient seen at bedside this AM.     Objective:  Vital Signs  T(C): 37.1 (10-09 @ 21:42), Max: 37.4 (10-09 @ 17:30)  HR: 85 (10-09 @ 21:42) (84 - 97)  BP: 123/75 (10-09 @ 21:42) (123/75 - 147/83)  RR: 16 (10-09 @ 21:42) (15 - 16)  SpO2: 100% (10-09 @ 21:42) (100% - 100%)  10-08-21 @ 07:01  -  10-09-21 @ 07:00  --------------------------------------------------------  IN:  Total IN: 0 mL    OUT:    Voided (mL): 500 mL  Total OUT: 500 mL    Total NET: -500 mL      10-09-21 @ 07:01  -  10-10-21 @ 00:09  --------------------------------------------------------  IN:  Total IN: 0 mL    OUT:    Voided (mL): 1020 mL  Total OUT: 1020 mL    Total NET: -1020 mL          Physical Exam:  GEN:   RESP:   ABD:   EXTR:   NEURO:     Labs:                        10.9   14.06 )-----------( 363      ( 09 Oct 2021 07:17 )             30.4   10-09    132<L>  |  96<L>  |  12  ----------------------------<  134<H>  3.3<L>   |  24  |  0.67    Ca    8.1<L>      09 Oct 2021 07:17  Phos  1.7     10-09  Mg     1.70     10-09      CAPILLARY BLOOD GLUCOSE      POCT Blood Glucose.: 136 mg/dL (09 Oct 2021 21:36)  POCT Blood Glucose.: 180 mg/dL (09 Oct 2021 17:25)  POCT Blood Glucose.: 206 mg/dL (09 Oct 2021 11:54)  POCT Blood Glucose.: 121 mg/dL (09 Oct 2021 07:21)      Medications:   MEDICATIONS  (STANDING):  acetaminophen   Tablet .. 975 milliGRAM(s) Oral every 6 hours  aspirin enteric coated 81 milliGRAM(s) Oral daily  atorvastatin 40 milliGRAM(s) Oral at bedtime  cadexomer iodine 0.9% Gel 1 Application(s) Topical daily  carvedilol 25 milliGRAM(s) Oral every 12 hours  cefepime   IVPB 2000 milliGRAM(s) IV Intermittent every 8 hours  dextrose 50% Injectable 25 Gram(s) IV Push once  dextrose 50% Injectable 12.5 Gram(s) IV Push once  dextrose 50% Injectable 25 Gram(s) IV Push once  heparin   Injectable 5000 Unit(s) SubCutaneous every 8 hours  influenza   Vaccine 0.5 milliLiter(s) IntraMuscular once  insulin lispro (ADMELOG) corrective regimen sliding scale   SubCutaneous three times a day before meals  insulin lispro (ADMELOG) corrective regimen sliding scale   SubCutaneous at bedtime  lisinopril 10 milliGRAM(s) Oral daily  metroNIDAZOLE    Tablet 500 milliGRAM(s) Oral every 8 hours  multivitamin 1 Tablet(s) Oral daily  polyethylene glycol 3350 17 Gram(s) Oral daily  senna 2 Tablet(s) Oral at bedtime  sodium chloride 1 Gram(s) Oral three times a day    MEDICATIONS  (PRN):  melatonin 3 milliGRAM(s) Oral at bedtime PRN Insomnia  oxyCODONE    IR 5 milliGRAM(s) Oral every 4 hours PRN Moderate Pain (4 - 6)  oxyCODONE    IR 10 milliGRAM(s) Oral every 4 hours PRN Severe Pain (7 - 10)      Imaging:     Vascular Surgery Progress Note    Overnight: No acute events overnight.    Subjective:   Patient seen at bedside this AM.  Patient is comfortable with no acute complains. Pain is under control.     Objective:  Vital Signs  T(C): 37.1 (10-09 @ 21:42), Max: 37.4 (10-09 @ 17:30)  HR: 85 (10-09 @ 21:42) (84 - 97)  BP: 123/75 (10-09 @ 21:42) (123/75 - 147/83)  RR: 16 (10-09 @ 21:42) (15 - 16)  SpO2: 100% (10-09 @ 21:42) (100% - 100%)  10-08-21 @ 07:01  -  10-09-21 @ 07:00  --------------------------------------------------------  IN:  Total IN: 0 mL    OUT:    Voided (mL): 500 mL  Total OUT: 500 mL    Total NET: -500 mL      10-09-21 @ 07:01  -  10-10-21 @ 00:09  --------------------------------------------------------  IN:  Total IN: 0 mL    OUT:    Voided (mL): 1020 mL  Total OUT: 1020 mL    Total NET: -1020 mL          Physical Exam:  GEN: NAD, resting quietly  PULM: symmetric chest rise bilaterally, no increased WOB  EXTR: left groin access dressing clear and dry, right groin with PREVENA, no edema or hematoma; RLE: wound is c/d/i; Daily dressing changes.    Labs:                        10.9   14.06 )-----------( 363      ( 09 Oct 2021 07:17 )             30.4   10-09    132<L>  |  96<L>  |  12  ----------------------------<  134<H>  3.3<L>   |  24  |  0.67    Ca    8.1<L>      09 Oct 2021 07:17  Phos  1.7     10-09  Mg     1.70     10-09      CAPILLARY BLOOD GLUCOSE      POCT Blood Glucose.: 136 mg/dL (09 Oct 2021 21:36)  POCT Blood Glucose.: 180 mg/dL (09 Oct 2021 17:25)  POCT Blood Glucose.: 206 mg/dL (09 Oct 2021 11:54)  POCT Blood Glucose.: 121 mg/dL (09 Oct 2021 07:21)      Medications:   MEDICATIONS  (STANDING):  acetaminophen   Tablet .. 975 milliGRAM(s) Oral every 6 hours  aspirin enteric coated 81 milliGRAM(s) Oral daily  atorvastatin 40 milliGRAM(s) Oral at bedtime  cadexomer iodine 0.9% Gel 1 Application(s) Topical daily  carvedilol 25 milliGRAM(s) Oral every 12 hours  cefepime   IVPB 2000 milliGRAM(s) IV Intermittent every 8 hours  dextrose 50% Injectable 25 Gram(s) IV Push once  dextrose 50% Injectable 12.5 Gram(s) IV Push once  dextrose 50% Injectable 25 Gram(s) IV Push once  heparin   Injectable 5000 Unit(s) SubCutaneous every 8 hours  influenza   Vaccine 0.5 milliLiter(s) IntraMuscular once  insulin lispro (ADMELOG) corrective regimen sliding scale   SubCutaneous three times a day before meals  insulin lispro (ADMELOG) corrective regimen sliding scale   SubCutaneous at bedtime  lisinopril 10 milliGRAM(s) Oral daily  metroNIDAZOLE    Tablet 500 milliGRAM(s) Oral every 8 hours  multivitamin 1 Tablet(s) Oral daily  polyethylene glycol 3350 17 Gram(s) Oral daily  senna 2 Tablet(s) Oral at bedtime  sodium chloride 1 Gram(s) Oral three times a day    MEDICATIONS  (PRN):  melatonin 3 milliGRAM(s) Oral at bedtime PRN Insomnia  oxyCODONE    IR 5 milliGRAM(s) Oral every 4 hours PRN Moderate Pain (4 - 6)  oxyCODONE    IR 10 milliGRAM(s) Oral every 4 hours PRN Severe Pain (7 - 10)      Imaging:

## 2021-10-10 NOTE — PROGRESS NOTE ADULT - SUBJECTIVE AND OBJECTIVE BOX
Infectious Diseases progress note:    Subjective: Covering for [facility] ID.  WBC 16 <-- 14.  Tmax 99.6.      ROS:  CONSTITUTIONAL:  No fever, chills, rigors  CARDIOVASCULAR:  No chest pain or palpitations  RESPIRATORY:   No SOB, cough, dyspnea on exertion.  No wheezing  GASTROINTESTINAL:  No abd pain, N/V, diarrhea/constipation  EXTREMITIES:  No swelling or joint pain  GENITOURINARY:  No burning on urination, increased frequency or urgency.  No flank pain  NEUROLOGIC:  No HA, visual disturbances  SKIN: No rashes    Allergies    penicillin (Other)    Intolerances        ANTIBIOTICS/RELEVANT:  antimicrobials  cefepime   IVPB 2000 milliGRAM(s) IV Intermittent every 8 hours  metroNIDAZOLE    Tablet 500 milliGRAM(s) Oral every 8 hours    immunologic:  influenza   Vaccine 0.5 milliLiter(s) IntraMuscular once    OTHER:  acetaminophen   Tablet .. 975 milliGRAM(s) Oral every 6 hours  aspirin enteric coated 81 milliGRAM(s) Oral daily  atorvastatin 40 milliGRAM(s) Oral at bedtime  cadexomer iodine 0.9% Gel 1 Application(s) Topical daily  carvedilol 25 milliGRAM(s) Oral every 12 hours  dextrose 50% Injectable 25 Gram(s) IV Push once  dextrose 50% Injectable 12.5 Gram(s) IV Push once  dextrose 50% Injectable 25 Gram(s) IV Push once  heparin   Injectable 5000 Unit(s) SubCutaneous every 8 hours  insulin lispro (ADMELOG) corrective regimen sliding scale   SubCutaneous at bedtime  insulin lispro (ADMELOG) corrective regimen sliding scale   SubCutaneous three times a day before meals  lisinopril 10 milliGRAM(s) Oral daily  melatonin 3 milliGRAM(s) Oral at bedtime PRN  multivitamin 1 Tablet(s) Oral daily  oxyCODONE    IR 5 milliGRAM(s) Oral every 4 hours PRN  oxyCODONE    IR 10 milliGRAM(s) Oral every 4 hours PRN  polyethylene glycol 3350 17 Gram(s) Oral daily  senna 2 Tablet(s) Oral at bedtime  sodium chloride 1 Gram(s) Oral three times a day  sodium phosphate IVPB 30 milliMole(s) IV Intermittent once      Objective:  Vital Signs Last 24 Hrs  T(C): 37.6 (10 Oct 2021 13:06), Max: 37.6 (10 Oct 2021 13:06)  T(F): 99.6 (10 Oct 2021 13:06), Max: 99.6 (10 Oct 2021 13:06)  HR: 99 (10 Oct 2021 13:06) (85 - 99)  BP: 149/81 (10 Oct 2021 13:06) (123/75 - 161/85)  BP(mean): --  RR: 18 (10 Oct 2021 13:06) (15 - 18)  SpO2: 99% (10 Oct 2021 13:06) (99% - 100%)    PHYSICAL EXAM:  Constitutional:NAD  Eyes:JASWANT, EOMI  Ear/Nose/Throat: no thrush, mucositis.  Moist mucous membranes	  Neck:no JVD, no lymphadenopathy, supple  Respiratory: CTA kateryna  Cardiovascular: S1S2 RRR, no murmurs  Gastrointestinal:soft, nontender,  nondistended (+) BS  Extremities:no e/e/c  Skin:  no rashes, open wounds or ulcerations        LABS:                        10.9   16.13 )-----------( 460      ( 10 Oct 2021 08:09 )             31.7     10-10    134<L>  |  103  |  14  ----------------------------<  95  4.1   |  23  |  0.76    Ca    8.8      10 Oct 2021 08:09  Phos  1.7     10-10  Mg     2.10     10-10          MICROBIOLOGY:    Culture - Abscess with Gram Stain (09.25.21 @ 18:21)   - Amikacin: S <=16   - Amikacin: S <=16   - Amoxicillin/Clavulanic Acid: R 16/8   - Ampicillin: R >16 These ampicillin results predict results for amoxicillin   - Ampicillin/Sulbactam: S <=4/2 Enterobacter, Citrobacter, and Serratia may develop resistance during prolonged therapy (3-4 days)   - Aztreonam: I 16   - Aztreonam: I 16   - Cefazolin: R >16 Enterobacter, Citrobacter, and Serratia may develop resistance during prolonged therapy (3-4 days)   - Cefepime: S <=2   - Cefepime: S 8   - Cefoxitin: S <=8   - Ceftazidime: S 4   - Ceftriaxone: S <=1 Enterobacter, Citrobacter, and Serratia may develop resistance during prolonged therapy   - Ciprofloxacin: S <=0.25   - Ciprofloxacin: S 0.5   - Ertapenem: S <=0.5   - Gentamicin: S <=2   - Gentamicin: S <=2   - Imipenem: R >8   - Imipenem: S <=1   - Levofloxacin: S <=0.5   - Levofloxacin: I 2   - Meropenem: R >8   - Meropenem: S <=1   - Piperacillin/Tazobactam: S <=8   - Piperacillin/Tazobactam: S 16   - Tobramycin: S <=2   - Tobramycin: S <=2   - Trimethoprim/Sulfamethoxazole: S <=0.5/9.5   Specimen Source: .Abscess Right foot 5th digit wound   Culture Results:   Few Pseudomonas aeruginosa (Carbapenem Resistant)   Moderate Providencia rettgeri   Moderate Bacteroides fragilis "Susceptibilities not performed"   Normal skin uriah isolated   Organism Identification: Pseudomonas aeruginosa (Carbapenem Resistant)   Providencia rettgeri   Organism: Pseudomonas aeruginosa (Carbapenem Resistant)   Organism: Providencia rettgeri   Method Type: CAM   Method Type: CAM     Culture - Blood (09.25.21 @ 11:40)   Specimen Source: .Blood Blood-Peripheral   Culture Results:   No Growth Final     Culture - Blood (09.25.21 @ 11:40)   Specimen Source: .Blood Blood-Peripheral   Culture Results:   No Growth Final       RADIOLOGY & ADDITIONAL STUDIES:    < from: CT Angio Abd Aorta w/run-off w/ IV Cont (09.26.21 @ 15:37) >    EXAM:  CT ANGIO ABD AOR W RUN(W)AW IC        PROCEDURE DATE:  Sep 26 2021         INTERPRETATION:  CLINICAL INFORMATION: Status post right femoropopliteal bypass now with fifth toe wound    COMPARISON: None.    CONTRAST/COMPLICATIONS:  IV Contrast:Omnipaque 350  90 cc administered   10 cc discarded  Oral Contrast: NONE  Complications: None reported at time of study completion    CT ANGIOGRAM ABDOMEN, PELVIS, AND LOWER EXTREMITIES:    PROCEDURE:  Initially, nonenhanced CT was obtained through the calves. Then, following the rapid administration of intravenous contrast, CT angiography was performed through the abdomen, pelvis, and lower extremities down to the toes.  Delayed images through the calves were also obtained. Sagittal and coronal reformats as well as 3D reconstructions were performed.    FINDINGS:    CENTRAL ARTERIAL SYSTEM:    Calcified and noncalcified plaque results in mild stenosis of the distal aorta and bifurcation. The celiac, superior mesenteric, inferior mesenteric, and renal arteries are patent.    RIGHT LOWER EXTREMITY:    Atherosclerotic disease throughout the right lower extremity results in multifocal areas of mild stenosis. An eccentric noncalcified plaque near the origin of the right common iliac artery results in moderate to severe stenosis. Focal severe stenosis of the right hypogastric artery. The common femoral and profunda femoris arteries are patent. The superficial femoral artery is occluded. Occlusion of a right femoropopliteal bypass. The proximal popliteal artery is occluded. Opacification of the distal popliteal artery, which is stenotic. Calcification of the arteries of the calf limits evaluation, but there is at least partial opacification of the peroneal and posterior tibial arteries. The anterior tibial artery is likely occluded.    LEFT LOWER EXTREMITY:    Atherosclerotic disease throughout the left lower extremity results in multifocal areas of mild stenosis. Severe stenosis or near occlusion of the ostium of the left hypogastric artery. Focal moderate stenosis of the proximal common femoral artery. The profunda femoris artery is patent. The superficial femoral artery is occluded. The popliteal artery is patent. Calcification of the arteries of the calf limits evaluation, but the anterior tibial and peroneal arteries appear patent.    ADDITIONAL FINDINGS:    Extensive hepatic vein reflux, which may reflect right heart failure. Enlarged prostate. No bowel obstruction.    IMPRESSION:  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    Occlusion of both the right SFA and a femoropopliteal bypass. Occlusion of the proximal right popliteal artery.    Occlusion of the left SFA.    Two-vessel runoff in both calves.    < end of copied text >        < from: Xray Chest 2 Views PA/Lat (09.25.21 @ 17:07) >  INTERPRETATION:    The heart is not enlarged. The mediastinum and dirk are unremarkable.  The lungs are clear. Linear artifacts project over the lower right and left parts of the chest.  No pleural effusion or pneumothorax is seen.  There is scoliosis of the spine with osteoarthritic degenerative change.      IMPRESSION:  Clear lungs.    < end of copied text >   Infectious Diseases progress note:    Subjective: Covering for [facility] ID.  WBC 16 <-- 14.  Tmax 99.6.  Pt w/o new localizing symptoms or somatic complaints.  Denies cough/fever/chills/sore throat/abd pain/diarrhea/dysuria.      ROS:  CONSTITUTIONAL:  No fever, chills, rigors  CARDIOVASCULAR:  No chest pain or palpitations  RESPIRATORY:   No SOB, cough, dyspnea on exertion.  No wheezing  GASTROINTESTINAL:  No abd pain, N/V, diarrhea/constipation  EXTREMITIES:  No swelling or joint pain  GENITOURINARY:  No burning on urination, increased frequency or urgency.  No flank pain  NEUROLOGIC:  No HA, visual disturbances  SKIN: No rashes    Allergies    penicillin (Other)    Intolerances        ANTIBIOTICS/RELEVANT:  antimicrobials  cefepime   IVPB 2000 milliGRAM(s) IV Intermittent every 8 hours  metroNIDAZOLE    Tablet 500 milliGRAM(s) Oral every 8 hours    immunologic:  influenza   Vaccine 0.5 milliLiter(s) IntraMuscular once    OTHER:  acetaminophen   Tablet .. 975 milliGRAM(s) Oral every 6 hours  aspirin enteric coated 81 milliGRAM(s) Oral daily  atorvastatin 40 milliGRAM(s) Oral at bedtime  cadexomer iodine 0.9% Gel 1 Application(s) Topical daily  carvedilol 25 milliGRAM(s) Oral every 12 hours  dextrose 50% Injectable 25 Gram(s) IV Push once  dextrose 50% Injectable 12.5 Gram(s) IV Push once  dextrose 50% Injectable 25 Gram(s) IV Push once  heparin   Injectable 5000 Unit(s) SubCutaneous every 8 hours  insulin lispro (ADMELOG) corrective regimen sliding scale   SubCutaneous at bedtime  insulin lispro (ADMELOG) corrective regimen sliding scale   SubCutaneous three times a day before meals  lisinopril 10 milliGRAM(s) Oral daily  melatonin 3 milliGRAM(s) Oral at bedtime PRN  multivitamin 1 Tablet(s) Oral daily  oxyCODONE    IR 5 milliGRAM(s) Oral every 4 hours PRN  oxyCODONE    IR 10 milliGRAM(s) Oral every 4 hours PRN  polyethylene glycol 3350 17 Gram(s) Oral daily  senna 2 Tablet(s) Oral at bedtime  sodium chloride 1 Gram(s) Oral three times a day  sodium phosphate IVPB 30 milliMole(s) IV Intermittent once      Objective:  Vital Signs Last 24 Hrs  T(C): 37.6 (10 Oct 2021 13:06), Max: 37.6 (10 Oct 2021 13:06)  T(F): 99.6 (10 Oct 2021 13:06), Max: 99.6 (10 Oct 2021 13:06)  HR: 99 (10 Oct 2021 13:06) (85 - 99)  BP: 149/81 (10 Oct 2021 13:06) (123/75 - 161/85)  BP(mean): --  RR: 18 (10 Oct 2021 13:06) (15 - 18)  SpO2: 99% (10 Oct 2021 13:06) (99% - 100%)    PHYSICAL EXAM:  Constitutional:NAD  Eyes:JASWANT, EOMI  Ear/Nose/Throat: no thrush, mucositis.  Moist mucous membranes	  Neck:no JVD, no lymphadenopathy, supple  Respiratory: CTA kateryna  Cardiovascular: S1S2 RRR, no murmurs  Gastrointestinal:soft, nontender,  nondistended (+) BS  Extremities:no e/e/c  Skin:  no rashes, open wounds or ulcerations        LABS:                        10.9   16.13 )-----------( 460      ( 10 Oct 2021 08:09 )             31.7     10-10    134<L>  |  103  |  14  ----------------------------<  95  4.1   |  23  |  0.76    Ca    8.8      10 Oct 2021 08:09  Phos  1.7     10-10  Mg     2.10     10-10          MICROBIOLOGY:    Culture - Abscess with Gram Stain (09.25.21 @ 18:21)   - Amikacin: S <=16   - Amikacin: S <=16   - Amoxicillin/Clavulanic Acid: R 16/8   - Ampicillin: R >16 These ampicillin results predict results for amoxicillin   - Ampicillin/Sulbactam: S <=4/2 Enterobacter, Citrobacter, and Serratia may develop resistance during prolonged therapy (3-4 days)   - Aztreonam: I 16   - Aztreonam: I 16   - Cefazolin: R >16 Enterobacter, Citrobacter, and Serratia may develop resistance during prolonged therapy (3-4 days)   - Cefepime: S <=2   - Cefepime: S 8   - Cefoxitin: S <=8   - Ceftazidime: S 4   - Ceftriaxone: S <=1 Enterobacter, Citrobacter, and Serratia may develop resistance during prolonged therapy   - Ciprofloxacin: S <=0.25   - Ciprofloxacin: S 0.5   - Ertapenem: S <=0.5   - Gentamicin: S <=2   - Gentamicin: S <=2   - Imipenem: R >8   - Imipenem: S <=1   - Levofloxacin: S <=0.5   - Levofloxacin: I 2   - Meropenem: R >8   - Meropenem: S <=1   - Piperacillin/Tazobactam: S <=8   - Piperacillin/Tazobactam: S 16   - Tobramycin: S <=2   - Tobramycin: S <=2   - Trimethoprim/Sulfamethoxazole: S <=0.5/9.5   Specimen Source: .Abscess Right foot 5th digit wound   Culture Results:   Few Pseudomonas aeruginosa (Carbapenem Resistant)   Moderate Providencia rettgeri   Moderate Bacteroides fragilis "Susceptibilities not performed"   Normal skin uriah isolated   Organism Identification: Pseudomonas aeruginosa (Carbapenem Resistant)   Providencia rettgeri   Organism: Pseudomonas aeruginosa (Carbapenem Resistant)   Organism: Providencia rettgeri   Method Type: CAM   Method Type: CAM     Culture - Blood (09.25.21 @ 11:40)   Specimen Source: .Blood Blood-Peripheral   Culture Results:   No Growth Final     Culture - Blood (09.25.21 @ 11:40)   Specimen Source: .Blood Blood-Peripheral   Culture Results:   No Growth Final       RADIOLOGY & ADDITIONAL STUDIES:    < from: CT Angio Abd Aorta w/run-off w/ IV Cont (09.26.21 @ 15:37) >    EXAM:  CT ANGIO ABD AOR W RUN(W)AW         PROCEDURE DATE:  Sep 26 2021         INTERPRETATION:  CLINICAL INFORMATION: Status post right femoropopliteal bypass now with fifth toe wound    COMPARISON: None.    CONTRAST/COMPLICATIONS:  IV Contrast:Omnipaque 350  90 cc administered   10 cc discarded  Oral Contrast: NONE  Complications: None reported at time of study completion    CT ANGIOGRAM ABDOMEN, PELVIS, AND LOWER EXTREMITIES:    PROCEDURE:  Initially, nonenhanced CT was obtained through the calves. Then, following the rapid administration of intravenous contrast, CT angiography was performed through the abdomen, pelvis, and lower extremities down to the toes.  Delayed images through the calves were also obtained. Sagittal and coronal reformats as well as 3D reconstructions were performed.    FINDINGS:    CENTRAL ARTERIAL SYSTEM:    Calcified and noncalcified plaque results in mild stenosis of the distal aorta and bifurcation. The celiac, superior mesenteric, inferior mesenteric, and renal arteries are patent.    RIGHT LOWER EXTREMITY:    Atherosclerotic disease throughout the right lower extremity results in multifocal areas of mild stenosis. An eccentric noncalcified plaque near the origin of the right common iliac artery results in moderate to severe stenosis. Focal severe stenosis of the right hypogastric artery. The common femoral and profunda femoris arteries are patent. The superficial femoral artery is occluded. Occlusion of a right femoropopliteal bypass. The proximal popliteal artery is occluded. Opacification of the distal popliteal artery, which is stenotic. Calcification of the arteries of the calf limits evaluation, but there is at least partial opacification of the peroneal and posterior tibial arteries. The anterior tibial artery is likely occluded.    LEFT LOWER EXTREMITY:    Atherosclerotic disease throughout the left lower extremity results in multifocal areas of mild stenosis. Severe stenosis or near occlusion of the ostium of the left hypogastric artery. Focal moderate stenosis of the proximal common femoral artery. The profunda femoris artery is patent. The superficial femoral artery is occluded. The popliteal artery is patent. Calcification of the arteries of the calf limits evaluation, but the anterior tibial and peroneal arteries appear patent.    ADDITIONAL FINDINGS:    Extensive hepatic vein reflux, which may reflect right heart failure. Enlarged prostate. No bowel obstruction.    IMPRESSION:  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    Occlusion of both the right SFA and a femoropopliteal bypass. Occlusion of the proximal right popliteal artery.    Occlusion of the left SFA.    Two-vessel runoff in both calves.    < end of copied text >        < from: Xray Chest 2 Views PA/Lat (09.25.21 @ 17:07) >  INTERPRETATION:    The heart is not enlarged. The mediastinum and dirk are unremarkable.  The lungs are clear. Linear artifacts project over the lower right and left parts of the chest.  No pleural effusion or pneumothorax is seen.  There is scoliosis of the spine with osteoarthritic degenerative change.      IMPRESSION:  Clear lungs.    < end of copied text >

## 2021-10-10 NOTE — PROGRESS NOTE ADULT - ASSESSMENT
61 y/o male, with a PmHx of CHF, HTN, CAD, HLD, DM, CKD, PVD with a Right Femoral-Femoral Bypass in 2018 on apixiban, presented to the [facility] ED with worsening Right Toe pain x 1 week.      Ravin   pt with multiple Ravin in past   RAVIN possible prerenal as SG is high s/p IVF  Renal function improved  On ACE Monitor closley  s/p angio 9/29. renal function stable  Monitor BMP  AVoid further nephrotoxics, NSAID RCA    Hypokalemia  Repelted KCl    MOnitor  serum K    Acidosis   improving   MOnitor serum Co2    hypomagnesemia  supplemented by team  monitor    hypophosphatemia  supplemented  monitor    hyponatremia  work up suggested SIADH  Na improving  continue Na tab 1g tid  continue free water restriction  avoid hypotonic solutions  monitor level    hypocalcemia  check pth, vit d 25  monitor

## 2021-10-10 NOTE — PROGRESS NOTE ADULT - ASSESSMENT
Assessment:         PLAN (to be discussed with attending in AM):  -  -  -  -  - 61yo M with Hx CHF, CAD (no stents), HTN, HLD, DM, PVD with h/o right fem-pop bypass ([facility] 2018) who presents with non-healing R toe wound, found to have dry gangrene with small area of wet conversion and bypass occlusion now s/p diagnostic RLE angiogram, b/l kissing iliac stents, right groin cutdown an PT exploration. Pt s/p 10/8 RLE angelita.      Plan/Recommendations:  - D: reg  - pain control  - IV abx (cefepime, flagyl)  - consult ID for consistent high WBC and recs for abx changes  - Daily dressing changes: Xeroform, 4x4, ABD, Kerlix, ACE  - Prevena to be taken off POD 5 from Right groin cutdown 10/6     C Team Surgery   [id number]

## 2021-10-10 NOTE — PROGRESS NOTE ADULT - ASSESSMENT
Patient is a 60 year old male, with PMH of CHF, HTN, CAD, HLD, DM, CKD, PVD with a Right Femoral-Femoral Bypass in 2018 on apixiban, presented to the [facility] ED with worsening R toe pain for 1 week and was admitted for R 5th toe gangrene.     R toe gangrene d/t PVD  Leukocytosis likely reactive and due to above   - had wound for a month, noted with cellulitis - now resolved    - R foot xray reviewed - no evidence of OM   - R foot wound culture with CRE Pseudomonas, Providencia and Bacteroides - sensitivities reviewed    - Vascular surgery following     -- s/p angiogram 9/29, s/p iliac stents and R groin cutdown and PT exploration 10/6   - afebrile, leukocytosis trended up likely reactive to procedure yesterday    - Podiatry following - s/p excisional debridement at bedside    -- planning for partial 5th ray resection    -- please send for bone culture and biopsy    - continue cefepime and metronidazole   - monitor temps, wbc    CKD    - monitor Cr, renally adjusted meds/abx    DM   - Blood glucose management per primary team.     H/o PCN allergy    - d/w pt and brother - exact reaction unknown, reports graying of skin, denies anaphylaxis   - tolerating cefepime without issues       10/10 - Pt s/p BERE FLORES.  Pt planned for full BKA later this week if wound clean.  WBC 16.   Patient is a 60 year old male, with PMH of CHF, HTN, CAD, HLD, DM, CKD, PVD with a Right Femoral-Femoral Bypass in 2018 on apixiban, presented to the [facility] ED with worsening R toe pain for 1 week and was admitted for R 5th toe gangrene.     R toe gangrene d/t PVD  Leukocytosis likely reactive and due to above   - had wound for a month, noted with cellulitis - now resolved    - R foot xray reviewed - no evidence of OM   - R foot wound culture with CRE Pseudomonas, Providencia and Bacteroides - sensitivities reviewed    - Vascular surgery following     -- s/p angiogram 9/29, s/p iliac stents and R groin cutdown and PT exploration 10/6   - afebrile, leukocytosis trended up likely reactive to procedure yesterday    - Podiatry following - s/p excisional debridement at bedside    -- planning for partial 5th ray resection    -- please send for bone culture and biopsy    - continue cefepime and metronidazole   - monitor temps, wbc    CKD    - monitor Cr, renally adjusted meds/abx    DM   - Blood glucose management per primary team.     H/o PCN allergy    - d/w pt and brother - exact reaction unknown, reports graying of skin, denies anaphylaxis   - tolerating cefepime without issues       10/10 - Pt s/p RLE angelita BKA.  Pt planned for full BKA later this week if wound clean.  WBC 16.  d/w Vascular team: concern for elevated WBC despite recent surgery for source control.      If pt spikes fever, recommend send blood cultures x 2, add vancomycin, check MRSA pcr swab, UA, ucx.  Repeat cxr to evaluate for alternate sources.    Covering for [facility] ID, they will resume care on 10/11 onwards.      Savannah Banks  [phone]

## 2021-10-11 ENCOUNTER — TRANSCRIPTION ENCOUNTER (OUTPATIENT)
Age: 61
End: 2021-10-11

## 2021-10-11 LAB
ANION GAP SERPL CALC-SCNC: 13 MMOL/L — SIGNIFICANT CHANGE UP (ref 7–14)
BUN SERPL-MCNC: 14 MG/DL — SIGNIFICANT CHANGE UP (ref 7–23)
CALCIUM SERPL-MCNC: 8.3 MG/DL — LOW (ref 8.4–10.5)
CHLORIDE SERPL-SCNC: 99 MMOL/L — SIGNIFICANT CHANGE UP (ref 98–107)
CO2 SERPL-SCNC: 22 MMOL/L — SIGNIFICANT CHANGE UP (ref 22–31)
CREAT SERPL-MCNC: 0.62 MG/DL — SIGNIFICANT CHANGE UP (ref 0.5–1.3)
GLUCOSE SERPL-MCNC: 149 MG/DL — HIGH (ref 70–99)
HCT VFR BLD CALC: 32.8 % — LOW (ref 39–50)
HGB BLD-MCNC: 11.2 G/DL — LOW (ref 13–17)
MAGNESIUM SERPL-MCNC: 1.8 MG/DL — SIGNIFICANT CHANGE UP (ref 1.6–2.6)
MCHC RBC-ENTMCNC: 31.5 PG — SIGNIFICANT CHANGE UP (ref 27–34)
MCHC RBC-ENTMCNC: 34.1 GM/DL — SIGNIFICANT CHANGE UP (ref 32–36)
MCV RBC AUTO: 92.1 FL — SIGNIFICANT CHANGE UP (ref 80–100)
NRBC # BLD: 0 /100 WBCS — SIGNIFICANT CHANGE UP
NRBC # FLD: 0 K/UL — SIGNIFICANT CHANGE UP
PHOSPHATE SERPL-MCNC: 1.9 MG/DL — LOW (ref 2.5–4.5)
PLATELET # BLD AUTO: 468 K/UL — HIGH (ref 150–400)
POTASSIUM SERPL-MCNC: 3.9 MMOL/L — SIGNIFICANT CHANGE UP (ref 3.5–5.3)
POTASSIUM SERPL-SCNC: 3.9 MMOL/L — SIGNIFICANT CHANGE UP (ref 3.5–5.3)
RBC # BLD: 3.56 M/UL — LOW (ref 4.2–5.8)
RBC # FLD: 14.4 % — SIGNIFICANT CHANGE UP (ref 10.3–14.5)
SARS-COV-2 RNA SPEC QL NAA+PROBE: SIGNIFICANT CHANGE UP
SODIUM SERPL-SCNC: 134 MMOL/L — LOW (ref 135–145)
WBC # BLD: 17.41 K/UL — HIGH (ref 3.8–10.5)
WBC # FLD AUTO: 17.41 K/UL — HIGH (ref 3.8–10.5)

## 2021-10-11 PROCEDURE — 71045 X-RAY EXAM CHEST 1 VIEW: CPT | Mod: 26

## 2021-10-11 RX ADMIN — Medication 500 MILLIGRAM(S): at 22:41

## 2021-10-11 RX ADMIN — Medication 1: at 17:22

## 2021-10-11 RX ADMIN — Medication 975 MILLIGRAM(S): at 13:06

## 2021-10-11 RX ADMIN — OXYCODONE HYDROCHLORIDE 10 MILLIGRAM(S): 5 TABLET ORAL at 13:05

## 2021-10-11 RX ADMIN — HEPARIN SODIUM 5000 UNIT(S): 5000 INJECTION INTRAVENOUS; SUBCUTANEOUS at 22:41

## 2021-10-11 RX ADMIN — SODIUM CHLORIDE 1 GRAM(S): 9 INJECTION INTRAMUSCULAR; INTRAVENOUS; SUBCUTANEOUS at 13:05

## 2021-10-11 RX ADMIN — CEFEPIME 100 MILLIGRAM(S): 1 INJECTION, POWDER, FOR SOLUTION INTRAMUSCULAR; INTRAVENOUS at 17:22

## 2021-10-11 RX ADMIN — OXYCODONE HYDROCHLORIDE 10 MILLIGRAM(S): 5 TABLET ORAL at 06:24

## 2021-10-11 RX ADMIN — SODIUM CHLORIDE 1 GRAM(S): 9 INJECTION INTRAMUSCULAR; INTRAVENOUS; SUBCUTANEOUS at 22:41

## 2021-10-11 RX ADMIN — OXYCODONE HYDROCHLORIDE 10 MILLIGRAM(S): 5 TABLET ORAL at 22:41

## 2021-10-11 RX ADMIN — HEPARIN SODIUM 5000 UNIT(S): 5000 INJECTION INTRAVENOUS; SUBCUTANEOUS at 13:05

## 2021-10-11 RX ADMIN — SODIUM CHLORIDE 1 GRAM(S): 9 INJECTION INTRAMUSCULAR; INTRAVENOUS; SUBCUTANEOUS at 05:55

## 2021-10-11 RX ADMIN — Medication 3: at 11:53

## 2021-10-11 RX ADMIN — Medication 500 MILLIGRAM(S): at 05:55

## 2021-10-11 RX ADMIN — Medication 500 MILLIGRAM(S): at 13:05

## 2021-10-11 RX ADMIN — OXYCODONE HYDROCHLORIDE 10 MILLIGRAM(S): 5 TABLET ORAL at 05:54

## 2021-10-11 RX ADMIN — Medication 63.75 MILLIMOLE(S): at 10:29

## 2021-10-11 RX ADMIN — OXYCODONE HYDROCHLORIDE 10 MILLIGRAM(S): 5 TABLET ORAL at 14:00

## 2021-10-11 RX ADMIN — OXYCODONE HYDROCHLORIDE 10 MILLIGRAM(S): 5 TABLET ORAL at 23:41

## 2021-10-11 RX ADMIN — LISINOPRIL 10 MILLIGRAM(S): 2.5 TABLET ORAL at 05:58

## 2021-10-11 RX ADMIN — CARVEDILOL PHOSPHATE 25 MILLIGRAM(S): 80 CAPSULE, EXTENDED RELEASE ORAL at 17:22

## 2021-10-11 RX ADMIN — Medication 1: at 07:50

## 2021-10-11 RX ADMIN — Medication 1 TABLET(S): at 11:54

## 2021-10-11 RX ADMIN — Medication 975 MILLIGRAM(S): at 19:23

## 2021-10-11 RX ADMIN — HEPARIN SODIUM 5000 UNIT(S): 5000 INJECTION INTRAVENOUS; SUBCUTANEOUS at 05:59

## 2021-10-11 RX ADMIN — ATORVASTATIN CALCIUM 40 MILLIGRAM(S): 80 TABLET, FILM COATED ORAL at 22:41

## 2021-10-11 RX ADMIN — Medication 81 MILLIGRAM(S): at 11:54

## 2021-10-11 RX ADMIN — CEFEPIME 100 MILLIGRAM(S): 1 INJECTION, POWDER, FOR SOLUTION INTRAMUSCULAR; INTRAVENOUS at 09:18

## 2021-10-11 RX ADMIN — CEFEPIME 100 MILLIGRAM(S): 1 INJECTION, POWDER, FOR SOLUTION INTRAMUSCULAR; INTRAVENOUS at 01:45

## 2021-10-11 RX ADMIN — CARVEDILOL PHOSPHATE 25 MILLIGRAM(S): 80 CAPSULE, EXTENDED RELEASE ORAL at 05:58

## 2021-10-11 NOTE — CHART NOTE - NSCHARTNOTEFT_GEN_A_CORE
Preop Dx: R foot necrosis s/p R BKA [place]  Surgeon: Dr. Lundberg  Procedure: R BKA Formalization    Vital Signs Last 24 Hrs  T(C): 36.8 (11 Oct 2021 05:57), Max: 37.6 (10 Oct 2021 13:06)  T(F): 98.3 (11 Oct 2021 05:57), Max: 99.6 (10 Oct 2021 13:06)  HR: 86 (11 Oct 2021 05:57) (78 - 99)  BP: 170/75 (11 Oct 2021 05:57) (147/84 - 170/75)  BP(mean): --  RR: 18 (11 Oct 2021 05:57) (17 - 18)  SpO2: 100% (11 Oct 2021 05:57) (99% - 100%)                        11.2   17.41 )-----------( 468      ( 11 Oct 2021 07:41 )             32.8     10-11    134<L>  |  99  |  14  ----------------------------<  149<H>  3.9   |  22  |  0.62    Ca    8.3<L>      11 Oct 2021 07:41  Phos  1.9     10-11  Mg     1.80     10-11      EKG: in chart  CXR: 10/11  Type and Screen: O+  COVID pending      A/P: 60y Male     - OR 10/12/21 for R BKA Formalization with Dr. Lundberg  - NPO past midnight, except medications  - IVF while NPO  - Consent signed and in chart  - Medical clearance for OR

## 2021-10-11 NOTE — PROGRESS NOTE ADULT - ASSESSMENT
Patient is a 60 year old male, with PMH of CHF, HTN, CAD, HLD, DM, CKD, PVD with a Right Femoral-Femoral Bypass in 2018 on apixiban, presented to the [facility] ED with worsening R toe pain for 1 week and was admitted for R 5th toe gangrene.     R toe gangrene d/t PVD  Leukocytosis likely reactive and due to above   - had wound for a month, noted with cellulitis - now resolved    - R foot xray reviewed - no evidence of OM   - R foot wound culture with CRE Pseudomonas, Providencia and Bacteroides - sensitivities reviewed    - Vascular surgery following     -- s/p angiogram 9/29, s/p iliac stents and R groin cutdown and PT exploration 10/6   - afebrile, nontoxic    no s/s of pna, no gu complaints, abd benign and no diarrhea  for right BKA on 10/12    CKD    - monitor Cr, renally adjusted meds/abx    DM   - Blood glucose management per primary team.     H/o PCN allergy    - d/w pt and brother - exact reaction unknown, reports graying of skin, denies anaphylaxis   - tolerating cefepime and flagyl day 13

## 2021-10-11 NOTE — PROGRESS NOTE ADULT - SUBJECTIVE AND OBJECTIVE BOX
CARDIOLOGY      S: no chest pain or sob; ros otherwise negative.     DATE OF SERVICE - 10-11-21     Review of Systems:   Constitutional: [ ] fevers, [ ] chills.   Skin: [ ] dry skin. [ ] rashes.  Psychiatric: [ ] depression, [ ] anxiety.   Gastrointestinal: [ ] BRBPR, [ ] melena.   Neurological: [ ] confusion. [ ] seizures. [ ] shuffling gait.   Ears,Nose,Mouth and Throat: [ ] ear pain [ ] sore throat.   Eyes: [ ] diplopia.   Respiratory: [ ] hemoptysis. [ ] shortness of breath  Cardiovascular: See HPI above  Hematologic/Lymphatic: [ ] anemia. [ ] painful nodes. [ ] prolonged bleeding.   Genitourinary: [ ] hematuria. [ ] flank pain.   Endocrine: [ ] significant change in weight. [ ] intolerance to heat and cold.     Review of systems [ x] otherwise negative, [ ] otherwise unable to obtain    FH: no family history of sudden cardiac death in first degree relatives    SH: [ ] tobacco, [ ] alcohol, [ ] drugs            acetaminophen   Tablet .. 975 milliGRAM(s) Oral every 6 hours  aspirin enteric coated 81 milliGRAM(s) Oral daily  atorvastatin 40 milliGRAM(s) Oral at bedtime  cadexomer iodine 0.9% Gel 1 Application(s) Topical daily  carvedilol 25 milliGRAM(s) Oral every 12 hours  cefepime   IVPB 2000 milliGRAM(s) IV Intermittent every 8 hours  dextrose 50% Injectable 25 Gram(s) IV Push once  dextrose 50% Injectable 12.5 Gram(s) IV Push once  dextrose 50% Injectable 25 Gram(s) IV Push once  heparin   Injectable 5000 Unit(s) SubCutaneous every 8 hours  influenza   Vaccine 0.5 milliLiter(s) IntraMuscular once  insulin lispro (ADMELOG) corrective regimen sliding scale   SubCutaneous three times a day before meals  insulin lispro (ADMELOG) corrective regimen sliding scale   SubCutaneous at bedtime  lisinopril 10 milliGRAM(s) Oral daily  melatonin 3 milliGRAM(s) Oral at bedtime PRN  metroNIDAZOLE    Tablet 500 milliGRAM(s) Oral every 8 hours  multivitamin 1 Tablet(s) Oral daily  oxyCODONE    IR 5 milliGRAM(s) Oral every 4 hours PRN  oxyCODONE    IR 10 milliGRAM(s) Oral every 4 hours PRN  polyethylene glycol 3350 17 Gram(s) Oral daily  senna 2 Tablet(s) Oral at bedtime  sodium chloride 1 Gram(s) Oral three times a day  sodium phosphate IVPB 15 milliMole(s) IV Intermittent once                            11.2   17.41 )-----------( 468      ( 11 Oct 2021 07:41 )             32.8       10-11    134<L>  |  99  |  14  ----------------------------<  149<H>  3.9   |  22  |  0.62    Ca    8.3<L>      11 Oct 2021 07:41  Phos  1.9     10-11  Mg     1.80     10-11              T(C): 37 (10-11-21 @ 09:14), Max: 37.6 (10-10-21 @ 13:06)  HR: 96 (10-11-21 @ 09:14) (78 - 99)  BP: 134/74 (10-11-21 @ 09:14) (134/74 - 170/75)  RR: 16 (10-11-21 @ 09:14) (16 - 18)  SpO2: 100% (10-11-21 @ 09:14) (99% - 100%)  Wt(kg): --    I&O's Summary    10 Oct 2021 07:01  -  11 Oct 2021 07:00  --------------------------------------------------------  IN: 850 mL / OUT: 1500 mL / NET: -650 mL    11 Oct 2021 07:01  -  11 Oct 2021 10:25  --------------------------------------------------------  IN: 360 mL / OUT: 0 mL / NET: 360 mL          General: Well nourished in no acute distress. Alert and Oriented * 3.   Head: Normocephalic and atraumatic.   Neck: No JVD. No bruits. Supple. Does not appear to be enlarged.   Cardiovascular: + S1,S2 ; RRR Soft systolic murmur at the left lower sternal border. No rubs noted.    Lungs: CTA b/l. No rhonchi, rales or wheezes.   Abdomen: + BS, soft. Non tender. Non distended. No rebound. No guarding.   Extremities: No clubbing/cyanosis/edema.    Skin: Warm and moist. The patient's skin has normal elasticity and good skin turgor.   Psychiatric: Appropriate mood and affect.    TTE: < from: Transthoracic Echocardiogram (09.29.21 @ 17:35) >  1. Calcified trileaflet aortic valve with normal opening.  2. Normal left ventricular internal dimensions and wall  thicknesses.  3. Normal left ventricular systolic function. No segmental  wall motion abnormalities.  4. Normal right ventricular size and function.    < end of copied text >    NST: < from: Nuclear Stress Test-Pharmacologic (Nuclear Stress Test-Pharmacologic .) (09.30.21 @ 16:16) >  IMPRESSIONS:Mildly Abnormal Study  * Myocardial Perfusion SPECT results are mildly abnormal.  * Review of raw data shows: The study is of fair technical  quality with adjacent bowel tracer uptake  * The left ventricle was normal in size. There is a small,  mild defect in septal wall that is fixed, suggestive of  mild scarring  * No clear evidence of ischemia.  * Post-stress gated wall motion analysis was performed  (LVEF = 66 %;LVEDV = 66 ml.), revealing normal LV  function. There was no segmental wall motion abnormality.  Septal wall motion appeared normal. RV function appeared  normal.    < end of copied text >      A/P: 59 y/o male PMH HTN, HLD, DM, CKD, PVD with a Right Femoral-Femoral Bypass in 2018 on apixiban, presented to the [facility] ED with worsening Right Toe pain x 1 week, CT with occluded bypass.  Now awaiting further work up from Vascular. Cardiology called for preop evaluation. s/p guillotine amputation  right foot.       -TTE with normal LV functinon   -NST with no ischemia  -tolerated procedure well from CV perspective  -no clinical heart failure or anginal symptoms   -pain management and post op care per vascular  -continue medical therapy for known PAD including asa, lipitor, coreg  -follow up vascular    Imani Cesar MD

## 2021-10-11 NOTE — PROGRESS NOTE ADULT - ASSESSMENT
Assessment:          Plan: Assessment: 59yo M with Hx CHF, CAD (no stents), HTN, HLD, DM, PVD with h/o right fem-pop bypass ([facility] 2018) who presents with non-healing R toe wound, found to have dry gangrene with small area of wet conversion and bypass occlusion now s/p 10/6 diagnostic RLE angiogram, b/l kissing iliac stents, right groin cutdown an PT exploration. Pt s/p 10/8 RLE angelita.      Plan/Recommendations:  - OR 10/12: R SANDRA Formalization  - D: reg; NPO @ mn  - pain control  - IV abx (cefepime, flagyl)  - ID for consistent high WBC and recs for abx changes  If pt spikes fever, recommend send blood cultures x 2, add vancomycin, check MRSA pcr swab, UA, ucx.  Repeat cxr to evaluate for alternate sources.  - Daily dressing changes: Xeroform, 4x4, ABD, Kerlix, ACE  - Prevena replaced with sterile dressing     C Team Surgery   [id number]

## 2021-10-11 NOTE — PROGRESS NOTE ADULT - ASSESSMENT
61 y/o male, with a PmHx of CHF, HTN, CAD, HLD, DM, CKD, PVD with a Right Femoral-Femoral Bypass in 2018 on apixiban, presented to the [facility] ED with worsening Right Toe pain x 1 week.      Ravin   pt with multiple Ravin in past   RAVIN possible prerenal as SG is high s/p IVF  Renal function improved  On ACE Monitor closley  s/p angio 9/29. renal function stable  Monitor BMP  AVoid further nephrotoxics, NSAID RCA    Hypokalemia  Repelted KCl    MOnitor  serum K    Acidosis   improving   MOnitor serum Co2    hypomagnesemia  supplemented by team  monitor    hypophosphatemia  supplemented  monitor    hyponatremia  work up suggested SIADH  Na improving  continue Na tab 1g tid  continue free water restriction  avoid hypotonic solutions  monitor level    hypocalcemia  check pth, vit d 25  monitor

## 2021-10-11 NOTE — PROGRESS NOTE ADULT - SUBJECTIVE AND OBJECTIVE BOX
[facility], Division of Infectious Diseases  CHINEDU Fernandes Y. Patel, S. Shah  [phone]  after hours and weekends [phone]    Name: EVE DELGADO  Age: 60y  Gender: Male  MRN: [id number]    Interval History--  Notes reviewed  feels ok  not sure what infection he has       Allergies    penicillin (Other)    Intolerances        Medications--  Antibiotics:  cefepime   IVPB 2000 milliGRAM(s) IV Intermittent every 8 hours  metroNIDAZOLE    Tablet 500 milliGRAM(s) Oral every 8 hours    Immunologic:  influenza   Vaccine 0.5 milliLiter(s) IntraMuscular once    Other:  acetaminophen   Tablet ..  aspirin enteric coated  atorvastatin  cadexomer iodine 0.9% Gel  carvedilol  dextrose 50% Injectable  dextrose 50% Injectable  dextrose 50% Injectable  heparin   Injectable  insulin lispro (ADMELOG) corrective regimen sliding scale  insulin lispro (ADMELOG) corrective regimen sliding scale  lisinopril  melatonin PRN  multivitamin  oxyCODONE    IR PRN  oxyCODONE    IR PRN  sodium chloride      Review of Systems--  A 10-point review of systems was obtained.     Pertinent positives and negatives--  Constitutional: No fevers. No Chills. No Rigors.   Cardiovascular: No chest pain. No palpitations.  Respiratory: No shortness of breath. No cough.  Gastrointestinal: No nausea or vomiting. No diarrhea or constipation.   Psychiatric: Pleasant. Appropriate affect.    Review of systems otherwise negative except as previously noted.    Physical Examination--  Vital Signs: T(F): 98.6 (10-11-21 @ 09:14), Max: 99.6 (10-10-21 @ 13:06)  HR: 96 (10-11-21 @ 09:14)  BP: 134/74 (10-11-21 @ 09:14)  RR: 16 (10-11-21 @ 09:14)  SpO2: 100% (10-11-21 @ 09:14)  Wt(kg): --  General: Nontoxic-appearing Male in no acute distress.  HEENT: AT/NC.r.  Neck: Not rigid. No sense of mass.  Nodes: None palpable.  Lungs: Clear bilaterally without rales, wheezing or rhonchi  Heart: Regular rate and rhythm. No Murmur.  Abdomen: Bowel sounds present and normoactive. Soft. Nondistended.   Back: No spinal tenderness. No costovertebral angle tenderness.   Extremities: No cyanosis or clubbing.  left groin wound wrapped.  No edema. right foot wrapped  Skin: Warm. Dry. Good turgor. No rash. No vasculitic stigmata.  Psychiatric: Appropriate affect and mood for situation.         Laboratory Studies--  CBC                        11.2   17.41 )-----------( 468      ( 11 Oct 2021 07:41 )             32.8       Chemistries  10-11    134<L>  |  99  |  14  ----------------------------<  149<H>  3.9   |  22  |  0.62    Ca    8.3<L>      11 Oct 2021 07:41  Phos  1.9     10-11  Mg     1.80     10-11        Culture Data    < from: Xray Chest 1 View- PORTABLE-Routine (Xray Chest 1 View- PORTABLE-Routine .) (10.11.21 @ 08:12) >  EXAM:  XR CHEST PORTABLE ROUTINE 1V        PROCEDURE DATE:  Oct 11 2021         INTERPRETATION:  CLINICAL HISTORY: Leukocytosis.    TECHNIQUE: Single frontal view of the chest.    COMPARISON: Chest x-ray from 9/25/2021.    FINDINGS:  Lines/Tubes: None    Lungs/Pleura: Lungs are clear. There is no pneumothorax.    Heart/Mediastinum: The heart is normal in size.    Osseous Structures: No acute osseous abnormalities.    IMPRESSION:  Lungs are clear.    < end of copied text >

## 2021-10-11 NOTE — PROGRESS NOTE ADULT - SUBJECTIVE AND OBJECTIVE BOX
VASCULAR SURGERY PROGRESS NOTE    Subjective:     Vital Signs:  Vital Signs Last 24 Hrs  T(C): 37.1 (10 Oct 2021 22:16), Max: 37.6 (10 Oct 2021 13:06)  T(F): 98.8 (10 Oct 2021 22:16), Max: 99.6 (10 Oct 2021 13:06)  HR: 78 (10 Oct 2021 22:16) (78 - 99)  BP: 149/78 (10 Oct 2021 22:16) (147/84 - 161/85)  BP(mean): --  RR: 18 (10 Oct 2021 22:16) (15 - 18)  SpO2: 100% (10 Oct 2021 22:16) (99% - 100%)    Physical Exam:  General:   Lungs:   CV:   Abdomen:  Extremities:          VASCULAR SURGERY PROGRESS NOTE    Subjective: No acute events overnight. Pt seen on AM rounds. Pt denies RLE pain, fevers, chills, n/v, CP, SOB. Dressing changed at bedside and Prevena wound vac was removed, replaced with sterile dressing.    Objective:  Vital Signs (24 Hrs):  T(C): 36.8 (10-11-21 @ 05:57), Max: 37.6 (10-10-21 @ 13:06)  HR: 86 (10-11-21 @ 05:57) (78 - 99)  BP: 170/75 (10-11-21 @ 05:57) (147/84 - 170/75)  RR: 18 (10-11-21 @ 05:57) (17 - 18)  SpO2: 100% (10-11-21 @ 05:57) (99% - 100%)  Wt(kg): --  Daily     Daily     I&O's Summary    10 Oct 2021 07:01  -  11 Oct 2021 07:00  --------------------------------------------------------  IN: 850 mL / OUT: 1500 mL / NET: -650 mL      Physical Exam:  GEN: NAD, resting quietly  PULM: symmetric chest rise bilaterally, no increased WOB  EXTR: left groin access dressing clear and dry, right groin PREVENA replaced with sterile dressing, no edema or hematoma; RLE: wound is c/d/i; Daily dressing changes.    LABS:                         11.2   17.41 )-----------( 468      ( 11 Oct 2021 07:41 )             32.8     10-11    134<L>  |  99  |  14  ----------------------------<  149<H>  3.9   |  22  |  0.62    Ca    8.3<L>      11 Oct 2021 07:41  Phos  1.9     10-11  Mg     1.80     10-11                RADIOLOGY, EKG & ADDITIONAL TESTS: Reviewed.

## 2021-10-11 NOTE — PROGRESS NOTE ADULT - SUBJECTIVE AND OBJECTIVE BOX
[facility] [place] [facility]   MD RAKAN SHEPHERD PA    TEL:  OFFICE: [phone]  DR FELICIANO CELL: [phone]  DR. PATIÑO CELL: [phone]  JANNETH SÁNCHEZ CELL: [phone]    From 5pm-7am Answering Service [phone]    -- RENAL FOLLOW UP NOTE ---Date of Service 10-11-21 @ 11:57    Patient is a 60y old  Male who presents with a chief complaint of Right Toe Necrosis (11 Oct 2021 10:24)      Patient seen and examined at bedside. No chest pain/sob    VITALS:  T(F): 98.6 (10-11-21 @ 09:14), Max: 99.6 (10-10-21 @ 13:06)  HR: 96 (10-11-21 @ 09:14)  BP: 134/74 (10-11-21 @ 09:14)  RR: 16 (10-11-21 @ 09:14)  SpO2: 100% (10-11-21 @ 09:14)  Wt(kg): --    10-10 @ 07:01  -  10-11 @ 07:00  --------------------------------------------------------  IN: 850 mL / OUT: 1500 mL / NET: -650 mL    10-11 @ 07:01  -  10-11 @ 11:57  --------------------------------------------------------  IN: 360 mL / OUT: 0 mL / NET: 360 mL          PHYSICAL EXAM:  Constitutional: NAD  Neck: No JVD  Respiratory: CTAB, no wheezes, rales or rhonchi  Cardiovascular: S1, S2, RRR  Gastrointestinal: BS+, soft, NT/ND  Extremities: No peripheral edema, right a    Hospital Medications:   MEDICATIONS  (STANDING):  acetaminophen   Tablet .. 975 milliGRAM(s) Oral every 6 hours  aspirin enteric coated 81 milliGRAM(s) Oral daily  atorvastatin 40 milliGRAM(s) Oral at bedtime  cadexomer iodine 0.9% Gel 1 Application(s) Topical daily  carvedilol 25 milliGRAM(s) Oral every 12 hours  cefepime   IVPB 2000 milliGRAM(s) IV Intermittent every 8 hours  dextrose 50% Injectable 25 Gram(s) IV Push once  dextrose 50% Injectable 12.5 Gram(s) IV Push once  dextrose 50% Injectable 25 Gram(s) IV Push once  heparin   Injectable 5000 Unit(s) SubCutaneous every 8 hours  influenza   Vaccine 0.5 milliLiter(s) IntraMuscular once  insulin lispro (ADMELOG) corrective regimen sliding scale   SubCutaneous three times a day before meals  insulin lispro (ADMELOG) corrective regimen sliding scale   SubCutaneous at bedtime  lisinopril 10 milliGRAM(s) Oral daily  metroNIDAZOLE    Tablet 500 milliGRAM(s) Oral every 8 hours  multivitamin 1 Tablet(s) Oral daily  sodium chloride 1 Gram(s) Oral three times a day      LABS:  10-11    134<L>  |  99  |  14  ----------------------------<  149<H>  3.9   |  22  |  0.62    Ca    8.3<L>      11 Oct 2021 07:41  Phos  1.9     10-11  Mg     1.80     10-11      Creatinine Trend: 0.62 <--, 0.76 <--, 0.67 <--, 0.75 <--, 0.84 <--, 0.89 <--    Phosphorus Level, Serum: 1.9 mg/dL (10-11 @ 07:41)                              11.2   17.41 )-----------( 468      ( 11 Oct 2021 07:41 )             32.8     Urine Studies:  Urinalysis - [09-25-21 @ 15:23]      Color Yellow / Appearance Clear / SG 1.021 / pH 5.5      Gluc Negative / Ketone Negative  / Bili Negative / Urobili <2 mg/dL       Blood Negative / Protein Trace / Leuk Est Negative / Nitrite Negative      RBC 2 / WBC 3 / Hyaline 1 / Gran  / Sq Epi  / Non Sq Epi 1 / Bacteria Negative      HbA1c 5.7      [12-20-18 @ 06:30]  TSH 0.76      [09-25-21 @ 12:52]  Lipid: chol 139, , HDL 26, LDL --      [09-26-21 @ 08:30]    HCV 0.09, Nonreact      [09-26-21 @ 19:55]      RADIOLOGY & ADDITIONAL STUDIES:

## 2021-10-12 ENCOUNTER — RESULT REVIEW (OUTPATIENT)
Age: 61
End: 2021-10-12

## 2021-10-12 LAB
ANION GAP SERPL CALC-SCNC: 10 MMOL/L — SIGNIFICANT CHANGE UP (ref 7–14)
APTT BLD: 32.6 SEC — SIGNIFICANT CHANGE UP (ref 27–36.3)
BLD GP AB SCN SERPL QL: NEGATIVE — SIGNIFICANT CHANGE UP
BUN SERPL-MCNC: 16 MG/DL — SIGNIFICANT CHANGE UP (ref 7–23)
CALCIUM SERPL-MCNC: 8.8 MG/DL — SIGNIFICANT CHANGE UP (ref 8.4–10.5)
CHLORIDE SERPL-SCNC: 99 MMOL/L — SIGNIFICANT CHANGE UP (ref 98–107)
CO2 SERPL-SCNC: 26 MMOL/L — SIGNIFICANT CHANGE UP (ref 22–31)
CREAT SERPL-MCNC: 0.72 MG/DL — SIGNIFICANT CHANGE UP (ref 0.5–1.3)
GLUCOSE SERPL-MCNC: 168 MG/DL — HIGH (ref 70–99)
HCT VFR BLD CALC: 30.3 % — LOW (ref 39–50)
HGB BLD-MCNC: 10.7 G/DL — LOW (ref 13–17)
INR BLD: 1.09 RATIO — SIGNIFICANT CHANGE UP (ref 0.88–1.16)
MAGNESIUM SERPL-MCNC: 1.8 MG/DL — SIGNIFICANT CHANGE UP (ref 1.6–2.6)
MCHC RBC-ENTMCNC: 32.3 PG — SIGNIFICANT CHANGE UP (ref 27–34)
MCHC RBC-ENTMCNC: 35.3 GM/DL — SIGNIFICANT CHANGE UP (ref 32–36)
MCV RBC AUTO: 91.5 FL — SIGNIFICANT CHANGE UP (ref 80–100)
NRBC # BLD: 0 /100 WBCS — SIGNIFICANT CHANGE UP
NRBC # FLD: 0 K/UL — SIGNIFICANT CHANGE UP
PHOSPHATE SERPL-MCNC: 2.2 MG/DL — LOW (ref 2.5–4.5)
PLATELET # BLD AUTO: 459 K/UL — HIGH (ref 150–400)
POTASSIUM SERPL-MCNC: 3.4 MMOL/L — LOW (ref 3.5–5.3)
POTASSIUM SERPL-SCNC: 3.4 MMOL/L — LOW (ref 3.5–5.3)
PROTHROM AB SERPL-ACNC: 12.5 SEC — SIGNIFICANT CHANGE UP (ref 10.6–13.6)
RBC # BLD: 3.31 M/UL — LOW (ref 4.2–5.8)
RBC # FLD: 14.4 % — SIGNIFICANT CHANGE UP (ref 10.3–14.5)
RH IG SCN BLD-IMP: POSITIVE — SIGNIFICANT CHANGE UP
SODIUM SERPL-SCNC: 135 MMOL/L — SIGNIFICANT CHANGE UP (ref 135–145)
WBC # BLD: 15.49 K/UL — HIGH (ref 3.8–10.5)
WBC # FLD AUTO: 15.49 K/UL — HIGH (ref 3.8–10.5)

## 2021-10-12 PROCEDURE — 27590 AMPUTATE LEG AT THIGH: CPT | Mod: 58,RT

## 2021-10-12 PROCEDURE — 88307 TISSUE EXAM BY PATHOLOGIST: CPT | Mod: 26

## 2021-10-12 RX ORDER — SODIUM CHLORIDE 9 MG/ML
1000 INJECTION, SOLUTION INTRAVENOUS
Refills: 0 | Status: DISCONTINUED | OUTPATIENT
Start: 2021-10-12 | End: 2021-10-12

## 2021-10-12 RX ORDER — HYDROMORPHONE HYDROCHLORIDE 2 MG/ML
0.4 INJECTION INTRAMUSCULAR; INTRAVENOUS; SUBCUTANEOUS
Refills: 0 | Status: DISCONTINUED | OUTPATIENT
Start: 2021-10-12 | End: 2021-10-12

## 2021-10-12 RX ORDER — POTASSIUM CHLORIDE 20 MEQ
10 PACKET (EA) ORAL
Refills: 0 | Status: COMPLETED | OUTPATIENT
Start: 2021-10-12 | End: 2021-10-12

## 2021-10-12 RX ORDER — HYDROMORPHONE HYDROCHLORIDE 2 MG/ML
0.5 INJECTION INTRAMUSCULAR; INTRAVENOUS; SUBCUTANEOUS EVERY 4 HOURS
Refills: 0 | Status: DISCONTINUED | OUTPATIENT
Start: 2021-10-12 | End: 2021-10-19

## 2021-10-12 RX ORDER — HYDROMORPHONE HYDROCHLORIDE 2 MG/ML
0.3 INJECTION INTRAMUSCULAR; INTRAVENOUS; SUBCUTANEOUS
Refills: 0 | Status: DISCONTINUED | OUTPATIENT
Start: 2021-10-12 | End: 2021-10-12

## 2021-10-12 RX ORDER — ONDANSETRON 8 MG/1
4 TABLET, FILM COATED ORAL EVERY 6 HOURS
Refills: 0 | Status: DISCONTINUED | OUTPATIENT
Start: 2021-10-12 | End: 2021-10-12

## 2021-10-12 RX ADMIN — Medication 81 MILLIGRAM(S): at 19:04

## 2021-10-12 RX ADMIN — Medication 975 MILLIGRAM(S): at 07:22

## 2021-10-12 RX ADMIN — SODIUM CHLORIDE 60 MILLILITER(S): 9 INJECTION, SOLUTION INTRAVENOUS at 06:15

## 2021-10-12 RX ADMIN — OXYCODONE HYDROCHLORIDE 10 MILLIGRAM(S): 5 TABLET ORAL at 06:23

## 2021-10-12 RX ADMIN — Medication 975 MILLIGRAM(S): at 01:01

## 2021-10-12 RX ADMIN — CEFEPIME 100 MILLIGRAM(S): 1 INJECTION, POWDER, FOR SOLUTION INTRAMUSCULAR; INTRAVENOUS at 19:03

## 2021-10-12 RX ADMIN — CEFEPIME 100 MILLIGRAM(S): 1 INJECTION, POWDER, FOR SOLUTION INTRAMUSCULAR; INTRAVENOUS at 09:06

## 2021-10-12 RX ADMIN — Medication 85 MILLIMOLE(S): at 07:56

## 2021-10-12 RX ADMIN — Medication 1: at 07:20

## 2021-10-12 RX ADMIN — SODIUM CHLORIDE 1 GRAM(S): 9 INJECTION INTRAMUSCULAR; INTRAVENOUS; SUBCUTANEOUS at 05:22

## 2021-10-12 RX ADMIN — Medication 100 MILLIEQUIVALENT(S): at 06:16

## 2021-10-12 RX ADMIN — Medication 100 MILLIEQUIVALENT(S): at 04:53

## 2021-10-12 RX ADMIN — Medication 500 MILLIGRAM(S): at 21:39

## 2021-10-12 RX ADMIN — Medication 2: at 12:24

## 2021-10-12 RX ADMIN — CEFEPIME 100 MILLIGRAM(S): 1 INJECTION, POWDER, FOR SOLUTION INTRAMUSCULAR; INTRAVENOUS at 01:00

## 2021-10-12 RX ADMIN — HEPARIN SODIUM 5000 UNIT(S): 5000 INJECTION INTRAVENOUS; SUBCUTANEOUS at 06:16

## 2021-10-12 RX ADMIN — Medication 2 DROP(S): at 22:22

## 2021-10-12 RX ADMIN — CARVEDILOL PHOSPHATE 25 MILLIGRAM(S): 80 CAPSULE, EXTENDED RELEASE ORAL at 05:22

## 2021-10-12 RX ADMIN — LISINOPRIL 10 MILLIGRAM(S): 2.5 TABLET ORAL at 05:22

## 2021-10-12 RX ADMIN — Medication 1: at 16:57

## 2021-10-12 RX ADMIN — ATORVASTATIN CALCIUM 40 MILLIGRAM(S): 80 TABLET, FILM COATED ORAL at 21:39

## 2021-10-12 RX ADMIN — Medication 2: at 21:40

## 2021-10-12 RX ADMIN — HEPARIN SODIUM 5000 UNIT(S): 5000 INJECTION INTRAVENOUS; SUBCUTANEOUS at 21:40

## 2021-10-12 RX ADMIN — Medication 975 MILLIGRAM(S): at 21:40

## 2021-10-12 RX ADMIN — Medication 500 MILLIGRAM(S): at 05:23

## 2021-10-12 RX ADMIN — Medication 100 MILLIEQUIVALENT(S): at 07:55

## 2021-10-12 RX ADMIN — CARVEDILOL PHOSPHATE 25 MILLIGRAM(S): 80 CAPSULE, EXTENDED RELEASE ORAL at 19:04

## 2021-10-12 RX ADMIN — OXYCODONE HYDROCHLORIDE 10 MILLIGRAM(S): 5 TABLET ORAL at 05:23

## 2021-10-12 NOTE — BRIEF OPERATIVE NOTE - NSICDXBRIEFPROCEDURE_GEN_ALL_CORE_FT
PROCEDURES:  Guilcynthiaine amputation below knee 08-Oct-2021 13:13:07 Doug Laureano  
PROCEDURES:  Above knee amputation 12-Oct-2021 16:41:39  Ricardo Silva

## 2021-10-12 NOTE — PROGRESS NOTE ADULT - SUBJECTIVE AND OBJECTIVE BOX
CARDIOLOGY      S: no chest pain or sob; ros otherwise negative.     DATE OF SERVICE - 10-12-21     Review of Systems:   Constitutional: [ ] fevers, [ ] chills.   Skin: [ ] dry skin. [ ] rashes.  Psychiatric: [ ] depression, [ ] anxiety.   Gastrointestinal: [ ] BRBPR, [ ] melena.   Neurological: [ ] confusion. [ ] seizures. [ ] shuffling gait.   Ears,Nose,Mouth and Throat: [ ] ear pain [ ] sore throat.   Eyes: [ ] diplopia.   Respiratory: [ ] hemoptysis. [ ] shortness of breath  Cardiovascular: See HPI above  Hematologic/Lymphatic: [ ] anemia. [ ] painful nodes. [ ] prolonged bleeding.   Genitourinary: [ ] hematuria. [ ] flank pain.   Endocrine: [ ] significant change in weight. [ ] intolerance to heat and cold.     Review of systems [ x] otherwise negative, [ ] otherwise unable to obtain    FH: no family history of sudden cardiac death in first degree relatives    SH: [ ] tobacco, [ ] alcohol, [ ] drugs           acetaminophen   Tablet .. 975 milliGRAM(s) Oral every 6 hours  aspirin enteric coated 81 milliGRAM(s) Oral daily  atorvastatin 40 milliGRAM(s) Oral at bedtime  cadexomer iodine 0.9% Gel 1 Application(s) Topical daily  carvedilol 25 milliGRAM(s) Oral every 12 hours  cefepime   IVPB 2000 milliGRAM(s) IV Intermittent every 8 hours  dextrose 50% Injectable 25 Gram(s) IV Push once  dextrose 50% Injectable 12.5 Gram(s) IV Push once  dextrose 50% Injectable 25 Gram(s) IV Push once  heparin   Injectable 5000 Unit(s) SubCutaneous every 8 hours  HYDROmorphone  Injectable 0.3 milliGRAM(s) IV Push every 30 minutes PRN  HYDROmorphone  Injectable 0.4 milliGRAM(s) IV Push every 30 minutes PRN  influenza   Vaccine 0.5 milliLiter(s) IntraMuscular once  insulin lispro (ADMELOG) corrective regimen sliding scale   SubCutaneous three times a day before meals  insulin lispro (ADMELOG) corrective regimen sliding scale   SubCutaneous at bedtime  lisinopril 10 milliGRAM(s) Oral daily  melatonin 3 milliGRAM(s) Oral at bedtime PRN  metroNIDAZOLE    Tablet 500 milliGRAM(s) Oral every 8 hours  multivitamin 1 Tablet(s) Oral daily  ondansetron Injectable 4 milliGRAM(s) IV Push every 6 hours PRN  oxyCODONE    IR 10 milliGRAM(s) Oral every 4 hours PRN  oxyCODONE    IR 5 milliGRAM(s) Oral every 4 hours PRN                            10.7   15.49 )-----------( 459      ( 12 Oct 2021 03:59 )             30.3       10-12    135  |  99  |  16  ----------------------------<  168<H>  3.4<L>   |  26  |  0.72    Ca    8.8      12 Oct 2021 03:59  Phos  2.2     10-12  Mg     1.80     10-12              T(C): 37.1 (10-12-21 @ 13:25), Max: 37.3 (10-11-21 @ 16:50)  HR: 87 (10-12-21 @ 13:25) (81 - 95)  BP: 144/69 (10-12-21 @ 13:25) (144/69 - 153/85)  RR: 18 (10-12-21 @ 13:25) (16 - 18)  SpO2: 97% (10-12-21 @ 13:25) (96% - 100%)  Wt(kg): --    I&O's Summary    11 Oct 2021 07:01  -  12 Oct 2021 07:00  --------------------------------------------------------  IN: 1260 mL / OUT: 900 mL / NET: 360 mL    12 Oct 2021 07:01  -  12 Oct 2021 16:05  --------------------------------------------------------  IN: 50 mL / OUT: 200 mL / NET: -150 mL            General: Well nourished in no acute distress. Alert and Oriented * 3.   Head: Normocephalic and atraumatic.   Neck: No JVD. No bruits. Supple. Does not appear to be enlarged.   Cardiovascular: + S1,S2 ; RRR Soft systolic murmur at the left lower sternal border. No rubs noted.    Lungs: CTA b/l. No rhonchi, rales or wheezes.   Abdomen: + BS, soft. Non tender. Non distended. No rebound. No guarding.   Extremities: No clubbing/cyanosis/edema.    Skin: Warm and moist. The patient's skin has normal elasticity and good skin turgor.   Psychiatric: Appropriate mood and affect.    TTE: < from: Transthoracic Echocardiogram (09.29.21 @ 17:35) >  1. Calcified trileaflet aortic valve with normal opening.  2. Normal left ventricular internal dimensions and wall  thicknesses.  3. Normal left ventricular systolic function. No segmental  wall motion abnormalities.  4. Normal right ventricular size and function.    < end of copied text >    NST: < from: Nuclear Stress Test-Pharmacologic (Nuclear Stress Test-Pharmacologic .) (09.30.21 @ 16:16) >  IMPRESSIONS:Mildly Abnormal Study  * Myocardial Perfusion SPECT results are mildly abnormal.  * Review of raw data shows: The study is of fair technical  quality with adjacent bowel tracer uptake  * The left ventricle was normal in size. There is a small,  mild defect in septal wall that is fixed, suggestive of  mild scarring  * No clear evidence of ischemia.  * Post-stress gated wall motion analysis was performed  (LVEF = 66 %;LVEDV = 66 ml.), revealing normal LV  function. There was no segmental wall motion abnormality.  Septal wall motion appeared normal. RV function appeared  normal.    < end of copied text >      A/P: 59 y/o male PMH HTN, HLD, DM, CKD, PVD with a Right Femoral-Femoral Bypass in 2018 on apixiban, presented to the [facility] ED with worsening Right Toe pain x 1 week, CT with occluded bypass.  Now awaiting further work up from Vascular. Cardiology called for preop evaluation. s/p guillotine amputation  right foot.       -TTE with normal LV functinon   -NST with no ischemia  -tolerated procedure well from CV perspective  -no clinical heart failure or anginal symptoms   -pain management and post op care per vascular  -continue medical therapy for known PAD including asa, lipitor, coreg  -optimized from cv perspective for planned procedure     Guruprasad Arsenio, MD

## 2021-10-12 NOTE — PROGRESS NOTE ADULT - ASSESSMENT
Patient is a 60 year old male, with PMH of CHF, HTN, CAD, HLD, DM, CKD, PVD with a Right Femoral-Femoral Bypass in 2018 on apixiban, presented to the [facility] ED with worsening R toe pain for 1 week and was admitted for R 5th toe gangrene.     R toe gangrene d/t PVD  Leukocytosis likely reactive and due to above   - had wound for a month, noted with cellulitis - now resolved    - R foot xray reviewed - no evidence of OM   - R foot wound culture with CRE Pseudomonas, Providencia and Bacteroides - sensitivities reviewed    - Vascular surgery following     -- s/p angiogram 9/29, s/p iliac stents and R groin cutdown and PT exploration 10/6   - afebrile, nontoxic    no s/s of pna, no gu complaints, abd benign and no diarrhea  cefepime/flagyl day 14  for right BKA today      CKD    - monitor Cr, renally adjusted meds/abx    DM   - Blood glucose management per primary team.    d/w pt that once has surgery will be able to tailor antibx off  ulcer and wound would be resolved and ideally  will have healthy blood supply to surgical wound to heal  trend wbc

## 2021-10-12 NOTE — PROGRESS NOTE ADULT - SUBJECTIVE AND OBJECTIVE BOX
[facility] [place] [facility]   MD RAKAN SHEPHERD PA    TEL:  OFFICE: [phone]  DR FELICIANO CELL: [phone]  DR. PATIÑO CELL: [phone]  JANNETH SÁNCHEZ CELL: [phone]    From 5pm-7am Answering Service [phone]    -- RENAL FOLLOW UP NOTE ---Date of Service 10-12-21 @ 14:04    Patient is a 60y old  Male who presents with a chief complaint of Right Toe Necrosis (12 Oct 2021 11:41)      Patient seen and examined at bedside. No chest pain/sob    VITALS:  T(F): 98.8 (10-12-21 @ 13:25), Max: 99.1 (10-11-21 @ 16:50)  HR: 87 (10-12-21 @ 13:25)  BP: 144/69 (10-12-21 @ 13:25)  RR: 18 (10-12-21 @ 13:25)  SpO2: 97% (10-12-21 @ 13:25)  Wt(kg): --    10-11 @ 07:01  -  10-12 @ 07:00  --------------------------------------------------------  IN: 1260 mL / OUT: 900 mL / NET: 360 mL    10-12 @ 07:01  -  10-12 @ 14:04  --------------------------------------------------------  IN: 50 mL / OUT: 200 mL / NET: -150 mL      Height (cm): 162.6 (10-12 @ 12:48)  Weight (kg): 50 (10-12 @ 12:48)  BMI (kg/m2): 18.9 (10-12 @ 12:48)  BSA (m2): 1.52 (10-12 @ 12:48)    PHYSICAL EXAM:  Constitutional: NAD  Neck: No JVD  Respiratory: CTAB, no wheezes, rales or rhonchi  Cardiovascular: S1, S2, RRR  Gastrointestinal: BS+, soft, NT/ND  Extremities: No peripheral edema, right bka    Hospital Medications:   MEDICATIONS  (STANDING):  acetaminophen   Tablet .. 975 milliGRAM(s) Oral every 6 hours  aspirin enteric coated 81 milliGRAM(s) Oral daily  atorvastatin 40 milliGRAM(s) Oral at bedtime  cadexomer iodine 0.9% Gel 1 Application(s) Topical daily  carvedilol 25 milliGRAM(s) Oral every 12 hours  cefepime   IVPB 2000 milliGRAM(s) IV Intermittent every 8 hours  dextrose 50% Injectable 25 Gram(s) IV Push once  dextrose 50% Injectable 12.5 Gram(s) IV Push once  dextrose 50% Injectable 25 Gram(s) IV Push once  heparin   Injectable 5000 Unit(s) SubCutaneous every 8 hours  influenza   Vaccine 0.5 milliLiter(s) IntraMuscular once  insulin lispro (ADMELOG) corrective regimen sliding scale   SubCutaneous three times a day before meals  insulin lispro (ADMELOG) corrective regimen sliding scale   SubCutaneous at bedtime  lisinopril 10 milliGRAM(s) Oral daily  metroNIDAZOLE    Tablet 500 milliGRAM(s) Oral every 8 hours  multivitamin 1 Tablet(s) Oral daily      LABS:  10-12    135  |  99  |  16  ----------------------------<  168<H>  3.4<L>   |  26  |  0.72    Ca    8.8      12 Oct 2021 03:59  Phos  2.2     10-12  Mg     1.80     10-12      Creatinine Trend: 0.72 <--, 0.62 <--, 0.76 <--, 0.67 <--, 0.75 <--, 0.84 <--, 0.89 <--    Phosphorus Level, Serum: 2.2 mg/dL (10-12 @ 03:59)                              10.7   15.49 )-----------( 459      ( 12 Oct 2021 03:59 )             30.3     Urine Studies:  Urinalysis - [09-25-21 @ 15:23]      Color Yellow / Appearance Clear / SG 1.021 / pH 5.5      Gluc Negative / Ketone Negative  / Bili Negative / Urobili <2 mg/dL       Blood Negative / Protein Trace / Leuk Est Negative / Nitrite Negative      RBC 2 / WBC 3 / Hyaline 1 / Gran  / Sq Epi  / Non Sq Epi 1 / Bacteria Negative      HbA1c 5.7      [12-20-18 @ 06:30]  TSH 0.76      [09-25-21 @ 12:52]  Lipid: chol 139, , HDL 26, LDL --      [09-26-21 @ 08:30]    HCV 0.09, Nonreact      [09-26-21 @ 19:55]      RADIOLOGY & ADDITIONAL STUDIES:

## 2021-10-12 NOTE — PRE-OP CHECKLIST - 1.
vitals in ASU at 10:51-bp148/80. hr-101, temp orally- 99.4, rr-16,sat on room air-97%
ASU pre op arrival at 1325. Temp 99.8, HR 98, /82, RR 18, O2 sat 100 % on RA.
Received pt from [address] [place] [address] via stretcher accompanied by transporter

## 2021-10-12 NOTE — PROGRESS NOTE ADULT - ASSESSMENT
59 y/o male, with a PmHx of CHF, HTN, CAD, HLD, DM, CKD, PVD with a Right Femoral-Femoral Bypass in 2018 on apixiban, presented to the [facility] ED with worsening Right Toe pain x 1 week.      Ravin   pt with multiple Ravin in past   RAVIN possible prerenal as SG is high s/p IVF  Renal function improved  On ACE Monitor closley  s/p angio 9/29. renal function stable  Monitor BMP  AVoid further nephrotoxics, NSAID RCA    Hypokalemia  Repelted KCl    MOnitor  serum K    Acidosis   improving   MOnitor serum Co2    hypomagnesemia  supplemented by team  monitor    hypophosphatemia  supplemented  monitor    hyponatremia  work up suggested SIADH  Na improving  continue Na tab 1g tid  continue free water restriction  avoid hypotonic solutions  monitor level    hypocalcemia  check pth, vit d 25  monitor

## 2021-10-12 NOTE — BRIEF OPERATIVE NOTE - NSICDXBRIEFPOSTOP_GEN_ALL_CORE_FT
POST-OP DIAGNOSIS:  Gangrene of right foot 08-Oct-2021 13:14:23  Doug Gupta  
POST-OP DIAGNOSIS:  Gangrene of right foot 08-Oct-2021 13:14:23  Doug Gupta

## 2021-10-12 NOTE — PHYSICAL THERAPY INITIAL EVALUATION ADULT - PERTINENT HX OF CURRENT PROBLEM, REHAB EVAL
Pt. admitted for right toe necrosis. Pt. s/p right guillotine below knee amputation on 10/8. Pt. with plan for right BKA formalization on 10/12.

## 2021-10-12 NOTE — PHYSICAL THERAPY INITIAL EVALUATION ADULT - ADDITIONAL COMMENTS
Pt. reports owning a straight cane.     Pt. was left in bed post PT Evaluation, no apparent distress, all lines intact, +right knee immobilizer. Blas ROMERO made aware of pt. status and participation in PT.

## 2021-10-12 NOTE — PHYSICAL THERAPY INITIAL EVALUATION ADULT - RANGE OF MOTION EXAMINATION, REHAB EVAL
except right knee not assessed./bilateral upper extremity ROM was WFL (within functional limits)/bilateral lower extremity ROM was WFL (within functional limits)

## 2021-10-12 NOTE — CHART NOTE - NSCHARTNOTESELECT_GEN_ALL_CORE
Chart Note/Nutrition Services
Event Note
Vascular Surgery
pre-op
Event Note
POC/Event Note
Post-Op Check
Post-op/Event Note
Pre-Op
Pre-Op Note/Event Note
post-op
pre-op

## 2021-10-12 NOTE — PROGRESS NOTE ADULT - SUBJECTIVE AND OBJECTIVE BOX
[facility], Division of Infectious Diseases  CHINEDU Fernandes Y. Patel, S. Shah  [phone]  after hours and weekends [phone]    Name: EVE DELGADO  Age: 60y  Gender: Male  MRN: [id number]    Interval History--  Notes reviewed  no events feels well      Allergies    penicillin (Other)    Intolerances        Medications--  Antibiotics:  cefepime   IVPB 2000 milliGRAM(s) IV Intermittent every 8 hours  metroNIDAZOLE    Tablet 500 milliGRAM(s) Oral every 8 hours    Immunologic:  influenza   Vaccine 0.5 milliLiter(s) IntraMuscular once    Other:  acetaminophen   Tablet ..  aspirin enteric coated  atorvastatin  cadexomer iodine 0.9% Gel  carvedilol  dextrose 50% Injectable  dextrose 50% Injectable  dextrose 50% Injectable  heparin   Injectable  insulin lispro (ADMELOG) corrective regimen sliding scale  insulin lispro (ADMELOG) corrective regimen sliding scale  lisinopril  melatonin PRN  multivitamin  oxyCODONE    IR PRN  oxyCODONE    IR PRN      Review of Systems--  A 10-point review of systems was obtained.     Pertinent positives and negatives--  Constitutional: No fevers. No Chills. No Rigors.   Cardiovascular: No chest pain. No palpitations.  Respiratory: No shortness of breath. No cough.  Gastrointestinal: No nausea or vomiting. No diarrhea or constipation.   Psychiatric:no depression    Review of systems otherwise negative except as previously noted.    Physical Examination--  Vital Signs: T(F): 98.7 (10-12-21 @ 08:47), Max: 99.1 (10-11-21 @ 16:50)  HR: 81 (10-12-21 @ 08:47)  BP: 147/81 (10-12-21 @ 08:47)  RR: 18 (10-12-21 @ 08:47)  SpO2: 99% (10-12-21 @ 08:47)  Wt(kg): --  General: Nontoxic-appearing Male in no acute distress.  HEENT: AT/NC. .  Neck: Not rigid. No sense of mass.  Nodes: None palpable.  Lungs: Clear bilaterally without rales, wheezing or rhonchi  Heart: Regular rate and rhythm.   Abdomen: Bowel sounds present and normoactive. Soft. Nondistended. Nontender.   Back: No spinal tenderness. No costovertebral angle tenderness.   Extremities: No cyanosis or clubbing. rle wrapped  Skin: Warm. Dry. Good turgor. No rash. No vasculitic stigmata.  Psychiatric: Appropriate affect and mood for situation.         Laboratory Studies--  CBC                        10.7   15.49 )-----------( 459      ( 12 Oct 2021 03:59 )             30.3       Chemistries  10-12    135  |  99  |  16  ----------------------------<  168<H>  3.4<L>   |  26  |  0.72    Ca    8.8      12 Oct 2021 03:59  Phos  2.2     10-12  Mg     1.80     10-12        Culture Data

## 2021-10-12 NOTE — PRE-OP CHECKLIST - 2.
See PACU flow sheets for ASU pre-op v/s
knee immobilizer intact to RLE
pt has kerlix on his right foot and meplex on his right leg

## 2021-10-12 NOTE — PHYSICAL THERAPY INITIAL EVALUATION ADULT - IMPAIRMENTS FOUND, PT EVAL
Patient called. She is scheduled for EGD 6/26. Insurance told her that procedure code was wrong and that her primary needs to call in the correct procedure code before they can approve it.    Please advise.   gait, locomotion, and balance/muscle strength

## 2021-10-12 NOTE — CHART NOTE - NSCHARTNOTEFT_GEN_A_CORE
NUTRITION FOLLOW-UP: Pt was seen for follow up care. Pt states his appetite and been good ad he has been eating well since in house. Pt had no complaints of GI distress.   Pt was going to have surgery later in day so interview was kept very short.    Weight: 110.2 lbs    Labs: POCT-207, 164, 240, 182, K-3.4, Glu-165, Phos-2.2    Current Diet: NPO    Edema: none noted (10/10/21)    Skin: intact except for surgical incision- Right BKA    RD to Remain Available: Dalila Polo MS, RDN, CDN pager [phone]     Additional Recommendations:   1) Monitor weight, labs, po intake and skin integrity.

## 2021-10-12 NOTE — PHYSICAL THERAPY INITIAL EVALUATION ADULT - GENERAL OBSERVATIONS, REHAB EVAL
Consult received, chart reviewed. Patient received in bed, no apparent distress, +right knee immobilizer, right BKA.

## 2021-10-12 NOTE — CHART NOTE - NSCHARTNOTEFT_GEN_A_CORE
VASCULAR SURGERY Post-op NOTE    Procedure: R AKA    Subjective: Pt denies any CP, SOB, n/v/d, or chills. Recovering appropriately at the floor. Pain is well controlled. Patient reports discomfort in his L eye with clear fluid drainage post-operatively.      Vital Signs:  Vital Signs Last 24 Hrs  T(C): 37.1 (12 Oct 2021 21:34), Max: 37.1 (12 Oct 2021 05:15)  T(F): 98.7 (12 Oct 2021 21:34), Max: 98.8 (12 Oct 2021 12:48)  HR: 91 (12 Oct 2021 21:34) (71 - 91)  BP: 151/78 (12 Oct 2021 21:34) (137/68 - 155/80)  BP(mean): 90 (12 Oct 2021 17:30) (85 - 94)  RR: 18 (12 Oct 2021 21:34) (8 - 18)  SpO2: 99% (12 Oct 2021 21:34) (96% - 100%)    Physical Exam:  General: NAD, L eye mildly irritated with clear drainage  Lungs: Nonlabored breathing  Extremities: R AKA stump w clean dry dressing (wrapped in ACE).      Assessment:  Assessment: 59yo M with Hx CHF, CAD (no stents), HTN, HLD, DM, PVD with h/o right fem-pop bypass ([facility] 2018) who presents with non-healing R toe wound, found to have dry gangrene with small area of wet conversion and bypass occlusion now s/p 10/6 diagnostic RLE angiogram, b/l kissing iliac stents, right groin cutdown an PT exploration. Pt s/p 10/8 RLE guillotine and s/p 10/12 R AKA.    Plan:  - Pain control as needed  - OOBAT  - Opthalmology consult if L eye irritation does not resolve within 24 hrs  - Diet: DASH/TLC, diabetic  - Monitor R AKA dressing  - ASA  - DVT ppx heparin SQ  - Cefepime+ Metronisazole  - F/U AM labs VASCULAR SURGERY Post-op NOTE    Procedure: R AKA    Subjective: Pt denies any CP, SOB, n/v/d, or chills. Recovering appropriately at the floor. Pain is well controlled. Patient reports discomfort in his L eye with clear fluid drainage post-operatively.      Vital Signs:  Vital Signs Last 24 Hrs  T(C): 37.1 (12 Oct 2021 21:34), Max: 37.1 (12 Oct 2021 05:15)  T(F): 98.7 (12 Oct 2021 21:34), Max: 98.8 (12 Oct 2021 12:48)  HR: 91 (12 Oct 2021 21:34) (71 - 91)  BP: 151/78 (12 Oct 2021 21:34) (137/68 - 155/80)  BP(mean): 90 (12 Oct 2021 17:30) (85 - 94)  RR: 18 (12 Oct 2021 21:34) (8 - 18)  SpO2: 99% (12 Oct 2021 21:34) (96% - 100%)    Physical Exam:  General: NAD, L eye mildly irritated with clear drainage  Lungs: Nonlabored breathing  Extremities: R AKA stump w clean dry dressing (wrapped in ACE).      Assessment:  Assessment: 61yo M with Hx CHF, CAD (no stents), HTN, HLD, DM, PVD with h/o right fem-pop bypass ([facility] 2018) who presents with non-healing R toe wound, found to have dry gangrene with small area of wet conversion and bypass occlusion now s/p 10/6 diagnostic RLE angiogram, b/l kissing iliac stents, right groin cutdown an PT exploration. Pt s/p 10/8 RLE guillotine and s/p 10/12 R AKA.    Plan:  - Pain control as needed  - OOBAT  - Opthalmology consult if L eye irritation does not resolve within 24 hrs  - Diet: DASH/TLC, diabetic  - Monitor R AKA dressing, take down on day 3  - Farias out tomorrow  - ASA  - DVT ppx heparin SQ  - Cefepime+ Metronisazole  - F/U AM labs

## 2021-10-12 NOTE — BRIEF OPERATIVE NOTE - OPERATION/FINDINGS
Skin below the right knee necrotized, decided to go with above knee amputation, incision proximal to knee, femur sawed around the lower 1/3, fascia closed with Vicryl 2-0, skin closed with Vicryl 3-0 and staples.
Right Lower Extremity Guillotine BKA. RLE was prepped and draped. Circumferential incision made proximal to the ankle. Right foot sawed off. Sterile dressing applied (4x4, ABD, Kerlix, ACE) in knee immobilizer. No drain. EBL= 5 cc.

## 2021-10-12 NOTE — BRIEF OPERATIVE NOTE - NSICDXBRIEFPREOP_GEN_ALL_CORE_FT
PRE-OP DIAGNOSIS:  Gangrene of right foot 08-Oct-2021 13:14:07  Doug Gupta  
PRE-OP DIAGNOSIS:  Gangrene of right foot 08-Oct-2021 13:14:07  Doug Gupta

## 2021-10-13 LAB
ANION GAP SERPL CALC-SCNC: 10 MMOL/L — SIGNIFICANT CHANGE UP (ref 7–14)
BUN SERPL-MCNC: 15 MG/DL — SIGNIFICANT CHANGE UP (ref 7–23)
CALCIUM SERPL-MCNC: 8.6 MG/DL — SIGNIFICANT CHANGE UP (ref 8.4–10.5)
CHLORIDE SERPL-SCNC: 100 MMOL/L — SIGNIFICANT CHANGE UP (ref 98–107)
CO2 SERPL-SCNC: 25 MMOL/L — SIGNIFICANT CHANGE UP (ref 22–31)
CREAT SERPL-MCNC: 0.71 MG/DL — SIGNIFICANT CHANGE UP (ref 0.5–1.3)
GLUCOSE SERPL-MCNC: 173 MG/DL — HIGH (ref 70–99)
HCT VFR BLD CALC: 30.1 % — LOW (ref 39–50)
HGB BLD-MCNC: 10.5 G/DL — LOW (ref 13–17)
MAGNESIUM SERPL-MCNC: 1.8 MG/DL — SIGNIFICANT CHANGE UP (ref 1.6–2.6)
MCHC RBC-ENTMCNC: 32 PG — SIGNIFICANT CHANGE UP (ref 27–34)
MCHC RBC-ENTMCNC: 34.9 GM/DL — SIGNIFICANT CHANGE UP (ref 32–36)
MCV RBC AUTO: 91.8 FL — SIGNIFICANT CHANGE UP (ref 80–100)
NRBC # BLD: 0 /100 WBCS — SIGNIFICANT CHANGE UP
NRBC # FLD: 0 K/UL — SIGNIFICANT CHANGE UP
PHOSPHATE SERPL-MCNC: 2.7 MG/DL — SIGNIFICANT CHANGE UP (ref 2.5–4.5)
PLATELET # BLD AUTO: 478 K/UL — HIGH (ref 150–400)
POTASSIUM SERPL-MCNC: 4.7 MMOL/L — SIGNIFICANT CHANGE UP (ref 3.5–5.3)
POTASSIUM SERPL-SCNC: 4.7 MMOL/L — SIGNIFICANT CHANGE UP (ref 3.5–5.3)
RBC # BLD: 3.28 M/UL — LOW (ref 4.2–5.8)
RBC # FLD: 14.6 % — HIGH (ref 10.3–14.5)
SODIUM SERPL-SCNC: 135 MMOL/L — SIGNIFICANT CHANGE UP (ref 135–145)
WBC # BLD: 14.19 K/UL — HIGH (ref 3.8–10.5)
WBC # FLD AUTO: 14.19 K/UL — HIGH (ref 3.8–10.5)

## 2021-10-13 RX ORDER — TOBRAMYCIN 0.3 %
2 DROPS OPHTHALMIC (EYE) EVERY 4 HOURS
Refills: 0 | Status: DISCONTINUED | OUTPATIENT
Start: 2021-10-13 | End: 2021-10-13

## 2021-10-13 RX ADMIN — Medication 500 MILLIGRAM(S): at 13:57

## 2021-10-13 RX ADMIN — CEFEPIME 100 MILLIGRAM(S): 1 INJECTION, POWDER, FOR SOLUTION INTRAMUSCULAR; INTRAVENOUS at 02:47

## 2021-10-13 RX ADMIN — Medication 1 TABLET(S): at 11:07

## 2021-10-13 RX ADMIN — CEFEPIME 100 MILLIGRAM(S): 1 INJECTION, POWDER, FOR SOLUTION INTRAMUSCULAR; INTRAVENOUS at 10:31

## 2021-10-13 RX ADMIN — CARVEDILOL PHOSPHATE 25 MILLIGRAM(S): 80 CAPSULE, EXTENDED RELEASE ORAL at 17:14

## 2021-10-13 RX ADMIN — Medication 1 APPLICATION(S): at 11:08

## 2021-10-13 RX ADMIN — CARVEDILOL PHOSPHATE 25 MILLIGRAM(S): 80 CAPSULE, EXTENDED RELEASE ORAL at 05:36

## 2021-10-13 RX ADMIN — Medication 975 MILLIGRAM(S): at 14:30

## 2021-10-13 RX ADMIN — HEPARIN SODIUM 5000 UNIT(S): 5000 INJECTION INTRAVENOUS; SUBCUTANEOUS at 05:37

## 2021-10-13 RX ADMIN — OXYCODONE HYDROCHLORIDE 10 MILLIGRAM(S): 5 TABLET ORAL at 21:04

## 2021-10-13 RX ADMIN — HEPARIN SODIUM 5000 UNIT(S): 5000 INJECTION INTRAVENOUS; SUBCUTANEOUS at 13:58

## 2021-10-13 RX ADMIN — Medication 2 DROP(S): at 17:13

## 2021-10-13 RX ADMIN — OXYCODONE HYDROCHLORIDE 10 MILLIGRAM(S): 5 TABLET ORAL at 10:31

## 2021-10-13 RX ADMIN — Medication 2 DROP(S): at 05:37

## 2021-10-13 RX ADMIN — Medication 975 MILLIGRAM(S): at 05:34

## 2021-10-13 RX ADMIN — OXYCODONE HYDROCHLORIDE 10 MILLIGRAM(S): 5 TABLET ORAL at 06:28

## 2021-10-13 RX ADMIN — Medication 4: at 11:56

## 2021-10-13 RX ADMIN — ATORVASTATIN CALCIUM 40 MILLIGRAM(S): 80 TABLET, FILM COATED ORAL at 21:05

## 2021-10-13 RX ADMIN — LISINOPRIL 10 MILLIGRAM(S): 2.5 TABLET ORAL at 05:35

## 2021-10-13 RX ADMIN — Medication 81 MILLIGRAM(S): at 11:07

## 2021-10-13 RX ADMIN — Medication 975 MILLIGRAM(S): at 10:31

## 2021-10-13 RX ADMIN — Medication 2: at 17:14

## 2021-10-13 RX ADMIN — OXYCODONE HYDROCHLORIDE 10 MILLIGRAM(S): 5 TABLET ORAL at 22:04

## 2021-10-13 RX ADMIN — Medication 975 MILLIGRAM(S): at 21:05

## 2021-10-13 RX ADMIN — HEPARIN SODIUM 5000 UNIT(S): 5000 INJECTION INTRAVENOUS; SUBCUTANEOUS at 21:05

## 2021-10-13 RX ADMIN — OXYCODONE HYDROCHLORIDE 10 MILLIGRAM(S): 5 TABLET ORAL at 11:15

## 2021-10-13 RX ADMIN — Medication 500 MILLIGRAM(S): at 05:36

## 2021-10-13 RX ADMIN — OXYCODONE HYDROCHLORIDE 10 MILLIGRAM(S): 5 TABLET ORAL at 05:38

## 2021-10-13 NOTE — PROGRESS NOTE ADULT - SUBJECTIVE AND OBJECTIVE BOX
[facility] [place] [facility]OLOG[facility]ACT[facility]   MD RAKAN SHEPHERD PA    TEL:  OFFICE: [phone]  DR FELICIANO CELL: [phone]  DR. PATIÑO CELL: [phone]  JANNETH SÁNCHEZ CELL: [phone]    From 5pm-7am Answering Service [phone]    -- RENAL FOLLOW UP NOTE ---Date of Service 10-13-21 @ 10:16    Patient is a 60y old  Male who presents with a chief complaint of Right Toe Necrosis (13 Oct 2021 01:21)      Patient seen and examined at bedside. No chest pain/sob    VITALS:  T(F): 98.6 (10-13-21 @ 09:22), Max: 98.8 (10-12-21 @ 12:48)  HR: 90 (10-13-21 @ 09:22)  BP: 125/74 (10-13-21 @ 09:22)  RR: 18 (10-13-21 @ 09:22)  SpO2: 99% (10-13-21 @ 09:22)  Wt(kg): --    10-12 @ 07:01  -  10-13 @ 07:00  --------------------------------------------------------  IN: 50 mL / OUT: 750 mL / NET: -700 mL    10-13 @ 07:01  -  10-13 @ 10:16  --------------------------------------------------------  IN: 0 mL / OUT: 200 mL / NET: -200 mL      Height (cm): 162.6 (10-12 @ 12:48)  Weight (kg): 50 (10-12 @ 12:48)  BMI (kg/m2): 18.9 (10-12 @ 12:48)  BSA (m2): 1.52 (10-12 @ 12:48)    PHYSICAL EXAM:  Constitutional: NAD  Neck: No JVD  Respiratory: CTAB, no wheezes, rales or rhonchi  Cardiovascular: S1, S2, RRR  Gastrointestinal: BS+, soft, NT/ND  Extremities: No peripheral edema, right bka dressing d/c/i    Hospital Medications:   MEDICATIONS  (STANDING):  acetaminophen   Tablet .. 975 milliGRAM(s) Oral every 6 hours  artificial  tears Solution 2 Drop(s) Left EYE two times a day  aspirin enteric coated 81 milliGRAM(s) Oral daily  atorvastatin 40 milliGRAM(s) Oral at bedtime  cadexomer iodine 0.9% Gel 1 Application(s) Topical daily  carvedilol 25 milliGRAM(s) Oral every 12 hours  cefepime   IVPB 2000 milliGRAM(s) IV Intermittent every 8 hours  dextrose 50% Injectable 25 Gram(s) IV Push once  dextrose 50% Injectable 12.5 Gram(s) IV Push once  dextrose 50% Injectable 25 Gram(s) IV Push once  heparin   Injectable 5000 Unit(s) SubCutaneous every 8 hours  influenza   Vaccine 0.5 milliLiter(s) IntraMuscular once  insulin lispro (ADMELOG) corrective regimen sliding scale   SubCutaneous three times a day before meals  insulin lispro (ADMELOG) corrective regimen sliding scale   SubCutaneous at bedtime  lisinopril 10 milliGRAM(s) Oral daily  metroNIDAZOLE    Tablet 500 milliGRAM(s) Oral every 8 hours  multivitamin 1 Tablet(s) Oral daily      LABS:  10-13    135  |  100  |  15  ----------------------------<  173<H>  4.7   |  25  |  0.71    Ca    8.6      13 Oct 2021 06:18  Phos  2.7     10-13  Mg     1.80     10-13      Creatinine Trend: 0.71 <--, 0.72 <--, 0.62 <--, 0.76 <--, 0.67 <--, 0.75 <--, 0.84 <--    Phosphorus Level, Serum: 2.7 mg/dL (10-13 @ 06:18)                              10.5   14.19 )-----------( 478      ( 13 Oct 2021 06:18 )             30.1     Urine Studies:  Urinalysis - [09-25-21 @ 15:23]      Color Yellow / Appearance Clear / SG 1.021 / pH 5.5      Gluc Negative / Ketone Negative  / Bili Negative / Urobili <2 mg/dL       Blood Negative / Protein Trace / Leuk Est Negative / Nitrite Negative      RBC 2 / WBC 3 / Hyaline 1 / Gran  / Sq Epi  / Non Sq Epi 1 / Bacteria Negative      HbA1c 5.7      [12-20-18 @ 06:30]  TSH 0.76      [09-25-21 @ 12:52]  Lipid: chol 139, , HDL 26, LDL --      [09-26-21 @ 08:30]    HCV 0.09, Nonreact      [09-26-21 @ 19:55]      RADIOLOGY & ADDITIONAL STUDIES:

## 2021-10-13 NOTE — PROGRESS NOTE ADULT - SUBJECTIVE AND OBJECTIVE BOX
[facility], Division of Infectious Diseases  CHINEDU Fernandes Y. Patel, S. Shah  [phone]  after hours and weekends [phone]    Name: EVE DELGADO  Age: 60y  Gender: Male  MRN: [id number]    Interval History--  Notes reviewed  no new complaints       Allergies    penicillin (Other)    Intolerances        Medications--  Antibiotics:  cefepime   IVPB 2000 milliGRAM(s) IV Intermittent every 8 hours  metroNIDAZOLE    Tablet 500 milliGRAM(s) Oral every 8 hours    Immunologic:  influenza   Vaccine 0.5 milliLiter(s) IntraMuscular once    Other:  acetaminophen   Tablet ..  artificial  tears Solution  aspirin enteric coated  atorvastatin  cadexomer iodine 0.9% Gel  carvedilol  dextrose 50% Injectable  dextrose 50% Injectable  dextrose 50% Injectable  heparin   Injectable  HYDROmorphone  Injectable PRN  insulin lispro (ADMELOG) corrective regimen sliding scale  insulin lispro (ADMELOG) corrective regimen sliding scale  lisinopril  melatonin PRN  multivitamin  oxyCODONE    IR PRN  oxyCODONE    IR PRN      Review of Systems--  A 10-point review of systems was obtained.     Pertinent positives and negatives--  Constitutional: No fevers. No Chills. No Rigors.   Cardiovascular: No chest pain. No palpitations.  Respiratory: No shortness of breath. No cough.  Gastrointestinal: No nausea or vomiting. No diarrhea or constipation.   Psychiatric:no anxiety   Review of systems otherwise negative except as previously noted.    Physical Examination--  Vital Signs: T(F): 98.9 (10-13-21 @ 13:30), Max: 98.9 (10-13-21 @ 13:30)  HR: 84 (10-13-21 @ 13:30)  BP: 141/80 (10-13-21 @ 13:30)  RR: 18 (10-13-21 @ 13:30)  SpO2: 100% (10-13-21 @ 13:30)  Wt(kg): --  General: Nontoxic-appearing Male in no acute distress.  HEENT: AT/NC. .  Neck: Not rigid. No sense of mass.  Nodes: None palpable.  Lungs: Clear bilaterally without rales, wheezing or rhonchi  Heart: Regular rate and rhythm.   Abdomen: Bowel sounds present and normoactive. Soft. Nondistended.   Back: No spinal tenderness. No costovertebral angle tenderness.   Extremities: No cyanosis or clubbing. No edema. rle wrapped   Skin: Warm. Dry. Good turgor. No rash. No vasculitic stigmata.  Psychiatric: Appropriate affect and mood for situation.         Laboratory Studies--  CBC                        10.5   14.19 )-----------( 478      ( 13 Oct 2021 06:18 )             30.1       Chemistries  10-13    135  |  100  |  15  ----------------------------<  173<H>  4.7   |  25  |  0.71    Ca    8.6      13 Oct 2021 06:18  Phos  2.7     10-13  Mg     1.80     10-13        Culture Data

## 2021-10-13 NOTE — PROGRESS NOTE ADULT - ASSESSMENT
61 y/o male, with a PmHx of CHF, HTN, CAD, HLD, DM, CKD, PVD with a Right Femoral-Femoral Bypass in 2018 on apixiban, presented to the [facility] ED with worsening Right Toe pain x 1 week.      Ravin   pt with multiple Ravin in past   RAVIN possible prerenal as SG is high s/p IVF  Renal function improved  On ACE Monitor closley  s/p angio 9/29. renal function stable  Monitor BMP  AVoid further nephrotoxics, NSAID RCA    Hypokalemia  Repelted KCl    MOnitor  serum K    Acidosis   improving   MOnitor serum Co2    hypomagnesemia  supplemented by team  monitor    hypophosphatemia  supplemented  monitor    hyponatremia  work up suggested SIADH  Na improving  continue Na tab 1g tid  continue free water restriction  avoid hypotonic solutions  monitor level    hypocalcemia  check pth, vit d 25  better  monitor

## 2021-10-13 NOTE — PROGRESS NOTE ADULT - SUBJECTIVE AND OBJECTIVE BOX
VASCULAR SURGERY PROGRESS NOTE    Subjective:     Vital Signs:  Vital Signs Last 24 Hrs  T(C): 37.1 (12 Oct 2021 21:34), Max: 37.1 (12 Oct 2021 05:15)  T(F): 98.7 (12 Oct 2021 21:34), Max: 98.8 (12 Oct 2021 12:48)  HR: 91 (12 Oct 2021 21:34) (71 - 91)  BP: 151/78 (12 Oct 2021 21:34) (137/68 - 155/80)  BP(mean): 90 (12 Oct 2021 17:30) (85 - 94)  RR: 18 (12 Oct 2021 21:34) (8 - 18)  SpO2: 99% (12 Oct 2021 21:34) (96% - 100%)    Physical Exam:  General:   Lungs:   CV:   Abdomen:  Extremities:          VASCULAR SURGERY PROGRESS NOTE    Subjective:     Vital Signs:  Vital Signs Last 24 Hrs  T(C): 37.1 (12 Oct 2021 21:34), Max: 37.1 (12 Oct 2021 05:15)  T(F): 98.7 (12 Oct 2021 21:34), Max: 98.8 (12 Oct 2021 12:48)  HR: 91 (12 Oct 2021 21:34) (71 - 91)  BP: 151/78 (12 Oct 2021 21:34) (137/68 - 155/80)  BP(mean): 90 (12 Oct 2021 17:30) (85 - 94)  RR: 18 (12 Oct 2021 21:34) (8 - 18)  SpO2: 99% (12 Oct 2021 21:34) (96% - 100%)    Physical Exam:  General:   Extremities:          VASCULAR SURGERY PROGRESS NOTE    Subjective:     Vital Signs:  Vital Signs Last 24 Hrs  T(C): 37.1 (12 Oct 2021 21:34), Max: 37.1 (12 Oct 2021 05:15)  T(F): 98.7 (12 Oct 2021 21:34), Max: 98.8 (12 Oct 2021 12:48)  HR: 91 (12 Oct 2021 21:34) (71 - 91)  BP: 151/78 (12 Oct 2021 21:34) (137/68 - 155/80)  BP(mean): 90 (12 Oct 2021 17:30) (85 - 94)  RR: 18 (12 Oct 2021 21:34) (8 - 18)  SpO2: 99% (12 Oct 2021 21:34) (96% - 100%)    Physical Exam:  General: A&Ox3 NAD  Extremities: R AKA dressing c/d/i         on unit/behind nurses station

## 2021-10-13 NOTE — PROGRESS NOTE ADULT - ASSESSMENT
Assessment:          Plan: 61yo M with Hx CHF, CAD (no stents), HTN, HLD, DM, PVD with h/o right fem-pop bypass ([facility] 2018) who presents with non-healing R toe wound, found to have dry gangrene with small area of wet conversion and bypass occlusion now s/p 10/6 diagnostic RLE angiogram, b/l kissing iliac stents, right groin cutdown an PT exploration. Pt s/p 10/8 RLE guillotine and s/p 10/12 R AKA.    Plan:  - Pain control as needed  - OOBAT  - Opthalmology consult if L eye irritation does not resolve within 24 hrs  - Diet: DASH/TLC, diabetic  - Monitor R AKA dressing, take down on day 3  - Farias out today  - ASA  - DVT ppx heparin SQ  - Cefepime+ Metronisazole  - F/U AM labs. 59yo M with Hx CHF, CAD (no stents), HTN, HLD, DM, PVD with h/o right fem-pop bypass ([facility] 2018) who presents with non-healing R toe wound, found to have dry gangrene with small area of wet conversion and bypass occlusion now s/p 10/6 diagnostic RLE angiogram, b/l kissing iliac stents, right groin cutdown an PT exploration. Pt s/p 10/8 RLE guillotine and s/p 10/12 R AKA.    Plan:  - Pain control as needed  - OOBAT  - Anesthesia called for left eye pain but cannot take consult at this time will recall [id number] later  - Diet: DASH/TLC, diabetic  - Monitor R AKA dressing, take down on day 3  - Farias out today  - ASA  - DVT ppx heparin SQ  - Cefepime+ Metronisazole  - F/U AM labs.

## 2021-10-13 NOTE — PROGRESS NOTE ADULT - ASSESSMENT
Patient is a 60 year old male, with PMH of CHF, HTN, CAD, HLD, DM, CKD, PVD with a Right Femoral-Femoral Bypass in 2018 on apixiban, presented to the [facility] ED with worsening R toe pain for 1 week and was admitted for R 5th toe gangrene.     R toe gangrene d/t PVD  Leukocytosis likely reactive and due to above   - had wound for a month, noted with cellulitis - now resolved    - R foot xray reviewed - no evidence of OM   - R foot wound culture with CRE Pseudomonas, Providencia and Bacteroides - sensitivities reviewed    - Vascular surgery following     -- s/p angiogram 9/29, s/p iliac stents and R groin cutdown and PT exploration 10/6   - afebrile, nontoxic    no s/s of pna, no gu complaints, abd benign and no diarrhea  cefepime/flagyl completed course -- will dc  10/12 right aka - afeb, nontoxic  trend wbc      CKD    - monitor Cr, renally adjusted meds/abx    DM   - Blood glucose management per primary team.

## 2021-10-13 NOTE — PROGRESS NOTE ADULT - SUBJECTIVE AND OBJECTIVE BOX
CARDIOLOGY      S: no chest pain or sob; ros otherwise negative.     DATE OF SERVICE - 10-13-21     Review of Systems:   Constitutional: [ ] fevers, [ ] chills.   Skin: [ ] dry skin. [ ] rashes.  Psychiatric: [ ] depression, [ ] anxiety.   Gastrointestinal: [ ] BRBPR, [ ] melena.   Neurological: [ ] confusion. [ ] seizures. [ ] shuffling gait.   Ears,Nose,Mouth and Throat: [ ] ear pain [ ] sore throat.   Eyes: [ ] diplopia.   Respiratory: [ ] hemoptysis. [ ] shortness of breath  Cardiovascular: See HPI above  Hematologic/Lymphatic: [ ] anemia. [ ] painful nodes. [ ] prolonged bleeding.   Genitourinary: [ ] hematuria. [ ] flank pain.   Endocrine: [ ] significant change in weight. [ ] intolerance to heat and cold.     Review of systems [x ] otherwise negative, [ ] otherwise unable to obtain    FH: no family history of sudden cardiac death in first degree relatives    SH: [ ] tobacco, [ ] alcohol, [ ] drugs    acetaminophen   Tablet .. 975 milliGRAM(s) Oral every 6 hours  artificial  tears Solution 2 Drop(s) Left EYE two times a day  aspirin enteric coated 81 milliGRAM(s) Oral daily  atorvastatin 40 milliGRAM(s) Oral at bedtime  cadexomer iodine 0.9% Gel 1 Application(s) Topical daily  carvedilol 25 milliGRAM(s) Oral every 12 hours  cefepime   IVPB 2000 milliGRAM(s) IV Intermittent every 8 hours  dextrose 50% Injectable 25 Gram(s) IV Push once  dextrose 50% Injectable 12.5 Gram(s) IV Push once  dextrose 50% Injectable 25 Gram(s) IV Push once  heparin   Injectable 5000 Unit(s) SubCutaneous every 8 hours  HYDROmorphone  Injectable 0.5 milliGRAM(s) IV Push every 4 hours PRN  influenza   Vaccine 0.5 milliLiter(s) IntraMuscular once  insulin lispro (ADMELOG) corrective regimen sliding scale   SubCutaneous three times a day before meals  insulin lispro (ADMELOG) corrective regimen sliding scale   SubCutaneous at bedtime  lisinopril 10 milliGRAM(s) Oral daily  melatonin 3 milliGRAM(s) Oral at bedtime PRN  metroNIDAZOLE    Tablet 500 milliGRAM(s) Oral every 8 hours  multivitamin 1 Tablet(s) Oral daily  oxyCODONE    IR 5 milliGRAM(s) Oral every 4 hours PRN  oxyCODONE    IR 10 milliGRAM(s) Oral every 4 hours PRN                            10.5   14.19 )-----------( 478      ( 13 Oct 2021 06:18 )             30.1     135  |  100  |  15  ----------------------------<  173<H>  4.7   |  25  |  0.71    Ca    8.6      13 Oct 2021 06:18  Phos  2.7     10-13  Mg     1.80     10-13      T(C): 37.2 (10-13-21 @ 13:30), Max: 37.2 (10-13-21 @ 13:30)  HR: 84 (10-13-21 @ 13:30) (71 - 91)  BP: 141/80 (10-13-21 @ 13:30) (125/74 - 155/80)  RR: 18 (10-13-21 @ 13:30) (8 - 18)  SpO2: 100% (10-13-21 @ 13:30) (96% - 100%)  Wt(kg): --    General: Well nourished in no acute distress. Alert and Oriented * 3.   Head: Normocephalic and atraumatic.   Neck: No JVD. No bruits. Supple. Does not appear to be enlarged.   Cardiovascular: + S1,S2 ; RRR Soft systolic murmur at the left lower sternal border. No rubs noted.    Lungs: CTA b/l. No rhonchi, rales or wheezes.   Abdomen: + BS, soft. Non tender. Non distended. No rebound. No guarding.   Extremities: No clubbing/cyanosis/edema.    Skin: Warm and moist. The patient's skin has normal elasticity and good skin turgor.   Psychiatric: Appropriate mood and affect.    TTE: < from: Transthoracic Echocardiogram (09.29.21 @ 17:35) >  1. Calcified trileaflet aortic valve with normal opening.  2. Normal left ventricular internal dimensions and wall  thicknesses.  3. Normal left ventricular systolic function. No segmental  wall motion abnormalities.  4. Normal right ventricular size and function.    < end of copied text >    NST: < from: Nuclear Stress Test-Pharmacologic (Nuclear Stress Test-Pharmacologic .) (09.30.21 @ 16:16) >  IMPRESSIONS:Mildly Abnormal Study  * Myocardial Perfusion SPECT results are mildly abnormal.  * Review of raw data shows: The study is of fair technical  quality with adjacent bowel tracer uptake  * The left ventricle was normal in size. There is a small,  mild defect in septal wall that is fixed, suggestive of  mild scarring  * No clear evidence of ischemia.  * Post-stress gated wall motion analysis was performed  (LVEF = 66 %;LVEDV = 66 ml.), revealing normal LV  function. There was no segmental wall motion abnormality.  Septal wall motion appeared normal. RV function appeared  normal.    < end of copied text >      A/P: 59 y/o male PMH HTN, HLD, DM, CKD, PVD with a Right Femoral-Femoral Bypass in 2018 on apixiban, presented to the [facility] ED with worsening Right Toe pain x 1 week, CT with occluded bypass.  Now awaiting further work up from Vascular. Cardiology called for preop evaluation. s/p guillotine amputation  right foot, s/p R AKA    -TTE with normal LV functinon   -NST with no ischemia  -tolerated procedure well from CV perspective  -no clinical heart failure or anginal symptoms   -pain management and post op care per vascular  -continue medical therapy for known PAD including asa, lipitor, coreg

## 2021-10-13 NOTE — PROGRESS NOTE ADULT - SUBJECTIVE AND OBJECTIVE BOX
Anesthesia Post Operative Note    s/p day 1 of procedure: above the knee amputaion    60y Male POSTOP DAY 1 S/P     Vital Signs Last 24 Hrs  T(C): 37.2 (13 Oct 2021 13:30), Max: 37.2 (13 Oct 2021 13:30)  T(F): 98.9 (13 Oct 2021 13:30), Max: 98.9 (13 Oct 2021 13:30)  HR: 84 (13 Oct 2021 13:30) (71 - 91)  BP: 141/80 (13 Oct 2021 13:30) (125/74 - 155/80)  BP(mean): 90 (12 Oct 2021 17:30) (85 - 94)  RR: 18 (13 Oct 2021 13:30) (8 - 18)  SpO2: 100% (13 Oct 2021 13:30) (96% - 100%)    Patient seen at: 10:12 am. Had some eye pain but with drops went away. No grossly abnormal  appearing eye. Patient resting comfortable. Answering fully and appropriately.    Doing well, no anesthetic complications or complaints noted or reported.  Pain is controlled.

## 2021-10-13 NOTE — PROGRESS NOTE ADULT - SUBJECTIVE AND OBJECTIVE BOX
pt with complete relief after basic saline rinse.  likely 2/2 eyelash or other foreign material in eye.  if pain recurs, would treat empirically as corneal abrasion with tobramycin eye gtts (2 drops to left eye q4hrs x3 doses) to prevent superinfection.

## 2021-10-14 LAB
ANION GAP SERPL CALC-SCNC: 8 MMOL/L — SIGNIFICANT CHANGE UP (ref 7–14)
BUN SERPL-MCNC: 18 MG/DL — SIGNIFICANT CHANGE UP (ref 7–23)
CALCIUM SERPL-MCNC: 8.8 MG/DL — SIGNIFICANT CHANGE UP (ref 8.4–10.5)
CHLORIDE SERPL-SCNC: 95 MMOL/L — LOW (ref 98–107)
CO2 SERPL-SCNC: 28 MMOL/L — SIGNIFICANT CHANGE UP (ref 22–31)
CREAT SERPL-MCNC: 0.81 MG/DL — SIGNIFICANT CHANGE UP (ref 0.5–1.3)
GLUCOSE SERPL-MCNC: 197 MG/DL — HIGH (ref 70–99)
HCT VFR BLD CALC: 32 % — LOW (ref 39–50)
HGB BLD-MCNC: 10.9 G/DL — LOW (ref 13–17)
MAGNESIUM SERPL-MCNC: 2 MG/DL — SIGNIFICANT CHANGE UP (ref 1.6–2.6)
MCHC RBC-ENTMCNC: 31.7 PG — SIGNIFICANT CHANGE UP (ref 27–34)
MCHC RBC-ENTMCNC: 34.1 GM/DL — SIGNIFICANT CHANGE UP (ref 32–36)
MCV RBC AUTO: 93 FL — SIGNIFICANT CHANGE UP (ref 80–100)
NRBC # BLD: 0 /100 WBCS — SIGNIFICANT CHANGE UP
NRBC # FLD: 0 K/UL — SIGNIFICANT CHANGE UP
PHOSPHATE SERPL-MCNC: 1.6 MG/DL — LOW (ref 2.5–4.5)
PLATELET # BLD AUTO: 550 K/UL — HIGH (ref 150–400)
POTASSIUM SERPL-MCNC: 4.6 MMOL/L — SIGNIFICANT CHANGE UP (ref 3.5–5.3)
POTASSIUM SERPL-SCNC: 4.6 MMOL/L — SIGNIFICANT CHANGE UP (ref 3.5–5.3)
RBC # BLD: 3.44 M/UL — LOW (ref 4.2–5.8)
RBC # FLD: 14.6 % — HIGH (ref 10.3–14.5)
SODIUM SERPL-SCNC: 131 MMOL/L — LOW (ref 135–145)
WBC # BLD: 14.81 K/UL — HIGH (ref 3.8–10.5)
WBC # FLD AUTO: 14.81 K/UL — HIGH (ref 3.8–10.5)

## 2021-10-14 PROCEDURE — 99222 1ST HOSP IP/OBS MODERATE 55: CPT

## 2021-10-14 RX ORDER — INSULIN LISPRO 100/ML
VIAL (ML) SUBCUTANEOUS AT BEDTIME
Refills: 0 | Status: DISCONTINUED | OUTPATIENT
Start: 2021-10-14 | End: 2021-10-19

## 2021-10-14 RX ORDER — OXYCODONE HYDROCHLORIDE 5 MG/1
10 TABLET ORAL EVERY 4 HOURS
Refills: 0 | Status: DISCONTINUED | OUTPATIENT
Start: 2021-10-14 | End: 2021-10-19

## 2021-10-14 RX ORDER — DEXTROSE 50 % IN WATER 50 %
15 SYRINGE (ML) INTRAVENOUS ONCE
Refills: 0 | Status: DISCONTINUED | OUTPATIENT
Start: 2021-10-14 | End: 2021-10-19

## 2021-10-14 RX ORDER — GLUCAGON INJECTION, SOLUTION 0.5 MG/.1ML
1 INJECTION, SOLUTION SUBCUTANEOUS ONCE
Refills: 0 | Status: DISCONTINUED | OUTPATIENT
Start: 2021-10-14 | End: 2021-10-19

## 2021-10-14 RX ORDER — OXYCODONE HYDROCHLORIDE 5 MG/1
5 TABLET ORAL EVERY 4 HOURS
Refills: 0 | Status: DISCONTINUED | OUTPATIENT
Start: 2021-10-14 | End: 2021-10-19

## 2021-10-14 RX ORDER — SODIUM,POTASSIUM PHOSPHATES 278-250MG
1 POWDER IN PACKET (EA) ORAL
Refills: 0 | Status: COMPLETED | OUTPATIENT
Start: 2021-10-14 | End: 2021-10-15

## 2021-10-14 RX ORDER — DEXTROSE 50 % IN WATER 50 %
25 SYRINGE (ML) INTRAVENOUS ONCE
Refills: 0 | Status: DISCONTINUED | OUTPATIENT
Start: 2021-10-14 | End: 2021-10-19

## 2021-10-14 RX ORDER — DEXTROSE 50 % IN WATER 50 %
12.5 SYRINGE (ML) INTRAVENOUS ONCE
Refills: 0 | Status: DISCONTINUED | OUTPATIENT
Start: 2021-10-14 | End: 2021-10-19

## 2021-10-14 RX ORDER — CEFEPIME 1 G/1
2000 INJECTION, POWDER, FOR SOLUTION INTRAMUSCULAR; INTRAVENOUS EVERY 8 HOURS
Refills: 0 | Status: DISCONTINUED | OUTPATIENT
Start: 2021-10-14 | End: 2021-10-18

## 2021-10-14 RX ORDER — INSULIN LISPRO 100/ML
VIAL (ML) SUBCUTANEOUS
Refills: 0 | Status: DISCONTINUED | OUTPATIENT
Start: 2021-10-14 | End: 2021-10-19

## 2021-10-14 RX ORDER — SODIUM CHLORIDE 9 MG/ML
1000 INJECTION, SOLUTION INTRAVENOUS
Refills: 0 | Status: DISCONTINUED | OUTPATIENT
Start: 2021-10-14 | End: 2021-10-19

## 2021-10-14 RX ORDER — METRONIDAZOLE 500 MG
500 TABLET ORAL EVERY 8 HOURS
Refills: 0 | Status: DISCONTINUED | OUTPATIENT
Start: 2021-10-14 | End: 2021-10-18

## 2021-10-14 RX ADMIN — Medication 2 DROP(S): at 18:04

## 2021-10-14 RX ADMIN — OXYCODONE HYDROCHLORIDE 10 MILLIGRAM(S): 5 TABLET ORAL at 04:16

## 2021-10-14 RX ADMIN — OXYCODONE HYDROCHLORIDE 10 MILLIGRAM(S): 5 TABLET ORAL at 19:02

## 2021-10-14 RX ADMIN — Medication 500 MILLIGRAM(S): at 21:47

## 2021-10-14 RX ADMIN — Medication 1 TABLET(S): at 18:14

## 2021-10-14 RX ADMIN — HEPARIN SODIUM 5000 UNIT(S): 5000 INJECTION INTRAVENOUS; SUBCUTANEOUS at 05:35

## 2021-10-14 RX ADMIN — OXYCODONE HYDROCHLORIDE 10 MILLIGRAM(S): 5 TABLET ORAL at 18:14

## 2021-10-14 RX ADMIN — CEFEPIME 100 MILLIGRAM(S): 1 INJECTION, POWDER, FOR SOLUTION INTRAMUSCULAR; INTRAVENOUS at 21:53

## 2021-10-14 RX ADMIN — ATORVASTATIN CALCIUM 40 MILLIGRAM(S): 80 TABLET, FILM COATED ORAL at 21:46

## 2021-10-14 RX ADMIN — Medication 8: at 12:36

## 2021-10-14 RX ADMIN — HEPARIN SODIUM 5000 UNIT(S): 5000 INJECTION INTRAVENOUS; SUBCUTANEOUS at 21:47

## 2021-10-14 RX ADMIN — CARVEDILOL PHOSPHATE 25 MILLIGRAM(S): 80 CAPSULE, EXTENDED RELEASE ORAL at 05:35

## 2021-10-14 RX ADMIN — CARVEDILOL PHOSPHATE 25 MILLIGRAM(S): 80 CAPSULE, EXTENDED RELEASE ORAL at 18:03

## 2021-10-14 RX ADMIN — OXYCODONE HYDROCHLORIDE 10 MILLIGRAM(S): 5 TABLET ORAL at 12:44

## 2021-10-14 RX ADMIN — Medication 2 DROP(S): at 05:34

## 2021-10-14 RX ADMIN — OXYCODONE HYDROCHLORIDE 10 MILLIGRAM(S): 5 TABLET ORAL at 05:00

## 2021-10-14 RX ADMIN — Medication 85 MILLIMOLE(S): at 18:40

## 2021-10-14 RX ADMIN — Medication 500 MILLIGRAM(S): at 12:00

## 2021-10-14 RX ADMIN — LISINOPRIL 10 MILLIGRAM(S): 2.5 TABLET ORAL at 05:35

## 2021-10-14 RX ADMIN — Medication 1 TABLET(S): at 12:38

## 2021-10-14 RX ADMIN — Medication 975 MILLIGRAM(S): at 10:51

## 2021-10-14 RX ADMIN — Medication 81 MILLIGRAM(S): at 12:38

## 2021-10-14 RX ADMIN — CEFEPIME 100 MILLIGRAM(S): 1 INJECTION, POWDER, FOR SOLUTION INTRAMUSCULAR; INTRAVENOUS at 13:01

## 2021-10-14 RX ADMIN — Medication 3 MILLIGRAM(S): at 21:47

## 2021-10-14 RX ADMIN — Medication 975 MILLIGRAM(S): at 10:12

## 2021-10-14 RX ADMIN — HEPARIN SODIUM 5000 UNIT(S): 5000 INJECTION INTRAVENOUS; SUBCUTANEOUS at 12:59

## 2021-10-14 RX ADMIN — Medication 975 MILLIGRAM(S): at 21:46

## 2021-10-14 NOTE — OCCUPATIONAL THERAPY INITIAL EVALUATION ADULT - PERTINENT HX OF CURRENT PROBLEM, REHAB EVAL
Patient is a 60 year old male with history of CHF, CAD, HTN, HLD, DM, and PVD. Presents with non-healing right 5th toe wound, found to have dry gangrene. Patient seen s/p 10/8/2021 right DEBI goldstein and s/p 10/12/2021 right LE AKA.

## 2021-10-14 NOTE — OCCUPATIONAL THERAPY INITIAL EVALUATION ADULT - IMPAIRED TRANSFERS: SIT/STAND, REHAB EVAL
Patient unable to use right LE 2/2 AKA./impaired balance/impaired postural control Pulses equal bilaterally, no edema present.

## 2021-10-14 NOTE — PROGRESS NOTE ADULT - SUBJECTIVE AND OBJECTIVE BOX
[facility]   MD RAKAN SHEPHERD PA    TEL:  OFFICE: [phone]  DR FELICIANO CELL: [phone]  DR. PATIÑO CELL: [phone]  JANNETH SÁNCHEZ CELL: [phone]    From 5pm-7am Answering Service [phone]    -- RENAL FOLLOW UP NOTE ---Date of Service 10-14-21 @ 15:48    Patient is a 60y old  Male who presents with a chief complaint of Right Toe Necrosis (14 Oct 2021 11:42)      Patient seen and examined at bedside. No chest pain/sob    VITALS:  T(F): 98.6 (10-14-21 @ 05:40), Max: 99.1 (10-13-21 @ 17:10)  HR: 82 (10-14-21 @ 10:29)  BP: 144/78 (10-14-21 @ 10:29)  RR: 19 (10-14-21 @ 10:29)  SpO2: 100% (10-14-21 @ 10:29)  Wt(kg): --    10-13 @ 07:01  -  10-14 @ 07:00  --------------------------------------------------------  IN: 800 mL / OUT: 800 mL / NET: 0 mL          PHYSICAL EXAM:  Constitutional: NAD  Neck: No JVD  Respiratory: CTAB, no wheezes, rales or rhonchi  Cardiovascular: S1, S2, RRR  Gastrointestinal: BS+, soft, NT/ND  Extremities: No peripheral edema, right a    Hospital Medications:   MEDICATIONS  (STANDING):  acetaminophen   Tablet .. 975 milliGRAM(s) Oral every 6 hours  artificial  tears Solution 2 Drop(s) Left EYE two times a day  aspirin enteric coated 81 milliGRAM(s) Oral daily  atorvastatin 40 milliGRAM(s) Oral at bedtime  cadexomer iodine 0.9% Gel 1 Application(s) Topical daily  carvedilol 25 milliGRAM(s) Oral every 12 hours  cefepime   IVPB 2000 milliGRAM(s) IV Intermittent every 8 hours  dextrose 40% Gel 15 Gram(s) Oral once  dextrose 5%. 1000 milliLiter(s) (50 mL/Hr) IV Continuous <Continuous>  dextrose 5%. 1000 milliLiter(s) (100 mL/Hr) IV Continuous <Continuous>  dextrose 50% Injectable 25 Gram(s) IV Push once  dextrose 50% Injectable 12.5 Gram(s) IV Push once  dextrose 50% Injectable 25 Gram(s) IV Push once  glucagon  Injectable 1 milliGRAM(s) IntraMuscular once  heparin   Injectable 5000 Unit(s) SubCutaneous every 8 hours  influenza   Vaccine 0.5 milliLiter(s) IntraMuscular once  insulin lispro (ADMELOG) corrective regimen sliding scale   SubCutaneous three times a day before meals  insulin lispro (ADMELOG) corrective regimen sliding scale   SubCutaneous at bedtime  lisinopril 10 milliGRAM(s) Oral daily  metroNIDAZOLE    Tablet 500 milliGRAM(s) Oral every 8 hours  multivitamin 1 Tablet(s) Oral daily  potassium phosphate / sodium phosphate Tablet (K-PHOS No. 2) 1 Tablet(s) Oral two times a day      LABS:  10-14    131<L>  |  95<L>  |  18  ----------------------------<  197<H>  4.6   |  28  |  0.81    Ca    8.8      14 Oct 2021 06:21  Phos  1.6     10-14  Mg     2.00     10-14      Creatinine Trend: 0.81 <--, 0.71 <--, 0.72 <--, 0.62 <--, 0.76 <--, 0.67 <--, 0.75 <--    Phosphorus Level, Serum: 1.6 mg/dL (10-14 @ 06:21)                              10.9   14.81 )-----------( 550      ( 14 Oct 2021 06:21 )             32.0     Urine Studies:  Urinalysis - [09-25-21 @ 15:23]      Color Yellow / Appearance Clear / SG 1.021 / pH 5.5      Gluc Negative / Ketone Negative  / Bili Negative / Urobili <2 mg/dL       Blood Negative / Protein Trace / Leuk Est Negative / Nitrite Negative      RBC 2 / WBC 3 / Hyaline 1 / Gran  / Sq Epi  / Non Sq Epi 1 / Bacteria Negative      HbA1c 5.7      [12-20-18 @ 06:30]  TSH 0.76      [09-25-21 @ 12:52]  Lipid: chol 139, , HDL 26, LDL --      [09-26-21 @ 08:30]    HCV 0.09, Nonreact      [09-26-21 @ 19:55]      RADIOLOGY & ADDITIONAL STUDIES:

## 2021-10-14 NOTE — OCCUPATIONAL THERAPY INITIAL EVALUATION ADULT - DIAGNOSIS, OT EVAL
s/p non-healing right 5th toe wound, s/p dry gangrene, s/p 10/12/2021 right LE AKA.; decreased functional mobility; decreased ADL performance

## 2021-10-14 NOTE — PROGRESS NOTE ADULT - SUBJECTIVE AND OBJECTIVE BOX
VASCULAR SURGERY PROGRESS NOTE    Subjective:     Vital Signs:  Vital Signs Last 24 Hrs  T(C): 37.1 (14 Oct 2021 01:01), Max: 37.3 (13 Oct 2021 17:10)  T(F): 98.7 (14 Oct 2021 01:01), Max: 99.1 (13 Oct 2021 17:10)  HR: 86 (14 Oct 2021 01:01) (77 - 90)  BP: 148/85 (14 Oct 2021 01:01) (125/74 - 150/76)  BP(mean): --  RR: 18 (14 Oct 2021 01:01) (18 - 19)  SpO2: 100% (14 Oct 2021 01:01) (99% - 100%)    Physical Exam:  General:   Lungs:   CV:   Abdomen:  Extremities:          VASCULAR SURGERY PROGRESS NOTE    Subjective: Patient seen and examined at bedside, resting comfortably with no compalints.    Vital Signs:  Vital Signs Last 24 Hrs  T(C): 37.1 (14 Oct 2021 01:01), Max: 37.3 (13 Oct 2021 17:10)  T(F): 98.7 (14 Oct 2021 01:01), Max: 99.1 (13 Oct 2021 17:10)  HR: 86 (14 Oct 2021 01:01) (77 - 90)  BP: 148/85 (14 Oct 2021 01:01) (125/74 - 150/76)  BP(mean): --  RR: 18 (14 Oct 2021 01:01) (18 - 19)  SpO2: 100% (14 Oct 2021 01:01) (99% - 100%)    Physical Exam:  General: A&Ox3 NAD  Extremities: R AKA dressing c/d/i

## 2021-10-14 NOTE — PROGRESS NOTE ADULT - ASSESSMENT
59 y/o male, with a PmHx of CHF, HTN, CAD, HLD, DM, CKD, PVD with a Right Femoral-Femoral Bypass in 2018 on apixiban, presented to the [facility] ED with worsening Right Toe pain x 1 week.      Ravin   pt with multiple Ravin in past   RAVIN possible prerenal as SG is high s/p IVF  Renal function improved  On ACE Monitor closley  s/p angio 9/29. renal function stable  Monitor BMP  AVoid further nephrotoxics, NSAID RCA    Hypokalemia  Repelted KCl    MOnitor  serum K    Acidosis   improving   MOnitor serum Co2    hypomagnesemia  supplemented by team  monitor    hypophosphatemia  phos 30 iv x1  monitor    hyponatremia  work up suggested SIADH  Na low again sec to hyperglycemia  continue Na tab 1g tid  continue free water restriction  avoid hypotonic solutions  optimize dm control  monitor level    hypocalcemia  check pth, vit d 25  better  monitor

## 2021-10-14 NOTE — CONSULT NOTE ADULT - SUBJECTIVE AND OBJECTIVE BOX
Patient is a 60y old  Male who presents with a chief complaint of Right Toe Necrosis (14 Oct 2021 11:08)      HPI:  59 y/o male, with a PmHx of CHF, HTN, CAD, HLD, DM, CKD, PVD with a Right Femoral-Femoral Bypass in 2018 on apixiban, presented to the [facility] ED with worsening Right Toe pain x 1 week.  Pt states about 1 month ago he started to get pain in his right foot 5th digit. He states he wife had  from Lung Cancer a few days prior but waited to see his PCP until a week later. He states when he finally went into see his PCP, his doctor states he was likely Gout and was prescribed Indomethacin. Pt states his foot wasn't getting any better and the other day he noticed some blood coming from the bottom of his toe so he had gone today to see his Podiatrist (had trouble getting an appointment over this past week). In the Podiatrist office, he was told to go to the ED because he could have a necrotic toe and since he had a history of a bypass in that leg before, there is a high risk of it getting worse. Pt denies any fever, chills, chest pain, HA, dizziness, blurred vision, abd pain, sick contacts, recent travel. The only complaint he was having is pain to the right 5th toe. In the [facility] ED today, he was seen by Podiatry and a bedside debridement was done and then wrapped in a dry sterile dressing. 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 Pt was started on Clindamycin and was then admitted to medicine for further medical management and treatment. (25 Sep 2021 15:50)    s/p R AKA on 10/12    REVIEW OF SYSTEMS  Constitutional - No fever, No weight loss, No fatigue  HEENT - No eye pain, No visual disturbances, No difficulty hearing, No tinnitus, No vertigo, No neck pain  Respiratory - No cough, No wheezing, No shortness of breath  Cardiovascular - No chest pain, No palpitations  Gastrointestinal - No abdominal pain, No nausea, No vomiting, No diarrhea, No constipation  Genitourinary - No dysuria, No frequency, No hematuria, No incontinence  Neurological - No headaches, No memory loss, No loss of strength, No numbness, No tremors  Skin - No itching, No rashes, No lesions   Endocrine - No temperature intolerance  Musculoskeletal - No joint pain, No joint swelling, No muscle pain  Psychiatric - No depression, No anxiety    PAST MEDICAL & SURGICAL HISTORY  DM (diabetes mellitus)    HTN (hypertension)    HLD (hyperlipidemia)    CHF (congestive heart failure)    CKD (chronic kidney disease)    PVD (peripheral vascular disease)    S/P femoral-femoral bypass surgery        SOCIAL HISTORY  Smoking - Denied  EtOH - Denied   Drugs - Denied    FUNCTIONAL HISTORY  Lives alone in private house with stairs  Independent with cane at baseline    CURRENT FUNCTIONAL STATUS   preop 10/12  Bed Mobility: Sit to Supine:     · Level of [place]	contact guard  · Physical Assist/Nonphysical Assist	1 person assist; nonverbal cues (demo/gestures); verbal cues  · Assistive Device	bed rails    Bed Mobility: Supine to Sit:     · Level of [place]	minimum assist (75% patients effort)  · Physical Assist/Nonphysical Assist	1 person assist; nonverbal cues (demo/gestures); verbal cues  · Assistive Device	bed rails    Bed Mobility Analysis:     · Bed Mobility Limitations	decreased ability to use legs for bridging/pushing  · Impairments Contributing to Impaired Bed Mobility	impaired balance; decreased strength    Transfer: Sit to Stand:     · Level of [place]	minimum assist (75% patients effort)  · Physical Assist/Nonphysical Assist	1 person assist; nonverbal cues (demo/gestures); verbal cues  · Weight-Bearing Restrictions	nonweight-bearing; right LE  · Assistive Device	rolling walker    Transfer: Stand to Sit:     · Level of [place]	minimum assist (75% patients effort)  · Physical Assist/Nonphysical Assist	1 person assist; nonverbal cues (demo/gestures); verbal cues  · Weight-Bearing Restrictions	nonweight-bearing; right LE  · Assistive Device	rolling walker    Sit/Stand Transfer Safety Analysis:     · Transfer Safety Concerns Noted	decreased weight-shifting ability  · Impairments Contributing to Impaired Transfers	impaired balance; decreased strength      FAMILY HISTORY   Family history of stroke (Father, Sibling)    Family history of MI (myocardial infarction)    FH: renal cell carcinoma    Family history of depression    FHx: diabetes mellitus    FH: heart disease        RECENT LABS/IMAGING  CBC Full  -  ( 14 Oct 2021 06:21 )  WBC Count : 14.81 K/uL  RBC Count : 3.44 M/uL  Hemoglobin : 10.9 g/dL  Hematocrit : 32.0 %  Platelet Count - Automated : 550 K/uL  Mean Cell Volume : 93.0 fL  Mean Cell Hemoglobin : 31.7 pg  Mean Cell Hemoglobin Concentration : 34.1 gm/dL  Auto Neutrophil # : x  Auto Lymphocyte # : x  Auto Monocyte # : x  Auto Eosinophil # : x  Auto Basophil # : x  Auto Neutrophil % : x  Auto Lymphocyte % : x  Auto Monocyte % : x  Auto Eosinophil % : x  Auto Basophil % : x    10-14    131<L>  |  95<L>  |  18  ----------------------------<  197<H>  4.6   |  28  |  0.81    Ca    8.8      14 Oct 2021 06:21  Phos  1.6     10-14  Mg     2.00     10-14          VITALS  T(C): 37 (10-14-21 @ 05:40), Max: 37.3 (10-13-21 @ 17:10)  HR: 82 (10-14-21 @ 10:29) (77 - 86)  BP: 144/78 (10-14-21 @ 10:29) (141/80 - 153/89)  RR: 19 (10-14-21 @ 10:29) (18 - 19)  SpO2: 100% (10-14-21 @ 10:29) (99% - 100%)  Wt(kg): --    ALLERGIES  penicillin (Other)      MEDICATIONS   acetaminophen   Tablet .. 975 milliGRAM(s) Oral every 6 hours  artificial  tears Solution 2 Drop(s) Left EYE two times a day  aspirin enteric coated 81 milliGRAM(s) Oral daily  atorvastatin 40 milliGRAM(s) Oral at bedtime  cadexomer iodine 0.9% Gel 1 Application(s) Topical daily  carvedilol 25 milliGRAM(s) Oral every 12 hours  cefepime   IVPB 2000 milliGRAM(s) IV Intermittent every 8 hours  dextrose 40% Gel 15 Gram(s) Oral once  dextrose 5%. 1000 milliLiter(s) IV Continuous <Continuous>  dextrose 5%. 1000 milliLiter(s) IV Continuous <Continuous>  dextrose 50% Injectable 25 Gram(s) IV Push once  dextrose 50% Injectable 12.5 Gram(s) IV Push once  dextrose 50% Injectable 25 Gram(s) IV Push once  glucagon  Injectable 1 milliGRAM(s) IntraMuscular once  heparin   Injectable 5000 Unit(s) SubCutaneous every 8 hours  HYDROmorphone  Injectable 0.5 milliGRAM(s) IV Push every 4 hours PRN  influenza   Vaccine 0.5 milliLiter(s) IntraMuscular once  insulin lispro (ADMELOG) corrective regimen sliding scale   SubCutaneous three times a day before meals  insulin lispro (ADMELOG) corrective regimen sliding scale   SubCutaneous at bedtime  lisinopril 10 milliGRAM(s) Oral daily  melatonin 3 milliGRAM(s) Oral at bedtime PRN  metroNIDAZOLE    Tablet 500 milliGRAM(s) Oral every 8 hours  multivitamin 1 Tablet(s) Oral daily  oxyCODONE    IR 10 milliGRAM(s) Oral every 4 hours PRN  oxyCODONE    IR 5 milliGRAM(s) Oral every 4 hours PRN  potassium phosphate / sodium phosphate Tablet (K-PHOS No. 2) 1 Tablet(s) Oral two times a day      ----------------------------------------------------------------------------------------  PHYSICAL EXAM - incomplete  Constitutional - NAD, Comfortable  HEENT - NCAT, EOMI  Neck - Supple, No limited ROM  Chest - CTA bilaterally, No wheeze, No rhonchi, No crackles  Cardiovascular - RRR, S1S2, No murmurs  Abdomen - BS+, Soft, NTND  Extremities - No C/C/E, No calf tenderness   Neurologic Exam -                    Cognitive - Awake, Alert, AAO to self, place, date, year, situation     Communication - Fluent, No dysarthria     Cranial Nerves - CN 2-12 intact     Motor - No focal deficits                    LEFT    UE - ShAB 5/5, EF 5/5, EE 5/5, WE 5/5,  5/5                    RIGHT UE - ShAB 5/5, EF 5/5, EE 5/5, WE 5/5,  5/5                    LEFT    LE - HF 5/5, KE 5/5, DF 5/5, PF 5/5                    RIGHT LE - HF 5/5, KE 5/5, DF 5/5, PF 5/5        Sensory - Intact to LT     Reflexes - DTR Intact, No primitive reflexive     Coordination - FTN intact     OculoVestibular - No saccades, No nystagmus, VOR         Balance - WNL Static  Psychiatric - Mood stable, Affect WNL  ----------------------------------------------------------------------------------------  ASSESSMENT/PLAN    Pain -  DVT PPX -   Rehab -  Patient is a 60y old  Male who presents with a chief complaint of Right Toe Necrosis (14 Oct 2021 11:08)      HPI:  59 y/o male, with a PmHx of CHF, HTN, CAD, HLD, DM, CKD, PVD with a Right Femoral-Femoral Bypass in 2018 on apixiban, presented to the [facility] ED with worsening Right Toe pain x 1 week.  Pt states about 1 month ago he started to get pain in his right foot 5th digit. He states he wife had  from Lung Cancer a few days prior but waited to see his PCP until a week later. He states when he finally went into see his PCP, his doctor states he was likely Gout and was prescribed Indomethacin. Pt states his foot wasn't getting any better and the other day he noticed some blood coming from the bottom of his toe so he had gone today to see his Podiatrist (had trouble getting an appointment over this past week). In the Podiatrist office, he was told to go to the ED because he could have a necrotic toe and since he had a history of a bypass in that leg before, there is a high risk of it getting worse. Pt denies any fever, chills, chest pain, HA, dizziness, blurred vision, abd pain, sick contacts, recent travel. The only complaint he was having is pain to the right 5th toe. In the [facility] ED today, he was seen by Podiatry and a bedside debridement was done and then wrapped in a dry sterile dressing. 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 Pt was started on Clindamycin and was then admitted to medicine for further medical management and treatment. (25 Sep 2021 15:50)    s/p foot amputation during admission  s/p R AKA on 10/12  reports some discomfort which is relieved with oxy ir    REVIEW OF SYSTEMS  Constitutional - No fever, No weight loss, No fatigue  HEENT - No eye pain, No visual disturbances, No difficulty hearing, No tinnitus, No vertigo, No neck pain  Respiratory - No cough, No wheezing, No shortness of breath  Cardiovascular - No chest pain, No palpitations  Gastrointestinal - No abdominal pain, No nausea, No vomiting, No diarrhea, No constipation  Genitourinary - No dysuria, No frequency, No hematuria, No incontinence  Neurological - No headaches, No memory loss, No loss of strength, No numbness, No tremors  Skin - No itching, No rashes, No lesions   Endocrine - No temperature intolerance  Musculoskeletal -+  pain  Psychiatric - No depression, No anxiety    PAST MEDICAL & SURGICAL HISTORY  DM (diabetes mellitus)    HTN (hypertension)    HLD (hyperlipidemia)    CHF (congestive heart failure)    CKD (chronic kidney disease)    PVD (peripheral vascular disease)    S/P femoral-femoral bypass surgery        SOCIAL HISTORY  Smoking - Denied  EtOH - Denied   Drugs - Denied    FUNCTIONAL HISTORY  Lives with brother in private house with 3 stairs outside 14 stairs inside  Independent at baseline, recently began using cane    plan is to stay with a different brother, who lives in house with basement level without stairs to enter    CURRENT FUNCTIONAL STATUS   preop 10/12  Bed Mobility: Sit to Supine:     · Level of [place]	contact guard  · Physical Assist/Nonphysical Assist	1 person assist; nonverbal cues (demo/gestures); verbal cues  · Assistive Device	bed rails    Bed Mobility: Supine to Sit:     · Level of [place]	minimum assist (75% patients effort)  · Physical Assist/Nonphysical Assist	1 person assist; nonverbal cues (demo/gestures); verbal cues  · Assistive Device	bed rails    Bed Mobility Analysis:     · Bed Mobility Limitations	decreased ability to use legs for bridging/pushing  · Impairments Contributing to Impaired Bed Mobility	impaired balance; decreased strength    Transfer: Sit to Stand:     · Level of [place]	minimum assist (75% patients effort)  · Physical Assist/Nonphysical Assist	1 person assist; nonverbal cues (demo/gestures); verbal cues  · Weight-Bearing Restrictions	nonweight-bearing; right LE  · Assistive Device	rolling walker    Transfer: Stand to Sit:     · Level of [place]	minimum assist (75% patients effort)  · Physical Assist/Nonphysical Assist	1 person assist; nonverbal cues (demo/gestures); verbal cues  · Weight-Bearing Restrictions	nonweight-bearing; right LE  · Assistive Device	rolling walker    Sit/Stand Transfer Safety Analysis:     · Transfer Safety Concerns Noted	decreased weight-shifting ability  · Impairments Contributing to Impaired Transfers	impaired balance; decreased strength      FAMILY HISTORY   Family history of stroke (Father, Sibling)    Family history of MI (myocardial infarction)    FH: renal cell carcinoma    Family history of depression    FHx: diabetes mellitus    FH: heart disease        RECENT LABS/IMAGING  CBC Full  -  ( 14 Oct 2021 06:21 )  WBC Count : 14.81 K/uL  RBC Count : 3.44 M/uL  Hemoglobin : 10.9 g/dL  Hematocrit : 32.0 %  Platelet Count - Automated : 550 K/uL  Mean Cell Volume : 93.0 fL  Mean Cell Hemoglobin : 31.7 pg  Mean Cell Hemoglobin Concentration : 34.1 gm/dL  Auto Neutrophil # : x  Auto Lymphocyte # : x  Auto Monocyte # : x  Auto Eosinophil # : x  Auto Basophil # : x  Auto Neutrophil % : x  Auto Lymphocyte % : x  Auto Monocyte % : x  Auto Eosinophil % : x  Auto Basophil % : x    10-14    131<L>  |  95<L>  |  18  ----------------------------<  197<H>  4.6   |  28  |  0.81    Ca    8.8      14 Oct 2021 06:21  Phos  1.6     10-14  Mg     2.00     10-14          VITALS  T(C): 37 (10-14-21 @ 05:40), Max: 37.3 (10-13-21 @ 17:10)  HR: 82 (10-14-21 @ 10:29) (77 - 86)  BP: 144/78 (10-14-21 @ 10:29) (141/80 - 153/89)  RR: 19 (10-14-21 @ 10:29) (18 - 19)  SpO2: 100% (10-14-21 @ 10:29) (99% - 100%)  Wt(kg): --    ALLERGIES  penicillin (Other)      MEDICATIONS   acetaminophen   Tablet .. 975 milliGRAM(s) Oral every 6 hours  artificial  tears Solution 2 Drop(s) Left EYE two times a day  aspirin enteric coated 81 milliGRAM(s) Oral daily  atorvastatin 40 milliGRAM(s) Oral at bedtime  cadexomer iodine 0.9% Gel 1 Application(s) Topical daily  carvedilol 25 milliGRAM(s) Oral every 12 hours  cefepime   IVPB 2000 milliGRAM(s) IV Intermittent every 8 hours  dextrose 40% Gel 15 Gram(s) Oral once  dextrose 5%. 1000 milliLiter(s) IV Continuous <Continuous>  dextrose 5%. 1000 milliLiter(s) IV Continuous <Continuous>  dextrose 50% Injectable 25 Gram(s) IV Push once  dextrose 50% Injectable 12.5 Gram(s) IV Push once  dextrose 50% Injectable 25 Gram(s) IV Push once  glucagon  Injectable 1 milliGRAM(s) IntraMuscular once  heparin   Injectable 5000 Unit(s) SubCutaneous every 8 hours  HYDROmorphone  Injectable 0.5 milliGRAM(s) IV Push every 4 hours PRN  influenza   Vaccine 0.5 milliLiter(s) IntraMuscular once  insulin lispro (ADMELOG) corrective regimen sliding scale   SubCutaneous three times a day before meals  insulin lispro (ADMELOG) corrective regimen sliding scale   SubCutaneous at bedtime  lisinopril 10 milliGRAM(s) Oral daily  melatonin 3 milliGRAM(s) Oral at bedtime PRN  metroNIDAZOLE    Tablet 500 milliGRAM(s) Oral every 8 hours  multivitamin 1 Tablet(s) Oral daily  oxyCODONE    IR 10 milliGRAM(s) Oral every 4 hours PRN  oxyCODONE    IR 5 milliGRAM(s) Oral every 4 hours PRN  potassium phosphate / sodium phosphate Tablet (K-PHOS No. 2) 1 Tablet(s) Oral two times a day      ----------------------------------------------------------------------------------------  PHYSICAL EXAM - incomplete  Constitutional - NAD, Comfortable  HEENT - NCAT, EOMI  Neck - Supple, No limited ROM  Chest - CTA bilaterally, No wheeze, No rhonchi, No crackles  Cardiovascular - RRR, [id number] -  Soft, NTND  Extremities - R residual limb dressing intact with ace wrap, No calf tenderness   Neurologic Exam -                    Cognitive - Awake, Alert, AAO to self, place, date, year, situation     Communication - Fluent, No dysarthria     Cranial Nerves - CN 2-12 intact     Motor - No focal deficits                    LEFT    UE - ShAB 5/5, EF 5/5, EE 5/5, WE 5/5,  5/5                    RIGHT UE - ShAB 5/5, EF 5/5, EE 5/5, WE 5/5,  5/5                    LEFT    LE - HF 5/5, KE 5/5, DF 5/5, PF 5/5                    RIGHT LE - HF 5/5      Sensory - Intact to LT      OculoVestibular - No saccades, No nystagmus, VOR         Balance - WNL Static  Psychiatric - Mood stable, Affect WNL  ----------------------------------------------------------------------------------------  ASSESSMENT/PLAN  61 yo m s/p R aka  Pain - oxy ir prn  Diet: dash/tlc, consistent carb, supplement glucerna  metronidazole and cefepime for wound infection  DVT PPX - heparin  on schedule for PT and OT today  Rehab -  pending progress with bedside therapy. anticipate patient can tolerate 3 hrs/day of therapy with medical supervision.  However patient prefers to be fit for prosthesis prior to discharge home and therefore prefers subacute rehab to facilitate longer rehab stay.  He reports he can stay with his brother in basement apartment without stairs, and could go home wheelchair level, but prefers to be able to ambulate with prosthesis at home.

## 2021-10-14 NOTE — PROGRESS NOTE ADULT - ASSESSMENT
Assessment:          Plan: · Assessment	  59yo M with Hx CHF, CAD (no stents), HTN, HLD, DM, PVD with h/o right fem-pop bypass ([facility] 2018) who presents with non-healing R toe wound, found to have dry gangrene with small area of wet conversion and bypass occlusion now s/p 10/6 diagnostic RLE angiogram, b/l kissing iliac stents, right groin cutdown an PT exploration. Pt s/p 10/8 RLE guillotine and s/p 10/12 R AKA.    Plan:  - Pain control as needed  - OOBAT  - Diet: DASH/TLC, diabetic  - Monitor R AKA dressing, take down on day 3  - ASA  - DVT ppx heparin SQ  - Continue Cefepime+ Metronidazole per Dr. Lundberg  - Mod insulin sliding scale  - f/u PM&R, PT,OT  - Dispo planning

## 2021-10-14 NOTE — CONSULT NOTE ADULT - CONSULT REASON
eval for rehab
Right foot 5th digit gangrene
hx CAD  rose marie-operative cardiac risk assessment
glendy
Rt toe infection
possible revascularization

## 2021-10-14 NOTE — CONSULT NOTE ADULT - CONSULT REQUESTED DATE/TIME
14-Oct-2021 11:44
25-Sep-2021 13:09
27-Sep-2021 14:32
26-Sep-2021 10:36
25-Sep-2021 13:39
28-Sep-2021 14:35

## 2021-10-14 NOTE — PROGRESS NOTE ADULT - SUBJECTIVE AND OBJECTIVE BOX
CARDIOLOGY      S: no chest pain or sob; ros otherwise negative.     DATE OF SERVICE - 10-14-21     Review of Systems:   Constitutional: [ ] fevers, [ ] chills.   Skin: [ ] dry skin. [ ] rashes.  Psychiatric: [ ] depression, [ ] anxiety.   Gastrointestinal: [ ] BRBPR, [ ] melena.   Neurological: [ ] confusion. [ ] seizures. [ ] shuffling gait.   Ears,Nose,Mouth and Throat: [ ] ear pain [ ] sore throat.   Eyes: [ ] diplopia.   Respiratory: [ ] hemoptysis. [ ] shortness of breath  Cardiovascular: See HPI above  Hematologic/Lymphatic: [ ] anemia. [ ] painful nodes. [ ] prolonged bleeding.   Genitourinary: [ ] hematuria. [ ] flank pain.   Endocrine: [ ] significant change in weight. [ ] intolerance to heat and cold.     Review of systems [x ] otherwise negative, [ ] otherwise unable to obtain    FH: no family history of sudden cardiac death in first degree relatives    SH: [ ] tobacco, [ ] alcohol, [ ] drugs    acetaminophen   Tablet .. 975 milliGRAM(s) Oral every 6 hours  artificial  tears Solution 2 Drop(s) Left EYE two times a day  aspirin enteric coated 81 milliGRAM(s) Oral daily  atorvastatin 40 milliGRAM(s) Oral at bedtime  cadexomer iodine 0.9% Gel 1 Application(s) Topical daily  carvedilol 25 milliGRAM(s) Oral every 12 hours  cefepime   IVPB 2000 milliGRAM(s) IV Intermittent every 8 hours  dextrose 40% Gel 15 Gram(s) Oral once  dextrose 5%. 1000 milliLiter(s) IV Continuous <Continuous>  dextrose 5%. 1000 milliLiter(s) IV Continuous <Continuous>  dextrose 50% Injectable 25 Gram(s) IV Push once  dextrose 50% Injectable 12.5 Gram(s) IV Push once  dextrose 50% Injectable 25 Gram(s) IV Push once  glucagon  Injectable 1 milliGRAM(s) IntraMuscular once  heparin   Injectable 5000 Unit(s) SubCutaneous every 8 hours  HYDROmorphone  Injectable 0.5 milliGRAM(s) IV Push every 4 hours PRN  influenza   Vaccine 0.5 milliLiter(s) IntraMuscular once  insulin lispro (ADMELOG) corrective regimen sliding scale   SubCutaneous three times a day before meals  insulin lispro (ADMELOG) corrective regimen sliding scale   SubCutaneous at bedtime  lisinopril 10 milliGRAM(s) Oral daily  melatonin 3 milliGRAM(s) Oral at bedtime PRN  metroNIDAZOLE    Tablet 500 milliGRAM(s) Oral every 8 hours  multivitamin 1 Tablet(s) Oral daily  oxyCODONE    IR 10 milliGRAM(s) Oral every 4 hours PRN  oxyCODONE    IR 5 milliGRAM(s) Oral every 4 hours PRN  potassium phosphate / sodium phosphate Tablet (K-PHOS No. 2) 1 Tablet(s) Oral two times a day                            10.9   14.81 )-----------( 550      ( 14 Oct 2021 06:21 )             32.0       10-14    131<L>  |  95<L>  |  18  ----------------------------<  197<H>  4.6   |  28  |  0.81    Ca    8.8      14 Oct 2021 06:21  Phos  1.6     10-14  Mg     2.00     10-14              T(C): 37 (10-14-21 @ 05:40), Max: 37.3 (10-13-21 @ 17:10)  HR: 82 (10-14-21 @ 10:29) (77 - 86)  BP: 144/78 (10-14-21 @ 10:29) (141/80 - 153/89)  RR: 19 (10-14-21 @ 10:29) (18 - 19)  SpO2: 100% (10-14-21 @ 10:29) (99% - 100%)  Wt(kg): --    I&O's Summary    13 Oct 2021 07:01  -  14 Oct 2021 07:00  --------------------------------------------------------  IN: 800 mL / OUT: 800 mL / NET: 0 mL        General: Well nourished in no acute distress. Alert and Oriented * 3.   Head: Normocephalic and atraumatic.   Neck: No JVD. No bruits. Supple. Does not appear to be enlarged.   Cardiovascular: + S1,S2 ; RRR Soft systolic murmur at the left lower sternal border. No rubs noted.    Lungs: CTA b/l. No rhonchi, rales or wheezes.   Abdomen: + BS, soft. Non tender. Non distended. No rebound. No guarding.   Extremities: No clubbing/cyanosis/edema.    Skin: Warm and moist. The patient's skin has normal elasticity and good skin turgor.   Psychiatric: Appropriate mood and affect.    TTE: < from: Transthoracic Echocardiogram (09.29.21 @ 17:35) >  1. Calcified trileaflet aortic valve with normal opening.  2. Normal left ventricular internal dimensions and wall  thicknesses.  3. Normal left ventricular systolic function. No segmental  wall motion abnormalities.  4. Normal right ventricular size and function.    < end of copied text >    NST: < from: Nuclear Stress Test-Pharmacologic (Nuclear Stress Test-Pharmacologic .) (09.30.21 @ 16:16) >  IMPRESSIONS:Mildly Abnormal Study  * Myocardial Perfusion SPECT results are mildly abnormal.  * Review of raw data shows: The study is of fair technical  quality with adjacent bowel tracer uptake  * The left ventricle was normal in size. There is a small,  mild defect in septal wall that is fixed, suggestive of  mild scarring  * No clear evidence of ischemia.  * Post-stress gated wall motion analysis was performed  (LVEF = 66 %;LVEDV = 66 ml.), revealing normal LV  function. There was no segmental wall motion abnormality.  Septal wall motion appeared normal. RV function appeared  normal.    < end of copied text >      A/P: 59 y/o male PMH HTN, HLD, DM, CKD, PVD with a Right Femoral-Femoral Bypass in 2018 on apixiban, presented to the [facility] ED with worsening Right Toe pain x 1 week, CT with occluded bypass.  Now awaiting further work up from Vascular. Cardiology called for preop evaluation. s/p guillotine amputation  right foot, s/p R AKA    -TTE with normal LV functinon   -NST with no ischemia  -tolerated procedure well from CV perspective  -no clinical heart failure or anginal symptoms   -pain management and post op care per vascular  -continue medical therapy for known PAD including asa, lipitor, coreg  -no further inpatient cardiac workup anticipated at this time    Imani Cesar MD

## 2021-10-14 NOTE — OCCUPATIONAL THERAPY INITIAL EVALUATION ADULT - LIVES WITH, PROFILE
Patient live alone in a private home, although reports that he will be residing with brother following rehab stay. Patient's brother's house has a few steps to enter and a stairway to the basement where the patient will reside. Patient will have steps to manage at his brother's house and in his own when he returns.

## 2021-10-15 LAB
ANION GAP SERPL CALC-SCNC: 12 MMOL/L — SIGNIFICANT CHANGE UP (ref 7–14)
BUN SERPL-MCNC: 23 MG/DL — SIGNIFICANT CHANGE UP (ref 7–23)
CALCIUM SERPL-MCNC: 9.2 MG/DL — SIGNIFICANT CHANGE UP (ref 8.4–10.5)
CHLORIDE SERPL-SCNC: 97 MMOL/L — LOW (ref 98–107)
CO2 SERPL-SCNC: 24 MMOL/L — SIGNIFICANT CHANGE UP (ref 22–31)
CREAT SERPL-MCNC: 0.8 MG/DL — SIGNIFICANT CHANGE UP (ref 0.5–1.3)
GLUCOSE SERPL-MCNC: 197 MG/DL — HIGH (ref 70–99)
HCT VFR BLD CALC: 31.4 % — LOW (ref 39–50)
HGB BLD-MCNC: 11.1 G/DL — LOW (ref 13–17)
MAGNESIUM SERPL-MCNC: 2.1 MG/DL — SIGNIFICANT CHANGE UP (ref 1.6–2.6)
MCHC RBC-ENTMCNC: 32.5 PG — SIGNIFICANT CHANGE UP (ref 27–34)
MCHC RBC-ENTMCNC: 35.4 GM/DL — SIGNIFICANT CHANGE UP (ref 32–36)
MCV RBC AUTO: 91.8 FL — SIGNIFICANT CHANGE UP (ref 80–100)
NRBC # BLD: 0 /100 WBCS — SIGNIFICANT CHANGE UP
NRBC # FLD: 0 K/UL — SIGNIFICANT CHANGE UP
PHOSPHATE SERPL-MCNC: 3.1 MG/DL — SIGNIFICANT CHANGE UP (ref 2.5–4.5)
PLATELET # BLD AUTO: 550 K/UL — HIGH (ref 150–400)
POTASSIUM SERPL-MCNC: 4.6 MMOL/L — SIGNIFICANT CHANGE UP (ref 3.5–5.3)
POTASSIUM SERPL-SCNC: 4.6 MMOL/L — SIGNIFICANT CHANGE UP (ref 3.5–5.3)
RBC # BLD: 3.42 M/UL — LOW (ref 4.2–5.8)
RBC # FLD: 14.7 % — HIGH (ref 10.3–14.5)
SARS-COV-2 RNA SPEC QL NAA+PROBE: SIGNIFICANT CHANGE UP
SODIUM SERPL-SCNC: 133 MMOL/L — LOW (ref 135–145)
WBC # BLD: 12.72 K/UL — HIGH (ref 3.8–10.5)
WBC # FLD AUTO: 12.72 K/UL — HIGH (ref 3.8–10.5)

## 2021-10-15 RX ORDER — INSULIN GLARGINE 100 [IU]/ML
5 INJECTION, SOLUTION SUBCUTANEOUS AT BEDTIME
Refills: 0 | Status: DISCONTINUED | OUTPATIENT
Start: 2021-10-15 | End: 2021-10-18

## 2021-10-15 RX ORDER — HUMAN INSULIN 100 [IU]/ML
3 INJECTION, SUSPENSION SUBCUTANEOUS ONCE
Refills: 0 | Status: COMPLETED | OUTPATIENT
Start: 2021-10-15 | End: 2021-10-15

## 2021-10-15 RX ADMIN — Medication 975 MILLIGRAM(S): at 21:52

## 2021-10-15 RX ADMIN — HEPARIN SODIUM 5000 UNIT(S): 5000 INJECTION INTRAVENOUS; SUBCUTANEOUS at 14:35

## 2021-10-15 RX ADMIN — CEFEPIME 100 MILLIGRAM(S): 1 INJECTION, POWDER, FOR SOLUTION INTRAMUSCULAR; INTRAVENOUS at 05:24

## 2021-10-15 RX ADMIN — Medication 1 TABLET(S): at 05:31

## 2021-10-15 RX ADMIN — HUMAN INSULIN 3 UNIT(S): 100 INJECTION, SUSPENSION SUBCUTANEOUS at 12:20

## 2021-10-15 RX ADMIN — CEFEPIME 100 MILLIGRAM(S): 1 INJECTION, POWDER, FOR SOLUTION INTRAMUSCULAR; INTRAVENOUS at 22:02

## 2021-10-15 RX ADMIN — OXYCODONE HYDROCHLORIDE 10 MILLIGRAM(S): 5 TABLET ORAL at 12:26

## 2021-10-15 RX ADMIN — HEPARIN SODIUM 5000 UNIT(S): 5000 INJECTION INTRAVENOUS; SUBCUTANEOUS at 05:25

## 2021-10-15 RX ADMIN — CARVEDILOL PHOSPHATE 25 MILLIGRAM(S): 80 CAPSULE, EXTENDED RELEASE ORAL at 07:33

## 2021-10-15 RX ADMIN — Medication 975 MILLIGRAM(S): at 05:35

## 2021-10-15 RX ADMIN — Medication 81 MILLIGRAM(S): at 12:22

## 2021-10-15 RX ADMIN — Medication 8: at 12:21

## 2021-10-15 RX ADMIN — Medication 500 MILLIGRAM(S): at 21:53

## 2021-10-15 RX ADMIN — HEPARIN SODIUM 5000 UNIT(S): 5000 INJECTION INTRAVENOUS; SUBCUTANEOUS at 21:52

## 2021-10-15 RX ADMIN — INSULIN GLARGINE 5 UNIT(S): 100 INJECTION, SOLUTION SUBCUTANEOUS at 22:03

## 2021-10-15 RX ADMIN — ATORVASTATIN CALCIUM 40 MILLIGRAM(S): 80 TABLET, FILM COATED ORAL at 21:52

## 2021-10-15 RX ADMIN — Medication 975 MILLIGRAM(S): at 14:34

## 2021-10-15 RX ADMIN — OXYCODONE HYDROCHLORIDE 10 MILLIGRAM(S): 5 TABLET ORAL at 13:26

## 2021-10-15 RX ADMIN — Medication 1 TABLET(S): at 12:22

## 2021-10-15 RX ADMIN — Medication 8: at 17:17

## 2021-10-15 RX ADMIN — Medication 975 MILLIGRAM(S): at 15:01

## 2021-10-15 RX ADMIN — Medication 500 MILLIGRAM(S): at 14:35

## 2021-10-15 RX ADMIN — Medication 500 MILLIGRAM(S): at 05:26

## 2021-10-15 RX ADMIN — Medication 4: at 07:40

## 2021-10-15 RX ADMIN — Medication 2 DROP(S): at 05:27

## 2021-10-15 RX ADMIN — Medication 975 MILLIGRAM(S): at 22:50

## 2021-10-15 RX ADMIN — CEFEPIME 100 MILLIGRAM(S): 1 INJECTION, POWDER, FOR SOLUTION INTRAMUSCULAR; INTRAVENOUS at 14:35

## 2021-10-15 RX ADMIN — LISINOPRIL 10 MILLIGRAM(S): 2.5 TABLET ORAL at 05:26

## 2021-10-15 RX ADMIN — CARVEDILOL PHOSPHATE 25 MILLIGRAM(S): 80 CAPSULE, EXTENDED RELEASE ORAL at 17:17

## 2021-10-15 NOTE — PROGRESS NOTE ADULT - SUBJECTIVE AND OBJECTIVE BOX
VASCULAR SURGERY PROGRESS NOTE    Subjective:     Vital Signs:  Vital Signs Last 24 Hrs  T(C): 37.2 (14 Oct 2021 21:23), Max: 37.6 (14 Oct 2021 16:50)  T(F): 99 (14 Oct 2021 21:23), Max: 99.6 (14 Oct 2021 16:50)  HR: 88 (14 Oct 2021 21:23) (82 - 98)  BP: 158/93 (14 Oct 2021 21:23) (144/78 - 158/93)  BP(mean): --  RR: 19 (14 Oct 2021 21:23) (18 - 19)  SpO2: 100% (14 Oct 2021 21:23) (100% - 100%)    Physical Exam:  General:   Lungs:   CV:   Abdomen:  Extremities:          VASCULAR SURGERY PROGRESS NOTE    Subjective:  Patient seen and examined at bedside, resting comfortably with no complaints. Dressing changed this AM.    Vital Signs:  Vital Signs Last 24 Hrs  T(C): 37.2 (14 Oct 2021 21:23), Max: 37.6 (14 Oct 2021 16:50)  T(F): 99 (14 Oct 2021 21:23), Max: 99.6 (14 Oct 2021 16:50)  HR: 88 (14 Oct 2021 21:23) (82 - 98)  BP: 158/93 (14 Oct 2021 21:23) (144/78 - 158/93)  BP(mean): --  RR: 19 (14 Oct 2021 21:23) (18 - 19)  SpO2: 100% (14 Oct 2021 21:23) (100% - 100%)    Physical Exam:  General: A&Ox3 NAD  Extremities: R AKA dressing c/d/i

## 2021-10-15 NOTE — PROGRESS NOTE ADULT - SUBJECTIVE AND OBJECTIVE BOX
CARDIOLOGY      S: no chest pain or sob; ros otherwise negative.     DATE OF SERVICE - 10-15-21     Review of Systems:   Constitutional: [ ] fevers, [ ] chills.   Skin: [ ] dry skin. [ ] rashes.  Psychiatric: [ ] depression, [ ] anxiety.   Gastrointestinal: [ ] BRBPR, [ ] melena.   Neurological: [ ] confusion. [ ] seizures. [ ] shuffling gait.   Ears,Nose,Mouth and Throat: [ ] ear pain [ ] sore throat.   Eyes: [ ] diplopia.   Respiratory: [ ] hemoptysis. [ ] shortness of breath  Cardiovascular: See HPI above  Hematologic/Lymphatic: [ ] anemia. [ ] painful nodes. [ ] prolonged bleeding.   Genitourinary: [ ] hematuria. [ ] flank pain.   Endocrine: [ ] significant change in weight. [ ] intolerance to heat and cold.     Review of systems [x ] otherwise negative, [ ] otherwise unable to obtain    FH: no family history of sudden cardiac death in first degree relatives    SH: [ ] tobacco, [ ] alcohol, [ ] drugs      acetaminophen   Tablet .. 975 milliGRAM(s) Oral every 6 hours  artificial  tears Solution 2 Drop(s) Left EYE two times a day  aspirin enteric coated 81 milliGRAM(s) Oral daily  atorvastatin 40 milliGRAM(s) Oral at bedtime  cadexomer iodine 0.9% Gel 1 Application(s) Topical daily  carvedilol 25 milliGRAM(s) Oral every 12 hours  cefepime   IVPB 2000 milliGRAM(s) IV Intermittent every 8 hours  dextrose 40% Gel 15 Gram(s) Oral once  dextrose 5%. 1000 milliLiter(s) IV Continuous <Continuous>  dextrose 5%. 1000 milliLiter(s) IV Continuous <Continuous>  dextrose 50% Injectable 25 Gram(s) IV Push once  dextrose 50% Injectable 12.5 Gram(s) IV Push once  dextrose 50% Injectable 25 Gram(s) IV Push once  glucagon  Injectable 1 milliGRAM(s) IntraMuscular once  heparin   Injectable 5000 Unit(s) SubCutaneous every 8 hours  HYDROmorphone  Injectable 0.5 milliGRAM(s) IV Push every 4 hours PRN  influenza   Vaccine 0.5 milliLiter(s) IntraMuscular once  insulin glargine Injectable (LANTUS) 5 Unit(s) SubCutaneous at bedtime  insulin lispro (ADMELOG) corrective regimen sliding scale   SubCutaneous three times a day before meals  insulin lispro (ADMELOG) corrective regimen sliding scale   SubCutaneous at bedtime  lisinopril 10 milliGRAM(s) Oral daily  melatonin 3 milliGRAM(s) Oral at bedtime PRN  metroNIDAZOLE    Tablet 500 milliGRAM(s) Oral every 8 hours  multivitamin 1 Tablet(s) Oral daily  oxyCODONE    IR 10 milliGRAM(s) Oral every 4 hours PRN  oxyCODONE    IR 5 milliGRAM(s) Oral every 4 hours PRN                            11.1   12.72 )-----------( 550      ( 15 Oct 2021 05:44 )             31.4       10-15    133<L>  |  97<L>  |  23  ----------------------------<  197<H>  4.6   |  24  |  0.80    Ca    9.2      15 Oct 2021 05:44  Phos  3.1     10-15  Mg     2.10     10-15              T(C): 37.1 (10-15-21 @ 13:00), Max: 37.6 (10-14-21 @ 16:50)  HR: 88 (10-15-21 @ 13:00) (88 - 98)  BP: 137/88 (10-15-21 @ 13:00) (137/88 - 158/93)  RR: 18 (10-15-21 @ 13:00) (18 - 19)  SpO2: 100% (10-15-21 @ 13:00) (100% - 100%)  Wt(kg): --    I&O's Summary      General: Well nourished in no acute distress. Alert and Oriented * 3.   Head: Normocephalic and atraumatic.   Neck: No JVD. No bruits. Supple. Does not appear to be enlarged.   Cardiovascular: + S1,S2 ; RRR Soft systolic murmur at the left lower sternal border. No rubs noted.    Lungs: CTA b/l. No rhonchi, rales or wheezes.   Abdomen: + BS, soft. Non tender. Non distended. No rebound. No guarding.   Extremities: No clubbing/cyanosis/edema.    Skin: Warm and moist. The patient's skin has normal elasticity and good skin turgor.   Psychiatric: Appropriate mood and affect.    TTE: < from: Transthoracic Echocardiogram (09.29.21 @ 17:35) >  1. Calcified trileaflet aortic valve with normal opening.  2. Normal left ventricular internal dimensions and wall  thicknesses.  3. Normal left ventricular systolic function. No segmental  wall motion abnormalities.  4. Normal right ventricular size and function.    < end of copied text >    NST: < from: Nuclear Stress Test-Pharmacologic (Nuclear Stress Test-Pharmacologic .) (09.30.21 @ 16:16) >  IMPRESSIONS:Mildly Abnormal Study  * Myocardial Perfusion SPECT results are mildly abnormal.  * Review of raw data shows: The study is of fair technical  quality with adjacent bowel tracer uptake  * The left ventricle was normal in size. There is a small,  mild defect in septal wall that is fixed, suggestive of  mild scarring  * No clear evidence of ischemia.  * Post-stress gated wall motion analysis was performed  (LVEF = 66 %;LVEDV = 66 ml.), revealing normal LV  function. There was no segmental wall motion abnormality.  Septal wall motion appeared normal. RV function appeared  normal.    < end of copied text >      A/P: 61 y/o male PMH HTN, HLD, DM, CKD, PVD with a Right Femoral-Femoral Bypass in 2018 on apixiban, presented to the [facility] ED with worsening Right Toe pain x 1 week, CT with occluded bypass.  Now awaiting further work up from Vascular. Cardiology called for preop evaluation. s/p guillotine amputation  right foot, s/p R AKA    -TTE with normal LV functinon   -NST with no ischemia  -tolerated procedure well from CV perspective  -no clinical heart failure or anginal symptoms   -pain management and post op care per vascular  -continue medical therapy for known PAD including asa, lipitor, coreg  -no further inpatient cardiac workup anticipated at this time  -dc planning per primary team     Imani Cesar MD

## 2021-10-15 NOTE — PROGRESS NOTE ADULT - ASSESSMENT
61 y/o male, with a PmHx of CHF, HTN, CAD, HLD, DM, CKD, PVD with a Right Femoral-Femoral Bypass in 2018 on apixiban, presented to the [facility] ED with worsening Right Toe pain x 1 week.      Ravin   pt with multiple Ravin in past   RAVIN possible prerenal as SG is high s/p IVF  Renal function improved  On ACE Monitor closley  s/p angio 9/29. renal function stable  Monitor BMP  AVoid further nephrotoxics, NSAID RCA    Hypokalemia  Repelted KCl    MOnitor  serum K    Acidosis   improving   MOnitor serum Co2    hypomagnesemia  supplemented by team  monitor    hypophosphatemia  Supplement as needed   monitor serum PO4    hyponatremia  work up suggested SIADH  continue Na tab 1g tid  continue free water restriction  avoid hypotonic solutions  optimize dm control  monitor level

## 2021-10-15 NOTE — PROGRESS NOTE ADULT - SUBJECTIVE AND OBJECTIVE BOX
[facility], Division of Infectious Diseases  CHINEDU Fernandes Y. Patel, S. Shah  [phone]  after hours and weekends [phone]    Name: EVE DELGADO  Age: 60y  Gender: Male  MRN: [id number]    Interval History--  Notes reviewed      Allergies    penicillin (Other)    Intolerances        Medications--  Antibiotics:  cefepime   IVPB 2000 milliGRAM(s) IV Intermittent every 8 hours  metroNIDAZOLE    Tablet 500 milliGRAM(s) Oral every 8 hours    Immunologic:  influenza   Vaccine 0.5 milliLiter(s) IntraMuscular once    Other:  acetaminophen   Tablet ..  artificial  tears Solution  aspirin enteric coated  atorvastatin  cadexomer iodine 0.9% Gel  carvedilol  dextrose 40% Gel  dextrose 5%.  dextrose 5%.  dextrose 50% Injectable  dextrose 50% Injectable  dextrose 50% Injectable  glucagon  Injectable  heparin   Injectable  HYDROmorphone  Injectable PRN  insulin glargine Injectable (LANTUS)  insulin lispro (ADMELOG) corrective regimen sliding scale  insulin lispro (ADMELOG) corrective regimen sliding scale  insulin NPH human recombinant  lisinopril  melatonin PRN  multivitamin  oxyCODONE    IR PRN  oxyCODONE    IR PRN      Review of Systems--  A 10-point review of systems was obtained.     Pertinent positives and negatives--  Constitutional: No fevers. No Chills. No Rigors.   Cardiovascular: No chest pain. No palpitations.  Respiratory: No shortness of breath. No cough.  Gastrointestinal: No nausea or vomiting. No diarrhea or constipation.   Psychiatric: Pleasant. Appropriate affect.    Review of systems otherwise negative except as previously noted.    Physical Examination--  Vital Signs: T(F): 98.8 (10-15-21 @ 05:37), Max: 99.6 (10-14-21 @ 16:50)  HR: 88 (10-15-21 @ 05:37)  BP: 138/88 (10-15-21 @ 05:37)  RR: 18 (10-15-21 @ 05:37)  SpO2: 100% (10-15-21 @ 05:37)  Wt(kg): --  General: Nontoxic-appearing Male in no acute distress.  HEENT: AT/NC.   Neck: Not rigid. No sense of mass.  Nodes: None palpable.  Lungs: Clear bilaterally without rales, wheezing or rhonchi  Heart: Regular rate and rhythm.   Abdomen: Bowel sounds present and normoactive. Soft. Nondistended.  Extremities: No cyanosis or clubbing. right aka wound wrapped    Skin: Warm. Dry. Good turgor. No rash. No vasculitic stigmata.  Psychiatric: Appropriate affect and mood for situation.         Laboratory Studies--  CBC                        11.1   12.72 )-----------( 550      ( 15 Oct 2021 05:44 )             31.4       Chemistries  10-15    133<L>  |  97<L>  |  23  ----------------------------<  197<H>  4.6   |  24  |  0.80    Ca    9.2      15 Oct 2021 05:44  Phos  3.1     10-15  Mg     2.10     10-15        Culture Data

## 2021-10-15 NOTE — PROGRESS NOTE ADULT - ASSESSMENT
Assessment:          Plan: · Assessment	  61yo M with Hx CHF, CAD (no stents), HTN, HLD, DM, PVD with h/o right fem-pop bypass ([facility] 2018) who presents with non-healing R toe wound, found to have dry gangrene with small area of wet conversion and bypass occlusion now s/p 10/6 diagnostic RLE angiogram, b/l kissing iliac stents, right groin cutdown an PT exploration. Pt s/p 10/8 RLE guillotine and s/p 10/12 R AKA.    Plan:  - Pain control as needed  - OOBAT  - Diet: DASH/TLC, diabetic  - Monitor R AKA dressing  - ASA  - DVT ppx heparin SQ  - Continue Cefepime+ Metronidazole per Dr. Lundberg  - Mod insulin sliding scale  - PM&R, PT,OT following  - Dispo planning to [facility]. [facility] lewis.

## 2021-10-15 NOTE — PROGRESS NOTE ADULT - SUBJECTIVE AND OBJECTIVE BOX
[facility]   MD RAKAN SHEPHERD MD RUORU WONG, PA    TEL:  OFFICE: [phone]  DR FELICINAO CELL: [phone]  OLU SÁNCHEZ CELL: [phone]  DR. PATIÑO CELL: [phone]      FROM 5 PM - 7 AM PLEASE CALL ANSWERING SERVICE: [phone]    RENAL FOLLOW UP NOTE--Date of Service 10-15-21 @ 11:13  --------------------------------------------------------------------------------  HPI:      Pt seen and examined at bedside.   Denies SOB, chest pain     PAST HISTORY  --------------------------------------------------------------------------------  No significant changes to PMH, PSH, FHx, SHx, unless otherwise noted    ALLERGIES & MEDICATIONS  --------------------------------------------------------------------------------  Allergies    penicillin (Other)    Intolerances      Standing Inpatient Medications  acetaminophen   Tablet .. 975 milliGRAM(s) Oral every 6 hours  artificial  tears Solution 2 Drop(s) Left EYE two times a day  aspirin enteric coated 81 milliGRAM(s) Oral daily  atorvastatin 40 milliGRAM(s) Oral at bedtime  cadexomer iodine 0.9% Gel 1 Application(s) Topical daily  carvedilol 25 milliGRAM(s) Oral every 12 hours  cefepime   IVPB 2000 milliGRAM(s) IV Intermittent every 8 hours  dextrose 40% Gel 15 Gram(s) Oral once  dextrose 5%. 1000 milliLiter(s) IV Continuous <Continuous>  dextrose 5%. 1000 milliLiter(s) IV Continuous <Continuous>  dextrose 50% Injectable 25 Gram(s) IV Push once  dextrose 50% Injectable 12.5 Gram(s) IV Push once  dextrose 50% Injectable 25 Gram(s) IV Push once  glucagon  Injectable 1 milliGRAM(s) IntraMuscular once  heparin   Injectable 5000 Unit(s) SubCutaneous every 8 hours  influenza   Vaccine 0.5 milliLiter(s) IntraMuscular once  insulin glargine Injectable (LANTUS) 5 Unit(s) SubCutaneous at bedtime  insulin lispro (ADMELOG) corrective regimen sliding scale   SubCutaneous three times a day before meals  insulin lispro (ADMELOG) corrective regimen sliding scale   SubCutaneous at bedtime  insulin NPH human recombinant 3 Unit(s) SubCutaneous once  lisinopril 10 milliGRAM(s) Oral daily  metroNIDAZOLE    Tablet 500 milliGRAM(s) Oral every 8 hours  multivitamin 1 Tablet(s) Oral daily    PRN Inpatient Medications  HYDROmorphone  Injectable 0.5 milliGRAM(s) IV Push every 4 hours PRN  melatonin 3 milliGRAM(s) Oral at bedtime PRN  oxyCODONE    IR 10 milliGRAM(s) Oral every 4 hours PRN  oxyCODONE    IR 5 milliGRAM(s) Oral every 4 hours PRN      REVIEW OF SYSTEMS  --------------------------------------------------------------------------------  General: no fever    MSK: no edema     VITALS/PHYSICAL EXAM  --------------------------------------------------------------------------------  T(C): 37.1 (10-15-21 @ 05:37), Max: 37.6 (10-14-21 @ 16:50)  HR: 88 (10-15-21 @ 05:37) (88 - 98)  BP: 138/88 (10-15-21 @ 05:37) (138/88 - 158/93)  RR: 18 (10-15-21 @ 05:37) (18 - 19)  SpO2: 100% (10-15-21 @ 05:37) (100% - 100%)  Wt(kg): --        Physical Exam:  	Gen: NAD  	HEENT: MMM  	Pulm: CTA B/L  	CV: S1S2  	Abd: Soft, +BS  	Ext: No LE edema B/L                      Neuro: Awake   	Skin: Warm and Dry   	Vascular access: NO HD catheter            no  roly  LABS/STUDIES  --------------------------------------------------------------------------------              11.1   12.72 >-----------<  550      [10-15-21 @ 05:44]              31.4     133  |  97  |  23  ----------------------------<  197      [10-15-21 @ 05:44]  4.6   |  24  |  0.80        Ca     9.2     [10-15-21 @ 05:44]      Mg     2.10     [10-15-21 @ 05:44]      Phos  3.1     [10-15-21 @ 05:44]            Creatinine Trend:  SCr 0.80 [10-15 @ 05:44]  SCr 0.81 [10-14 @ 06:21]  SCr 0.71 [10-13 @ 06:18]  SCr 0.72 [10-12 @ 03:59]  SCr 0.62 [10-11 @ 07:41]    Urinalysis - [09-25-21 @ 15:23]      Color Yellow / Appearance Clear / SG 1.021 / pH 5.5      Gluc Negative / Ketone Negative  / Bili Negative / Urobili <2 mg/dL       Blood Negative / Protein Trace / Leuk Est Negative / Nitrite Negative      RBC 2 / WBC 3 / Hyaline 1 / Gran  / Sq Epi  / Non Sq Epi 1 / Bacteria Negative      HbA1c 5.7      [12-20-18 @ 06:30]  TSH 0.76      [09-25-21 @ 12:52]  Lipid: chol 139, , HDL 26, LDL --      [09-26-21 @ 08:30]    HCV 0.09, Nonreact      [09-26-21 @ 19:55]

## 2021-10-15 NOTE — PROGRESS NOTE ADULT - ASSESSMENT
Patient is a 60 year old male, with PMH of CHF, HTN, CAD, HLD, DM, CKD, PVD with a Right Femoral-Femoral Bypass in 2018 on apixiban, presented to the [facility] ED with worsening R toe pain for 1 week and was admitted for R 5th toe gangrene.   10/12 right aka     R toe gangrene d/t PVD  Leukocytosis likely reactive -- down trend    - had wound for a month, noted with cellulitis - now resolved    - R foot xray reviewed - no evidence of OM   - R foot wound culture with CRE Pseudomonas, Providencia and Bacteroides - sensitivities reviewed    - Vascular surgery following     -- s/p angiogram 9/29, s/p iliac stents and R groin cutdown and PT exploration 10/6   - afebrile, nontoxic    no s/s of pna, no gu complaints, abd benign and no diarrhea  cefepime/flagyl completed course -- would stop antibx  10/12 right aka - afeb, nontoxic  trend wbc-- down trend    10/15 unclear role of antibx   blood cx neg , no pna, wound has been amputated  wbc trending down, pt afteb, and nontoxic  ID will sign off  please call with questions  thank you   [phone]

## 2021-10-16 LAB
ANION GAP SERPL CALC-SCNC: 12 MMOL/L — SIGNIFICANT CHANGE UP (ref 7–14)
BUN SERPL-MCNC: 27 MG/DL — HIGH (ref 7–23)
CALCIUM SERPL-MCNC: 9.3 MG/DL — SIGNIFICANT CHANGE UP (ref 8.4–10.5)
CHLORIDE SERPL-SCNC: 102 MMOL/L — SIGNIFICANT CHANGE UP (ref 98–107)
CO2 SERPL-SCNC: 25 MMOL/L — SIGNIFICANT CHANGE UP (ref 22–31)
CREAT SERPL-MCNC: 0.89 MG/DL — SIGNIFICANT CHANGE UP (ref 0.5–1.3)
GLUCOSE SERPL-MCNC: 151 MG/DL — HIGH (ref 70–99)
HCT VFR BLD CALC: 32 % — LOW (ref 39–50)
HGB BLD-MCNC: 10.8 G/DL — LOW (ref 13–17)
MAGNESIUM SERPL-MCNC: 2.2 MG/DL — SIGNIFICANT CHANGE UP (ref 1.6–2.6)
MCHC RBC-ENTMCNC: 31.9 PG — SIGNIFICANT CHANGE UP (ref 27–34)
MCHC RBC-ENTMCNC: 33.8 GM/DL — SIGNIFICANT CHANGE UP (ref 32–36)
MCV RBC AUTO: 94.4 FL — SIGNIFICANT CHANGE UP (ref 80–100)
NRBC # BLD: 0 /100 WBCS — SIGNIFICANT CHANGE UP
NRBC # FLD: 0 K/UL — SIGNIFICANT CHANGE UP
PHOSPHATE SERPL-MCNC: 2.5 MG/DL — SIGNIFICANT CHANGE UP (ref 2.5–4.5)
PLATELET # BLD AUTO: 640 K/UL — HIGH (ref 150–400)
POTASSIUM SERPL-MCNC: 4.3 MMOL/L — SIGNIFICANT CHANGE UP (ref 3.5–5.3)
POTASSIUM SERPL-SCNC: 4.3 MMOL/L — SIGNIFICANT CHANGE UP (ref 3.5–5.3)
RBC # BLD: 3.39 M/UL — LOW (ref 4.2–5.8)
RBC # FLD: 15.2 % — HIGH (ref 10.3–14.5)
SODIUM SERPL-SCNC: 139 MMOL/L — SIGNIFICANT CHANGE UP (ref 135–145)
WBC # BLD: 14 K/UL — HIGH (ref 3.8–10.5)
WBC # FLD AUTO: 14 K/UL — HIGH (ref 3.8–10.5)

## 2021-10-16 RX ORDER — SODIUM,POTASSIUM PHOSPHATES 278-250MG
1 POWDER IN PACKET (EA) ORAL ONCE
Refills: 0 | Status: COMPLETED | OUTPATIENT
Start: 2021-10-16 | End: 2021-10-16

## 2021-10-16 RX ADMIN — CEFEPIME 100 MILLIGRAM(S): 1 INJECTION, POWDER, FOR SOLUTION INTRAMUSCULAR; INTRAVENOUS at 05:27

## 2021-10-16 RX ADMIN — OXYCODONE HYDROCHLORIDE 10 MILLIGRAM(S): 5 TABLET ORAL at 09:13

## 2021-10-16 RX ADMIN — OXYCODONE HYDROCHLORIDE 10 MILLIGRAM(S): 5 TABLET ORAL at 10:13

## 2021-10-16 RX ADMIN — Medication 975 MILLIGRAM(S): at 22:50

## 2021-10-16 RX ADMIN — Medication 975 MILLIGRAM(S): at 03:20

## 2021-10-16 RX ADMIN — Medication 1 TABLET(S): at 12:20

## 2021-10-16 RX ADMIN — Medication 975 MILLIGRAM(S): at 17:26

## 2021-10-16 RX ADMIN — Medication 2: at 17:26

## 2021-10-16 RX ADMIN — Medication 2: at 21:49

## 2021-10-16 RX ADMIN — OXYCODONE HYDROCHLORIDE 10 MILLIGRAM(S): 5 TABLET ORAL at 22:01

## 2021-10-16 RX ADMIN — OXYCODONE HYDROCHLORIDE 10 MILLIGRAM(S): 5 TABLET ORAL at 00:54

## 2021-10-16 RX ADMIN — CARVEDILOL PHOSPHATE 25 MILLIGRAM(S): 80 CAPSULE, EXTENDED RELEASE ORAL at 05:28

## 2021-10-16 RX ADMIN — Medication 1 APPLICATION(S): at 13:17

## 2021-10-16 RX ADMIN — Medication 8: at 13:18

## 2021-10-16 RX ADMIN — Medication 4: at 08:30

## 2021-10-16 RX ADMIN — Medication 500 MILLIGRAM(S): at 21:05

## 2021-10-16 RX ADMIN — Medication 1 TABLET(S): at 12:19

## 2021-10-16 RX ADMIN — HEPARIN SODIUM 5000 UNIT(S): 5000 INJECTION INTRAVENOUS; SUBCUTANEOUS at 13:48

## 2021-10-16 RX ADMIN — CARVEDILOL PHOSPHATE 25 MILLIGRAM(S): 80 CAPSULE, EXTENDED RELEASE ORAL at 18:17

## 2021-10-16 RX ADMIN — LISINOPRIL 10 MILLIGRAM(S): 2.5 TABLET ORAL at 05:28

## 2021-10-16 RX ADMIN — ATORVASTATIN CALCIUM 40 MILLIGRAM(S): 80 TABLET, FILM COATED ORAL at 21:04

## 2021-10-16 RX ADMIN — Medication 500 MILLIGRAM(S): at 05:28

## 2021-10-16 RX ADMIN — Medication 81 MILLIGRAM(S): at 12:19

## 2021-10-16 RX ADMIN — HEPARIN SODIUM 5000 UNIT(S): 5000 INJECTION INTRAVENOUS; SUBCUTANEOUS at 05:27

## 2021-10-16 RX ADMIN — OXYCODONE HYDROCHLORIDE 10 MILLIGRAM(S): 5 TABLET ORAL at 01:50

## 2021-10-16 RX ADMIN — HEPARIN SODIUM 5000 UNIT(S): 5000 INJECTION INTRAVENOUS; SUBCUTANEOUS at 21:04

## 2021-10-16 RX ADMIN — CEFEPIME 100 MILLIGRAM(S): 1 INJECTION, POWDER, FOR SOLUTION INTRAMUSCULAR; INTRAVENOUS at 21:04

## 2021-10-16 RX ADMIN — CEFEPIME 100 MILLIGRAM(S): 1 INJECTION, POWDER, FOR SOLUTION INTRAMUSCULAR; INTRAVENOUS at 13:48

## 2021-10-16 RX ADMIN — Medication 500 MILLIGRAM(S): at 13:49

## 2021-10-16 RX ADMIN — INSULIN GLARGINE 5 UNIT(S): 100 INJECTION, SOLUTION SUBCUTANEOUS at 21:49

## 2021-10-16 RX ADMIN — Medication 2 DROP(S): at 17:26

## 2021-10-16 RX ADMIN — Medication 975 MILLIGRAM(S): at 04:20

## 2021-10-16 RX ADMIN — OXYCODONE HYDROCHLORIDE 10 MILLIGRAM(S): 5 TABLET ORAL at 20:57

## 2021-10-16 NOTE — PROGRESS NOTE ADULT - SUBJECTIVE AND OBJECTIVE BOX
[facility] [place] [facility]   MD RAKAN SHEPHERD MD RUORU WONG, PA    TEL:  OFFICE: [phone]  DR FELICIANO CELL: [phone]  OLU SÁNCHEZ CELL: [phone]  DR. PATIÑO CELL: [phone]      FROM 5 PM - 7 AM PLEASE CALL ANSWERING SERVICE: [phone]    RENAL FOLLOW UP NOTE--Date of Service 10-16-21 @ 14:04  --------------------------------------------------------------------------------  HPI:      Pt seen and examined at bedside.   Denies SOB, chest pain     PAST HISTORY  --------------------------------------------------------------------------------  No significant changes to PMH, PSH, FHx, SHx, unless otherwise noted    ALLERGIES & MEDICATIONS  --------------------------------------------------------------------------------  Allergies    penicillin (Other)    Intolerances      Standing Inpatient Medications  acetaminophen   Tablet .. 975 milliGRAM(s) Oral every 6 hours  artificial  tears Solution 2 Drop(s) Left EYE two times a day  aspirin enteric coated 81 milliGRAM(s) Oral daily  atorvastatin 40 milliGRAM(s) Oral at bedtime  cadexomer iodine 0.9% Gel 1 Application(s) Topical daily  carvedilol 25 milliGRAM(s) Oral every 12 hours  cefepime   IVPB 2000 milliGRAM(s) IV Intermittent every 8 hours  dextrose 40% Gel 15 Gram(s) Oral once  dextrose 5%. 1000 milliLiter(s) IV Continuous <Continuous>  dextrose 5%. 1000 milliLiter(s) IV Continuous <Continuous>  dextrose 50% Injectable 25 Gram(s) IV Push once  dextrose 50% Injectable 12.5 Gram(s) IV Push once  dextrose 50% Injectable 25 Gram(s) IV Push once  glucagon  Injectable 1 milliGRAM(s) IntraMuscular once  heparin   Injectable 5000 Unit(s) SubCutaneous every 8 hours  influenza   Vaccine 0.5 milliLiter(s) IntraMuscular once  insulin glargine Injectable (LANTUS) 5 Unit(s) SubCutaneous at bedtime  insulin lispro (ADMELOG) corrective regimen sliding scale   SubCutaneous three times a day before meals  insulin lispro (ADMELOG) corrective regimen sliding scale   SubCutaneous at bedtime  lisinopril 10 milliGRAM(s) Oral daily  metroNIDAZOLE    Tablet 500 milliGRAM(s) Oral every 8 hours  multivitamin 1 Tablet(s) Oral daily    PRN Inpatient Medications  HYDROmorphone  Injectable 0.5 milliGRAM(s) IV Push every 4 hours PRN  melatonin 3 milliGRAM(s) Oral at bedtime PRN  oxyCODONE    IR 10 milliGRAM(s) Oral every 4 hours PRN  oxyCODONE    IR 5 milliGRAM(s) Oral every 4 hours PRN      REVIEW OF SYSTEMS  --------------------------------------------------------------------------------  General: no fever  CVS: no chest pain  RESP: no sob, no cough  ABD: no abdominal pain  : no dysuria,  MSK: no edema     VITALS/PHYSICAL EXAM  --------------------------------------------------------------------------------  T(C): 36.8 (10-16-21 @ 09:13), Max: 36.8 (10-16-21 @ 01:11)  HR: 88 (10-16-21 @ 09:13) (82 - 93)  BP: 116/71 (10-16-21 @ 09:13) (104/62 - 133/79)  RR: 18 (10-16-21 @ 09:13) (16 - 18)  SpO2: 100% (10-16-21 @ 05:21) (100% - 100%)  Wt(kg): --        Physical Exam:  	Gen: NAD  	HEENT: MMM  	Pulm: CTA B/L  	CV: S1S2  	Abd: Soft, +BS  	Ext: No LE edema B/L                      Neuro: Awake   	Skin: Warm and Dry   	Vascular access: NO HD catheter            no  roly  LABS/STUDIES  --------------------------------------------------------------------------------              10.8   14.00 >-----------<  640      [10-16-21 @ 07:50]              32.0     139  |  102  |  27  ----------------------------<  151      [10-16-21 @ 07:50]  4.3   |  25  |  0.89        Ca     9.3     [10-16-21 @ 07:50]      Mg     2.20     [10-16-21 @ 07:50]      Phos  2.5     [10-16-21 @ 07:50]            Creatinine Trend:  SCr 0.89 [10-16 @ 07:50]  SCr 0.80 [10-15 @ 05:44]  SCr 0.81 [10-14 @ 06:21]  SCr 0.71 [10-13 @ 06:18]  SCr 0.72 [10-12 @ 03:59]    Urinalysis - [09-25-21 @ 15:23]      Color Yellow / Appearance Clear / SG 1.021 / pH 5.5      Gluc Negative / Ketone Negative  / Bili Negative / Urobili <2 mg/dL       Blood Negative / Protein Trace / Leuk Est Negative / Nitrite Negative      RBC 2 / WBC 3 / Hyaline 1 / Gran  / Sq Epi  / Non Sq Epi 1 / Bacteria Negative      HbA1c 5.7      [12-20-18 @ 06:30]  TSH 0.76      [09-25-21 @ 12:52]  Lipid: chol 139, , HDL 26, LDL --      [09-26-21 @ 08:30]    HCV 0.09, Nonreact      [09-26-21 @ 19:55]

## 2021-10-16 NOTE — PROGRESS NOTE ADULT - SUBJECTIVE AND OBJECTIVE BOX
CARDIOLOGY      S: no chest pain or sob; ros otherwise negative.     DATE OF SERVICE - 10-16-21     Review of Systems:   Constitutional: [ ] fevers, [ ] chills.   Skin: [ ] dry skin. [ ] rashes.  Psychiatric: [ ] depression, [ ] anxiety.   Gastrointestinal: [ ] BRBPR, [ ] melena.   Neurological: [ ] confusion. [ ] seizures. [ ] shuffling gait.   Ears,Nose,Mouth and Throat: [ ] ear pain [ ] sore throat.   Eyes: [ ] diplopia.   Respiratory: [ ] hemoptysis. [ ] shortness of breath  Cardiovascular: See HPI above  Hematologic/Lymphatic: [ ] anemia. [ ] painful nodes. [ ] prolonged bleeding.   Genitourinary: [ ] hematuria. [ ] flank pain.   Endocrine: [ ] significant change in weight. [ ] intolerance to heat and cold.     Review of systems [x ] otherwise negative, [ ] otherwise unable to obtain    FH: no family history of sudden cardiac death in first degree relatives    SH: [ ] tobacco, [ ] alcohol, [ ] drugs    acetaminophen   Tablet .. 975 milliGRAM(s) Oral every 6 hours  artificial  tears Solution 2 Drop(s) Left EYE two times a day  aspirin enteric coated 81 milliGRAM(s) Oral daily  atorvastatin 40 milliGRAM(s) Oral at bedtime  cadexomer iodine 0.9% Gel 1 Application(s) Topical daily  carvedilol 25 milliGRAM(s) Oral every 12 hours  cefepime   IVPB 2000 milliGRAM(s) IV Intermittent every 8 hours  dextrose 40% Gel 15 Gram(s) Oral once  dextrose 5%. 1000 milliLiter(s) IV Continuous <Continuous>  dextrose 5%. 1000 milliLiter(s) IV Continuous <Continuous>  dextrose 50% Injectable 25 Gram(s) IV Push once  dextrose 50% Injectable 12.5 Gram(s) IV Push once  dextrose 50% Injectable 25 Gram(s) IV Push once  glucagon  Injectable 1 milliGRAM(s) IntraMuscular once  heparin   Injectable 5000 Unit(s) SubCutaneous every 8 hours  HYDROmorphone  Injectable 0.5 milliGRAM(s) IV Push every 4 hours PRN  influenza   Vaccine 0.5 milliLiter(s) IntraMuscular once  insulin glargine Injectable (LANTUS) 5 Unit(s) SubCutaneous at bedtime  insulin lispro (ADMELOG) corrective regimen sliding scale   SubCutaneous three times a day before meals  insulin lispro (ADMELOG) corrective regimen sliding scale   SubCutaneous at bedtime  lisinopril 10 milliGRAM(s) Oral daily  melatonin 3 milliGRAM(s) Oral at bedtime PRN  metroNIDAZOLE    Tablet 500 milliGRAM(s) Oral every 8 hours  multivitamin 1 Tablet(s) Oral daily  oxyCODONE    IR 10 milliGRAM(s) Oral every 4 hours PRN  oxyCODONE    IR 5 milliGRAM(s) Oral every 4 hours PRN                            10.8   14.00 )-----------( 640      ( 16 Oct 2021 07:50 )             32.0       10-16    139  |  102  |  27<H>  ----------------------------<  151<H>  4.3   |  25  |  0.89    Ca    9.3      16 Oct 2021 07:50  Phos  2.5     10-16  Mg     2.20     10-16      T(C): 37.8 (10-16-21 @ 14:31), Max: 37.8 (10-16-21 @ 14:31)  HR: 87 (10-16-21 @ 14:31) (82 - 93)  BP: 131/81 (10-16-21 @ 14:31) (104/62 - 133/79)  RR: 17 (10-16-21 @ 14:31) (16 - 18)  SpO2: 98% (10-16-21 @ 14:31) (98% - 100%)      General: Well nourished in no acute distress. Alert and Oriented * 3.   Head: Normocephalic and atraumatic.   Neck: No JVD. No bruits. Supple. Does not appear to be enlarged.   Cardiovascular: + S1,S2 ; RRR Soft systolic murmur at the left lower sternal border. No rubs noted.    Lungs: CTA b/l. No rhonchi, rales or wheezes.   Abdomen: + BS, soft. Non tender. Non distended. No rebound. No guarding.   Extremities: No clubbing/cyanosis/edema.    Skin: Warm and moist. The patient's skin has normal elasticity and good skin turgor.   Psychiatric: Appropriate mood and affect.    TTE: < from: Transthoracic Echocardiogram (09.29.21 @ 17:35) >  1. Calcified trileaflet aortic valve with normal opening.  2. Normal left ventricular internal dimensions and wall  thicknesses.  3. Normal left ventricular systolic function. No segmental  wall motion abnormalities.  4. Normal right ventricular size and function.    < end of copied text >    NST: < from: Nuclear Stress Test-Pharmacologic (Nuclear Stress Test-Pharmacologic .) (09.30.21 @ 16:16) >  IMPRESSIONS:Mildly Abnormal Study  * Myocardial Perfusion SPECT results are mildly abnormal.  * Review of raw data shows: The study is of fair technical  quality with adjacent bowel tracer uptake  * The left ventricle was normal in size. There is a small,  mild defect in septal wall that is fixed, suggestive of  mild scarring  * No clear evidence of ischemia.  * Post-stress gated wall motion analysis was performed  (LVEF = 66 %;LVEDV = 66 ml.), revealing normal LV  function. There was no segmental wall motion abnormality.  Septal wall motion appeared normal. RV function appeared  normal.    < end of copied text >      A/P: 59 y/o male PMH HTN, HLD, DM, CKD, PVD with a Right Femoral-Femoral Bypass in 2018 on apixiban, presented to the [facility] ED with worsening Right Toe pain x 1 week, CT with occluded bypass.  Now awaiting further work up from Vascular. Cardiology called for preop evaluation. s/p guillotine amputation  right foot, s/p R AKA    -TTE with normal LV functinon   -NST with no ischemia  -tolerated procedure well from CV perspective  -no clinical heart failure or anginal symptoms   -pain management and post op care per vascular  -continue medical therapy for known PAD including asa, lipitor, coreg  -no further inpatient cardiac workup anticipated at this time  -dc planning per primary team

## 2021-10-16 NOTE — PROGRESS NOTE ADULT - SUBJECTIVE AND OBJECTIVE BOX
VASCULAR SURGERY PROGRESS NOTE    Subjective:     Vital Signs:  Vital Signs Last 24 Hrs  T(C): 36.8 (16 Oct 2021 01:11), Max: 37.1 (15 Oct 2021 05:37)  T(F): 98.3 (16 Oct 2021 01:11), Max: 98.8 (15 Oct 2021 05:37)  HR: 88 (16 Oct 2021 01:11) (82 - 93)  BP: 127/77 (16 Oct 2021 01:11) (104/62 - 138/88)  BP(mean): --  RR: 16 (16 Oct 2021 01:11) (16 - 18)  SpO2: 100% (16 Oct 2021 01:11) (100% - 100%)    Physical Exam:  General:   Lungs:   CV:   Abdomen:  Extremities:          VASCULAR SURGERY PROGRESS NOTE    Subjective: Patient complains of mild pain in right leg AKA area. Denies sob, chest pain , fever or chills.    Vital Signs:  Vital Signs Last 24 Hrs  T(C): 36.8 (16 Oct 2021 01:11), Max: 37.1 (15 Oct 2021 05:37)  T(F): 98.3 (16 Oct 2021 01:11), Max: 98.8 (15 Oct 2021 05:37)  HR: 88 (16 Oct 2021 01:11) (82 - 93)  BP: 127/77 (16 Oct 2021 01:11) (104/62 - 138/88)  BP(mean): --  RR: 16 (16 Oct 2021 01:11) (16 - 18)  SpO2: 100% (16 Oct 2021 01:11) (100% - 100%)    Physical Exam:  General: A&Ox3 NAD  Extremities: R AKA dressing c/d/i

## 2021-10-16 NOTE — PROGRESS NOTE ADULT - ASSESSMENT
Assessment:          Plan: 61yo M with Hx CHF, CAD (no stents), HTN, HLD, DM, PVD with h/o right fem-pop bypass ([facility] 2018) who presents with non-healing R toe wound, found to have dry gangrene with small area of wet conversion and bypass occlusion now s/p 10/6 diagnostic RLE angiogram, b/l kissing iliac stents, right groin cutdown an PT exploration. Pt s/p 10/8 RLE guillotine and s/p 10/12 R AKA.    Plan:  - Continue Cefepime+ Metronidazole until wbc normalize  - Pain control as needed  - OOBAT  - Diet: DASH/TLC, diabetic  - R AKA dressing change daily  - ASA  - DVT ppx heparin SQ  - Mod lantus + insulin sliding scale  - PM&R, PT,OT following  - Dispo planning to [place]. Covid sent.     c team vascular  [id number]

## 2021-10-17 LAB
ANION GAP SERPL CALC-SCNC: 10 MMOL/L — SIGNIFICANT CHANGE UP (ref 7–14)
BUN SERPL-MCNC: 34 MG/DL — HIGH (ref 7–23)
CALCIUM SERPL-MCNC: 9.4 MG/DL — SIGNIFICANT CHANGE UP (ref 8.4–10.5)
CHLORIDE SERPL-SCNC: 101 MMOL/L — SIGNIFICANT CHANGE UP (ref 98–107)
CO2 SERPL-SCNC: 25 MMOL/L — SIGNIFICANT CHANGE UP (ref 22–31)
CREAT SERPL-MCNC: 0.86 MG/DL — SIGNIFICANT CHANGE UP (ref 0.5–1.3)
GLUCOSE SERPL-MCNC: 199 MG/DL — HIGH (ref 70–99)
HCT VFR BLD CALC: 29.8 % — LOW (ref 39–50)
HGB BLD-MCNC: 10.2 G/DL — LOW (ref 13–17)
MAGNESIUM SERPL-MCNC: 2.2 MG/DL — SIGNIFICANT CHANGE UP (ref 1.6–2.6)
MCHC RBC-ENTMCNC: 32.3 PG — SIGNIFICANT CHANGE UP (ref 27–34)
MCHC RBC-ENTMCNC: 34.2 GM/DL — SIGNIFICANT CHANGE UP (ref 32–36)
MCV RBC AUTO: 94.3 FL — SIGNIFICANT CHANGE UP (ref 80–100)
NRBC # BLD: 0 /100 WBCS — SIGNIFICANT CHANGE UP
NRBC # FLD: 0 K/UL — SIGNIFICANT CHANGE UP
PHOSPHATE SERPL-MCNC: 2.9 MG/DL — SIGNIFICANT CHANGE UP (ref 2.5–4.5)
PLATELET # BLD AUTO: 672 K/UL — HIGH (ref 150–400)
POTASSIUM SERPL-MCNC: 4.6 MMOL/L — SIGNIFICANT CHANGE UP (ref 3.5–5.3)
POTASSIUM SERPL-SCNC: 4.6 MMOL/L — SIGNIFICANT CHANGE UP (ref 3.5–5.3)
RBC # BLD: 3.16 M/UL — LOW (ref 4.2–5.8)
RBC # FLD: 15.3 % — HIGH (ref 10.3–14.5)
SODIUM SERPL-SCNC: 136 MMOL/L — SIGNIFICANT CHANGE UP (ref 135–145)
WBC # BLD: 13.6 K/UL — HIGH (ref 3.8–10.5)
WBC # FLD AUTO: 13.6 K/UL — HIGH (ref 3.8–10.5)

## 2021-10-17 RX ADMIN — OXYCODONE HYDROCHLORIDE 10 MILLIGRAM(S): 5 TABLET ORAL at 05:15

## 2021-10-17 RX ADMIN — Medication 2: at 21:58

## 2021-10-17 RX ADMIN — OXYCODONE HYDROCHLORIDE 10 MILLIGRAM(S): 5 TABLET ORAL at 14:06

## 2021-10-17 RX ADMIN — Medication 975 MILLIGRAM(S): at 16:30

## 2021-10-17 RX ADMIN — INSULIN GLARGINE 5 UNIT(S): 100 INJECTION, SOLUTION SUBCUTANEOUS at 21:57

## 2021-10-17 RX ADMIN — Medication 975 MILLIGRAM(S): at 21:45

## 2021-10-17 RX ADMIN — Medication 2 DROP(S): at 05:16

## 2021-10-17 RX ADMIN — Medication 500 MILLIGRAM(S): at 05:15

## 2021-10-17 RX ADMIN — OXYCODONE HYDROCHLORIDE 10 MILLIGRAM(S): 5 TABLET ORAL at 06:31

## 2021-10-17 RX ADMIN — CARVEDILOL PHOSPHATE 25 MILLIGRAM(S): 80 CAPSULE, EXTENDED RELEASE ORAL at 18:34

## 2021-10-17 RX ADMIN — HEPARIN SODIUM 5000 UNIT(S): 5000 INJECTION INTRAVENOUS; SUBCUTANEOUS at 13:27

## 2021-10-17 RX ADMIN — CEFEPIME 100 MILLIGRAM(S): 1 INJECTION, POWDER, FOR SOLUTION INTRAMUSCULAR; INTRAVENOUS at 05:16

## 2021-10-17 RX ADMIN — Medication 975 MILLIGRAM(S): at 09:49

## 2021-10-17 RX ADMIN — CEFEPIME 100 MILLIGRAM(S): 1 INJECTION, POWDER, FOR SOLUTION INTRAMUSCULAR; INTRAVENOUS at 13:27

## 2021-10-17 RX ADMIN — HEPARIN SODIUM 5000 UNIT(S): 5000 INJECTION INTRAVENOUS; SUBCUTANEOUS at 21:12

## 2021-10-17 RX ADMIN — ATORVASTATIN CALCIUM 40 MILLIGRAM(S): 80 TABLET, FILM COATED ORAL at 21:13

## 2021-10-17 RX ADMIN — Medication 1 TABLET(S): at 11:35

## 2021-10-17 RX ADMIN — Medication 500 MILLIGRAM(S): at 21:12

## 2021-10-17 RX ADMIN — HEPARIN SODIUM 5000 UNIT(S): 5000 INJECTION INTRAVENOUS; SUBCUTANEOUS at 05:16

## 2021-10-17 RX ADMIN — Medication 2 DROP(S): at 18:34

## 2021-10-17 RX ADMIN — Medication 975 MILLIGRAM(S): at 21:15

## 2021-10-17 RX ADMIN — Medication 2: at 18:33

## 2021-10-17 RX ADMIN — Medication 81 MILLIGRAM(S): at 11:35

## 2021-10-17 RX ADMIN — Medication 1 APPLICATION(S): at 11:36

## 2021-10-17 RX ADMIN — LISINOPRIL 10 MILLIGRAM(S): 2.5 TABLET ORAL at 05:15

## 2021-10-17 RX ADMIN — CEFEPIME 100 MILLIGRAM(S): 1 INJECTION, POWDER, FOR SOLUTION INTRAMUSCULAR; INTRAVENOUS at 21:13

## 2021-10-17 RX ADMIN — Medication 975 MILLIGRAM(S): at 03:25

## 2021-10-17 RX ADMIN — OXYCODONE HYDROCHLORIDE 10 MILLIGRAM(S): 5 TABLET ORAL at 21:09

## 2021-10-17 RX ADMIN — CARVEDILOL PHOSPHATE 25 MILLIGRAM(S): 80 CAPSULE, EXTENDED RELEASE ORAL at 05:15

## 2021-10-17 RX ADMIN — Medication 10: at 07:57

## 2021-10-17 RX ADMIN — OXYCODONE HYDROCHLORIDE 10 MILLIGRAM(S): 5 TABLET ORAL at 13:36

## 2021-10-17 RX ADMIN — Medication 6: at 11:36

## 2021-10-17 RX ADMIN — Medication 500 MILLIGRAM(S): at 13:34

## 2021-10-17 NOTE — PROGRESS NOTE ADULT - ASSESSMENT
59 y/o male, with a PmHx of CHF, HTN, CAD, HLD, DM, CKD, PVD with a Right Femoral-Femoral Bypass in 2018 on apixiban, presented to the [facility] ED with worsening Right Toe pain x 1 week.      [facility]   pt with multiple [facility] in past   RAVIN possible prerenal as SG is high s/p IVF  Renal function improved  On ACE Monitor closley  s/p angio 9/29. renal function stable  Monitor BMP  AVoid further nephrotoxics, NSAID RCA    Hypokalemia  Repelted KCl   as needed   MOnitor  serum K    Acidosis   improving   MOnitor serum Co2    hypomagnesemia  supplement as needed  monitor    hypophosphatemia  Supplement as needed   monitor serum PO4    hyponatremia  work up suggested SIADH  Change antibiotics to be given in NS   continue free water restriction  avoid hypotonic solutions  optimize dm control  monitor level

## 2021-10-17 NOTE — PROGRESS NOTE ADULT - SUBJECTIVE AND OBJECTIVE BOX
[facility]   MD RAKAN SHEPHERD MD RUORU WONG, PA    TEL:  OFFICE: [phone]  DR FELICIANO CELL: [phone]  OLU SÁNCHEZ CELL: [phone]  DR. PATIÑO CELL: [phone]      FROM 5 PM - 7 AM PLEASE CALL ANSWERING SERVICE: [phone]    RENAL FOLLOW UP NOTE--Date of Service 10-17-21 @ 11:37  --------------------------------------------------------------------------------  HPI:      Pt seen and examined at bedside.   Denies SOB, chest pain     PAST HISTORY  --------------------------------------------------------------------------------  No significant changes to PMH, PSH, FHx, SHx, unless otherwise noted    ALLERGIES & MEDICATIONS  --------------------------------------------------------------------------------  Allergies    penicillin (Other)    Intolerances      Standing Inpatient Medications  acetaminophen   Tablet .. 975 milliGRAM(s) Oral every 6 hours  artificial  tears Solution 2 Drop(s) Left EYE two times a day  aspirin enteric coated 81 milliGRAM(s) Oral daily  atorvastatin 40 milliGRAM(s) Oral at bedtime  cadexomer iodine 0.9% Gel 1 Application(s) Topical daily  carvedilol 25 milliGRAM(s) Oral every 12 hours  cefepime   IVPB 2000 milliGRAM(s) IV Intermittent every 8 hours  dextrose 40% Gel 15 Gram(s) Oral once  dextrose 5%. 1000 milliLiter(s) IV Continuous <Continuous>  dextrose 5%. 1000 milliLiter(s) IV Continuous <Continuous>  dextrose 50% Injectable 25 Gram(s) IV Push once  dextrose 50% Injectable 12.5 Gram(s) IV Push once  dextrose 50% Injectable 25 Gram(s) IV Push once  glucagon  Injectable 1 milliGRAM(s) IntraMuscular once  heparin   Injectable 5000 Unit(s) SubCutaneous every 8 hours  influenza   Vaccine 0.5 milliLiter(s) IntraMuscular once  insulin glargine Injectable (LANTUS) 5 Unit(s) SubCutaneous at bedtime  insulin lispro (ADMELOG) corrective regimen sliding scale   SubCutaneous three times a day before meals  insulin lispro (ADMELOG) corrective regimen sliding scale   SubCutaneous at bedtime  lisinopril 10 milliGRAM(s) Oral daily  metroNIDAZOLE    Tablet 500 milliGRAM(s) Oral every 8 hours  multivitamin 1 Tablet(s) Oral daily    PRN Inpatient Medications  HYDROmorphone  Injectable 0.5 milliGRAM(s) IV Push every 4 hours PRN  melatonin 3 milliGRAM(s) Oral at bedtime PRN  oxyCODONE    IR 10 milliGRAM(s) Oral every 4 hours PRN  oxyCODONE    IR 5 milliGRAM(s) Oral every 4 hours PRN      REVIEW OF SYSTEMS  --------------------------------------------------------------------------------  General: no fever  CVS: no chest pain  MSK: no edema     VITALS/PHYSICAL EXAM  --------------------------------------------------------------------------------  T(C): 36.7 (10-17-21 @ 05:12), Max: 37.8 (10-16-21 @ 14:31)  HR: 80 (10-17-21 @ 05:12) (80 - 92)  BP: 132/82 (10-17-21 @ 05:12) (123/65 - 138/82)  RR: 17 (10-17-21 @ 05:12) (17 - 17)  SpO2: 100% (10-17-21 @ 05:12) (98% - 100%)  Wt(kg): --        10-16-21 @ 07:01  -  10-17-21 @ 07:00  --------------------------------------------------------  IN: 385 mL / OUT: 1550 mL / NET: -1165 mL      Physical Exam:  	Gen: NAD  	HEENT: MMM  	Pulm: CTA B/L  	CV: S1S2  	Abd: Soft, +BS  	Ext: No LE edema B/L                      Neuro: Awake   	Skin: Warm and Dry   	Vascular access: NO HD catheter            no  dunham  LABS/STUDIES  --------------------------------------------------------------------------------              10.2   13.60 >-----------<  672      [10-17-21 @ 07:17]              29.8     136  |  101  |  34  ----------------------------<  199      [10-17-21 @ 07:17]  4.6   |  25  |  0.86        Ca     9.4     [10-17-21 @ 07:17]      Mg     2.20     [10-17-21 @ 07:17]      Phos  2.9     [10-17-21 @ 07:17]            Creatinine Trend:  SCr 0.86 [10-17 @ 07:17]  SCr 0.89 [10-16 @ 07:50]  SCr 0.80 [10-15 @ 05:44]  SCr 0.81 [10-14 @ 06:21]  SCr 0.71 [10-13 @ 06:18]    Urinalysis - [09-25-21 @ 15:23]      Color Yellow / Appearance Clear / SG 1.021 / pH 5.5      Gluc Negative / Ketone Negative  / Bili Negative / Urobili <2 mg/dL       Blood Negative / Protein Trace / Leuk Est Negative / Nitrite Negative      RBC 2 / WBC 3 / Hyaline 1 / Gran  / Sq Epi  / Non Sq Epi 1 / Bacteria Negative      HbA1c 5.7      [12-20-18 @ 06:30]  TSH 0.76      [09-25-21 @ 12:52]  Lipid: chol 139, , HDL 26, LDL --      [09-26-21 @ 08:30]    HCV 0.09, Nonreact      [09-26-21 @ 19:55]

## 2021-10-17 NOTE — PROGRESS NOTE ADULT - ASSESSMENT
61yo M with Hx CHF, CAD (no stents), HTN, HLD, DM, PVD with h/o right fem-pop bypass ([facility] 2018) who presents with non-healing R toe wound, found to have dry gangrene with small area of wet conversion and bypass occlusion now s/p 10/6 diagnostic RLE angiogram, b/l kissing iliac stents, right groin cutdown an PT exploration. Pt s/p 10/8 RLE guillotine and s/p 10/12 R AKA.    Plan:  - Continue Cefepime+ Metronidazole until discharge. Will likely d/c off antibiotics.  - Pain control as needed  - OOBAT  - Diet: DASH/TLC, diabetic  - R AKA dressing change daily  - ASA  - DVT ppx heparin SQ  - Mod lantus + insulin sliding scale  - PM&R, PT,OT following  - Dispo planning to [facility] Team Surgery   [id number]

## 2021-10-17 NOTE — PROGRESS NOTE ADULT - SUBJECTIVE AND OBJECTIVE BOX
CARDIOLOGY      S: no chest pain or sob; ros otherwise negative.     DATE OF SERVICE - 10-17-21     Review of Systems:   Constitutional: [ ] fevers, [ ] chills.   Skin: [ ] dry skin. [ ] rashes.  Psychiatric: [ ] depression, [ ] anxiety.   Gastrointestinal: [ ] BRBPR, [ ] melena.   Neurological: [ ] confusion. [ ] seizures. [ ] shuffling gait.   Ears,Nose,Mouth and Throat: [ ] ear pain [ ] sore throat.   Eyes: [ ] diplopia.   Respiratory: [ ] hemoptysis. [ ] shortness of breath  Cardiovascular: See HPI above  Hematologic/Lymphatic: [ ] anemia. [ ] painful nodes. [ ] prolonged bleeding.   Genitourinary: [ ] hematuria. [ ] flank pain.   Endocrine: [ ] significant change in weight. [ ] intolerance to heat and cold.     Review of systems [x ] otherwise negative, [ ] otherwise unable to obtain    FH: no family history of sudden cardiac death in first degree relatives    SH: [ ] tobacco, [ ] alcohol, [ ] drugs         acetaminophen   Tablet .. 975 milliGRAM(s) Oral every 6 hours  artificial  tears Solution 2 Drop(s) Left EYE two times a day  aspirin enteric coated 81 milliGRAM(s) Oral daily  atorvastatin 40 milliGRAM(s) Oral at bedtime  cadexomer iodine 0.9% Gel 1 Application(s) Topical daily  carvedilol 25 milliGRAM(s) Oral every 12 hours  cefepime   IVPB 2000 milliGRAM(s) IV Intermittent every 8 hours  dextrose 40% Gel 15 Gram(s) Oral once  dextrose 5%. 1000 milliLiter(s) IV Continuous <Continuous>  dextrose 5%. 1000 milliLiter(s) IV Continuous <Continuous>  dextrose 50% Injectable 25 Gram(s) IV Push once  dextrose 50% Injectable 12.5 Gram(s) IV Push once  dextrose 50% Injectable 25 Gram(s) IV Push once  glucagon  Injectable 1 milliGRAM(s) IntraMuscular once  heparin   Injectable 5000 Unit(s) SubCutaneous every 8 hours  HYDROmorphone  Injectable 0.5 milliGRAM(s) IV Push every 4 hours PRN  influenza   Vaccine 0.5 milliLiter(s) IntraMuscular once  insulin glargine Injectable (LANTUS) 5 Unit(s) SubCutaneous at bedtime  insulin lispro (ADMELOG) corrective regimen sliding scale   SubCutaneous at bedtime  insulin lispro (ADMELOG) corrective regimen sliding scale   SubCutaneous three times a day before meals  lisinopril 10 milliGRAM(s) Oral daily  melatonin 3 milliGRAM(s) Oral at bedtime PRN  metroNIDAZOLE    Tablet 500 milliGRAM(s) Oral every 8 hours  multivitamin 1 Tablet(s) Oral daily  oxyCODONE    IR 10 milliGRAM(s) Oral every 4 hours PRN  oxyCODONE    IR 5 milliGRAM(s) Oral every 4 hours PRN                            10.2   13.60 )-----------( 672      ( 17 Oct 2021 07:17 )             29.8       Hemoglobin: 10.2 g/dL (10-17 @ 07:17)  Hemoglobin: 10.8 g/dL (10-16 @ 07:50)  Hemoglobin: 11.1 g/dL (10-15 @ 05:44)  Hemoglobin: 10.9 g/dL (10-14 @ 06:21)  Hemoglobin: 10.5 g/dL (10-13 @ 06:18)      10-16    139  |  102  |  27<H>  ----------------------------<  151<H>  4.3   |  25  |  0.89    Ca    9.3      16 Oct 2021 07:50  Phos  2.5     10-16  Mg     2.20     10-16      Creatinine Trend: 0.89<--, 0.80<--, 0.81<--, 0.71<--, 0.72<--, 0.62<--    COAGS:           T(C): 36.7 (10-17-21 @ 05:12), Max: 37.8 (10-16-21 @ 14:31)  HR: 80 (10-17-21 @ 05:12) (80 - 92)  BP: 132/82 (10-17-21 @ 05:12) (116/71 - 138/82)  RR: 17 (10-17-21 @ 05:12) (17 - 18)  SpO2: 100% (10-17-21 @ 05:12) (98% - 100%)  Wt(kg): --    I&O's Summary    16 Oct 2021 07:01  -  17 Oct 2021 07:00  --------------------------------------------------------  IN: 385 mL / OUT: 1550 mL / NET: -1165 mL      General: Well nourished in no acute distress. Alert and Oriented * 3.   Head: Normocephalic and atraumatic.   Neck: No JVD. No bruits. Supple. Does not appear to be enlarged.   Cardiovascular: + S1,S2 ; RRR Soft systolic murmur at the left lower sternal border. No rubs noted.    Lungs: CTA b/l. No rhonchi, rales or wheezes.   Abdomen: + BS, soft. Non tender. Non distended. No rebound. No guarding.   Extremities: No clubbing/cyanosis/edema.    Skin: Warm and moist. The patient's skin has normal elasticity and good skin turgor.   Psychiatric: Appropriate mood and affect.    TTE: < from: Transthoracic Echocardiogram (09.29.21 @ 17:35) >  1. Calcified trileaflet aortic valve with normal opening.  2. Normal left ventricular internal dimensions and wall  thicknesses.  3. Normal left ventricular systolic function. No segmental  wall motion abnormalities.  4. Normal right ventricular size and function.    < end of copied text >    NST: < from: Nuclear Stress Test-Pharmacologic (Nuclear Stress Test-Pharmacologic .) (09.30.21 @ 16:16) >  IMPRESSIONS:Mildly Abnormal Study  * Myocardial Perfusion SPECT results are mildly abnormal.  * Review of raw data shows: The study is of fair technical  quality with adjacent bowel tracer uptake  * The left ventricle was normal in size. There is a small,  mild defect in septal wall that is fixed, suggestive of  mild scarring  * No clear evidence of ischemia.  * Post-stress gated wall motion analysis was performed  (LVEF = 66 %;LVEDV = 66 ml.), revealing normal LV  function. There was no segmental wall motion abnormality.  Septal wall motion appeared normal. RV function appeared  normal.    < end of copied text >      A/P: 59 y/o male PMH HTN, HLD, DM, CKD, PVD with a Right Femoral-Femoral Bypass in 2018 on apixiban, presented to the [facility] ED with worsening Right Toe pain x 1 week, CT with occluded bypass.  Now awaiting further work up from Vascular. Cardiology called for preop evaluation. s/p guillotine amputation  right foot, s/p R AKA    -TTE with normal LV functinon   -NST with no ischemia  -tolerated procedure well from CV perspective  -no clinical heart failure or anginal symptoms   -pain management and post op care per vascular  -continue medical therapy for known PAD including asa, lipitor, coreg  -no further inpatient cardiac workup anticipated at this time  -dc planning per primary team

## 2021-10-17 NOTE — PROGRESS NOTE ADULT - SUBJECTIVE AND OBJECTIVE BOX
Surgery Progress Note    INTERVAl/SUBJECTIVE: No acute event overnight.     Vital Signs Last 24 Hrs  T(C): 36.7 (16 Oct 2021 20:46), Max: 37.8 (16 Oct 2021 14:31)  T(F): 98 (16 Oct 2021 20:46), Max: 100 (16 Oct 2021 14:31)  HR: 82 (16 Oct 2021 20:46) (82 - 92)  BP: 123/65 (16 Oct 2021 20:46) (116/71 - 138/82)  BP(mean): --  RR: 17 (16 Oct 2021 20:46) (16 - 18)  SpO2: 100% (16 Oct 2021 20:46) (98% - 100%)    Physical Exam:  General:  Neuro:    CV:   Abdomen:     LABS:                        10.8   14.00 )-----------( 640      ( 16 Oct 2021 07:50 )             32.0     10-16    139  |  102  |  27<H>  ----------------------------<  151<H>  4.3   |  25  |  0.89    Ca    9.3      16 Oct 2021 07:50  Phos  2.5     10-16  Mg     2.20     10-16            INs and OUTs:    10-16-21 @ 07:01  -  10-17-21 @ 00:54  --------------------------------------------------------  IN: 150 mL / OUT: 1025 mL / NET: -875 mL       INTERVAL/SUBJECTIVE: Pain well-controlled. No acute complaints.    Vital Signs Last 24 Hrs  T(C): 36.7 (16 Oct 2021 20:46), Max: 37.8 (16 Oct 2021 14:31)  T(F): 98 (16 Oct 2021 20:46), Max: 100 (16 Oct 2021 14:31)  HR: 82 (16 Oct 2021 20:46) (82 - 92)  BP: 123/65 (16 Oct 2021 20:46) (116/71 - 138/82)  BP(mean): --  RR: 17 (16 Oct 2021 20:46) (16 - 18)  SpO2: 100% (16 Oct 2021 20:46) (98% - 100%)    Physical Exam:  General: NAD  Ext: R AKA incision healing well c/d/i    LABS:                        10.8   14.00 )-----------( 640      ( 16 Oct 2021 07:50 )             32.0     10-16    139  |  102  |  27<H>  ----------------------------<  151<H>  4.3   |  25  |  0.89    Ca    9.3      16 Oct 2021 07:50  Phos  2.5     10-16  Mg     2.20     10-16            INs and OUTs:    10-16-21 @ 07:01  -  10-17-21 @ 00:54  --------------------------------------------------------  IN: 150 mL / OUT: 1025 mL / NET: -875 mL

## 2021-10-18 ENCOUNTER — TRANSCRIPTION ENCOUNTER (OUTPATIENT)
Age: 61
End: 2021-10-18

## 2021-10-18 LAB
ANION GAP SERPL CALC-SCNC: 8 MMOL/L — SIGNIFICANT CHANGE UP (ref 7–14)
BUN SERPL-MCNC: 28 MG/DL — HIGH (ref 7–23)
CALCIUM SERPL-MCNC: 8.9 MG/DL — SIGNIFICANT CHANGE UP (ref 8.4–10.5)
CHLORIDE SERPL-SCNC: 100 MMOL/L — SIGNIFICANT CHANGE UP (ref 98–107)
CO2 SERPL-SCNC: 25 MMOL/L — SIGNIFICANT CHANGE UP (ref 22–31)
CREAT SERPL-MCNC: 0.77 MG/DL — SIGNIFICANT CHANGE UP (ref 0.5–1.3)
GLUCOSE SERPL-MCNC: 196 MG/DL — HIGH (ref 70–99)
HCT VFR BLD CALC: 32.2 % — LOW (ref 39–50)
HGB BLD-MCNC: 11 G/DL — LOW (ref 13–17)
MAGNESIUM SERPL-MCNC: 2.1 MG/DL — SIGNIFICANT CHANGE UP (ref 1.6–2.6)
MCHC RBC-ENTMCNC: 32.3 PG — SIGNIFICANT CHANGE UP (ref 27–34)
MCHC RBC-ENTMCNC: 34.2 GM/DL — SIGNIFICANT CHANGE UP (ref 32–36)
MCV RBC AUTO: 94.4 FL — SIGNIFICANT CHANGE UP (ref 80–100)
NRBC # BLD: 0 /100 WBCS — SIGNIFICANT CHANGE UP
NRBC # FLD: 0 K/UL — SIGNIFICANT CHANGE UP
PHOSPHATE SERPL-MCNC: 2.7 MG/DL — SIGNIFICANT CHANGE UP (ref 2.5–4.5)
PLATELET # BLD AUTO: 630 K/UL — HIGH (ref 150–400)
POTASSIUM SERPL-MCNC: 4.6 MMOL/L — SIGNIFICANT CHANGE UP (ref 3.5–5.3)
POTASSIUM SERPL-SCNC: 4.6 MMOL/L — SIGNIFICANT CHANGE UP (ref 3.5–5.3)
RBC # BLD: 3.41 M/UL — LOW (ref 4.2–5.8)
RBC # FLD: 15.5 % — HIGH (ref 10.3–14.5)
SODIUM SERPL-SCNC: 133 MMOL/L — LOW (ref 135–145)
WBC # BLD: 13.4 K/UL — HIGH (ref 3.8–10.5)
WBC # FLD AUTO: 13.4 K/UL — HIGH (ref 3.8–10.5)

## 2021-10-18 PROCEDURE — 99232 SBSQ HOSP IP/OBS MODERATE 35: CPT

## 2021-10-18 RX ORDER — INSULIN GLARGINE 100 [IU]/ML
10 INJECTION, SOLUTION SUBCUTANEOUS AT BEDTIME
Refills: 0 | Status: DISCONTINUED | OUTPATIENT
Start: 2021-10-18 | End: 2021-10-19

## 2021-10-18 RX ORDER — INSULIN LISPRO 100/ML
3 VIAL (ML) SUBCUTANEOUS
Refills: 0 | Status: DISCONTINUED | OUTPATIENT
Start: 2021-10-18 | End: 2021-10-19

## 2021-10-18 RX ADMIN — Medication 975 MILLIGRAM(S): at 15:10

## 2021-10-18 RX ADMIN — Medication 3 UNIT(S): at 17:41

## 2021-10-18 RX ADMIN — INSULIN GLARGINE 10 UNIT(S): 100 INJECTION, SOLUTION SUBCUTANEOUS at 22:08

## 2021-10-18 RX ADMIN — Medication 975 MILLIGRAM(S): at 23:00

## 2021-10-18 RX ADMIN — Medication 975 MILLIGRAM(S): at 22:09

## 2021-10-18 RX ADMIN — Medication 2 DROP(S): at 17:31

## 2021-10-18 RX ADMIN — HYDROMORPHONE HYDROCHLORIDE 0.5 MILLIGRAM(S): 2 INJECTION INTRAMUSCULAR; INTRAVENOUS; SUBCUTANEOUS at 05:42

## 2021-10-18 RX ADMIN — Medication 975 MILLIGRAM(S): at 14:40

## 2021-10-18 RX ADMIN — Medication 975 MILLIGRAM(S): at 09:33

## 2021-10-18 RX ADMIN — Medication 975 MILLIGRAM(S): at 10:33

## 2021-10-18 RX ADMIN — Medication 500 MILLIGRAM(S): at 05:46

## 2021-10-18 RX ADMIN — Medication 81 MILLIGRAM(S): at 12:03

## 2021-10-18 RX ADMIN — HEPARIN SODIUM 5000 UNIT(S): 5000 INJECTION INTRAVENOUS; SUBCUTANEOUS at 05:46

## 2021-10-18 RX ADMIN — OXYCODONE HYDROCHLORIDE 10 MILLIGRAM(S): 5 TABLET ORAL at 03:20

## 2021-10-18 RX ADMIN — HEPARIN SODIUM 5000 UNIT(S): 5000 INJECTION INTRAVENOUS; SUBCUTANEOUS at 13:14

## 2021-10-18 RX ADMIN — HEPARIN SODIUM 5000 UNIT(S): 5000 INJECTION INTRAVENOUS; SUBCUTANEOUS at 22:09

## 2021-10-18 RX ADMIN — CARVEDILOL PHOSPHATE 25 MILLIGRAM(S): 80 CAPSULE, EXTENDED RELEASE ORAL at 05:46

## 2021-10-18 RX ADMIN — CARVEDILOL PHOSPHATE 25 MILLIGRAM(S): 80 CAPSULE, EXTENDED RELEASE ORAL at 17:31

## 2021-10-18 RX ADMIN — CEFEPIME 100 MILLIGRAM(S): 1 INJECTION, POWDER, FOR SOLUTION INTRAMUSCULAR; INTRAVENOUS at 05:42

## 2021-10-18 RX ADMIN — Medication 1 TABLET(S): at 12:03

## 2021-10-18 RX ADMIN — Medication 4: at 08:00

## 2021-10-18 RX ADMIN — OXYCODONE HYDROCHLORIDE 10 MILLIGRAM(S): 5 TABLET ORAL at 22:07

## 2021-10-18 RX ADMIN — Medication 975 MILLIGRAM(S): at 03:20

## 2021-10-18 RX ADMIN — HYDROMORPHONE HYDROCHLORIDE 0.5 MILLIGRAM(S): 2 INJECTION INTRAMUSCULAR; INTRAVENOUS; SUBCUTANEOUS at 00:36

## 2021-10-18 RX ADMIN — LISINOPRIL 10 MILLIGRAM(S): 2.5 TABLET ORAL at 05:47

## 2021-10-18 RX ADMIN — OXYCODONE HYDROCHLORIDE 10 MILLIGRAM(S): 5 TABLET ORAL at 23:00

## 2021-10-18 RX ADMIN — HYDROMORPHONE HYDROCHLORIDE 0.5 MILLIGRAM(S): 2 INJECTION INTRAMUSCULAR; INTRAVENOUS; SUBCUTANEOUS at 06:12

## 2021-10-18 RX ADMIN — Medication 1 APPLICATION(S): at 12:04

## 2021-10-18 RX ADMIN — ATORVASTATIN CALCIUM 40 MILLIGRAM(S): 80 TABLET, FILM COATED ORAL at 22:09

## 2021-10-18 RX ADMIN — Medication 10: at 12:03

## 2021-10-18 RX ADMIN — Medication 3 MILLIGRAM(S): at 22:10

## 2021-10-18 NOTE — PROGRESS NOTE ADULT - SUBJECTIVE AND OBJECTIVE BOX
Patient is a 60y old  Male who presents with a chief complaint of Right Toe Necrosis (18 Oct 2021 09:17)      HPI:  59 y/o male, with a PmHx of CHF, HTN, CAD, HLD, DM, CKD, PVD with a Right Femoral-Femoral Bypass in 2018 on apixiban, presented to the [facility] ED with worsening Right Toe pain x 1 week.  Pt states about 1 month ago he started to get pain in his right foot 5th digit. He states his wife had  from Lung Cancer a few days prior but waited to see his PCP until a week later. He states when he finally went into see his PCP, his doctor states he was likely Gout and was prescribed Indomethacin. Pt states his foot wasn't getting any better and the other day he noticed some blood coming from the bottom of his toe so he had gone today to see his Podiatrist (had trouble getting an appointment over this past week). In the Podiatrist office, he was told to go to the ED because he could have a necrotic toe and since he had a history of a bypass in that leg before, there is a high risk of it getting worse. Pt denies any fever, chills, chest pain, HA, dizziness, blurred vision, abd pain, sick contacts, recent travel. The only complaint he was having is pain to the right 5th toe. In the [facility] ED today, he was seen by Podiatry and a bedside debridement was done and then wrapped in a dry sterile dressing. 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 Pt was started on Clindamycin and was then admitted to medicine for further medical management and treatment. (25 Sep 2021 15:50)    Patient reports he has been working with PT, tolerating therapy but prefers subacute rehab. Requests Melissa. States he does not want acute rehab.  Reports pain at times, currently controlled.     REVIEW OF SYSTEMS  Constitutional - No fever, No weight loss, No fatigue  HEENT - No eye pain, No visual disturbances, No difficulty hearing, No tinnitus, No vertigo, No neck pain  Respiratory - No cough, No wheezing, No shortness of breath  Cardiovascular - No chest pain, No palpitations  Gastrointestinal - No abdominal pain, No nausea, No vomiting, No diarrhea, No constipation  Genitourinary - No dysuria, No frequency, No hematuria, No incontinence  Neurological - No headaches, No memory loss, No loss of strength, No numbness, No tremors  Skin - No itching, No rashes, No lesions   Endocrine - No temperature intolerance  Musculoskeletal - + pain  Psychiatric - No depression, No anxiety    PAST MEDICAL & SURGICAL HISTORY  DM (diabetes mellitus)    HTN (hypertension)    HLD (hyperlipidemia)    CHF (congestive heart failure)    CKD (chronic kidney disease)    PVD (peripheral vascular disease)    S/P femoral-femoral bypass surgery      CURRENT FUNCTIONAL STATUS  10/17  Bed Mobility  Bed Mobility Training Rehab Potential: good, to achieve stated therapy goals  Bed Mobility Training Symptoms Noted During/After Treatment: none  Bed Mobility Training Sit-to-Supine: contact guard;  1 person assist;  bed rails  Bed Mobility Training Supine-to-Sit: contact guard;  1 person assist;  bed rails  Bed Mobility Training Limitations: decreased ROM;  decreased strength;  impaired balance    Sit-Stand Transfer Training  Sit-to-Stand Transfer Training Rehab Potential: good, to achieve stated therapy goals  Sit-to-Stand Transfer Training Symptoms Noted During/After Treatment: none  Transfer Training Sit-to-Stand Transfer: minimum assist (75% patient effort);  1 person assist;  nonweight-bearing   Right BKA   rolling walker  Transfer Training Stand-to-Sit Transfer: minimum assist (75% patient effort);  1 person assist;  nonweight-bearing   Right BKA   rolling walker  Sit-to-Stand Transfer Training Transfer Safety Analysis: decreased strength;  impaired balance    Gait Training  Gait Training Rehab Potential: good, to achieve stated therapy goals  Gait Training Symptoms Noted During/After Treatment: none  Gait Training: minimum assist (75% patient effort);  1 person assist;  nonweight-bearing   Right BKA   rolling walker;  10 feet;  x 2  Gait Analysis: 3-point gait   decreased vitor;  decreased step length;  decreased stride length;  decreased strength;  impaired balance;  10 feet;  x 2;  rolling walker      FAMILY HISTORY   Family history of stroke (Father, Sibling)    Family history of MI (myocardial infarction)    FH: renal cell carcinoma    Family history of depression    FHx: diabetes mellitus    FH: heart disease        RECENT LABS/IMAGING  CBC Full  -  ( 18 Oct 2021 07:55 )  WBC Count : 13.40 K/uL  RBC Count : 3.41 M/uL  Hemoglobin : 11.0 g/dL  Hematocrit : 32.2 %  Platelet Count - Automated : 630 K/uL  Mean Cell Volume : 94.4 fL  Mean Cell Hemoglobin : 32.3 pg  Mean Cell Hemoglobin Concentration : 34.2 gm/dL  Auto Neutrophil # : x  Auto Lymphocyte # : x  Auto Monocyte # : x  Auto Eosinophil # : x  Auto Basophil # : x  Auto Neutrophil % : x  Auto Lymphocyte % : x  Auto Monocyte % : x  Auto Eosinophil % : x  Auto Basophil % : x    10-18    133<L>  |  100  |  28<H>  ----------------------------<  196<H>  4.6   |  25  |  0.77    Ca    8.9      18 Oct 2021 07:55  Phos  2.7     10-18  Mg     2.10     10-18      VITALS  T(C): 36.4 (10-18-21 @ 10:14), Max: 36.8 (10-17-21 @ 13:53)  HR: 88 (10-18-21 @ 10:14) (79 - 88)  BP: 104/79 (10-18-21 @ 10:14) (104/79 - 149/81)  RR: 18 (10-18-21 @ 10:14) ( - 18)  SpO2: 100% (10-18-21 @ 10:14) (98% - 100%)  Wt(kg): --    ALLERGIES  penicillin (Other)      MEDICATIONS   acetaminophen   Tablet .. 975 milliGRAM(s) Oral every 6 hours  artificial  tears Solution 2 Drop(s) Left EYE two times a day  aspirin enteric coated 81 milliGRAM(s) Oral daily  atorvastatin 40 milliGRAM(s) Oral at bedtime  cadexomer iodine 0.9% Gel 1 Application(s) Topical daily  carvedilol 25 milliGRAM(s) Oral every 12 hours  cefepime   IVPB 2000 milliGRAM(s) IV Intermittent every 8 hours  dextrose 40% Gel 15 Gram(s) Oral once  dextrose 5%. 1000 milliLiter(s) IV Continuous <Continuous>  dextrose 5%. 1000 milliLiter(s) IV Continuous <Continuous>  dextrose 50% Injectable 25 Gram(s) IV Push once  dextrose 50% Injectable 12.5 Gram(s) IV Push once  dextrose 50% Injectable 25 Gram(s) IV Push once  glucagon  Injectable 1 milliGRAM(s) IntraMuscular once  heparin   Injectable 5000 Unit(s) SubCutaneous every 8 hours  HYDROmorphone  Injectable 0.5 milliGRAM(s) IV Push every 4 hours PRN  influenza   Vaccine 0.5 milliLiter(s) IntraMuscular once  insulin glargine Injectable (LANTUS) 5 Unit(s) SubCutaneous at bedtime  insulin lispro (ADMELOG) corrective regimen sliding scale   SubCutaneous three times a day before meals  insulin lispro (ADMELOG) corrective regimen sliding scale   SubCutaneous at bedtime  lisinopril 10 milliGRAM(s) Oral daily  melatonin 3 milliGRAM(s) Oral at bedtime PRN  metroNIDAZOLE    Tablet 500 milliGRAM(s) Oral every 8 hours  multivitamin 1 Tablet(s) Oral daily  oxyCODONE    IR 10 milliGRAM(s) Oral every 4 hours PRN  oxyCODONE    IR 5 milliGRAM(s) Oral every 4 hours PRN      ----------------------------------------------------------------------------------------   PHYSICAL EXAM - incomplete  Constitutional - NAD, Comfortable  HEENT - NCAT, EOMI  Neck - Supple, No limited ROM  Chest - CTA bilaterally, No wheeze, No rhonchi, No crackles  Cardiovascular - RRR, [id number] -  Soft, NTND  Extremities - R residual limb dressing intact with ace wrap, No calf tenderness   Neurologic Exam -                    Cognitive - Awake, Alert, AAO to self, place, date, year, situation     Communication - Fluent, No dysarthria     Cranial Nerves - CN 2-12 intact     Motor - No focal deficits                    LEFT    UE - ShAB 5/5, EF 5/5, EE 5/5, WE 5/5,  5/5                    RIGHT UE - ShAB 5/5, EF 5/5, EE 5/5, WE 5/5,  5/5                    LEFT    LE - HF 5/5, KE 5/5, DF 5/5, PF 5/5                    RIGHT LE - HF 5/5      Sensory - Intact to LT      OculoVestibular - No saccades, No nystagmus, VOR         Balance - WNL Static  Psychiatric - Mood stable, Affect WNL  ----------------------------------------------------------------------------------------  ASSESSMENT/PLAN  61 yo m s/p R aka  Pain - oxy ir prn, dilaudid iv prn  reports diarrhea after antibiotics, monitor bm  Diet: dash/tlc, consistent carb, supplement glucerna  metronidazole and cefepime for wound infection  DVT PPX - heparin  Rehab - patient can tolerate 3 hrs/day of therapy with medical supervision.  However patient prefers to be fit for prosthesis prior to discharge home and therefore prefers subacute rehab to facilitate longer rehab stay.  He reports he can stay with his brother in basement apartment without stairs, and could go home wheelchair level, but prefers to be able to ambulate with prosthesis at home.     recommend subacute rehab when medically cleared, per patient preference

## 2021-10-18 NOTE — DISCHARGE NOTE PROVIDER - NSDCFUADDAPPT_GEN_ALL_CORE_FT
Please make an appointment to follow up with Dr. Lundberg in clinic in two weeks.    Wound care instructions: your groin wound may be dressed with dry gauze followed by paper tape. Daily dressing changes as needed.

## 2021-10-18 NOTE — DISCHARGE NOTE PROVIDER - PROVIDER TOKENS
PROVIDER:[TOKEN:[[id number]]] PROVIDER:[TOKEN:[[id number]]],PROVIDER:[TOKEN:[[id number]]]

## 2021-10-18 NOTE — PROGRESS NOTE ADULT - ASSESSMENT
· Assessment	  61yo M with Hx CHF, CAD (no stents), HTN, HLD, DM, PVD with h/o right fem-pop bypass ([facility] 2018) who presents with non-healing R toe wound, found to have dry gangrene with small area of wet conversion and bypass occlusion now s/p 10/6 diagnostic RLE angiogram, b/l kissing iliac stents, right groin cutdown an PT exploration. Pt s/p 10/8 RLE guillotine and s/p 10/12 R AKA.    Plan:  - Continue Cefepime+ Metronidazole until discharge. Discharge home on Augmentin x1 wk  - Pain control as needed  - OOBAT  - Diet: DASH/TLC, diabetic  - R AKA dressing change daily  - ASA  - DVT ppx heparin SQ  - Mod lantus + insulin sliding scale  - PM&R, PT,OT following  - Dispo planning to [facility]   [id number]

## 2021-10-18 NOTE — PROGRESS NOTE ADULT - SUBJECTIVE AND OBJECTIVE BOX
CARDIOLOGY      S: no chest pain or sob; ros otherwise negative.     DATE OF SERVICE - 10-18-21     Review of Systems:   Constitutional: [ ] fevers, [ ] chills.   Skin: [ ] dry skin. [ ] rashes.  Psychiatric: [ ] depression, [ ] anxiety.   Gastrointestinal: [ ] BRBPR, [ ] melena.   Neurological: [ ] confusion. [ ] seizures. [ ] shuffling gait.   Ears,Nose,Mouth and Throat: [ ] ear pain [ ] sore throat.   Eyes: [ ] diplopia.   Respiratory: [ ] hemoptysis. [ ] shortness of breath  Cardiovascular: See HPI above  Hematologic/Lymphatic: [ ] anemia. [ ] painful nodes. [ ] prolonged bleeding.   Genitourinary: [ ] hematuria. [ ] flank pain.   Endocrine: [ ] significant change in weight. [ ] intolerance to heat and cold.     Review of systems [x ] otherwise negative, [ ] otherwise unable to obtain    FH: no family history of sudden cardiac death in first degree relatives    SH: [ ] tobacco, [ ] alcohol, [ ] drugs    acetaminophen   Tablet .. 975 milliGRAM(s) Oral every 6 hours  artificial  tears Solution 2 Drop(s) Left EYE two times a day  aspirin enteric coated 81 milliGRAM(s) Oral daily  atorvastatin 40 milliGRAM(s) Oral at bedtime  cadexomer iodine 0.9% Gel 1 Application(s) Topical daily  carvedilol 25 milliGRAM(s) Oral every 12 hours  dextrose 40% Gel 15 Gram(s) Oral once  dextrose 5%. 1000 milliLiter(s) IV Continuous <Continuous>  dextrose 5%. 1000 milliLiter(s) IV Continuous <Continuous>  dextrose 50% Injectable 25 Gram(s) IV Push once  dextrose 50% Injectable 12.5 Gram(s) IV Push once  dextrose 50% Injectable 25 Gram(s) IV Push once  glucagon  Injectable 1 milliGRAM(s) IntraMuscular once  heparin   Injectable 5000 Unit(s) SubCutaneous every 8 hours  HYDROmorphone  Injectable 0.5 milliGRAM(s) IV Push every 4 hours PRN  influenza   Vaccine 0.5 milliLiter(s) IntraMuscular once  insulin glargine Injectable (LANTUS) 10 Unit(s) SubCutaneous at bedtime  insulin lispro (ADMELOG) corrective regimen sliding scale   SubCutaneous three times a day before meals  insulin lispro (ADMELOG) corrective regimen sliding scale   SubCutaneous at bedtime  insulin lispro Injectable (ADMELOG) 3 Unit(s) SubCutaneous three times a day before meals  lisinopril 10 milliGRAM(s) Oral daily  melatonin 3 milliGRAM(s) Oral at bedtime PRN  multivitamin 1 Tablet(s) Oral daily  oxyCODONE    IR 10 milliGRAM(s) Oral every 4 hours PRN  oxyCODONE    IR 5 milliGRAM(s) Oral every 4 hours PRN                        11.0   13.40 )-----------( 630      ( 18 Oct 2021 07:55 )             32.2       133<L>  |  100  |  28<H>  ----------------------------<  196<H>  4.6   |  25  |  0.77    Ca    8.9      18 Oct 2021 07:55  Phos  2.7     10-18  Mg     2.10     10-18      T(C): 36.4 (10-18-21 @ 10:14), Max: 36.8 (10-18-21 @ 02:02)  HR: 88 (10-18-21 @ 10:14) (81 - 88)  BP: 104/79 (10-18-21 @ 10:14) (104/79 - 149/81)  RR: 18 (10-18-21 @ 10:14) (18 - 18)  SpO2: 100% (10-18-21 @ 10:14) (98% - 100%)  Wt(kg): --    General: Well nourished in no acute distress. Alert and Oriented * 3.   Head: Normocephalic and atraumatic.   Neck: No JVD. No bruits. Supple. Does not appear to be enlarged.   Cardiovascular: + S1,S2 ; RRR Soft systolic murmur at the left lower sternal border. No rubs noted.    Lungs: CTA b/l. No rhonchi, rales or wheezes.   Abdomen: + BS, soft. Non tender. Non distended. No rebound. No guarding.   Extremities: No clubbing/cyanosis/edema.    Skin: Warm and moist. The patient's skin has normal elasticity and good skin turgor.   Psychiatric: Appropriate mood and affect.    TTE: < from: Transthoracic Echocardiogram (09.29.21 @ 17:35) >  1. Calcified trileaflet aortic valve with normal opening.  2. Normal left ventricular internal dimensions and wall  thicknesses.  3. Normal left ventricular systolic function. No segmental  wall motion abnormalities.  4. Normal right ventricular size and function.    < end of copied text >    NST: < from: Nuclear Stress Test-Pharmacologic (Nuclear Stress Test-Pharmacologic .) (09.30.21 @ 16:16) >  IMPRESSIONS:Mildly Abnormal Study  * Myocardial Perfusion SPECT results are mildly abnormal.  * Review of raw data shows: The study is of fair technical  quality with adjacent bowel tracer uptake  * The left ventricle was normal in size. There is a small,  mild defect in septal wall that is fixed, suggestive of  mild scarring  * No clear evidence of ischemia.  * Post-stress gated wall motion analysis was performed  (LVEF = 66 %;LVEDV = 66 ml.), revealing normal LV  function. There was no segmental wall motion abnormality.  Septal wall motion appeared normal. RV function appeared  normal.    < end of copied text >      A/P: 59 y/o male PMH HTN, HLD, DM, CKD, PVD with a Right Femoral-Femoral Bypass in 2018 on apixiban, presented to the [facility] ED with worsening Right Toe pain x 1 week, CT with occluded bypass.  Now awaiting further work up from Vascular. Cardiology called for preop evaluation. s/p guillotine amputation  right foot, s/p R AKA    -TTE with normal LV functinon   -NST with no ischemia  -tolerated procedure well from CV perspective  -no clinical heart failure or anginal symptoms   -pain management and post op care per vascular  -continue medical therapy for known PAD including asa, lipitor, coreg  -no further inpatient cardiac workup anticipated at this time  -dc planning per primary team

## 2021-10-18 NOTE — DISCHARGE NOTE PROVIDER - NSDCCPCAREPLAN_GEN_ALL_CORE_FT
PRINCIPAL DISCHARGE DIAGNOSIS  Diagnosis: Infection of toe  Assessment and Plan of Treatment: WOUND CARE:  Please keep incisions clean and dry. Please do not Scrub or rub incisions. Do not use lotion or powder on incisions.   BATHING: You may shower and/or sponge bathe. You may use warm soapy water in the shower and rinse, pat dry.  ACTIVITY: No heavy lifting or straining. Otherwise, you may return to your usual level of physical activity. If you are taking narcotic pain medication DO NOT drive a car, operate machinery or make important decisions.  DIET: diabetic diet  NOTIFY YOUR SURGEON IF YOU HAVE: any bleeding that does not stop, any pus draining from your wound(s), any fever (over 100.4 F) persistent nausea/vomiting, or if your pain is not controlled on your discharge pain medications, unable to urinate.  Please follow up with your primary care physician in one week regarding your hospitalization, bring copies of your discharge paperwork.  Please follow up with your surgeon, Dr. Lundberg as an outpatient, please call to schedule appointment          PRINCIPAL DISCHARGE DIAGNOSIS  Diagnosis: Infection of toe  Assessment and Plan of Treatment: WOUND CARE:  Please keep incisions clean and dry. Please do not Scrub or rub incisions. Do not use lotion or powder on incisions.   BATHING: You may shower and/or sponge bathe. You may use warm soapy water in the shower and rinse, pat dry.  ACTIVITY: No heavy lifting or straining. Otherwise, you may return to your usual level of physical activity. If you are taking narcotic pain medication DO NOT drive a car, operate machinery or make important decisions.  DIET: diabetic diet  NOTIFY YOUR SURGEON IF YOU HAVE: any bleeding that does not stop, any pus draining from your wound(s), any fever (over 100.4 F) persistent nausea/vomiting, or if your pain is not controlled on your discharge pain medications, unable to urinate.  Please follow up with your primary care physician in one week regarding your hospitalization, bring copies of your discharge paperwork.  Please follow up with your cardiologist Dr Cruz as an outpatient, please call to schedule appointment   Please follow up with your surgeon, Dr. Lundberg as an outpatient, please call to schedule appointment         SECONDARY DISCHARGE DIAGNOSES  Diagnosis: CKD (chronic kidney disease)  Assessment and Plan of Treatment: Please continue free water restriction, monitor Na level, and follow up with your Nephrologist as an outpatient, please call to schedule appointment

## 2021-10-18 NOTE — DISCHARGE NOTE PROVIDER - DETAILS OF MALNUTRITION DIAGNOSIS/DIAGNOSES
This patient has been assessed with a concern for Malnutrition and was treated during this hospitalization for the following Nutrition diagnosis/diagnoses:     -  10/04/2021: Severe protein-calorie malnutrition   -  10/04/2021: Underweight (BMI < 19)

## 2021-10-18 NOTE — DISCHARGE NOTE PROVIDER - CARE PROVIDERS DIRECT ADDRESSES
,[email or web address]

## 2021-10-18 NOTE — PROGRESS NOTE ADULT - SUBJECTIVE AND OBJECTIVE BOX
TEAM SURGERY PROGRESS NOTE    SUBJECTIVE: Patient seen and examined at bedside on AM rounds, patient without complaints.     Vital Signs Last 24 Hrs  T(C): 36.8 (18 Oct 2021 05:15), Max: 36.8 (17 Oct 2021 13:53)  T(F): 98.2 (18 Oct 2021 05:15), Max: 98.3 (18 Oct 2021 02:02)  HR: 81 (18 Oct 2021 05:15) (79 - 84)  BP: 149/81 (18 Oct 2021 05:15) (132/74 - 149/81)  BP(mean): --  RR: 18 (18 Oct 2021 05:15) (17 - 18)  SpO2: 99% (18 Oct 2021 05:15) (98% - 100%)  I&O's Detail    17 Oct 2021 07:01  -  18 Oct 2021 07:00  --------------------------------------------------------  IN:    Oral Fluid: 850 mL  Total IN: 850 mL    OUT:    Voided (mL): 1280 mL  Total OUT: 1280 mL    Total NET: -430 mL        MEDICATIONS  (STANDING):  acetaminophen   Tablet .. 975 milliGRAM(s) Oral every 6 hours  artificial  tears Solution 2 Drop(s) Left EYE two times a day  aspirin enteric coated 81 milliGRAM(s) Oral daily  atorvastatin 40 milliGRAM(s) Oral at bedtime  cadexomer iodine 0.9% Gel 1 Application(s) Topical daily  carvedilol 25 milliGRAM(s) Oral every 12 hours  cefepime   IVPB 2000 milliGRAM(s) IV Intermittent every 8 hours  dextrose 40% Gel 15 Gram(s) Oral once  dextrose 5%. 1000 milliLiter(s) (50 mL/Hr) IV Continuous <Continuous>  dextrose 5%. 1000 milliLiter(s) (100 mL/Hr) IV Continuous <Continuous>  dextrose 50% Injectable 25 Gram(s) IV Push once  dextrose 50% Injectable 12.5 Gram(s) IV Push once  dextrose 50% Injectable 25 Gram(s) IV Push once  glucagon  Injectable 1 milliGRAM(s) IntraMuscular once  heparin   Injectable 5000 Unit(s) SubCutaneous every 8 hours  influenza   Vaccine 0.5 milliLiter(s) IntraMuscular once  insulin glargine Injectable (LANTUS) 5 Unit(s) SubCutaneous at bedtime  insulin lispro (ADMELOG) corrective regimen sliding scale   SubCutaneous three times a day before meals  insulin lispro (ADMELOG) corrective regimen sliding scale   SubCutaneous at bedtime  lisinopril 10 milliGRAM(s) Oral daily  metroNIDAZOLE    Tablet 500 milliGRAM(s) Oral every 8 hours  multivitamin 1 Tablet(s) Oral daily    MEDICATIONS  (PRN):  HYDROmorphone  Injectable 0.5 milliGRAM(s) IV Push every 4 hours PRN breakthrough  melatonin 3 milliGRAM(s) Oral at bedtime PRN Insomnia  oxyCODONE    IR 10 milliGRAM(s) Oral every 4 hours PRN Severe Pain (7 - 10)  oxyCODONE    IR 5 milliGRAM(s) Oral every 4 hours PRN Moderate Pain (4 - 6)      Physical Exam:  General: NAD  Ext: R AKA incision healing well c/d/i    LABS:                        11.0   13.40 )-----------( 630      ( 18 Oct 2021 07:55 )             32.2     10-18    133<L>  |  100  |  28<H>  ----------------------------<  196<H>  4.6   |  25  |  0.77    Ca    8.9      18 Oct 2021 07:55  Phos  2.7     10-18  Mg     2.10     10-18              Patient is a 60y Male

## 2021-10-18 NOTE — DISCHARGE NOTE PROVIDER - NSDCCPTREATMENT_GEN_ALL_CORE_FT
PRINCIPAL PROCEDURE  Procedure: Above knee amputation  Findings and Treatment:       SECONDARY PROCEDURE  Procedure: Guillotine amputation below knee  Findings and Treatment: BERE

## 2021-10-18 NOTE — DISCHARGE NOTE PROVIDER - NSDCMRMEDTOKEN_GEN_ALL_CORE_FT
apixaban 2.5 mg oral tablet: 1 tab(s) orally 2 times a day  aspirin 81 mg oral tablet: 1 tab(s) orally once a day  atorvastatin 40 mg oral tablet: 1 tab(s) orally once a day  Coreg 25 mg oral tablet: 1 tab(s) orally 2 times a day  Janumet 50 mg-1000 mg oral tablet: 1 tab(s) orally 2 times a day  lisinopril 10 mg oral tablet: 1 tab(s) orally once a day  Multiple Vitamins oral tablet: 1 tab(s) orally once a day   acetaminophen 325 mg oral tablet: 2 tab(s) orally every 6 hours, As Needed  apixaban 2.5 mg oral tablet: 1 tab(s) orally 2 times a day  aspirin 81 mg oral tablet: 1 tab(s) orally once a day  atorvastatin 40 mg oral tablet: 1 tab(s) orally once a day  cadexomer iodine 0.9% topical gel: 1 application topically once a day  Coreg 25 mg oral tablet: 1 tab(s) orally 2 times a day  Janumet 50 mg-1000 mg oral tablet: 1 tab(s) orally 2 times a day  lisinopril 10 mg oral tablet: 1 tab(s) orally once a day  Multiple Vitamins oral tablet: 1 tab(s) orally once a day  ocular lubricant ophthalmic solution: 2 drop(s) to each affected eye 2 times a day  oxyCODONE 10 mg oral tablet: 1 tab(s) orally every 4 hours, As needed, Severe Pain (7 - 10)  oxyCODONE 5 mg oral tablet: 1 tab(s) orally every 4 hours, As needed, Moderate Pain (4 - 6)   acetaminophen 325 mg oral tablet: 2 tab(s) orally every 6 hours, As Needed  aspirin 81 mg oral tablet: 1 tab(s) orally once a day  atorvastatin 40 mg oral tablet: 1 tab(s) orally once a day  cadexomer iodine 0.9% topical gel: 1 application topically once a day  Coreg 25 mg oral tablet: 1 tab(s) orally 2 times a day  lisinopril 10 mg oral tablet: 1 tab(s) orally once a day  Multiple Vitamins oral tablet: 1 tab(s) orally once a day  ocular lubricant ophthalmic solution: 2 drop(s) to each affected eye 2 times a day  oxyCODONE 10 mg oral tablet: 1 tab(s) orally every 4 hours, As needed, Severe Pain (7 - 10)  oxyCODONE 5 mg oral tablet: 1 tab(s) orally every 4 hours, As needed, Moderate Pain (4 - 6)

## 2021-10-18 NOTE — PROGRESS NOTE ADULT - ASSESSMENT
61 y/o male, with a PmHx of CHF, HTN, CAD, HLD, DM, CKD, PVD with a Right Femoral-Femoral Bypass in 2018 on apixiban, presented to the [facility] ED with worsening Right Toe pain x 1 week.      Ravin   pt with multiple Ravin in past   RAVIN possible prerenal as SG is high s/p IVF  Renal function improved  On ACE Monitor closley  s/p angio 9/29. renal function stable  Monitor BMP  AVoid further nephrotoxics, NSAID RCA    Hypokalemia  Repelted KCl   as needed   MOnitor  serum K    Acidosis   improving   MOnitor serum Co2    hypomagnesemia  supplement as needed  monitor    hypophosphatemia  Supplement as needed   monitor serum PO4    hyponatremia  work up suggested SIADH, hyperglycemia  Change antibiotics to be given in NS   continue free water restriction  avoid hypotonic solutions  optimize dm control  monitor level   61 y/o male, with a PmHx of CHF, HTN, CAD, HLD, DM, CKD, PVD with a Right Femoral-Femoral Bypass in 2018 on apixiban, presented to the [facility] ED with worsening Right Toe pain x 1 week.      Ravin   pt with multiple Ravin in past   RAVIN possible prerenal as SG is high s/p IVF  Renal function improved  On ACE Monitor closley  s/p angio 9/29. renal function stable  Monitor BMP  AVoid further nephrotoxics, NSAID RCA    Hypokalemia  Repelted KCl   as needed   MOnitor  serum K    Acidosis   improving   MOnitor serum Co2    hypomagnesemia  supplement as needed  monitor    hypophosphatemia  Supplement as needed   monitor serum PO4    hyponatremia  work up suggested SIADH, hyperglycemia  antibiotics to be given in NS   continue free water restriction  avoid hypotonic solutions  optimize dm control  monitor level

## 2021-10-18 NOTE — PROGRESS NOTE ADULT - ATTENDING SUPERVISION STATEMENT
ACP
Resident
ACP
Resident
ACP

## 2021-10-18 NOTE — PROGRESS NOTE ADULT - ATTENDING COMMENTS
Patient is 60 years old male with PMHx of DM2, severe PAD, s/p bypass graft 2018, HTN, HLD with c/o RT toe vascular wound, seen by Podiatry.  Wound infected, started on atb by ED, Clindamycin , R/O osteomyelitis, low suspicion per Podiatry.   F/u vascular surgery recommendations  Consider ID consult.  CKD 3, avoid nephrotoxics
Patient seen and examined with JAYNE Fox. Agree with above.   Preoperative NST with no ischemia or infarct  Optimized from cv perspective for planned vascular surgery this week     Imani Cesar MD
Patient seen and examined.  Agree with above.   Tolerated procedure well in terms of cv perspective  Continue with supportive care per vascular     Imani Cesar MD
Patient seen and examined.  Agree with above.  No further inpatient cardiac workup needed at this time.     Imani Cesar MD
Patient s/p RLE angiography. CTA shows right common iliac thrombus (source of bypass occlusion?). Angiography shows occluded SFA, popliteal and tibial vessels. Will schedule for iliac kissing stents on 10/6 and will explore tibial vessels for bypass target in OR. Otherwise patient will ultimately require amputation.  -Please obtain cardiac and medical clearance   -Continue anticoagulation  -Embolic/hypercoagulable workup  -Vein mapping of lower extremities
Patient seen and examined.  Agree with above.   No further inpatient cardiac workup needed at this time.     Imani Cesar MD
Patient seen and examined.  Agree with above.   No further inpatient cardiac workup needed at this time.   DC planning per primary team     Imani Cesar MD
RHINA: Better-monitor renal function
RHINA: Improving-monitor
RHINA: Better-monitor renal function
RHINA: Improving-monitor
RHINA: Improving-monitor
Vascular surgery attending covering for Dr. Lundberg.  I have discussed the case with the surgical house staff. I have personally seen, examined, and participated in the care of this patient. I have reviewed all pertinent clinical information.     s/p angelita FLORES  daily dressing change  cont antibiotics  OR later this week if wound clean for completion bka
Patient seen/examined. S/p right AKA. Continue antibiotics until white count normalizes.
Vascular surgery attending covering for Dr. Lundberg.  I have discussed the case with the surgical house staff. I have personally seen, examined, and participated in the care of this patient. I have reviewed all pertinent clinical information.     s/p angelita FLORES  daily dressing change  cont antibiotics  OR later this week if wound clean for completion bka
Patient is 60 years old male with PMHx of DM2, severe PAD, s/p bypass graft 2018, HTN, HLD with c/o RT toe vascular wound, seen by Podiatry.  Wound infected, started on atb by ED, Clindamycin , R/O osteomyelitis, low suspicion per Podiatry.   F/u vascular surgery recommendations  Consider ID consult.  CKD 3, avoid nephrotoxics

## 2021-10-18 NOTE — DISCHARGE NOTE PROVIDER - CARE PROVIDER_API CALL
Autumn Lundberg)  Surgery  [address], [address]  [place] [address]  Phone: [phone]  Fax: [phone]  Follow Up Time:    Autumn Lundberg)  Surgery  [address], [address]  [place] [address]  Phone: [phone]  Fax: [phone]  Follow Up Time:     Dave Hilton  INTERNAL MEDICINE  [address], [address] [place] [address]  Phone: [phone]  Fax: [phone]  Follow Up Time:

## 2021-10-18 NOTE — PROGRESS NOTE ADULT - SUBJECTIVE AND OBJECTIVE BOX
[facility]   MD RAKAN SHEPHERD PA    TEL:  OFFICE: [phone]  DR FELICIANO CELL: [phone]  DR. PATIÑO CELL: [phone]  JANNETH SÁNCHEZ CELL: [phone]    From 5pm-7am Answering Service [phone]    -- RENAL FOLLOW UP NOTE ---Date of Service 10-18-21 @ 13:10    Patient is a 60y old  Male who presents with a chief complaint of Right Toe Necrosis (18 Oct 2021 11:27)      Patient seen and examined at bedside. No chest pain/sob    VITALS:  T(F): 97.6 (10-18-21 @ 10:14), Max: 98.3 (10-18-21 @ 02:02)  HR: 88 (10-18-21 @ 10:14)  BP: 104/79 (10-18-21 @ 10:14)  RR: 18 (10-18-21 @ 10:14)  SpO2: 100% (10-18-21 @ 10:14)  Wt(kg): --    10-17 @ 07:01  -  10-18 @ 07:00  --------------------------------------------------------  IN: 850 mL / OUT: 1280 mL / NET: -430 mL    10-18 @ 07:01  -  10-18 @ 13:10  --------------------------------------------------------  IN: 0 mL / OUT: 200 mL / NET: -200 mL          PHYSICAL EXAM:  Constitutional: NAD  Neck: No JVD  Respiratory: CTAB, no wheezes, rales or rhonchi  Cardiovascular: S1, S2, RRR  Gastrointestinal: BS+, soft, NT/ND  Extremities: No peripheral edema, right a    Hospital Medications:   MEDICATIONS  (STANDING):  acetaminophen   Tablet .. 975 milliGRAM(s) Oral every 6 hours  artificial  tears Solution 2 Drop(s) Left EYE two times a day  aspirin enteric coated 81 milliGRAM(s) Oral daily  atorvastatin 40 milliGRAM(s) Oral at bedtime  cadexomer iodine 0.9% Gel 1 Application(s) Topical daily  carvedilol 25 milliGRAM(s) Oral every 12 hours  dextrose 40% Gel 15 Gram(s) Oral once  dextrose 5%. 1000 milliLiter(s) (50 mL/Hr) IV Continuous <Continuous>  dextrose 5%. 1000 milliLiter(s) (100 mL/Hr) IV Continuous <Continuous>  dextrose 50% Injectable 25 Gram(s) IV Push once  dextrose 50% Injectable 12.5 Gram(s) IV Push once  dextrose 50% Injectable 25 Gram(s) IV Push once  glucagon  Injectable 1 milliGRAM(s) IntraMuscular once  heparin   Injectable 5000 Unit(s) SubCutaneous every 8 hours  influenza   Vaccine 0.5 milliLiter(s) IntraMuscular once  insulin glargine Injectable (LANTUS) 10 Unit(s) SubCutaneous at bedtime  insulin lispro (ADMELOG) corrective regimen sliding scale   SubCutaneous three times a day before meals  insulin lispro (ADMELOG) corrective regimen sliding scale   SubCutaneous at bedtime  insulin lispro Injectable (ADMELOG) 3 Unit(s) SubCutaneous three times a day before meals  lisinopril 10 milliGRAM(s) Oral daily  multivitamin 1 Tablet(s) Oral daily      LABS:  10-18    133<L>  |  100  |  28<H>  ----------------------------<  196<H>  4.6   |  25  |  0.77    Ca    8.9      18 Oct 2021 07:55  Phos  2.7     10-18  Mg     2.10     10-18      Creatinine Trend: 0.77 <--, 0.86 <--, 0.89 <--, 0.80 <--, 0.81 <--, 0.71 <--, 0.72 <--    Phosphorus Level, Serum: 2.7 mg/dL (10-18 @ 07:55)                              11.0   13.40 )-----------( 630      ( 18 Oct 2021 07:55 )             32.2     Urine Studies:  Urinalysis - [09-25-21 @ 15:23]      Color Yellow / Appearance Clear / SG 1.021 / pH 5.5      Gluc Negative / Ketone Negative  / Bili Negative / Urobili <2 mg/dL       Blood Negative / Protein Trace / Leuk Est Negative / Nitrite Negative      RBC 2 / WBC 3 / Hyaline 1 / Gran  / Sq Epi  / Non Sq Epi 1 / Bacteria Negative      HbA1c 5.7      [12-20-18 @ 06:30]  TSH 0.76      [09-25-21 @ 12:52]  Lipid: chol 139, , HDL 26, LDL --      [09-26-21 @ 08:30]    HCV 0.09, Nonreact      [09-26-21 @ 19:55]      RADIOLOGY & ADDITIONAL STUDIES:

## 2021-10-18 NOTE — DISCHARGE NOTE PROVIDER - HOSPITAL COURSE
61 y/o male, with a PmHx of CHF, HTN, CAD, HLD, DM, CKD, PVD with a Right Femoral-Femoral Bypass in 2018 on apixiban, presented to the [facility] ED with worsening Right Toe pain x 1 week.  Pt states about 1 month ago he started to get pain in his right foot 5th digit. He states he wife had  from Lung Cancer a few days prior but waited to see his PCP until a week later. He states when he finally went into see his PCP, his doctor states he was likely Gout and was prescribed Indomethacin. Pt states his foot wasn't getting any better and the other day he noticed some blood coming from the bottom of his toe so he had gone today to see his Podiatrist (had trouble getting an appointment over this past week). In the Podiatrist office, he was told to go to the ED because he could have a necrotic toe and since he had a history of a bypass in that leg before, there is a high risk of it getting worse. Pt denies any fever, chills, chest pain, HA, dizziness, blurred vision, abd pain, sick contacts, recent travel. The only complaint he was having is pain to the right 5th toe. In the [facility] ED today, he was seen by Podiatry and a bedside debridement was done and then wrapped in a dry sterile dressing. 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 Cultures obtained, pt was started on Clindamycin, and admitted to medicine for further medical management and treatment.   Vascular surgery consulted for concern right femoral-popliteal bypass stenosis or occlusion, possible revascularization. Rec change AC from eliquis to heparin drip with bolus    Nephrology consulted for RHINA on CKD     CTA performed and showed 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 Occlusion of both the right SFA and a femoropopliteal bypass. Occlusion of the proximal right popliteal artery.  Occlusion of the left SFA. Two-vessel runoff in both calves.    Pt  cards and medicine optimized pt for procedure/     pt underwent right leg angiography. Right common femoral: vessel was patent. Right superficial femoral: vessel was occluded. Right deep femoral: vessel was patent. Right popliteal: The vessel was occluded. Right anterior tibial: vessel was occluded. Right tibio-peroneal: vessel was occluded. Right posterior tibial: vessel was occluded. Right peroneal: vessel was occluded. Final catheter position is right common femoral artery.     echo performed and showed 1. Calcified trileaflet aortic valve with normal opening. 2. Normal left ventricular internal dimensions and wall thicknesses. 3. Normal left ventricular systolic function. No segmental wall motion abnormalities. 4. Normal right ventricular size and function.     nuclear stress test performed and was Mildly Abnormal Study * Myocardial Perfusion SPECT results are mildly abnormal. * Review of raw data shows: The study is of fair technical  quality with adjacent bowel tracer uptake * The left ventricle was normal in size. There is a small, mild defect in septal wall that is fixed, suggestive of mild scarring * No clear evidence of ischemia. * Post-stress gated wall motion analysis was performed (LVEF = 66 %;LVEDV = 66 ml.), revealing normal LV function. There was no segmental wall motion abnormality.  Septal wall motion appeared normal. RV function appeared normal.    10/1 b/l LE duplex performed and showed No evidence of thrombosis or significant venous wall thickening noted in the right and left great saphenous and smaller saphenous veins. Vessel diameters as noted above.    10/11 CXR performed for leukocytosis and showed Lungs are clear. 61 y/o male, with a PmHx of CHF, HTN, CAD, HLD, DM, CKD, PVD with a Right Femoral-Femoral Bypass in 2018 on apixiban, presented to the [facility] ED with worsening Right Toe pain x 1 week.  Pt states about 1 month ago he started to get pain in his right foot 5th digit. He states he wife had  from Lung Cancer a few days prior but waited to see his PCP until a week later. He states when he finally went into see his PCP, his doctor states he was likely Gout and was prescribed Indomethacin. Pt states his foot wasn't getting any better and the other day he noticed some blood coming from the bottom of his toe so he had gone today to see his Podiatrist (had trouble getting an appointment over this past week). In the Podiatrist office, he was told to go to the ED because he could have a necrotic toe and since he had a history of a bypass in that leg before, there is a high risk of it getting worse. Pt denies any fever, chills, chest pain, HA, dizziness, blurred vision, abd pain, sick contacts, recent travel. The only complaint he was having is pain to the right 5th toe. In the [facility] ED today, he was seen by Podiatry and a bedside debridement was done and then wrapped in a dry sterile dressing. 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 Cultures obtained, pt was started on Clindamycin, and admitted to medicine for further medical management and treatment.   Vascular surgery consulted for concern right femoral-popliteal bypass stenosis or occlusion, possible revascularization. Rec change AC from eliquis to heparin drip with bolus    Nephrology consulted for RHINA on CKD     CTA performed and showed 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 Occlusion of both the right SFA and a femoropopliteal bypass. Occlusion of the proximal right popliteal artery.  Occlusion of the left SFA. Two-vessel runoff in both calves.    Pt  cards and medicine optimized pt for procedure     pt underwent right leg angiography. Right common femoral: vessel was patent. Right superficial femoral: vessel was occluded. Right deep femoral: vessel was patent. Right popliteal: The vessel was occluded. Right anterior tibial: vessel was occluded. Right tibio-peroneal: vessel was occluded. Right posterior tibial: vessel was occluded. Right peroneal: vessel was occluded. Final catheter position is right common femoral artery.    Hep gtt resumed post procedure. Pt tentatively scheduled for iliac stents and possible bypass 10/6    Pers cards pt req echo and stress test for OR optimization     echo performed and showed 1. Calcified trileaflet aortic valve with normal opening. 2. Normal left ventricular internal dimensions and wall thicknesses. 3. Normal left ventricular systolic function. No segmental wall motion abnormalities. 4. Normal right ventricular size and function.     nuclear stress test performed and was Mildly Abnormal Study * Myocardial Perfusion SPECT results are mildly abnormal. * Review of raw data shows: The study is of fair technical  quality with adjacent bowel tracer uptake * The left ventricle was normal in size. There is a small, mild defect in septal wall that is fixed, suggestive of mild scarring * No clear evidence of ischemia. * Post-stress gated wall motion analysis was performed (LVEF = 66 %;LVEDV = 66 ml.), revealing normal LV function. There was no segmental wall motion abnormality.  Septal wall motion appeared normal. RV function appeared normal.    ID consulted for cultures +  CRE Pseudomonas, Providencia and Bacteroides. Rec stop clindamycin and start  cefepime 2g IV 8h and metronidazole 500mg PO Q8h.    10/1 b/l LE vein mapping performed and showed No evidence of thrombosis or significant venous wall thickening noted in the right and left great saphenous and smaller saphenous veins. Vessel diameters as noted above.      10/6 pt taken to OR and underwent b/l kissing iliac stents, right groin cutdown an PT exploration.  No signal on the distal artery of his right foot found. Pt tolerated procedure well. Plan for BKA      10/8 pt taken to OR for Right Lower Extremity Guillotine BKA. Pt tolerated procedure well.       Pt with persistent leukocytosis, ID state  likely reactive. Rec if pt spikes fever, recommend send blood cultures x 2, add vancomycin, check MRSA pcr swab, UA, ucx.  Repeat cxr to evaluate for alternate source.    10/11 CXR performed and showed lungs are clear.    10/12 pt returned to OR. Skin below the right knee necrotized, decision made to perform right  above knee amputation    Post op PM&R and OT consulted. Plan dc to [facility]    Per Attending pt now stable for dc to rehab. Pt hemodynamically stable and pain well controlled. Pt to follow up with Dr Lundberg as an outpatient, instructed to call to schedule follow up appointment 61 y/o male, with a PmHx of CHF, HTN, CAD, HLD, DM, CKD, PVD with a Right Femoral-Femoral Bypass in 2018 on apixiban, presented to the [facility] ED with worsening Right Toe pain x 1 week.  Pt states about 1 month ago he started to get pain in his right foot 5th digit. He states he wife had  from Lung Cancer a few days prior but waited to see his PCP until a week later. He states when he finally went into see his PCP, his doctor states he was likely Gout and was prescribed Indomethacin. Pt states his foot wasn't getting any better and the other day he noticed some blood coming from the bottom of his toe so he had gone today to see his Podiatrist (had trouble getting an appointment over this past week). In the Podiatrist office, he was told to go to the ED because he could have a necrotic toe and since he had a history of a bypass in that leg before, there is a high risk of it getting worse. Pt denies any fever, chills, chest pain, HA, dizziness, blurred vision, abd pain, sick contacts, recent travel. The only complaint he was having is pain to the right 5th toe. In the [facility] ED today, he was seen by Podiatry and a bedside debridement was done and then wrapped in a dry sterile dressing. 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 Cultures obtained, pt was started on Clindamycin, and admitted to medicine for further medical management and treatment.   Vascular surgery consulted for concern right femoral-popliteal bypass stenosis or occlusion, possible revascularization. Rec change AC from eliquis to heparin drip with bolus    Nephrology consulted for RHINA on CKD and for hyponatremia, work up suggested SIADH , rec free water restriction     CTA performed and showed 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 Occlusion of both the right SFA and a femoropopliteal bypass. Occlusion of the proximal right popliteal artery.  Occlusion of the left SFA. Two-vessel runoff in both calves.    Pt  cards and medicine optimized pt for procedure     pt underwent right leg angiography. Right common femoral: vessel was patent. Right superficial femoral: vessel was occluded. Right deep femoral: vessel was patent. Right popliteal: The vessel was occluded. Right anterior tibial: vessel was occluded. Right tibio-peroneal: vessel was occluded. Right posterior tibial: vessel was occluded. Right peroneal: vessel was occluded. Final catheter position is right common femoral artery.    Hep gtt resumed post procedure. Pt tentatively scheduled for iliac stents and possible bypass 10/6    Pers cards pt req echo and stress test for OR optimization     echo performed and showed 1. Calcified trileaflet aortic valve with normal opening. 2. Normal left ventricular internal dimensions and wall thicknesses. 3. Normal left ventricular systolic function. No segmental wall motion abnormalities. 4. Normal right ventricular size and function.     nuclear stress test performed and was Mildly Abnormal Study * Myocardial Perfusion SPECT results are mildly abnormal. * Review of raw data shows: The study is of fair technical  quality with adjacent bowel tracer uptake * The left ventricle was normal in size. There is a small, mild defect in septal wall that is fixed, suggestive of mild scarring * No clear evidence of ischemia. * Post-stress gated wall motion analysis was performed (LVEF = 66 %;LVEDV = 66 ml.), revealing normal LV function. There was no segmental wall motion abnormality.  Septal wall motion appeared normal. RV function appeared normal.    Cards recs no further work up, continue medical therapy for known PAD including asa, lipitor, coreg    ID consulted for cultures +  CRE Pseudomonas, Providencia and Bacteroides. Rec stop clindamycin and start  cefepime 2g IV 8h and metronidazole 500mg PO Q8h.    10/1 b/l LE vein mapping performed and showed No evidence of thrombosis or significant venous wall thickening noted in the right and left great saphenous and smaller saphenous veins. Vessel diameters as noted above.      10/6 pt taken to OR and underwent b/l kissing iliac stents, right groin cutdown an PT exploration.  No signal on the distal artery of his right foot found. Pt tolerated procedure well. Plan for BKA      10/8 pt taken to OR for Right Lower Extremity Guillotine BKA. Pt tolerated procedure well.       Pt with persistent leukocytosis, ID state  likely reactive. Rec if pt spikes fever, recommend send blood cultures x 2, add vancomycin, check MRSA pcr swab, UA, ucx.  Repeat cxr to evaluate for alternate source.    10/11 CXR performed and showed lungs are clear.    10/12 pt returned to OR. Skin below the right knee necrotized, decision made to perform right  above knee amputation    Post op PM&R and OT consulted. Plan dc to [facility]    Per Attending pt now stable for dc to rehab. Pt hemodynamically stable and pain well controlled. Pt to follow up with Dr Lundberg as an outpatient, instructed to call to schedule follow up appointment 61 y/o male, with a PmHx of CHF, HTN, CAD, HLD, DM, CKD, PVD with a Right Femoral-Femoral Bypass in 2018 on apixiban, presented to the [facility] ED with worsening Right Toe pain x 1 week.  Pt states about 1 month ago he started to get pain in his right foot 5th digit. He states he wife had  from Lung Cancer a few days prior but waited to see his PCP until a week later. He states when he finally went into see his PCP, his doctor states he was likely Gout and was prescribed Indomethacin. Pt states his foot wasn't getting any better and the other day he noticed some blood coming from the bottom of his toe so he had gone today to see his Podiatrist (had trouble getting an appointment over this past week). In the Podiatrist office, he was told to go to the ED because he could have a necrotic toe and since he had a history of a bypass in that leg before, there is a high risk of it getting worse. Pt denies any fever, chills, chest pain, HA, dizziness, blurred vision, abd pain, sick contacts, recent travel. The only complaint he was having is pain to the right 5th toe. In the [facility] ED today, he was seen by Podiatry and a bedside debridement was done and then wrapped in a dry sterile dressing. 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 Cultures obtained, pt was started on Clindamycin, and admitted to medicine for further medical management and treatment.   Vascular surgery consulted for concern right femoral-popliteal bypass stenosis or occlusion, possible revascularization. Rec change AC from eliquis to heparin drip with bolus    Nephrology consulted for RHINA on CKD and for hyponatremia, work up suggested SIADH , rec free water restriction     CTA performed and showed 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 Occlusion of both the right SFA and a femoropopliteal bypass. Occlusion of the proximal right popliteal artery.  Occlusion of the left SFA. Two-vessel runoff in both calves.    Pt  cards and medicine optimized pt for procedure     pt underwent right leg angiography. Right common femoral: vessel was patent. Right superficial femoral: vessel was occluded. Right deep femoral: vessel was patent. Right popliteal: The vessel was occluded. Right anterior tibial: vessel was occluded. Right tibio-peroneal: vessel was occluded. Right posterior tibial: vessel was occluded. Right peroneal: vessel was occluded. Final catheter position is right common femoral artery.    Hep gtt resumed post procedure. Pt tentatively scheduled for iliac stents and possible bypass 10/6    Pers cards pt req echo and stress test for OR optimization     echo performed and showed 1. Calcified trileaflet aortic valve with normal opening. 2. Normal left ventricular internal dimensions and wall thicknesses. 3. Normal left ventricular systolic function. No segmental wall motion abnormalities. 4. Normal right ventricular size and function.     nuclear stress test performed and was Mildly Abnormal Study * Myocardial Perfusion SPECT results are mildly abnormal. * Review of raw data shows: The study is of fair technical  quality with adjacent bowel tracer uptake * The left ventricle was normal in size. There is a small, mild defect in septal wall that is fixed, suggestive of mild scarring * No clear evidence of ischemia. * Post-stress gated wall motion analysis was performed (LVEF = 66 %;LVEDV = 66 ml.), revealing normal LV function. There was no segmental wall motion abnormality.  Septal wall motion appeared normal. RV function appeared normal.    Cards recs no further work up, continue medical therapy for known PAD including asa, lipitor, coreg    ID consulted for cultures +  CRE Pseudomonas, Providencia and Bacteroides. Rec stop clindamycin and start  cefepime 2g IV 8h and metronidazole 500mg PO Q8h.    10/1 b/l LE vein mapping performed and showed No evidence of thrombosis or significant venous wall thickening noted in the right and left great saphenous and smaller saphenous veins. Vessel diameters as noted above.    10/6 pt taken to OR and underwent b/l kissing iliac stents, right groin cutdown an PT exploration.  No signal on the distal artery of his right foot found. Pt tolerated procedure well. Plan for BKA    10/8 pt taken to OR for Right Lower Extremity Guillotine BKA. Pt tolerated procedure well.       Pt with persistent leukocytosis, ID state  likely reactive. Rec if pt spikes fever, recommend send blood cultures x 2, add vancomycin, check MRSA pcr swab, UA, ucx.  Repeat cxr to evaluate for alternate source.    10/11 CXR performed and showed lungs are clear.    10/12 pt returned to OR. Skin below the right knee necrotized, decision made to perform right  above knee amputation    Post op PM&R and OT consulted. Plan dc to [facility]    Per Attending pt now stable for dc to rehab. Pt hemodynamically stable and pain well controlled. Pt to follow up with Dr Lundberg as an outpatient, instructed to call to schedule follow up appointment

## 2021-10-19 ENCOUNTER — TRANSCRIPTION ENCOUNTER (OUTPATIENT)
Age: 61
End: 2021-10-19

## 2021-10-19 VITALS
SYSTOLIC BLOOD PRESSURE: 106 MMHG | RESPIRATION RATE: 17 BRPM | DIASTOLIC BLOOD PRESSURE: 67 MMHG | HEART RATE: 85 BPM | OXYGEN SATURATION: 100 % | TEMPERATURE: 98 F

## 2021-10-19 LAB
ANION GAP SERPL CALC-SCNC: 9 MMOL/L — SIGNIFICANT CHANGE UP (ref 7–14)
BUN SERPL-MCNC: 33 MG/DL — HIGH (ref 7–23)
CALCIUM SERPL-MCNC: 9 MG/DL — SIGNIFICANT CHANGE UP (ref 8.4–10.5)
CHLORIDE SERPL-SCNC: 99 MMOL/L — SIGNIFICANT CHANGE UP (ref 98–107)
CO2 SERPL-SCNC: 26 MMOL/L — SIGNIFICANT CHANGE UP (ref 22–31)
CREAT SERPL-MCNC: 0.85 MG/DL — SIGNIFICANT CHANGE UP (ref 0.5–1.3)
GLUCOSE SERPL-MCNC: 163 MG/DL — HIGH (ref 70–99)
HCT VFR BLD CALC: 32.4 % — LOW (ref 39–50)
HGB BLD-MCNC: 11 G/DL — LOW (ref 13–17)
MAGNESIUM SERPL-MCNC: 2.1 MG/DL — SIGNIFICANT CHANGE UP (ref 1.6–2.6)
MCHC RBC-ENTMCNC: 32.1 PG — SIGNIFICANT CHANGE UP (ref 27–34)
MCHC RBC-ENTMCNC: 34 GM/DL — SIGNIFICANT CHANGE UP (ref 32–36)
MCV RBC AUTO: 94.5 FL — SIGNIFICANT CHANGE UP (ref 80–100)
NRBC # BLD: 0 /100 WBCS — SIGNIFICANT CHANGE UP
NRBC # FLD: 0 K/UL — SIGNIFICANT CHANGE UP
PHOSPHATE SERPL-MCNC: 3.3 MG/DL — SIGNIFICANT CHANGE UP (ref 2.5–4.5)
PLATELET # BLD AUTO: 694 K/UL — HIGH (ref 150–400)
POTASSIUM SERPL-MCNC: 4.7 MMOL/L — SIGNIFICANT CHANGE UP (ref 3.5–5.3)
POTASSIUM SERPL-SCNC: 4.7 MMOL/L — SIGNIFICANT CHANGE UP (ref 3.5–5.3)
RBC # BLD: 3.43 M/UL — LOW (ref 4.2–5.8)
RBC # FLD: 15.7 % — HIGH (ref 10.3–14.5)
SARS-COV-2 RNA SPEC QL NAA+PROBE: SIGNIFICANT CHANGE UP
SODIUM SERPL-SCNC: 134 MMOL/L — LOW (ref 135–145)
WBC # BLD: 12.6 K/UL — HIGH (ref 3.8–10.5)
WBC # FLD AUTO: 12.6 K/UL — HIGH (ref 3.8–10.5)

## 2021-10-19 RX ORDER — CADEXOMER IODINE 0.9 %
1 PADS, MEDICATED (EA) TOPICAL
Qty: 0 | Refills: 0 | DISCHARGE
Start: 2021-10-19

## 2021-10-19 RX ORDER — OXYCODONE HYDROCHLORIDE 5 MG/1
1 TABLET ORAL
Qty: 0 | Refills: 0 | DISCHARGE
Start: 2021-10-19

## 2021-10-19 RX ORDER — ACETAMINOPHEN 500 MG
2 TABLET ORAL
Qty: 0 | Refills: 0 | DISCHARGE
Start: 2021-10-19

## 2021-10-19 RX ORDER — APIXABAN 2.5 MG/1
1 TABLET, FILM COATED ORAL
Qty: 0 | Refills: 0 | DISCHARGE

## 2021-10-19 RX ORDER — SITAGLIPTIN AND METFORMIN HYDROCHLORIDE 500; 50 MG/1; MG/1
1 TABLET, FILM COATED ORAL
Qty: 0 | Refills: 0 | DISCHARGE

## 2021-10-19 RX ADMIN — Medication 6: at 11:47

## 2021-10-19 RX ADMIN — OXYCODONE HYDROCHLORIDE 10 MILLIGRAM(S): 5 TABLET ORAL at 06:36

## 2021-10-19 RX ADMIN — Medication 2: at 07:48

## 2021-10-19 RX ADMIN — Medication 975 MILLIGRAM(S): at 18:13

## 2021-10-19 RX ADMIN — Medication 4: at 18:13

## 2021-10-19 RX ADMIN — CARVEDILOL PHOSPHATE 25 MILLIGRAM(S): 80 CAPSULE, EXTENDED RELEASE ORAL at 06:29

## 2021-10-19 RX ADMIN — HYDROMORPHONE HYDROCHLORIDE 0.5 MILLIGRAM(S): 2 INJECTION INTRAMUSCULAR; INTRAVENOUS; SUBCUTANEOUS at 14:09

## 2021-10-19 RX ADMIN — Medication 1 TABLET(S): at 12:20

## 2021-10-19 RX ADMIN — Medication 81 MILLIGRAM(S): at 12:20

## 2021-10-19 RX ADMIN — Medication 3 UNIT(S): at 07:49

## 2021-10-19 RX ADMIN — HEPARIN SODIUM 5000 UNIT(S): 5000 INJECTION INTRAVENOUS; SUBCUTANEOUS at 18:12

## 2021-10-19 RX ADMIN — HYDROMORPHONE HYDROCHLORIDE 0.5 MILLIGRAM(S): 2 INJECTION INTRAMUSCULAR; INTRAVENOUS; SUBCUTANEOUS at 00:28

## 2021-10-19 RX ADMIN — HEPARIN SODIUM 5000 UNIT(S): 5000 INJECTION INTRAVENOUS; SUBCUTANEOUS at 06:28

## 2021-10-19 RX ADMIN — Medication 975 MILLIGRAM(S): at 11:50

## 2021-10-19 RX ADMIN — Medication 975 MILLIGRAM(S): at 11:20

## 2021-10-19 RX ADMIN — HYDROMORPHONE HYDROCHLORIDE 0.5 MILLIGRAM(S): 2 INJECTION INTRAMUSCULAR; INTRAVENOUS; SUBCUTANEOUS at 00:43

## 2021-10-19 RX ADMIN — LISINOPRIL 10 MILLIGRAM(S): 2.5 TABLET ORAL at 06:29

## 2021-10-19 RX ADMIN — Medication 3 UNIT(S): at 11:47

## 2021-10-19 RX ADMIN — Medication 3 UNIT(S): at 18:13

## 2021-10-19 NOTE — PROGRESS NOTE ADULT - ASSESSMENT
61 y/o male, with a PmHx of CHF, HTN, CAD, HLD, DM, CKD, PVD with a Right Femoral-Femoral Bypass in 2018 on apixiban, presented to the [facility] ED with worsening Right Toe pain x 1 week.      Ravin   pt with multiple Ravin in past   RAVIN possible prerenal as SG is high s/p IVF  Renal function improved  On ACE Monitor closley  s/p angio 9/29. renal function stable  Monitor BMP  AVoid further nephrotoxics, NSAID RCA    Hypokalemia  Repelted KCl   as needed   MOnitor  serum K    Acidosis   improving   MOnitor serum Co2    hypomagnesemia  supplement as needed  monitor    hypophosphatemia  Supplement as needed   monitor serum PO4    hyponatremia  work up suggested SIADH, hyperglycemia  antibiotics to be given in NS   continue free water restriction  avoid hypotonic solutions  optimize dm control  monitor level

## 2021-10-19 NOTE — PROGRESS NOTE ADULT - PROVIDER SPECIALTY LIST ADULT
Anesthesia
Cardiology
Nephrology
Vascular Surgery
Anesthesia
Cardiology
Infectious Disease
Infectious Disease
Nephrology
Podiatry
Vascular Surgery
Cardiology
Infectious Disease
Nephrology
Podiatry
Podiatry
Rehab Medicine
Surgery
Vascular Surgery
Cardiology
Infectious Disease
Infectious Disease
Nephrology
Vascular Surgery
Internal Medicine
Vascular Surgery
Internal Medicine

## 2021-10-19 NOTE — PROGRESS NOTE ADULT - ASSESSMENT
61yo M with Hx CHF, CAD (no stents), HTN, HLD, DM, PVD with h/o right fem-pop bypass ([facility] 2018) who presents with non-healing R toe wound, found to have dry gangrene with small area of wet conversion and bypass occlusion now s/p 10/6 diagnostic RLE angiogram, b/l kissing iliac stents, right groin cutdown an PT exploration. Pt s/p 10/8 RLE guillotine and s/p 10/12 R AKA.    Plan:  - Continue Cefepime+ Metronidazole until discharge. Discharge home on Augmentin x1 wk  - Pain control as needed  - OOBAT  - Diet: DASH/TLC, diabetic  - R AKA dressing change daily  - ASA  - DVT ppx heparin SQ  - Mod lantus + insulin sliding scale  - PM&R, PT,OT following  - Dispo planning to [facility] Surgery   [id number]  61yo M with Hx CHF, CAD (no stents), HTN, HLD, DM, PVD with h/o right fem-pop bypass ([facility] 2018) who presents with non-healing R toe wound, found to have dry gangrene with small area of wet conversion and bypass occlusion now s/p 10/6 diagnostic RLE angiogram, b/l kissing iliac stents, right groin cutdown an PT exploration. Pt s/p 10/8 RLE guillotine and s/p 10/12 R AKA.    Plan:  - Monitor off of abx  - Pain control as needed  - OOBAT  - Diet: DASH/TLC, diabetic  - R AKA dressing change daily  - ASA  - DVT ppx heparin SQ  - Mod lantus + insulin sliding scale  - PM&R, PT,OT following  - Discharge to [facility]    C Team Surgery   [id number]

## 2021-10-19 NOTE — PROGRESS NOTE ADULT - SUBJECTIVE AND OBJECTIVE BOX
CARDIOLOGY      S: no chest pain or sob; ros otherwise negative.     DATE OF SERVICE - 10-19-21     Review of Systems:   Constitutional: [ ] fevers, [ ] chills.   Skin: [ ] dry skin. [ ] rashes.  Psychiatric: [ ] depression, [ ] anxiety.   Gastrointestinal: [ ] BRBPR, [ ] melena.   Neurological: [ ] confusion. [ ] seizures. [ ] shuffling gait.   Ears,Nose,Mouth and Throat: [ ] ear pain [ ] sore throat.   Eyes: [ ] diplopia.   Respiratory: [ ] hemoptysis. [ ] shortness of breath  Cardiovascular: See HPI above  Hematologic/Lymphatic: [ ] anemia. [ ] painful nodes. [ ] prolonged bleeding.   Genitourinary: [ ] hematuria. [ ] flank pain.   Endocrine: [ ] significant change in weight. [ ] intolerance to heat and cold.     Review of systems [x ] otherwise negative, [ ] otherwise unable to obtain    FH: no family history of sudden cardiac death in first degree relatives    SH: [ ] tobacco, [ ] alcohol, [ ] drugs    acetaminophen   Tablet .. 975 milliGRAM(s) Oral every 6 hours  artificial  tears Solution 2 Drop(s) Left EYE two times a day  aspirin enteric coated 81 milliGRAM(s) Oral daily  atorvastatin 40 milliGRAM(s) Oral at bedtime  cadexomer iodine 0.9% Gel 1 Application(s) Topical daily  carvedilol 25 milliGRAM(s) Oral every 12 hours  dextrose 40% Gel 15 Gram(s) Oral once  dextrose 5%. 1000 milliLiter(s) IV Continuous <Continuous>  dextrose 5%. 1000 milliLiter(s) IV Continuous <Continuous>  dextrose 50% Injectable 25 Gram(s) IV Push once  dextrose 50% Injectable 12.5 Gram(s) IV Push once  dextrose 50% Injectable 25 Gram(s) IV Push once  glucagon  Injectable 1 milliGRAM(s) IntraMuscular once  heparin   Injectable 5000 Unit(s) SubCutaneous every 8 hours  HYDROmorphone  Injectable 0.5 milliGRAM(s) IV Push every 4 hours PRN  influenza   Vaccine 0.5 milliLiter(s) IntraMuscular once  insulin glargine Injectable (LANTUS) 10 Unit(s) SubCutaneous at bedtime  insulin lispro (ADMELOG) corrective regimen sliding scale   SubCutaneous at bedtime  insulin lispro (ADMELOG) corrective regimen sliding scale   SubCutaneous three times a day before meals  insulin lispro Injectable (ADMELOG) 3 Unit(s) SubCutaneous three times a day before meals  lisinopril 10 milliGRAM(s) Oral daily  melatonin 3 milliGRAM(s) Oral at bedtime PRN  multivitamin 1 Tablet(s) Oral daily  oxyCODONE    IR 10 milliGRAM(s) Oral every 4 hours PRN  oxyCODONE    IR 5 milliGRAM(s) Oral every 4 hours PRN                            11.0   12.60 )-----------( 694      ( 19 Oct 2021 06:53 )             32.4       10-19    134<L>  |  99  |  33<H>  ----------------------------<  163<H>  4.7   |  26  |  0.85    Ca    9.0      19 Oct 2021 06:53  Phos  3.3     10-19  Mg     2.10     10-19              T(C): 36.8 (10-19-21 @ 09:36), Max: 36.8 (10-19-21 @ 06:30)  HR: 85 (10-19-21 @ 09:36) (78 - 88)  BP: 106/67 (10-19-21 @ 09:36) (104/79 - 134/75)  RR: 17 (10-19-21 @ 09:36) (16 - 18)  SpO2: 100% (10-19-21 @ 09:36) (100% - 100%)  Wt(kg): --    I&O's Summary    18 Oct 2021 07:01  -  19 Oct 2021 07:00  --------------------------------------------------------  IN: 0 mL / OUT: 750 mL / NET: -750 mL      General: Well nourished in no acute distress. Alert and Oriented * 3.   Head: Normocephalic and atraumatic.   Neck: No JVD. No bruits. Supple. Does not appear to be enlarged.   Cardiovascular: + S1,S2 ; RRR Soft systolic murmur at the left lower sternal border. No rubs noted.    Lungs: CTA b/l. No rhonchi, rales or wheezes.   Abdomen: + BS, soft. Non tender. Non distended. No rebound. No guarding.   Extremities: No clubbing/cyanosis/edema.    Skin: Warm and moist. The patient's skin has normal elasticity and good skin turgor.   Psychiatric: Appropriate mood and affect.    TTE: < from: Transthoracic Echocardiogram (09.29.21 @ 17:35) >  1. Calcified trileaflet aortic valve with normal opening.  2. Normal left ventricular internal dimensions and wall  thicknesses.  3. Normal left ventricular systolic function. No segmental  wall motion abnormalities.  4. Normal right ventricular size and function.    < end of copied text >    NST: < from: Nuclear Stress Test-Pharmacologic (Nuclear Stress Test-Pharmacologic .) (09.30.21 @ 16:16) >  IMPRESSIONS:Mildly Abnormal Study  * Myocardial Perfusion SPECT results are mildly abnormal.  * Review of raw data shows: The study is of fair technical  quality with adjacent bowel tracer uptake  * The left ventricle was normal in size. There is a small,  mild defect in septal wall that is fixed, suggestive of  mild scarring  * No clear evidence of ischemia.  * Post-stress gated wall motion analysis was performed  (LVEF = 66 %;LVEDV = 66 ml.), revealing normal LV  function. There was no segmental wall motion abnormality.  Septal wall motion appeared normal. RV function appeared  normal.    < end of copied text >      A/P: 61 y/o male PMH HTN, HLD, DM, CKD, PVD with a Right Femoral-Femoral Bypass in 2018 on apixiban, presented to the [facility] ED with worsening Right Toe pain x 1 week, CT with occluded bypass.  Now awaiting further work up from Vascular. Cardiology called for preop evaluation. s/p guillotine amputation  right foot, s/p R AKA    -TTE with normal LV functinon   -NST with no ischemia  -tolerated procedure well from CV perspective  -no clinical heart failure or anginal symptoms   -pain management and post op care per vascular  -continue medical therapy for known PAD including asa, lipitor, coreg  -no further inpatient cardiac workup anticipated at this time  -dc planning per primary team   -pt. to follow up with his primary cardiologist after discharge    Imani Cesar MD

## 2021-10-19 NOTE — PROGRESS NOTE ADULT - SUBJECTIVE AND OBJECTIVE BOX
[facility] NEPHROLOGY PRACTICE   MD RAKAN SHEPHERD PA    TEL:  OFFICE: [phone]  DR FELICIANO CELL: [phone]  DR. PATIÑO CELL: [phone]  JANNETH SÁNCHEZ CELL: [phone]    From 5pm-7am Answering Service [phone]    -- RENAL FOLLOW UP NOTE ---Date of Service 10-19-21 @ 15:14    Patient is a 60y old  Male who presents with a chief complaint of Right Toe Necrosis (19 Oct 2021 10:29)      Patient seen and examined at bedside. No chest pain/sob    VITALS:  T(F): 98.2 (10-19-21 @ 09:36), Max: 98.2 (10-19-21 @ 06:30)  HR: 85 (10-19-21 @ 09:36)  BP: 106/67 (10-19-21 @ 09:36)  RR: 17 (10-19-21 @ 09:36)  SpO2: 100% (10-19-21 @ 09:36)  Wt(kg): --    10-18 @ 07:01  -  10-19 @ 07:00  --------------------------------------------------------  IN: 0 mL / OUT: 750 mL / NET: -750 mL          PHYSICAL EXAM:  Constitutional: NAD  Neck: No JVD  Respiratory: CTAB, no wheezes, rales or rhonchi  Cardiovascular: S1, S2, RRR  Gastrointestinal: BS+, soft, NT/ND  Extremities: No peripheral edema, right bka    Hospital Medications:   MEDICATIONS  (STANDING):  acetaminophen   Tablet .. 975 milliGRAM(s) Oral every 6 hours  artificial  tears Solution 2 Drop(s) Left EYE two times a day  aspirin enteric coated 81 milliGRAM(s) Oral daily  atorvastatin 40 milliGRAM(s) Oral at bedtime  cadexomer iodine 0.9% Gel 1 Application(s) Topical daily  carvedilol 25 milliGRAM(s) Oral every 12 hours  dextrose 40% Gel 15 Gram(s) Oral once  dextrose 5%. 1000 milliLiter(s) (50 mL/Hr) IV Continuous <Continuous>  dextrose 5%. 1000 milliLiter(s) (100 mL/Hr) IV Continuous <Continuous>  dextrose 50% Injectable 25 Gram(s) IV Push once  dextrose 50% Injectable 12.5 Gram(s) IV Push once  dextrose 50% Injectable 25 Gram(s) IV Push once  glucagon  Injectable 1 milliGRAM(s) IntraMuscular once  heparin   Injectable 5000 Unit(s) SubCutaneous every 8 hours  influenza   Vaccine 0.5 milliLiter(s) IntraMuscular once  insulin glargine Injectable (LANTUS) 10 Unit(s) SubCutaneous at bedtime  insulin lispro (ADMELOG) corrective regimen sliding scale   SubCutaneous three times a day before meals  insulin lispro (ADMELOG) corrective regimen sliding scale   SubCutaneous at bedtime  insulin lispro Injectable (ADMELOG) 3 Unit(s) SubCutaneous three times a day before meals  lisinopril 10 milliGRAM(s) Oral daily  multivitamin 1 Tablet(s) Oral daily      LABS:  10-19    134<L>  |  99  |  33<H>  ----------------------------<  163<H>  4.7   |  26  |  0.85    Ca    9.0      19 Oct 2021 06:53  Phos  3.3     10-19  Mg     2.10     10-19      Creatinine Trend: 0.85 <--, 0.77 <--, 0.86 <--, 0.89 <--, 0.80 <--, 0.81 <--, 0.71 <--    Phosphorus Level, Serum: 3.3 mg/dL (10-19 @ 06:53)                              11.0   12.60 )-----------( 694      ( 19 Oct 2021 06:53 )             32.4     Urine Studies:  Urinalysis - [09-25-21 @ 15:23]      Color Yellow / Appearance Clear / SG 1.021 / pH 5.5      Gluc Negative / Ketone Negative  / Bili Negative / Urobili <2 mg/dL       Blood Negative / Protein Trace / Leuk Est Negative / Nitrite Negative      RBC 2 / WBC 3 / Hyaline 1 / Gran  / Sq Epi  / Non Sq Epi 1 / Bacteria Negative      HbA1c 5.7      [12-20-18 @ 06:30]  TSH 0.76      [09-25-21 @ 12:52]  Lipid: chol 139, , HDL 26, LDL --      [09-26-21 @ 08:30]    HCV 0.09, Nonreact      [09-26-21 @ 19:55]      RADIOLOGY & ADDITIONAL STUDIES:

## 2021-10-19 NOTE — DISCHARGE NOTE NURSING/CASE MANAGEMENT/SOCIAL WORK - PATIENT PORTAL LINK FT
You can access the FollowMyHealth Patient Portal offered by [facility] by registering at the following website: [email or web address]. By joining [employer]’s FollowMyHealth portal, you will also be able to view your health information using other applications (apps) compatible with our system.

## 2021-10-19 NOTE — PROGRESS NOTE ADULT - REASON FOR ADMISSION
Right Toe Necrosis

## 2021-10-19 NOTE — PROGRESS NOTE ADULT - NUTRITIONAL ASSESSMENT
This patient has been assessed with a concern for Malnutrition and has been determined to have a diagnosis/diagnoses of Severe protein-calorie malnutrition and Underweight (BMI < 19).    This patient is being managed with:   Diet DASH/TLC-  Sodium & Cholesterol Restricted  Consistent Carbohydrate {No Snacks} (CSTCHO)  Low Fat (LOWFAT)  No Caffeine  Supplement Feeding Modality:  Oral  Glucerna Shake Cans or Servings Per Day:  1       Frequency:  Two Times a day  Entered: Oct  4 2021  9:44AM    
Assessment:          Plan:
This patient has been assessed with a concern for Malnutrition and has been determined to have a diagnosis/diagnoses of Severe protein-calorie malnutrition and Underweight (BMI < 19).    This patient is being managed with:   Diet DASH/TLC-  Sodium & Cholesterol Restricted  Consistent Carbohydrate {No Snacks} (CSTCHO)  Low Fat (LOWFAT)  No Caffeine  Supplement Feeding Modality:  Oral  Glucerna Shake Cans or Servings Per Day:  1       Frequency:  Two Times a day  Entered: Oct  4 2021  9:44AM    

## 2021-10-20 ENCOUNTER — OUTPATIENT (OUTPATIENT)
Dept: OUTPATIENT SERVICES | Facility: HOSPITAL | Age: 61
LOS: 1 days | Discharge: HOME | End: 2021-10-20
Payer: MEDICARE

## 2021-10-20 DIAGNOSIS — Z95.828 PRESENCE OF OTHER VASCULAR IMPLANTS AND GRAFTS: Chronic | ICD-10-CM

## 2021-10-20 DIAGNOSIS — I77.9 DISORDER OF ARTERIES AND ARTERIOLES, UNSPECIFIED: ICD-10-CM

## 2021-10-20 PROCEDURE — 93926 LOWER EXTREMITY STUDY: CPT | Mod: 26,LT

## 2021-10-21 PROBLEM — Z00.00 ENCOUNTER FOR PREVENTIVE HEALTH EXAMINATION: Status: ACTIVE | Noted: 2021-10-21

## 2021-10-21 PROBLEM — N18.9 CHRONIC KIDNEY DISEASE, UNSPECIFIED: Chronic | Status: ACTIVE | Noted: 2021-09-25

## 2021-10-21 PROBLEM — Z00.00 ENCOUNTER FOR PREVENTIVE HEALTH EXAMINATION: Noted: 2021-10-21

## 2021-10-21 PROBLEM — I73.9 PERIPHERAL VASCULAR DISEASE, UNSPECIFIED: Chronic | Status: ACTIVE | Noted: 2021-09-25

## 2021-10-25 ENCOUNTER — APPOINTMENT (OUTPATIENT)
Dept: VASCULAR SURGERY | Facility: CLINIC | Age: 61
End: 2021-10-25
Payer: COMMERCIAL

## 2021-10-25 VITALS
DIASTOLIC BLOOD PRESSURE: 67 MMHG | RESPIRATION RATE: 14 BRPM | HEART RATE: 98 BPM | WEIGHT: 98 LBS | TEMPERATURE: 98.1 F | SYSTOLIC BLOOD PRESSURE: 96 MMHG | HEIGHT: 64 IN | BODY MASS INDEX: 16.73 KG/M2

## 2021-10-25 PROCEDURE — 93923 UPR/LXTR ART STDY 3+ LVLS: CPT

## 2021-10-25 PROCEDURE — 99024 POSTOP FOLLOW-UP VISIT: CPT

## 2021-10-25 NOTE — HISTORY OF PRESENT ILLNESS
[FreeTextEntry1] : Mr. Patton presents in follow up s/p bilateral iliac artery kissing stents (10/6/21) via right groin cutdown/left percutaneous and right AKA (10/12/21). He is presently at [facility] rehab. Prior to discharge from [facility], he reports that his left foot was pumped with an IV pole during transport and he has developed ischemic changes of the distal aspect of the left foot. He also has dependent rubor of the left foot. His right AKA and groin incisions are healing well without erythema, induration, fluctuance or drainage. He is taking Aspirin and Lipitor. \par \par Meds as per [facility] med list in EMR.

## 2021-10-25 NOTE — PHYSICAL EXAM
[Normal Breath Sounds] : Normal breath sounds [Normal Heart Sounds] : normal heart sounds [2+] : left 2+ [de-identified] : NAD. In wheelchair [de-identified] : WNL [FreeTextEntry1] : Well healed right groin incision. Right AKA stump with staples in place c/d/i. No signs of infection.\par \par Left 1st toe distal aspect with ischemic changes (bluish skin) (1.5cm max diameter). Dependent rubor of foot.

## 2021-10-25 NOTE — ASSESSMENT
[FreeTextEntry1] : CAMMY on the left side is 0.81 (falsely elevated) with blunting of waveforms from low thigh distally. Toe pressue is 0.\par \par Mr. Patton presents with BLE PAD. He should continue Aspirin and statin. Left 1st toe appears stable since last week per images sent from [facility]. We will attempt to treat conservatively. If wound progresses, he was instructed to notify me, otherwise he will follow up for a repeat assessment in one week. If wound fails to heal, he will require revascularization.

## 2021-11-02 ENCOUNTER — EMERGENCY (EMERGENCY)
Facility: HOSPITAL | Age: 61
LOS: 1 days | Discharge: HOME | End: 2021-11-02
Attending: EMERGENCY MEDICINE | Admitting: EMERGENCY MEDICINE
Payer: MEDICARE

## 2021-11-02 VITALS
HEART RATE: 95 BPM | DIASTOLIC BLOOD PRESSURE: 62 MMHG | TEMPERATURE: 98 F | OXYGEN SATURATION: 97 % | HEIGHT: 64 IN | RESPIRATION RATE: 17 BRPM | SYSTOLIC BLOOD PRESSURE: 100 MMHG

## 2021-11-02 VITALS
TEMPERATURE: 98 F | RESPIRATION RATE: 18 BRPM | SYSTOLIC BLOOD PRESSURE: 138 MMHG | OXYGEN SATURATION: 100 % | HEART RATE: 87 BPM | DIASTOLIC BLOOD PRESSURE: 71 MMHG

## 2021-11-02 DIAGNOSIS — Z95.828 PRESENCE OF OTHER VASCULAR IMPLANTS AND GRAFTS: Chronic | ICD-10-CM

## 2021-11-02 PROCEDURE — 73630 X-RAY EXAM OF FOOT: CPT | Mod: 26,LT

## 2021-11-02 PROCEDURE — 71046 X-RAY EXAM CHEST 2 VIEWS: CPT | Mod: 26

## 2021-11-02 PROCEDURE — 99284 EMERGENCY DEPT VISIT MOD MDM: CPT

## 2021-11-02 PROCEDURE — 73610 X-RAY EXAM OF ANKLE: CPT | Mod: 26,LT

## 2021-11-02 PROCEDURE — 71100 X-RAY EXAM RIBS UNI 2 VIEWS: CPT | Mod: 26,RT

## 2021-11-02 RX ORDER — OXYCODONE HYDROCHLORIDE 5 MG/1
5 TABLET ORAL ONCE
Refills: 0 | Status: DISCONTINUED | OUTPATIENT
Start: 2021-11-02 | End: 2021-11-02

## 2021-11-02 RX ADMIN — OXYCODONE HYDROCHLORIDE 5 MILLIGRAM(S): 5 TABLET ORAL at 19:36

## 2021-11-02 NOTE — ED PROVIDER NOTE - CLINICAL SUMMARY MEDICAL DECISION MAKING FREE TEXT BOX
DO Jeri PGY-3: 59 y/o M with multiple comorbidities presenting s/p fall 2 days prior now with R rib pain, acute on chronic L foot pain. Exam not consistent with fx or cellulitis 59 y/o M with multiple comorbidities presenting s/p fall 2 days prior now with R rib pain, acute on chronic L foot pain. Exam not consistent with fx or cellulitis, DP pulse +doppler, SILT, pt with chronic issues to L foot likely exacerbated by recent trauma. No c/f acute limb ischemia. XR chest and foot negative for fracture, pt well appearing, advised close f/u with vascular surgery regarding his foot and recent missed appointment.

## 2021-11-02 NOTE — ED PROVIDER NOTE - NSFOLLOWUPINSTRUCTIONS_ED_ALL_ED_FT
Please follow up with Dr. Lundberg within the next week as you missed your appointment yesterday.    Return immediately if you have any worsening pain, swelling, redness, numbness/tingling, or any new or concerning symptoms.    Continue taking all medications as prescribed.    follow up with your primary doctor in 1-2 days. Please be sure to review your results from today's visit which have been included in your discharge papers.

## 2021-11-02 NOTE — ED ADULT NURSE NOTE - OBJECTIVE STATEMENT
Pt. is A+Ox4 and c/o left foot pain and swelling s/p fall 2 days ago. Pt also c/o right sided rib pain where he hit table at time of fall. Pt denies head trauma. Pt noted to have right sided AKA. VS stable.

## 2021-11-02 NOTE — ED ADULT NURSE NOTE - NSICDXPASTMEDICALHX_GEN_ALL_CORE_FT
Opt out
PAST MEDICAL HISTORY:  CHF (congestive heart failure)     CKD (chronic kidney disease)     DM (diabetes mellitus)     HLD (hyperlipidemia)     HTN (hypertension)     PVD (peripheral vascular disease)

## 2021-11-02 NOTE — ED ADULT NURSE REASSESSMENT NOTE - NS ED NURSE REASSESS COMMENT FT1
Pt received, A&Ox3, reporting foot pain in left foot after sustaining injury at nursing facility, Pending discharge at this time, Patient provided meal and reporting no fyurther pain at this time, Safety maintained will continue to monitor

## 2021-11-02 NOTE — ED PROVIDER NOTE - OBJECTIVE STATEMENT
59 y/o male PMH HTN, HLD, DM, CKD, s/p R AKA on 10/8 presents with L foot pain, R anterior chest pain after a fall 3 days ago. Pt was being transferred to a scale and was accidentally dropped. No head trauma or LOC. He is not sure how he hit his foot but has noted some swelling to the foot, increased from prior. Denies sob, cough. Minimally tender to palpation to anterior upper R chest. L foot has dusky L big toe tip- pt states it has been that color for a while. He has noted some increasing swelling since fall but not other changes or increase in pain. Pt saw his vascular surgeon 1 week ago, was told the flow is decreasing to his L foot and was supposed to follow up yesterday but 2/2 transport issues did not make it to the appointment. Denies back anywhere else except as noted above.

## 2021-11-02 NOTE — PROVIDER CONTACT NOTE (OTHER) - ASSESSMENT
Arranged [facility] [phone]  to take pt back to [facility]. P/U in 2 Hours.

## 2021-11-02 NOTE — ED PROVIDER NOTE - PATIENT PORTAL LINK FT
You can access the FollowMyHealth Patient Portal offered by [facility] by registering at the following website: [email or web address]. By joining [employer]’s FollowMyHealth portal, you will also be able to view your health information using other applications (apps) compatible with our system.

## 2021-11-02 NOTE — ED PROVIDER NOTE - PHYSICAL EXAMINATION
GEN - NAD; well appearing; A+O x3   HEAD - NC/AT   EYES- PERRL, EOMI  ENT: Airway patent, mmm  NECK: Neck supple  PULMONARY - CTA b/l, symmetric breath sounds.   CARDIAC -s1s2, RRR, no M,G,R  ABDOMEN - +BS, ND, NT, soft, no guarding, no rebound, no masses   BACK - no CVA tenderness, Normal  spine   EXTREMITIES - R AKA, L foot with dusky big toe tip, mild erythema to foot, SILT, no TTP, full ROM of entire LE, dopplerable DP pulse.   SKIN - no rash or bruising   NEUROLOGIC - alert, speech clear, no focal deficits  PSYCH -nl mood/affect, nl insight.

## 2021-11-02 NOTE — ED ADULT TRIAGE NOTE - CHIEF COMPLAINT QUOTE
Pt from rehab center s/p fall Sunday morning. Pt states he was dropped by nursing aid while trying to weigh pt. Pt c/o left foot pain and swelling. Pt also c/o right sided rib pain where he hit table at time of fall. Pt denies head trauma. Pt noted to have right sided AKA. PMH DM2, CHF

## 2021-11-03 RX ORDER — IBUPROFEN 200 MG
400 TABLET ORAL ONCE
Refills: 0 | Status: COMPLETED | OUTPATIENT
Start: 2021-11-03 | End: 2021-11-03

## 2021-11-03 RX ORDER — ACETAMINOPHEN 500 MG
975 TABLET ORAL ONCE
Refills: 0 | Status: COMPLETED | OUTPATIENT
Start: 2021-11-03 | End: 2021-11-03

## 2021-11-03 RX ADMIN — Medication 400 MILLIGRAM(S): at 00:37

## 2021-11-03 RX ADMIN — Medication 975 MILLIGRAM(S): at 00:37

## 2021-11-03 NOTE — PROVIDER CONTACT NOTE (OTHER) - ACTION/TREATMENT ORDERED:
SW called [facility]; trip was assigned to [facility].  As per [facility], unit to arrive within the hour-ETA 1:30 a.m.

## 2021-11-06 ENCOUNTER — OUTPATIENT (OUTPATIENT)
Dept: OUTPATIENT SERVICES | Facility: HOSPITAL | Age: 61
LOS: 1 days | Discharge: HOME | End: 2021-11-06
Payer: MEDICARE

## 2021-11-06 DIAGNOSIS — Z95.828 PRESENCE OF OTHER VASCULAR IMPLANTS AND GRAFTS: Chronic | ICD-10-CM

## 2021-11-06 DIAGNOSIS — S12.300A UNSPECIFIED DISPLACED FRACTURE OF FOURTH CERVICAL VERTEBRA, INITIAL ENCOUNTER FOR CLOSED FRACTURE: ICD-10-CM

## 2021-11-06 PROCEDURE — 72125 CT NECK SPINE W/O DYE: CPT | Mod: 26

## 2021-11-08 ENCOUNTER — APPOINTMENT (OUTPATIENT)
Dept: VASCULAR SURGERY | Facility: CLINIC | Age: 61
End: 2021-11-08
Payer: COMMERCIAL

## 2021-11-08 VITALS
DIASTOLIC BLOOD PRESSURE: 71 MMHG | BODY MASS INDEX: 17.75 KG/M2 | HEIGHT: 64 IN | HEART RATE: 99 BPM | TEMPERATURE: 98 F | SYSTOLIC BLOOD PRESSURE: 108 MMHG | WEIGHT: 104 LBS

## 2021-11-08 PROCEDURE — 99024 POSTOP FOLLOW-UP VISIT: CPT

## 2021-11-08 NOTE — PHYSICAL EXAM
[Normal Breath Sounds] : Normal breath sounds [Normal Heart Sounds] : normal heart sounds [2+] : left 2+ [de-identified] : NAD. In wheelchair [de-identified] : WNL [FreeTextEntry1] : Well healed right groin incision. Right AKA stump with staples in place c/d/i. No signs of infection.\par \par Left 1st toe distal aspect with ischemic changes (bluish skin) (1.5cm max diameter). Dependent rubor of foot.

## 2021-11-08 NOTE — ASSESSMENT
[FreeTextEntry1] : In summary, Mr. Patton presents s/p right AKA with LLE ischemic changes. All staples were removed from the right AKA. We will schedule him for LLE angiography, possible bypass on 11/17/21.

## 2021-11-08 NOTE — HISTORY OF PRESENT ILLNESS
[FreeTextEntry1] : Mr. Patton presents in follow up s/p bilateral iliac artery kissing stents (10/6/21) via right groin cutdown/left percutaneous and right AKA (10/12/21). He is presently at [facility] rehab. Prior to discharge from [facility], he reports that his left foot was pumped with an IV pole during transport and he has developed ischemic changes of the distal aspect of the left foot. He also has dependent rubor of the left foot. His right AKA and groin incisions are healing well without erythema, induration, fluctuance or drainage. He is taking Aspirin and Lipitor. He presents today for re-evaluation of his left foot and staple removal from right AKA.\par \par Meds as per [facility] med list in EMR.

## 2021-11-15 ENCOUNTER — INPATIENT (INPATIENT)
Facility: HOSPITAL | Age: 61
LOS: 7 days | Discharge: NURSING, REHAB OR LONG-TERM CARE | End: 2021-11-23
Attending: SURGERY | Admitting: SURGERY
Payer: MEDICARE

## 2021-11-15 VITALS
SYSTOLIC BLOOD PRESSURE: 95 MMHG | HEIGHT: 64 IN | RESPIRATION RATE: 18 BRPM | DIASTOLIC BLOOD PRESSURE: 61 MMHG | OXYGEN SATURATION: 98 % | TEMPERATURE: 98 F | HEART RATE: 98 BPM

## 2021-11-15 DIAGNOSIS — Z95.828 PRESENCE OF OTHER VASCULAR IMPLANTS AND GRAFTS: Chronic | ICD-10-CM

## 2021-11-15 DIAGNOSIS — I96 GANGRENE, NOT ELSEWHERE CLASSIFIED: ICD-10-CM

## 2021-11-15 LAB
ALBUMIN SERPL ELPH-MCNC: 3.6 G/DL — SIGNIFICANT CHANGE UP (ref 3.3–5)
ALP SERPL-CCNC: 146 U/L — HIGH (ref 40–120)
ALT FLD-CCNC: 28 U/L — SIGNIFICANT CHANGE UP (ref 4–41)
ANION GAP SERPL CALC-SCNC: 11 MMOL/L — SIGNIFICANT CHANGE UP (ref 7–14)
APTT BLD: 28.7 SEC — SIGNIFICANT CHANGE UP (ref 27–36.3)
AST SERPL-CCNC: 18 U/L — SIGNIFICANT CHANGE UP (ref 4–40)
B PERT DNA SPEC QL NAA+PROBE: SIGNIFICANT CHANGE UP
B PERT+PARAPERT DNA PNL SPEC NAA+PROBE: SIGNIFICANT CHANGE UP
BASOPHILS # BLD AUTO: 0.07 K/UL — SIGNIFICANT CHANGE UP (ref 0–0.2)
BASOPHILS NFR BLD AUTO: 0.6 % — SIGNIFICANT CHANGE UP (ref 0–2)
BILIRUB SERPL-MCNC: <0.2 MG/DL — SIGNIFICANT CHANGE UP (ref 0.2–1.2)
BLD GP AB SCN SERPL QL: NEGATIVE — SIGNIFICANT CHANGE UP
BORDETELLA PARAPERTUSSIS (RAPRVP): SIGNIFICANT CHANGE UP
BUN SERPL-MCNC: 49 MG/DL — HIGH (ref 7–23)
C PNEUM DNA SPEC QL NAA+PROBE: SIGNIFICANT CHANGE UP
CALCIUM SERPL-MCNC: 9.2 MG/DL — SIGNIFICANT CHANGE UP (ref 8.4–10.5)
CHLORIDE SERPL-SCNC: 101 MMOL/L — SIGNIFICANT CHANGE UP (ref 98–107)
CO2 SERPL-SCNC: 25 MMOL/L — SIGNIFICANT CHANGE UP (ref 22–31)
CREAT SERPL-MCNC: 1.12 MG/DL — SIGNIFICANT CHANGE UP (ref 0.5–1.3)
EOSINOPHIL # BLD AUTO: 0.38 K/UL — SIGNIFICANT CHANGE UP (ref 0–0.5)
EOSINOPHIL NFR BLD AUTO: 3.1 % — SIGNIFICANT CHANGE UP (ref 0–6)
FLUAV SUBTYP SPEC NAA+PROBE: SIGNIFICANT CHANGE UP
FLUBV RNA SPEC QL NAA+PROBE: SIGNIFICANT CHANGE UP
GLUCOSE SERPL-MCNC: 118 MG/DL — HIGH (ref 70–99)
HADV DNA SPEC QL NAA+PROBE: SIGNIFICANT CHANGE UP
HCOV 229E RNA SPEC QL NAA+PROBE: SIGNIFICANT CHANGE UP
HCOV HKU1 RNA SPEC QL NAA+PROBE: SIGNIFICANT CHANGE UP
HCOV NL63 RNA SPEC QL NAA+PROBE: SIGNIFICANT CHANGE UP
HCOV OC43 RNA SPEC QL NAA+PROBE: SIGNIFICANT CHANGE UP
HCT VFR BLD CALC: 32.9 % — LOW (ref 39–50)
HGB BLD-MCNC: 10.5 G/DL — LOW (ref 13–17)
HMPV RNA SPEC QL NAA+PROBE: SIGNIFICANT CHANGE UP
HPIV1 RNA SPEC QL NAA+PROBE: SIGNIFICANT CHANGE UP
HPIV2 RNA SPEC QL NAA+PROBE: SIGNIFICANT CHANGE UP
HPIV3 RNA SPEC QL NAA+PROBE: SIGNIFICANT CHANGE UP
HPIV4 RNA SPEC QL NAA+PROBE: SIGNIFICANT CHANGE UP
IANC: 8.96 K/UL — HIGH (ref 1.5–8.5)
IMM GRANULOCYTES NFR BLD AUTO: 0.8 % — SIGNIFICANT CHANGE UP (ref 0–1.5)
INR BLD: 1.02 RATIO — SIGNIFICANT CHANGE UP (ref 0.88–1.16)
LYMPHOCYTES # BLD AUTO: 1.61 K/UL — SIGNIFICANT CHANGE UP (ref 1–3.3)
LYMPHOCYTES # BLD AUTO: 13 % — SIGNIFICANT CHANGE UP (ref 13–44)
M PNEUMO DNA SPEC QL NAA+PROBE: SIGNIFICANT CHANGE UP
MCHC RBC-ENTMCNC: 31.7 PG — SIGNIFICANT CHANGE UP (ref 27–34)
MCHC RBC-ENTMCNC: 31.9 GM/DL — LOW (ref 32–36)
MCV RBC AUTO: 99.4 FL — SIGNIFICANT CHANGE UP (ref 80–100)
MONOCYTES # BLD AUTO: 1.27 K/UL — HIGH (ref 0–0.9)
MONOCYTES NFR BLD AUTO: 10.3 % — SIGNIFICANT CHANGE UP (ref 2–14)
NEUTROPHILS # BLD AUTO: 8.96 K/UL — HIGH (ref 1.8–7.4)
NEUTROPHILS NFR BLD AUTO: 72.2 % — SIGNIFICANT CHANGE UP (ref 43–77)
NRBC # BLD: 0 /100 WBCS — SIGNIFICANT CHANGE UP
NRBC # FLD: 0 K/UL — SIGNIFICANT CHANGE UP
PLATELET # BLD AUTO: 484 K/UL — HIGH (ref 150–400)
POTASSIUM SERPL-MCNC: 4.5 MMOL/L — SIGNIFICANT CHANGE UP (ref 3.5–5.3)
POTASSIUM SERPL-SCNC: 4.5 MMOL/L — SIGNIFICANT CHANGE UP (ref 3.5–5.3)
PROT SERPL-MCNC: 7.2 G/DL — SIGNIFICANT CHANGE UP (ref 6–8.3)
PROTHROM AB SERPL-ACNC: 11.6 SEC — SIGNIFICANT CHANGE UP (ref 10.6–13.6)
RAPID RVP RESULT: SIGNIFICANT CHANGE UP
RBC # BLD: 3.31 M/UL — LOW (ref 4.2–5.8)
RBC # FLD: 15.2 % — HIGH (ref 10.3–14.5)
RH IG SCN BLD-IMP: POSITIVE — SIGNIFICANT CHANGE UP
RSV RNA SPEC QL NAA+PROBE: SIGNIFICANT CHANGE UP
RV+EV RNA SPEC QL NAA+PROBE: SIGNIFICANT CHANGE UP
SARS-COV-2 RNA SPEC QL NAA+PROBE: SIGNIFICANT CHANGE UP
SODIUM SERPL-SCNC: 137 MMOL/L — SIGNIFICANT CHANGE UP (ref 135–145)
WBC # BLD: 12.39 K/UL — HIGH (ref 3.8–10.5)
WBC # FLD AUTO: 12.39 K/UL — HIGH (ref 3.8–10.5)

## 2021-11-15 PROCEDURE — 99223 1ST HOSP IP/OBS HIGH 75: CPT | Mod: 24

## 2021-11-15 PROCEDURE — 93925 LOWER EXTREMITY STUDY: CPT | Mod: 26

## 2021-11-15 PROCEDURE — 99285 EMERGENCY DEPT VISIT HI MDM: CPT

## 2021-11-15 RX ORDER — INSULIN LISPRO 100/ML
VIAL (ML) SUBCUTANEOUS
Refills: 0 | Status: DISCONTINUED | OUTPATIENT
Start: 2021-11-15 | End: 2021-11-23

## 2021-11-15 RX ORDER — ACETAMINOPHEN 500 MG
650 TABLET ORAL ONCE
Refills: 0 | Status: COMPLETED | OUTPATIENT
Start: 2021-11-15 | End: 2021-11-15

## 2021-11-15 RX ORDER — CARVEDILOL PHOSPHATE 80 MG/1
25 CAPSULE, EXTENDED RELEASE ORAL EVERY 12 HOURS
Refills: 0 | Status: DISCONTINUED | OUTPATIENT
Start: 2021-11-15 | End: 2021-11-16

## 2021-11-15 RX ORDER — DEXTROSE 50 % IN WATER 50 %
25 SYRINGE (ML) INTRAVENOUS ONCE
Refills: 0 | Status: DISCONTINUED | OUTPATIENT
Start: 2021-11-15 | End: 2021-11-23

## 2021-11-15 RX ORDER — ACETAMINOPHEN 500 MG
650 TABLET ORAL EVERY 6 HOURS
Refills: 0 | Status: DISCONTINUED | OUTPATIENT
Start: 2021-11-15 | End: 2021-11-23

## 2021-11-15 RX ORDER — ASPIRIN/CALCIUM CARB/MAGNESIUM 324 MG
81 TABLET ORAL DAILY
Refills: 0 | Status: DISCONTINUED | OUTPATIENT
Start: 2021-11-15 | End: 2021-11-23

## 2021-11-15 RX ORDER — SODIUM CHLORIDE 9 MG/ML
1000 INJECTION, SOLUTION INTRAVENOUS
Refills: 0 | Status: DISCONTINUED | OUTPATIENT
Start: 2021-11-15 | End: 2021-11-17

## 2021-11-15 RX ORDER — GLUCAGON INJECTION, SOLUTION 0.5 MG/.1ML
1 INJECTION, SOLUTION SUBCUTANEOUS ONCE
Refills: 0 | Status: DISCONTINUED | OUTPATIENT
Start: 2021-11-15 | End: 2021-11-18

## 2021-11-15 RX ORDER — DEXTROSE 50 % IN WATER 50 %
12.5 SYRINGE (ML) INTRAVENOUS ONCE
Refills: 0 | Status: DISCONTINUED | OUTPATIENT
Start: 2021-11-15 | End: 2021-11-23

## 2021-11-15 RX ORDER — OXYCODONE HYDROCHLORIDE 5 MG/1
10 TABLET ORAL EVERY 6 HOURS
Refills: 0 | Status: DISCONTINUED | OUTPATIENT
Start: 2021-11-15 | End: 2021-11-21

## 2021-11-15 RX ORDER — DEXTROSE 50 % IN WATER 50 %
15 SYRINGE (ML) INTRAVENOUS ONCE
Refills: 0 | Status: DISCONTINUED | OUTPATIENT
Start: 2021-11-15 | End: 2021-11-17

## 2021-11-15 RX ORDER — ATORVASTATIN CALCIUM 80 MG/1
40 TABLET, FILM COATED ORAL AT BEDTIME
Refills: 0 | Status: DISCONTINUED | OUTPATIENT
Start: 2021-11-15 | End: 2021-11-23

## 2021-11-15 RX ORDER — CADEXOMER IODINE 0.9 %
1 PADS, MEDICATED (EA) TOPICAL DAILY
Refills: 0 | Status: DISCONTINUED | OUTPATIENT
Start: 2021-11-15 | End: 2021-11-23

## 2021-11-15 RX ORDER — CARVEDILOL PHOSPHATE 80 MG/1
25 CAPSULE, EXTENDED RELEASE ORAL
Refills: 0 | Status: DISCONTINUED | OUTPATIENT
Start: 2021-11-15 | End: 2021-11-15

## 2021-11-15 RX ORDER — OXYCODONE HYDROCHLORIDE 5 MG/1
5 TABLET ORAL EVERY 6 HOURS
Refills: 0 | Status: DISCONTINUED | OUTPATIENT
Start: 2021-11-15 | End: 2021-11-22

## 2021-11-15 RX ORDER — ENOXAPARIN SODIUM 100 MG/ML
40 INJECTION SUBCUTANEOUS EVERY 24 HOURS
Refills: 0 | Status: DISCONTINUED | OUTPATIENT
Start: 2021-11-15 | End: 2021-11-23

## 2021-11-15 RX ORDER — ATORVASTATIN CALCIUM 80 MG/1
40 TABLET, FILM COATED ORAL DAILY
Refills: 0 | Status: DISCONTINUED | OUTPATIENT
Start: 2021-11-15 | End: 2021-11-15

## 2021-11-15 RX ORDER — LISINOPRIL 2.5 MG/1
10 TABLET ORAL DAILY
Refills: 0 | Status: DISCONTINUED | OUTPATIENT
Start: 2021-11-15 | End: 2021-11-23

## 2021-11-15 RX ADMIN — ENOXAPARIN SODIUM 40 MILLIGRAM(S): 100 INJECTION SUBCUTANEOUS at 21:16

## 2021-11-15 RX ADMIN — Medication 650 MILLIGRAM(S): at 12:54

## 2021-11-15 RX ADMIN — Medication 1: at 21:17

## 2021-11-15 RX ADMIN — CARVEDILOL PHOSPHATE 25 MILLIGRAM(S): 80 CAPSULE, EXTENDED RELEASE ORAL at 18:12

## 2021-11-15 RX ADMIN — Medication 650 MILLIGRAM(S): at 14:38

## 2021-11-15 RX ADMIN — ATORVASTATIN CALCIUM 40 MILLIGRAM(S): 80 TABLET, FILM COATED ORAL at 21:17

## 2021-11-15 NOTE — ED PROVIDER NOTE - ATTENDING CONTRIBUTION TO CARE
I (Janelle) agree with above, I performed a history and physical. Counseled eder medical staff, physician assistant, and/or medical student on medical decision making as documented. Medical decisions and treatment interventions were made in real time during the patient encounter. Additionally and/or with the following exceptions: The patient presented to the ED with complaint of left lower extremity pain to distal toes. of note, had a previous and recent right aka for PAD. pain is worse when touched. was schedule for outpatient angiogram but was unable to make the appointment due to his current rehab status. ||| vital signs normal and stable during my period of care ||| delayed cap refill in lle but no rest pain, vascular consulted, admitted for angiogram and definitive management, of note patient had good pain control with APAP. The patient's condition was not amenable to outpatient treatment due either the lack of feasibility of outpatient care coordination, possibility for further decompensation with adverse outcome if discharge, or treatments and diagnostic  modalities only available during an inpatient hospitalization.

## 2021-11-15 NOTE — ED PROVIDER NOTE - PHYSICAL EXAMINATION
PHYSICAL EXAM:    GENERAL: : Lying in bed comfortably  HEAD:  Atraumatic, Normocephalic  EYES: EOMI, PERRLA, conjunctiva and sclera clear  ENT: No erythema/pallor/petechiae/lesions; TMs clear b/l  NECK: Supple, No JVD  LUNG: CTA b/l; no r/r/w  HEART: RRR, +S1/S2; No m/r/g  ABDOMEN: soft, NT/ND; BS audible   EXTREMITIES:  s/p Right BKA well healing wound (staples removed past week), Left great and little toe blackening, redness of forefoot, delayed capillary refill > 2 sec, cannot manually palpate DP/Post Tibial arteries, patient not tender and has intact left ankle/knee motion.   NERVOUS SYSTEM:  AAOx3, speech clear. No sensory/motor deficits   SKIN: No rashes or lesions

## 2021-11-15 NOTE — ED ADULT TRIAGE NOTE - CHIEF COMPLAINT QUOTE
pt transferred from [place].  pt sent to ED for angiogram of left leg.  sent for vascular surgery

## 2021-11-15 NOTE — ED ADULT NURSE NOTE - OBJECTIVE STATEMENT
Pt presenting to room 16 AxOx4 nonambulatory at baseline, arriving from [place] for surgical consult and r/o blood clot to left foot. PMH HTN, DM, PVD. pt presenting with right BKA s/p- surgery 1 month ago. Surgical site healing well. No drainage noted. Pt noted to have discoloration to top of left big toe. Weak pedal pulse noted. Pt denies decreased sensation. + ROM noted. Pt denies CP, SOB, fever, dizziness. Sacral area dry with 1x1 stage 2 noted- covered with clean dressing. VSS and as noted. IV established with 20g in RAC. Labs drawn and sent. MD at bedside. will continue to monitor.

## 2021-11-15 NOTE — H&P ADULT - NSHPPHYSICALEXAM_GEN_ALL_CORE
GEN: resting in bed comfortably in NAD  RESP: no increased WOB  ABD: soft, non-distended, non-tender without rebound tenderness or guarding  EXTR:  - RLE BKA stump healing well   - LLE: palpable femoral   NEURO: awake, alert GEN: resting in bed comfortably in NAD  RESP: no increased WOB  ABD: soft, non-distended, non-tender without rebound tenderness or guarding  EXTR:  - RLE AKA stump healing well, palpable femoral pulse  - LLE: palpable femoral pulse, faint AT signal, strong PT signal; dry gangrene over L 1st + 5th toes, no malodor; non-tender to palpation but pain with abduction of 5th toe; warm, cap refill 5s; sensation grossly intact, dorsi+plantarflexion 5/5  NEURO: awake, alert

## 2021-11-15 NOTE — ED PROVIDER NOTE - CLINICAL SUMMARY MEDICAL DECISION MAKING FREE TEXT BOX
61 Yr old male k/c DM HTN IHD PAD CKD s/p right BKA 10/12 (Dr. Maria Antonia Lundberg). Per patient has developed progressive blackening of left great toe and little toe since past 2 weeks. PE s/p Right BKA well healing wound (staples removed past week), Left great and little toe blackening, redness of forefoot, delayed capillary refill > 2 sec, cannot manually palpate DP/Post Tibial arteries, patient not tender and has intact left ankle/knee motion. Plan for labs, Duplex sonogram, Vascular surgery consult.

## 2021-11-15 NOTE — H&P ADULT - ATTENDING COMMENTS
Patient with prior right AKA now p/w left foot ischemic changes in the setting of known PAD (SFA occlusion). Pedal pulses non-palpable. Ischemic changes of left 1st and 5th toes. Will admit for angiography and revascularization of LLE.

## 2021-11-15 NOTE — ED PROVIDER NOTE - OBJECTIVE STATEMENT
61 Yr old male k/c DM HTN IHD PAD CKD s/p right BKA 10/12 (Dr. Maria Antonia Lundberg).   Per patient has developed progressive blackening of left great toe and little toe since past 2 weeks.   Is following up with his surgeon, was due to get a CT angiogram past Wednesday but could not have it as he suffered a ground level fall and showed up to ED for evaluation.   Today went to [facility] and transferred her for Vasc. Surg input.   Patient denies fever, dizziness, headache, CP, palpitations, SOB, bleeding.

## 2021-11-15 NOTE — H&P ADULT - HISTORY OF PRESENT ILLNESS
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 He was scheduled for an CT angio last week, but missed it because he had fallen.  62yo M with Hx DM, HTN, PAD, CKD, R AKA (10/12) who presents with progressive blackening of the L 1st and 5th toes for 2 weeks. He was scheduled for an CT angio last week, but missed it because he had fallen.     *** FULL NOTE TO FOLLOW *** 60yo M with Hx DM, HTN, PVD, CKD, R AKA (10/12) who presents with progressive blackening of the L 1st and 5th toes for 2 weeks. He was scheduled for an CT angio last week, but missed it because he had fallen. He presented to [place] today and was transferred to [facility] for vascular surgery evaluation. Patient states denies resting pain or claudication. Denies nausea, vomiting, chest pain, shortness of breath, or dizziness.

## 2021-11-15 NOTE — H&P ADULT - ASSESSMENT
62yo M with Hx DM, HTN, PAD, CKD, R AKA (10/12) who presents with progressive blackening of the L 1st and 5th toes for 2 weeks, planned for LLE angiogram, possible bypass on Wednesday    PLAN:   - Admit to C Team Surgery, Dr. Lundberg  - Plan for LLE angiogram, possible bypass on Wednesday  - Diet: Reg CC  - Pain control: Tylenol, Oxy 5/10 PRN  - Abx: not needed at this time  - C/w home meds: lisinopril, carvedilol, ASA 81  - FS ACqHS / HANK  - VTE ppx: Lovenox   - Will need cardiac and IM clearance for OR Wednesday      **** FULL NOTE TO FOLLOW ***    Patient discussed with Vascular Surgery fellow, Dr. Tao, on behalf of Dr. Tamika Galan, PGY-2  C Team Surgery  #[id number]  60yo M with Hx DM, HTN, PAD, CKD, R AKA (10/12) who presents with progressive blackening of the L 1st and 5th toes for 2 weeks, planned for LLE angiogram, possible bypass on Wednesday.    PLAN:   - Admit to C Team Surgery, Dr. Ludnberg  - Plan for LLE angiogram, possible bypass on Wednesday  - Diet: Reg CC  - Pain control: Tylenol, Oxy 5/10 PRN  - Abx: not needed at this time  - C/w home meds: lisinopril, carvedilol, ASA 81  - FS ACqHS / HANK  - VTE ppx: Lovenox   - Will need cardiac and IM clearance for OR Wednesday    Patient discussed with Vascular Surgery fellow, Dr. Tao, on behalf of Dr. Tamika Galan, PGY-2  C Team Surgery  #[id number]

## 2021-11-15 NOTE — ED PROVIDER NOTE - NS ED ROS FT
CONSTITUTIONAL: No weakness, fevers   EYES/ENT: No visual changes;  No vertigo or throat pain   NECK: No pain or stiffness  RESPIRATORY: No cough, shortness of breath  CARDIOVASCULAR: No chest pain, palpitations  GASTROINTESTINAL: No abdominal pain, nausea, vomiting, diarrhea or constipation.  GENITOURINARY: No dysuria, frequency  NEUROLOGICAL: No numbness or weakness  SKIN: No rashes, or lesions     All other review of systems is negative unless indicated above.

## 2021-11-15 NOTE — H&P ADULT - NSHPLABSRESULTS_GEN_ALL_CORE
10.5   12.39 )-----------( 484      ( 15 Nov 2021 12:38 )             32.9     137  |  101  |  49<H>  ----------------------------<  118<H>  4.5   |  25  |  1.12    Ca    9.2      15 Nov 2021 12:12    TPro  7.2  /  Alb  3.6  /  TBili  <0.2  /  DBili  x   /  AST  18  /  ALT  28  /  AlkPhos  146<H>  11-15

## 2021-11-15 NOTE — ED ADULT NURSE NOTE - IS THE PATIENT ABLE TO BE SCREENED?
"        Tomas Padillaak   2017 9:20 AM   Office Visit   MRN: [id number]    Department:  [facility]   Dept Phone:  [phone]    Description:  Male : 1990   Provider:  JESSE Lockett           Reason for Visit     Establish Care     Chest Pain about 4 months ago       Allergies as of 2017     No Known Allergies      You were diagnosed with     Chest pain at rest   [079445]       Attention deficit hyperactivity disorder (ADHD), predominantly inattentive type   [7374728]       Primary insomnia   [318062]       Sedative, hypnotic or anxiolytic dependence, unspecified (CMS-HCC)   [304.10.ICD-9-CM]         Vital Signs     Blood Pressure Pulse Temperature Respirations Height Weight    110/78 mmHg 93 36.6 °C (97.9 °F) 14 1.854 m (6' 1\") 83.008 kg (183 lb)    Body Mass Index Oxygen Saturation Smoking Status             24.15 kg/m2 97% Former Smoker         Basic Information     Date Of Birth Sex Race Ethnicity Preferred Language    1990 Male White Non- English      Your appointments     May 04, 2017  7:20 AM   Established Patient with JESSE Lockett   [facility]    [address] [place] [id number] [phone]           You will be receiving a confirmation call a few days before your appointment from our automated call confirmation system.              Problem List              ICD-10-CM Priority Class Noted - Resolved    Chest pain at rest R07.9   2017 - Present    Attention deficit hyperactivity disorder (ADHD), predominantly inattentive type F90.0   2017 - Present    Primary insomnia F51.01   2017 - Present      Health Maintenance        Date Due Completion Dates    IMM HEP B VACCINE (1 of 3 - Primary Series) 1989 ---    IMM HEP A VACCINE (1 of 2 - Standard Series) 1990 ---    IMM VARICELLA (CHICKENPOX) VACCINE (1 of 2 - 2 Dose Adolescent Series) 2002 ---    IMM DTaP/Tdap/Td Vaccine (1 - Tdap) 2008 ---    IMM " INFLUENZA (1) 9/1/2016 ---            Current Immunizations     No immunizations on file.      Below and/or attached are the medications your provider expects you to take. Review all of your home medications and newly ordered medications with your provider and/or pharmacist. Follow medication instructions as directed by your provider and/or pharmacist. Please keep your medication list with you and share with your provider. Update the information when medications are discontinued, doses are changed, or new medications (including over-the-counter products) are added; and carry medication information at all times in the event of emergency situations     Allergies:  No Known Allergies          Medications  Valid as of: February 08, 2017 - 10:13 AM    Generic Name Brand Name Tablet Size Instructions for use    Amphetamine-Dextroamphetamine (CAPSULE SR 24 HR) ADDERALL XR 10 MG Take 10 mg by mouth every morning.        Amphetamine-Dextroamphetamine (Tab) ADDERALL 20 MG Take 1 Tab by mouth 2 times a day.        Azithromycin (Tab) ZITHROMAX 250 MG Take 2 tabs by mouth on day 1, then take 1 tab on days 2-5        Zolpidem Tartrate (Tab) AMBIEN 5 MG Take 1 Tab by mouth at bedtime as needed for Sleep.        .                 Medicines prescribed today were sent to:     [facility] - [place], [place] - [address]    [address] [place] [address]    Phone: [phone] Fax: [phone]    Open 24 Hours?: No      Medication refill instructions:       If your prescription bottle indicates you have medication refills left, it is not necessary to call your provider’s office. Please contact your pharmacy and they will refill your medication.    If your prescription bottle indicates you do not have any refills left, you may request refills at any time through one of the following ways: The online [employer] system (except Urgent Care), by calling your provider’s office, or by asking your pharmacy to contact your provider’s  office with a refill request. Medication refills are processed only during regular business hours and may not be available until the next business day. Your provider may request additional information or to have a follow-up visit with you prior to refilling your medication.   *Please Note: Medication refills are assigned a new Rx number when refilled electronically. Your pharmacy may indicate that no refills were authorized even though a new prescription for the same medication is available at the pharmacy. Please request the medicine by name with the pharmacy before contacting your provider for a refill.        Your To Do List     Future Labs/Procedures Complete By Expires    COMP METABOLIC PANEL  As directed 2/9/2018    LIPID PROFILE  As directed 2/9/2018    [facility] PAIN MANAGEMENT SCREEN  As directed 2/8/2018    Comments:    Current Outpatient Prescriptions:  zolpidem (AMBIEN) 5 MG Tab, Take 1 Tab by mouth at bedtime as needed for Sleep., Disp: 30 Tab, Rfl: 0  amphetamine-dextroamphetamine (ADDERALL, 20MG,) 20 MG Tab, Take 1 Tab by mouth 2 times a day., Disp: 60 Tab, Rfl: 0  amphetamine-dextroamphetamine XR (ADDERALL XR) 10 MG CAPSULE SR 24 HR, Take 10 mg by mouth every morning., Disp: , Rfl:   azithromycin (ZITHROMAX) 250 MG Tab, Take 2 tabs by mouth on day 1, then take 1 tab on days 2-5, Disp: 6 Tab, Rfl: 0    No current facility-administered medications for this visit.         [employer] Access Code: [email or web address]  Expires: 2/24/2017  3:19 PM    [employer]  A secure, online tool to manage your health information     [employer]® is a secure, online tool that connects you to your personalized health information from the privacy of your home -- day or night - making it very easy for you to manage your healthcare. Once the activation process is completed, you can even access your medical information using the [employer] andrzej, which is available for free in the Apple Andrzej store or Google Play store.      [employer] provides the following levels of access (as shown below):   My Chart Features   [facility] Doctor [facility]  Specialists [facility]  Urgent  Care Non-[facility]  Primary Care  Doctor   Email your healthcare team securely and privately 24/7 X X X    Manage appointments: schedule your next appointment; view details of past/upcoming appointments X      Request prescription refills. X      View recent personal medical records, including lab and immunizations X X X X   View health record, including health history, allergies, medications X X X X   Read reports about your outpatient visits, procedures, consult and ER notes X X X X   See your discharge summary, which is a recap of your hospital and/or ER visit that includes your diagnosis, lab results, and care plan. X X       How to register for [employer]:  1. Go to  [email or web address].  2. Click on the Sign Up Now box, which takes you to the New Member Sign Up page. You will need to provide the following information:  a. Enter your [employer] Access Code exactly as it appears at the top of this page. (You will not need to use this code after you’ve completed the sign-up process. If you do not sign up before the expiration date, you must request a new code.)   b. Enter your date of birth.   c. Enter your home email address.   d. Click Submit, and follow the next screen’s instructions.  3. Create a [employer] ID. This will be your [employer] login ID and cannot be changed, so think of one that is secure and easy to remember.  4. Create a [employer] password. You can change your password at any time.  5. Enter your Password Reset Question and Answer. This can be used at a later time if you forget your password.   6. Enter your e-mail address. This allows you to receive e-mail notifications when new information is available in [employer].  7. Click Sign Up. You can now view your health information.    For assistance activating your [employer] account, call [phone]       Yes

## 2021-11-16 ENCOUNTER — TRANSCRIPTION ENCOUNTER (OUTPATIENT)
Age: 61
End: 2021-11-16

## 2021-11-16 DIAGNOSIS — E11.9 TYPE 2 DIABETES MELLITUS WITHOUT COMPLICATIONS: ICD-10-CM

## 2021-11-16 DIAGNOSIS — I73.9 PERIPHERAL VASCULAR DISEASE, UNSPECIFIED: ICD-10-CM

## 2021-11-16 DIAGNOSIS — Z01.818 ENCOUNTER FOR OTHER PREPROCEDURAL EXAMINATION: ICD-10-CM

## 2021-11-16 DIAGNOSIS — I10 ESSENTIAL (PRIMARY) HYPERTENSION: ICD-10-CM

## 2021-11-16 LAB
ANION GAP SERPL CALC-SCNC: 10 MMOL/L — SIGNIFICANT CHANGE UP (ref 7–14)
APTT BLD: 30.5 SEC — SIGNIFICANT CHANGE UP (ref 27–36.3)
BUN SERPL-MCNC: 46 MG/DL — HIGH (ref 7–23)
CALCIUM SERPL-MCNC: 9.5 MG/DL — SIGNIFICANT CHANGE UP (ref 8.4–10.5)
CHLORIDE SERPL-SCNC: 103 MMOL/L — SIGNIFICANT CHANGE UP (ref 98–107)
CO2 SERPL-SCNC: 23 MMOL/L — SIGNIFICANT CHANGE UP (ref 22–31)
CREAT SERPL-MCNC: 0.85 MG/DL — SIGNIFICANT CHANGE UP (ref 0.5–1.3)
GLUCOSE SERPL-MCNC: 168 MG/DL — HIGH (ref 70–99)
HCT VFR BLD CALC: 33.7 % — LOW (ref 39–50)
HGB BLD-MCNC: 10.5 G/DL — LOW (ref 13–17)
INR BLD: 1.02 RATIO — SIGNIFICANT CHANGE UP (ref 0.88–1.16)
MAGNESIUM SERPL-MCNC: 2.2 MG/DL — SIGNIFICANT CHANGE UP (ref 1.6–2.6)
MCHC RBC-ENTMCNC: 31.2 GM/DL — LOW (ref 32–36)
MCHC RBC-ENTMCNC: 31.7 PG — SIGNIFICANT CHANGE UP (ref 27–34)
MCV RBC AUTO: 101.8 FL — HIGH (ref 80–100)
NRBC # BLD: 0 /100 WBCS — SIGNIFICANT CHANGE UP
NRBC # FLD: 0 K/UL — SIGNIFICANT CHANGE UP
PHOSPHATE SERPL-MCNC: 3.4 MG/DL — SIGNIFICANT CHANGE UP (ref 2.5–4.5)
PLATELET # BLD AUTO: 521 K/UL — HIGH (ref 150–400)
POTASSIUM SERPL-MCNC: 4.9 MMOL/L — SIGNIFICANT CHANGE UP (ref 3.5–5.3)
POTASSIUM SERPL-SCNC: 4.9 MMOL/L — SIGNIFICANT CHANGE UP (ref 3.5–5.3)
PROTHROM AB SERPL-ACNC: 11.6 SEC — SIGNIFICANT CHANGE UP (ref 10.6–13.6)
RBC # BLD: 3.31 M/UL — LOW (ref 4.2–5.8)
RBC # FLD: 15.4 % — HIGH (ref 10.3–14.5)
SODIUM SERPL-SCNC: 136 MMOL/L — SIGNIFICANT CHANGE UP (ref 135–145)
WBC # BLD: 11.35 K/UL — HIGH (ref 3.8–10.5)
WBC # FLD AUTO: 11.35 K/UL — HIGH (ref 3.8–10.5)

## 2021-11-16 PROCEDURE — 99223 1ST HOSP IP/OBS HIGH 75: CPT

## 2021-11-16 PROCEDURE — 93010 ELECTROCARDIOGRAM REPORT: CPT

## 2021-11-16 RX ORDER — INSULIN LISPRO 100/ML
4 VIAL (ML) SUBCUTANEOUS
Refills: 0 | Status: DISCONTINUED | OUTPATIENT
Start: 2021-11-16 | End: 2021-11-23

## 2021-11-16 RX ORDER — CARVEDILOL PHOSPHATE 80 MG/1
12.5 CAPSULE, EXTENDED RELEASE ORAL EVERY 12 HOURS
Refills: 0 | Status: DISCONTINUED | OUTPATIENT
Start: 2021-11-16 | End: 2021-11-23

## 2021-11-16 RX ORDER — INFLUENZA VIRUS VACCINE 15; 15; 15; 15 UG/.5ML; UG/.5ML; UG/.5ML; UG/.5ML
0.5 SUSPENSION INTRAMUSCULAR ONCE
Refills: 0 | Status: DISCONTINUED | OUTPATIENT
Start: 2021-11-16 | End: 2021-11-23

## 2021-11-16 RX ORDER — CARVEDILOL PHOSPHATE 80 MG/1
1 CAPSULE, EXTENDED RELEASE ORAL
Qty: 0 | Refills: 0 | DISCHARGE

## 2021-11-16 RX ORDER — SODIUM CHLORIDE 9 MG/ML
1000 INJECTION, SOLUTION INTRAVENOUS
Refills: 0 | Status: DISCONTINUED | OUTPATIENT
Start: 2021-11-17 | End: 2021-11-18

## 2021-11-16 RX ORDER — INSULIN GLARGINE 100 [IU]/ML
12 INJECTION, SOLUTION SUBCUTANEOUS AT BEDTIME
Refills: 0 | Status: DISCONTINUED | OUTPATIENT
Start: 2021-11-16 | End: 2021-11-23

## 2021-11-16 RX ADMIN — Medication 4 UNIT(S): at 17:22

## 2021-11-16 RX ADMIN — CARVEDILOL PHOSPHATE 12.5 MILLIGRAM(S): 80 CAPSULE, EXTENDED RELEASE ORAL at 17:23

## 2021-11-16 RX ADMIN — Medication 1 TABLET(S): at 12:19

## 2021-11-16 RX ADMIN — OXYCODONE HYDROCHLORIDE 10 MILLIGRAM(S): 5 TABLET ORAL at 15:01

## 2021-11-16 RX ADMIN — Medication 2: at 21:26

## 2021-11-16 RX ADMIN — CARVEDILOL PHOSPHATE 25 MILLIGRAM(S): 80 CAPSULE, EXTENDED RELEASE ORAL at 05:52

## 2021-11-16 RX ADMIN — Medication 1: at 08:16

## 2021-11-16 RX ADMIN — Medication 4 UNIT(S): at 12:27

## 2021-11-16 RX ADMIN — LISINOPRIL 10 MILLIGRAM(S): 2.5 TABLET ORAL at 05:52

## 2021-11-16 RX ADMIN — OXYCODONE HYDROCHLORIDE 10 MILLIGRAM(S): 5 TABLET ORAL at 15:40

## 2021-11-16 RX ADMIN — Medication 1: at 12:26

## 2021-11-16 RX ADMIN — INSULIN GLARGINE 12 UNIT(S): 100 INJECTION, SOLUTION SUBCUTANEOUS at 21:26

## 2021-11-16 RX ADMIN — ENOXAPARIN SODIUM 40 MILLIGRAM(S): 100 INJECTION SUBCUTANEOUS at 21:26

## 2021-11-16 RX ADMIN — ATORVASTATIN CALCIUM 40 MILLIGRAM(S): 80 TABLET, FILM COATED ORAL at 21:26

## 2021-11-16 RX ADMIN — Medication 1 APPLICATION(S): at 12:20

## 2021-11-16 RX ADMIN — Medication 81 MILLIGRAM(S): at 12:19

## 2021-11-16 NOTE — PROGRESS NOTE ADULT - ASSESSMENT
Assessment/Plan: 61yMale s/p  60yo M with Hx DM, HTN, PAD, CKD, R AKA (10/12) who presents with progressive blackening of the L 1st and 5th toes for 2 weeks, planned for LLE angiogram, possible bypass tomorrow    - Plan for LLE angiogram, possible bypass tomorrow  - EKG, cardiology consult called this am for preop optimization   - Diet: Reg CC, no after midnight  - Pain control: Tylenol, Oxy 5/10 PRN  - C/w home meds: lisinopril, carvedilol, ASA 81  - FS ACqHS / HANK  - VTE ppx: Lovenox   - Dispo: Floor

## 2021-11-16 NOTE — CONSULT NOTE ADULT - PROBLEM SELECTOR RECOMMENDATION 4
NO acitve ACS, CHF signs.  Unable to verify METS due to limited functional status.  Recent TTE reviewed.  RCRI of 3 points - continue Coreg perioperatively.  Appreciate cardiology optimization note.

## 2021-11-16 NOTE — CONSULT NOTE ADULT - SUBJECTIVE AND OBJECTIVE BOX
[facility] Division of Hospital Medicine  Miguelangel Lal MD  Pager (RAVI, 8A-5P): [phone]  Other Times:  [phone]    Patient is a 61y old  Male who presents with a chief complaint of dry gangrene L 1st + 5th toes (16 Nov 2021 14:37)      HPI:  60yo M with Hx DM, HTN, PVD, CKD, R AKA (10/12) who presents with progressive blackening of the L 1st and 5th toes for 2 weeks. He was scheduled for an CT angio last week, but missed it because he had fallen. He presented to [place] today and was transferred to [facility] for vascular surgery evaluation. Patient states denies resting pain or claudication. Denies nausea, vomiting, chest pain, shortness of breath, or dizziness.  (15 Nov 2021 16:56)  Patient offers no new complaints.  No ACS/CHF exacerbation signs at this time.  Minimal ambulation due to ongoing LE issues. Pain is rated 9/10 in severity, achy in nature, localized to the Left foot, improved with pain meds, but worse with movement.    No F/C, N/V, CP, SOB, Cough, lightheadedness, dizziness, abdominal pain, diarrhea, dysuria.    Pain Symptoms if applicable:             	                         none	   mild         moderate         severe  Pain:	           9                 0	    1-3	     4-6	         7-10  Location:	Surgical site  Modifying factors:	Worse with movement  Associated symptoms:	    Allergies    penicillin (Other)    Intolerances        HOME MEDICATIONS: Reviewed    MEDICATIONS  (STANDING):  aspirin enteric coated 81 milliGRAM(s) Oral daily  atorvastatin 40 milliGRAM(s) Oral at bedtime  cadexomer iodine 0.9% Gel 1 Application(s) Topical daily  carvedilol 12.5 milliGRAM(s) Oral every 12 hours  dextrose 40% Gel 15 Gram(s) Oral once  dextrose 5%. 1000 milliLiter(s) (100 mL/Hr) IV Continuous <Continuous>  dextrose 5%. 1000 milliLiter(s) (50 mL/Hr) IV Continuous <Continuous>  dextrose 50% Injectable 25 Gram(s) IV Push once  dextrose 50% Injectable 12.5 Gram(s) IV Push once  dextrose 50% Injectable 25 Gram(s) IV Push once  enoxaparin Injectable 40 milliGRAM(s) SubCutaneous every 24 hours  glucagon  Injectable 1 milliGRAM(s) IntraMuscular once  influenza   Vaccine 0.5 milliLiter(s) IntraMuscular once  insulin glargine Injectable (LANTUS) 12 Unit(s) SubCutaneous at bedtime  insulin lispro (ADMELOG) corrective regimen sliding scale   SubCutaneous Before meals and at bedtime  insulin lispro Injectable (ADMELOG) 4 Unit(s) SubCutaneous three times a day before meals  lisinopril 10 milliGRAM(s) Oral daily  multivitamin 1 Tablet(s) Oral daily    MEDICATIONS  (PRN):  acetaminophen     Tablet .. 650 milliGRAM(s) Oral every 6 hours PRN Mild Pain (1 - 3)  oxyCODONE    IR 5 milliGRAM(s) Oral every 6 hours PRN Moderate Pain (4 - 6)  oxyCODONE    IR 10 milliGRAM(s) Oral every 6 hours PRN Severe Pain (7 - 10)      PAST MEDICAL & SURGICAL HISTORY:  DM (diabetes mellitus)    HTN (hypertension)    HLD (hyperlipidemia)    CHF (congestive heart failure)    CKD (chronic kidney disease)    PVD (peripheral vascular disease)    S/P femoral-femoral bypass surgery  2018 - right leg        SOCIAL HISTORY:  No tobacco/alcohol use/abuse.    FAMILY HISTORY:  Family history of MI (myocardial infarction)  Father    FH: renal cell carcinoma  Sister - s/p nephrectomy    Family history of depression  Brother    FHx: diabetes mellitus  Paternal Grandfather    FH: heart disease  Mother        REVIEW OF SYSTEMS:    CONSTITUTIONAL: No fever, weight loss, or fatigue  EYES: No eye pain, visual disturbances, or discharge  ENMT:  No difficulty hearing, tinnitus, vertigo; No sinus or throat pain  NECK: No pain or stiffness  RESPIRATORY: No cough, wheezing, chills or hemoptysis; No shortness of breath  CARDIOVASCULAR: No chest pain, palpitations, dizziness, or leg swelling  GASTROINTESTINAL: No abdominal or epigastric pain. No nausea, vomiting, or hematemesis; No diarrhea or constipation. No melena or hematochezia.  GENITOURINARY: No dysuria, frequency, hematuria, or incontinence  NEUROLOGICAL: No headaches, memory loss, loss of strength, numbness, or tremors  SKIN: No itching, burning, rashes, or lesions   LYMPH NODES: No enlarged glands  ENDOCRINE: No heat or cold intolerance; No hair loss  MUSCULOSKELETAL: Leg pain  PSYCHIATRIC: No depression, anxiety, mood swings, or difficulty sleeping  HEME/LYMPH: No easy bruising, or bleeding gums  ALLERGY AND IMMUNOLOGIC: No hives or eczema    [] Unable to obtain due to poor mental status    Vital Signs Last 24 Hrs  T(C): 36.9 (16 Nov 2021 10:15), Max: 36.9 (15 Nov 2021 16:51)  T(F): 98.5 (16 Nov 2021 10:15), Max: 98.5 (16 Nov 2021 10:15)  HR: 81 (16 Nov 2021 10:15) (76 - 88)  BP: 109/68 (16 Nov 2021 10:15) (109/68 - 131/71)  BP(mean): --  RR: 18 (16 Nov 2021 10:15) (18 - 18)  SpO2: 100% (16 Nov 2021 10:15) (96% - 100%)  CAPILLARY BLOOD GLUCOSE      POCT Blood Glucose.: 155 mg/dL (16 Nov 2021 11:44)  POCT Blood Glucose.: 166 mg/dL (16 Nov 2021 07:22)  POCT Blood Glucose.: 180 mg/dL (15 Nov 2021 21:11)  POCT Blood Glucose.: 168 mg/dL (15 Nov 2021 18:00)      PHYSICAL EXAM:    CONSTITUTIONAL: NAD  EYES: PERRLA; conjunctiva and sclera clear  ENMT: Moist oral mucosa, no pharyngeal injection or exudates; normal dentition  NECK: Supple, no palpable masses; no thyromegaly  RESPIRATORY: Normal respiratory effort; lungs are clear to auscultation bilaterally  CARDIOVASCULAR: Regular rate and rhythm, normal S1 and S2, no murmur/rub/gallop; No lower extremity edema; Gangrene Left toes  ABDOMEN: Nontender to palpation, normoactive bowel sounds, no rebound/guarding; No hepatosplenomegaly  MUSCULOSKELETAL:  unable to assess gait; no joint swelling or tenderness to palpation; Rt AKA  PSYCH: A+O to person, place, and time; affect appropriate  NEUROLOGY: CN 2-12 are intact and symmetric; no gross sensory deficits   SKIN: No rashes; no palpable lesions    LABS:                        10.5   11.35 )-----------( 521      ( 16 Nov 2021 08:05 )             33.7     11-16    136  |  103  |  46<H>  ----------------------------<  168<H>  4.9   |  23  |  0.85    Ca    9.5      16 Nov 2021 08:05  Phos  3.4     11-16  Mg     2.20     11-16    TPro  7.2  /  Alb  3.6  /  TBili  <0.2  /  DBili  x   /  AST  18  /  ALT  28  /  AlkPhos  146<H>  11-15    PT/INR - ( 16 Nov 2021 08:05 )   PT: 11.6 sec;   INR: 1.02 ratio         PTT - ( 16 Nov 2021 08:05 )  PTT:30.5 sec    CAPILLARY BLOOD GLUCOSE      POCT Blood Glucose.: 155 mg/dL (16 Nov 2021 11:44)      RADIOLOGY & ADDITIONAL STUDIES:    Imaging:   Personally Reviewed:  [ ] YES               EKG:   Personally Reviewed:  [x ] YES   SR at 75bpm    Care Discussed with Consultant(s)/Other Providers:  Care Discussed with Primary Team.      [ ] Increased delirium risk  [ ] Delirium and other risks can be reduced by:          -early ambulation          -minimizing "tethers" - IV, oxygen, catheters, etc          -avoiding hypnotics and sedatives          -maintaining hydration/nutrition          -avoid anticholinergics - diphenhydramine, etc          -pain control          -supportive environment

## 2021-11-16 NOTE — PROGRESS NOTE ADULT - SUBJECTIVE AND OBJECTIVE BOX
SUBJECTIVE:   Seen and examined at bedside. No acute events overnight.     OBJECTIVE: T(C): 36.7 (11-16-21 @ 01:04), Max: 37.1 (11-15-21 @ 13:02)  HR: 83 (11-16-21 @ 01:04) (76 - 98)  BP: 122/74 (11-16-21 @ 01:04) (95/61 - 128/70)  RR: 18 (11-16-21 @ 01:04) (16 - 18)  SpO2: 100% (11-16-21 @ 01:04) (96% - 100%)  Wt(kg): --  I&O's Summary    I&O's Detail    Physical Exam  General:  Abdomen:    MEDICATIONS  (STANDING):  aspirin enteric coated 81 milliGRAM(s) Oral daily  atorvastatin 40 milliGRAM(s) Oral at bedtime  cadexomer iodine 0.9% Gel 1 Application(s) Topical daily  carvedilol 25 milliGRAM(s) Oral every 12 hours  dextrose 40% Gel 15 Gram(s) Oral once  dextrose 5%. 1000 milliLiter(s) (50 mL/Hr) IV Continuous <Continuous>  dextrose 5%. 1000 milliLiter(s) (100 mL/Hr) IV Continuous <Continuous>  dextrose 50% Injectable 25 Gram(s) IV Push once  dextrose 50% Injectable 12.5 Gram(s) IV Push once  dextrose 50% Injectable 25 Gram(s) IV Push once  enoxaparin Injectable 40 milliGRAM(s) SubCutaneous every 24 hours  glucagon  Injectable 1 milliGRAM(s) IntraMuscular once  insulin lispro (ADMELOG) corrective regimen sliding scale   SubCutaneous Before meals and at bedtime  lisinopril 10 milliGRAM(s) Oral daily  multivitamin 1 Tablet(s) Oral daily    MEDICATIONS  (PRN):  acetaminophen     Tablet .. 650 milliGRAM(s) Oral every 6 hours PRN Mild Pain (1 - 3)  oxyCODONE    IR 5 milliGRAM(s) Oral every 6 hours PRN Moderate Pain (4 - 6)  oxyCODONE    IR 10 milliGRAM(s) Oral every 6 hours PRN Severe Pain (7 - 10)      LABS:                        10.5   12.39 )-----------( 484      ( 15 Nov 2021 12:38 )             32.9     11-15    137  |  101  |  49<H>  ----------------------------<  118<H>  4.5   |  25  |  1.12    Ca    9.2      15 Nov 2021 12:12    TPro  7.2  /  Alb  3.6  /  TBili  <0.2  /  DBili  x   /  AST  18  /  ALT  28  /  AlkPhos  146<H>  11-15    PT/INR - ( 15 Nov 2021 12:20 )   PT: 11.6 sec;   INR: 1.02 ratio         PTT - ( 15 Nov 2021 12:20 )  PTT:28.7 sec      RADIOLOGY & ADDITIONAL STUDIES:           SUBJECTIVE:   Seen and examined at bedside. No acute events overnight. Patient complaining of right stump pain    OBJECTIVE: T(C): 36.7 (11-16-21 @ 01:04), Max: 37.1 (11-15-21 @ 13:02)  HR: 83 (11-16-21 @ 01:04) (76 - 98)  BP: 122/74 (11-16-21 @ 01:04) (95/61 - 128/70)  RR: 18 (11-16-21 @ 01:04) (16 - 18)  SpO2: 100% (11-16-21 @ 01:04) (96% - 100%)  Wt(kg): --  I&O's Summary    I&O's Detail    Physical Exam  General:awake and nad NAD  R stump, incision well healed no erythema, right foot dry gangrene of 1s and 5th digit    MEDICATIONS  (STANDING):  aspirin enteric coated 81 milliGRAM(s) Oral daily  atorvastatin 40 milliGRAM(s) Oral at bedtime  cadexomer iodine 0.9% Gel 1 Application(s) Topical daily  carvedilol 25 milliGRAM(s) Oral every 12 hours  dextrose 40% Gel 15 Gram(s) Oral once  dextrose 5%. 1000 milliLiter(s) (50 mL/Hr) IV Continuous <Continuous>  dextrose 5%. 1000 milliLiter(s) (100 mL/Hr) IV Continuous <Continuous>  dextrose 50% Injectable 25 Gram(s) IV Push once  dextrose 50% Injectable 12.5 Gram(s) IV Push once  dextrose 50% Injectable 25 Gram(s) IV Push once  enoxaparin Injectable 40 milliGRAM(s) SubCutaneous every 24 hours  glucagon  Injectable 1 milliGRAM(s) IntraMuscular once  insulin lispro (ADMELOG) corrective regimen sliding scale   SubCutaneous Before meals and at bedtime  lisinopril 10 milliGRAM(s) Oral daily  multivitamin 1 Tablet(s) Oral daily    MEDICATIONS  (PRN):  acetaminophen     Tablet .. 650 milliGRAM(s) Oral every 6 hours PRN Mild Pain (1 - 3)  oxyCODONE    IR 5 milliGRAM(s) Oral every 6 hours PRN Moderate Pain (4 - 6)  oxyCODONE    IR 10 milliGRAM(s) Oral every 6 hours PRN Severe Pain (7 - 10)      LABS:                        10.5   12.39 )-----------( 484      ( 15 Nov 2021 12:38 )             32.9     11-15    137  |  101  |  49<H>  ----------------------------<  118<H>  4.5   |  25  |  1.12    Ca    9.2      15 Nov 2021 12:12    TPro  7.2  /  Alb  3.6  /  TBili  <0.2  /  DBili  x   /  AST  18  /  ALT  28  /  AlkPhos  146<H>  11-15    PT/INR - ( 15 Nov 2021 12:20 )   PT: 11.6 sec;   INR: 1.02 ratio         PTT - ( 15 Nov 2021 12:20 )  PTT:28.7 sec      RADIOLOGY & ADDITIONAL STUDIES:

## 2021-11-16 NOTE — CONSULT NOTE ADULT - SUBJECTIVE AND OBJECTIVE BOX
date of service 11/16/2021    HISTORY OF PRESENT ILLNESS: HPI:    62 y/o male PMH HTN, HLD, DM, CKD, PVD with a Right Femoral-Femoral Bypass in 2018, s/p R AKA 10/2021, with last TTE 9/2021 with normal LV function and last NST 9/2021 with no ischemia or infarct, presented with dry gangrene to L foot toes.  Denies chest pain, SOB, palps, or syncope.  Denies any recent cardiac work up aside from what was done here recently.  ROS otherwise negative.    PAST MEDICAL & SURGICAL HISTORY:  DM (diabetes mellitus)    HTN (hypertension)    HLD (hyperlipidemia)    CHF (congestive heart failure)    CKD (chronic kidney disease)    PVD (peripheral vascular disease)    S/P femoral-femoral bypass surgery  2018 - right leg      MEDICATIONS  (STANDING):  aspirin enteric coated 81 milliGRAM(s) Oral daily  atorvastatin 40 milliGRAM(s) Oral at bedtime  cadexomer iodine 0.9% Gel 1 Application(s) Topical daily  carvedilol 12.5 milliGRAM(s) Oral every 12 hours  dextrose 40% Gel 15 Gram(s) Oral once  dextrose 5%. 1000 milliLiter(s) (100 mL/Hr) IV Continuous <Continuous>  dextrose 5%. 1000 milliLiter(s) (50 mL/Hr) IV Continuous <Continuous>  dextrose 50% Injectable 25 Gram(s) IV Push once  dextrose 50% Injectable 12.5 Gram(s) IV Push once  dextrose 50% Injectable 25 Gram(s) IV Push once  enoxaparin Injectable 40 milliGRAM(s) SubCutaneous every 24 hours  glucagon  Injectable 1 milliGRAM(s) IntraMuscular once  influenza   Vaccine 0.5 milliLiter(s) IntraMuscular once  insulin glargine Injectable (LANTUS) 12 Unit(s) SubCutaneous at bedtime  insulin lispro (ADMELOG) corrective regimen sliding scale   SubCutaneous Before meals and at bedtime  insulin lispro Injectable (ADMELOG) 4 Unit(s) SubCutaneous three times a day before meals  lisinopril 10 milliGRAM(s) Oral daily  multivitamin 1 Tablet(s) Oral daily      Allergies  penicillin (Other)      FAMILY HISTORY:  Family history of MI (myocardial infarction)  Father    FH: renal cell carcinoma  Sister - s/p nephrectomy    Family history of depression  Brother    FHx: diabetes mellitus  Paternal Grandfather    FH: heart disease  Mother      Non-contributary for premature coronary disease or sudden cardiac death    SOCIAL HISTORY:    [x ] Non-smoker  [ ] Smoker  [ ] Alcohol    FLU VACCINE THIS YEAR STARTS IN AUGUST:  [ ] Yes    [ ] No    IF OVER 65 HAVE YOU EVER HAD A PNA VACCINE:  [ ] Yes    [ ] No       [ ] N/A      REVIEW OF SYSTEMS:  [ ]chest pain  [  ]shortness of breath  [  ]palpitations  [  ]syncope  [ ]near syncope [ ]upper extremity weakness   [ ] lower extremity weakness  [  ]diplopia  [  ]altered mental status   [  ]fevers  [ ]chills [ ]nausea  [ ]vomiting  [  ]dysphagia    [ ]abdominal pain  [ ]melena  [ ]BRBPR    [  ]epistaxis  [  ]rash    [ ]lower extremity edema        [x ] All others negative	  [ ] Unable to obtain      LABS:	 	               10.5   11.35 )-----------( 521      ( 16 Nov 2021 08:05 )             33.7     136  |  103  |  46<H>  ----------------------------<  168<H>  4.9   |  23  |  0.85    Ca    9.5      16 Nov 2021 08:05  Phos  3.4     11-16  Mg     2.20     11-16    TPro  7.2  /  Alb  3.6  /  TBili  <0.2  /  DBili  x   /  AST  18  /  ALT  28  /  AlkPhos  146<H>  11-15    Creatinine Trend: 0.85<--, 1.12<--, 0.85<--, 0.77<--    Coags:  PT/INR - ( 16 Nov 2021 08:05 )   PT: 11.6 sec;   INR: 1.02 ratio      PTT - ( 16 Nov 2021 08:05 )  PTT:30.5 sec    PHYSICAL EXAM:  T(C): 36.9 (11-16-21 @ 10:15), Max: 36.9 (11-15-21 @ 16:51)  HR: 81 (11-16-21 @ 10:15) (76 - 88)  BP: 109/68 (11-16-21 @ 10:15) (109/68 - 131/71)  RR: 18 (11-16-21 @ 10:15) (18 - 18)  SpO2: 100% (11-16-21 @ 10:15) (96% - 100%)    Gen: Appears well in NAD  HEENT:  (-)icterus (-)pallor  CV: N S1 S2 1/6 GIANA (+)2 Pulses B/l  Resp:  Clear to ausculatation B/L, normal effort  GI: (+) BS Soft, NT, ND  Lymph:  R AKA, no LLE edema  Skin: Warm to touch, Normal turgor  Psych: Appropriate mood and affect	      ECG:  NSR LAD	    < from: CT Cervical Spine No Cont (11.06.21 @ 16:50) >  IMPRESSION:  1.  No acute fracture or traumatic subluxation of the cervical spine.  2.  At C4-5, a posterior disc osteophyte complex causes mild central stenosis.    < end of copied text >    < from: Transthoracic Echocardiogram (09.29.21 @ 17:35) >  CONCLUSIONS:  1. Calcified trileaflet aortic valve with normal opening.  2. Normal left ventricular internal dimensions and wall  thicknesses.  3. Normal left ventricular systolic function. No segmental  wall motion abnormalities.  4. Normal right ventricular size and function.  ------------------------------------------------------------------------  Confirmed on  9/29/2021 - 18:38:12 by MARIELOS Valencia    < end of copied text >        < from: Nuclear Stress Test-Pharmacologic (Nuclear Stress Test-Pharmacologic .) (09.30.21 @ 16:16) >  GATED ANALYSIS:  Post-stress gated wall motion analysis was performed (LVEF  = 66 %;LVEDV = 66 ml.), revealing normal LV function.  There was no segmental wall motion abnormality. Septal  wall motion appeared normal. RV function appeared normal.  ------------------------------------------------------------------------  IMPRESSIONS:Mildly Abnormal Study  * Myocardial Perfusion SPECT results are mildly abnormal.  * Review of raw data shows: The study is of fair technical  quality with adjacent bowel tracer uptake  * The left ventricle was normal in size. There is a small,  mild defect in septal wall that is fixed, suggestive of  mild scarring  * No clear evidence of ischemia.  * Post-stress gated wall motion analysis was performed  (LVEF = 66 %;LVEDV = 66 ml.), revealing normal LV  function. There was no segmental wall motion abnormality.  Septal wall motion appeared normal. RV function appeared  normal.  ------------------------------------------------------------------------  Confirmed on  10/1/2021 - 17:43:39 by Momo Deluna M.D.    < end of copied text >        ASSESSMENT/PLAN: 	62 y/o male PMH HTN, HLD, DM, CKD, PVD with a Right Femoral-Femoral Bypass in 2018, s/p R AKA 10/2021, with last TTE 9/2021 with normal LV function and last NST 9/2021 with no ischemia or infarct, presented with dry gangrene to L foot toes.     --EKG with no acute changes  --planned for LE angiogram/ possible bypass?  --recent TTE and NST noted above  --pt without unstable anginal symptoms or decomp CHF  --his RCRI score is 3 making him high risk for high risk surgery (Le bypass), but patient is optimized from CV perspective and would continue perioperative asa and coreg      Karine Moreau PA-C  [phone]  date of service 11/16/2021    HISTORY OF PRESENT ILLNESS: HPI:    60 y/o male PMH HTN, HLD, DM, CKD, PVD with a Right Femoral-Femoral Bypass in 2018, s/p R AKA 10/2021, with last TTE 9/2021 with normal LV function and last NST 9/2021 with no ischemia, presented with dry gangrene to L foot toes.  Denies chest pain, SOB, palps, or syncope.  Denies any recent cardiac work up aside from what was done here recently.  ROS otherwise negative.    PAST MEDICAL & SURGICAL HISTORY:  DM (diabetes mellitus)    HTN (hypertension)    HLD (hyperlipidemia)    CHF (congestive heart failure)    CKD (chronic kidney disease)    PVD (peripheral vascular disease)    S/P femoral-femoral bypass surgery  2018 - right leg      MEDICATIONS  (STANDING):  aspirin enteric coated 81 milliGRAM(s) Oral daily  atorvastatin 40 milliGRAM(s) Oral at bedtime  cadexomer iodine 0.9% Gel 1 Application(s) Topical daily  carvedilol 12.5 milliGRAM(s) Oral every 12 hours  dextrose 40% Gel 15 Gram(s) Oral once  dextrose 5%. 1000 milliLiter(s) (100 mL/Hr) IV Continuous <Continuous>  dextrose 5%. 1000 milliLiter(s) (50 mL/Hr) IV Continuous <Continuous>  dextrose 50% Injectable 25 Gram(s) IV Push once  dextrose 50% Injectable 12.5 Gram(s) IV Push once  dextrose 50% Injectable 25 Gram(s) IV Push once  enoxaparin Injectable 40 milliGRAM(s) SubCutaneous every 24 hours  glucagon  Injectable 1 milliGRAM(s) IntraMuscular once  influenza   Vaccine 0.5 milliLiter(s) IntraMuscular once  insulin glargine Injectable (LANTUS) 12 Unit(s) SubCutaneous at bedtime  insulin lispro (ADMELOG) corrective regimen sliding scale   SubCutaneous Before meals and at bedtime  insulin lispro Injectable (ADMELOG) 4 Unit(s) SubCutaneous three times a day before meals  lisinopril 10 milliGRAM(s) Oral daily  multivitamin 1 Tablet(s) Oral daily      Allergies  penicillin (Other)      FAMILY HISTORY:  Family history of MI (myocardial infarction)  Father    FH: renal cell carcinoma  Sister - s/p nephrectomy    Family history of depression  Brother    FHx: diabetes mellitus  Paternal Grandfather    FH: heart disease  Mother      Non-contributary for premature coronary disease or sudden cardiac death    SOCIAL HISTORY:    [x ] Non-smoker  [ ] Smoker  [ ] Alcohol    FLU VACCINE THIS YEAR STARTS IN AUGUST:  [ ] Yes    [ ] No    IF OVER 65 HAVE YOU EVER HAD A PNA VACCINE:  [ ] Yes    [ ] No       [ ] N/A      REVIEW OF SYSTEMS:  [ ]chest pain  [  ]shortness of breath  [  ]palpitations  [  ]syncope  [ ]near syncope [ ]upper extremity weakness   [ ] lower extremity weakness  [  ]diplopia  [  ]altered mental status   [  ]fevers  [ ]chills [ ]nausea  [ ]vomiting  [  ]dysphagia    [ ]abdominal pain  [ ]melena  [ ]BRBPR    [  ]epistaxis  [  ]rash    [ ]lower extremity edema        [x ] All others negative	  [ ] Unable to obtain      LABS:	 	               10.5   11.35 )-----------( 521      ( 16 Nov 2021 08:05 )             33.7     136  |  103  |  46<H>  ----------------------------<  168<H>  4.9   |  23  |  0.85    Ca    9.5      16 Nov 2021 08:05  Phos  3.4     11-16  Mg     2.20     11-16    TPro  7.2  /  Alb  3.6  /  TBili  <0.2  /  DBili  x   /  AST  18  /  ALT  28  /  AlkPhos  146<H>  11-15    Creatinine Trend: 0.85<--, 1.12<--, 0.85<--, 0.77<--    Coags:  PT/INR - ( 16 Nov 2021 08:05 )   PT: 11.6 sec;   INR: 1.02 ratio      PTT - ( 16 Nov 2021 08:05 )  PTT:30.5 sec    PHYSICAL EXAM:  T(C): 36.9 (11-16-21 @ 10:15), Max: 36.9 (11-15-21 @ 16:51)  HR: 81 (11-16-21 @ 10:15) (76 - 88)  BP: 109/68 (11-16-21 @ 10:15) (109/68 - 131/71)  RR: 18 (11-16-21 @ 10:15) (18 - 18)  SpO2: 100% (11-16-21 @ 10:15) (96% - 100%)    Gen: Appears well in NAD  HEENT:  (-)icterus (-)pallor  CV: N S1 S2 1/6 GIANA (+)2 Pulses B/l  Resp:  Clear to ausculatation B/L, normal effort  GI: (+) BS Soft, NT, ND  Lymph:  R AKA, no LLE edema  Skin: Warm to touch, Normal turgor  Psych: Appropriate mood and affect	      ECG:  NSR LAD	    < from: CT Cervical Spine No Cont (11.06.21 @ 16:50) >  IMPRESSION:  1.  No acute fracture or traumatic subluxation of the cervical spine.  2.  At C4-5, a posterior disc osteophyte complex causes mild central stenosis.    < end of copied text >    < from: Transthoracic Echocardiogram (09.29.21 @ 17:35) >  CONCLUSIONS:  1. Calcified trileaflet aortic valve with normal opening.  2. Normal left ventricular internal dimensions and wall  thicknesses.  3. Normal left ventricular systolic function. No segmental  wall motion abnormalities.  4. Normal right ventricular size and function.  ------------------------------------------------------------------------  Confirmed on  9/29/2021 - 18:38:12 by MARIELOS Valencia    < end of copied text >        < from: Nuclear Stress Test-Pharmacologic (Nuclear Stress Test-Pharmacologic .) (09.30.21 @ 16:16) >  GATED ANALYSIS:  Post-stress gated wall motion analysis was performed (LVEF  = 66 %;LVEDV = 66 ml.), revealing normal LV function.  There was no segmental wall motion abnormality. Septal  wall motion appeared normal. RV function appeared normal.  ------------------------------------------------------------------------  IMPRESSIONS:Mildly Abnormal Study  * Myocardial Perfusion SPECT results are mildly abnormal.  * Review of raw data shows: The study is of fair technical  quality with adjacent bowel tracer uptake  * The left ventricle was normal in size. There is a small,  mild defect in septal wall that is fixed, suggestive of  mild scarring  * No clear evidence of ischemia.  * Post-stress gated wall motion analysis was performed  (LVEF = 66 %;LVEDV = 66 ml.), revealing normal LV  function. There was no segmental wall motion abnormality.  Septal wall motion appeared normal. RV function appeared  normal.  ------------------------------------------------------------------------  Confirmed on  10/1/2021 - 17:43:39 by Momo Deluna M.D.    < end of copied text >        ASSESSMENT/PLAN: 	60 y/o male PMH HTN, HLD, DM, CKD, PVD with a Right Femoral-Femoral Bypass in 2018, s/p R AKA 10/2021, with last TTE 9/2021 with normal LV function and last NST 9/2021 with no ischemia, presented with dry gangrene to L foot toes.     --EKG with no acute changes  --planned for LE angiogram/ possible bypass?  --recent TTE and NST noted above  --pt without unstable anginal symptoms or decomp CHF  --his RCRI score is 3 making him high risk for high risk surgery (Le bypass), but patient is optimized from CV perspective and would continue perioperative asa and coreg      Karine Moreau PA-C  [phone]

## 2021-11-16 NOTE — CONSULT NOTE ADULT - ATTENDING COMMENTS
Patient seen and examined.  Agree with above.   Pt. with recent NST demonstrating no ischemia and recent TTE demonstrating normal LV function   High risk based on RCRI score but optimized from cv perspective for planned procedure    Imani Cesar MD

## 2021-11-16 NOTE — CONSULT NOTE ADULT - ASSESSMENT
61M DM2 A1c 9, PAD s/p Rt AKA, CKD, HTN p/w Lt toe necrosis plan for LLE angiogram with possible bypass on 11/17. Medical consultation for med optimization.

## 2021-11-16 NOTE — CONSULT NOTE ADULT - PROBLEM SELECTOR RECOMMENDATION 9
Plan for Angiogram and possible bypass on 11/17  - Pain control with oxyIR PRN  - wound care as per primary team

## 2021-11-17 LAB
ANION GAP SERPL CALC-SCNC: 13 MMOL/L — SIGNIFICANT CHANGE UP (ref 7–14)
ANION GAP SERPL CALC-SCNC: 8 MMOL/L — SIGNIFICANT CHANGE UP (ref 7–14)
APTT BLD: 31 SEC — SIGNIFICANT CHANGE UP (ref 27–36.3)
BLD GP AB SCN SERPL QL: NEGATIVE — SIGNIFICANT CHANGE UP
BUN SERPL-MCNC: 33 MG/DL — HIGH (ref 7–23)
BUN SERPL-MCNC: 45 MG/DL — HIGH (ref 7–23)
CALCIUM SERPL-MCNC: 9.3 MG/DL — SIGNIFICANT CHANGE UP (ref 8.4–10.5)
CALCIUM SERPL-MCNC: 9.7 MG/DL — SIGNIFICANT CHANGE UP (ref 8.4–10.5)
CHLORIDE SERPL-SCNC: 107 MMOL/L — SIGNIFICANT CHANGE UP (ref 98–107)
CHLORIDE SERPL-SCNC: 107 MMOL/L — SIGNIFICANT CHANGE UP (ref 98–107)
CO2 SERPL-SCNC: 20 MMOL/L — LOW (ref 22–31)
CO2 SERPL-SCNC: 25 MMOL/L — SIGNIFICANT CHANGE UP (ref 22–31)
CREAT SERPL-MCNC: 0.76 MG/DL — SIGNIFICANT CHANGE UP (ref 0.5–1.3)
CREAT SERPL-MCNC: 0.98 MG/DL — SIGNIFICANT CHANGE UP (ref 0.5–1.3)
GLUCOSE SERPL-MCNC: 141 MG/DL — HIGH (ref 70–99)
GLUCOSE SERPL-MCNC: 191 MG/DL — HIGH (ref 70–99)
HCT VFR BLD CALC: 33.2 % — LOW (ref 39–50)
HCT VFR BLD CALC: 33.3 % — LOW (ref 39–50)
HGB BLD-MCNC: 10.9 G/DL — LOW (ref 13–17)
HGB BLD-MCNC: 11 G/DL — LOW (ref 13–17)
INR BLD: 1.05 RATIO — SIGNIFICANT CHANGE UP (ref 0.88–1.16)
MAGNESIUM SERPL-MCNC: 2 MG/DL — SIGNIFICANT CHANGE UP (ref 1.6–2.6)
MAGNESIUM SERPL-MCNC: 2.2 MG/DL — SIGNIFICANT CHANGE UP (ref 1.6–2.6)
MCHC RBC-ENTMCNC: 31.3 PG — SIGNIFICANT CHANGE UP (ref 27–34)
MCHC RBC-ENTMCNC: 32.5 PG — SIGNIFICANT CHANGE UP (ref 27–34)
MCHC RBC-ENTMCNC: 32.8 GM/DL — SIGNIFICANT CHANGE UP (ref 32–36)
MCHC RBC-ENTMCNC: 33 GM/DL — SIGNIFICANT CHANGE UP (ref 32–36)
MCV RBC AUTO: 94.9 FL — SIGNIFICANT CHANGE UP (ref 80–100)
MCV RBC AUTO: 99.1 FL — SIGNIFICANT CHANGE UP (ref 80–100)
NRBC # BLD: 0 /100 WBCS — SIGNIFICANT CHANGE UP
NRBC # BLD: 0 /100 WBCS — SIGNIFICANT CHANGE UP
NRBC # FLD: 0 K/UL — SIGNIFICANT CHANGE UP
NRBC # FLD: 0 K/UL — SIGNIFICANT CHANGE UP
PHOSPHATE SERPL-MCNC: 3.9 MG/DL — SIGNIFICANT CHANGE UP (ref 2.5–4.5)
PHOSPHATE SERPL-MCNC: 4.1 MG/DL — SIGNIFICANT CHANGE UP (ref 2.5–4.5)
PLATELET # BLD AUTO: 376 K/UL — SIGNIFICANT CHANGE UP (ref 150–400)
PLATELET # BLD AUTO: 508 K/UL — HIGH (ref 150–400)
POTASSIUM SERPL-MCNC: 4.3 MMOL/L — SIGNIFICANT CHANGE UP (ref 3.5–5.3)
POTASSIUM SERPL-MCNC: 4.8 MMOL/L — SIGNIFICANT CHANGE UP (ref 3.5–5.3)
POTASSIUM SERPL-SCNC: 4.3 MMOL/L — SIGNIFICANT CHANGE UP (ref 3.5–5.3)
POTASSIUM SERPL-SCNC: 4.8 MMOL/L — SIGNIFICANT CHANGE UP (ref 3.5–5.3)
PREALB SERPL-MCNC: 18 MG/DL — LOW (ref 20–40)
PROTHROM AB SERPL-ACNC: 12.1 SEC — SIGNIFICANT CHANGE UP (ref 10.6–13.6)
RBC # BLD: 3.35 M/UL — LOW (ref 4.2–5.8)
RBC # BLD: 3.51 M/UL — LOW (ref 4.2–5.8)
RBC # FLD: 15.4 % — HIGH (ref 10.3–14.5)
RBC # FLD: 16.1 % — HIGH (ref 10.3–14.5)
RH IG SCN BLD-IMP: POSITIVE — SIGNIFICANT CHANGE UP
SODIUM SERPL-SCNC: 140 MMOL/L — SIGNIFICANT CHANGE UP (ref 135–145)
SODIUM SERPL-SCNC: 140 MMOL/L — SIGNIFICANT CHANGE UP (ref 135–145)
WBC # BLD: 12.44 K/UL — HIGH (ref 3.8–10.5)
WBC # BLD: 12.94 K/UL — HIGH (ref 3.8–10.5)
WBC # FLD AUTO: 12.44 K/UL — HIGH (ref 3.8–10.5)
WBC # FLD AUTO: 12.94 K/UL — HIGH (ref 3.8–10.5)

## 2021-11-17 PROCEDURE — 97605 NEG PRS WND THER DME<=50SQCM: CPT | Mod: 79

## 2021-11-17 PROCEDURE — 36245 INS CATH ABD/L-EXT ART 1ST: CPT | Mod: 59,79

## 2021-11-17 PROCEDURE — 35656 BPG FEMORAL-POPLITEAL: CPT | Mod: 79,LT

## 2021-11-17 PROCEDURE — 75710 ARTERY X-RAYS ARM/LEG: CPT | Mod: 26,59,79

## 2021-11-17 PROCEDURE — 76937 US GUIDE VASCULAR ACCESS: CPT | Mod: 26,59,79

## 2021-11-17 RX ORDER — CEFAZOLIN SODIUM 1 G
500 VIAL (EA) INJECTION EVERY 8 HOURS
Refills: 0 | Status: DISCONTINUED | OUTPATIENT
Start: 2021-11-17 | End: 2021-11-17

## 2021-11-17 RX ORDER — CEFAZOLIN SODIUM 1 G
1000 VIAL (EA) INJECTION EVERY 8 HOURS
Refills: 0 | Status: DISCONTINUED | OUTPATIENT
Start: 2021-11-17 | End: 2021-11-23

## 2021-11-17 RX ADMIN — OXYCODONE HYDROCHLORIDE 10 MILLIGRAM(S): 5 TABLET ORAL at 18:06

## 2021-11-17 RX ADMIN — CARVEDILOL PHOSPHATE 12.5 MILLIGRAM(S): 80 CAPSULE, EXTENDED RELEASE ORAL at 05:19

## 2021-11-17 RX ADMIN — ATORVASTATIN CALCIUM 40 MILLIGRAM(S): 80 TABLET, FILM COATED ORAL at 21:58

## 2021-11-17 RX ADMIN — SODIUM CHLORIDE 75 MILLILITER(S): 9 INJECTION, SOLUTION INTRAVENOUS at 15:12

## 2021-11-17 RX ADMIN — Medication 100 MILLIGRAM(S): at 21:32

## 2021-11-17 RX ADMIN — Medication 4 UNIT(S): at 16:30

## 2021-11-17 RX ADMIN — OXYCODONE HYDROCHLORIDE 10 MILLIGRAM(S): 5 TABLET ORAL at 18:35

## 2021-11-17 RX ADMIN — Medication 3: at 22:13

## 2021-11-17 RX ADMIN — Medication 1: at 16:30

## 2021-11-17 RX ADMIN — CARVEDILOL PHOSPHATE 12.5 MILLIGRAM(S): 80 CAPSULE, EXTENDED RELEASE ORAL at 18:05

## 2021-11-17 RX ADMIN — LISINOPRIL 10 MILLIGRAM(S): 2.5 TABLET ORAL at 05:20

## 2021-11-17 RX ADMIN — ENOXAPARIN SODIUM 40 MILLIGRAM(S): 100 INJECTION SUBCUTANEOUS at 21:58

## 2021-11-17 RX ADMIN — Medication 81 MILLIGRAM(S): at 18:05

## 2021-11-17 NOTE — PROGRESS NOTE ADULT - SUBJECTIVE AND OBJECTIVE BOX
SUBJECTIVE:   Seen and examined at bedside. No acute events overnight.     OBJECTIVE: T(C): 36.8 (11-16-21 @ 21:40), Max: 36.9 (11-16-21 @ 10:15)  HR: 80 (11-16-21 @ 21:40) (80 - 87)  BP: 112/63 (11-16-21 @ 21:40) (109/68 - 131/71)  RR: 19 (11-16-21 @ 21:40) (18 - 19)  SpO2: 100% (11-16-21 @ 21:40) (100% - 100%)  Wt(kg): --  I&O's Summary    16 Nov 2021 07:01  -  17 Nov 2021 01:23  --------------------------------------------------------  IN: 720 mL / OUT: 750 mL / NET: -30 mL      I&O's Detail    16 Nov 2021 07:01  -  17 Nov 2021 01:23  --------------------------------------------------------  IN:    Oral Fluid: 720 mL  Total IN: 720 mL    OUT:    Voided (mL): 750 mL  Total OUT: 750 mL    Total NET: -30 mL      Physical Exam  General:  Abdomen:    MEDICATIONS  (STANDING):  aspirin enteric coated 81 milliGRAM(s) Oral daily  atorvastatin 40 milliGRAM(s) Oral at bedtime  cadexomer iodine 0.9% Gel 1 Application(s) Topical daily  carvedilol 12.5 milliGRAM(s) Oral every 12 hours  dextrose 40% Gel 15 Gram(s) Oral once  dextrose 5% + sodium chloride 0.45%. 1000 milliLiter(s) (75 mL/Hr) IV Continuous <Continuous>  dextrose 5%. 1000 milliLiter(s) (100 mL/Hr) IV Continuous <Continuous>  dextrose 5%. 1000 milliLiter(s) (50 mL/Hr) IV Continuous <Continuous>  dextrose 50% Injectable 25 Gram(s) IV Push once  dextrose 50% Injectable 12.5 Gram(s) IV Push once  dextrose 50% Injectable 25 Gram(s) IV Push once  enoxaparin Injectable 40 milliGRAM(s) SubCutaneous every 24 hours  glucagon  Injectable 1 milliGRAM(s) IntraMuscular once  influenza   Vaccine 0.5 milliLiter(s) IntraMuscular once  insulin glargine Injectable (LANTUS) 12 Unit(s) SubCutaneous at bedtime  insulin lispro (ADMELOG) corrective regimen sliding scale   SubCutaneous Before meals and at bedtime  insulin lispro Injectable (ADMELOG) 4 Unit(s) SubCutaneous three times a day before meals  lisinopril 10 milliGRAM(s) Oral daily  multivitamin 1 Tablet(s) Oral daily    MEDICATIONS  (PRN):  acetaminophen     Tablet .. 650 milliGRAM(s) Oral every 6 hours PRN Mild Pain (1 - 3)  oxyCODONE    IR 5 milliGRAM(s) Oral every 6 hours PRN Moderate Pain (4 - 6)  oxyCODONE    IR 10 milliGRAM(s) Oral every 6 hours PRN Severe Pain (7 - 10)      LABS:                        10.5   11.35 )-----------( 521      ( 16 Nov 2021 08:05 )             33.7     11-16    136  |  103  |  46<H>  ----------------------------<  168<H>  4.9   |  23  |  0.85    Ca    9.5      16 Nov 2021 08:05  Phos  3.4     11-16  Mg     2.20     11-16    TPro  7.2  /  Alb  3.6  /  TBili  <0.2  /  DBili  x   /  AST  18  /  ALT  28  /  AlkPhos  146<H>  11-15    PT/INR - ( 16 Nov 2021 08:05 )   PT: 11.6 sec;   INR: 1.02 ratio         PTT - ( 16 Nov 2021 08:05 )  PTT:30.5 sec

## 2021-11-17 NOTE — BRIEF OPERATIVE NOTE - NSICDXBRIEFPREOP_GEN_ALL_CORE_FT
PRE-OP DIAGNOSIS:  PAD (peripheral artery disease) 17-Nov-2021 14:11:58  Pretty Aguirre  Wound of left foot 17-Nov-2021 14:12:10  Pretty Aguirre

## 2021-11-17 NOTE — PROGRESS NOTE ADULT - ASSESSMENT
62yo M with Hx DM, HTN, PAD, CKD, R AKA (10/12) who presents with progressive blackening of the L 1st and 5th toes for 2 weeks, planned for LLE angiogram, possible bypass       Plan:   - OR for LLE angiogram, possible bypass   - NPO   - appreciate cards consult: High risk based on RCRI score but optimized from cv perspective for planned procedure  - appreciate medicine consult: Recent TTE reviewed.  RCRI of 3 points - continue Coreg perioperatively.  - Pain control: Tylenol, Oxy 5/10 PRN  - C/w home meds: lisinopril, carvedilol, ASA 81  - FS ACqHS / HANK  - VTE ppx: Lovenox     C Team Surgery  [id number]

## 2021-11-17 NOTE — BRIEF OPERATIVE NOTE - OPERATION/FINDINGS
left lower extremity angiogram diagnostic-   common femoral- patent  profunda- patent  SFA- flush total occlusion  popliteal- above knee patent  TP trunk-patent  AT- total occlusion proximally without reconstitution  peroneal- distal total occlusion   PT- patent into foot without stenosis    Left fem-AKpop bypass with 6mm ringed PTFE.  PT signal.

## 2021-11-17 NOTE — CHART NOTE - NSCHARTNOTEFT_GEN_A_CORE
Post Operative Note  Patient: EVE DELGADO 61y (1960) Male   MRN: [id number]  Location: [facility]  Visit: 11-15-21 Inpatient  Date: 11-17-21 @ 22:22    Procedure: S/P LLE angiogram, left fem-pop bypass w/ PTFE graft     Subjective:   Seen and examined at bedside 4 hrs postop. No acute events in the immediate postop period. Denies chest pain, SOB, nausea or vomiting.     Objective:  Vitals: T(F): 97.5 (11-17-21 @ 18:00), Max: 98.7 (11-17-21 @ 06:59)  HR: 75 (11-17-21 @ 22:00)  BP: 129/67 (11-17-21 @ 22:00) (99/52 - 144/68)  RR: 12 (11-17-21 @ 22:00)  SpO2: 100% (11-17-21 @ 22:00)  Vent Settings:     In:   11-16-21 @ 07:01  -  11-17-21 @ 07:00  --------------------------------------------------------  IN: 720 mL    11-17-21 @ 07:01  -  11-17-21 @ 22:22  --------------------------------------------------------  IN: 525 mL      IV Fluids: dextrose 5% + sodium chloride 0.45%. 1000 milliLiter(s) (75 mL/Hr) IV Continuous <Continuous>  multivitamin 1 Tablet(s) Oral daily      Out:   11-16-21 @ 07:01  -  11-17-21 @ 07:00  --------------------------------------------------------  OUT: 1350 mL    11-17-21 @ 07:01  -  11-17-21 @ 22:22  --------------------------------------------------------  OUT: 270 mL      EBL: 100cc    Voided Urine:   11-16-21 @ 07:01  -  11-17-21 @ 07:00  --------------------------------------------------------  OUT: 1350 mL    11-17-21 @ 07:01  -  11-17-21 @ 22:22  --------------------------------------------------------  OUT: 270 mL      Farias Catheter: yes         Physical Examination:  General: NAD, resting comfortably in bed  HEENT: Normocephalic atraumatic  Respiratory: Nonlabored respirations, normal CW expansion.  Cardio: regular rate and rhythm.  Vascular: R groin dressing c/d/i. no palpable collections appreciated.  R thigh w/ Aquacel dressing c/d/i. no palpable collections appreciated. no skin changes  L PT Dopplerable. calf soft, easily compressible.     Imaging:  No post-op imaging studies    Assessment:  61yMale patient S/P LLE angiogram, left fem-pop bypass w/ PTFE graft     Plan:  - Cont ASA   - Pain control PRN  - Diet: Reg  - Activity: bedrest for 6hrs, then as tolerated   - DVT ppx: LVX     C Team/Vascular  [id number]

## 2021-11-17 NOTE — BRIEF OPERATIVE NOTE - NSICDXBRIEFPOSTOP_GEN_ALL_CORE_FT
POST-OP DIAGNOSIS:  Wound of left foot 17-Nov-2021 14:12:20  Pretty Aguirre  PAD (peripheral artery disease) 17-Nov-2021 14:12:14  Pretty Aguirre

## 2021-11-17 NOTE — BRIEF OPERATIVE NOTE - PRIMARY SURGEON
I called Paola today to notify her that I scheduled an appointment for her at the [facility] to see a pulmonologist.  She is scheduled for 4-6-17 with Dr. Medina.  They will send out an information packet to Paola.  Paola told me that this date and time would work for her.  I told her to call us back if she had any questions.    Marii Arellano  
Dr. Lundberg

## 2021-11-18 DIAGNOSIS — D50.0 IRON DEFICIENCY ANEMIA SECONDARY TO BLOOD LOSS (CHRONIC): ICD-10-CM

## 2021-11-18 DIAGNOSIS — D72.829 ELEVATED WHITE BLOOD CELL COUNT, UNSPECIFIED: ICD-10-CM

## 2021-11-18 LAB
ANION GAP SERPL CALC-SCNC: 12 MMOL/L — SIGNIFICANT CHANGE UP (ref 7–14)
BUN SERPL-MCNC: 25 MG/DL — HIGH (ref 7–23)
CALCIUM SERPL-MCNC: 8.7 MG/DL — SIGNIFICANT CHANGE UP (ref 8.4–10.5)
CHLORIDE SERPL-SCNC: 100 MMOL/L — SIGNIFICANT CHANGE UP (ref 98–107)
CO2 SERPL-SCNC: 22 MMOL/L — SIGNIFICANT CHANGE UP (ref 22–31)
CREAT SERPL-MCNC: 0.68 MG/DL — SIGNIFICANT CHANGE UP (ref 0.5–1.3)
GLUCOSE SERPL-MCNC: 205 MG/DL — HIGH (ref 70–99)
HCT VFR BLD CALC: 30.3 % — LOW (ref 39–50)
HGB BLD-MCNC: 10.4 G/DL — LOW (ref 13–17)
MAGNESIUM SERPL-MCNC: 1.4 MG/DL — LOW (ref 1.6–2.6)
MCHC RBC-ENTMCNC: 32.4 PG — SIGNIFICANT CHANGE UP (ref 27–34)
MCHC RBC-ENTMCNC: 34.3 GM/DL — SIGNIFICANT CHANGE UP (ref 32–36)
MCV RBC AUTO: 94.4 FL — SIGNIFICANT CHANGE UP (ref 80–100)
NRBC # BLD: 0 /100 WBCS — SIGNIFICANT CHANGE UP
NRBC # FLD: 0 K/UL — SIGNIFICANT CHANGE UP
PHOSPHATE SERPL-MCNC: 3.1 MG/DL — SIGNIFICANT CHANGE UP (ref 2.5–4.5)
PLATELET # BLD AUTO: 345 K/UL — SIGNIFICANT CHANGE UP (ref 150–400)
POTASSIUM SERPL-MCNC: 3.8 MMOL/L — SIGNIFICANT CHANGE UP (ref 3.5–5.3)
POTASSIUM SERPL-SCNC: 3.8 MMOL/L — SIGNIFICANT CHANGE UP (ref 3.5–5.3)
RBC # BLD: 3.21 M/UL — LOW (ref 4.2–5.8)
RBC # FLD: 15.9 % — HIGH (ref 10.3–14.5)
SODIUM SERPL-SCNC: 134 MMOL/L — LOW (ref 135–145)
WBC # BLD: 11.36 K/UL — HIGH (ref 3.8–10.5)
WBC # FLD AUTO: 11.36 K/UL — HIGH (ref 3.8–10.5)

## 2021-11-18 PROCEDURE — 99233 SBSQ HOSP IP/OBS HIGH 50: CPT

## 2021-11-18 RX ORDER — MAGNESIUM SULFATE 500 MG/ML
2 VIAL (ML) INJECTION ONCE
Refills: 0 | Status: COMPLETED | OUTPATIENT
Start: 2021-11-18 | End: 2021-11-18

## 2021-11-18 RX ADMIN — OXYCODONE HYDROCHLORIDE 10 MILLIGRAM(S): 5 TABLET ORAL at 07:19

## 2021-11-18 RX ADMIN — Medication 4 UNIT(S): at 12:15

## 2021-11-18 RX ADMIN — Medication 2: at 22:31

## 2021-11-18 RX ADMIN — Medication 100 MILLIGRAM(S): at 20:31

## 2021-11-18 RX ADMIN — OXYCODONE HYDROCHLORIDE 10 MILLIGRAM(S): 5 TABLET ORAL at 02:00

## 2021-11-18 RX ADMIN — Medication 650 MILLIGRAM(S): at 22:50

## 2021-11-18 RX ADMIN — CARVEDILOL PHOSPHATE 12.5 MILLIGRAM(S): 80 CAPSULE, EXTENDED RELEASE ORAL at 17:05

## 2021-11-18 RX ADMIN — OXYCODONE HYDROCHLORIDE 10 MILLIGRAM(S): 5 TABLET ORAL at 21:15

## 2021-11-18 RX ADMIN — INSULIN GLARGINE 12 UNIT(S): 100 INJECTION, SOLUTION SUBCUTANEOUS at 22:08

## 2021-11-18 RX ADMIN — LISINOPRIL 10 MILLIGRAM(S): 2.5 TABLET ORAL at 05:10

## 2021-11-18 RX ADMIN — Medication 4 UNIT(S): at 17:05

## 2021-11-18 RX ADMIN — CARVEDILOL PHOSPHATE 12.5 MILLIGRAM(S): 80 CAPSULE, EXTENDED RELEASE ORAL at 05:10

## 2021-11-18 RX ADMIN — Medication 1 APPLICATION(S): at 22:08

## 2021-11-18 RX ADMIN — ATORVASTATIN CALCIUM 40 MILLIGRAM(S): 80 TABLET, FILM COATED ORAL at 22:07

## 2021-11-18 RX ADMIN — Medication 4 UNIT(S): at 07:56

## 2021-11-18 RX ADMIN — Medication 2: at 12:15

## 2021-11-18 RX ADMIN — OXYCODONE HYDROCHLORIDE 10 MILLIGRAM(S): 5 TABLET ORAL at 14:26

## 2021-11-18 RX ADMIN — Medication 650 MILLIGRAM(S): at 15:14

## 2021-11-18 RX ADMIN — Medication 100 MILLIGRAM(S): at 15:13

## 2021-11-18 RX ADMIN — Medication 100 MILLIGRAM(S): at 05:10

## 2021-11-18 RX ADMIN — OXYCODONE HYDROCHLORIDE 10 MILLIGRAM(S): 5 TABLET ORAL at 08:17

## 2021-11-18 RX ADMIN — OXYCODONE HYDROCHLORIDE 10 MILLIGRAM(S): 5 TABLET ORAL at 01:02

## 2021-11-18 RX ADMIN — Medication 2: at 17:05

## 2021-11-18 RX ADMIN — Medication 81 MILLIGRAM(S): at 15:14

## 2021-11-18 RX ADMIN — ENOXAPARIN SODIUM 40 MILLIGRAM(S): 100 INJECTION SUBCUTANEOUS at 20:31

## 2021-11-18 RX ADMIN — Medication 650 MILLIGRAM(S): at 22:08

## 2021-11-18 RX ADMIN — Medication 50 GRAM(S): at 10:23

## 2021-11-18 RX ADMIN — OXYCODONE HYDROCHLORIDE 10 MILLIGRAM(S): 5 TABLET ORAL at 13:38

## 2021-11-18 RX ADMIN — Medication 1: at 07:55

## 2021-11-18 RX ADMIN — Medication 650 MILLIGRAM(S): at 18:00

## 2021-11-18 RX ADMIN — Medication 1 TABLET(S): at 15:14

## 2021-11-18 RX ADMIN — OXYCODONE HYDROCHLORIDE 10 MILLIGRAM(S): 5 TABLET ORAL at 20:31

## 2021-11-18 NOTE — OCCUPATIONAL THERAPY INITIAL EVALUATION ADULT - PLANNED THERAPY INTERVENTIONS, OT EVAL
ADL retraining/IADL retraining/balance training/bed mobility training/ROM/strengthening/stretching/transfer training

## 2021-11-18 NOTE — PROGRESS NOTE ADULT - SUBJECTIVE AND OBJECTIVE BOX
[facility] Division of Hospital Medicine  Miguelangel Lal MD  Pager (M-F, 8A-5P): [phone]  Other Times:  [phone]    Patient is a 61y old  Male who presents with a chief complaint of dry gangrene L 1st + 5th toes (18 Nov 2021 04:56)    SUBJECTIVE / OVERNIGHT EVENTS:  Patient tolerated Lt fem-pop bypass well.  Starting to get sensations back on his foot.  Offers no new complaints.  No F/C, N/V, CP, SOB, Cough, lightheadedness, dizziness, abdominal pain, diarrhea, dysuria.    MEDICATIONS  (STANDING):  aspirin enteric coated 81 milliGRAM(s) Oral daily  atorvastatin 40 milliGRAM(s) Oral at bedtime  cadexomer iodine 0.9% Gel 1 Application(s) Topical daily  carvedilol 12.5 milliGRAM(s) Oral every 12 hours  ceFAZolin   IVPB 1000 milliGRAM(s) IV Intermittent every 8 hours  dextrose 50% Injectable 25 Gram(s) IV Push once  dextrose 50% Injectable 12.5 Gram(s) IV Push once  dextrose 50% Injectable 25 Gram(s) IV Push once  enoxaparin Injectable 40 milliGRAM(s) SubCutaneous every 24 hours  influenza   Vaccine 0.5 milliLiter(s) IntraMuscular once  insulin glargine Injectable (LANTUS) 12 Unit(s) SubCutaneous at bedtime  insulin lispro (ADMELOG) corrective regimen sliding scale   SubCutaneous Before meals and at bedtime  insulin lispro Injectable (ADMELOG) 4 Unit(s) SubCutaneous three times a day before meals  lisinopril 10 milliGRAM(s) Oral daily  multivitamin 1 Tablet(s) Oral daily    MEDICATIONS  (PRN):  acetaminophen     Tablet .. 650 milliGRAM(s) Oral every 6 hours PRN Mild Pain (1 - 3)  oxyCODONE    IR 5 milliGRAM(s) Oral every 6 hours PRN Moderate Pain (4 - 6)  oxyCODONE    IR 10 milliGRAM(s) Oral every 6 hours PRN Severe Pain (7 - 10)      Vital Signs Last 24 Hrs  T(C): 37.2 (18 Nov 2021 10:05), Max: 37.2 (18 Nov 2021 10:05)  T(F): 99 (18 Nov 2021 10:05), Max: 99 (18 Nov 2021 10:05)  HR: 92 (18 Nov 2021 10:05) (61 - 92)  BP: 123/58 (18 Nov 2021 10:05) (99/52 - 144/68)  BP(mean): 86 (17 Nov 2021 23:00) (64 - 87)  RR: 18 (18 Nov 2021 10:05) (11 - 18)  SpO2: 100% (18 Nov 2021 10:05) (99% - 100%)  CAPILLARY BLOOD GLUCOSE      POCT Blood Glucose.: 195 mg/dL (18 Nov 2021 07:37)  POCT Blood Glucose.: 268 mg/dL (17 Nov 2021 22:08)  POCT Blood Glucose.: 195 mg/dL (17 Nov 2021 16:19)    I&O's Summary    17 Nov 2021 07:01  -  18 Nov 2021 07:00  --------------------------------------------------------  IN: 525 mL / OUT: 1570 mL / NET: -1045 mL    18 Nov 2021 07:01  -  18 Nov 2021 11:33  --------------------------------------------------------  IN: 0 mL / OUT: 200 mL / NET: -200 mL        PHYSICAL EXAM:  CONSTITUTIONAL: NAD, thin  EYES: PERRLA; conjunctiva and sclera clear  ENMT: Moist oral mucosa, no pharyngeal injection or exudates; normal dentition  NECK: Supple, no palpable masses; no thyromegaly  RESPIRATORY: Normal respiratory effort; lungs are clear to auscultation bilaterally  CARDIOVASCULAR: Regular rate and rhythm, normal S1 and S2, no murmur/rub/gallop; No lower extremity edema; Peripheral pulses palpable  ABDOMEN: Nontender to palpation, normoactive bowel sounds, no rebound/guarding; No hepatosplenomegaly  MUSCULOSKELETAL:  unable to assess gait; no clubbing or cyanosis of digits; no joint swelling or tenderness to palpation; Rt AKA  PSYCH: A+O to person, place, and time; affect appropriate  NEUROLOGY: CN 2-12 are intact and symmetric; no gross sensory deficits   SKIN: Left groin surgical site C/D/I    LABS:                        10.4   11.36 )-----------( 345      ( 18 Nov 2021 07:18 )             30.3     11-18    134<L>  |  100  |  25<H>  ----------------------------<  205<H>  3.8   |  22  |  0.68    Ca    8.7      18 Nov 2021 07:18  Phos  3.1     11-18  Mg     1.40     11-18      PT/INR - ( 17 Nov 2021 04:25 )   PT: 12.1 sec;   INR: 1.05 ratio         PTT - ( 17 Nov 2021 04:25 )  PTT:31.0 sec          RADIOLOGY & ADDITIONAL TESTS:    Imaging Personally Reviewed:    Care Discussed with Consultants/Other Providers:

## 2021-11-18 NOTE — PHYSICAL THERAPY INITIAL EVALUATION ADULT - PLANNED THERAPY INTERVENTIONS, PT EVAL
Patient left positioned for safety in bed in NAD, call bell in reach, all lines intact. +Bed alarm./balance training/bed mobility training/gait training/ROM/strengthening/transfer training

## 2021-11-18 NOTE — PROGRESS NOTE ADULT - SUBJECTIVE AND OBJECTIVE BOX
Date of service 11/18/2021    chief complaint: L toe discoloration    extended hpi: 62 y/o male PMH HTN, HLD, DM, CKD, PVD with a Right Femoral-Femoral Bypass in 2018, s/p R AKA 10/2021, with last TTE 9/2021 with normal LV function and last NST 9/2021 with no ischemia, presented with dry gangrene to L foot toes.    reports mild pain in L thigh otherwise no CP or SOB    Review of Systems:   Constitutional: [ ] fevers, [ ] chills.   Skin: [ ] dry skin. [ ] rashes.  Psychiatric: [ ] depression, [ ] anxiety.   Gastrointestinal: [ ] BRBPR, [ ] melena.   Neurological: [ ] confusion. [ ] seizures. [ ] shuffling gait.   Ears,Nose,Mouth and Throat: [ ] ear pain [ ] sore throat.   Eyes: [ ] diplopia.   Respiratory: [ ] hemoptysis. [ ] shortness of breath  Cardiovascular: See HPI above  Hematologic/Lymphatic: [ ] anemia. [ ] painful nodes. [ ] prolonged bleeding.   Genitourinary: [ ] hematuria. [ ] flank pain.   Endocrine: [ ] significant change in weight. [ ] intolerance to heat and cold.     Review of systems [x ] otherwise negative, [ ] otherwise unable to obtain    FH: no family history of sudden cardiac death in first degree relatives    SH: [ ] tobacco, [ ] alcohol, [ ] drugs    acetaminophen     Tablet .. 650 milliGRAM(s) Oral every 6 hours PRN  aspirin enteric coated 81 milliGRAM(s) Oral daily  atorvastatin 40 milliGRAM(s) Oral at bedtime  cadexomer iodine 0.9% Gel 1 Application(s) Topical daily  carvedilol 12.5 milliGRAM(s) Oral every 12 hours  ceFAZolin   IVPB 1000 milliGRAM(s) IV Intermittent every 8 hours  dextrose 50% Injectable 25 Gram(s) IV Push once  dextrose 50% Injectable 12.5 Gram(s) IV Push once  dextrose 50% Injectable 25 Gram(s) IV Push once  enoxaparin Injectable 40 milliGRAM(s) SubCutaneous every 24 hours  influenza   Vaccine 0.5 milliLiter(s) IntraMuscular once  insulin glargine Injectable (LANTUS) 12 Unit(s) SubCutaneous at bedtime  insulin lispro (ADMELOG) corrective regimen sliding scale   SubCutaneous Before meals and at bedtime  insulin lispro Injectable (ADMELOG) 4 Unit(s) SubCutaneous three times a day before meals  lisinopril 10 milliGRAM(s) Oral daily  multivitamin 1 Tablet(s) Oral daily  oxyCODONE    IR 5 milliGRAM(s) Oral every 6 hours PRN  oxyCODONE    IR 10 milliGRAM(s) Oral every 6 hours PRN                          10.4   11.36 )-----------( 345      ( 18 Nov 2021 07:18 )             30.3     134<L>  |  100  |  25<H>  ----------------------------<  205<H>  3.8   |  22  |  0.68    Ca    8.7      18 Nov 2021 07:18  Phos  3.1     11-18  Mg     1.40     11-18    T(C): 37.2 (11-18-21 @ 10:05), Max: 37.2 (11-18-21 @ 10:05)  HR: 92 (11-18-21 @ 10:05) (61 - 92)  BP: 123/58 (11-18-21 @ 10:05) (99/52 - 144/68)  RR: 18 (11-18-21 @ 10:05) (11 - 18)  SpO2: 100% (11-18-21 @ 10:05) (99% - 100%)      General: Well nourished in no acute distress. Alert and Oriented * 3.   Head: Normocephalic and atraumatic.   Neck: No JVD. No bruits. Supple. Does not appear to be enlarged.   Cardiovascular: + S1,S2 ; RRR Soft systolic murmur at the left lower sternal border. No rubs noted.    Lungs: CTA b/l. No rhonchi, rales or wheezes.   Abdomen: + BS, soft. Non tender. Non distended. No rebound. No guarding.   Extremities: No clubbing/cyanosis/edema LLE, R AKA  Neurologic: Moves all four extremities. Full range of motion.   Skin: Warm and moist. The patient's skin has normal elasticity and good skin turgor.   Psychiatric: Appropriate mood and affect.  Musculoskeletal: Normal range of motion, normal strength    DATA    ECG:  NSR LAD	    < from: CT Cervical Spine No Cont (11.06.21 @ 16:50) >  IMPRESSION:  1.  No acute fracture or traumatic subluxation of the cervical spine.  2.  At C4-5, a posterior disc osteophyte complex causes mild central stenosis.    < end of copied text >    < from: Transthoracic Echocardiogram (09.29.21 @ 17:35) >  CONCLUSIONS:  1. Calcified trileaflet aortic valve with normal opening.  2. Normal left ventricular internal dimensions and wall  thicknesses.  3. Normal left ventricular systolic function. No segmental  wall motion abnormalities.  4. Normal right ventricular size and function.  ------------------------------------------------------------------------  Confirmed on  9/29/2021 - 18:38:12 by MARIELOS Valencia    < end of copied text >        < from: Nuclear Stress Test-Pharmacologic (Nuclear Stress Test-Pharmacologic .) (09.30.21 @ 16:16) >  GATED ANALYSIS:  Post-stress gated wall motion analysis was performed (LVEF  = 66 %;LVEDV = 66 ml.), revealing normal LV function.  There was no segmental wall motion abnormality. Septal  wall motion appeared normal. RV function appeared normal.  ------------------------------------------------------------------------  IMPRESSIONS:Mildly Abnormal Study  * Myocardial Perfusion SPECT results are mildly abnormal.  * Review of raw data shows: The study is of fair technical  quality with adjacent bowel tracer uptake  * The left ventricle was normal in size. There is a small,  mild defect in septal wall that is fixed, suggestive of  mild scarring  * No clear evidence of ischemia.  * Post-stress gated wall motion analysis was performed  (LVEF = 66 %;LVEDV = 66 ml.), revealing normal LV  function. There was no segmental wall motion abnormality.  Septal wall motion appeared normal. RV function appeared  normal.  ------------------------------------------------------------------------  Confirmed on  10/1/2021 - 17:43:39 by Momo Deluna M.D.    < end of copied text >        ASSESSMENT/PLAN: 	62 y/o male PMH HTN, HLD, DM, CKD, PVD with a Right Femoral-Femoral Bypass in 2018, s/p R AKA 10/2021, with last TTE 9/2021 with normal LV function and last NST 9/2021 with no ischemia, presented with dry gangrene to L foot toes, s/p LLE angio and Left Fem-AK pop bypass with PTFE 11/17.    --EKG with no acute changes  --recent TTE and NST noted above  --tolerated procedure well from CV perspective  --management per vascular

## 2021-11-18 NOTE — PROGRESS NOTE ADULT - SUBJECTIVE AND OBJECTIVE BOX
SUBJECTIVE:   Seen and examined at bedside. No acute events overnight.     OBJECTIVE: T(C): 36.6 (11-18-21 @ 02:06), Max: 37.1 (11-17-21 @ 06:59)  HR: 85 (11-18-21 @ 02:06) (61 - 85)  BP: 118/64 (11-18-21 @ 02:06) (99/52 - 144/68)  RR: 18 (11-18-21 @ 02:06) (11 - 18)  SpO2: 100% (11-18-21 @ 02:06) (99% - 100%)  Wt(kg): --  I&O's Summary    16 Nov 2021 07:01  -  17 Nov 2021 07:00  --------------------------------------------------------  IN: 720 mL / OUT: 1350 mL / NET: -630 mL    17 Nov 2021 07:01  -  18 Nov 2021 04:56  --------------------------------------------------------  IN: 525 mL / OUT: 520 mL / NET: 5 mL      I&O's Detail    16 Nov 2021 07:01  -  17 Nov 2021 07:00  --------------------------------------------------------  IN:    Oral Fluid: 720 mL  Total IN: 720 mL    OUT:    Voided (mL): 1350 mL  Total OUT: 1350 mL    Total NET: -630 mL      17 Nov 2021 07:01  -  18 Nov 2021 04:56  --------------------------------------------------------  IN:    dextrose 5% + sodium chloride 0.45%: 375 mL    Oral Fluid: 150 mL  Total IN: 525 mL    OUT:    Indwelling Catheter - Urethral (mL): 520 mL  Total OUT: 520 mL    Total NET: 5 mL        Physical Examination:  General: NAD, resting comfortably in bed  HEENT: Normocephalic atraumatic  Respiratory: Nonlabored respirations, normal CW expansion.  Cardio: regular rate and rhythm.  Vascular: R groin dressing c/d/i. no palpable collections appreciated.  R thigh w/ Aquacel dressing c/d/i. no palpable collections appreciated. no skin changes  L PT Dopplerable. calf soft, easily compressible.     MEDICATIONS  (STANDING):  aspirin enteric coated 81 milliGRAM(s) Oral daily  atorvastatin 40 milliGRAM(s) Oral at bedtime  cadexomer iodine 0.9% Gel 1 Application(s) Topical daily  carvedilol 12.5 milliGRAM(s) Oral every 12 hours  ceFAZolin   IVPB 1000 milliGRAM(s) IV Intermittent every 8 hours  dextrose 5% + sodium chloride 0.45%. 1000 milliLiter(s) (75 mL/Hr) IV Continuous <Continuous>  dextrose 50% Injectable 25 Gram(s) IV Push once  dextrose 50% Injectable 12.5 Gram(s) IV Push once  dextrose 50% Injectable 25 Gram(s) IV Push once  enoxaparin Injectable 40 milliGRAM(s) SubCutaneous every 24 hours  glucagon  Injectable 1 milliGRAM(s) IntraMuscular once  influenza   Vaccine 0.5 milliLiter(s) IntraMuscular once  insulin glargine Injectable (LANTUS) 12 Unit(s) SubCutaneous at bedtime  insulin lispro (ADMELOG) corrective regimen sliding scale   SubCutaneous Before meals and at bedtime  insulin lispro Injectable (ADMELOG) 4 Unit(s) SubCutaneous three times a day before meals  lisinopril 10 milliGRAM(s) Oral daily  multivitamin 1 Tablet(s) Oral daily    MEDICATIONS  (PRN):  acetaminophen     Tablet .. 650 milliGRAM(s) Oral every 6 hours PRN Mild Pain (1 - 3)  oxyCODONE    IR 5 milliGRAM(s) Oral every 6 hours PRN Moderate Pain (4 - 6)  oxyCODONE    IR 10 milliGRAM(s) Oral every 6 hours PRN Severe Pain (7 - 10)      LABS:                        11.0   12.44 )-----------( 376      ( 17 Nov 2021 15:11 )             33.3     11-17    140  |  107  |  33<H>  ----------------------------<  191<H>  4.3   |  25  |  0.76    Ca    9.3      17 Nov 2021 15:11  Phos  3.9     11-17  Mg     2.20     11-17      PT/INR - ( 17 Nov 2021 04:25 )   PT: 12.1 sec;   INR: 1.05 ratio         PTT - ( 17 Nov 2021 04:25 )  PTT:31.0 sec         SUBJECTIVE:   Seen and examined at bedside. No acute events overnight. Patient is resting comfortably without any complains; pain controlled. His left PT pulse was palpable.     OBJECTIVE: T(C): 36.6 (11-18-21 @ 02:06), Max: 37.1 (11-17-21 @ 06:59)  HR: 85 (11-18-21 @ 02:06) (61 - 85)  BP: 118/64 (11-18-21 @ 02:06) (99/52 - 144/68)  RR: 18 (11-18-21 @ 02:06) (11 - 18)  SpO2: 100% (11-18-21 @ 02:06) (99% - 100%)  Wt(kg): --  I&O's Summary    16 Nov 2021 07:01  -  17 Nov 2021 07:00  --------------------------------------------------------  IN: 720 mL / OUT: 1350 mL / NET: -630 mL    17 Nov 2021 07:01  -  18 Nov 2021 04:56  --------------------------------------------------------  IN: 525 mL / OUT: 520 mL / NET: 5 mL      I&O's Detail    16 Nov 2021 07:01  -  17 Nov 2021 07:00  --------------------------------------------------------  IN:    Oral Fluid: 720 mL  Total IN: 720 mL    OUT:    Voided (mL): 1350 mL  Total OUT: 1350 mL    Total NET: -630 mL      17 Nov 2021 07:01  -  18 Nov 2021 04:56  --------------------------------------------------------  IN:    dextrose 5% + sodium chloride 0.45%: 375 mL    Oral Fluid: 150 mL  Total IN: 525 mL    OUT:    Indwelling Catheter - Urethral (mL): 520 mL  Total OUT: 520 mL    Total NET: 5 mL        Physical Examination:  General: NAD, resting comfortably in bed  HEENT: Normocephalic atraumatic  Respiratory: Nonlabored respirations, normal CW expansion.  Cardio: regular rate and rhythm.  Vascular: R groin dressing c/d/i. no palpable collections appreciated.  R thigh w/ Aquacel dressing c/d/i. no palpable collections appreciated. no skin changes  L PT palpable. calf soft, easily compressible.     MEDICATIONS  (STANDING):  aspirin enteric coated 81 milliGRAM(s) Oral daily  atorvastatin 40 milliGRAM(s) Oral at bedtime  cadexomer iodine 0.9% Gel 1 Application(s) Topical daily  carvedilol 12.5 milliGRAM(s) Oral every 12 hours  ceFAZolin   IVPB 1000 milliGRAM(s) IV Intermittent every 8 hours  dextrose 5% + sodium chloride 0.45%. 1000 milliLiter(s) (75 mL/Hr) IV Continuous <Continuous>  dextrose 50% Injectable 25 Gram(s) IV Push once  dextrose 50% Injectable 12.5 Gram(s) IV Push once  dextrose 50% Injectable 25 Gram(s) IV Push once  enoxaparin Injectable 40 milliGRAM(s) SubCutaneous every 24 hours  glucagon  Injectable 1 milliGRAM(s) IntraMuscular once  influenza   Vaccine 0.5 milliLiter(s) IntraMuscular once  insulin glargine Injectable (LANTUS) 12 Unit(s) SubCutaneous at bedtime  insulin lispro (ADMELOG) corrective regimen sliding scale   SubCutaneous Before meals and at bedtime  insulin lispro Injectable (ADMELOG) 4 Unit(s) SubCutaneous three times a day before meals  lisinopril 10 milliGRAM(s) Oral daily  multivitamin 1 Tablet(s) Oral daily    MEDICATIONS  (PRN):  acetaminophen     Tablet .. 650 milliGRAM(s) Oral every 6 hours PRN Mild Pain (1 - 3)  oxyCODONE    IR 5 milliGRAM(s) Oral every 6 hours PRN Moderate Pain (4 - 6)  oxyCODONE    IR 10 milliGRAM(s) Oral every 6 hours PRN Severe Pain (7 - 10)      LABS:                        11.0   12.44 )-----------( 376      ( 17 Nov 2021 15:11 )             33.3     11-17    140  |  107  |  33<H>  ----------------------------<  191<H>  4.3   |  25  |  0.76    Ca    9.3      17 Nov 2021 15:11  Phos  3.9     11-17  Mg     2.20     11-17      PT/INR - ( 17 Nov 2021 04:25 )   PT: 12.1 sec;   INR: 1.05 ratio         PTT - ( 17 Nov 2021 04:25 )  PTT:31.0 sec

## 2021-11-18 NOTE — PROGRESS NOTE ADULT - ASSESSMENT
Assessment:  61yMale POD#1 S/P LLE angiogram, left fem-pop bypass w/ PTFE graft     Plan:  - Cont ASA   - Pain control PRN  - Diet: Reg  - Activity: as tolerated   - DVT ppx: LVX     C Team/Vascular  [id number]. Assessment:  61yMale POD#1 S/P LLE angiogram, left fem-pop bypass w/ PTFE graft     Plan:  - ASA and lovenox  - Ancef  - IVL  - DC dunham  - Pain control PRN  - Diet: Reg  - Activity: as tolerated     C Team/Vascular  [id number].

## 2021-11-18 NOTE — CONSULT NOTE ADULT - CONSULT REASON
Left foot ischemia
rose marie-operative cardiac risk assessment
Consult for medical optimization for angiogram/bypass

## 2021-11-18 NOTE — PHYSICAL THERAPY INITIAL EVALUATION ADULT - ACTIVE RANGE OF MOTION EXAMINATION, REHAB EVAL
left LE WFL except hip flexion 0-95 degrees; left knee extension -10 degrees from neutral; right LE residual limb WFL/bilateral upper extremity Active ROM was WFL (within functional limits)

## 2021-11-18 NOTE — OCCUPATIONAL THERAPY INITIAL EVALUATION ADULT - GENERAL OBSERVATIONS, REHAB EVAL
Patient received semisupine in bed in NAD and agreeable to participate in OT evaluation. +tele. Patient presents with deficits in ADL performance and functional mobility as well as decreased endurance. Patient will benefit from inpatient rehab to improve functional independence. Patient left in semisupine position with HOB elevated above 30 degrees, in NAD. All lines intact.

## 2021-11-18 NOTE — DIETITIAN INITIAL EVALUATION ADULT. - PERTINENT LABORATORY DATA
11-18 Na 134 mmol/L<L> Glu 205 mg/dL<H> K+ 3.8 mmol/L Cr 0.68 mg/dL BUN 25 mg/dL<H> Phos 3.1 mg/dL  11-18 @ 11:46 POCT 219 mg/dL  11-18 @ 07:37 POCT 195 mg/dL  11-17 @ 22:08 POCT 268 mg/dL  11-17 @ 16:19 POCT 195 mg/dL    (9/25/21) A1c- 5.7% (H)

## 2021-11-18 NOTE — PROGRESS NOTE ADULT - ASSESSMENT
61M DM2 A1c 9, PAD s/p Rt AKA, CKD, HTN p/w Lt toe necrosis s/p fem-AKpop bypass on 11/17 c/b post-op anemia, leukocytosis.

## 2021-11-18 NOTE — PHYSICAL THERAPY INITIAL EVALUATION ADULT - GENERAL OBSERVATIONS, REHAB EVAL
Pt found semi reclined in bed; patient agreeable to PT; right AKA with dressing intact; Left LE dressing intact; +wound vac.

## 2021-11-18 NOTE — DIETITIAN INITIAL EVALUATION ADULT. - OTHER INFO
60yo M admitted with progressive blackening of the L 1st and 5th toes for 2 weeks and PMH of DM, HTN, PVD, CKD, R AKA.   Pt AOx4 and reports 100% intake of his meals with No GI distress (nausea/ vomiting/ diarrhea/ constipation.) at present.  Pt reports UBW- 130# 3 months ago prior to amputation.  Current wt- 119.2# (11/18/21) via bed scale. Adj bw for AKA-117#.      Skin noted with Stage 2 sacrum pressure injury and surgical incision but no edema per nursing flow sheet- receiving MVI supplementation to promote healing.  Pt receiving supplement Ensure Enlive, suggest change to Glucerna  3x daily instead (660kcals, 30g protein).  pt willing to drink it.  Labs reviewed- A1c- 5.7%.  .

## 2021-11-18 NOTE — OCCUPATIONAL THERAPY INITIAL EVALUATION ADULT - ADDITIONAL COMMENTS
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 3 steps to enter, 1 flight inside +railing. Owns rollator and shower chair. Uses cane as needed to ambulate. Tub shower.

## 2021-11-18 NOTE — DIETITIAN INITIAL EVALUATION ADULT. - PERTINENT MEDS FT
MEDICATIONS  (STANDING):  aspirin enteric coated 81 milliGRAM(s) Oral daily  atorvastatin 40 milliGRAM(s) Oral at bedtime  cadexomer iodine 0.9% Gel 1 Application(s) Topical daily  carvedilol 12.5 milliGRAM(s) Oral every 12 hours  ceFAZolin   IVPB 1000 milliGRAM(s) IV Intermittent every 8 hours  dextrose 50% Injectable 25 Gram(s) IV Push once  dextrose 50% Injectable 12.5 Gram(s) IV Push once  dextrose 50% Injectable 25 Gram(s) IV Push once  enoxaparin Injectable 40 milliGRAM(s) SubCutaneous every 24 hours  influenza   Vaccine 0.5 milliLiter(s) IntraMuscular once  insulin glargine Injectable (LANTUS) 12 Unit(s) SubCutaneous at bedtime  insulin lispro (ADMELOG) corrective regimen sliding scale   SubCutaneous Before meals and at bedtime  insulin lispro Injectable (ADMELOG) 4 Unit(s) SubCutaneous three times a day before meals  lisinopril 10 milliGRAM(s) Oral daily  multivitamin 1 Tablet(s) Oral daily

## 2021-11-18 NOTE — PHYSICAL THERAPY INITIAL EVALUATION ADULT - PERTINENT HX OF CURRENT PROBLEM, REHAB EVAL
60yo M with Hx DM, HTN, PVD, CKD, R AKA (10/12) who presents with progressive blackening of the L 1st and 5th toes for 2 weeks. He was scheduled for an CT angio last week, but missed it because he had fallen. He presented to [place] today and was transferred to [facility] for vascular surgery evaluation. 61 year old male with Hx DM, HTN, PVD, CKD, R AKA (10/12) who presents with progressive blackening of the left 1st and 5th toes for 2 weeks. He was scheduled for an CT angio last week, but missed it because he had fallen. He presented to [place] and was transferred to [facility] for vascular surgery evaluation.

## 2021-11-18 NOTE — PHYSICAL THERAPY INITIAL EVALUATION ADULT - ADDITIONAL COMMENTS
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 Pt has 3 steps to enter, 14 steps with unilateral railings to negotiate inside. Pt owns a rollator and shower chair. Uses cane as needed to ambulate.

## 2021-11-18 NOTE — CONSULT NOTE ADULT - ASSESSMENT
62 y/o M presents with L hallux and 5th digit gangrene  - Patient seen and evaluated   - Afebrile, WBC 11.36  - Exam: left foot plantar hallux dry gangrene to the level of PIPJ, no wet conversion, no malodor, distal tip 5th digit gangrene with ischemic changes noted to 4th digit. No open lesions noted.   - left foot xray: no gas, no OM  - s/p LLE fem-pop bypass with PTFE graft  - Continue IV Abx  - Pod plan for local wound care vs surgical intervention pending vasc recs   - Discussed with attending  62 y/o M presents with L hallux and 5th digit gangrene  - Patient seen and evaluated   - Afebrile, WBC 11.36  - Exam: left foot plantar hallux dry gangrene to the level of PIPJ, no wet conversion, no malodor, distal tip 5th digit gangrene with ischemic changes noted to 4th digit. No open lesions noted.   - left foot xray: no gas, no OM  - s/p LLE fem-pop bypass with PTFE graft  - Continue IV Abx  - Wounc care ordered placed   - Pod plan for local wound care pending complete demarcation  - Discussed with attending

## 2021-11-18 NOTE — OCCUPATIONAL THERAPY INITIAL EVALUATION ADULT - PERTINENT HX OF CURRENT PROBLEM, REHAB EVAL
62yo M with Hx DM, HTN, PVD, CKD, R AKA (10/12) who presents with progressive blackening of the L 1st and 5th toes for 2 weeks. Was transferred to [facility] from  for vascular surgery evaluation. Now POD#1 S/P LLE angiogram, left fem-pop bypass w/ PTFE graft.

## 2021-11-18 NOTE — CONSULT NOTE ADULT - SUBJECTIVE AND OBJECTIVE BOX
Podiatry pager #: [phone]/ [phone]    Patient is a 61y old  Male who presents with a chief complaint of dry gangrene L 1st + 5th toes (18 Nov 2021 14:44)      HPI:  62yo M with Hx DM, HTN, PVD, CKD, R AKA (10/12) who presents with progressive blackening of the L 1st and 5th toes for 2 weeks. He was scheduled for an CT angio last week, but missed it because he had fallen. He presented to [place] today and was transferred to [facility] for vascular surgery evaluation. Patient states denies resting pain or claudication. Denies nausea, vomiting, chest pain, shortness of breath, or dizziness.  (15 Nov 2021 16:56)      PAST MEDICAL & SURGICAL HISTORY:  DM (diabetes mellitus)    HTN (hypertension)    HLD (hyperlipidemia)    CHF (congestive heart failure)    CKD (chronic kidney disease)    PVD (peripheral vascular disease)    S/P femoral-femoral bypass surgery  2018 - right leg        MEDICATIONS  (STANDING):  aspirin enteric coated 81 milliGRAM(s) Oral daily  atorvastatin 40 milliGRAM(s) Oral at bedtime  cadexomer iodine 0.9% Gel 1 Application(s) Topical daily  carvedilol 12.5 milliGRAM(s) Oral every 12 hours  ceFAZolin   IVPB 1000 milliGRAM(s) IV Intermittent every 8 hours  dextrose 50% Injectable 25 Gram(s) IV Push once  dextrose 50% Injectable 12.5 Gram(s) IV Push once  dextrose 50% Injectable 25 Gram(s) IV Push once  enoxaparin Injectable 40 milliGRAM(s) SubCutaneous every 24 hours  influenza   Vaccine 0.5 milliLiter(s) IntraMuscular once  insulin glargine Injectable (LANTUS) 12 Unit(s) SubCutaneous at bedtime  insulin lispro (ADMELOG) corrective regimen sliding scale   SubCutaneous Before meals and at bedtime  insulin lispro Injectable (ADMELOG) 4 Unit(s) SubCutaneous three times a day before meals  lisinopril 10 milliGRAM(s) Oral daily  multivitamin 1 Tablet(s) Oral daily    MEDICATIONS  (PRN):  acetaminophen     Tablet .. 650 milliGRAM(s) Oral every 6 hours PRN Mild Pain (1 - 3)  oxyCODONE    IR 5 milliGRAM(s) Oral every 6 hours PRN Moderate Pain (4 - 6)  oxyCODONE    IR 10 milliGRAM(s) Oral every 6 hours PRN Severe Pain (7 - 10)      Allergies    penicillin (Other)    Intolerances        VITALS:    Vital Signs Last 24 Hrs  T(C): 37.9 (18 Nov 2021 16:39), Max: 37.9 (18 Nov 2021 16:39)  T(F): 100.2 (18 Nov 2021 16:39), Max: 100.2 (18 Nov 2021 16:39)  HR: 105 (18 Nov 2021 16:39) (75 - 109)  BP: 127/73 (18 Nov 2021 16:39) (99/52 - 145/83)  BP(mean): 86 (17 Nov 2021 23:00) (64 - 86)  RR: 19 (18 Nov 2021 16:39) (11 - 19)  SpO2: 99% (18 Nov 2021 16:39) (99% - 100%)    LABS:                          10.4   11.36 )-----------( 345      ( 18 Nov 2021 07:18 )             30.3       11-18    134<L>  |  100  |  25<H>  ----------------------------<  205<H>  3.8   |  22  |  0.68    Ca    8.7      18 Nov 2021 07:18  Phos  3.1     11-18  Mg     1.40     11-18        CAPILLARY BLOOD GLUCOSE      POCT Blood Glucose.: 204 mg/dL (18 Nov 2021 16:54)  POCT Blood Glucose.: 219 mg/dL (18 Nov 2021 11:46)  POCT Blood Glucose.: 195 mg/dL (18 Nov 2021 07:37)  POCT Blood Glucose.: 268 mg/dL (17 Nov 2021 22:08)      PT/INR - ( 17 Nov 2021 04:25 )   PT: 12.1 sec;   INR: 1.05 ratio         PTT - ( 17 Nov 2021 04:25 )  PTT:31.0 sec    LOWER EXTREMITY PHYSICAL EXAM:    Vascular: DP/PT 0/4, L, CFT <3 seconds L, Temperature gradient warm to cold on L  Neuro: Epicritic sensation diminished to the level of ankle, L  Musculoskeletal/Ortho: s/p right AKA  Skin: left foot plantar hallux dry gangrene to the level of PIPJ, no wet conversion, no malodor, distal tip 5th digit gangrene with ischemic changes noted to 4th digit. No open lesions noted.     RADIOLOGY & ADDITIONAL STUDIES:    < from: Xray Foot AP + Lateral + Oblique, Left (11.02.21 @ 20:13) >    EXAM:  XR ANKLE COMP MIN 3 VIEWS LT      EXAM:  XR FOOT COMP MIN 3 VIEWS LT        PROCEDURE DATE:  Nov 2 2021         INTERPRETATION:  CLINICAL INFORMATION: Left foot and ankle pain status post fall.    EXAM: 3 views of the left foot. 4 views of the left ankle.    COMPARISON: No similar prior studies available for comparison.    FINDINGS:  No acute fracture or dislocation.  Preserved joint spaces.  Vascular calcifications.  Unremarkable soft tissues.    IMPRESSION:  No acute fracture or dislocation.    --- End of Report ---            RADHA VASQUEZ MD; Resident Radiology  This document has been electronically signed.  CINDA SANDERS MD; Attending Radiologist  This document has been electronically signed. Nov 2 2021  8:33PM    < end of copied text >

## 2021-11-19 LAB
ANION GAP SERPL CALC-SCNC: 13 MMOL/L — SIGNIFICANT CHANGE UP (ref 7–14)
BUN SERPL-MCNC: 25 MG/DL — HIGH (ref 7–23)
CALCIUM SERPL-MCNC: 8.9 MG/DL — SIGNIFICANT CHANGE UP (ref 8.4–10.5)
CHLORIDE SERPL-SCNC: 99 MMOL/L — SIGNIFICANT CHANGE UP (ref 98–107)
CO2 SERPL-SCNC: 24 MMOL/L — SIGNIFICANT CHANGE UP (ref 22–31)
CREAT SERPL-MCNC: 0.74 MG/DL — SIGNIFICANT CHANGE UP (ref 0.5–1.3)
GLUCOSE SERPL-MCNC: 154 MG/DL — HIGH (ref 70–99)
HCT VFR BLD CALC: 33.2 % — LOW (ref 39–50)
HGB BLD-MCNC: 10.4 G/DL — LOW (ref 13–17)
MAGNESIUM SERPL-MCNC: 1.6 MG/DL — SIGNIFICANT CHANGE UP (ref 1.6–2.6)
MCHC RBC-ENTMCNC: 31 PG — SIGNIFICANT CHANGE UP (ref 27–34)
MCHC RBC-ENTMCNC: 31.3 GM/DL — LOW (ref 32–36)
MCV RBC AUTO: 98.8 FL — SIGNIFICANT CHANGE UP (ref 80–100)
NRBC # BLD: 0 /100 WBCS — SIGNIFICANT CHANGE UP
NRBC # FLD: 0 K/UL — SIGNIFICANT CHANGE UP
PHOSPHATE SERPL-MCNC: 3 MG/DL — SIGNIFICANT CHANGE UP (ref 2.5–4.5)
PLATELET # BLD AUTO: 305 K/UL — SIGNIFICANT CHANGE UP (ref 150–400)
POTASSIUM SERPL-MCNC: 3.8 MMOL/L — SIGNIFICANT CHANGE UP (ref 3.5–5.3)
POTASSIUM SERPL-SCNC: 3.8 MMOL/L — SIGNIFICANT CHANGE UP (ref 3.5–5.3)
RBC # BLD: 3.36 M/UL — LOW (ref 4.2–5.8)
RBC # FLD: 15.1 % — HIGH (ref 10.3–14.5)
SARS-COV-2 RNA SPEC QL NAA+PROBE: SIGNIFICANT CHANGE UP
SODIUM SERPL-SCNC: 136 MMOL/L — SIGNIFICANT CHANGE UP (ref 135–145)
WBC # BLD: 14.53 K/UL — HIGH (ref 3.8–10.5)
WBC # FLD AUTO: 14.53 K/UL — HIGH (ref 3.8–10.5)

## 2021-11-19 PROCEDURE — 99233 SBSQ HOSP IP/OBS HIGH 50: CPT

## 2021-11-19 RX ORDER — POLYETHYLENE GLYCOL 3350 17 G/17G
17 POWDER, FOR SOLUTION ORAL ONCE
Refills: 0 | Status: COMPLETED | OUTPATIENT
Start: 2021-11-19 | End: 2021-11-19

## 2021-11-19 RX ADMIN — ENOXAPARIN SODIUM 40 MILLIGRAM(S): 100 INJECTION SUBCUTANEOUS at 21:16

## 2021-11-19 RX ADMIN — CARVEDILOL PHOSPHATE 12.5 MILLIGRAM(S): 80 CAPSULE, EXTENDED RELEASE ORAL at 05:49

## 2021-11-19 RX ADMIN — OXYCODONE HYDROCHLORIDE 10 MILLIGRAM(S): 5 TABLET ORAL at 11:53

## 2021-11-19 RX ADMIN — Medication 4 UNIT(S): at 17:34

## 2021-11-19 RX ADMIN — CARVEDILOL PHOSPHATE 12.5 MILLIGRAM(S): 80 CAPSULE, EXTENDED RELEASE ORAL at 17:36

## 2021-11-19 RX ADMIN — INSULIN GLARGINE 12 UNIT(S): 100 INJECTION, SOLUTION SUBCUTANEOUS at 21:19

## 2021-11-19 RX ADMIN — Medication 100 MILLIGRAM(S): at 12:24

## 2021-11-19 RX ADMIN — Medication 3: at 21:21

## 2021-11-19 RX ADMIN — Medication 1: at 08:56

## 2021-11-19 RX ADMIN — Medication 1 TABLET(S): at 11:07

## 2021-11-19 RX ADMIN — OXYCODONE HYDROCHLORIDE 10 MILLIGRAM(S): 5 TABLET ORAL at 06:30

## 2021-11-19 RX ADMIN — Medication 100 MILLIGRAM(S): at 21:17

## 2021-11-19 RX ADMIN — Medication 1 APPLICATION(S): at 11:06

## 2021-11-19 RX ADMIN — Medication 1: at 17:34

## 2021-11-19 RX ADMIN — ATORVASTATIN CALCIUM 40 MILLIGRAM(S): 80 TABLET, FILM COATED ORAL at 21:16

## 2021-11-19 RX ADMIN — LISINOPRIL 10 MILLIGRAM(S): 2.5 TABLET ORAL at 05:49

## 2021-11-19 RX ADMIN — Medication 4 UNIT(S): at 08:57

## 2021-11-19 RX ADMIN — Medication 81 MILLIGRAM(S): at 11:06

## 2021-11-19 RX ADMIN — OXYCODONE HYDROCHLORIDE 10 MILLIGRAM(S): 5 TABLET ORAL at 05:48

## 2021-11-19 RX ADMIN — Medication 4 UNIT(S): at 11:56

## 2021-11-19 RX ADMIN — Medication 1: at 11:56

## 2021-11-19 RX ADMIN — POLYETHYLENE GLYCOL 3350 17 GRAM(S): 17 POWDER, FOR SOLUTION ORAL at 21:16

## 2021-11-19 RX ADMIN — Medication 100 MILLIGRAM(S): at 05:49

## 2021-11-19 RX ADMIN — OXYCODONE HYDROCHLORIDE 10 MILLIGRAM(S): 5 TABLET ORAL at 11:14

## 2021-11-19 NOTE — PROGRESS NOTE ADULT - ASSESSMENT
Assessment:  61yMale POD#2 S/P LLE angiogram, left fem-pop bypass w/ PTFE graft     Plan:  -     C Team/Vascular  [id number].   Assessment:  61yMale POD#1 S/P LLE angiogram, left fem-pop bypass w/ PTFE graft     Plan:  - PT and dispo planning  - ASA and lovenox  - Ancef  - Pain control PRN  - Diet: Reg  - Activity: as tolerated     C Team/Vascular  [id number].

## 2021-11-19 NOTE — PROGRESS NOTE ADULT - SUBJECTIVE AND OBJECTIVE BOX
[facility] Division of Hospital Medicine  Miguelangel Lal MD  Pager (M-F, 8A-5P): [phone]  Other Times:  [phone]    Patient is a 61y old  Male who presents with a chief complaint of dry gangrene L 1st + 5th toes (19 Nov 2021 13:00)    SUBJECTIVE / OVERNIGHT EVENTS:  Patient offers no new complaints.  No F/C, N/V, CP, SOB, Cough, lightheadedness, dizziness, abdominal pain, diarrhea, dysuria.    MEDICATIONS  (STANDING):  aspirin enteric coated 81 milliGRAM(s) Oral daily  atorvastatin 40 milliGRAM(s) Oral at bedtime  cadexomer iodine 0.9% Gel 1 Application(s) Topical daily  carvedilol 12.5 milliGRAM(s) Oral every 12 hours  ceFAZolin   IVPB 1000 milliGRAM(s) IV Intermittent every 8 hours  dextrose 50% Injectable 25 Gram(s) IV Push once  dextrose 50% Injectable 12.5 Gram(s) IV Push once  dextrose 50% Injectable 25 Gram(s) IV Push once  enoxaparin Injectable 40 milliGRAM(s) SubCutaneous every 24 hours  influenza   Vaccine 0.5 milliLiter(s) IntraMuscular once  insulin glargine Injectable (LANTUS) 12 Unit(s) SubCutaneous at bedtime  insulin lispro (ADMELOG) corrective regimen sliding scale   SubCutaneous Before meals and at bedtime  insulin lispro Injectable (ADMELOG) 4 Unit(s) SubCutaneous three times a day before meals  lisinopril 10 milliGRAM(s) Oral daily  multivitamin 1 Tablet(s) Oral daily    MEDICATIONS  (PRN):  acetaminophen     Tablet .. 650 milliGRAM(s) Oral every 6 hours PRN Mild Pain (1 - 3)  oxyCODONE    IR 5 milliGRAM(s) Oral every 6 hours PRN Moderate Pain (4 - 6)  oxyCODONE    IR 10 milliGRAM(s) Oral every 6 hours PRN Severe Pain (7 - 10)      Vital Signs Last 24 Hrs  T(C): 37 (19 Nov 2021 10:00), Max: 37.9 (18 Nov 2021 16:39)  T(F): 98.6 (19 Nov 2021 10:00), Max: 100.2 (18 Nov 2021 16:39)  HR: 87 (19 Nov 2021 10:00) (60 - 109)  BP: 136/71 (19 Nov 2021 10:00) (123/65 - 145/83)  BP(mean): --  RR: 18 (19 Nov 2021 10:00) (16 - 20)  SpO2: 100% (19 Nov 2021 10:00) (87% - 100%)  CAPILLARY BLOOD GLUCOSE      POCT Blood Glucose.: 194 mg/dL (19 Nov 2021 11:49)  POCT Blood Glucose.: 168 mg/dL (19 Nov 2021 08:38)  POCT Blood Glucose.: 245 mg/dL (18 Nov 2021 21:38)  POCT Blood Glucose.: 204 mg/dL (18 Nov 2021 16:54)    I&O's Summary    18 Nov 2021 07:01  -  19 Nov 2021 07:00  --------------------------------------------------------  IN: 560 mL / OUT: 1250 mL / NET: -690 mL        PHYSICAL EXAM:  CONSTITUTIONAL: NAD, thin  EYES: PERRLA; conjunctiva and sclera clear  ENMT: Moist oral mucosa, no pharyngeal injection or exudates; normal dentition  NECK: Supple, no palpable masses; no thyromegaly  RESPIRATORY: Normal respiratory effort; lungs are clear to auscultation bilaterally  CARDIOVASCULAR: Regular rate and rhythm, normal S1 and S2, no murmur/rub/gallop; No lower extremity edema; Peripheral pulses palpable  ABDOMEN: Nontender to palpation, normoactive bowel sounds, no rebound/guarding; No hepatosplenomegaly  MUSCULOSKELETAL:  unable to assess gait; no clubbing or cyanosis of digits; no joint swelling or tenderness to palpation; Rt AKA  PSYCH: A+O to person, place, and time; affect appropriate  NEUROLOGY: CN 2-12 are intact and symmetric; no gross sensory deficits   SKIN: Left groin surgical site C/D/I    LABS:                        10.4   14.53 )-----------( 305      ( 19 Nov 2021 07:47 )             33.2     11-19    136  |  99  |  25<H>  ----------------------------<  154<H>  3.8   |  24  |  0.74    Ca    8.9      19 Nov 2021 07:47  Phos  3.0     11-19  Mg     1.60     11-19                RADIOLOGY & ADDITIONAL TESTS:    Imaging Personally Reviewed:    Care Discussed with Consultants/Other Providers:

## 2021-11-19 NOTE — PROGRESS NOTE ADULT - SUBJECTIVE AND OBJECTIVE BOX
SUBJECTIVE:   Seen and examined at bedside. No acute events overnight.     OBJECTIVE: T(C): 37.1 (11-19-21 @ 02:08), Max: 37.9 (11-18-21 @ 16:39)  HR: 90 (11-19-21 @ 02:08) (60 - 109)  BP: 123/65 (11-19-21 @ 02:08) (123/58 - 145/83)  RR: 18 (11-19-21 @ 02:08) (18 - 20)  SpO2: 87% (11-19-21 @ 02:08) (87% - 100%)  Wt(kg): --  I&O's Summary    17 Nov 2021 07:01  -  18 Nov 2021 07:00  --------------------------------------------------------  IN: 525 mL / OUT: 1570 mL / NET: -1045 mL    18 Nov 2021 07:01  -  19 Nov 2021 03:22  --------------------------------------------------------  IN: 390 mL / OUT: 1000 mL / NET: -610 mL      I&O's Detail    17 Nov 2021 07:01  -  18 Nov 2021 07:00  --------------------------------------------------------  IN:    dextrose 5% + sodium chloride 0.45%: 375 mL    Oral Fluid: 150 mL  Total IN: 525 mL    OUT:    Indwelling Catheter - Urethral (mL): 1570 mL  Total OUT: 1570 mL    Total NET: -1045 mL      18 Nov 2021 07:01  -  19 Nov 2021 03:22  --------------------------------------------------------  IN:    IV PiggyBack: 150 mL    Oral Fluid: 240 mL  Total IN: 390 mL    OUT:    Voided (mL): 1000 mL  Total OUT: 1000 mL    Total NET: -610 mL      Physical Exam:   General:  Abdomen:    MEDICATIONS  (STANDING):  aspirin enteric coated 81 milliGRAM(s) Oral daily  atorvastatin 40 milliGRAM(s) Oral at bedtime  cadexomer iodine 0.9% Gel 1 Application(s) Topical daily  carvedilol 12.5 milliGRAM(s) Oral every 12 hours  ceFAZolin   IVPB 1000 milliGRAM(s) IV Intermittent every 8 hours  dextrose 50% Injectable 25 Gram(s) IV Push once  dextrose 50% Injectable 12.5 Gram(s) IV Push once  dextrose 50% Injectable 25 Gram(s) IV Push once  enoxaparin Injectable 40 milliGRAM(s) SubCutaneous every 24 hours  influenza   Vaccine 0.5 milliLiter(s) IntraMuscular once  insulin glargine Injectable (LANTUS) 12 Unit(s) SubCutaneous at bedtime  insulin lispro (ADMELOG) corrective regimen sliding scale   SubCutaneous Before meals and at bedtime  insulin lispro Injectable (ADMELOG) 4 Unit(s) SubCutaneous three times a day before meals  lisinopril 10 milliGRAM(s) Oral daily  multivitamin 1 Tablet(s) Oral daily    MEDICATIONS  (PRN):  acetaminophen     Tablet .. 650 milliGRAM(s) Oral every 6 hours PRN Mild Pain (1 - 3)  oxyCODONE    IR 5 milliGRAM(s) Oral every 6 hours PRN Moderate Pain (4 - 6)  oxyCODONE    IR 10 milliGRAM(s) Oral every 6 hours PRN Severe Pain (7 - 10)      LABS:                        10.4   11.36 )-----------( 345      ( 18 Nov 2021 07:18 )             30.3     11-18    134<L>  |  100  |  25<H>  ----------------------------<  205<H>  3.8   |  22  |  0.68    Ca    8.7      18 Nov 2021 07:18  Phos  3.1     11-18  Mg     1.40     11-18      PT/INR - ( 17 Nov 2021 04:25 )   PT: 12.1 sec;   INR: 1.05 ratio         PTT - ( 17 Nov 2021 04:25 )  PTT:31.0 sec       SUBJECTIVE:   Seen and examined at bedside. No acute events overnight.     OBJECTIVE: T(C): 37.1 (11-19-21 @ 02:08), Max: 37.9 (11-18-21 @ 16:39)  HR: 90 (11-19-21 @ 02:08) (60 - 109)  BP: 123/65 (11-19-21 @ 02:08) (123/58 - 145/83)  RR: 18 (11-19-21 @ 02:08) (18 - 20)  SpO2: 87% (11-19-21 @ 02:08) (87% - 100%)  Wt(kg): --  I&O's Summary    17 Nov 2021 07:01  -  18 Nov 2021 07:00  --------------------------------------------------------  IN: 525 mL / OUT: 1570 mL / NET: -1045 mL    18 Nov 2021 07:01  -  19 Nov 2021 03:22  --------------------------------------------------------  IN: 390 mL / OUT: 1000 mL / NET: -610 mL      I&O's Detail    17 Nov 2021 07:01  -  18 Nov 2021 07:00  --------------------------------------------------------  IN:    dextrose 5% + sodium chloride 0.45%: 375 mL    Oral Fluid: 150 mL  Total IN: 525 mL    OUT:    Indwelling Catheter - Urethral (mL): 1570 mL  Total OUT: 1570 mL    Total NET: -1045 mL      18 Nov 2021 07:01  -  19 Nov 2021 03:22  --------------------------------------------------------  IN:    IV PiggyBack: 150 mL    Oral Fluid: 240 mL  Total IN: 390 mL    OUT:    Voided (mL): 1000 mL  Total OUT: 1000 mL    Total NET: -610 mL      Physical Exam:   General: NAD, resting comfortably in bed  HEENT: Normocephalic atraumatic  Respiratory: Nonlabored respirations, normal CW expansion.  Cardio: regular rate and rhythm.  Vascular: R groin dressing c/d/i. no palpable collections appreciated.  R thigh w/ Aquacel dressing c/d/i. no palpable collections appreciated. no skin changes  L PT palpable. calf soft, easily compressible.     MEDICATIONS  (STANDING):  aspirin enteric coated 81 milliGRAM(s) Oral daily  atorvastatin 40 milliGRAM(s) Oral at bedtime  cadexomer iodine 0.9% Gel 1 Application(s) Topical daily  carvedilol 12.5 milliGRAM(s) Oral every 12 hours  ceFAZolin   IVPB 1000 milliGRAM(s) IV Intermittent every 8 hours  dextrose 50% Injectable 25 Gram(s) IV Push once  dextrose 50% Injectable 12.5 Gram(s) IV Push once  dextrose 50% Injectable 25 Gram(s) IV Push once  enoxaparin Injectable 40 milliGRAM(s) SubCutaneous every 24 hours  influenza   Vaccine 0.5 milliLiter(s) IntraMuscular once  insulin glargine Injectable (LANTUS) 12 Unit(s) SubCutaneous at bedtime  insulin lispro (ADMELOG) corrective regimen sliding scale   SubCutaneous Before meals and at bedtime  insulin lispro Injectable (ADMELOG) 4 Unit(s) SubCutaneous three times a day before meals  lisinopril 10 milliGRAM(s) Oral daily  multivitamin 1 Tablet(s) Oral daily    MEDICATIONS  (PRN):  acetaminophen     Tablet .. 650 milliGRAM(s) Oral every 6 hours PRN Mild Pain (1 - 3)  oxyCODONE    IR 5 milliGRAM(s) Oral every 6 hours PRN Moderate Pain (4 - 6)  oxyCODONE    IR 10 milliGRAM(s) Oral every 6 hours PRN Severe Pain (7 - 10)      LABS:                        10.4   11.36 )-----------( 345      ( 18 Nov 2021 07:18 )             30.3     11-18    134<L>  |  100  |  25<H>  ----------------------------<  205<H>  3.8   |  22  |  0.68    Ca    8.7      18 Nov 2021 07:18  Phos  3.1     11-18  Mg     1.40     11-18      PT/INR - ( 17 Nov 2021 04:25 )   PT: 12.1 sec;   INR: 1.05 ratio         PTT - ( 17 Nov 2021 04:25 )  PTT:31.0 sec

## 2021-11-19 NOTE — PROGRESS NOTE ADULT - SUBJECTIVE AND OBJECTIVE BOX
Patient is a 61y old  Male who presents with a chief complaint of dry gangrene L 1st + 5th toes (19 Nov 2021 03:21)       INTERVAL HPI/OVERNIGHT EVENTS:  Patient seen and evaluated at bedside.  Pt is resting comfortable in NAD. Denies N/V/F/C.  Pain rated at X/10    Allergies    penicillin (Other)    Intolerances        Vital Signs Last 24 Hrs  T(C): 37.3 (19 Nov 2021 05:44), Max: 37.9 (18 Nov 2021 16:39)  T(F): 99.2 (19 Nov 2021 05:44), Max: 100.2 (18 Nov 2021 16:39)  HR: 90 (19 Nov 2021 05:44) (60 - 109)  BP: 128/66 (19 Nov 2021 05:44) (123/58 - 145/83)  BP(mean): --  RR: 16 (19 Nov 2021 05:44) (16 - 20)  SpO2: 100% (19 Nov 2021 05:44) (87% - 100%)    LABS:                        10.4   14.53 )-----------( 305      ( 19 Nov 2021 07:47 )             33.2     11-18    134<L>  |  100  |  25<H>  ----------------------------<  205<H>  3.8   |  22  |  0.68    Ca    8.7      18 Nov 2021 07:18  Phos  3.1     11-18  Mg     1.40     11-18          CAPILLARY BLOOD GLUCOSE      POCT Blood Glucose.: 245 mg/dL (18 Nov 2021 21:38)  POCT Blood Glucose.: 204 mg/dL (18 Nov 2021 16:54)  POCT Blood Glucose.: 219 mg/dL (18 Nov 2021 11:46)      Lower Extremity Physical Exam:    Vascular: DP/PT 0/4, L, CFT <3 seconds L, Temperature gradient warm to cold on L  Neuro: Epicritic sensation diminished to the level of ankle, L  Musculoskeletal/Ortho: s/p right AKA  Skin: left foot plantar hallux dry gangrene to the level of PIPJ, no wet conversion, no malodor, distal tip 5th digit gangrene with ischemic changes noted to 4th digit. No open lesions noted.         RADIOLOGY & ADDITIONAL TESTS:

## 2021-11-19 NOTE — PROGRESS NOTE ADULT - SUBJECTIVE AND OBJECTIVE BOX
Date of service 11/19/2021    chief complaint: L toe discoloration    extended hpi: 62 y/o male PMH HTN, HLD, DM, CKD, PVD with a Right Femoral-Femoral Bypass in 2018, s/p R AKA 10/2021, with last TTE 9/2021 with normal LV function and last NST 9/2021 with no ischemia, presented with dry gangrene to L foot toes.    S: no chest pain or sob; ros otherwise negative.     Review of Systems:   Constitutional: [ ] fevers, [ ] chills.   Skin: [ ] dry skin. [ ] rashes.  Psychiatric: [ ] depression, [ ] anxiety.   Gastrointestinal: [ ] BRBPR, [ ] melena.   Neurological: [ ] confusion. [ ] seizures. [ ] shuffling gait.   Ears,Nose,Mouth and Throat: [ ] ear pain [ ] sore throat.   Eyes: [ ] diplopia.   Respiratory: [ ] hemoptysis. [ ] shortness of breath  Cardiovascular: See HPI above  Hematologic/Lymphatic: [ ] anemia. [ ] painful nodes. [ ] prolonged bleeding.   Genitourinary: [ ] hematuria. [ ] flank pain.   Endocrine: [ ] significant change in weight. [ ] intolerance to heat and cold.     Review of systems [x ] otherwise negative, [ ] otherwise unable to obtain    FH: no family history of sudden cardiac death in first degree relatives    SH: [ ] tobacco, [ ] alcohol, [ ] drugs      acetaminophen     Tablet .. 650 milliGRAM(s) Oral every 6 hours PRN  aspirin enteric coated 81 milliGRAM(s) Oral daily  atorvastatin 40 milliGRAM(s) Oral at bedtime  cadexomer iodine 0.9% Gel 1 Application(s) Topical daily  carvedilol 12.5 milliGRAM(s) Oral every 12 hours  ceFAZolin   IVPB 1000 milliGRAM(s) IV Intermittent every 8 hours  dextrose 50% Injectable 25 Gram(s) IV Push once  dextrose 50% Injectable 12.5 Gram(s) IV Push once  dextrose 50% Injectable 25 Gram(s) IV Push once  enoxaparin Injectable 40 milliGRAM(s) SubCutaneous every 24 hours  influenza   Vaccine 0.5 milliLiter(s) IntraMuscular once  insulin glargine Injectable (LANTUS) 12 Unit(s) SubCutaneous at bedtime  insulin lispro (ADMELOG) corrective regimen sliding scale   SubCutaneous Before meals and at bedtime  insulin lispro Injectable (ADMELOG) 4 Unit(s) SubCutaneous three times a day before meals  lisinopril 10 milliGRAM(s) Oral daily  multivitamin 1 Tablet(s) Oral daily  oxyCODONE    IR 5 milliGRAM(s) Oral every 6 hours PRN  oxyCODONE    IR 10 milliGRAM(s) Oral every 6 hours PRN                            10.4   14.53 )-----------( 305      ( 19 Nov 2021 07:47 )             33.2       11-19    136  |  99  |  25<H>  ----------------------------<  154<H>  3.8   |  24  |  0.74    Ca    8.9      19 Nov 2021 07:47  Phos  3.0     11-19  Mg     1.60     11-19              T(C): 37 (11-19-21 @ 10:00), Max: 37.9 (11-18-21 @ 16:39)  HR: 87 (11-19-21 @ 10:00) (60 - 109)  BP: 136/71 (11-19-21 @ 10:00) (123/65 - 145/83)  RR: 18 (11-19-21 @ 10:00) (16 - 20)  SpO2: 100% (11-19-21 @ 10:00) (87% - 100%)  Wt(kg): --    I&O's Summary    18 Nov 2021 07:01  -  19 Nov 2021 07:00  --------------------------------------------------------  IN: 560 mL / OUT: 1250 mL / NET: -690 mL          General: Well nourished in no acute distress. Alert and Oriented * 3.   Head: Normocephalic and atraumatic.   Neck: No JVD. No bruits. Supple. Does not appear to be enlarged.   Cardiovascular: + S1,S2 ; RRR Soft systolic murmur at the left lower sternal border. No rubs noted.    Lungs: CTA b/l. No rhonchi, rales or wheezes.   Abdomen: + BS, soft. Non tender. Non distended. No rebound. No guarding.   Extremities: No clubbing/cyanosis/edema LLE, R AKA  Neurologic: Moves all four extremities. Full range of motion.   Skin: Warm and moist. The patient's skin has normal elasticity and good skin turgor.   Psychiatric: Appropriate mood and affect.  Musculoskeletal: Normal range of motion, normal strength    DATA    ECG:  NSR LAD	    < from: CT Cervical Spine No Cont (11.06.21 @ 16:50) >  IMPRESSION:  1.  No acute fracture or traumatic subluxation of the cervical spine.  2.  At C4-5, a posterior disc osteophyte complex causes mild central stenosis.    < end of copied text >    < from: Transthoracic Echocardiogram (09.29.21 @ 17:35) >  CONCLUSIONS:  1. Calcified trileaflet aortic valve with normal opening.  2. Normal left ventricular internal dimensions and wall  thicknesses.  3. Normal left ventricular systolic function. No segmental  wall motion abnormalities.  4. Normal right ventricular size and function.  ------------------------------------------------------------------------  Confirmed on  9/29/2021 - 18:38:12 by MARIELOS Valencia    < end of copied text >        < from: Nuclear Stress Test-Pharmacologic (Nuclear Stress Test-Pharmacologic .) (09.30.21 @ 16:16) >  GATED ANALYSIS:  Post-stress gated wall motion analysis was performed (LVEF  = 66 %;LVEDV = 66 ml.), revealing normal LV function.  There was no segmental wall motion abnormality. Septal  wall motion appeared normal. RV function appeared normal.  ------------------------------------------------------------------------  IMPRESSIONS:Mildly Abnormal Study  * Myocardial Perfusion SPECT results are mildly abnormal.  * Review of raw data shows: The study is of fair technical  quality with adjacent bowel tracer uptake  * The left ventricle was normal in size. There is a small,  mild defect in septal wall that is fixed, suggestive of  mild scarring  * No clear evidence of ischemia.  * Post-stress gated wall motion analysis was performed  (LVEF = 66 %;LVEDV = 66 ml.), revealing normal LV  function. There was no segmental wall motion abnormality.  Septal wall motion appeared normal. RV function appeared  normal.  ------------------------------------------------------------------------  Confirmed on  10/1/2021 - 17:43:39 by Momo Deluna M.D.    < end of copied text >        ASSESSMENT/PLAN: 	62 y/o male PMH HTN, HLD, DM, CKD, PVD with a Right Femoral-Femoral Bypass in 2018, s/p R AKA 10/2021, with last TTE 9/2021 with normal LV function and last NST 9/2021 with no ischemia, presented with dry gangrene to L foot toes, s/p LLE angio and Left Fem-AK pop bypass with PTFE 11/17.    --EKG with no acute changes  --recent TTE and NST noted above with normal LV function and no ischemia  --tolerated procedure well from CV perspective  --post op care per vascular    Imani Cesar MD

## 2021-11-19 NOTE — PROGRESS NOTE ADULT - ASSESSMENT
62 y/o M presents with L hallux and 5th digit gangrene  - Patient seen and evaluated   - Afebrile, WBC 14.53  - Exam: left foot plantar hallux dry gangrene to the level of PIPJ, no wet conversion, no malodor, distal tip 5th digit gangrene with ischemic changes noted to 4th digit. No open lesions noted.   - left foot xray: no gas, no OM  - s/p LLE fem-pop bypass with PTFE graft  - Continue IV Abx  - Wound care ordered placed   - Pod plan for local wound care pending complete demarcation  - Discussed with attending

## 2021-11-19 NOTE — ADVANCED PRACTICE NURSE CONSULT - REASON FOR CONSULT
Patient with Prevena Vac. Spoke to vascular Jennifer Washburn.  Patient with Prevena Vac. Spoke to vascular Jennifer Washburn, vascular will follow up.

## 2021-11-20 LAB
ANION GAP SERPL CALC-SCNC: 11 MMOL/L — SIGNIFICANT CHANGE UP (ref 7–14)
BUN SERPL-MCNC: 26 MG/DL — HIGH (ref 7–23)
CALCIUM SERPL-MCNC: 9 MG/DL — SIGNIFICANT CHANGE UP (ref 8.4–10.5)
CHLORIDE SERPL-SCNC: 100 MMOL/L — SIGNIFICANT CHANGE UP (ref 98–107)
CO2 SERPL-SCNC: 25 MMOL/L — SIGNIFICANT CHANGE UP (ref 22–31)
CREAT SERPL-MCNC: 0.74 MG/DL — SIGNIFICANT CHANGE UP (ref 0.5–1.3)
GLUCOSE SERPL-MCNC: 162 MG/DL — HIGH (ref 70–99)
HCT VFR BLD CALC: 33.6 % — LOW (ref 39–50)
HGB BLD-MCNC: 10.9 G/DL — LOW (ref 13–17)
MAGNESIUM SERPL-MCNC: 1.8 MG/DL — SIGNIFICANT CHANGE UP (ref 1.6–2.6)
MCHC RBC-ENTMCNC: 31.4 PG — SIGNIFICANT CHANGE UP (ref 27–34)
MCHC RBC-ENTMCNC: 32.4 GM/DL — SIGNIFICANT CHANGE UP (ref 32–36)
MCV RBC AUTO: 96.8 FL — SIGNIFICANT CHANGE UP (ref 80–100)
NRBC # BLD: 0 /100 WBCS — SIGNIFICANT CHANGE UP
NRBC # FLD: 0 K/UL — SIGNIFICANT CHANGE UP
PHOSPHATE SERPL-MCNC: 3.1 MG/DL — SIGNIFICANT CHANGE UP (ref 2.5–4.5)
PLATELET # BLD AUTO: 291 K/UL — SIGNIFICANT CHANGE UP (ref 150–400)
POTASSIUM SERPL-MCNC: 4.2 MMOL/L — SIGNIFICANT CHANGE UP (ref 3.5–5.3)
POTASSIUM SERPL-SCNC: 4.2 MMOL/L — SIGNIFICANT CHANGE UP (ref 3.5–5.3)
RBC # BLD: 3.47 M/UL — LOW (ref 4.2–5.8)
RBC # FLD: 14.7 % — HIGH (ref 10.3–14.5)
SODIUM SERPL-SCNC: 136 MMOL/L — SIGNIFICANT CHANGE UP (ref 135–145)
WBC # BLD: 14.63 K/UL — HIGH (ref 3.8–10.5)
WBC # FLD AUTO: 14.63 K/UL — HIGH (ref 3.8–10.5)

## 2021-11-20 PROCEDURE — 99233 SBSQ HOSP IP/OBS HIGH 50: CPT

## 2021-11-20 RX ORDER — MAGNESIUM SULFATE 500 MG/ML
2 VIAL (ML) INJECTION ONCE
Refills: 0 | Status: COMPLETED | OUTPATIENT
Start: 2021-11-20 | End: 2021-11-20

## 2021-11-20 RX ADMIN — Medication 650 MILLIGRAM(S): at 21:53

## 2021-11-20 RX ADMIN — Medication 4 UNIT(S): at 12:05

## 2021-11-20 RX ADMIN — Medication 1 TABLET(S): at 12:04

## 2021-11-20 RX ADMIN — Medication 4 UNIT(S): at 17:59

## 2021-11-20 RX ADMIN — Medication 100 MILLIGRAM(S): at 21:54

## 2021-11-20 RX ADMIN — INSULIN GLARGINE 12 UNIT(S): 100 INJECTION, SOLUTION SUBCUTANEOUS at 21:54

## 2021-11-20 RX ADMIN — Medication 1 APPLICATION(S): at 12:10

## 2021-11-20 RX ADMIN — CARVEDILOL PHOSPHATE 12.5 MILLIGRAM(S): 80 CAPSULE, EXTENDED RELEASE ORAL at 05:55

## 2021-11-20 RX ADMIN — Medication 2: at 21:55

## 2021-11-20 RX ADMIN — Medication 81 MILLIGRAM(S): at 12:05

## 2021-11-20 RX ADMIN — ATORVASTATIN CALCIUM 40 MILLIGRAM(S): 80 TABLET, FILM COATED ORAL at 21:53

## 2021-11-20 RX ADMIN — LISINOPRIL 10 MILLIGRAM(S): 2.5 TABLET ORAL at 05:55

## 2021-11-20 RX ADMIN — Medication 1: at 08:21

## 2021-11-20 RX ADMIN — Medication 650 MILLIGRAM(S): at 06:43

## 2021-11-20 RX ADMIN — Medication 650 MILLIGRAM(S): at 22:50

## 2021-11-20 RX ADMIN — ENOXAPARIN SODIUM 40 MILLIGRAM(S): 100 INJECTION SUBCUTANEOUS at 21:54

## 2021-11-20 RX ADMIN — Medication 100 MILLIGRAM(S): at 12:55

## 2021-11-20 RX ADMIN — OXYCODONE HYDROCHLORIDE 5 MILLIGRAM(S): 5 TABLET ORAL at 00:10

## 2021-11-20 RX ADMIN — Medication 2: at 18:00

## 2021-11-20 RX ADMIN — Medication 650 MILLIGRAM(S): at 09:45

## 2021-11-20 RX ADMIN — CARVEDILOL PHOSPHATE 12.5 MILLIGRAM(S): 80 CAPSULE, EXTENDED RELEASE ORAL at 18:00

## 2021-11-20 RX ADMIN — Medication 100 MILLIGRAM(S): at 05:55

## 2021-11-20 RX ADMIN — Medication 4 UNIT(S): at 08:21

## 2021-11-20 RX ADMIN — Medication 50 GRAM(S): at 10:10

## 2021-11-20 NOTE — PROGRESS NOTE ADULT - SUBJECTIVE AND OBJECTIVE BOX
Patient is a 61y old  Male who presents with a chief complaint of dry gangrene L 1st + 5th toes (20 Nov 2021 11:45)      SUBJECTIVE / OVERNIGHT EVENTS: Doing well. No acute event overnight or new complaint.    MEDICATIONS  (STANDING):  aspirin enteric coated 81 milliGRAM(s) Oral daily  atorvastatin 40 milliGRAM(s) Oral at bedtime  cadexomer iodine 0.9% Gel 1 Application(s) Topical daily  carvedilol 12.5 milliGRAM(s) Oral every 12 hours  ceFAZolin   IVPB 1000 milliGRAM(s) IV Intermittent every 8 hours  dextrose 50% Injectable 25 Gram(s) IV Push once  dextrose 50% Injectable 12.5 Gram(s) IV Push once  dextrose 50% Injectable 25 Gram(s) IV Push once  enoxaparin Injectable 40 milliGRAM(s) SubCutaneous every 24 hours  influenza   Vaccine 0.5 milliLiter(s) IntraMuscular once  insulin glargine Injectable (LANTUS) 12 Unit(s) SubCutaneous at bedtime  insulin lispro (ADMELOG) corrective regimen sliding scale   SubCutaneous Before meals and at bedtime  insulin lispro Injectable (ADMELOG) 4 Unit(s) SubCutaneous three times a day before meals  lisinopril 10 milliGRAM(s) Oral daily  multivitamin 1 Tablet(s) Oral daily    MEDICATIONS  (PRN):  acetaminophen     Tablet .. 650 milliGRAM(s) Oral every 6 hours PRN Mild Pain (1 - 3)  oxyCODONE    IR 10 milliGRAM(s) Oral every 6 hours PRN Severe Pain (7 - 10)  oxyCODONE    IR 5 milliGRAM(s) Oral every 6 hours PRN Moderate Pain (4 - 6)      CAPILLARY BLOOD GLUCOSE      POCT Blood Glucose.: 118 mg/dL (20 Nov 2021 11:49)  POCT Blood Glucose.: 189 mg/dL (20 Nov 2021 07:24)  POCT Blood Glucose.: 258 mg/dL (19 Nov 2021 21:00)  POCT Blood Glucose.: 180 mg/dL (19 Nov 2021 17:32)    I&O's Summary    19 Nov 2021 07:01  -  20 Nov 2021 07:00  --------------------------------------------------------  IN: 0 mL / OUT: 350 mL / NET: -350 mL        PHYSICAL EXAM:  Vital Signs Last 24 Hrs  T(C): 36.9 (20 Nov 2021 09:55), Max: 37.8 (19 Nov 2021 18:20)  T(F): 98.4 (20 Nov 2021 09:55), Max: 100.1 (19 Nov 2021 18:20)  HR: 82 (20 Nov 2021 09:55) (82 - 101)  BP: 125/64 (20 Nov 2021 09:55) (125/64 - 144/86)  BP(mean): --  RR: 18 (20 Nov 2021 09:55) (18 - 18)  SpO2: 100% (20 Nov 2021 09:55) (99% - 100%)  CONSTITUTIONAL: NAD, well-developed, well-groomed  EYES: PERRLA; conjunctiva and sclera clear  ENMT: Moist oral mucosa, no pharyngeal injection or exudates; normal dentition  RESPIRATORY: Normal respiratory effort; lungs are clear to auscultation bilaterally  CARDIOVASCULAR: Regular rate and rhythm, normal S1 and S2, no murmur/rub/gallop; No lower extremity edema; Peripheral pulses are 2+ bilaterally  ABDOMEN: Nontender to palpation, normoactive bowel sounds, no rebound/guarding; No hepatosplenomegaly    LABS:                        10.9   14.63 )-----------( 291      ( 20 Nov 2021 06:49 )             33.6     11-20    136  |  100  |  26<H>  ----------------------------<  162<H>  4.2   |  25  |  0.74    Ca    9.0      20 Nov 2021 06:49  Phos  3.1     11-20  Mg     1.80     11-20                  RADIOLOGY & ADDITIONAL TESTS:  Results Reviewed:   Imaging Personally Reviewed:  Electrocardiogram Personally Reviewed:    COORDINATION OF CARE:  Care Discussed with Consultants/Other Providers [Y/N]:  Prior or Outpatient Records Reviewed [Y/N]:

## 2021-11-20 NOTE — PROGRESS NOTE ADULT - SUBJECTIVE AND OBJECTIVE BOX
Date of service 11/20/2021    chief complaint: L toe discoloration    extended hpi: 60 y/o male PMH HTN, HLD, DM, CKD, PVD with a Right Femoral-Femoral Bypass in 2018, s/p R AKA 10/2021, with last TTE 9/2021 with normal LV function and last NST 9/2021 with no ischemia, presented with dry gangrene to L foot toes.    S: Denies chest pain or sob; ros otherwise negative.     Review of Systems:   Constitutional: [ ] fevers, [ ] chills.   Skin: [ ] dry skin. [ ] rashes.  Psychiatric: [ ] depression, [ ] anxiety.   Gastrointestinal: [ ] BRBPR, [ ] melena.   Neurological: [ ] confusion. [ ] seizures. [ ] shuffling gait.   Ears,Nose,Mouth and Throat: [ ] ear pain [ ] sore throat.   Eyes: [ ] diplopia.   Respiratory: [ ] hemoptysis. [ ] shortness of breath  Cardiovascular: See HPI above  Hematologic/Lymphatic: [ ] anemia. [ ] painful nodes. [ ] prolonged bleeding.   Genitourinary: [ ] hematuria. [ ] flank pain.   Endocrine: [ ] significant change in weight. [ ] intolerance to heat and cold.     Review of systems [x ] otherwise negative, [ ] otherwise unable to obtain    FH: no family history of sudden cardiac death in first degree relatives    SH: [ ] tobacco, [ ] alcohol, [ ] drugs      MEDICATIONS  (STANDING):  aspirin enteric coated 81 milliGRAM(s) Oral daily  atorvastatin 40 milliGRAM(s) Oral at bedtime  cadexomer iodine 0.9% Gel 1 Application(s) Topical daily  carvedilol 12.5 milliGRAM(s) Oral every 12 hours  ceFAZolin   IVPB 1000 milliGRAM(s) IV Intermittent every 8 hours  dextrose 50% Injectable 25 Gram(s) IV Push once  dextrose 50% Injectable 12.5 Gram(s) IV Push once  dextrose 50% Injectable 25 Gram(s) IV Push once  enoxaparin Injectable 40 milliGRAM(s) SubCutaneous every 24 hours  influenza   Vaccine 0.5 milliLiter(s) IntraMuscular once  insulin glargine Injectable (LANTUS) 12 Unit(s) SubCutaneous at bedtime  insulin lispro (ADMELOG) corrective regimen sliding scale   SubCutaneous Before meals and at bedtime  insulin lispro Injectable (ADMELOG) 4 Unit(s) SubCutaneous three times a day before meals  lisinopril 10 milliGRAM(s) Oral daily  multivitamin 1 Tablet(s) Oral daily    MEDICATIONS  (PRN):  acetaminophen     Tablet .. 650 milliGRAM(s) Oral every 6 hours PRN Mild Pain (1 - 3)  oxyCODONE    IR 10 milliGRAM(s) Oral every 6 hours PRN Severe Pain (7 - 10)  oxyCODONE    IR 5 milliGRAM(s) Oral every 6 hours PRN Moderate Pain (4 - 6)      LABS:                          10.9   14.63 )-----------( 291      ( 20 Nov 2021 06:49 )             33.6     Hemoglobin: 10.9 g/dL (11-20 @ 06:49)  Hemoglobin: 10.4 g/dL (11-19 @ 07:47)  Hemoglobin: 10.4 g/dL (11-18 @ 07:18)  Hemoglobin: 11.0 g/dL (11-17 @ 15:11)  Hemoglobin: 10.9 g/dL (11-17 @ 04:25)    11-20    136  |  100  |  26<H>  ----------------------------<  162<H>  4.2   |  25  |  0.74    Ca    9.0      20 Nov 2021 06:49  Phos  3.1     11-20  Mg     1.80     11-20      Creatinine Trend: 0.74<--, 0.74<--, 0.68<--, 0.76<--, 0.98<--, 0.85<--             11-19-21 @ 07:01  -  11-20-21 @ 07:00  --------------------------------------------------------  IN: 0 mL / OUT: 350 mL / NET: -350 mL        PHYSICAL EXAM  Vital Signs Last 24 Hrs  T(C): 36.9 (20 Nov 2021 09:55), Max: 37.8 (19 Nov 2021 18:20)  T(F): 98.4 (20 Nov 2021 09:55), Max: 100.1 (19 Nov 2021 18:20)  HR: 82 (20 Nov 2021 09:55) (82 - 101)  BP: 125/64 (20 Nov 2021 09:55) (125/64 - 144/86)  BP(mean): --  RR: 18 (20 Nov 2021 09:55) (18 - 18)  SpO2: 100% (20 Nov 2021 09:55) (99% - 100%)      General: Well nourished in no acute distress. Alert and Oriented * 3.   Head: Normocephalic and atraumatic.   Neck: No JVD. No bruits. Supple. Does not appear to be enlarged.   Cardiovascular: + S1,S2 ; RRR Soft systolic murmur at the left lower sternal border. No rubs noted.    Lungs: CTA b/l. No rhonchi, rales or wheezes.   Abdomen: + BS, soft. Non tender. Non distended. No rebound. No guarding.   Extremities: No clubbing/cyanosis/edema LLE, R AKA  Neurologic: Moves all four extremities. Full range of motion.   Skin: Warm and moist. The patient's skin has normal elasticity and good skin turgor.   Psychiatric: Appropriate mood and affect.  Musculoskeletal: Normal range of motion, normal strength    DATA    ECG:  NSR LAD	    < from: CT Cervical Spine No Cont (11.06.21 @ 16:50) >  IMPRESSION:  1.  No acute fracture or traumatic subluxation of the cervical spine.  2.  At C4-5, a posterior disc osteophyte complex causes mild central stenosis.    < end of copied text >    < from: Transthoracic Echocardiogram (09.29.21 @ 17:35) >  CONCLUSIONS:  1. Calcified trileaflet aortic valve with normal opening.  2. Normal left ventricular internal dimensions and wall  thicknesses.  3. Normal left ventricular systolic function. No segmental  wall motion abnormalities.  4. Normal right ventricular size and function.  ------------------------------------------------------------------------  Confirmed on  9/29/2021 - 18:38:12 by Gage Garibay M.D. RPVI    < end of copied text >        < from: Nuclear Stress Test-Pharmacologic (Nuclear Stress Test-Pharmacologic .) (09.30.21 @ 16:16) >  GATED ANALYSIS:  Post-stress gated wall motion analysis was performed (LVEF  = 66 %;LVEDV = 66 ml.), revealing normal LV function.  There was no segmental wall motion abnormality. Septal  wall motion appeared normal. RV function appeared normal.  ------------------------------------------------------------------------  IMPRESSIONS:Mildly Abnormal Study  * Myocardial Perfusion SPECT results are mildly abnormal.  * Review of raw data shows: The study is of fair technical  quality with adjacent bowel tracer uptake  * The left ventricle was normal in size. There is a small,  mild defect in septal wall that is fixed, suggestive of  mild scarring  * No clear evidence of ischemia.  * Post-stress gated wall motion analysis was performed  (LVEF = 66 %;LVEDV = 66 ml.), revealing normal LV  function. There was no segmental wall motion abnormality.  Septal wall motion appeared normal. RV function appeared  normal.  ------------------------------------------------------------------------  Confirmed on  10/1/2021 - 17:43:39 by Momo Deluna M.D.    < end of copied text >        ASSESSMENT/PLAN: 	60 y/o male PMH HTN, HLD, DM, CKD, PVD with a Right Femoral-Femoral Bypass in 2018, s/p R AKA 10/2021, with last TTE 9/2021 with normal LV function and last NST 9/2021 with no ischemia, presented with dry gangrene to L foot toes, s/p LLE angio and Left Fem-AK pop bypass with PTFE 11/17.    --No evidence of clinical HF or anginal symptoms  --EKG with no acute changes  --recent TTE and NST noted above with normal LV function and no ischemia  --post op care per vascular  --no further inpatient cardiac w/u planned    Elvin Zazueta PA-C  Pager: [phone]

## 2021-11-20 NOTE — PROGRESS NOTE ADULT - SUBJECTIVE AND OBJECTIVE BOX
SUBJECTIVE:   Seen and examined at bedside.      OBJECTIVE: T(C): 37.8 (11-19-21 @ 18:20), Max: 37.8 (11-19-21 @ 18:20)  HR: 101 (11-19-21 @ 18:20) (87 - 101)  BP: 144/86 (11-19-21 @ 18:20) (134/67 - 144/86)  RR: 18 (11-19-21 @ 18:20) (18 - 18)  SpO2: 99% (11-19-21 @ 18:20) (99% - 100%)  Wt(kg): --  I&O's Summary    18 Nov 2021 07:01  -  19 Nov 2021 07:00  --------------------------------------------------------  IN: 560 mL / OUT: 1250 mL / NET: -690 mL    19 Nov 2021 07:01  -  20 Nov 2021 05:47  --------------------------------------------------------  IN: 0 mL / OUT: 100 mL / NET: -100 mL      I&O's Detail    18 Nov 2021 07:01  -  19 Nov 2021 07:00  --------------------------------------------------------  IN:    IV PiggyBack: 200 mL    Oral Fluid: 360 mL  Total IN: 560 mL    OUT:    Voided (mL): 1250 mL  Total OUT: 1250 mL    Total NET: -690 mL      19 Nov 2021 07:01  -  20 Nov 2021 05:47  --------------------------------------------------------  IN:  Total IN: 0 mL    OUT:    Voided (mL): 100 mL  Total OUT: 100 mL    Total NET: -100 mL      Physical Exam  General:  Abdomen:    MEDICATIONS  (STANDING):  aspirin enteric coated 81 milliGRAM(s) Oral daily  atorvastatin 40 milliGRAM(s) Oral at bedtime  cadexomer iodine 0.9% Gel 1 Application(s) Topical daily  carvedilol 12.5 milliGRAM(s) Oral every 12 hours  ceFAZolin   IVPB 1000 milliGRAM(s) IV Intermittent every 8 hours  dextrose 50% Injectable 25 Gram(s) IV Push once  dextrose 50% Injectable 12.5 Gram(s) IV Push once  dextrose 50% Injectable 25 Gram(s) IV Push once  enoxaparin Injectable 40 milliGRAM(s) SubCutaneous every 24 hours  influenza   Vaccine 0.5 milliLiter(s) IntraMuscular once  insulin glargine Injectable (LANTUS) 12 Unit(s) SubCutaneous at bedtime  insulin lispro (ADMELOG) corrective regimen sliding scale   SubCutaneous Before meals and at bedtime  insulin lispro Injectable (ADMELOG) 4 Unit(s) SubCutaneous three times a day before meals  lisinopril 10 milliGRAM(s) Oral daily  multivitamin 1 Tablet(s) Oral daily    MEDICATIONS  (PRN):  acetaminophen     Tablet .. 650 milliGRAM(s) Oral every 6 hours PRN Mild Pain (1 - 3)  oxyCODONE    IR 10 milliGRAM(s) Oral every 6 hours PRN Severe Pain (7 - 10)  oxyCODONE    IR 5 milliGRAM(s) Oral every 6 hours PRN Moderate Pain (4 - 6)      LABS:                        10.4   14.53 )-----------( 305      ( 19 Nov 2021 07:47 )             33.2     11-19    136  |  99  |  25<H>  ----------------------------<  154<H>  3.8   |  24  |  0.74    Ca    8.9      19 Nov 2021 07:47  Phos  3.0     11-19  Mg     1.60     11-19             No acute events overnight.     SUBJECTIVE:   Seen and examined at bedside. Pt denies n/v, fevers, chills, CP, SOB, abd pain.       OBJECTIVE: T(C): 37.8 (11-19-21 @ 18:20), Max: 37.8 (11-19-21 @ 18:20)  HR: 101 (11-19-21 @ 18:20) (87 - 101)  BP: 144/86 (11-19-21 @ 18:20) (134/67 - 144/86)  RR: 18 (11-19-21 @ 18:20) (18 - 18)  SpO2: 99% (11-19-21 @ 18:20) (99% - 100%)  Wt(kg): --  I&O's Summary    18 Nov 2021 07:01  -  19 Nov 2021 07:00  --------------------------------------------------------  IN: 560 mL / OUT: 1250 mL / NET: -690 mL    19 Nov 2021 07:01  -  20 Nov 2021 05:47  --------------------------------------------------------  IN: 0 mL / OUT: 100 mL / NET: -100 mL      I&O's Detail    18 Nov 2021 07:01  -  19 Nov 2021 07:00  --------------------------------------------------------  IN:    IV PiggyBack: 200 mL    Oral Fluid: 360 mL  Total IN: 560 mL    OUT:    Voided (mL): 1250 mL  Total OUT: 1250 mL    Total NET: -690 mL      19 Nov 2021 07:01  -  20 Nov 2021 05:47  --------------------------------------------------------  IN:  Total IN: 0 mL    OUT:    Voided (mL): 100 mL  Total OUT: 100 mL    Total NET: -100 mL      Physical Exam:   General: NAD, resting comfortably in bed.  Respiratory: Nonlabored respirations  Cardio: RRR  Vascular: R groin dressing c/d/i. no palpable collections   R thigh dressing c/d/i. no palpable collections or skin changes  L PT palpable. calf soft, easily compressible.     MEDICATIONS  (STANDING):  aspirin enteric coated 81 milliGRAM(s) Oral daily  atorvastatin 40 milliGRAM(s) Oral at bedtime  cadexomer iodine 0.9% Gel 1 Application(s) Topical daily  carvedilol 12.5 milliGRAM(s) Oral every 12 hours  ceFAZolin   IVPB 1000 milliGRAM(s) IV Intermittent every 8 hours  dextrose 50% Injectable 25 Gram(s) IV Push once  dextrose 50% Injectable 12.5 Gram(s) IV Push once  dextrose 50% Injectable 25 Gram(s) IV Push once  enoxaparin Injectable 40 milliGRAM(s) SubCutaneous every 24 hours  influenza   Vaccine 0.5 milliLiter(s) IntraMuscular once  insulin glargine Injectable (LANTUS) 12 Unit(s) SubCutaneous at bedtime  insulin lispro (ADMELOG) corrective regimen sliding scale   SubCutaneous Before meals and at bedtime  insulin lispro Injectable (ADMELOG) 4 Unit(s) SubCutaneous three times a day before meals  lisinopril 10 milliGRAM(s) Oral daily  multivitamin 1 Tablet(s) Oral daily    MEDICATIONS  (PRN):  acetaminophen     Tablet .. 650 milliGRAM(s) Oral every 6 hours PRN Mild Pain (1 - 3)  oxyCODONE    IR 10 milliGRAM(s) Oral every 6 hours PRN Severe Pain (7 - 10)  oxyCODONE    IR 5 milliGRAM(s) Oral every 6 hours PRN Moderate Pain (4 - 6)      LABS:                        10.4   14.53 )-----------( 305      ( 19 Nov 2021 07:47 )             33.2     11-19    136  |  99  |  25<H>  ----------------------------<  154<H>  3.8   |  24  |  0.74    Ca    8.9      19 Nov 2021 07:47  Phos  3.0     11-19  Mg     1.60     11-19

## 2021-11-20 NOTE — PROGRESS NOTE ADULT - ASSESSMENT
Assessment:  61yMale POD#2 S/P LLE angiogram, left fem-pop bypass w/ PTFE graft     Plan:  - PT and dispo planning  - ASA and lovenox  - Ancef  - Pain control PRN  - Diet: Reg  - Activity: as tolerated     C Team/Vascular  [id number]. Assessment:  61yMale S/P 11/17 LLE angiogram, left fem-pop bypass w/ PTFE graft     Plan:  - PT and dispo planning  - ASA and lovenox  - Ancef  - Pain control PRN  - Diet: Reg  - Activity: as tolerated     C Team/Vascular  [id number].

## 2021-11-21 ENCOUNTER — TRANSCRIPTION ENCOUNTER (OUTPATIENT)
Age: 61
End: 2021-11-21

## 2021-11-21 LAB
ANION GAP SERPL CALC-SCNC: 10 MMOL/L — SIGNIFICANT CHANGE UP (ref 7–14)
BUN SERPL-MCNC: 30 MG/DL — HIGH (ref 7–23)
CALCIUM SERPL-MCNC: 9 MG/DL — SIGNIFICANT CHANGE UP (ref 8.4–10.5)
CHLORIDE SERPL-SCNC: 99 MMOL/L — SIGNIFICANT CHANGE UP (ref 98–107)
CO2 SERPL-SCNC: 27 MMOL/L — SIGNIFICANT CHANGE UP (ref 22–31)
CREAT SERPL-MCNC: 0.85 MG/DL — SIGNIFICANT CHANGE UP (ref 0.5–1.3)
GLUCOSE SERPL-MCNC: 186 MG/DL — HIGH (ref 70–99)
HCT VFR BLD CALC: 33.5 % — LOW (ref 39–50)
HGB BLD-MCNC: 10.5 G/DL — LOW (ref 13–17)
MAGNESIUM SERPL-MCNC: 2 MG/DL — SIGNIFICANT CHANGE UP (ref 1.6–2.6)
MCHC RBC-ENTMCNC: 31.3 GM/DL — LOW (ref 32–36)
MCHC RBC-ENTMCNC: 31.3 PG — SIGNIFICANT CHANGE UP (ref 27–34)
MCV RBC AUTO: 100 FL — SIGNIFICANT CHANGE UP (ref 80–100)
NRBC # BLD: 0 /100 WBCS — SIGNIFICANT CHANGE UP
NRBC # FLD: 0 K/UL — SIGNIFICANT CHANGE UP
PHOSPHATE SERPL-MCNC: 3.3 MG/DL — SIGNIFICANT CHANGE UP (ref 2.5–4.5)
PLATELET # BLD AUTO: 352 K/UL — SIGNIFICANT CHANGE UP (ref 150–400)
POTASSIUM SERPL-MCNC: 4.7 MMOL/L — SIGNIFICANT CHANGE UP (ref 3.5–5.3)
POTASSIUM SERPL-SCNC: 4.7 MMOL/L — SIGNIFICANT CHANGE UP (ref 3.5–5.3)
RBC # BLD: 3.35 M/UL — LOW (ref 4.2–5.8)
RBC # FLD: 14.4 % — SIGNIFICANT CHANGE UP (ref 10.3–14.5)
SARS-COV-2 RNA SPEC QL NAA+PROBE: SIGNIFICANT CHANGE UP
SODIUM SERPL-SCNC: 136 MMOL/L — SIGNIFICANT CHANGE UP (ref 135–145)
WBC # BLD: 13.7 K/UL — HIGH (ref 3.8–10.5)
WBC # FLD AUTO: 13.7 K/UL — HIGH (ref 3.8–10.5)

## 2021-11-21 PROCEDURE — 99233 SBSQ HOSP IP/OBS HIGH 50: CPT

## 2021-11-21 RX ORDER — SENNA PLUS 8.6 MG/1
1 TABLET ORAL ONCE
Refills: 0 | Status: COMPLETED | OUTPATIENT
Start: 2021-11-21 | End: 2021-11-21

## 2021-11-21 RX ORDER — POLYETHYLENE GLYCOL 3350 17 G/17G
17 POWDER, FOR SOLUTION ORAL ONCE
Refills: 0 | Status: COMPLETED | OUTPATIENT
Start: 2021-11-21 | End: 2021-11-21

## 2021-11-21 RX ADMIN — INSULIN GLARGINE 12 UNIT(S): 100 INJECTION, SOLUTION SUBCUTANEOUS at 22:18

## 2021-11-21 RX ADMIN — Medication 3: at 17:46

## 2021-11-21 RX ADMIN — Medication 1 TABLET(S): at 12:22

## 2021-11-21 RX ADMIN — OXYCODONE HYDROCHLORIDE 10 MILLIGRAM(S): 5 TABLET ORAL at 13:00

## 2021-11-21 RX ADMIN — Medication 2: at 07:59

## 2021-11-21 RX ADMIN — Medication 100 MILLIGRAM(S): at 05:20

## 2021-11-21 RX ADMIN — CARVEDILOL PHOSPHATE 12.5 MILLIGRAM(S): 80 CAPSULE, EXTENDED RELEASE ORAL at 05:20

## 2021-11-21 RX ADMIN — ENOXAPARIN SODIUM 40 MILLIGRAM(S): 100 INJECTION SUBCUTANEOUS at 23:01

## 2021-11-21 RX ADMIN — Medication 4 UNIT(S): at 12:23

## 2021-11-21 RX ADMIN — OXYCODONE HYDROCHLORIDE 10 MILLIGRAM(S): 5 TABLET ORAL at 12:22

## 2021-11-21 RX ADMIN — Medication 4 UNIT(S): at 17:47

## 2021-11-21 RX ADMIN — Medication 81 MILLIGRAM(S): at 12:22

## 2021-11-21 RX ADMIN — ATORVASTATIN CALCIUM 40 MILLIGRAM(S): 80 TABLET, FILM COATED ORAL at 23:01

## 2021-11-21 RX ADMIN — Medication 100 MILLIGRAM(S): at 13:22

## 2021-11-21 RX ADMIN — CARVEDILOL PHOSPHATE 12.5 MILLIGRAM(S): 80 CAPSULE, EXTENDED RELEASE ORAL at 17:47

## 2021-11-21 RX ADMIN — Medication 3: at 12:23

## 2021-11-21 RX ADMIN — Medication 100 MILLIGRAM(S): at 23:02

## 2021-11-21 RX ADMIN — Medication 1 APPLICATION(S): at 13:07

## 2021-11-21 RX ADMIN — POLYETHYLENE GLYCOL 3350 17 GRAM(S): 17 POWDER, FOR SOLUTION ORAL at 12:22

## 2021-11-21 RX ADMIN — LISINOPRIL 10 MILLIGRAM(S): 2.5 TABLET ORAL at 05:20

## 2021-11-21 RX ADMIN — Medication 4 UNIT(S): at 07:59

## 2021-11-21 NOTE — DISCHARGE NOTE PROVIDER - NSDCCPCAREPLAN_GEN_ALL_CORE_FT
PRINCIPAL DISCHARGE DIAGNOSIS  Diagnosis: Gangrene  Assessment and Plan of Treatment: WOUND CARE:  Daily L foot dressing change with betadine soaked 4x4 gauze with lona.  BATHING: Please do not submerge wound underwater. You may shower and/or sponge bathe.  ACTIVITY: No heavy lifting or straining. Otherwise, you may return to your usual level of physical activity. If you are taking narcotic pain medication (such as Percocet) DO NOT drive a car, operate machinery or make important decisions.  DIET: Return to your usual diet.  NOTIFY YOUR SURGEON IF: You have any bleeding that does not stop, any pus draining from your wound(s), any fever (over 100.4 F) or chills, persistent nausea/vomiting, persistent diarrhea, or if your pain is not controlled on your discharge pain medications.  FOLLOW-UP: Please follow up with your primary care physician in one week regarding your hospitalization

## 2021-11-21 NOTE — PROGRESS NOTE ADULT - SUBJECTIVE AND OBJECTIVE BOX
SURGERY  Pager: #[phone]    INTERVAL EVENTS/SUBJECTIVE: No acute events overnight.     ______________________________________________  OBJECTIVE:   T(C): 36.7 (11-20-21 @ 21:29), Max: 37.1 (11-20-21 @ 14:27)  HR: 91 (11-20-21 @ 21:29) (82 - 91)  BP: 140/74 (11-20-21 @ 21:29) (125/64 - 140/74)  RR: 18 (11-20-21 @ 21:29) (18 - 18)  SpO2: 100% (11-20-21 @ 21:29) (99% - 100%)  Wt(kg): --  CAPILLARY BLOOD GLUCOSE      POCT Blood Glucose.: 202 mg/dL (20 Nov 2021 21:29)  POCT Blood Glucose.: 222 mg/dL (20 Nov 2021 17:01)  POCT Blood Glucose.: 118 mg/dL (20 Nov 2021 11:49)  POCT Blood Glucose.: 189 mg/dL (20 Nov 2021 07:24)    I&O's Detail    19 Nov 2021 07:01  -  20 Nov 2021 07:00  --------------------------------------------------------  IN:  Total IN: 0 mL    OUT:    Voided (mL): 350 mL  Total OUT: 350 mL    Total NET: -350 mL      20 Nov 2021 07:01  -  21 Nov 2021 01:02  --------------------------------------------------------  IN:  Total IN: 0 mL    OUT:    Voided (mL): 300 mL  Total OUT: 300 mL    Total NET: -300 mL        Physical Exam:   General: NAD, resting comfortably in bed.  Respiratory: Nonlabored respirations  Cardio: RRR  Vascular: R groin dressing c/d/i. no palpable collections   R thigh dressing c/d/i. no palpable collections or skin changes  L PT palpable. calf soft, easily compressible.     ______________________________________________  LABS:  CBC Full  -  ( 20 Nov 2021 06:49 )  WBC Count : 14.63 K/uL  RBC Count : 3.47 M/uL  Hemoglobin : 10.9 g/dL  Hematocrit : 33.6 %  Platelet Count - Automated : 291 K/uL  Mean Cell Volume : 96.8 fL  Mean Cell Hemoglobin : 31.4 pg  Mean Cell Hemoglobin Concentration : 32.4 gm/dL  Auto Neutrophil # : x  Auto Lymphocyte # : x  Auto Monocyte # : x  Auto Eosinophil # : x  Auto Basophil # : x  Auto Neutrophil % : x  Auto Lymphocyte % : x  Auto Monocyte % : x  Auto Eosinophil % : x  Auto Basophil % : x    11-20    136  |  100  |  26<H>  ----------------------------<  162<H>  4.2   |  25  |  0.74    Ca    9.0      20 Nov 2021 06:49  Phos  3.1     11-20  Mg     1.80     11-20      _____________________________________________  RADIOLOGY:     SURGERY  Pager: [phone]    INTERVAL EVENTS/SUBJECTIVE: No acute events overnight.     ______________________________________________  OBJECTIVE:   T(C): 36.7 (11-20-21 @ 21:29), Max: 37.1 (11-20-21 @ 14:27)  HR: 91 (11-20-21 @ 21:29) (82 - 91)  BP: 140/74 (11-20-21 @ 21:29) (125/64 - 140/74)  RR: 18 (11-20-21 @ 21:29) (18 - 18)  SpO2: 100% (11-20-21 @ 21:29) (99% - 100%)  Wt(kg): --  CAPILLARY BLOOD GLUCOSE      POCT Blood Glucose.: 202 mg/dL (20 Nov 2021 21:29)  POCT Blood Glucose.: 222 mg/dL (20 Nov 2021 17:01)  POCT Blood Glucose.: 118 mg/dL (20 Nov 2021 11:49)  POCT Blood Glucose.: 189 mg/dL (20 Nov 2021 07:24)    I&O's Detail    19 Nov 2021 07:01  -  20 Nov 2021 07:00  --------------------------------------------------------  IN:  Total IN: 0 mL    OUT:    Voided (mL): 350 mL  Total OUT: 350 mL    Total NET: -350 mL      20 Nov 2021 07:01  -  21 Nov 2021 01:02  --------------------------------------------------------  IN:  Total IN: 0 mL    OUT:    Voided (mL): 300 mL  Total OUT: 300 mL    Total NET: -300 mL        Physical Exam:   General: NAD, resting comfortably in bed.  Respiratory: Nonlabored respirations  Cardio: RRR  Vascular: R groin dressing c/d/i. no palpable collections   R thigh dressing c/d/i. no palpable collections or skin changes  L PT palpable. calf soft, easily compressible.     ______________________________________________  LABS:  CBC Full  -  ( 20 Nov 2021 06:49 )  WBC Count : 14.63 K/uL  RBC Count : 3.47 M/uL  Hemoglobin : 10.9 g/dL  Hematocrit : 33.6 %  Platelet Count - Automated : 291 K/uL  Mean Cell Volume : 96.8 fL  Mean Cell Hemoglobin : 31.4 pg  Mean Cell Hemoglobin Concentration : 32.4 gm/dL  Auto Neutrophil # : x  Auto Lymphocyte # : x  Auto Monocyte # : x  Auto Eosinophil # : x  Auto Basophil # : x  Auto Neutrophil % : x  Auto Lymphocyte % : x  Auto Monocyte % : x  Auto Eosinophil % : x  Auto Basophil % : x    11-20    136  |  100  |  26<H>  ----------------------------<  162<H>  4.2   |  25  |  0.74    Ca    9.0      20 Nov 2021 06:49  Phos  3.1     11-20  Mg     1.80     11-20      _____________________________________________  RADIOLOGY:     SURGERY  Pager: [phone]    INTERVAL EVENTS/SUBJECTIVE: No acute events overnight. This morning, patient is resting comfortably. Pain is controlled. He says he has not had a bowel movement since his operation. Left foot dressing changed at bedside.    ______________________________________________  OBJECTIVE:   T(C): 36.7 (11-20-21 @ 21:29), Max: 37.1 (11-20-21 @ 14:27)  HR: 91 (11-20-21 @ 21:29) (82 - 91)  BP: 140/74 (11-20-21 @ 21:29) (125/64 - 140/74)  RR: 18 (11-20-21 @ 21:29) (18 - 18)  SpO2: 100% (11-20-21 @ 21:29) (99% - 100%)  Wt(kg): --  CAPILLARY BLOOD GLUCOSE      POCT Blood Glucose.: 202 mg/dL (20 Nov 2021 21:29)  POCT Blood Glucose.: 222 mg/dL (20 Nov 2021 17:01)  POCT Blood Glucose.: 118 mg/dL (20 Nov 2021 11:49)  POCT Blood Glucose.: 189 mg/dL (20 Nov 2021 07:24)    I&O's Detail    19 Nov 2021 07:01  -  20 Nov 2021 07:00  --------------------------------------------------------  IN:  Total IN: 0 mL    OUT:    Voided (mL): 350 mL  Total OUT: 350 mL    Total NET: -350 mL      20 Nov 2021 07:01  -  21 Nov 2021 01:02  --------------------------------------------------------  IN:  Total IN: 0 mL    OUT:    Voided (mL): 300 mL  Total OUT: 300 mL    Total NET: -300 mL        Physical Exam:   General: NAD, resting comfortably in bed.  Respiratory: Nonlabored respirations  Cardio: RRR  Vascular: R groin dressing c/d/i. no palpable collections   R thigh dressing c/d/i. no palpable collections or skin changes  L PT palpable. calf soft, easily compressible.     ______________________________________________  LABS:  CBC Full  -  ( 20 Nov 2021 06:49 )  WBC Count : 14.63 K/uL  RBC Count : 3.47 M/uL  Hemoglobin : 10.9 g/dL  Hematocrit : 33.6 %  Platelet Count - Automated : 291 K/uL  Mean Cell Volume : 96.8 fL  Mean Cell Hemoglobin : 31.4 pg  Mean Cell Hemoglobin Concentration : 32.4 gm/dL  Auto Neutrophil # : x  Auto Lymphocyte # : x  Auto Monocyte # : x  Auto Eosinophil # : x  Auto Basophil # : x  Auto Neutrophil % : x  Auto Lymphocyte % : x  Auto Monocyte % : x  Auto Eosinophil % : x  Auto Basophil % : x    11-20    136  |  100  |  26<H>  ----------------------------<  162<H>  4.2   |  25  |  0.74    Ca    9.0      20 Nov 2021 06:49  Phos  3.1     11-20  Mg     1.80     11-20      _____________________________________________  RADIOLOGY:

## 2021-11-21 NOTE — DISCHARGE NOTE PROVIDER - NSDCMRMEDTOKEN_GEN_ALL_CORE_FT
acetaminophen 325 mg oral tablet: 2 tab(s) orally every 6 hours, As Needed  Admelog 100 units/mL injectable solution: 4 unit(s) injectable 3 times a day (before meals)  aspirin 81 mg oral tablet: 1 tab(s) orally once a day  atorvastatin 40 mg oral tablet: 1 tab(s) orally once a day  cadexomer iodine 0.9% topical gel: 1 application topically once a day  Coreg 12.5 mg oral tablet: 1 tab(s) orally 2 times a day  lisinopril 10 mg oral tablet: 1 tab(s) orally once a day  Multiple Vitamins oral tablet: 1 tab(s) orally once a day  ocular lubricant ophthalmic solution: 2 drop(s) to each affected eye 2 times a day  oxyCODONE 10 mg oral tablet: 1 tab(s) orally every 4 hours, As needed, Severe Pain (7 - 10)  oxyCODONE 5 mg oral tablet: 1 tab(s) orally every 4 hours, As needed, Moderate Pain (4 - 6)  Semglee 100 units/mL subcutaneous solution: 12 unit(s) subcutaneous once a day (at bedtime)   acetaminophen 325 mg oral tablet: 2 tab(s) orally every 6 hours, As Needed  Admelog 100 units/mL injectable solution: 4 unit(s) injectable 3 times a day (before meals)  aspirin 81 mg oral tablet: 1 tab(s) orally once a day  atorvastatin 40 mg oral tablet: 1 tab(s) orally once a day  Coreg 12.5 mg oral tablet: 1 tab(s) orally 2 times a day  Keflex 500 mg oral capsule: 1 cap(s) orally every 12 hours  lisinopril 10 mg oral tablet: 1 tab(s) orally once a day  Multiple Vitamins oral tablet: 1 tab(s) orally once a day  ocular lubricant ophthalmic solution: 2 drop(s) to each affected eye 2 times a day  oxyCODONE 10 mg oral tablet: 1 tab(s) orally every 4 hours, As needed, Severe Pain (7 - 10)  oxyCODONE 5 mg oral tablet: 1 tab(s) orally every 4 hours, As needed, Moderate Pain (4 - 6)  Semglee 100 units/mL subcutaneous solution: 12 unit(s) subcutaneous once a day (at bedtime)

## 2021-11-21 NOTE — DISCHARGE NOTE PROVIDER - CARE PROVIDER_API CALL
Autumn Lundberg)  Surgery  [address], [address]  [place] [address]  Phone: [phone]  Fax: [phone]  Follow Up Time: 1 week

## 2021-11-21 NOTE — DISCHARGE NOTE PROVIDER - NSDCFUADDINST_GEN_ALL_CORE_FT
WOUND CARE:  Please keep incisions clean and dry. Please do not Scrub or rub incisions. Do not use lotion or powder on incisions.   BATHING: You may shower and/or sponge bathe. You may use warm soapy water in the shower and rinse, pat dry.  ACTIVITY: No heavy lifting or straining. Otherwise, you may return to your usual level of physical activity. If you are taking narcotic pain medication DO NOT drive a car, operate machinery or make important decisions.  DIET: Return to your usual diet.  NOTIFY YOUR SURGEON IF YOU HAVE: any bleeding that does not stop, any pus draining from your wound(s), any fever (over 100.4 F) persistent nausea/vomiting, or if your pain is not controlled on your discharge pain medications, unable to urinate.  Please follow up with your primary care physician in one week regarding your hospitalization, bring copies of your discharge paperwork.  Please follow up with your surgeon, Dr. Lundberg as an outpatient, please call to schedule appointment

## 2021-11-21 NOTE — DISCHARGE NOTE PROVIDER - HOSPITAL COURSE
60yo M with Hx DM, HTN, PVD, CKD, R AKA (10/12) who presents with progressive blackening of the L 1st and 5th toes for 2 weeks. He was scheduled for an CT angio last week, but missed it because he had fallen. He presented to [facility] today and was transferred to [facility] for vascular surgery evaluation. Patient states denies resting pain or claudication. Denies nausea, vomiting, chest pain, shortness of breath, or dizziness.   Patient was admitted to [facility] Vascular Surgery team and transferred to surgical floor.   11/17 Patient underwent diagnostic LLE angiogram which demonstrated common femoral, profunda-patent, SFA- flush total occlusion, popliteal- above knee patent  TP trunk-patent, AT- total occlusion proximally without reconstitution, peroneal- distal total occlusion PT- patent into foot without stenosis. Performed Left fem-AKpop bypass with 6mm ringed PTFE. Patient tolerated procedure well without complication.   11/18 Podiatry consulted, recommending local wound care for 1st and 5th toes. Daily dressing change with betadine to the left foot big toe and 5th toe followed by 4x4 gauze and kerlix.  Patient diet was advanced to regular as tolerated. Glucose well controlled on insulin. IV pain medication transitioned to oral medication.  Patient is currently tolerating regular diet, ambulating well with assistance, voiding, and pain is well controlled.  Per team and attending patient hemodynamically stable for discharge to rehab and follow up in one week.

## 2021-11-21 NOTE — DISCHARGE NOTE PROVIDER - NSDCCPTREATMENT_GEN_ALL_CORE_FT
PRINCIPAL PROCEDURE  Procedure: Femoral popliteal bypass with prosthetic graft  Findings and Treatment:

## 2021-11-21 NOTE — PROGRESS NOTE ADULT - SUBJECTIVE AND OBJECTIVE BOX
Date of service 11/21/2021    chief complaint: L toe discoloration    extended hpi: 60 y/o male PMH HTN, HLD, DM, CKD, PVD with a Right Femoral-Femoral Bypass in 2018, s/p R AKA 10/2021, with last TTE 9/2021 with normal LV function and last NST 9/2021 with no ischemia, presented with dry gangrene to L foot toes.    S:  pt seen and examined, no complaints, ROS - .      Review of Systems:   Constitutional: [ ] fevers, [ ] chills.   Skin: [ ] dry skin. [ ] rashes.  Psychiatric: [ ] depression, [ ] anxiety.   Gastrointestinal: [ ] BRBPR, [ ] melena.   Neurological: [ ] confusion. [ ] seizures. [ ] shuffling gait.   Ears,Nose,Mouth and Throat: [ ] ear pain [ ] sore throat.   Eyes: [ ] diplopia.   Respiratory: [ ] hemoptysis. [ ] shortness of breath  Cardiovascular: See HPI above  Hematologic/Lymphatic: [ ] anemia. [ ] painful nodes. [ ] prolonged bleeding.   Genitourinary: [ ] hematuria. [ ] flank pain.   Endocrine: [ ] significant change in weight. [ ] intolerance to heat and cold.     Review of systems [x ] otherwise negative, [ ] otherwise unable to obtain    FH: no family history of sudden cardiac death in first degree relatives    SH: [ ] tobacco, [ ] alcohol, [ ] drugs         acetaminophen     Tablet .. 650 milliGRAM(s) Oral every 6 hours PRN  aspirin enteric coated 81 milliGRAM(s) Oral daily  atorvastatin 40 milliGRAM(s) Oral at bedtime  cadexomer iodine 0.9% Gel 1 Application(s) Topical daily  carvedilol 12.5 milliGRAM(s) Oral every 12 hours  ceFAZolin   IVPB 1000 milliGRAM(s) IV Intermittent every 8 hours  dextrose 50% Injectable 25 Gram(s) IV Push once  dextrose 50% Injectable 25 Gram(s) IV Push once  dextrose 50% Injectable 12.5 Gram(s) IV Push once  enoxaparin Injectable 40 milliGRAM(s) SubCutaneous every 24 hours  influenza   Vaccine 0.5 milliLiter(s) IntraMuscular once  insulin glargine Injectable (LANTUS) 12 Unit(s) SubCutaneous at bedtime  insulin lispro (ADMELOG) corrective regimen sliding scale   SubCutaneous Before meals and at bedtime  insulin lispro Injectable (ADMELOG) 4 Unit(s) SubCutaneous three times a day before meals  lisinopril 10 milliGRAM(s) Oral daily  multivitamin 1 Tablet(s) Oral daily  oxyCODONE    IR 10 milliGRAM(s) Oral every 6 hours PRN  oxyCODONE    IR 5 milliGRAM(s) Oral every 6 hours PRN                            10.9   14.63 )-----------( 291      ( 20 Nov 2021 06:49 )             33.6       Hemoglobin: 10.9 g/dL (11-20 @ 06:49)  Hemoglobin: 10.4 g/dL (11-19 @ 07:47)  Hemoglobin: 10.4 g/dL (11-18 @ 07:18)  Hemoglobin: 11.0 g/dL (11-17 @ 15:11)  Hemoglobin: 10.9 g/dL (11-17 @ 04:25)      11-20    136  |  100  |  26<H>  ----------------------------<  162<H>  4.2   |  25  |  0.74    Ca    9.0      20 Nov 2021 06:49  Phos  3.1     11-20  Mg     1.80     11-20      Creatinine Trend: 0.74<--, 0.74<--, 0.68<--, 0.76<--, 0.98<--, 0.85<--    COAGS:           T(C): 36.7 (11-21-21 @ 05:17), Max: 37.1 (11-20-21 @ 14:27)  HR: 86 (11-21-21 @ 05:17) (82 - 91)  BP: 138/68 (11-21-21 @ 05:17) (125/64 - 140/74)  RR: 18 (11-21-21 @ 05:17) (18 - 18)  SpO2: 100% (11-21-21 @ 05:17) (99% - 100%)  Wt(kg): --    I&O's Summary    20 Nov 2021 07:01  -  21 Nov 2021 07:00  --------------------------------------------------------  IN: 0 mL / OUT: 300 mL / NET: -300 mL        General: Well nourished in no acute distress. Alert and Oriented * 3.   Head: Normocephalic and atraumatic.   Neck: No JVD. No bruits. Supple. Does not appear to be enlarged.   Cardiovascular: + S1,S2 ; RRR Soft systolic murmur at the left lower sternal border. No rubs noted.    Lungs: CTA b/l. No rhonchi, rales or wheezes.   Abdomen: + BS, soft. Non tender. Non distended. No rebound. No guarding.   Extremities: No clubbing/cyanosis/edema LLE, R AKA  Neurologic: Moves all four extremities. Full range of motion.   Skin: Warm and moist. The patient's skin has normal elasticity and good skin turgor.   Psychiatric: Appropriate mood and affect.  Musculoskeletal: Normal range of motion, normal strength    DATA    ECG:  NSR LAD	    < from: CT Cervical Spine No Cont (11.06.21 @ 16:50) >  IMPRESSION:  1.  No acute fracture or traumatic subluxation of the cervical spine.  2.  At C4-5, a posterior disc osteophyte complex causes mild central stenosis.    < end of copied text >    < from: Transthoracic Echocardiogram (09.29.21 @ 17:35) >  CONCLUSIONS:  1. Calcified trileaflet aortic valve with normal opening.  2. Normal left ventricular internal dimensions and wall  thicknesses.  3. Normal left ventricular systolic function. No segmental  wall motion abnormalities.  4. Normal right ventricular size and function.  ------------------------------------------------------------------------  Confirmed on  9/29/2021 - 18:38:12 by MARIELOS Valencia    < end of copied text >        < from: Nuclear Stress Test-Pharmacologic (Nuclear Stress Test-Pharmacologic .) (09.30.21 @ 16:16) >  GATED ANALYSIS:  Post-stress gated wall motion analysis was performed (LVEF  = 66 %;LVEDV = 66 ml.), revealing normal LV function.  There was no segmental wall motion abnormality. Septal  wall motion appeared normal. RV function appeared normal.  ------------------------------------------------------------------------  IMPRESSIONS:Mildly Abnormal Study  * Myocardial Perfusion SPECT results are mildly abnormal.  * Review of raw data shows: The study is of fair technical  quality with adjacent bowel tracer uptake  * The left ventricle was normal in size. There is a small,  mild defect in septal wall that is fixed, suggestive of  mild scarring  * No clear evidence of ischemia.  * Post-stress gated wall motion analysis was performed  (LVEF = 66 %;LVEDV = 66 ml.), revealing normal LV  function. There was no segmental wall motion abnormality.  Septal wall motion appeared normal. RV function appeared  normal.  ------------------------------------------------------------------------  Confirmed on  10/1/2021 - 17:43:39 by Momo Deluna M.D.    < end of copied text >        ASSESSMENT/PLAN: 	60 y/o male PMH HTN, HLD, DM, CKD, PVD with a Right Femoral-Femoral Bypass in 2018, s/p R AKA 10/2021, with last TTE 9/2021 with normal LV function and last NST 9/2021 with no ischemia, presented with dry gangrene to L foot toes, s/p LLE angio and Left Fem-AK pop bypass with PTFE 11/17.    --No evidence of clinical HF or anginal symptoms  --EKG with no acute changes  --recent TTE and NST noted above with normal LV function and no ischemia  --post op care per vascular  --no further inpatient cardiac w/u planned

## 2021-11-21 NOTE — PROGRESS NOTE ADULT - ATTENDING COMMENTS
seen and agree w/ PA.  recovering from LE bypass.  no pain in feet, only at surgical sites.  in good spirits.  continue PAD medical mgmt.
MARTY'ed fellow
MARTY'ed fellow

## 2021-11-21 NOTE — PROGRESS NOTE ADULT - SUBJECTIVE AND OBJECTIVE BOX
Patient is a 61y old  Male who presents with a chief complaint of dry gangrene L 1st + 5th toes (21 Nov 2021 07:15)      SUBJECTIVE / OVERNIGHT EVENTS: No acute event overnight.    MEDICATIONS  (STANDING):  aspirin enteric coated 81 milliGRAM(s) Oral daily  atorvastatin 40 milliGRAM(s) Oral at bedtime  cadexomer iodine 0.9% Gel 1 Application(s) Topical daily  carvedilol 12.5 milliGRAM(s) Oral every 12 hours  ceFAZolin   IVPB 1000 milliGRAM(s) IV Intermittent every 8 hours  dextrose 50% Injectable 25 Gram(s) IV Push once  dextrose 50% Injectable 12.5 Gram(s) IV Push once  dextrose 50% Injectable 25 Gram(s) IV Push once  enoxaparin Injectable 40 milliGRAM(s) SubCutaneous every 24 hours  influenza   Vaccine 0.5 milliLiter(s) IntraMuscular once  insulin glargine Injectable (LANTUS) 12 Unit(s) SubCutaneous at bedtime  insulin lispro (ADMELOG) corrective regimen sliding scale   SubCutaneous Before meals and at bedtime  insulin lispro Injectable (ADMELOG) 4 Unit(s) SubCutaneous three times a day before meals  lisinopril 10 milliGRAM(s) Oral daily  multivitamin 1 Tablet(s) Oral daily  polyethylene glycol 3350 17 Gram(s) Oral once  senna 1 Tablet(s) Oral once    MEDICATIONS  (PRN):  acetaminophen     Tablet .. 650 milliGRAM(s) Oral every 6 hours PRN Mild Pain (1 - 3)  oxyCODONE    IR 10 milliGRAM(s) Oral every 6 hours PRN Severe Pain (7 - 10)  oxyCODONE    IR 5 milliGRAM(s) Oral every 6 hours PRN Moderate Pain (4 - 6)      CAPILLARY BLOOD GLUCOSE      POCT Blood Glucose.: 204 mg/dL (21 Nov 2021 07:22)  POCT Blood Glucose.: 202 mg/dL (20 Nov 2021 21:29)  POCT Blood Glucose.: 222 mg/dL (20 Nov 2021 17:01)  POCT Blood Glucose.: 118 mg/dL (20 Nov 2021 11:49)    I&O's Summary    20 Nov 2021 07:01  -  21 Nov 2021 07:00  --------------------------------------------------------  IN: 0 mL / OUT: 300 mL / NET: -300 mL        PHYSICAL EXAM:  Vital Signs Last 24 Hrs  T(C): 36.7 (21 Nov 2021 05:17), Max: 37.1 (20 Nov 2021 14:27)  T(F): 98 (21 Nov 2021 05:17), Max: 98.7 (20 Nov 2021 14:27)  HR: 86 (21 Nov 2021 05:17) (86 - 91)  BP: 138/68 (21 Nov 2021 05:17) (126/69 - 140/74)  BP(mean): --  RR: 18 (21 Nov 2021 05:17) (18 - 18)  SpO2: 100% (21 Nov 2021 05:17) (99% - 100%)  CONSTITUTIONAL: NAD, well-developed, well-groomed  EYES: PERRLA; conjunctiva and sclera clear  ENMT: Moist oral mucosa, no pharyngeal injection or exudates; normal dentition  NECK: Supple, no palpable masses; no thyromegaly  RESPIRATORY: Normal respiratory effort; lungs are clear to auscultation bilaterally  CARDIOVASCULAR: Regular rate and rhythm, normal S1 and S2, no murmur/rub/gallop; No lower extremity edema; Peripheral pulses are 2+ bilaterally  ABDOMEN: Nontender to palpation, normoactive bowel sounds, no rebound/guarding; No hepatosplenomegaly    LABS:                        10.5   13.70 )-----------( 352      ( 21 Nov 2021 07:15 )             33.5     11-21    136  |  99  |  30<H>  ----------------------------<  186<H>  4.7   |  27  |  0.85    Ca    9.0      21 Nov 2021 07:15  Phos  3.3     11-21  Mg     2.00     11-21                  RADIOLOGY & ADDITIONAL TESTS:  Results Reviewed:   Imaging Personally Reviewed:  Electrocardiogram Personally Reviewed:    COORDINATION OF CARE:  Care Discussed with Consultants/Other Providers [Y/N]:  Prior or Outpatient Records Reviewed [Y/N]:

## 2021-11-21 NOTE — PROGRESS NOTE ADULT - ASSESSMENT
Assessment:  61yMale S/P 11/17 LLE angiogram, left fem-pop bypass w/ PTFE graft     Plan:  - PT and dispo planning  - ASA and lovenox  - Ancef  - Pain control PRN  - Diet: Reg  - Activity: as tolerated     C Team/Vascular  [id number]. Assessment:  61yMale S/P 11/17 LLE angiogram, left fem-pop bypass w/ PTFE graft     Plan:  - PT and dispo planning  - ASA and lovenox  - Miralax for constipation  - Ancef  - Pain control PRN  - Diet: Reg  - Activity: as tolerated     C Team/Vascular  [id number].

## 2021-11-21 NOTE — DISCHARGE NOTE PROVIDER - NSDCFUADDAPPT_GEN_ALL_CORE_FT
Podiatry Discharge Instructions:  Follow up: Please follow up with Dr. Win within 1 week of discharge from the hospital, please call [phone] for appointment and discuss that you recently were seen in the hospital.  Wound Care: Betadine soaked 4x4 gauze, ABD pads, and lona.   Weight bearing: Please weight bear as tolerated in a surgical shoe.  Antibiotics: Please continue as instructed.

## 2021-11-22 LAB
ANION GAP SERPL CALC-SCNC: 12 MMOL/L — SIGNIFICANT CHANGE UP (ref 7–14)
BUN SERPL-MCNC: 30 MG/DL — HIGH (ref 7–23)
CALCIUM SERPL-MCNC: 9.2 MG/DL — SIGNIFICANT CHANGE UP (ref 8.4–10.5)
CHLORIDE SERPL-SCNC: 99 MMOL/L — SIGNIFICANT CHANGE UP (ref 98–107)
CO2 SERPL-SCNC: 27 MMOL/L — SIGNIFICANT CHANGE UP (ref 22–31)
CREAT SERPL-MCNC: 0.82 MG/DL — SIGNIFICANT CHANGE UP (ref 0.5–1.3)
GLUCOSE SERPL-MCNC: 184 MG/DL — HIGH (ref 70–99)
HCT VFR BLD CALC: 33.6 % — LOW (ref 39–50)
HGB BLD-MCNC: 11.3 G/DL — LOW (ref 13–17)
MAGNESIUM SERPL-MCNC: 1.8 MG/DL — SIGNIFICANT CHANGE UP (ref 1.6–2.6)
MCHC RBC-ENTMCNC: 32.3 PG — SIGNIFICANT CHANGE UP (ref 27–34)
MCHC RBC-ENTMCNC: 33.6 GM/DL — SIGNIFICANT CHANGE UP (ref 32–36)
MCV RBC AUTO: 96 FL — SIGNIFICANT CHANGE UP (ref 80–100)
NRBC # BLD: 0 /100 WBCS — SIGNIFICANT CHANGE UP
NRBC # FLD: 0 K/UL — SIGNIFICANT CHANGE UP
PHOSPHATE SERPL-MCNC: 3.1 MG/DL — SIGNIFICANT CHANGE UP (ref 2.5–4.5)
PLATELET # BLD AUTO: 362 K/UL — SIGNIFICANT CHANGE UP (ref 150–400)
POTASSIUM SERPL-MCNC: 4.3 MMOL/L — SIGNIFICANT CHANGE UP (ref 3.5–5.3)
POTASSIUM SERPL-SCNC: 4.3 MMOL/L — SIGNIFICANT CHANGE UP (ref 3.5–5.3)
RBC # BLD: 3.5 M/UL — LOW (ref 4.2–5.8)
RBC # FLD: 14.3 % — SIGNIFICANT CHANGE UP (ref 10.3–14.5)
SODIUM SERPL-SCNC: 138 MMOL/L — SIGNIFICANT CHANGE UP (ref 135–145)
WBC # BLD: 12.88 K/UL — HIGH (ref 3.8–10.5)
WBC # FLD AUTO: 12.88 K/UL — HIGH (ref 3.8–10.5)

## 2021-11-22 PROCEDURE — 99232 SBSQ HOSP IP/OBS MODERATE 35: CPT

## 2021-11-22 RX ADMIN — Medication 100 MILLIGRAM(S): at 21:40

## 2021-11-22 RX ADMIN — Medication 4 UNIT(S): at 08:01

## 2021-11-22 RX ADMIN — Medication 1: at 08:00

## 2021-11-22 RX ADMIN — Medication 100 MILLIGRAM(S): at 12:57

## 2021-11-22 RX ADMIN — Medication 4 UNIT(S): at 17:17

## 2021-11-22 RX ADMIN — Medication 2: at 12:30

## 2021-11-22 RX ADMIN — OXYCODONE HYDROCHLORIDE 5 MILLIGRAM(S): 5 TABLET ORAL at 15:25

## 2021-11-22 RX ADMIN — OXYCODONE HYDROCHLORIDE 5 MILLIGRAM(S): 5 TABLET ORAL at 00:10

## 2021-11-22 RX ADMIN — OXYCODONE HYDROCHLORIDE 5 MILLIGRAM(S): 5 TABLET ORAL at 14:10

## 2021-11-22 RX ADMIN — ENOXAPARIN SODIUM 40 MILLIGRAM(S): 100 INJECTION SUBCUTANEOUS at 21:37

## 2021-11-22 RX ADMIN — ATORVASTATIN CALCIUM 40 MILLIGRAM(S): 80 TABLET, FILM COATED ORAL at 21:38

## 2021-11-22 RX ADMIN — Medication 100 MILLIGRAM(S): at 06:50

## 2021-11-22 RX ADMIN — Medication 81 MILLIGRAM(S): at 12:32

## 2021-11-22 RX ADMIN — Medication 1 TABLET(S): at 12:32

## 2021-11-22 RX ADMIN — INSULIN GLARGINE 12 UNIT(S): 100 INJECTION, SOLUTION SUBCUTANEOUS at 22:27

## 2021-11-22 RX ADMIN — Medication 1: at 22:24

## 2021-11-22 RX ADMIN — CARVEDILOL PHOSPHATE 12.5 MILLIGRAM(S): 80 CAPSULE, EXTENDED RELEASE ORAL at 17:18

## 2021-11-22 RX ADMIN — Medication 4 UNIT(S): at 12:31

## 2021-11-22 RX ADMIN — Medication 1 APPLICATION(S): at 11:01

## 2021-11-22 RX ADMIN — LISINOPRIL 10 MILLIGRAM(S): 2.5 TABLET ORAL at 06:03

## 2021-11-22 RX ADMIN — CARVEDILOL PHOSPHATE 12.5 MILLIGRAM(S): 80 CAPSULE, EXTENDED RELEASE ORAL at 06:03

## 2021-11-22 NOTE — PROGRESS NOTE ADULT - SUBJECTIVE AND OBJECTIVE BOX
[facility] Division of Hospital Medicine  Naveen Cao MD  Pager [phone]    Patient is a 61y old  Male who presents with a chief complaint of dry gangrene L 1st + 5th toes (22 Nov 2021 08:59)      SUBJECTIVE / OVERNIGHT EVENTS: Walked with PT, feeling good      MEDICATIONS  (STANDING):  aspirin enteric coated 81 milliGRAM(s) Oral daily  atorvastatin 40 milliGRAM(s) Oral at bedtime  cadexomer iodine 0.9% Gel 1 Application(s) Topical daily  carvedilol 12.5 milliGRAM(s) Oral every 12 hours  ceFAZolin   IVPB 1000 milliGRAM(s) IV Intermittent every 8 hours  dextrose 50% Injectable 25 Gram(s) IV Push once  dextrose 50% Injectable 12.5 Gram(s) IV Push once  dextrose 50% Injectable 25 Gram(s) IV Push once  enoxaparin Injectable 40 milliGRAM(s) SubCutaneous every 24 hours  influenza   Vaccine 0.5 milliLiter(s) IntraMuscular once  insulin glargine Injectable (LANTUS) 12 Unit(s) SubCutaneous at bedtime  insulin lispro (ADMELOG) corrective regimen sliding scale   SubCutaneous Before meals and at bedtime  insulin lispro Injectable (ADMELOG) 4 Unit(s) SubCutaneous three times a day before meals  lisinopril 10 milliGRAM(s) Oral daily  multivitamin 1 Tablet(s) Oral daily    MEDICATIONS  (PRN):  acetaminophen     Tablet .. 650 milliGRAM(s) Oral every 6 hours PRN Mild Pain (1 - 3)  oxyCODONE    IR 5 milliGRAM(s) Oral every 6 hours PRN Moderate Pain (4 - 6)  oxyCODONE    IR 10 milliGRAM(s) Oral every 6 hours PRN Severe Pain (7 - 10)      CAPILLARY BLOOD GLUCOSE      POCT Blood Glucose.: 230 mg/dL (22 Nov 2021 11:52)  POCT Blood Glucose.: 191 mg/dL (22 Nov 2021 07:14)  POCT Blood Glucose.: 179 mg/dL (21 Nov 2021 21:37)  POCT Blood Glucose.: 260 mg/dL (21 Nov 2021 17:06)    I&O's Summary    21 Nov 2021 07:01  -  22 Nov 2021 07:00  --------------------------------------------------------  IN: 0 mL / OUT: 500 mL / NET: -500 mL    22 Nov 2021 07:01  -  22 Nov 2021 11:54  --------------------------------------------------------  IN: 250 mL / OUT: 150 mL / NET: 100 mL        PHYSICAL EXAM:  Vital Signs Last 24 Hrs  T(C): 37 (22 Nov 2021 08:56), Max: 37 (22 Nov 2021 08:56)  T(F): 98.6 (22 Nov 2021 08:56), Max: 98.6 (22 Nov 2021 08:56)  HR: 85 (22 Nov 2021 08:56) (84 - 98)  BP: 152/83 (22 Nov 2021 08:56) (134/69 - 152/83)  BP(mean): --  RR: 18 (22 Nov 2021 08:56) (18 - 18)  SpO2: 95% (22 Nov 2021 08:56) (95% - 100%)  CONSTITUTIONAL: NAD  EYES: EOMI, conjunctiva and sclera clear  ENMT: Moist oral mucosa  NECK: Supple  RESPIRATORY: Breathing unlabored, CTAB  CARDIOVASCULAR: S1S2 no MRG  ABDOMEN: Nontender to palpation, normoactive bowel sounds, no rebound/guarding  MUSCULOSKELETAL:dry gangrene LLE toes  NEUROLOGY: dec sens BLE       LABS:                        11.3   12.88 )-----------( 362      ( 22 Nov 2021 07:33 )             33.6     11-22    138  |  99  |  30<H>  ----------------------------<  184<H>  4.3   |  27  |  0.82    Ca    9.2      22 Nov 2021 07:33  Phos  3.1     11-22  Mg     1.80     11-22            COORDINATION OF CARE:  Care Discussed with Consultants/Other Providers [Y/N]: Vasc surg

## 2021-11-22 NOTE — PROGRESS NOTE ADULT - ASSESSMENT
Assessment:  61yMale S/P 11/17 LLE angiogram, left fem-pop bypass w/ PTFE graft     Plan:  - PT and dispo planning  - COVID negative   - ASA and lovenox  - Miralax for constipation  - Ancef  - Pain control PRN  - Diet: Reg  - Activity: as tolerated     C Team/Vascular  [id number]. Assessment:  61yMale S/P 11/17 LLE angiogram, left fem-pop bypass w/ PTFE graft     Plan:  - PT and dispo planning  - COVID negative   - ASA and lovenox  - Miralax for constipation  - Ancef, switch to Keflex for dispo  - Pain control PRN  - Diet: Reg  - Activity: as tolerated         C Team/Vascular  [id number].

## 2021-11-22 NOTE — PROGRESS NOTE ADULT - SUBJECTIVE AND OBJECTIVE BOX
Date of service 11/22/2021    chief complaint: L toe discoloration    extended hpi: 62 y/o male PMH HTN, HLD, DM, CKD, PVD with a Right Femoral-Femoral Bypass in 2018, s/p R AKA 10/2021, with last TTE 9/2021 with normal LV function and last NST 9/2021 with no ischemia, presented with dry gangrene to L foot toes.    S: no chest pain or sob; ros otherwise negative.     Review of Systems:   Constitutional: [ ] fevers, [ ] chills.   Skin: [ ] dry skin. [ ] rashes.  Psychiatric: [ ] depression, [ ] anxiety.   Gastrointestinal: [ ] BRBPR, [ ] melena.   Neurological: [ ] confusion. [ ] seizures. [ ] shuffling gait.   Ears,Nose,Mouth and Throat: [ ] ear pain [ ] sore throat.   Eyes: [ ] diplopia.   Respiratory: [ ] hemoptysis. [ ] shortness of breath  Cardiovascular: See HPI above  Hematologic/Lymphatic: [ ] anemia. [ ] painful nodes. [ ] prolonged bleeding.   Genitourinary: [ ] hematuria. [ ] flank pain.   Endocrine: [ ] significant change in weight. [ ] intolerance to heat and cold.     Review of systems [x ] otherwise negative, [ ] otherwise unable to obtain    FH: no family history of sudden cardiac death in first degree relatives    SH: [ ] tobacco, [ ] alcohol, [ ] drugs      acetaminophen     Tablet .. 650 milliGRAM(s) Oral every 6 hours PRN  aspirin enteric coated 81 milliGRAM(s) Oral daily  atorvastatin 40 milliGRAM(s) Oral at bedtime  cadexomer iodine 0.9% Gel 1 Application(s) Topical daily  carvedilol 12.5 milliGRAM(s) Oral every 12 hours  ceFAZolin   IVPB 1000 milliGRAM(s) IV Intermittent every 8 hours  dextrose 50% Injectable 25 Gram(s) IV Push once  dextrose 50% Injectable 12.5 Gram(s) IV Push once  dextrose 50% Injectable 25 Gram(s) IV Push once  enoxaparin Injectable 40 milliGRAM(s) SubCutaneous every 24 hours  influenza   Vaccine 0.5 milliLiter(s) IntraMuscular once  insulin glargine Injectable (LANTUS) 12 Unit(s) SubCutaneous at bedtime  insulin lispro (ADMELOG) corrective regimen sliding scale   SubCutaneous Before meals and at bedtime  insulin lispro Injectable (ADMELOG) 4 Unit(s) SubCutaneous three times a day before meals  lisinopril 10 milliGRAM(s) Oral daily  multivitamin 1 Tablet(s) Oral daily  oxyCODONE    IR 10 milliGRAM(s) Oral every 6 hours PRN  oxyCODONE    IR 5 milliGRAM(s) Oral every 6 hours PRN                            11.3   12.88 )-----------( 362      ( 22 Nov 2021 07:33 )             33.6       11-22    138  |  99  |  30<H>  ----------------------------<  184<H>  4.3   |  27  |  0.82    Ca    9.2      22 Nov 2021 07:33  Phos  3.1     11-22  Mg     1.80     11-22              T(C): 37 (11-22-21 @ 08:56), Max: 37 (11-22-21 @ 08:56)  HR: 85 (11-22-21 @ 08:56) (84 - 98)  BP: 152/83 (11-22-21 @ 08:56) (134/69 - 152/83)  RR: 18 (11-22-21 @ 08:56) (18 - 18)  SpO2: 95% (11-22-21 @ 08:56) (95% - 100%)  Wt(kg): --    I&O's Summary    21 Nov 2021 07:01  -  22 Nov 2021 07:00  --------------------------------------------------------  IN: 0 mL / OUT: 500 mL / NET: -500 mL    22 Nov 2021 07:01  -  22 Nov 2021 12:02  --------------------------------------------------------  IN: 250 mL / OUT: 150 mL / NET: 100 mL      General: Well nourished in no acute distress. Alert and Oriented * 3.   Head: Normocephalic and atraumatic.   Neck: No JVD. No bruits. Supple. Does not appear to be enlarged.   Cardiovascular: + S1,S2 ; RRR Soft systolic murmur at the left lower sternal border. No rubs noted.    Lungs: CTA b/l. No rhonchi, rales or wheezes.   Abdomen: + BS, soft. Non tender. Non distended. No rebound. No guarding.   Extremities: No clubbing/cyanosis/edema LLE, R AKA  Neurologic: Moves all four extremities. Full range of motion.   Skin: Warm and moist. The patient's skin has normal elasticity and good skin turgor.   Psychiatric: Appropriate mood and affect.  Musculoskeletal: Normal range of motion, normal strength    DATA    ECG:  NSR LAD	    < from: CT Cervical Spine No Cont (11.06.21 @ 16:50) >  IMPRESSION:  1.  No acute fracture or traumatic subluxation of the cervical spine.  2.  At C4-5, a posterior disc osteophyte complex causes mild central stenosis.    < end of copied text >    < from: Transthoracic Echocardiogram (09.29.21 @ 17:35) >  CONCLUSIONS:  1. Calcified trileaflet aortic valve with normal opening.  2. Normal left ventricular internal dimensions and wall  thicknesses.  3. Normal left ventricular systolic function. No segmental  wall motion abnormalities.  4. Normal right ventricular size and function.  ------------------------------------------------------------------------  Confirmed on  9/29/2021 - 18:38:12 by MARIELOS Valencia    < end of copied text >        < from: Nuclear Stress Test-Pharmacologic (Nuclear Stress Test-Pharmacologic .) (09.30.21 @ 16:16) >  GATED ANALYSIS:  Post-stress gated wall motion analysis was performed (LVEF  = 66 %;LVEDV = 66 ml.), revealing normal LV function.  There was no segmental wall motion abnormality. Septal  wall motion appeared normal. RV function appeared normal.  ------------------------------------------------------------------------  IMPRESSIONS:Mildly Abnormal Study  * Myocardial Perfusion SPECT results are mildly abnormal.  * Review of raw data shows: The study is of fair technical  quality with adjacent bowel tracer uptake  * The left ventricle was normal in size. There is a small,  mild defect in septal wall that is fixed, suggestive of  mild scarring  * No clear evidence of ischemia.  * Post-stress gated wall motion analysis was performed  (LVEF = 66 %;LVEDV = 66 ml.), revealing normal LV  function. There was no segmental wall motion abnormality.  Septal wall motion appeared normal. RV function appeared  normal.  ------------------------------------------------------------------------  Confirmed on  10/1/2021 - 17:43:39 by Momo Deluna M.D.    < end of copied text >        ASSESSMENT/PLAN: 	62 y/o male PMH HTN, HLD, DM, CKD, PVD with a Right Femoral-Femoral Bypass in 2018, s/p R AKA 10/2021, with last TTE 9/2021 with normal LV function and last NST 9/2021 with no ischemia, presented with dry gangrene to L foot toes, s/p LLE angio and Left Fem-AK pop bypass with PTFE 11/17.    --No evidence of clinical HF or anginal symptoms  --EKG with no acute changes  --recent TTE and NST noted above with normal LV function and no ischemia  --post op care per vascular  --no further inpatient cardiac w/u planned at this time  --follow up vascular    Imani Cesar MD

## 2021-11-22 NOTE — PROGRESS NOTE ADULT - ASSESSMENT
60 y/o M presents with L hallux and 5th digit gangrene  - Patient seen and evaluated   - Afebrile, WBC 12.88  - Exam: left foot plantar hallux dry gangrene to the level of PIPJ, no wet conversion, no malodor, distal tip 5th digit gangrene with ischemic changes noted to 4th digit. No open lesions noted.   - left foot xray: no gas, no OM  - s/p LLE fem-pop bypass with PTFE graft  - Continue IV Abx  - Wound care ordered placed   - Pod plan for local wound care pending complete demarcation  - Pod stable for d/c, follow up information can be found in the discharge not provider   - Discussed with attending

## 2021-11-22 NOTE — PROGRESS NOTE ADULT - SUBJECTIVE AND OBJECTIVE BOX
Patient is a 61y old  Male who presents with a chief complaint of dry gangrene L 1st + 5th toes (22 Nov 2021 00:56)       INTERVAL HPI/OVERNIGHT EVENTS:  Patient seen and evaluated at bedside.  Pt is resting comfortable in NAD. Denies N/V/F/C.  Pain rated at 0/10    Allergies    penicillin (Other)    Intolerances        Vital Signs Last 24 Hrs  T(C): 37 (22 Nov 2021 08:56), Max: 37 (22 Nov 2021 08:56)  T(F): 98.6 (22 Nov 2021 08:56), Max: 98.6 (22 Nov 2021 08:56)  HR: 85 (22 Nov 2021 08:56) (84 - 98)  BP: 152/83 (22 Nov 2021 08:56) (134/69 - 152/83)  BP(mean): --  RR: 18 (22 Nov 2021 08:56) (18 - 18)  SpO2: 95% (22 Nov 2021 08:56) (95% - 100%)    LABS:                        11.3   12.88 )-----------( 362      ( 22 Nov 2021 07:33 )             33.6     11-22    138  |  99  |  30<H>  ----------------------------<  184<H>  4.3   |  27  |  0.82    Ca    9.2      22 Nov 2021 07:33  Phos  3.1     11-22  Mg     1.80     11-22          CAPILLARY BLOOD GLUCOSE      POCT Blood Glucose.: 191 mg/dL (22 Nov 2021 07:14)  POCT Blood Glucose.: 179 mg/dL (21 Nov 2021 21:37)  POCT Blood Glucose.: 260 mg/dL (21 Nov 2021 17:06)  POCT Blood Glucose.: 278 mg/dL (21 Nov 2021 11:48)      Lower Extremity Physical Exam:  Vascular: DP/PT 0/4, L, CFT <3 seconds L, Temperature gradient warm to cold on L  Neuro: Epicritic sensation diminished to the level of ankle, L  Musculoskeletal/Ortho: s/p right AKA  Skin: left foot plantar hallux dry gangrene to the level of PIPJ, no wet conversion, no malodor, distal tip 5th digit gangrene with ischemic changes noted to 4th digit. No open lesions noted        RADIOLOGY & ADDITIONAL TESTS:

## 2021-11-22 NOTE — PROGRESS NOTE ADULT - SUBJECTIVE AND OBJECTIVE BOX
VASCULAR SURGERY DAILY PROGRESS NOTE:    Interval/Subjective  No acute events overnight. Patient seen and examined.     Vital Signs Last 24 Hrs  T(C): 36.9 (21 Nov 2021 22:30), Max: 36.9 (21 Nov 2021 14:20)  T(F): 98.4 (21 Nov 2021 22:30), Max: 98.4 (21 Nov 2021 14:20)  HR: 90 (21 Nov 2021 22:30) (86 - 98)  BP: 144/70 (21 Nov 2021 22:30) (132/68 - 144/70)  BP(mean): --  RR: 18 (21 Nov 2021 22:30) (18 - 18)  SpO2: 100% (21 Nov 2021 22:30) (99% - 100%)    Physical Exam:      I&O's Detail    20 Nov 2021 07:01  -  21 Nov 2021 07:00  --------------------------------------------------------  IN:  Total IN: 0 mL    OUT:    Voided (mL): 300 mL  Total OUT: 300 mL    Total NET: -300 mL      21 Nov 2021 07:01  -  22 Nov 2021 00:56  --------------------------------------------------------  IN:  Total IN: 0 mL    OUT:    Voided (mL): 500 mL  Total OUT: 500 mL    Total NET: -500 mL          Daily     Daily     MEDICATIONS  (STANDING):  aspirin enteric coated 81 milliGRAM(s) Oral daily  atorvastatin 40 milliGRAM(s) Oral at bedtime  cadexomer iodine 0.9% Gel 1 Application(s) Topical daily  carvedilol 12.5 milliGRAM(s) Oral every 12 hours  ceFAZolin   IVPB 1000 milliGRAM(s) IV Intermittent every 8 hours  dextrose 50% Injectable 25 Gram(s) IV Push once  dextrose 50% Injectable 12.5 Gram(s) IV Push once  dextrose 50% Injectable 25 Gram(s) IV Push once  enoxaparin Injectable 40 milliGRAM(s) SubCutaneous every 24 hours  influenza   Vaccine 0.5 milliLiter(s) IntraMuscular once  insulin glargine Injectable (LANTUS) 12 Unit(s) SubCutaneous at bedtime  insulin lispro (ADMELOG) corrective regimen sliding scale   SubCutaneous Before meals and at bedtime  insulin lispro Injectable (ADMELOG) 4 Unit(s) SubCutaneous three times a day before meals  lisinopril 10 milliGRAM(s) Oral daily  multivitamin 1 Tablet(s) Oral daily    MEDICATIONS  (PRN):  acetaminophen     Tablet .. 650 milliGRAM(s) Oral every 6 hours PRN Mild Pain (1 - 3)  oxyCODONE    IR 10 milliGRAM(s) Oral every 6 hours PRN Severe Pain (7 - 10)  oxyCODONE    IR 5 milliGRAM(s) Oral every 6 hours PRN Moderate Pain (4 - 6)      LABS:                        10.5   13.70 )-----------( 352      ( 21 Nov 2021 07:15 )             33.5     11-21    136  |  99  |  30<H>  ----------------------------<  186<H>  4.7   |  27  |  0.85    Ca    9.0      21 Nov 2021 07:15  Phos  3.3     11-21  Mg     2.00     11-21                 VASCULAR SURGERY DAILY PROGRESS NOTE:    Interval/Subjective:   No acute events overnight. Patient seen and examined. No new complaints.     Vital Signs Last 24 Hrs  T(C): 37 (22 Nov 2021 08:56), Max: 37 (22 Nov 2021 08:56)  T(F): 98.6 (22 Nov 2021 08:56), Max: 98.6 (22 Nov 2021 08:56)  HR: 85 (22 Nov 2021 08:56) (84 - 98)  BP: 152/83 (22 Nov 2021 08:56) (134/69 - 152/83)  BP(mean): --  RR: 18 (22 Nov 2021 08:56) (18 - 18)  SpO2: 95% (22 Nov 2021 08:56) (95% - 100%)    I&O's Summary    21 Nov 2021 07:01  -  22 Nov 2021 07:00  --------------------------------------------------------  IN: 0 mL / OUT: 500 mL / NET: -500 mL    22 Nov 2021 07:01  -  22 Nov 2021 10:34  --------------------------------------------------------  IN: 250 mL / OUT: 150 mL / NET: 100 mL      Physical Exam:  General: NAD, resting comfortably in bed.  Respiratory: Nonlabored respirations  Cardio: RRR  Vascular: R groin dressing c/d/i. no palpable collections   R thigh dressing c/d/i. no palpable collections or skin changes  L PT palpable. calf soft, easily compressible.           MEDICATIONS  (STANDING):  aspirin enteric coated 81 milliGRAM(s) Oral daily  atorvastatin 40 milliGRAM(s) Oral at bedtime  cadexomer iodine 0.9% Gel 1 Application(s) Topical daily  carvedilol 12.5 milliGRAM(s) Oral every 12 hours  ceFAZolin   IVPB 1000 milliGRAM(s) IV Intermittent every 8 hours  dextrose 50% Injectable 25 Gram(s) IV Push once  dextrose 50% Injectable 12.5 Gram(s) IV Push once  dextrose 50% Injectable 25 Gram(s) IV Push once  enoxaparin Injectable 40 milliGRAM(s) SubCutaneous every 24 hours  influenza   Vaccine 0.5 milliLiter(s) IntraMuscular once  insulin glargine Injectable (LANTUS) 12 Unit(s) SubCutaneous at bedtime  insulin lispro (ADMELOG) corrective regimen sliding scale   SubCutaneous Before meals and at bedtime  insulin lispro Injectable (ADMELOG) 4 Unit(s) SubCutaneous three times a day before meals  lisinopril 10 milliGRAM(s) Oral daily  multivitamin 1 Tablet(s) Oral daily    MEDICATIONS  (PRN):  acetaminophen     Tablet .. 650 milliGRAM(s) Oral every 6 hours PRN Mild Pain (1 - 3)  oxyCODONE    IR 10 milliGRAM(s) Oral every 6 hours PRN Severe Pain (7 - 10)  oxyCODONE    IR 5 milliGRAM(s) Oral every 6 hours PRN Moderate Pain (4 - 6)      LABS:                                   11.3   12.88 )-----------( 362      ( 22 Nov 2021 07:33 )             33.6       11-22    138  |  99  |  30<H>  ----------------------------<  184<H>  4.3   |  27  |  0.82    Ca    9.2      22 Nov 2021 07:33  Phos  3.1     11-22  Mg     1.80     11-22

## 2021-11-23 ENCOUNTER — TRANSCRIPTION ENCOUNTER (OUTPATIENT)
Age: 61
End: 2021-11-23

## 2021-11-23 VITALS
RESPIRATION RATE: 18 BRPM | TEMPERATURE: 98 F | HEART RATE: 90 BPM | SYSTOLIC BLOOD PRESSURE: 141 MMHG | OXYGEN SATURATION: 100 % | DIASTOLIC BLOOD PRESSURE: 71 MMHG

## 2021-11-23 LAB
ANION GAP SERPL CALC-SCNC: 10 MMOL/L — SIGNIFICANT CHANGE UP (ref 7–14)
BUN SERPL-MCNC: 29 MG/DL — HIGH (ref 7–23)
CALCIUM SERPL-MCNC: 9.3 MG/DL — SIGNIFICANT CHANGE UP (ref 8.4–10.5)
CHLORIDE SERPL-SCNC: 102 MMOL/L — SIGNIFICANT CHANGE UP (ref 98–107)
CO2 SERPL-SCNC: 26 MMOL/L — SIGNIFICANT CHANGE UP (ref 22–31)
CREAT SERPL-MCNC: 0.7 MG/DL — SIGNIFICANT CHANGE UP (ref 0.5–1.3)
GLUCOSE SERPL-MCNC: 141 MG/DL — HIGH (ref 70–99)
HCT VFR BLD CALC: 33.9 % — LOW (ref 39–50)
HGB BLD-MCNC: 11.2 G/DL — LOW (ref 13–17)
MAGNESIUM SERPL-MCNC: 1.8 MG/DL — SIGNIFICANT CHANGE UP (ref 1.6–2.6)
MCHC RBC-ENTMCNC: 31.5 PG — SIGNIFICANT CHANGE UP (ref 27–34)
MCHC RBC-ENTMCNC: 33 GM/DL — SIGNIFICANT CHANGE UP (ref 32–36)
MCV RBC AUTO: 95.2 FL — SIGNIFICANT CHANGE UP (ref 80–100)
NRBC # BLD: 0 /100 WBCS — SIGNIFICANT CHANGE UP
NRBC # FLD: 0 K/UL — SIGNIFICANT CHANGE UP
PHOSPHATE SERPL-MCNC: 3.5 MG/DL — SIGNIFICANT CHANGE UP (ref 2.5–4.5)
PLATELET # BLD AUTO: 357 K/UL — SIGNIFICANT CHANGE UP (ref 150–400)
POTASSIUM SERPL-MCNC: 3.7 MMOL/L — SIGNIFICANT CHANGE UP (ref 3.5–5.3)
POTASSIUM SERPL-SCNC: 3.7 MMOL/L — SIGNIFICANT CHANGE UP (ref 3.5–5.3)
RBC # BLD: 3.56 M/UL — LOW (ref 4.2–5.8)
RBC # FLD: 13.9 % — SIGNIFICANT CHANGE UP (ref 10.3–14.5)
SODIUM SERPL-SCNC: 138 MMOL/L — SIGNIFICANT CHANGE UP (ref 135–145)
WBC # BLD: 12.62 K/UL — HIGH (ref 3.8–10.5)
WBC # FLD AUTO: 12.62 K/UL — HIGH (ref 3.8–10.5)

## 2021-11-23 PROCEDURE — 99232 SBSQ HOSP IP/OBS MODERATE 35: CPT

## 2021-11-23 RX ORDER — CEPHALEXIN 500 MG
1 CAPSULE ORAL
Qty: 0 | Refills: 0 | DISCHARGE
Start: 2021-11-23 | End: 2021-11-30

## 2021-11-23 RX ORDER — OXYCODONE HYDROCHLORIDE 5 MG/1
5 TABLET ORAL EVERY 6 HOURS
Refills: 0 | Status: DISCONTINUED | OUTPATIENT
Start: 2021-11-23 | End: 2021-11-23

## 2021-11-23 RX ORDER — OXYCODONE HYDROCHLORIDE 5 MG/1
10 TABLET ORAL EVERY 6 HOURS
Refills: 0 | Status: DISCONTINUED | OUTPATIENT
Start: 2021-11-23 | End: 2021-11-23

## 2021-11-23 RX ORDER — SENNA PLUS 8.6 MG/1
2 TABLET ORAL AT BEDTIME
Refills: 0 | Status: DISCONTINUED | OUTPATIENT
Start: 2021-11-23 | End: 2021-11-23

## 2021-11-23 RX ADMIN — OXYCODONE HYDROCHLORIDE 10 MILLIGRAM(S): 5 TABLET ORAL at 04:15

## 2021-11-23 RX ADMIN — OXYCODONE HYDROCHLORIDE 5 MILLIGRAM(S): 5 TABLET ORAL at 15:17

## 2021-11-23 RX ADMIN — Medication 4 UNIT(S): at 08:47

## 2021-11-23 RX ADMIN — LISINOPRIL 10 MILLIGRAM(S): 2.5 TABLET ORAL at 05:05

## 2021-11-23 RX ADMIN — Medication 100 MILLIGRAM(S): at 05:09

## 2021-11-23 RX ADMIN — OXYCODONE HYDROCHLORIDE 10 MILLIGRAM(S): 5 TABLET ORAL at 03:19

## 2021-11-23 RX ADMIN — CARVEDILOL PHOSPHATE 12.5 MILLIGRAM(S): 80 CAPSULE, EXTENDED RELEASE ORAL at 05:05

## 2021-11-23 RX ADMIN — Medication 81 MILLIGRAM(S): at 11:56

## 2021-11-23 RX ADMIN — Medication 1 TABLET(S): at 11:56

## 2021-11-23 RX ADMIN — Medication 100 MILLIGRAM(S): at 12:45

## 2021-11-23 RX ADMIN — Medication 4 UNIT(S): at 11:56

## 2021-11-23 RX ADMIN — Medication 1 APPLICATION(S): at 12:26

## 2021-11-23 RX ADMIN — Medication 1: at 11:55

## 2021-11-23 RX ADMIN — OXYCODONE HYDROCHLORIDE 5 MILLIGRAM(S): 5 TABLET ORAL at 12:44

## 2021-11-23 NOTE — DISCHARGE NOTE NURSING/CASE MANAGEMENT/SOCIAL WORK - PATIENT PORTAL LINK FT
You can access the FollowMyHealth Patient Portal offered by [facility] by registering at the following website: [email or web address]. By joining [employer]’s FollowMyHealth portal, you will also be able to view your health information using other applications (apps) compatible with our system.

## 2021-11-23 NOTE — PROGRESS NOTE ADULT - ASSESSMENT
Assessment:  61yMale S/P 11/17 LLE angiogram, left fem-pop bypass w/ PTFE graft     Plan:  - PT and dispo planning  - COVID negative   - ASA and lovenox  - Miralax for constipation  - Ancef, switch to Keflex for dispo  - Pain control PRN  - Diet: Reg  - Activity: as tolerated         C Team/Vascular  [id number].

## 2021-11-23 NOTE — PROGRESS NOTE ADULT - PROBLEM SELECTOR PLAN 3
Hb Stable, improving  Acute anemia likely post-op blood loss related.  No clinical indication for PRBC transfusion.  Monitor H/H.
Acute anemia likely post-op blood loss related.  No clinical indication for PRBC transfusion.  Monitor H/H.
Hb Stable, improving  Acute anemia likely post-op blood loss related.  No clinical indication for PRBC transfusion.  Monitor H/H.
Acute anemia likely post-op blood loss related.  No clinical indication for PRBC transfusion.  Monitor H/H.

## 2021-11-23 NOTE — DISCHARGE NOTE NURSING/CASE MANAGEMENT/SOCIAL WORK - NSDCFUADDAPPT_GEN_ALL_CORE_FT
Podiatry Discharge Instructions:  Follow up: Please follow up with Dr. Win within 1 week of discharge from the hospital, please call [phone] for appointment and discuss that you recently were seen in the hospital.  Wound Care: Betadine soaked 4x4 gauze, ABD pads, and lona.   Weight bearing: Please weight bear as tolerated in a surgical shoe.  Antibiotics: Please continue as instructed.

## 2021-11-23 NOTE — PROGRESS NOTE ADULT - SUBJECTIVE AND OBJECTIVE BOX
Date of service 11/23/2021    chief complaint: L toe discoloration    extended hpi: 62 y/o male PMH HTN, HLD, DM, CKD, PVD with a Right Femoral-Femoral Bypass in 2018, s/p R AKA 10/2021, with last TTE 9/2021 with normal LV function and last NST 9/2021 with no ischemia, presented with dry gangrene to L foot toes.    S: no chest pain or sob; ros otherwise negative.     Review of Systems:   Constitutional: [ ] fevers, [ ] chills.   Skin: [ ] dry skin. [ ] rashes.  Psychiatric: [ ] depression, [ ] anxiety.   Gastrointestinal: [ ] BRBPR, [ ] melena.   Neurological: [ ] confusion. [ ] seizures. [ ] shuffling gait.   Ears,Nose,Mouth and Throat: [ ] ear pain [ ] sore throat.   Eyes: [ ] diplopia.   Respiratory: [ ] hemoptysis. [ ] shortness of breath  Cardiovascular: See HPI above  Hematologic/Lymphatic: [ ] anemia. [ ] painful nodes. [ ] prolonged bleeding.   Genitourinary: [ ] hematuria. [ ] flank pain.   Endocrine: [ ] significant change in weight. [ ] intolerance to heat and cold.     Review of systems [x ] otherwise negative, [ ] otherwise unable to obtain    FH: no family history of sudden cardiac death in first degree relatives    SH: [ ] tobacco, [ ] alcohol, [ ] drugs    acetaminophen     Tablet .. 650 milliGRAM(s) Oral every 6 hours PRN  aspirin enteric coated 81 milliGRAM(s) Oral daily  atorvastatin 40 milliGRAM(s) Oral at bedtime  cadexomer iodine 0.9% Gel 1 Application(s) Topical daily  carvedilol 12.5 milliGRAM(s) Oral every 12 hours  ceFAZolin   IVPB 1000 milliGRAM(s) IV Intermittent every 8 hours  dextrose 50% Injectable 25 Gram(s) IV Push once  dextrose 50% Injectable 12.5 Gram(s) IV Push once  dextrose 50% Injectable 25 Gram(s) IV Push once  enoxaparin Injectable 40 milliGRAM(s) SubCutaneous every 24 hours  influenza   Vaccine 0.5 milliLiter(s) IntraMuscular once  insulin glargine Injectable (LANTUS) 12 Unit(s) SubCutaneous at bedtime  insulin lispro (ADMELOG) corrective regimen sliding scale   SubCutaneous Before meals and at bedtime  insulin lispro Injectable (ADMELOG) 4 Unit(s) SubCutaneous three times a day before meals  lisinopril 10 milliGRAM(s) Oral daily  multivitamin 1 Tablet(s) Oral daily  oxyCODONE    IR 5 milliGRAM(s) Oral every 6 hours PRN  oxyCODONE    IR 10 milliGRAM(s) Oral every 6 hours PRN  senna 2 Tablet(s) Oral at bedtime                            11.2   12.62 )-----------( 357      ( 23 Nov 2021 07:03 )             33.9       11-23    138  |  102  |  29<H>  ----------------------------<  141<H>  3.7   |  26  |  0.70    Ca    9.3      23 Nov 2021 07:03  Phos  3.5     11-23  Mg     1.80     11-23              T(C): 37 (11-23-21 @ 09:28), Max: 37.7 (11-22-21 @ 15:22)  HR: 82 (11-23-21 @ 09:28) (77 - 95)  BP: 138/86 (11-23-21 @ 09:28) (125/67 - 138/86)  RR: 19 (11-23-21 @ 09:28) (17 - 19)  SpO2: 100% (11-23-21 @ 09:28) (97% - 100%)  Wt(kg): --    I&O's Summary    22 Nov 2021 07:01  -  23 Nov 2021 07:00  --------------------------------------------------------  IN: 500 mL / OUT: 450 mL / NET: 50 mL    23 Nov 2021 07:01  -  23 Nov 2021 13:32  --------------------------------------------------------  IN: 250 mL / OUT: 225 mL / NET: 25 mL        General: Well nourished in no acute distress. Alert and Oriented * 3.   Head: Normocephalic and atraumatic.   Neck: No JVD. No bruits. Supple. Does not appear to be enlarged.   Cardiovascular: + S1,S2 ; RRR Soft systolic murmur at the left lower sternal border. No rubs noted.    Lungs: CTA b/l. No rhonchi, rales or wheezes.   Abdomen: + BS, soft. Non tender. Non distended. No rebound. No guarding.   Extremities: No clubbing/cyanosis/edema LLE, R AKA  Neurologic: Moves all four extremities. Full range of motion.   Skin: Warm and moist. The patient's skin has normal elasticity and good skin turgor.   Psychiatric: Appropriate mood and affect.  Musculoskeletal: Normal range of motion, normal strength    DATA    ECG:  NSR LAD	    < from: CT Cervical Spine No Cont (11.06.21 @ 16:50) >  IMPRESSION:  1.  No acute fracture or traumatic subluxation of the cervical spine.  2.  At C4-5, a posterior disc osteophyte complex causes mild central stenosis.    < end of copied text >    < from: Transthoracic Echocardiogram (09.29.21 @ 17:35) >  CONCLUSIONS:  1. Calcified trileaflet aortic valve with normal opening.  2. Normal left ventricular internal dimensions and wall  thicknesses.  3. Normal left ventricular systolic function. No segmental  wall motion abnormalities.  4. Normal right ventricular size and function.  ------------------------------------------------------------------------  Confirmed on  9/29/2021 - 18:38:12 by MARIELOS Valencia    < end of copied text >        < from: Nuclear Stress Test-Pharmacologic (Nuclear Stress Test-Pharmacologic .) (09.30.21 @ 16:16) >  GATED ANALYSIS:  Post-stress gated wall motion analysis was performed (LVEF  = 66 %;LVEDV = 66 ml.), revealing normal LV function.  There was no segmental wall motion abnormality. Septal  wall motion appeared normal. RV function appeared normal.  ------------------------------------------------------------------------  IMPRESSIONS:Mildly Abnormal Study  * Myocardial Perfusion SPECT results are mildly abnormal.  * Review of raw data shows: The study is of fair technical  quality with adjacent bowel tracer uptake  * The left ventricle was normal in size. There is a small,  mild defect in septal wall that is fixed, suggestive of  mild scarring  * No clear evidence of ischemia.  * Post-stress gated wall motion analysis was performed  (LVEF = 66 %;LVEDV = 66 ml.), revealing normal LV  function. There was no segmental wall motion abnormality.  Septal wall motion appeared normal. RV function appeared  normal.  ------------------------------------------------------------------------  Confirmed on  10/1/2021 - 17:43:39 by Momo Deluna M.D.    < end of copied text >        ASSESSMENT/PLAN: 	62 y/o male PMH HTN, HLD, DM, CKD, PVD with a Right Femoral-Femoral Bypass in 2018, s/p R AKA 10/2021, with last TTE 9/2021 with normal LV function and last NST 9/2021 with no ischemia, presented with dry gangrene to L foot toes, s/p LLE angio and Left Fem-AK pop bypass with PTFE 11/17.    --No evidence of clinical HF or anginal symptoms  --EKG with no acute changes  --recent TTE and NST noted above with normal LV function and no ischemia  --no further inpatient cardiac w/u planned at this time  --follow up vascular  --dc planning per primary team     Imani Cesar MD

## 2021-11-23 NOTE — PROGRESS NOTE ADULT - PROBLEM SELECTOR PROBLEM 1
Gangrene due to peripheral vascular disease

## 2021-11-23 NOTE — PROGRESS NOTE ADULT - PROBLEM SELECTOR PLAN 4
Continue home insulin regimen - Lantus and admelog pre-meal  - DIEGO CHRISTIANSEN QID.

## 2021-11-23 NOTE — PROGRESS NOTE ADULT - REASON FOR ADMISSION
dry gangrene L 1st + 5th toes

## 2021-11-23 NOTE — PROGRESS NOTE ADULT - SUBJECTIVE AND OBJECTIVE BOX
SUBJECTIVE:   Seen and examined at bedside. No acute events overnight.     OBJECTIVE: T(C): 37.1 (11-22-21 @ 21:22), Max: 37.7 (11-22-21 @ 15:22)  HR: 85 (11-22-21 @ 21:22) (84 - 95)  BP: 132/72 (11-22-21 @ 21:22) (125/67 - 152/83)  RR: 17 (11-22-21 @ 21:22) (17 - 18)  SpO2: 97% (11-22-21 @ 21:22) (95% - 99%)  Wt(kg): --  I&O's Summary    21 Nov 2021 07:01  -  22 Nov 2021 07:00  --------------------------------------------------------  IN: 0 mL / OUT: 500 mL / NET: -500 mL    22 Nov 2021 07:01 - 23 Nov 2021 01:50  --------------------------------------------------------  IN: 500 mL / OUT: 450 mL / NET: 50 mL      I&O's Detail    21 Nov 2021 07:01  -  22 Nov 2021 07:00  --------------------------------------------------------  IN:  Total IN: 0 mL    OUT:    Voided (mL): 500 mL  Total OUT: 500 mL    Total NET: -500 mL      22 Nov 2021 07:01  -  23 Nov 2021 01:50  --------------------------------------------------------  IN:    Oral Fluid: 500 mL  Total IN: 500 mL    OUT:    Voided (mL): 450 mL  Total OUT: 450 mL    Total NET: 50 mL        General:  Abdomen:    MEDICATIONS  (STANDING):  aspirin enteric coated 81 milliGRAM(s) Oral daily  atorvastatin 40 milliGRAM(s) Oral at bedtime  cadexomer iodine 0.9% Gel 1 Application(s) Topical daily  carvedilol 12.5 milliGRAM(s) Oral every 12 hours  ceFAZolin   IVPB 1000 milliGRAM(s) IV Intermittent every 8 hours  dextrose 50% Injectable 25 Gram(s) IV Push once  dextrose 50% Injectable 12.5 Gram(s) IV Push once  dextrose 50% Injectable 25 Gram(s) IV Push once  enoxaparin Injectable 40 milliGRAM(s) SubCutaneous every 24 hours  influenza   Vaccine 0.5 milliLiter(s) IntraMuscular once  insulin glargine Injectable (LANTUS) 12 Unit(s) SubCutaneous at bedtime  insulin lispro (ADMELOG) corrective regimen sliding scale   SubCutaneous Before meals and at bedtime  insulin lispro Injectable (ADMELOG) 4 Unit(s) SubCutaneous three times a day before meals  lisinopril 10 milliGRAM(s) Oral daily  multivitamin 1 Tablet(s) Oral daily  senna 2 Tablet(s) Oral at bedtime    MEDICATIONS  (PRN):  acetaminophen     Tablet .. 650 milliGRAM(s) Oral every 6 hours PRN Mild Pain (1 - 3)  oxyCODONE    IR 10 milliGRAM(s) Oral every 6 hours PRN Severe Pain (7 - 10)  oxyCODONE    IR 5 milliGRAM(s) Oral every 6 hours PRN Moderate Pain (4 - 6)      LABS:                        11.3   12.88 )-----------( 362      ( 22 Nov 2021 07:33 )             33.6     11-22    138  |  99  |  30<H>  ----------------------------<  184<H>  4.3   |  27  |  0.82    Ca    9.2      22 Nov 2021 07:33  Phos  3.1     11-22  Mg     1.80     11-22             VASCULAR SURGERY PROGRESS NOTE     SUBJECTIVE:   Seen and examined at bedside. No acute events overnight.       OBJECTIVE:  Vital Signs Last 24 Hrs  T(C): 37 (23 Nov 2021 09:28), Max: 37.7 (22 Nov 2021 15:22)  T(F): 98.6 (23 Nov 2021 09:28), Max: 99.9 (22 Nov 2021 15:22)  HR: 82 (23 Nov 2021 09:28) (77 - 95)  BP: 138/86 (23 Nov 2021 09:28) (125/67 - 138/86)  BP(mean): --  RR: 19 (23 Nov 2021 09:28) (17 - 19)  SpO2: 100% (23 Nov 2021 09:28) (97% - 100%)    I&O's Summary    22 Nov 2021 07:01  -  23 Nov 2021 07:00  --------------------------------------------------------  IN: 500 mL / OUT: 450 mL / NET: 50 mL    23 Nov 2021 07:01  -  23 Nov 2021 11:53  --------------------------------------------------------  IN: 250 mL / OUT: 225 mL / NET: 25 mL      Physical Exam:  General: NAD, resting comfortably in bed.  Respiratory: Nonlabored respirations  Cardio: RRR  Vascular: R groin dressing c/d/i. no palpable collections   R thigh dressing c/d/i. no palpable collections or skin changes  L PT palpable. calf soft, easily compressible.       MEDICATIONS  (STANDING):  aspirin enteric coated 81 milliGRAM(s) Oral daily  atorvastatin 40 milliGRAM(s) Oral at bedtime  cadexomer iodine 0.9% Gel 1 Application(s) Topical daily  carvedilol 12.5 milliGRAM(s) Oral every 12 hours  ceFAZolin   IVPB 1000 milliGRAM(s) IV Intermittent every 8 hours  dextrose 50% Injectable 25 Gram(s) IV Push once  dextrose 50% Injectable 12.5 Gram(s) IV Push once  dextrose 50% Injectable 25 Gram(s) IV Push once  enoxaparin Injectable 40 milliGRAM(s) SubCutaneous every 24 hours  influenza   Vaccine 0.5 milliLiter(s) IntraMuscular once  insulin glargine Injectable (LANTUS) 12 Unit(s) SubCutaneous at bedtime  insulin lispro (ADMELOG) corrective regimen sliding scale   SubCutaneous Before meals and at bedtime  insulin lispro Injectable (ADMELOG) 4 Unit(s) SubCutaneous three times a day before meals  lisinopril 10 milliGRAM(s) Oral daily  multivitamin 1 Tablet(s) Oral daily  senna 2 Tablet(s) Oral at bedtime    MEDICATIONS  (PRN):  acetaminophen     Tablet .. 650 milliGRAM(s) Oral every 6 hours PRN Mild Pain (1 - 3)  oxyCODONE    IR 10 milliGRAM(s) Oral every 6 hours PRN Severe Pain (7 - 10)  oxyCODONE    IR 5 milliGRAM(s) Oral every 6 hours PRN Moderate Pain (4 - 6)      LABS:                                   11.2   12.62 )-----------( 357      ( 23 Nov 2021 07:03 )             33.9   11-23      138  |  102  |  29<H>  ----------------------------<  141<H>  3.7   |  26  |  0.70    Ca    9.3      23 Nov 2021 07:03  Phos  3.5     11-23  Mg     1.80     11-23

## 2021-11-23 NOTE — PROGRESS NOTE ADULT - PROBLEM SELECTOR PLAN 1
s/p Lt fem-AKpop bypass on 11/17  - pain control with oxyIR PRN  - wound care as per primary team  - Lovenox SC for VTE ppx  - PT/OT eval for safe dispo
s/p Lt fem-AKpop bypass on 11/17  - pain control with oxyIR PRN  - wound care as per primary team  - Lovenox SC for VTE ppx
s/p Lt fem-AKpop bypass on 11/17  - pain control with oxyIR PRN  - wound care as per primary team  - Lovenox SC for VTE ppx  - PT/OT eval for safe dispo
s/p Lt fem-AKpop bypass on 11/17  - pain control with oxyIR PRN  - wound care as per primary team  - Lovenox SC for VTE ppx  - PT/OT eval for safe dispo
s/p Lt fem-AKpop bypass on 11/17  - pain control with oxyIR PRN  - wound care as per primary team  - Lovenox SC for VTE ppx
s/p Lt fem-AKpop bypass on 11/17  - pain control with oxyIR PRN  - wound care as per primary team  - Lovenox SC for VTE ppx  - PT/OT eval for safe dispo

## 2021-11-23 NOTE — PROGRESS NOTE ADULT - PROVIDER SPECIALTY LIST ADULT
Cardiology
Surgery
Vascular Surgery
Vascular Surgery
Cardiology
Podiatry
Podiatry
Vascular Surgery
Hospitalist
Vascular Surgery
Vascular Surgery
Hospitalist

## 2021-11-23 NOTE — PROGRESS NOTE ADULT - SUBJECTIVE AND OBJECTIVE BOX
[facility] Division of Hospital Medicine  Naveen Cao MD  Pager [phone]    Patient is a 61y old  Male who presents with a chief complaint of dry gangrene L 1st + 5th toes (23 Nov 2021 01:50)      SUBJECTIVE / OVERNIGHT EVENTS: Feels well      MEDICATIONS  (STANDING):  aspirin enteric coated 81 milliGRAM(s) Oral daily  atorvastatin 40 milliGRAM(s) Oral at bedtime  cadexomer iodine 0.9% Gel 1 Application(s) Topical daily  carvedilol 12.5 milliGRAM(s) Oral every 12 hours  ceFAZolin   IVPB 1000 milliGRAM(s) IV Intermittent every 8 hours  dextrose 50% Injectable 25 Gram(s) IV Push once  dextrose 50% Injectable 12.5 Gram(s) IV Push once  dextrose 50% Injectable 25 Gram(s) IV Push once  enoxaparin Injectable 40 milliGRAM(s) SubCutaneous every 24 hours  influenza   Vaccine 0.5 milliLiter(s) IntraMuscular once  insulin glargine Injectable (LANTUS) 12 Unit(s) SubCutaneous at bedtime  insulin lispro (ADMELOG) corrective regimen sliding scale   SubCutaneous Before meals and at bedtime  insulin lispro Injectable (ADMELOG) 4 Unit(s) SubCutaneous three times a day before meals  lisinopril 10 milliGRAM(s) Oral daily  multivitamin 1 Tablet(s) Oral daily  senna 2 Tablet(s) Oral at bedtime    MEDICATIONS  (PRN):  acetaminophen     Tablet .. 650 milliGRAM(s) Oral every 6 hours PRN Mild Pain (1 - 3)  oxyCODONE    IR 5 milliGRAM(s) Oral every 6 hours PRN Moderate Pain (4 - 6)  oxyCODONE    IR 10 milliGRAM(s) Oral every 6 hours PRN Severe Pain (7 - 10)      CAPILLARY BLOOD GLUCOSE      POCT Blood Glucose.: 176 mg/dL (23 Nov 2021 11:36)  POCT Blood Glucose.: 130 mg/dL (23 Nov 2021 07:40)  POCT Blood Glucose.: 176 mg/dL (22 Nov 2021 21:44)  POCT Blood Glucose.: 142 mg/dL (22 Nov 2021 16:45)    I&O's Summary    22 Nov 2021 07:01  -  23 Nov 2021 07:00  --------------------------------------------------------  IN: 500 mL / OUT: 450 mL / NET: 50 mL    23 Nov 2021 07:01  -  23 Nov 2021 13:30  --------------------------------------------------------  IN: 250 mL / OUT: 225 mL / NET: 25 mL        PHYSICAL EXAM:  Vital Signs Last 24 Hrs  T(C): 37 (23 Nov 2021 09:28), Max: 37.7 (22 Nov 2021 15:22)  T(F): 98.6 (23 Nov 2021 09:28), Max: 99.9 (22 Nov 2021 15:22)  HR: 82 (23 Nov 2021 09:28) (77 - 95)  BP: 138/86 (23 Nov 2021 09:28) (125/67 - 138/86)  BP(mean): --  RR: 19 (23 Nov 2021 09:28) (17 - 19)  SpO2: 100% (23 Nov 2021 09:28) (97% - 100%)  CONSTITUTIONAL: NAD  EYES: EOMI, conjunctiva and sclera clear  ENMT: Moist oral mucosa  NECK: Supple  RESPIRATORY: Breathing unlabored, CTAB  CARDIOVASCULAR: S1S2 no MRG  ABDOMEN: Nontender to palpation, normoactive bowel sounds, no rebound/guarding  MUSCULOSKELETAL:dry gangrene LLE toes  NEUROLOGY: dec sens BLE     LABS:                        11.2   12.62 )-----------( 357      ( 23 Nov 2021 07:03 )             33.9     11-23    138  |  102  |  29<H>  ----------------------------<  141<H>  3.7   |  26  |  0.70    Ca    9.3      23 Nov 2021 07:03  Phos  3.5     11-23  Mg     1.80     11-23              COORDINATION OF CARE:  Care Discussed with Consultants/Other Providers [Y/N]: vasc surg

## 2021-11-23 NOTE — PROGRESS NOTE ADULT - PROBLEM SELECTOR PLAN 5
Continue Coreg, Lisinopril with hold parameter.

## 2021-11-23 NOTE — PROGRESS NOTE ADULT - PROBLEM SELECTOR PLAN 2
Likely post-op reactive leukocytosis.  No clinical suspicion for infection.  Continue to monitor CBC.

## 2021-11-29 ENCOUNTER — APPOINTMENT (OUTPATIENT)
Dept: VASCULAR SURGERY | Facility: CLINIC | Age: 61
End: 2021-11-29
Payer: MEDICARE

## 2021-11-29 VITALS
TEMPERATURE: 98.4 F | WEIGHT: 115 LBS | SYSTOLIC BLOOD PRESSURE: 95 MMHG | HEART RATE: 89 BPM | DIASTOLIC BLOOD PRESSURE: 62 MMHG | BODY MASS INDEX: 19.63 KG/M2 | HEIGHT: 64 IN

## 2021-11-29 VITALS — SYSTOLIC BLOOD PRESSURE: 106 MMHG | HEART RATE: 90 BPM | DIASTOLIC BLOOD PRESSURE: 67 MMHG

## 2021-11-29 PROCEDURE — 93971 EXTREMITY STUDY: CPT

## 2021-11-29 PROCEDURE — 99024 POSTOP FOLLOW-UP VISIT: CPT

## 2021-11-29 NOTE — HISTORY OF PRESENT ILLNESS
[FreeTextEntry1] : Mr. Patton presents in follow up s/p left femoral to above knee popliteal artery bypass with PTFE. Surgery was performed on 11/17/21. [facility] He continues to take Keflex and has some erythema at the incisions. He has dry gangrene of the left distal 1st and 5th toes and foot edema.\par \par He is also s/p bilateral iliac artery kissing stents (10/6/21) via right groin cutdown/left percutaneous and right AKA (10/12/21).\par \par PMH: DMII, HTN, HLD\par \par Medications: Insulin, Aspirin, Lipitor, Carvedilol, Lisinopril, Keflex\par \par All: PCN

## 2021-11-29 NOTE — PHYSICAL EXAM
[Normal Breath Sounds] : Normal breath sounds [Normal Heart Sounds] : normal heart sounds [2+] : left 2+ [de-identified] : NAD. [de-identified] : WNL. [FreeTextEntry1] : Well healed right groin incision. Right  is healed. No signs of infection.\par \par Left 1st toe distal dry gangrene and dry gangrene of 5th toe. Some erythema of left groin and thigh incisions. Left foot edema.

## 2021-11-29 NOTE — ASSESSMENT
[FreeTextEntry1] : In summary, Mr. Patton presents s/p prosthetic left femoral-popliteal bypass. Please wash wounds daily with soap and water. Patient at high risk of prosthetic infection in setting of diabetes.\par A venous duplex was performed and showed no evidence of DVT (bypass is patent).\par Continue antibiotics. He will follow up with formal arterial duplex one months post-op (after 12/17/21). He may now be fit for right leg prosthesis and  (rx provided and attached).

## 2021-12-27 ENCOUNTER — APPOINTMENT (OUTPATIENT)
Dept: VASCULAR SURGERY | Facility: CLINIC | Age: 61
End: 2021-12-27
Payer: MEDICARE

## 2021-12-27 ENCOUNTER — APPOINTMENT (OUTPATIENT)
Dept: VASCULAR SURGERY | Facility: CLINIC | Age: 61
End: 2021-12-27

## 2021-12-27 VITALS
TEMPERATURE: 97.7 F | HEART RATE: 80 BPM | BODY MASS INDEX: 19.63 KG/M2 | HEIGHT: 64 IN | DIASTOLIC BLOOD PRESSURE: 79 MMHG | SYSTOLIC BLOOD PRESSURE: 144 MMHG | WEIGHT: 115 LBS

## 2021-12-27 PROCEDURE — 93926 LOWER EXTREMITY STUDY: CPT

## 2021-12-27 PROCEDURE — 99024 POSTOP FOLLOW-UP VISIT: CPT

## 2022-01-13 NOTE — HISTORY OF PRESENT ILLNESS
[FreeTextEntry1] : Mr. Patton presents in follow up s/p left femoral to above knee popliteal artery bypass with PTFE. Surgery was performed on 11/17/21. He is currently living with his brother and has been discharged from [facility] rehab. He has dry gangrene of the left distal 1st and 5th toes and foot edema. He has developed a small area (1.5x1cm) of skin dehiscence in his groin with fibrinous tissue on top, which he was not aware of.\par \par He is also s/p bilateral iliac artery kissing stents (10/6/21) via right groin cutdown/left percutaneous and right AKA (10/12/21).\par \par PMH: DMII, HTN, HLD\par \par Medications: Insulin, Aspirin, Lipitor, Carvedilol, Lisinopril\par \par All: PCN

## 2022-01-13 NOTE — ASSESSMENT
[FreeTextEntry1] : Mr. Patton presents in follow up s/p left femoral to above knee popliteal artery bypass with PTFE. A duplex was performed in the office today and showed patent bypass without flow-limiting lesions. He should continue Aspirin and statin therapy. He will follow up in one month to re-assess his left groin wound and in three months for repeat surveillance duplex of the bypass.

## 2022-01-25 NOTE — ED ADULT NURSE NOTE - NSIMPLEMENTINTERV_GEN_ALL_ED
Quality 226: Preventive Care And Screening: Tobacco Use: Screening And Cessation Intervention: Patient screened for tobacco use and is an ex/non-smoker Detail Level: Detailed Name And Contact Information For Health Care Proxy: Jackelin Kearns Quality 110: Preventive Care And Screening: Influenza Immunization: Influenza Immunization Administered during Influenza season Quality 47: Advance Care Plan: Advance Care Planning discussed and documented; advance care plan or surrogate decision maker documented in the medical record. Quality 431: Preventive Care And Screening: Unhealthy Alcohol Use - Screening: Patient not identified as an unhealthy alcohol user when screened for unhealthy alcohol use using a systematic screening method Implemented All Fall Risk Interventions:  [place] to call system. Call bell, personal items and telephone within reach. Instruct patient to call for assistance. Room bathroom lighting operational. Non-slip footwear when patient is off stretcher. Physically safe environment: no spills, clutter or unnecessary equipment. Stretcher in lowest position, wheels locked, appropriate side rails in place. Provide visual cue, wrist band, yellow gown, etc. Monitor gait and stability. Monitor for mental status changes and reorient to person, place, and time. Review medications for side effects contributing to fall risk. Reinforce activity limits and safety measures with patient and family.

## 2022-01-31 ENCOUNTER — APPOINTMENT (OUTPATIENT)
Dept: VASCULAR SURGERY | Facility: CLINIC | Age: 62
End: 2022-01-31
Payer: MEDICARE

## 2022-01-31 ENCOUNTER — APPOINTMENT (OUTPATIENT)
Dept: WOUND CARE | Facility: CLINIC | Age: 62
End: 2022-01-31
Payer: MEDICARE

## 2022-01-31 VITALS
HEIGHT: 64 IN | WEIGHT: 120 LBS | SYSTOLIC BLOOD PRESSURE: 158 MMHG | DIASTOLIC BLOOD PRESSURE: 90 MMHG | HEART RATE: 94 BPM | BODY MASS INDEX: 20.49 KG/M2 | TEMPERATURE: 98 F

## 2022-01-31 VITALS — DIASTOLIC BLOOD PRESSURE: 83 MMHG | HEART RATE: 89 BPM | SYSTOLIC BLOOD PRESSURE: 154 MMHG

## 2022-01-31 DIAGNOSIS — L97.509 TYPE 2 DIABETES MELLITUS WITH FOOT ULCER: ICD-10-CM

## 2022-01-31 DIAGNOSIS — E11.621 TYPE 2 DIABETES MELLITUS WITH FOOT ULCER: ICD-10-CM

## 2022-01-31 DIAGNOSIS — Z86.79 PERSONAL HISTORY OF OTHER DISEASES OF THE CIRCULATORY SYSTEM: ICD-10-CM

## 2022-01-31 DIAGNOSIS — Z79.4 TYPE 2 DIABETES MELLITUS WITH FOOT ULCER: ICD-10-CM

## 2022-01-31 DIAGNOSIS — E11.51 TYPE 2 DIABETES MELLITUS WITH DIABETIC PERIPHERAL ANGIOPATHY W/OUT GANGRENE: ICD-10-CM

## 2022-01-31 PROCEDURE — 99024 POSTOP FOLLOW-UP VISIT: CPT

## 2022-01-31 PROCEDURE — 99203 OFFICE O/P NEW LOW 30 MIN: CPT
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
## 2022-01-31 NOTE — PLAN
[FreeTextEntry1] : The patient is seen with Dr. Lundberg and will continue with prevention of infection using topical antibiotics.\par - I discussed with the patient referring for osteomyelitis work up with x-ray and MRI in [facility] and eventual amputation of the hallux and 5th ray with closures.  \par - Will discuss his case with Dr. Melissa who may resume care\par - Pt to go to [facility] for Work-up.

## 2022-01-31 NOTE — ASSESSMENT
[FreeTextEntry1] : In summary, Mr. Patton presents s/p LLE fem-pop bypass. The groin wound is healing well. He will follow up in March for surveillance bypass duplex. He should continue Aspirin and statin. I requested that Dr. Kirby from podiatry evaluate the foot wounds to expedite care.

## 2022-01-31 NOTE — PHYSICAL EXAM
[0] : left 0 [de-identified] : AAO x 3 [de-identified] : Absent right leg BK [de-identified] : Epicritic sensation grossly decreased left LE [FreeTextEntry1] : hallux [de-identified] : distal toe plantar with some nail bed involvement [FreeTextEntry7] : 5th toe [de-identified] : distal 1/ of the toe [de-identified] : lateral 5th MTPJ [de-identified] : 1.0 cm [de-identified] : 1.0 cm [de-identified] : 0.3 cm [de-identified] : capsule [TWNoteComboBox1] : Left [de-identified] : 100% [TWNoteComboBox9] : Left [de-identified] : 100% [de-identified] : Left [de-identified] : 3 [de-identified] : FT [de-identified] : None [de-identified] : <20%

## 2022-01-31 NOTE — PHYSICAL EXAM
[Normal Breath Sounds] : Normal breath sounds [Normal Heart Sounds] : normal heart sounds [2+] : left 2+ [de-identified] : NAD. [de-identified] : WNL. [FreeTextEntry1] : Well healed right groin incision. Right AKA is healed. No signs of infection.\par \par Left 1st toe distal dry gangrene and dry gangrene of 5th toe. New left 5th metatarsal ulcer with gangrene. Small area of skin dehiscence in his groin with fibrinous tissue on top, almost completely healed.
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
## 2022-02-02 ENCOUNTER — INPATIENT (INPATIENT)
Facility: HOSPITAL | Age: 62
LOS: 9 days | Discharge: NURSING, REHAB OR LONG-TERM CARE | End: 2022-02-12
Attending: SURGERY | Admitting: SURGERY
Payer: MEDICARE

## 2022-02-02 VITALS
OXYGEN SATURATION: 100 % | DIASTOLIC BLOOD PRESSURE: 90 MMHG | HEIGHT: 64 IN | TEMPERATURE: 98 F | RESPIRATION RATE: 16 BRPM | HEART RATE: 80 BPM | SYSTOLIC BLOOD PRESSURE: 157 MMHG

## 2022-02-02 DIAGNOSIS — I96 GANGRENE, NOT ELSEWHERE CLASSIFIED: ICD-10-CM

## 2022-02-02 DIAGNOSIS — Z95.828 PRESENCE OF OTHER VASCULAR IMPLANTS AND GRAFTS: Chronic | ICD-10-CM

## 2022-02-02 LAB
ALBUMIN SERPL ELPH-MCNC: 4.1 G/DL — SIGNIFICANT CHANGE UP (ref 3.3–5)
ALP SERPL-CCNC: 94 U/L — SIGNIFICANT CHANGE UP (ref 40–120)
ALT FLD-CCNC: 25 U/L — SIGNIFICANT CHANGE UP (ref 4–41)
ANION GAP SERPL CALC-SCNC: 12 MMOL/L — SIGNIFICANT CHANGE UP (ref 7–14)
APTT BLD: 33.1 SEC — SIGNIFICANT CHANGE UP (ref 27–36.3)
APTT BLD: 81.9 SEC — HIGH (ref 27–36.3)
AST SERPL-CCNC: 21 U/L — SIGNIFICANT CHANGE UP (ref 4–40)
BASE EXCESS BLDV CALC-SCNC: 1.7 MMOL/L — SIGNIFICANT CHANGE UP (ref -2–3)
BASOPHILS # BLD AUTO: 0.05 K/UL — SIGNIFICANT CHANGE UP (ref 0–0.2)
BASOPHILS NFR BLD AUTO: 0.7 % — SIGNIFICANT CHANGE UP (ref 0–2)
BILIRUB SERPL-MCNC: <0.2 MG/DL — SIGNIFICANT CHANGE UP (ref 0.2–1.2)
BLD GP AB SCN SERPL QL: NEGATIVE — SIGNIFICANT CHANGE UP
BLOOD GAS VENOUS COMPREHENSIVE RESULT: SIGNIFICANT CHANGE UP
BUN SERPL-MCNC: 22 MG/DL — SIGNIFICANT CHANGE UP (ref 7–23)
CALCIUM SERPL-MCNC: 9.7 MG/DL — SIGNIFICANT CHANGE UP (ref 8.4–10.5)
CHLORIDE BLDV-SCNC: 103 MMOL/L — SIGNIFICANT CHANGE UP (ref 96–108)
CHLORIDE SERPL-SCNC: 103 MMOL/L — SIGNIFICANT CHANGE UP (ref 98–107)
CO2 BLDV-SCNC: 27.9 MMOL/L — HIGH (ref 22–26)
CO2 SERPL-SCNC: 24 MMOL/L — SIGNIFICANT CHANGE UP (ref 22–31)
CREAT SERPL-MCNC: 0.78 MG/DL — SIGNIFICANT CHANGE UP (ref 0.5–1.3)
CRP SERPL-MCNC: <4 MG/L — SIGNIFICANT CHANGE UP
EOSINOPHIL # BLD AUTO: 0.15 K/UL — SIGNIFICANT CHANGE UP (ref 0–0.5)
EOSINOPHIL NFR BLD AUTO: 2.1 % — SIGNIFICANT CHANGE UP (ref 0–6)
ERYTHROCYTE [SEDIMENTATION RATE] IN BLOOD: 26 MM/HR — HIGH (ref 1–15)
FLUAV AG NPH QL: SIGNIFICANT CHANGE UP
FLUBV AG NPH QL: SIGNIFICANT CHANGE UP
GAS PNL BLDV: 135 MMOL/L — LOW (ref 136–145)
GLUCOSE BLDV-MCNC: 107 MG/DL — HIGH (ref 70–99)
GLUCOSE SERPL-MCNC: 113 MG/DL — HIGH (ref 70–99)
HCO3 BLDV-SCNC: 27 MMOL/L — SIGNIFICANT CHANGE UP (ref 22–29)
HCT VFR BLD CALC: 36.8 % — LOW (ref 39–50)
HCT VFR BLD CALC: 37.4 % — LOW (ref 39–50)
HCT VFR BLDA CALC: 37 % — LOW (ref 39–51)
HGB BLD CALC-MCNC: 12.3 G/DL — LOW (ref 13–17)
HGB BLD-MCNC: 12 G/DL — LOW (ref 13–17)
HGB BLD-MCNC: 12.2 G/DL — LOW (ref 13–17)
IANC: 4.89 K/UL — SIGNIFICANT CHANGE UP (ref 1.5–8.5)
IMM GRANULOCYTES NFR BLD AUTO: 0.3 % — SIGNIFICANT CHANGE UP (ref 0–1.5)
INR BLD: 1.2 RATIO — HIGH (ref 0.88–1.16)
LACTATE BLDV-MCNC: 1.9 MMOL/L — SIGNIFICANT CHANGE UP (ref 0.5–2)
LYMPHOCYTES # BLD AUTO: 1.49 K/UL — SIGNIFICANT CHANGE UP (ref 1–3.3)
LYMPHOCYTES # BLD AUTO: 20.4 % — SIGNIFICANT CHANGE UP (ref 13–44)
MCHC RBC-ENTMCNC: 30.6 PG — SIGNIFICANT CHANGE UP (ref 27–34)
MCHC RBC-ENTMCNC: 30.8 PG — SIGNIFICANT CHANGE UP (ref 27–34)
MCHC RBC-ENTMCNC: 32.6 GM/DL — SIGNIFICANT CHANGE UP (ref 32–36)
MCHC RBC-ENTMCNC: 32.6 GM/DL — SIGNIFICANT CHANGE UP (ref 32–36)
MCV RBC AUTO: 93.9 FL — SIGNIFICANT CHANGE UP (ref 80–100)
MCV RBC AUTO: 94.4 FL — SIGNIFICANT CHANGE UP (ref 80–100)
MONOCYTES # BLD AUTO: 0.71 K/UL — SIGNIFICANT CHANGE UP (ref 0–0.9)
MONOCYTES NFR BLD AUTO: 9.7 % — SIGNIFICANT CHANGE UP (ref 2–14)
NEUTROPHILS # BLD AUTO: 4.89 K/UL — SIGNIFICANT CHANGE UP (ref 1.8–7.4)
NEUTROPHILS NFR BLD AUTO: 66.8 % — SIGNIFICANT CHANGE UP (ref 43–77)
NRBC # BLD: 0 /100 WBCS — SIGNIFICANT CHANGE UP
NRBC # BLD: 0 /100 WBCS — SIGNIFICANT CHANGE UP
NRBC # FLD: 0 K/UL — SIGNIFICANT CHANGE UP
NRBC # FLD: 0 K/UL — SIGNIFICANT CHANGE UP
PCO2 BLDV: 42 MMHG — SIGNIFICANT CHANGE UP (ref 42–55)
PH BLDV: 7.41 — SIGNIFICANT CHANGE UP (ref 7.32–7.43)
PLATELET # BLD AUTO: 278 K/UL — SIGNIFICANT CHANGE UP (ref 150–400)
PLATELET # BLD AUTO: 303 K/UL — SIGNIFICANT CHANGE UP (ref 150–400)
PO2 BLDV: 62 MMHG — SIGNIFICANT CHANGE UP
POTASSIUM BLDV-SCNC: 4.7 MMOL/L — SIGNIFICANT CHANGE UP (ref 3.5–5.1)
POTASSIUM SERPL-MCNC: 4.5 MMOL/L — SIGNIFICANT CHANGE UP (ref 3.5–5.3)
POTASSIUM SERPL-SCNC: 4.5 MMOL/L — SIGNIFICANT CHANGE UP (ref 3.5–5.3)
PROT SERPL-MCNC: 6.9 G/DL — SIGNIFICANT CHANGE UP (ref 6–8.3)
PROTHROM AB SERPL-ACNC: 13.7 SEC — HIGH (ref 10.6–13.6)
RBC # BLD: 3.92 M/UL — LOW (ref 4.2–5.8)
RBC # BLD: 3.96 M/UL — LOW (ref 4.2–5.8)
RBC # FLD: 14.3 % — SIGNIFICANT CHANGE UP (ref 10.3–14.5)
RBC # FLD: 14.5 % — SIGNIFICANT CHANGE UP (ref 10.3–14.5)
RH IG SCN BLD-IMP: POSITIVE — SIGNIFICANT CHANGE UP
RSV RNA NPH QL NAA+NON-PROBE: SIGNIFICANT CHANGE UP
SAO2 % BLDV: 92.6 % — SIGNIFICANT CHANGE UP
SARS-COV-2 RNA SPEC QL NAA+PROBE: SIGNIFICANT CHANGE UP
SODIUM SERPL-SCNC: 139 MMOL/L — SIGNIFICANT CHANGE UP (ref 135–145)
WBC # BLD: 6.85 K/UL — SIGNIFICANT CHANGE UP (ref 3.8–10.5)
WBC # BLD: 7.31 K/UL — SIGNIFICANT CHANGE UP (ref 3.8–10.5)
WBC # FLD AUTO: 6.85 K/UL — SIGNIFICANT CHANGE UP (ref 3.8–10.5)
WBC # FLD AUTO: 7.31 K/UL — SIGNIFICANT CHANGE UP (ref 3.8–10.5)

## 2022-02-02 PROCEDURE — 99285 EMERGENCY DEPT VISIT HI MDM: CPT

## 2022-02-02 PROCEDURE — 73630 X-RAY EXAM OF FOOT: CPT | Mod: 26,LT

## 2022-02-02 RX ORDER — SODIUM CHLORIDE 9 MG/ML
1000 INJECTION, SOLUTION INTRAVENOUS
Refills: 0 | Status: DISCONTINUED | OUTPATIENT
Start: 2022-02-02 | End: 2022-02-11

## 2022-02-02 RX ORDER — INSULIN GLARGINE 100 [IU]/ML
12 INJECTION, SOLUTION SUBCUTANEOUS
Qty: 0 | Refills: 0 | DISCHARGE

## 2022-02-02 RX ORDER — LISINOPRIL 2.5 MG/1
1 TABLET ORAL
Qty: 0 | Refills: 0 | DISCHARGE

## 2022-02-02 RX ORDER — ATORVASTATIN CALCIUM 80 MG/1
1 TABLET, FILM COATED ORAL
Qty: 0 | Refills: 0 | DISCHARGE

## 2022-02-02 RX ORDER — VANCOMYCIN HCL 1 G
1000 VIAL (EA) INTRAVENOUS EVERY 12 HOURS
Refills: 0 | Status: DISCONTINUED | OUTPATIENT
Start: 2022-02-02 | End: 2022-02-05

## 2022-02-02 RX ORDER — HEPARIN SODIUM 5000 [USP'U]/ML
INJECTION INTRAVENOUS; SUBCUTANEOUS
Qty: 25000 | Refills: 0 | Status: DISCONTINUED | OUTPATIENT
Start: 2022-02-02 | End: 2022-02-03

## 2022-02-02 RX ORDER — METFORMIN HYDROCHLORIDE 850 MG/1
1 TABLET ORAL
Qty: 0 | Refills: 0 | DISCHARGE

## 2022-02-02 RX ORDER — DEXTROSE 50 % IN WATER 50 %
12.5 SYRINGE (ML) INTRAVENOUS ONCE
Refills: 0 | Status: DISCONTINUED | OUTPATIENT
Start: 2022-02-02 | End: 2022-02-12

## 2022-02-02 RX ORDER — ALOGLIPTIN 12.5 MG/1
1 TABLET, FILM COATED ORAL
Qty: 0 | Refills: 0 | DISCHARGE

## 2022-02-02 RX ORDER — CARVEDILOL PHOSPHATE 80 MG/1
12.5 CAPSULE, EXTENDED RELEASE ORAL EVERY 12 HOURS
Refills: 0 | Status: DISCONTINUED | OUTPATIENT
Start: 2022-02-02 | End: 2022-02-12

## 2022-02-02 RX ORDER — CEFEPIME 1 G/1
2000 INJECTION, POWDER, FOR SOLUTION INTRAMUSCULAR; INTRAVENOUS EVERY 12 HOURS
Refills: 0 | Status: DISCONTINUED | OUTPATIENT
Start: 2022-02-03 | End: 2022-02-08

## 2022-02-02 RX ORDER — GLUCAGON INJECTION, SOLUTION 0.5 MG/.1ML
1 INJECTION, SOLUTION SUBCUTANEOUS ONCE
Refills: 0 | Status: DISCONTINUED | OUTPATIENT
Start: 2022-02-02 | End: 2022-02-12

## 2022-02-02 RX ORDER — DEXTROSE 50 % IN WATER 50 %
25 SYRINGE (ML) INTRAVENOUS ONCE
Refills: 0 | Status: DISCONTINUED | OUTPATIENT
Start: 2022-02-02 | End: 2022-02-12

## 2022-02-02 RX ORDER — ATORVASTATIN CALCIUM 80 MG/1
40 TABLET, FILM COATED ORAL AT BEDTIME
Refills: 0 | Status: DISCONTINUED | OUTPATIENT
Start: 2022-02-02 | End: 2022-02-12

## 2022-02-02 RX ORDER — ASPIRIN/CALCIUM CARB/MAGNESIUM 324 MG
1 TABLET ORAL
Qty: 0 | Refills: 0 | DISCHARGE

## 2022-02-02 RX ORDER — CARVEDILOL PHOSPHATE 80 MG/1
1 CAPSULE, EXTENDED RELEASE ORAL
Qty: 0 | Refills: 0 | DISCHARGE

## 2022-02-02 RX ORDER — LISINOPRIL 2.5 MG/1
10 TABLET ORAL DAILY
Refills: 0 | Status: DISCONTINUED | OUTPATIENT
Start: 2022-02-02 | End: 2022-02-07

## 2022-02-02 RX ORDER — APIXABAN 2.5 MG/1
1 TABLET, FILM COATED ORAL
Qty: 0 | Refills: 0 | DISCHARGE

## 2022-02-02 RX ORDER — ATORVASTATIN CALCIUM 80 MG/1
40 TABLET, FILM COATED ORAL DAILY
Refills: 0 | Status: DISCONTINUED | OUTPATIENT
Start: 2022-02-02 | End: 2022-02-02

## 2022-02-02 RX ORDER — VANCOMYCIN HCL 1 G
1000 VIAL (EA) INTRAVENOUS ONCE
Refills: 0 | Status: COMPLETED | OUTPATIENT
Start: 2022-02-02 | End: 2022-02-02

## 2022-02-02 RX ORDER — DEXTROSE 50 % IN WATER 50 %
15 SYRINGE (ML) INTRAVENOUS ONCE
Refills: 0 | Status: DISCONTINUED | OUTPATIENT
Start: 2022-02-02 | End: 2022-02-12

## 2022-02-02 RX ORDER — CEFEPIME 1 G/1
2000 INJECTION, POWDER, FOR SOLUTION INTRAMUSCULAR; INTRAVENOUS ONCE
Refills: 0 | Status: COMPLETED | OUTPATIENT
Start: 2022-02-02 | End: 2022-02-02

## 2022-02-02 RX ORDER — ASPIRIN/CALCIUM CARB/MAGNESIUM 324 MG
81 TABLET ORAL DAILY
Refills: 0 | Status: DISCONTINUED | OUTPATIENT
Start: 2022-02-02 | End: 2022-02-12

## 2022-02-02 RX ORDER — INSULIN LISPRO 100/ML
VIAL (ML) SUBCUTANEOUS
Refills: 0 | Status: DISCONTINUED | OUTPATIENT
Start: 2022-02-02 | End: 2022-02-12

## 2022-02-02 RX ORDER — ENOXAPARIN SODIUM 100 MG/ML
40 INJECTION SUBCUTANEOUS EVERY 24 HOURS
Refills: 0 | Status: DISCONTINUED | OUTPATIENT
Start: 2022-02-02 | End: 2022-02-02

## 2022-02-02 RX ORDER — INSULIN LISPRO 100/ML
VIAL (ML) SUBCUTANEOUS AT BEDTIME
Refills: 0 | Status: DISCONTINUED | OUTPATIENT
Start: 2022-02-02 | End: 2022-02-12

## 2022-02-02 RX ORDER — INSULIN LISPRO 100/ML
4 VIAL (ML) SUBCUTANEOUS
Qty: 0 | Refills: 0 | DISCHARGE

## 2022-02-02 RX ORDER — APIXABAN 2.5 MG/1
2.5 TABLET, FILM COATED ORAL
Refills: 0 | Status: DISCONTINUED | OUTPATIENT
Start: 2022-02-02 | End: 2022-02-02

## 2022-02-02 RX ORDER — ACETAMINOPHEN 500 MG
1000 TABLET ORAL ONCE
Refills: 0 | Status: COMPLETED | OUTPATIENT
Start: 2022-02-02 | End: 2022-02-03

## 2022-02-02 RX ADMIN — Medication 1 DROP(S): at 18:48

## 2022-02-02 RX ADMIN — ENOXAPARIN SODIUM 40 MILLIGRAM(S): 100 INJECTION SUBCUTANEOUS at 14:48

## 2022-02-02 RX ADMIN — Medication 250 MILLIGRAM(S): at 23:18

## 2022-02-02 RX ADMIN — ATORVASTATIN CALCIUM 40 MILLIGRAM(S): 80 TABLET, FILM COATED ORAL at 22:31

## 2022-02-02 RX ADMIN — CEFEPIME 100 MILLIGRAM(S): 1 INJECTION, POWDER, FOR SOLUTION INTRAMUSCULAR; INTRAVENOUS at 12:21

## 2022-02-02 RX ADMIN — CARVEDILOL PHOSPHATE 12.5 MILLIGRAM(S): 80 CAPSULE, EXTENDED RELEASE ORAL at 18:48

## 2022-02-02 RX ADMIN — Medication 250 MILLIGRAM(S): at 11:32

## 2022-02-02 RX ADMIN — HEPARIN SODIUM 1100 UNIT(S)/HR: 5000 INJECTION INTRAVENOUS; SUBCUTANEOUS at 19:20

## 2022-02-02 RX ADMIN — HEPARIN SODIUM 1100 UNIT(S)/HR: 5000 INJECTION INTRAVENOUS; SUBCUTANEOUS at 15:35

## 2022-02-02 NOTE — H&P ADULT - NSHPPHYSICALEXAM_GEN_ALL_CORE
VITAL SIGNS:  Vital Signs Last 24 Hrs  T(C): 36.8 (02 Feb 2022 11:15), Max: 36.8 (02 Feb 2022 10:06)  T(F): 98.2 (02 Feb 2022 11:15), Max: 98.3 (02 Feb 2022 10:06)  HR: 77 (02 Feb 2022 11:15) (77 - 80)  BP: 127/72 (02 Feb 2022 11:15) (127/72 - 157/90)  BP(mean): --  RR: 18 (02 Feb 2022 11:15) (16 - 18)  SpO2: 100% (02 Feb 2022 11:15) (100% - 100%)      PHYSICAL EXAM:    General: NAD, Sitting in bed comfortably  HEENT: NC/AT, EOMI  Neck: Soft, supple  Cardio: RRR, nml S1/S2  Resp: Good effort, CTA b/l  GI/Abd: Soft, NT/ND, no rebound/guarding, no masses palpated  Musculoskeletal:   Right - s/p AKA  Left - first and fifth digits dry gangrene, intact motor and sensory function. DP and PT signals

## 2022-02-02 NOTE — ED ADULT TRIAGE NOTE - CHIEF COMPLAINT QUOTE
pt s/p bypass in left leg in november, sent by vascular MD for podiatry consult for gangrene to left big and pinky toe and lateral side of foot. denies pain to area. no fever/chills.

## 2022-02-02 NOTE — ED PROVIDER NOTE - CLINICAL SUMMARY MEDICAL DECISION MAKING FREE TEXT BOX
62 y/o male pmh htn, dm, PVD sent in by vacular surgeon for L foot gangrene- will check labs, xray, ABX, podiatry/surgery consult, admit Katie att: 60 y/o male pmh htn, dm, PVD sent in by vacular surgeon for L foot gangrene- will check labs, xray, ABX, podiatry/surgery consult, admit

## 2022-02-02 NOTE — CONSULT NOTE ADULT - SUBJECTIVE AND OBJECTIVE BOX
Patient is a 61y old  Male who presents with a chief complaint of left foot wounds    HPI:  Patient is s/p bypass in left leg in november, sent by vascular MD for podiatry consult for gangrene to left big and pinky toe and lateral side of foot. denies pain to area. no fever/chills.    PAST MEDICAL & SURGICAL HISTORY:  DM (diabetes mellitus)    HTN (hypertension)    HLD (hyperlipidemia)    CHF (congestive heart failure)    CKD (chronic kidney disease)    PVD (peripheral vascular disease)    S/P femoral-femoral bypass surgery  2018 - right leg        MEDICATIONS  (STANDING):  cefepime   IVPB 2000 milliGRAM(s) IV Intermittent once    MEDICATIONS  (PRN):      Allergies    penicillin (Other)    Intolerances        VITALS:    Vital Signs Last 24 Hrs  T(C): 36.8 (02 Feb 2022 11:15), Max: 36.8 (02 Feb 2022 10:06)  T(F): 98.2 (02 Feb 2022 11:15), Max: 98.3 (02 Feb 2022 10:06)  HR: 77 (02 Feb 2022 11:15) (77 - 80)  BP: 127/72 (02 Feb 2022 11:15) (127/72 - 157/90)  BP(mean): --  RR: 18 (02 Feb 2022 11:15) (16 - 18)  SpO2: 100% (02 Feb 2022 11:15) (100% - 100%)    LABS:                CAPILLARY BLOOD GLUCOSE      POCT Blood Glucose.: 110 mg/dL (02 Feb 2022 10:56)          LOWER EXTREMITY PHYSICAL EXAM:    Vascular: DP/PT 0/4, B/L, CFT <_ seconds B/L, Temperature gradient _, B/L.   Neuro: Epicritic sensation _ to the level of _, B/L.  Musculoskeletal/Ortho:  Skin:  Wound #1:   Location:  Size:  Depth:  Wound bed:   Drainage:   Odor:   Periwound:  Etiology:     RADIOLOGY & ADDITIONAL STUDIES:     Patient is a 61y old  Male who presents with a chief complaint of left foot wounds    HPI:  Patient is s/p bypass in left leg in november, sent by vascular MD for podiatry consult for gangrene to left big and pinky toe and lateral side of foot. denies pain to area. no fever/chills.    PAST MEDICAL & SURGICAL HISTORY:  DM (diabetes mellitus)    HTN (hypertension)    HLD (hyperlipidemia)    CHF (congestive heart failure)    CKD (chronic kidney disease)    PVD (peripheral vascular disease)    S/P femoral-femoral bypass surgery  2018 - right leg        MEDICATIONS  (STANDING):  cefepime   IVPB 2000 milliGRAM(s) IV Intermittent once    MEDICATIONS  (PRN):      Allergies    penicillin (Other)    Intolerances        VITALS:    Vital Signs Last 24 Hrs  T(C): 36.8 (02 Feb 2022 11:15), Max: 36.8 (02 Feb 2022 10:06)  T(F): 98.2 (02 Feb 2022 11:15), Max: 98.3 (02 Feb 2022 10:06)  HR: 77 (02 Feb 2022 11:15) (77 - 80)  BP: 127/72 (02 Feb 2022 11:15) (127/72 - 157/90)  BP(mean): --  RR: 18 (02 Feb 2022 11:15) (16 - 18)  SpO2: 100% (02 Feb 2022 11:15) (100% - 100%)    LABS:                CAPILLARY BLOOD GLUCOSE      POCT Blood Glucose.: 110 mg/dL (02 Feb 2022 10:56)          LOWER EXTREMITY PHYSICAL EXAM:    Vascular: DP/PT 0/4, B/L, CFT <3 seconds B/L, Temperature gradient warm to cool, B/L.   Neuro: Epicritic sensation intact to the level of digits, B/L.  Musculoskeletal/Ortho: R AKA  Skin: left foot distal hallux dry stable gangrene to the level of DIPJ, well demarcated, no acute SOI, left foot fifth digit distal dry stable gangrene to the of DIPJ with periwound erythema extending to the base of the digits, left foot lateral fifth MPJ dry stable eschar with periwound erythema and mild edema, no drainage, no tracking or tunneling or wet conversion noted.    RADIOLOGY & ADDITIONAL STUDIES:

## 2022-02-02 NOTE — ED ADULT NURSE NOTE - OBJECTIVE STATEMENT
Received pt to bed 10, A+Ox4, AKA to the right side, arrives with wheelchair, states he uses walker and prosthetic. sent by vascular MD for podiatry consult for gangrene to left big and pinky toe and lateral side of foot. necrotic tissue noted to the left 1st toe and 5th toe, no purluent drainage noted, pt with minimal sensation to the 1st and 5th toe. Respirations even and unlabored, normal work of breathing, no accessory muscle use, speaking in full clear uninterrupted sentences. ABD is soft, non tender, non distended. Pt denies any chest pain, SOB, headache, dizziness, N/V/D, fever, chills. 20G to the Right forearm, labs sent, will continue to monitor.

## 2022-02-02 NOTE — H&P ADULT - HISTORY OF PRESENT ILLNESS
61M w/ PMHx of HTN, DM, PVD, s/p R AKA for RLE gangrene 10/2021, and recent LLE Fem-AKPop PTFE bypass 11/2021 for L foot gangrene. Pt now presents to the ED after being referred by Podiatrist for potential surgical intervention on Left foot.    In the ED, patient afebrile, hemodynamically stable, labs largely unremarkable. Podiatry evaluated patient and planning for surgical intervention this admission. Vascular surgery consulted for evaluation fo adequate arterial blood flow to foot.

## 2022-02-02 NOTE — H&P ADULT - ASSESSMENT
61M w/ hx of R AKA and L Fem-AKPop PTFE bypass for left foot dry gangrene who now presents for evaluation for possible podiatric intervention on first and fifth toes.    PLAN:  - Admit to C Team Surgery under Dr. Lundberg  - OR with Podiatry later this week  - CAMMY w/ toe pressures  - Left foot xray and MRI per podiatry  - Medical clearance for OR with podiatry  - pain control as needed    Discussed with attending surgeon Dr. Lundberg.    ALLIE Lawler, PGY-2  [facility]  C Team Surgery  [id number]

## 2022-02-02 NOTE — ED PROVIDER NOTE - OBJECTIVE STATEMENT
60 y/o male pmh htn, dm, PVD, s/p R AKA c/o L foot gangrene. Pt was sent in by his vascular surgeon for gangrene to L foot. Pt admits to having a LLE bypass in November and symptoms have been worsening over the last 1-2 weeks. Pt has wounds to L 1st and 5th toe and lateral foot. Pt denies pain, discharge, chest pain, sob, abd pain, n/v/d, fever or chills.

## 2022-02-02 NOTE — ED ADULT NURSE REASSESSMENT NOTE - NS ED NURSE REASSESS COMMENT FT1
pt started on heparin at 11mL/hr as per orders and heparin nomogram. pt educated on risks of heparin and understands. denies any bleeding, pain, sob, fever, headache, dizziness, n/v. respirations are equal and nonlabored, no respiratory distress noted. pt stable, safety maintained, will reassess and monitor.

## 2022-02-02 NOTE — ED ADULT NURSE NOTE - TEMPERATURE IN FAHRENHEIT (DEGREES F)
Maternal history reviewed, patient examined.     0dMale, born via [ x]   [ ] C/S to a      33    year old,  4  Para  1  --> 2    mother.   Prenatal labs:  Blood type  B+     , HepBsAg  negative,   RPR  nonreactive,  HIV  negative,    Rubella  immune        GBS status [ ] negative  [ ] unknown  [x ] positive PCN<4h  The pregnancy was un-complicated and the labor and delivery were un-remarkable.   ROM was  5  hours.    Birth weight:    3670             g              Apgars  9     @1min         9  @5 min    The nursery course to date has been un-remarkable  Physical Examination:  Vital signs stable  General Appearance: comfortable, no distress, no dysmorphic features   Head: normocephalic, anterior fontanelle open and flat  Eyes/ENT: red reflex present b/l, palate intact  Neck/clavicles: no masses, no crepitus  Chest: no grunting, flaring or retractions, clear and equal breath sounds b/l  CV: RRR, nl S1 S2, no murmurs, well perfused  Abdomen: soft, nontender, nondistended, no masses  : [ ] normal female  [x] normal male  Back: no defects  Extremities: full range of motion, no hip clicks, normal digits. 2+ Femoral pulses.  Neuro: good tone, moves all extremities, symmetric [place], suck, grasp  Skin: no lesions, no jaundice    Laboratory & Imaging Studies:        CAPILLARY BLOOD GLUCOSE          Assessment:   Well      Plan:  Admit to well baby nursery  Normal / Healthy [place] [facility] and teaching  Discuss hep B vaccine with parents
98.2

## 2022-02-02 NOTE — H&P ADULT - NSHPLABSRESULTS_GEN_ALL_CORE
LABS:                        12.2   7.31  )-----------( 303      ( 02 Feb 2022 11:52 )             37.4     02 Feb 2022 11:52    139    |  103    |  22     ----------------------------<  113    4.5     |  24     |  0.78     Ca    9.7        02 Feb 2022 11:52    TPro  6.9    /  Alb  4.1    /  TBili  <0.2   /  DBili  x      /  AST  21     /  ALT  25     /  AlkPhos  94     02 Feb 2022 11:52    PT/INR - ( 02 Feb 2022 11:52 )   PT: 13.7 sec;   INR: 1.20 ratio         PTT - ( 02 Feb 2022 11:52 )  PTT:33.1 sec  CAPILLARY BLOOD GLUCOSE      POCT Blood Glucose.: 110 mg/dL (02 Feb 2022 10:56)        LIVER FUNCTIONS - ( 02 Feb 2022 11:52 )  Alb: 4.1 g/dL / Pro: 6.9 g/dL / ALK PHOS: 94 U/L / ALT: 25 U/L / AST: 21 U/L / GGT: x

## 2022-02-03 DIAGNOSIS — Z29.9 ENCOUNTER FOR PROPHYLACTIC MEASURES, UNSPECIFIED: ICD-10-CM

## 2022-02-03 DIAGNOSIS — I10 ESSENTIAL (PRIMARY) HYPERTENSION: ICD-10-CM

## 2022-02-03 DIAGNOSIS — Z01.818 ENCOUNTER FOR OTHER PREPROCEDURAL EXAMINATION: ICD-10-CM

## 2022-02-03 DIAGNOSIS — N18.9 CHRONIC KIDNEY DISEASE, UNSPECIFIED: ICD-10-CM

## 2022-02-03 DIAGNOSIS — I73.9 PERIPHERAL VASCULAR DISEASE, UNSPECIFIED: ICD-10-CM

## 2022-02-03 DIAGNOSIS — E11.9 TYPE 2 DIABETES MELLITUS WITHOUT COMPLICATIONS: ICD-10-CM

## 2022-02-03 LAB
A1C WITH ESTIMATED AVERAGE GLUCOSE RESULT: 5.8 % — HIGH (ref 4–5.6)
ALBUMIN SERPL ELPH-MCNC: 3.8 G/DL — SIGNIFICANT CHANGE UP (ref 3.3–5)
ALP SERPL-CCNC: 104 U/L — SIGNIFICANT CHANGE UP (ref 40–120)
ALT FLD-CCNC: 24 U/L — SIGNIFICANT CHANGE UP (ref 4–41)
ANION GAP SERPL CALC-SCNC: 11 MMOL/L — SIGNIFICANT CHANGE UP (ref 7–14)
APTT BLD: 109.5 SEC — HIGH (ref 27–36.3)
APTT BLD: 74.1 SEC — HIGH (ref 27–36.3)
APTT BLD: 83.2 SEC — HIGH (ref 27–36.3)
AST SERPL-CCNC: 21 U/L — SIGNIFICANT CHANGE UP (ref 4–40)
BILIRUB SERPL-MCNC: <0.2 MG/DL — SIGNIFICANT CHANGE UP (ref 0.2–1.2)
BUN SERPL-MCNC: 24 MG/DL — HIGH (ref 7–23)
CALCIUM SERPL-MCNC: 9.2 MG/DL — SIGNIFICANT CHANGE UP (ref 8.4–10.5)
CHLORIDE SERPL-SCNC: 103 MMOL/L — SIGNIFICANT CHANGE UP (ref 98–107)
CO2 SERPL-SCNC: 23 MMOL/L — SIGNIFICANT CHANGE UP (ref 22–31)
CREAT SERPL-MCNC: 1.07 MG/DL — SIGNIFICANT CHANGE UP (ref 0.5–1.3)
ESTIMATED AVERAGE GLUCOSE: 120 — SIGNIFICANT CHANGE UP
GLUCOSE SERPL-MCNC: 97 MG/DL — SIGNIFICANT CHANGE UP (ref 70–99)
HCT VFR BLD CALC: 37.6 % — LOW (ref 39–50)
HCT VFR BLD CALC: 37.7 % — LOW (ref 39–50)
HGB BLD-MCNC: 12.1 G/DL — LOW (ref 13–17)
HGB BLD-MCNC: 12.2 G/DL — LOW (ref 13–17)
MAGNESIUM SERPL-MCNC: 1.9 MG/DL — SIGNIFICANT CHANGE UP (ref 1.6–2.6)
MCHC RBC-ENTMCNC: 30.5 PG — SIGNIFICANT CHANGE UP (ref 27–34)
MCHC RBC-ENTMCNC: 31.1 PG — SIGNIFICANT CHANGE UP (ref 27–34)
MCHC RBC-ENTMCNC: 32.1 GM/DL — SIGNIFICANT CHANGE UP (ref 32–36)
MCHC RBC-ENTMCNC: 32.4 GM/DL — SIGNIFICANT CHANGE UP (ref 32–36)
MCV RBC AUTO: 95 FL — SIGNIFICANT CHANGE UP (ref 80–100)
MCV RBC AUTO: 95.9 FL — SIGNIFICANT CHANGE UP (ref 80–100)
NRBC # BLD: 0 /100 WBCS — SIGNIFICANT CHANGE UP
NRBC # BLD: 0 /100 WBCS — SIGNIFICANT CHANGE UP
NRBC # FLD: 0 K/UL — SIGNIFICANT CHANGE UP
NRBC # FLD: 0 K/UL — SIGNIFICANT CHANGE UP
PHOSPHATE SERPL-MCNC: 3.2 MG/DL — SIGNIFICANT CHANGE UP (ref 2.5–4.5)
PLATELET # BLD AUTO: 260 K/UL — SIGNIFICANT CHANGE UP (ref 150–400)
PLATELET # BLD AUTO: 283 K/UL — SIGNIFICANT CHANGE UP (ref 150–400)
POTASSIUM SERPL-MCNC: 4 MMOL/L — SIGNIFICANT CHANGE UP (ref 3.5–5.3)
POTASSIUM SERPL-SCNC: 4 MMOL/L — SIGNIFICANT CHANGE UP (ref 3.5–5.3)
PROT SERPL-MCNC: 6.9 G/DL — SIGNIFICANT CHANGE UP (ref 6–8.3)
RBC # BLD: 3.92 M/UL — LOW (ref 4.2–5.8)
RBC # BLD: 3.97 M/UL — LOW (ref 4.2–5.8)
RBC # FLD: 14.5 % — SIGNIFICANT CHANGE UP (ref 10.3–14.5)
RBC # FLD: 14.6 % — HIGH (ref 10.3–14.5)
SODIUM SERPL-SCNC: 137 MMOL/L — SIGNIFICANT CHANGE UP (ref 135–145)
VANCOMYCIN TROUGH SERPL-MCNC: 14.7 UG/ML — SIGNIFICANT CHANGE UP (ref 10–20)
WBC # BLD: 6.95 K/UL — SIGNIFICANT CHANGE UP (ref 3.8–10.5)
WBC # BLD: 7.92 K/UL — SIGNIFICANT CHANGE UP (ref 3.8–10.5)
WBC # FLD AUTO: 6.95 K/UL — SIGNIFICANT CHANGE UP (ref 3.8–10.5)
WBC # FLD AUTO: 7.92 K/UL — SIGNIFICANT CHANGE UP (ref 3.8–10.5)

## 2022-02-03 PROCEDURE — 93010 ELECTROCARDIOGRAM REPORT: CPT

## 2022-02-03 PROCEDURE — 71045 X-RAY EXAM CHEST 1 VIEW: CPT | Mod: 26

## 2022-02-03 PROCEDURE — 99223 1ST HOSP IP/OBS HIGH 75: CPT

## 2022-02-03 RX ORDER — HEPARIN SODIUM 5000 [USP'U]/ML
1000 INJECTION INTRAVENOUS; SUBCUTANEOUS
Qty: 25000 | Refills: 0 | Status: DISCONTINUED | OUTPATIENT
Start: 2022-02-03 | End: 2022-02-04

## 2022-02-03 RX ORDER — SODIUM CHLORIDE 9 MG/ML
1000 INJECTION, SOLUTION INTRAVENOUS
Refills: 0 | Status: DISCONTINUED | OUTPATIENT
Start: 2022-02-04 | End: 2022-02-04

## 2022-02-03 RX ORDER — ACETAMINOPHEN 500 MG
650 TABLET ORAL EVERY 6 HOURS
Refills: 0 | Status: DISCONTINUED | OUTPATIENT
Start: 2022-02-03 | End: 2022-02-12

## 2022-02-03 RX ADMIN — Medication 1 DROP(S): at 05:20

## 2022-02-03 RX ADMIN — Medication 650 MILLIGRAM(S): at 23:46

## 2022-02-03 RX ADMIN — HEPARIN SODIUM 1100 UNIT(S)/HR: 5000 INJECTION INTRAVENOUS; SUBCUTANEOUS at 00:31

## 2022-02-03 RX ADMIN — Medication 81 MILLIGRAM(S): at 12:35

## 2022-02-03 RX ADMIN — HEPARIN SODIUM 10 UNIT(S)/HR: 5000 INJECTION INTRAVENOUS; SUBCUTANEOUS at 07:41

## 2022-02-03 RX ADMIN — Medication 2: at 12:35

## 2022-02-03 RX ADMIN — Medication 400 MILLIGRAM(S): at 00:18

## 2022-02-03 RX ADMIN — HEPARIN SODIUM 10 UNIT(S)/HR: 5000 INJECTION INTRAVENOUS; SUBCUTANEOUS at 20:14

## 2022-02-03 RX ADMIN — CEFEPIME 100 MILLIGRAM(S): 1 INJECTION, POWDER, FOR SOLUTION INTRAMUSCULAR; INTRAVENOUS at 12:35

## 2022-02-03 RX ADMIN — Medication 1000 MILLIGRAM(S): at 01:20

## 2022-02-03 RX ADMIN — Medication 250 MILLIGRAM(S): at 11:31

## 2022-02-03 RX ADMIN — Medication 250 MILLIGRAM(S): at 23:21

## 2022-02-03 RX ADMIN — CARVEDILOL PHOSPHATE 12.5 MILLIGRAM(S): 80 CAPSULE, EXTENDED RELEASE ORAL at 17:23

## 2022-02-03 RX ADMIN — HEPARIN SODIUM 10 UNIT(S)/HR: 5000 INJECTION INTRAVENOUS; SUBCUTANEOUS at 06:59

## 2022-02-03 RX ADMIN — Medication 2: at 17:23

## 2022-02-03 RX ADMIN — LISINOPRIL 10 MILLIGRAM(S): 2.5 TABLET ORAL at 05:19

## 2022-02-03 RX ADMIN — Medication 650 MILLIGRAM(S): at 15:03

## 2022-02-03 RX ADMIN — CEFEPIME 100 MILLIGRAM(S): 1 INJECTION, POWDER, FOR SOLUTION INTRAMUSCULAR; INTRAVENOUS at 01:44

## 2022-02-03 RX ADMIN — CARVEDILOL PHOSPHATE 12.5 MILLIGRAM(S): 80 CAPSULE, EXTENDED RELEASE ORAL at 05:19

## 2022-02-03 RX ADMIN — ATORVASTATIN CALCIUM 40 MILLIGRAM(S): 80 TABLET, FILM COATED ORAL at 21:32

## 2022-02-03 RX ADMIN — Medication 1 DROP(S): at 17:24

## 2022-02-03 RX ADMIN — Medication 1 TABLET(S): at 12:35

## 2022-02-03 NOTE — CONSULT NOTE ADULT - PROBLEM SELECTOR RECOMMENDATION 4
A1C: 5.8, on oral meds at home: alogliptin, metformin  Hold all oral meds while inpatient  FS controlled  Check FS achs, SSI premeals  c/w lipitor 40mg

## 2022-02-03 NOTE — CONSULT NOTE ADULT - PROBLEM SELECTOR RECOMMENDATION 9
pod plan possible partial first and fifth ray resection pending MRI/vasc recs  Podiatry plan for surgical intervention under local anesthesia and light IV sedation  No evidence of clinical HF or anginal symptoms  Obtain EKG  Recent TTE and NST 9/2021 with normal LV function and no ischemia  BP and DM controlled  Patient is presently medically optimized for proposed procedure  RCRI: 0 indicating 3.9% 30 day risk of death, MI or cardiac arrest

## 2022-02-03 NOTE — PROGRESS NOTE ADULT - SUBJECTIVE AND OBJECTIVE BOX
Surgery Progress Note     Subjective/24hour Events: Patient seen and examined at the bedside this morning. No acute events overnight. Pain controlled.     Vital Signs:  Vital Signs Last 24 Hrs  T(C): 36.6 (03 Feb 2022 01:31), Max: 37.2 (02 Feb 2022 22:20)  T(F): 97.8 (03 Feb 2022 01:31), Max: 99 (02 Feb 2022 22:20)  HR: 80 (03 Feb 2022 01:31) (77 - 84)  BP: 117/60 (03 Feb 2022 01:31) (117/60 - 157/90)  BP(mean): --  RR: 18 (03 Feb 2022 01:31) (16 - 20)  SpO2: 99% (03 Feb 2022 01:31) (99% - 100%)        I&O's Detail    PHYSICAL EXAM:    General: NAD, Sitting in bed comfortably  HEENT: NC/AT, EOMI  Neck: Soft, supple  Cardio: RRR, nml S1/S2  Resp: Good effort, CTA b/l  GI/Abd: Soft, NT/ND, no rebound/guarding, no masses palpated  Musculoskeletal:   Right - s/p AKA  Left - first and fifth digits dry gangrene, intact motor and sensory function. DP and PT signals    Labs:    02-02    139  |  103  |  22  ----------------------------<  113<H>  4.5   |  24  |  0.78    Ca    9.7      02 Feb 2022 11:52    TPro  6.9  /  Alb  4.1  /  TBili  <0.2  /  DBili  x   /  AST  21  /  ALT  25  /  AlkPhos  94  02-02    CAPILLARY BLOOD GLUCOSE      POCT Blood Glucose.: 115 mg/dL (02 Feb 2022 21:22)  POCT Blood Glucose.: 98 mg/dL (02 Feb 2022 17:56)  POCT Blood Glucose.: 110 mg/dL (02 Feb 2022 10:56)    LIVER FUNCTIONS - ( 02 Feb 2022 11:52 )  Alb: 4.1 g/dL / Pro: 6.9 g/dL / ALK PHOS: 94 U/L / ALT: 25 U/L / AST: 21 U/L / GGT: x                                 12.0   6.85  )-----------( 278      ( 02 Feb 2022 22:16 )             36.8     PT/INR - ( 02 Feb 2022 11:52 )   PT: 13.7 sec;   INR: 1.20 ratio         PTT - ( 02 Feb 2022 22:16 )  PTT:81.9 sec       Vascular Surgery Progress Note     Subjective/24hour Events: Patient seen and examined at the bedside this morning. No acute events overnight. Pain controlled.     Vital Signs:  Vital Signs Last 24 Hrs  T(C): 36.6 (03 Feb 2022 01:31), Max: 37.2 (02 Feb 2022 22:20)  T(F): 97.8 (03 Feb 2022 01:31), Max: 99 (02 Feb 2022 22:20)  HR: 80 (03 Feb 2022 01:31) (77 - 84)  BP: 117/60 (03 Feb 2022 01:31) (117/60 - 157/90)  BP(mean): --  RR: 18 (03 Feb 2022 01:31) (16 - 20)  SpO2: 99% (03 Feb 2022 01:31) (99% - 100%)        I&O's Detail    PHYSICAL EXAM:    General: NAD, Sitting in bed comfortably  HEENT: NC/AT, EOMI  Neck: Soft, supple  Cardio: RRR, nml S1/S2  Resp: Good effort, CTA b/l  GI/Abd: Soft, NT/ND, no rebound/guarding, no masses palpated  Musculoskeletal:   Right - s/p AKA  Left - first and fifth digits dry gangrene, intact motor and sensory function. DP and PT signals    Labs:    02-02    139  |  103  |  22  ----------------------------<  113<H>  4.5   |  24  |  0.78    Ca    9.7      02 Feb 2022 11:52    TPro  6.9  /  Alb  4.1  /  TBili  <0.2  /  DBili  x   /  AST  21  /  ALT  25  /  AlkPhos  94  02-02    CAPILLARY BLOOD GLUCOSE      POCT Blood Glucose.: 115 mg/dL (02 Feb 2022 21:22)  POCT Blood Glucose.: 98 mg/dL (02 Feb 2022 17:56)  POCT Blood Glucose.: 110 mg/dL (02 Feb 2022 10:56)    LIVER FUNCTIONS - ( 02 Feb 2022 11:52 )  Alb: 4.1 g/dL / Pro: 6.9 g/dL / ALK PHOS: 94 U/L / ALT: 25 U/L / AST: 21 U/L / GGT: x                                 12.0   6.85  )-----------( 278      ( 02 Feb 2022 22:16 )             36.8     PT/INR - ( 02 Feb 2022 11:52 )   PT: 13.7 sec;   INR: 1.20 ratio         PTT - ( 02 Feb 2022 22:16 )  PTT:81.9 sec    IMAGING:  < from: Xray Foot AP + Lateral + Oblique, Left (02.02.22 @ 13:24) >    PROCEDURE DATE:  02/02/2022          INTERPRETATION:  CLINICAL INDICATION: left 1st and 5th MPJ region   ulcerations; evaluate for osteomyelitis    EXAM:  Frontal, oblique, and lateral left foot from 2/2/2022 at 1324. Compared   to prior study from 11/2/2021.    IMPRESSION:  Peripheral 1st and 5th MPJ region partial-thickness soft tissue   ulcerations however without adjacent tracking gas collections or gross   radiographic evidence for underlying osteomyelitis. Also correlate with   findings on already pending MRI.    No fractures or dislocations.    Tarsometatarsal alignment maintained without evidence for a Lisfranc   injury.    Congenitally fused 5th DIP joint. Preserved remaining joint spaces and no   joint margin erosions.    No calcaneal spurring.    Generalized osteopenia otherwise no discrete lytic or blastic lesions.    Vascular calcifications.    --- End of Report ---    < end of copied text >

## 2022-02-03 NOTE — PROGRESS NOTE ADULT - ASSESSMENT
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  - LF MRI pending, expedited and protocoled 2/2  - CAMMY/PVR pending, expedited 2/3  - patient is scheduled for left foot partial first and fifth ray resection at 9:30am on 2/4 with Dr. Melissa, pending MRI/vasc recs  - NPO after midnight  - COVID neg 2/2, ok for OR on 2/4  - please document medical optimization for surgical intervention under local anesthesia and light IV sedation  - discussed w/ attending

## 2022-02-03 NOTE — CONSULT NOTE ADULT - ASSESSMENT
61yr old man PMH HTN, DM, PVD s/p R AKA for RLE gangrene 10/2021, and recent LLE Fem-AKPop PTFE bypass 11/2021 for L foot gangrene presents with left toe gangrene.  
61M with left foot gangrene  - pt seen and evaluated  - afebrile, no leukocytosis  -  left foot distal hallux dry stable gangrene to the level of DIPJ, well demarcated, no acute SOI, left foot fifth digit distal dry stable gangrene to the of DIPJ with periwound erythema extending to the base of the digits, left foot lateral fifth MPJ dry stable eschar with periwound erythema and mild edema, no drainage, no tracking or tunneling or wet conversion noted.  - rec admission to vascular, Dr. Lundberg patient   - rec IV vanco, cefepime  - ordered LF MRI  - ordered CAMMY/PVR  - pod plan possible partial first and fifth ray resection pending MRI/vasc recs  - please document medical optimization for surgical intervention under local anesthesia and light IV sedation  - discussed w/ attending

## 2022-02-03 NOTE — PROGRESS NOTE ADULT - ASSESSMENT
61M w/ hx of R AKA and L Fem-AKPop PTFE bypass for left foot dry gangrene who now presents for evaluation for possible podiatric intervention on first and fifth toes.    PLAN:    - OR with Podiatry later this week  - CAMMY w/ toe pressures  - Left foot xray and MRI per podiatry  - Medical clearance for OR with podiatry  - pain control as needed 61M w/ hx of R AKA and L Fem-AKPop PTFE bypass for left foot dry gangrene who now presents for evaluation for possible podiatric intervention on first and fifth toes.    PLAN:  - Pain control PRN  - OR with Podiatry later this week for possible 1st/5th ray resection L foot  - CAMMY w/ toe pressures  - MRI left foot ordered   - Regular Diet  - Medical clearance for OR with podiatry  - ABX: Vanc/ Cefepime  - Cont heparin gtt; monitor PTTs    C Team/ Vascular Surgery  [id number]

## 2022-02-03 NOTE — PATIENT PROFILE ADULT - FALL HARM RISK - RISK INTERVENTIONS

## 2022-02-03 NOTE — PROGRESS NOTE ADULT - SUBJECTIVE AND OBJECTIVE BOX
Patient is a 61y old  Male who presents with a chief complaint of Left foot dry gangrene (03 Feb 2022 04:45)       INTERVAL HPI/OVERNIGHT EVENTS:  Patient seen and evaluated at bedside.  Pt is resting comfortable in NAD. Denies N/V/F/C.    Allergies    penicillin (Other)    Intolerances        Vital Signs Last 24 Hrs  T(C): 36.8 (03 Feb 2022 10:04), Max: 37.2 (02 Feb 2022 22:20)  T(F): 98.2 (03 Feb 2022 10:04), Max: 99 (02 Feb 2022 22:20)  HR: 80 (03 Feb 2022 10:04) (77 - 84)  BP: 126/75 (03 Feb 2022 10:04) (117/60 - 136/72)  BP(mean): --  RR: 18 (03 Feb 2022 10:04) (18 - 20)  SpO2: 100% (03 Feb 2022 10:04) (99% - 100%)    LABS:                        12.2   6.95  )-----------( 283      ( 03 Feb 2022 07:02 )             37.6     02-03    137  |  103  |  24<H>  ----------------------------<  97  4.0   |  23  |  1.07    Ca    9.2      03 Feb 2022 07:02  Phos  3.2     02-03  Mg     1.90     02-03    TPro  6.9  /  Alb  3.8  /  TBili  <0.2  /  DBili  x   /  AST  21  /  ALT  24  /  AlkPhos  104  02-03    PT/INR - ( 02 Feb 2022 11:52 )   PT: 13.7 sec;   INR: 1.20 ratio         PTT - ( 03 Feb 2022 04:59 )  PTT:109.5 sec    CAPILLARY BLOOD GLUCOSE      POCT Blood Glucose.: 105 mg/dL (03 Feb 2022 08:38)  POCT Blood Glucose.: 115 mg/dL (02 Feb 2022 21:22)  POCT Blood Glucose.: 98 mg/dL (02 Feb 2022 17:56)  POCT Blood Glucose.: 110 mg/dL (02 Feb 2022 10:56)      Lower Extremity Physical Exam:  Vascular: DP/PT 0/4, B/L, CFT <3 seconds B/L, Temperature gradient warm to cool, B/L.   Neuro: Epicritic sensation intact to the level of digits, B/L.  Musculoskeletal/Ortho: R AKA  Skin: left foot distal hallux dry stable gangrene to the level of DIPJ, well demarcated, no acute SOI, left foot fifth digit distal dry stable gangrene to the of DIPJ with periwound erythema extending to the base of the digits, left foot lateral fifth MPJ dry stable eschar with periwound erythema and mild edema, no drainage, no tracking or tunneling or wet conversion noted.      RADIOLOGY & ADDITIONAL TESTS:

## 2022-02-03 NOTE — CHART NOTE - NSCHARTNOTEFT_GEN_A_CORE
Pre-operative Note    Pre-operative Diagnosis: Left foot gangrene  Procedure: Left foot partial first and fifth ray resection at 9:30am on 2/4   Surgeon: Dr. Melissa    Labs:                        12.1   7.92  )-----------( 260      ( 03 Feb 2022 12:32 )             37.7     02-03    137  |  103  |  24<H>  ----------------------------<  97  4.0   |  23  |  1.07    Ca    9.2      03 Feb 2022 07:02  Phos  3.2     02-03  Mg     1.90     02-03    TPro  6.9  /  Alb  3.8  /  TBili  <0.2  /  DBili  x   /  AST  21  /  ALT  24  /  AlkPhos  104  02-03    PT/INR - ( 02 Feb 2022 11:52 )   PT: 13.7 sec;   INR: 1.20 ratio         PTT - ( 03 Feb 2022 12:32 )  PTT:83.2 sec    Type & Screen #1: Type + Screen (02.02.22 @ 11:38)   ABO Interpretation: O   Rh Interpretation: Positive   Antibody Screen: Negative     Type & Screen #2:   Type + Screen (11.17.21 @ 04:18)   ABO Interpretation: O   Rh Interpretation: Positive   Antibody Screen: Negative         IMAGING  - CXR: < from: Xray Chest 1 View AP/PA (02.03.22 @ 12:32) >      INTERPRETATION:  CLINICAL INDICATION: Preoperative radiograph, foot   ulcerations.    TECHNIQUE: Single view of the chest was obtained.    COMPARISON: Chest radiograph 11/2/2021.    FINDINGS:    Clear lungs. No pleural effusion or pneumothorax.  The heart is not enlarged.  No acute osseous abnormality.    IMPRESSION:    < end of copied text >      - EKG: Ordered    PLAN:  - NPO at midnight  - IVF while NPO  - Type and Cross active  - HOLD heparin gtt at 00:00 2/4  - COVID(-) 2/2  - Consent to be obtained by podiatry  - Per medicine: Patient is presently medically optimized for proposed procedure  RCRI: 0 indicating 3.9% 30 day risk of death, MI or cardiac arrest.    C Team/ Vascular Surgery  [id number]

## 2022-02-03 NOTE — CONSULT NOTE ADULT - PROBLEM SELECTOR RECOMMENDATION 2
s/p Lt fem-AKpop bypass on 11/17  - CAMMY/PVR and MRI pending  - patient is scheduled for left foot partial first and fifth ray resection on 2/4 for left toe gangrene  - pain control with oxy IR PRN  - wound care as per primary team. On Vanc/cefepime per primary team  - c/w ASA  - Eliquis held in anticipation of OR, currently on heparin gtt

## 2022-02-04 LAB
ANION GAP SERPL CALC-SCNC: 12 MMOL/L — SIGNIFICANT CHANGE UP (ref 7–14)
APTT BLD: 30.4 SEC — SIGNIFICANT CHANGE UP (ref 27–36.3)
APTT BLD: 56.9 SEC — HIGH (ref 27–36.3)
BLD GP AB SCN SERPL QL: NEGATIVE — SIGNIFICANT CHANGE UP
BUN SERPL-MCNC: 22 MG/DL — SIGNIFICANT CHANGE UP (ref 7–23)
CALCIUM SERPL-MCNC: 8.8 MG/DL — SIGNIFICANT CHANGE UP (ref 8.4–10.5)
CHLORIDE SERPL-SCNC: 106 MMOL/L — SIGNIFICANT CHANGE UP (ref 98–107)
CO2 SERPL-SCNC: 21 MMOL/L — LOW (ref 22–31)
CREAT SERPL-MCNC: 0.83 MG/DL — SIGNIFICANT CHANGE UP (ref 0.5–1.3)
GLUCOSE SERPL-MCNC: 138 MG/DL — HIGH (ref 70–99)
HCT VFR BLD CALC: 36.6 % — LOW (ref 39–50)
HGB BLD-MCNC: 11.7 G/DL — LOW (ref 13–17)
INR BLD: 1.05 RATIO — SIGNIFICANT CHANGE UP (ref 0.88–1.16)
MAGNESIUM SERPL-MCNC: 1.8 MG/DL — SIGNIFICANT CHANGE UP (ref 1.6–2.6)
MCHC RBC-ENTMCNC: 30.5 PG — SIGNIFICANT CHANGE UP (ref 27–34)
MCHC RBC-ENTMCNC: 32 GM/DL — SIGNIFICANT CHANGE UP (ref 32–36)
MCV RBC AUTO: 95.3 FL — SIGNIFICANT CHANGE UP (ref 80–100)
NRBC # BLD: 0 /100 WBCS — SIGNIFICANT CHANGE UP
NRBC # FLD: 0 K/UL — SIGNIFICANT CHANGE UP
PHOSPHATE SERPL-MCNC: 3.4 MG/DL — SIGNIFICANT CHANGE UP (ref 2.5–4.5)
PLATELET # BLD AUTO: 240 K/UL — SIGNIFICANT CHANGE UP (ref 150–400)
POTASSIUM SERPL-MCNC: 3.9 MMOL/L — SIGNIFICANT CHANGE UP (ref 3.5–5.3)
POTASSIUM SERPL-SCNC: 3.9 MMOL/L — SIGNIFICANT CHANGE UP (ref 3.5–5.3)
PROTHROM AB SERPL-ACNC: 12.1 SEC — SIGNIFICANT CHANGE UP (ref 10.6–13.6)
RBC # BLD: 3.84 M/UL — LOW (ref 4.2–5.8)
RBC # FLD: 14.6 % — HIGH (ref 10.3–14.5)
RH IG SCN BLD-IMP: POSITIVE — SIGNIFICANT CHANGE UP
SODIUM SERPL-SCNC: 139 MMOL/L — SIGNIFICANT CHANGE UP (ref 135–145)
WBC # BLD: 6.41 K/UL — SIGNIFICANT CHANGE UP (ref 3.8–10.5)
WBC # FLD AUTO: 6.41 K/UL — SIGNIFICANT CHANGE UP (ref 3.8–10.5)

## 2022-02-04 PROCEDURE — 73720 MRI LWR EXTREMITY W/O&W/DYE: CPT | Mod: 26,LT

## 2022-02-04 PROCEDURE — 99232 SBSQ HOSP IP/OBS MODERATE 35: CPT

## 2022-02-04 PROCEDURE — 93923 UPR/LXTR ART STDY 3+ LVLS: CPT | Mod: 26

## 2022-02-04 RX ORDER — MAGNESIUM SULFATE 500 MG/ML
2 VIAL (ML) INJECTION ONCE
Refills: 0 | Status: COMPLETED | OUTPATIENT
Start: 2022-02-04 | End: 2022-02-04

## 2022-02-04 RX ORDER — HEPARIN SODIUM 5000 [USP'U]/ML
1000 INJECTION INTRAVENOUS; SUBCUTANEOUS
Qty: 25000 | Refills: 0 | Status: DISCONTINUED | OUTPATIENT
Start: 2022-02-04 | End: 2022-02-06

## 2022-02-04 RX ORDER — HEPARIN SODIUM 5000 [USP'U]/ML
1000 INJECTION INTRAVENOUS; SUBCUTANEOUS
Qty: 25000 | Refills: 0 | Status: DISCONTINUED | OUTPATIENT
Start: 2022-02-04 | End: 2022-02-04

## 2022-02-04 RX ADMIN — CARVEDILOL PHOSPHATE 12.5 MILLIGRAM(S): 80 CAPSULE, EXTENDED RELEASE ORAL at 06:11

## 2022-02-04 RX ADMIN — SODIUM CHLORIDE 100 MILLILITER(S): 9 INJECTION, SOLUTION INTRAVENOUS at 00:33

## 2022-02-04 RX ADMIN — LISINOPRIL 10 MILLIGRAM(S): 2.5 TABLET ORAL at 06:11

## 2022-02-04 RX ADMIN — Medication 250 MILLIGRAM(S): at 22:17

## 2022-02-04 RX ADMIN — CARVEDILOL PHOSPHATE 12.5 MILLIGRAM(S): 80 CAPSULE, EXTENDED RELEASE ORAL at 17:04

## 2022-02-04 RX ADMIN — CEFEPIME 100 MILLIGRAM(S): 1 INJECTION, POWDER, FOR SOLUTION INTRAMUSCULAR; INTRAVENOUS at 00:26

## 2022-02-04 RX ADMIN — Medication 250 MILLIGRAM(S): at 15:01

## 2022-02-04 RX ADMIN — Medication 1 TABLET(S): at 13:07

## 2022-02-04 RX ADMIN — Medication 650 MILLIGRAM(S): at 00:38

## 2022-02-04 RX ADMIN — HEPARIN SODIUM 10 UNIT(S)/HR: 5000 INJECTION INTRAVENOUS; SUBCUTANEOUS at 13:06

## 2022-02-04 RX ADMIN — CEFEPIME 100 MILLIGRAM(S): 1 INJECTION, POWDER, FOR SOLUTION INTRAMUSCULAR; INTRAVENOUS at 14:08

## 2022-02-04 RX ADMIN — Medication 25 GRAM(S): at 13:06

## 2022-02-04 RX ADMIN — Medication 650 MILLIGRAM(S): at 22:15

## 2022-02-04 RX ADMIN — Medication 81 MILLIGRAM(S): at 13:07

## 2022-02-04 RX ADMIN — Medication 1 DROP(S): at 17:03

## 2022-02-04 RX ADMIN — Medication 650 MILLIGRAM(S): at 21:46

## 2022-02-04 RX ADMIN — ATORVASTATIN CALCIUM 40 MILLIGRAM(S): 80 TABLET, FILM COATED ORAL at 21:46

## 2022-02-04 RX ADMIN — HEPARIN SODIUM 11 UNIT(S)/HR: 5000 INJECTION INTRAVENOUS; SUBCUTANEOUS at 19:38

## 2022-02-04 NOTE — PROGRESS NOTE ADULT - PROBLEM SELECTOR PLAN 1
pod plan possible partial first and fifth ray resection pending MRI/vasc recs  Podiatry plan for surgical intervention under local anesthesia and light IV sedation  No evidence of clinical HF or anginal symptoms  Recent TTE and NST 9/2021 with normal LV function and no ischemia  BP and DM controlled  Patient is presently medically optimized for proposed procedure  RCRI: 0 indicating 3.9% 30 day risk of death, MI or cardiac arrest.

## 2022-02-04 NOTE — PROGRESS NOTE ADULT - ASSESSMENT
61M w/ hx of R AKA and L Fem-AKPop PTFE bypass for left foot dry gangrene who now presents for evaluation for possible podiatric intervention on first and fifth toes.    PLAN:   61M w/ hx of R AKA and L Fem-AKPop PTFE bypass for left foot dry gangrene who now presents for evaluation for possible podiatric intervention on first and fifth toes.    PLAN:  - Pain control PRN  - Continue home medications  - OR with Podiatry today for possible 1st/5th ray resection L foot; pending ABIs   - Per medicine, medically optimized for proposed procedure; RCRI: 0 indicating 3.9% 30 day risk of death, MI or cardiac arrest.  - MRI left foot ordered   - NPO  - ABX: Vanc/ Cefepime  - Heparin gtt held in anticipation of procedure; if OR cancelled for poor ABIs will resume    C Team/ Vascular Surgery  [id number]

## 2022-02-04 NOTE — PROGRESS NOTE ADULT - SUBJECTIVE AND OBJECTIVE BOX
[facility] Division of Hospital Medicine  Katrin Garcia DO  Pager ([facility], 8G-5P): [phone]      Patient is a 61y old  Male who presents with a chief complaint of Left foot dry gangrene (04 Feb 2022 02:18)      SUBJECTIVE / OVERNIGHT EVENTS: No acute events overnight. No complaints. Feels well. Denies chest pain, SOB, N/V/D    MEDICATIONS  (STANDING):  artificial  tears Solution 1 Drop(s) Both EYES two times a day  aspirin enteric coated 81 milliGRAM(s) Oral daily  atorvastatin 40 milliGRAM(s) Oral at bedtime  carvedilol 12.5 milliGRAM(s) Oral every 12 hours  cefepime   IVPB 2000 milliGRAM(s) IV Intermittent every 12 hours  dextrose 40% Gel 15 Gram(s) Oral once  dextrose 5% + sodium chloride 0.45%. 1000 milliLiter(s) (100 mL/Hr) IV Continuous <Continuous>  dextrose 5%. 1000 milliLiter(s) (50 mL/Hr) IV Continuous <Continuous>  dextrose 5%. 1000 milliLiter(s) (100 mL/Hr) IV Continuous <Continuous>  dextrose 50% Injectable 25 Gram(s) IV Push once  dextrose 50% Injectable 12.5 Gram(s) IV Push once  dextrose 50% Injectable 25 Gram(s) IV Push once  glucagon  Injectable 1 milliGRAM(s) IntraMuscular once  insulin lispro (ADMELOG) corrective regimen sliding scale   SubCutaneous three times a day before meals  insulin lispro (ADMELOG) corrective regimen sliding scale   SubCutaneous at bedtime  lisinopril 10 milliGRAM(s) Oral daily  multivitamin 1 Tablet(s) Oral daily  vancomycin  IVPB 1000 milliGRAM(s) IV Intermittent every 12 hours    MEDICATIONS  (PRN):  acetaminophen     Tablet .. 650 milliGRAM(s) Oral every 6 hours PRN Mild Pain (1 - 3)      CAPILLARY BLOOD GLUCOSE      POCT Blood Glucose.: 192 mg/dL (03 Feb 2022 21:05)  POCT Blood Glucose.: 167 mg/dL (03 Feb 2022 17:08)  POCT Blood Glucose.: 178 mg/dL (03 Feb 2022 12:06)  POCT Blood Glucose.: 105 mg/dL (03 Feb 2022 08:38)    I&O's Summary    03 Feb 2022 07:01  -  04 Feb 2022 07:00  --------------------------------------------------------  IN: 410 mL / OUT: 900 mL / NET: -490 mL        PHYSICAL EXAM:  Vital Signs Last 24 Hrs  T(C): 36.7 (04 Feb 2022 05:45), Max: 36.8 (03 Feb 2022 10:04)  T(F): 98.1 (04 Feb 2022 05:45), Max: 98.2 (03 Feb 2022 10:04)  HR: 66 (04 Feb 2022 05:45) (66 - 80)  BP: 130/70 (04 Feb 2022 05:45) (107/69 - 138/65)  BP(mean): --  RR: 18 (04 Feb 2022 05:45) (16 - 18)  SpO2: 100% (04 Feb 2022 05:45) (99% - 100%)    GENERAL: NAD, on room air  EYES:  sclera clear  ENMT: Moist mucous membranes  NECK: Supple, No JVD  RESPIRATORY: Clear to auscultation bilaterally; No rales, rhonchi, wheezing, or rubs  CARDIOVASCULAR: s1/s2, RRR, no murmurs appreciated  GASTROINTESTINAL: Soft, Nontender  EXTREMITIES:  right AKA, left 1st toe necrosis noted, left foot wrapped with kerlix  NERVOUS SYSTEM: awake, alert, No gross deficits  PSYCH: calm cooperative  SKIN: No rashes; no palpable lesions    LABS:                        11.7   6.41  )-----------( 240      ( 04 Feb 2022 05:54 )             36.6     02-04    139  |  106  |  22  ----------------------------<  138<H>  3.9   |  21<L>  |  0.83    Ca    8.8      04 Feb 2022 05:54  Phos  3.4     02-04  Mg     1.80     02-04    TPro  6.9  /  Alb  3.8  /  TBili  <0.2  /  DBili  x   /  AST  21  /  ALT  24  /  AlkPhos  104  02-03    PT/INR - ( 04 Feb 2022 05:54 )   PT: 12.1 sec;   INR: 1.05 ratio         PTT - ( 04 Feb 2022 05:54 )  PTT:30.4 sec          Culture - Blood (collected 02 Feb 2022 14:51)  Source: .Blood Blood-Venous  Preliminary Report (03 Feb 2022 15:04):    No growth to date.    Culture - Blood (collected 02 Feb 2022 14:51)  Source: .Blood Blood-Peripheral  Preliminary Report (03 Feb 2022 15:04):    No growth to date.        RADIOLOGY & ADDITIONAL TESTS:  Results Reviewed:   Imaging Personally Reviewed:  Electrocardiogram Personally Reviewed:    COORDINATION OF CARE:  Care Discussed with Consultants/Other Providers [Y/N]:  Prior or Outpatient Records Reviewed [Y/N]:

## 2022-02-04 NOTE — PROGRESS NOTE ADULT - ASSESSMENT
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  - case cancelled 2/2 no MRI and CAMMY  - CAMMY/PVR pending  - LF MR: Abnormal marrow signal within the distal fifth metatarsal and the fifth digit. Given the adjacent skin wounds, findings are concerning for osteonecrosis. Abnormal marrow signal the first distal phalanx with overlying skin wound concerning for osteonecrosis. Abnormal marrow signal within the second and third distal phalanges, likelihood for osteomyelitis vs reactive osteitis. Abnormal marrow signal in the fourth metatarsal head.   - patient is rescheduled for left foot partial first and fifth ray resection at Noon on 2/7 with Dr. Melissa, pending CAMMY/vasc recs  - medical optimization documented for surgical intervention under local anesthesia and light IV sedation on 2/3  - discussed w/ attending

## 2022-02-04 NOTE — PROGRESS NOTE ADULT - SUBJECTIVE AND OBJECTIVE BOX
Surgery Progress Note    INTERVAl/SUBJECTIVE: No acute event overnight.     Vital Signs Last 24 Hrs  T(C): 36.7 (04 Feb 2022 01:56), Max: 36.8 (03 Feb 2022 05:00)  T(F): 98 (04 Feb 2022 01:56), Max: 98.2 (03 Feb 2022 05:00)  HR: 70 (04 Feb 2022 01:56) (70 - 80)  BP: 120/58 (04 Feb 2022 01:56) (107/69 - 138/65)  BP(mean): --  RR: 17 (04 Feb 2022 01:56) (16 - 18)  SpO2: 99% (04 Feb 2022 01:56) (99% - 100%)    Physical Exam:  General:  Neuro:    CV:   Abdomen:     LABS:                        12.1   7.92  )-----------( 260      ( 03 Feb 2022 12:32 )             37.7     02-03    137  |  103  |  24<H>  ----------------------------<  97  4.0   |  23  |  1.07    Ca    9.2      03 Feb 2022 07:02  Phos  3.2     02-03  Mg     1.90     02-03    TPro  6.9  /  Alb  3.8  /  TBili  <0.2  /  DBili  x   /  AST  21  /  ALT  24  /  AlkPhos  104  02-03    PT/INR - ( 02 Feb 2022 11:52 )   PT: 13.7 sec;   INR: 1.20 ratio         PTT - ( 03 Feb 2022 18:50 )  PTT:74.1 sec      INs and OUTs:    02-03-22 @ 07:01  -  02-04-22 @ 02:18  --------------------------------------------------------  IN: 400 mL / OUT: 200 mL / NET: 200 mL     C TEAM Surgery Progress Note  Patient is a 61y old  Male who presents with a chief complaint of Left foot dry gangrene (04 Feb 2022 08:15)      INTERVAL EVENTS: Patient is pending 1st/5th ray resection with podiatry. Also pending ABIs. No acute events overnight.    SUBJECTIVE: Patient seen and examined at bedside with surgical team, patient without complaints. Denies LE pain. Denies fever, chills, CP, SOB.Has been NPO since midnight.     OBJECTIVE:    Vital Signs Last 24 Hrs  T(C): 36.7 (04 Feb 2022 05:45), Max: 36.8 (03 Feb 2022 10:04)  T(F): 98.1 (04 Feb 2022 05:45), Max: 98.2 (03 Feb 2022 10:04)  HR: 66 (04 Feb 2022 05:45) (66 - 80)  BP: 130/70 (04 Feb 2022 05:45) (107/69 - 138/65)  BP(mean): --  RR: 18 (04 Feb 2022 05:45) (16 - 18)  SpO2: 100% (04 Feb 2022 05:45) (99% - 100%)I&O's Detail    03 Feb 2022 07:01  -  04 Feb 2022 07:00  --------------------------------------------------------  IN:    Heparin: 50 mL    IV PiggyBack: 300 mL    Oral Fluid: 60 mL  Total IN: 410 mL    OUT:    Voided (mL): 900 mL  Total OUT: 900 mL    Total NET: -490 mL      MEDICATIONS  (STANDING):  artificial  tears Solution 1 Drop(s) Both EYES two times a day  aspirin enteric coated 81 milliGRAM(s) Oral daily  atorvastatin 40 milliGRAM(s) Oral at bedtime  carvedilol 12.5 milliGRAM(s) Oral every 12 hours  cefepime   IVPB 2000 milliGRAM(s) IV Intermittent every 12 hours  dextrose 40% Gel 15 Gram(s) Oral once  dextrose 5% + sodium chloride 0.45%. 1000 milliLiter(s) (100 mL/Hr) IV Continuous <Continuous>  dextrose 5%. 1000 milliLiter(s) (50 mL/Hr) IV Continuous <Continuous>  dextrose 5%. 1000 milliLiter(s) (100 mL/Hr) IV Continuous <Continuous>  dextrose 50% Injectable 25 Gram(s) IV Push once  dextrose 50% Injectable 12.5 Gram(s) IV Push once  dextrose 50% Injectable 25 Gram(s) IV Push once  glucagon  Injectable 1 milliGRAM(s) IntraMuscular once  insulin lispro (ADMELOG) corrective regimen sliding scale   SubCutaneous three times a day before meals  insulin lispro (ADMELOG) corrective regimen sliding scale   SubCutaneous at bedtime  lisinopril 10 milliGRAM(s) Oral daily  magnesium sulfate  IVPB 2 Gram(s) IV Intermittent once  multivitamin 1 Tablet(s) Oral daily  vancomycin  IVPB 1000 milliGRAM(s) IV Intermittent every 12 hours    MEDICATIONS  (PRN):  acetaminophen     Tablet .. 650 milliGRAM(s) Oral every 6 hours PRN Mild Pain (1 - 3)      PHYSICAL EXAM:    General: NAD, Sitting in bed comfortably  Cardio: Pulse regularly present  Resp: Unlabored breathing  GI/Abd: Soft, NT/ND, no rebound/guarding, no masses palpated  Extremities: Right - s/p AKA  Left - first and fifth digits dry gangrene, intact motor and sensory function. DP and PT signals    LABS:                        11.7   6.41  )-----------( 240      ( 04 Feb 2022 05:54 )             36.6     02-04    139  |  106  |  22  ----------------------------<  138<H>  3.9   |  21<L>  |  0.83    Ca    8.8      04 Feb 2022 05:54  Phos  3.4     02-04  Mg     1.80     02-04    TPro  6.9  /  Alb  3.8  /  TBili  <0.2  /  DBili  x   /  AST  21  /  ALT  24  /  AlkPhos  104  02-03    PT/INR - ( 04 Feb 2022 05:54 )   PT: 12.1 sec;   INR: 1.05 ratio         PTT - ( 04 Feb 2022 05:54 )  PTT:30.4 sec  LIVER FUNCTIONS - ( 03 Feb 2022 07:02 )  Alb: 3.8 g/dL / Pro: 6.9 g/dL / ALK PHOS: 104 U/L / ALT: 24 U/L / AST: 21 U/L / GGT: x             ABO Interpretation: O (02-04-22 @ 05:23)

## 2022-02-04 NOTE — PROGRESS NOTE ADULT - ASSESSMENT
61yr old man PMH HTN, DM, PVD s/p R AKA for RLE gangrene 10/2021, and recent LLE Fem-AKPop PTFE bypass 11/2021 for L foot gangrene presents with left toe gangrene.

## 2022-02-04 NOTE — PROGRESS NOTE ADULT - SUBJECTIVE AND OBJECTIVE BOX
Patient is a 61y old  Male who presents with a chief complaint of Left foot dry gangrene (04 Feb 2022 08:15)       INTERVAL HPI/OVERNIGHT EVENTS:  Patient seen and evaluated at bedside.  Pt is resting comfortable in NAD. Denies N/V/F/C.    Allergies    penicillin (Other)    Intolerances        Vital Signs Last 24 Hrs  T(C): 36.7 (04 Feb 2022 05:45), Max: 36.7 (03 Feb 2022 21:35)  T(F): 98.1 (04 Feb 2022 05:45), Max: 98.1 (04 Feb 2022 05:45)  HR: 66 (04 Feb 2022 05:45) (66 - 77)  BP: 130/70 (04 Feb 2022 05:45) (107/69 - 130/70)  BP(mean): --  RR: 18 (04 Feb 2022 05:45) (17 - 18)  SpO2: 100% (04 Feb 2022 05:45) (99% - 100%)    LABS:                        11.7   6.41  )-----------( 240      ( 04 Feb 2022 05:54 )             36.6     02-04    139  |  106  |  22  ----------------------------<  138<H>  3.9   |  21<L>  |  0.83    Ca    8.8      04 Feb 2022 05:54  Phos  3.4     02-04  Mg     1.80     02-04    TPro  6.9  /  Alb  3.8  /  TBili  <0.2  /  DBili  x   /  AST  21  /  ALT  24  /  AlkPhos  104  02-03    PT/INR - ( 04 Feb 2022 05:54 )   PT: 12.1 sec;   INR: 1.05 ratio         PTT - ( 04 Feb 2022 05:54 )  PTT:30.4 sec    CAPILLARY BLOOD GLUCOSE      POCT Blood Glucose.: 131 mg/dL (04 Feb 2022 12:52)  POCT Blood Glucose.: 140 mg/dL (04 Feb 2022 09:26)  POCT Blood Glucose.: 192 mg/dL (03 Feb 2022 21:05)  POCT Blood Glucose.: 167 mg/dL (03 Feb 2022 17:08)      Lower Extremity Physical Exam:  Vascular: DP/PT 0/4, B/L, CFT <3 seconds B/L, Temperature gradient warm to cool, B/L.   Neuro: Epicritic sensation intact to the level of digits, B/L.  Musculoskeletal/Ortho: R AKA  Skin: left foot distal hallux dry stable gangrene to the level of DIPJ, well demarcated, no acute SOI, left foot fifth digit distal dry stable gangrene to the of DIPJ with periwound erythema extending to the base of the digits, left foot lateral fifth MPJ dry stable eschar with periwound erythema and mild edema, no drainage, no tracking or tunneling or wet conversion noted.    RADIOLOGY & ADDITIONAL TESTS:  < from: MR Foot w/wo IV Cont, Left (02.04.22 @ 12:17) >    ACC: [id number] EXAM:  MR FOOT WAW IC LT                          PROCEDURE DATE:  02/04/2022          INTERPRETATION:  MR FOOT WITHOUT AND WITH IV CONTRAST LEFT dated 2/4/2022   12:17 PM    INDICATION: Pain and swelling. Foot wound.    COMPARISON: Foot radiographs dated 2/2/2022    TECHNIQUE: Multi-sequential, multiplanar MRI imaging of the left midfoot   and forefoot was performed per standard protocol. Images were obtained   before and after the administration of IV contrast.  Contrast: Gadavist. Administered: 6 cc. Discarded: 1.5 cc.    FINDINGS:    BONE MARROW: There is marrow edema and patchy decreased T1 signal   throughout the mid and distal fifth metatarsal. Additional edema signal   seen within the fifth proximal and distal phalanges. (Note is made of a   congenitally fused fifth middle and distal phalanges). There is mild   edema and decreased T1 signal within the fourth metatarsal head and at   the medial base of the fourth proximal phalanx. There is diffuse edema   and deformity with decreased T1 signal in the first distal phalanx. Mild   edema noted at the distal tips of the second and third distal phalanges.   No fracture is seen  SYNOVIUM/ JOINT FLUID: No joint effusion  TENDONS: Intact tendons  MUSCLES: Edema within the intrinsic musculature the foot. No significant   muscle atrophy  NEUROVASCULAR STRUCTURES: Preserved  SUBCUTANEOUS SOFT TISSUES: A skin wound measuring 0.7 cm is noted over   the lateral forefoot at the level of fifth metatarsal head. A second skin  wound is appreciated the fifth distal phalanx. There is a large skin   wound over the first distal phalanx measuring up to 2.5 cm. Skin   thickening and edema within the second and third digits.    IMPRESSION:    1.  Skin wounds at the fifth digit and first digit with soft tissue   swelling and skin thickening concerning for cellulitis. No drainable   fluid collection is seen.  2.  Abnormal marrow signal within the distal fifth metatarsal and the   fifth digit. Given the adjacent skin wounds, findings are concerning for   osteonecrosis.  3.  Abnormal marrow signal the first distal phalanx with overlying skin   wound concerning for osteonecrosis.  4.  Abnormal marrow signal within the second and third distal phalanges.   Correlate clinicallyfor overlying skin wound which would increase the   likelihood for osteomyelitis. Differential includes vascular   insufficiency and reactive osteitis.  5.  Abnormal marrow signal in the fourth metatarsal head. Favor   osteomyelitis given the lateralforefoot skin wound. Differential   includes reactive osteomyelitis.    --- End of Report ---            CINDA SANDERS MD; Attending Radiologist  This document has been electronically signed. Feb 4 2022 12:45PM    < end of copied text >

## 2022-02-05 LAB
ANION GAP SERPL CALC-SCNC: 12 MMOL/L — SIGNIFICANT CHANGE UP (ref 7–14)
APTT BLD: 68.9 SEC — HIGH (ref 27–36.3)
APTT BLD: 79.7 SEC — HIGH (ref 27–36.3)
APTT BLD: 89.8 SEC — HIGH (ref 27–36.3)
BUN SERPL-MCNC: 31 MG/DL — HIGH (ref 7–23)
CALCIUM SERPL-MCNC: 9.7 MG/DL — SIGNIFICANT CHANGE UP (ref 8.4–10.5)
CHLORIDE SERPL-SCNC: 104 MMOL/L — SIGNIFICANT CHANGE UP (ref 98–107)
CO2 SERPL-SCNC: 23 MMOL/L — SIGNIFICANT CHANGE UP (ref 22–31)
CREAT SERPL-MCNC: 1.07 MG/DL — SIGNIFICANT CHANGE UP (ref 0.5–1.3)
GLUCOSE SERPL-MCNC: 127 MG/DL — HIGH (ref 70–99)
HCT VFR BLD CALC: 36.7 % — LOW (ref 39–50)
HCT VFR BLD CALC: 39.1 % — SIGNIFICANT CHANGE UP (ref 39–50)
HGB BLD-MCNC: 12.1 G/DL — LOW (ref 13–17)
HGB BLD-MCNC: 12.7 G/DL — LOW (ref 13–17)
INR BLD: 0.99 RATIO — SIGNIFICANT CHANGE UP (ref 0.88–1.16)
MAGNESIUM SERPL-MCNC: 2.1 MG/DL — SIGNIFICANT CHANGE UP (ref 1.6–2.6)
MCHC RBC-ENTMCNC: 31 PG — SIGNIFICANT CHANGE UP (ref 27–34)
MCHC RBC-ENTMCNC: 31.2 PG — SIGNIFICANT CHANGE UP (ref 27–34)
MCHC RBC-ENTMCNC: 32.5 GM/DL — SIGNIFICANT CHANGE UP (ref 32–36)
MCHC RBC-ENTMCNC: 33 GM/DL — SIGNIFICANT CHANGE UP (ref 32–36)
MCV RBC AUTO: 94.6 FL — SIGNIFICANT CHANGE UP (ref 80–100)
MCV RBC AUTO: 95.4 FL — SIGNIFICANT CHANGE UP (ref 80–100)
NRBC # BLD: 0 /100 WBCS — SIGNIFICANT CHANGE UP
NRBC # BLD: 0 /100 WBCS — SIGNIFICANT CHANGE UP
NRBC # FLD: 0 K/UL — SIGNIFICANT CHANGE UP
NRBC # FLD: 0 K/UL — SIGNIFICANT CHANGE UP
PHOSPHATE SERPL-MCNC: 3.7 MG/DL — SIGNIFICANT CHANGE UP (ref 2.5–4.5)
PLATELET # BLD AUTO: 246 K/UL — SIGNIFICANT CHANGE UP (ref 150–400)
PLATELET # BLD AUTO: 273 K/UL — SIGNIFICANT CHANGE UP (ref 150–400)
POTASSIUM SERPL-MCNC: 4.7 MMOL/L — SIGNIFICANT CHANGE UP (ref 3.5–5.3)
POTASSIUM SERPL-SCNC: 4.7 MMOL/L — SIGNIFICANT CHANGE UP (ref 3.5–5.3)
PROTHROM AB SERPL-ACNC: 11.4 SEC — SIGNIFICANT CHANGE UP (ref 10.6–13.6)
RBC # BLD: 3.88 M/UL — LOW (ref 4.2–5.8)
RBC # BLD: 4.1 M/UL — LOW (ref 4.2–5.8)
RBC # FLD: 14.6 % — HIGH (ref 10.3–14.5)
RBC # FLD: 14.7 % — HIGH (ref 10.3–14.5)
SODIUM SERPL-SCNC: 139 MMOL/L — SIGNIFICANT CHANGE UP (ref 135–145)
VANCOMYCIN TROUGH SERPL-MCNC: 23.7 UG/ML — HIGH (ref 10–20)
WBC # BLD: 7.19 K/UL — SIGNIFICANT CHANGE UP (ref 3.8–10.5)
WBC # BLD: 8.19 K/UL — SIGNIFICANT CHANGE UP (ref 3.8–10.5)
WBC # FLD AUTO: 7.19 K/UL — SIGNIFICANT CHANGE UP (ref 3.8–10.5)
WBC # FLD AUTO: 8.19 K/UL — SIGNIFICANT CHANGE UP (ref 3.8–10.5)

## 2022-02-05 PROCEDURE — 99232 SBSQ HOSP IP/OBS MODERATE 35: CPT

## 2022-02-05 RX ORDER — VANCOMYCIN HCL 1 G
750 VIAL (EA) INTRAVENOUS ONCE
Refills: 0 | Status: COMPLETED | OUTPATIENT
Start: 2022-02-06 | End: 2022-02-06

## 2022-02-05 RX ADMIN — Medication 1 DROP(S): at 05:40

## 2022-02-05 RX ADMIN — CEFEPIME 100 MILLIGRAM(S): 1 INJECTION, POWDER, FOR SOLUTION INTRAMUSCULAR; INTRAVENOUS at 00:54

## 2022-02-05 RX ADMIN — Medication 1 DROP(S): at 17:13

## 2022-02-05 RX ADMIN — Medication 250 MILLIGRAM(S): at 12:23

## 2022-02-05 RX ADMIN — CEFEPIME 100 MILLIGRAM(S): 1 INJECTION, POWDER, FOR SOLUTION INTRAMUSCULAR; INTRAVENOUS at 13:40

## 2022-02-05 RX ADMIN — Medication 1 TABLET(S): at 12:25

## 2022-02-05 RX ADMIN — CARVEDILOL PHOSPHATE 12.5 MILLIGRAM(S): 80 CAPSULE, EXTENDED RELEASE ORAL at 17:17

## 2022-02-05 RX ADMIN — HEPARIN SODIUM 11 UNIT(S)/HR: 5000 INJECTION INTRAVENOUS; SUBCUTANEOUS at 15:07

## 2022-02-05 RX ADMIN — Medication 81 MILLIGRAM(S): at 12:25

## 2022-02-05 RX ADMIN — ATORVASTATIN CALCIUM 40 MILLIGRAM(S): 80 TABLET, FILM COATED ORAL at 21:57

## 2022-02-05 RX ADMIN — LISINOPRIL 10 MILLIGRAM(S): 2.5 TABLET ORAL at 05:40

## 2022-02-05 RX ADMIN — CARVEDILOL PHOSPHATE 12.5 MILLIGRAM(S): 80 CAPSULE, EXTENDED RELEASE ORAL at 05:40

## 2022-02-05 NOTE — PROGRESS NOTE ADULT - SUBJECTIVE AND OBJECTIVE BOX
Surgery Progress Note    INTERVAl/SUBJECTIVE: No acute event overnight.     Vital Signs Last 24 Hrs  T(C): 36.7 (05 Feb 2022 01:34), Max: 36.9 (04 Feb 2022 22:00)  T(F): 98.1 (05 Feb 2022 01:34), Max: 98.4 (04 Feb 2022 22:00)  HR: 70 (05 Feb 2022 01:34) (66 - 99)  BP: 100/62 (05 Feb 2022 01:34) (100/62 - 132/81)  BP(mean): --  RR: 18 (05 Feb 2022 01:34) (18 - 18)  SpO2: 98% (05 Feb 2022 01:34) (97% - 100%)    Physical Exam:  General:  Neuro:    CV:   Abdomen:     LABS:                        12.1   8.19  )-----------( 246      ( 05 Feb 2022 02:16 )             36.7     02-04    139  |  106  |  22  ----------------------------<  138<H>  3.9   |  21<L>  |  0.83    Ca    8.8      04 Feb 2022 05:54  Phos  3.4     02-04  Mg     1.80     02-04    TPro  6.9  /  Alb  3.8  /  TBili  <0.2  /  DBili  x   /  AST  21  /  ALT  24  /  AlkPhos  104  02-03    PT/INR - ( 04 Feb 2022 05:54 )   PT: 12.1 sec;   INR: 1.05 ratio         PTT - ( 05 Feb 2022 02:16 )  PTT:79.7 sec      INs and OUTs:    02-03-22 @ 07:01  -  02-04-22 @ 07:00  --------------------------------------------------------  IN: 410 mL / OUT: 900 mL / NET: -490 mL    02-04-22 @ 07:01  -  02-05-22 @ 03:03  --------------------------------------------------------  IN: 558 mL / OUT: 600 mL / NET: -42 mL     Surgery Progress Note    INTERVAl/SUBJECTIVE: No acute event overnight.     Vital Signs Last 24 Hrs  T(C): 36.7 (05 Feb 2022 01:34), Max: 36.9 (04 Feb 2022 22:00)  T(F): 98.1 (05 Feb 2022 01:34), Max: 98.4 (04 Feb 2022 22:00)  HR: 70 (05 Feb 2022 01:34) (66 - 99)  BP: 100/62 (05 Feb 2022 01:34) (100/62 - 132/81)  BP(mean): --  RR: 18 (05 Feb 2022 01:34) (18 - 18)  SpO2: 98% (05 Feb 2022 01:34) (97% - 100%)    PHYSICAL EXAM:    General: NAD, Sitting in bed comfortably  Cardio: Pulse regularly present  Resp: Unlabored breathing  GI/Abd: Soft, NT/ND, no rebound/guarding, no masses palpated  Extremities: Right - s/p AKA  Left - first and fifth digits dry gangrene, intact motor and sensory function. DP and PT signals    LABS:                        12.1   8.19  )-----------( 246      ( 05 Feb 2022 02:16 )             36.7     02-04    139  |  106  |  22  ----------------------------<  138<H>  3.9   |  21<L>  |  0.83    Ca    8.8      04 Feb 2022 05:54  Phos  3.4     02-04  Mg     1.80     02-04    TPro  6.9  /  Alb  3.8  /  TBili  <0.2  /  DBili  x   /  AST  21  /  ALT  24  /  AlkPhos  104  02-03    PT/INR - ( 04 Feb 2022 05:54 )   PT: 12.1 sec;   INR: 1.05 ratio         PTT - ( 05 Feb 2022 02:16 )  PTT:79.7 sec      INs and OUTs:    02-03-22 @ 07:01  -  02-04-22 @ 07:00  --------------------------------------------------------  IN: 410 mL / OUT: 900 mL / NET: -490 mL    02-04-22 @ 07:01  -  02-05-22 @ 03:03  --------------------------------------------------------  IN: 558 mL / OUT: 600 mL / NET: -42 mL

## 2022-02-05 NOTE — PROGRESS NOTE ADULT - PROBLEM SELECTOR PLAN 1
pod plan possible partial first and fifth ray resection pending MRI/vasc recs  Podiatry plan for surgical intervention under local anesthesia and light IV sedation  No evidence of clinical HF or anginal symptoms  Recent TTE and NST 9/2021 with normal LV function and no ischemia  BP and DM controlled  Patient is presently medically optimized for proposed procedure  RCRI: 0 indicating 0.4% 30 day risk of death, MI or cardiac arrest.

## 2022-02-05 NOTE — PROGRESS NOTE ADULT - SUBJECTIVE AND OBJECTIVE BOX
Patient is a 61y old  Male who presents with a chief complaint of Left foot dry gangrene (05 Feb 2022 03:03)      SUBJECTIVE / OVERNIGHT EVENTS:    Patient seen and examined at bedside. No CP/dyspnea overnight. Danie po intake. Pain controlled.    MEDICATIONS  (STANDING):  artificial  tears Solution 1 Drop(s) Both EYES two times a day  aspirin enteric coated 81 milliGRAM(s) Oral daily  atorvastatin 40 milliGRAM(s) Oral at bedtime  carvedilol 12.5 milliGRAM(s) Oral every 12 hours  cefepime   IVPB 2000 milliGRAM(s) IV Intermittent every 12 hours  dextrose 40% Gel 15 Gram(s) Oral once  dextrose 5%. 1000 milliLiter(s) (50 mL/Hr) IV Continuous <Continuous>  dextrose 5%. 1000 milliLiter(s) (100 mL/Hr) IV Continuous <Continuous>  dextrose 50% Injectable 25 Gram(s) IV Push once  dextrose 50% Injectable 12.5 Gram(s) IV Push once  dextrose 50% Injectable 25 Gram(s) IV Push once  glucagon  Injectable 1 milliGRAM(s) IntraMuscular once  heparin  Infusion 1000 Unit(s)/Hr (11 mL/Hr) IV Continuous <Continuous>  insulin lispro (ADMELOG) corrective regimen sliding scale   SubCutaneous three times a day before meals  insulin lispro (ADMELOG) corrective regimen sliding scale   SubCutaneous at bedtime  lisinopril 10 milliGRAM(s) Oral daily  multivitamin 1 Tablet(s) Oral daily  vancomycin  IVPB 1000 milliGRAM(s) IV Intermittent every 12 hours    MEDICATIONS  (PRN):  acetaminophen     Tablet .. 650 milliGRAM(s) Oral every 6 hours PRN Mild Pain (1 - 3)      Vital Signs Last 24 Hrs  T(C): 36.7 (05 Feb 2022 05:35), Max: 36.9 (04 Feb 2022 22:00)  T(F): 98 (05 Feb 2022 05:35), Max: 98.4 (04 Feb 2022 22:00)  HR: 65 (05 Feb 2022 05:35) (65 - 99)  BP: 136/77 (05 Feb 2022 05:35) (100/62 - 136/77)  BP(mean): --  RR: 18 (05 Feb 2022 05:35) (18 - 18)  SpO2: 97% (05 Feb 2022 05:35) (97% - 100%)  CAPILLARY BLOOD GLUCOSE      POCT Blood Glucose.: 148 mg/dL (05 Feb 2022 11:56)  POCT Blood Glucose.: 137 mg/dL (05 Feb 2022 07:46)  POCT Blood Glucose.: 159 mg/dL (04 Feb 2022 21:18)  POCT Blood Glucose.: 140 mg/dL (04 Feb 2022 17:13)    I&O's Summary    04 Feb 2022 07:01  -  05 Feb 2022 07:00  --------------------------------------------------------  IN: 722 mL / OUT: 800 mL / NET: -78 mL        PHYSICAL EXAM:  Vital Signs Last 24 Hrs  T(C): 36.7 (02-05-22 @ 05:35)  T(F): 98 (02-05-22 @ 05:35), Max: 98.4 (02-04-22 @ 22:00)  HR: 65 (02-05-22 @ 05:35) (65 - 99)  BP: 136/77 (02-05-22 @ 05:35)  BP(mean): --  RR: 18 (02-05-22 @ 05:35) (18 - 18)  SpO2: 97% (02-05-22 @ 05:35) (97% - 100%)  Wt(kg): --    02-04 @ 07:01  -  02-05 @ 07:00  --------------------------------------------------------  IN: 722 mL / OUT: 800 mL / NET: -78 mL        GENERAL: NAD, on room air  EYES:  sclera clear  ENMT: Moist mucous membranes  NECK: Supple, No JVD  RESPIRATORY: Clear to auscultation bilaterally; No rales, rhonchi, wheezing, or rubs  CARDIOVASCULAR: s1/s2, RRR, no murmurs appreciated  GASTROINTESTINAL: Soft, Nontender  EXTREMITIES:  right AKA, left 1st toe necrosis noted, left foot wrapped with kerlix  NERVOUS SYSTEM: awake, alert, No gross deficits  PSYCH: calm cooperative  SKIN: No rashes; no palpable lesions      LABS:                        12.7   7.19  )-----------( 273      ( 05 Feb 2022 07:25 )             39.1     02-05    139  |  104  |  31<H>  ----------------------------<  127<H>  4.7   |  23  |  1.07    Ca    9.7      05 Feb 2022 07:25  Phos  3.7     02-05  Mg     2.10     02-05      PT/INR - ( 05 Feb 2022 07:25 )   PT: 11.4 sec;   INR: 0.99 ratio         PTT - ( 05 Feb 2022 07:25 )  PTT:89.8 sec          RADIOLOGY & ADDITIONAL TESTS:    Imaging Personally Reviewed:    Consultant(s) Notes Reviewed:      Care Discussed with Consultants/Other Providers:

## 2022-02-05 NOTE — PROGRESS NOTE ADULT - ASSESSMENT
12/6/2019         RE: Sosa Seay  [address] [address] [address]        Dear Colleague,    Thank you for referring your patient, Sosa Seay, to the [facility]. Please see a copy of my visit note below.    HPI:   Chief complaint: Sosa Seay is a 58 year old female who presents for Full skin cancer screening to rule out skin cancer.  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  See patient intake sheet.  ROS of the following were done and are negative: Constitutional, Eyes, Ears, Nose,   Mouth, Throat, Cardiovascular, Respiratory, GI, Genitourinary, Musculoskeletal,   Psychiatric, Endocrine, Allergic/Immunologic.    HPI, past medical history, social history, allergies, medications reviewed as of December 6, 2019     This document serves as a record of the services and decisions personally performed and made by Becky Mao, MS, PA-C. It was created on her behalf by Bri Cason, a trained medical scribe. The creation of this document is based on the provider's statements to the medical scribe.  Bri Cason 10:03 AM December 6, 2019    PHYSICAL EXAM:   BP (!) 135/91   Pulse 84   LMP 01/04/2012   SpO2 98%   Breastfeeding No   Skin exam performed as follows: Type 2 skin. Mood appropriate  Alert and Oriented X 3. Well developed, well nourished in no distress.  General appearance: Normal  Head including face: Normal  Eyes: conjunctiva and lids: Normal  Mouth: Lips, teeth, gums: Normal  Neck: Normal  Chest-breast/axillae: Normal  Back: Normal  Spleen and  liver: Normal  Cardiovascular: Exam of peripheral vascular system by observation for swelling, varicosities, edema: Normal  Genitalia: groin, buttocks: Normal  Extremities: digits/nails (clubbing): Normal  Eccrine and Apocrine glands: Normal  Right upper extremity: Normal  Left upper extremity: Normal  Right lower extremity: Normal  Left lower extremity: Normal  Skin: Scalp and body hair: See below    Pt deferred exam of breasts, groin, buttocks: No    Other physical findings:  1. Multiple pigmented macules on extremities and trunk  2. Multiple pigmented macules on face, trunk and extremities  3. Multiple vascular papules on trunk, arms and legs  4. Multiple scattered keratotic plaques       Except as noted above, no other signs of skin cancer or melanoma.     ASSESSMENT/PLAN:   Benign Full skin cancer screening today.     Patient with history of SCC on forehead 2018  Advised on monthly self exams and 1 year  Patient Education: Appropriate brochures given.    Multiple benign appearing nevi on arms, legs and trunk. Discussed ABCDEs of melanoma and sunscreen.   Multiple lentigos on arms, legs and trunk. Advised benign, no treatment needed.  Multiple scattered angiomas. Advised benign, no treatment needed.   Seborrheic keratosis on arms, legs and trunk. Advised benign, no treatment needed.  Sosa to follow up with Primary Care provider regarding elevated blood pressure.       Follow-up: yearly FSE/PRN sooner     1.) Patient was asked about new and changing moles. YES  2.) Patient received a complete physical skin examination: YES  3.) Patient was counseled to perform a monthly self skin examination: YES  Scribed By: Bri Cason, Medical Scribe    The information in this document, created by the medical scribe for me, accurately reflects the services I personally performed and the decisions made by me. I have reviewed and approved this document for accuracy prior to leaving the patient care  area.  December 6, 2019 10:08 AM    Becky Mao MS, PAClaudiaC\    Again, thank you for allowing me to participate in the care of your patient.        Sincerely,        Becky Mao PA-C     61yr old man PMH HTN, DM, PVD s/p R AKA for RLE gangrene 10/2021, and recent LLE Fem-AKPop PTFE bypass 11/2021 for L foot gangrene presents with left toe gangrene.

## 2022-02-05 NOTE — PROGRESS NOTE ADULT - ASSESSMENT
61M w/ hx of R AKA and L Fem-AKPop PTFE bypass for left foot dry gangrene who now presents for evaluation for possible podiatric intervention on first and fifth toes.    PLAN:  - Pain control PRN  - Continue home medications  - OR with Podiatry 2/7for possible 1st/5th ray resection L foot; pending ABIs   - Per medicine, medically optimized for proposed procedure; RCRI: 0 indicating 3.9% 30 day risk of death, MI or cardiac arrest.  - ABX: Vanc/ Cefepime  - Heparin gtts    C Team/ Vascular Surgery  [id number]

## 2022-02-06 ENCOUNTER — TRANSCRIPTION ENCOUNTER (OUTPATIENT)
Age: 62
End: 2022-02-06

## 2022-02-06 LAB
ANION GAP SERPL CALC-SCNC: 10 MMOL/L — SIGNIFICANT CHANGE UP (ref 7–14)
APTT BLD: 49.8 SEC — HIGH (ref 27–36.3)
APTT BLD: 55.7 SEC — HIGH (ref 27–36.3)
APTT BLD: 57.7 SEC — HIGH (ref 27–36.3)
BUN SERPL-MCNC: 29 MG/DL — HIGH (ref 7–23)
CALCIUM SERPL-MCNC: 9.6 MG/DL — SIGNIFICANT CHANGE UP (ref 8.4–10.5)
CHLORIDE SERPL-SCNC: 104 MMOL/L — SIGNIFICANT CHANGE UP (ref 98–107)
CO2 SERPL-SCNC: 24 MMOL/L — SIGNIFICANT CHANGE UP (ref 22–31)
CREAT SERPL-MCNC: 0.88 MG/DL — SIGNIFICANT CHANGE UP (ref 0.5–1.3)
GLUCOSE SERPL-MCNC: 131 MG/DL — HIGH (ref 70–99)
HCT VFR BLD CALC: 36.7 % — LOW (ref 39–50)
HCT VFR BLD CALC: 38.5 % — LOW (ref 39–50)
HGB BLD-MCNC: 12.2 G/DL — LOW (ref 13–17)
HGB BLD-MCNC: 12.5 G/DL — LOW (ref 13–17)
MAGNESIUM SERPL-MCNC: 2 MG/DL — SIGNIFICANT CHANGE UP (ref 1.6–2.6)
MCHC RBC-ENTMCNC: 30.9 PG — SIGNIFICANT CHANGE UP (ref 27–34)
MCHC RBC-ENTMCNC: 31.2 PG — SIGNIFICANT CHANGE UP (ref 27–34)
MCHC RBC-ENTMCNC: 32.5 GM/DL — SIGNIFICANT CHANGE UP (ref 32–36)
MCHC RBC-ENTMCNC: 33.2 GM/DL — SIGNIFICANT CHANGE UP (ref 32–36)
MCV RBC AUTO: 93.9 FL — SIGNIFICANT CHANGE UP (ref 80–100)
MCV RBC AUTO: 95.3 FL — SIGNIFICANT CHANGE UP (ref 80–100)
NRBC # BLD: 0 /100 WBCS — SIGNIFICANT CHANGE UP
NRBC # BLD: 0 /100 WBCS — SIGNIFICANT CHANGE UP
NRBC # FLD: 0 K/UL — SIGNIFICANT CHANGE UP
NRBC # FLD: 0 K/UL — SIGNIFICANT CHANGE UP
PHOSPHATE SERPL-MCNC: 3.6 MG/DL — SIGNIFICANT CHANGE UP (ref 2.5–4.5)
PLATELET # BLD AUTO: 256 K/UL — SIGNIFICANT CHANGE UP (ref 150–400)
PLATELET # BLD AUTO: 274 K/UL — SIGNIFICANT CHANGE UP (ref 150–400)
POTASSIUM SERPL-MCNC: 4.9 MMOL/L — SIGNIFICANT CHANGE UP (ref 3.5–5.3)
POTASSIUM SERPL-SCNC: 4.9 MMOL/L — SIGNIFICANT CHANGE UP (ref 3.5–5.3)
RBC # BLD: 3.91 M/UL — LOW (ref 4.2–5.8)
RBC # BLD: 4.04 M/UL — LOW (ref 4.2–5.8)
RBC # FLD: 14.9 % — HIGH (ref 10.3–14.5)
RBC # FLD: 15 % — HIGH (ref 10.3–14.5)
SARS-COV-2 RNA SPEC QL NAA+PROBE: SIGNIFICANT CHANGE UP
SODIUM SERPL-SCNC: 138 MMOL/L — SIGNIFICANT CHANGE UP (ref 135–145)
VANCOMYCIN FLD-MCNC: 18.2 UG/ML — SIGNIFICANT CHANGE UP
WBC # BLD: 6.71 K/UL — SIGNIFICANT CHANGE UP (ref 3.8–10.5)
WBC # BLD: 7.21 K/UL — SIGNIFICANT CHANGE UP (ref 3.8–10.5)
WBC # FLD AUTO: 6.71 K/UL — SIGNIFICANT CHANGE UP (ref 3.8–10.5)
WBC # FLD AUTO: 7.21 K/UL — SIGNIFICANT CHANGE UP (ref 3.8–10.5)

## 2022-02-06 PROCEDURE — 99232 SBSQ HOSP IP/OBS MODERATE 35: CPT

## 2022-02-06 RX ORDER — HEPARIN SODIUM 5000 [USP'U]/ML
1200 INJECTION INTRAVENOUS; SUBCUTANEOUS
Qty: 25000 | Refills: 0 | Status: DISCONTINUED | OUTPATIENT
Start: 2022-02-06 | End: 2022-02-06

## 2022-02-06 RX ORDER — HEPARIN SODIUM 5000 [USP'U]/ML
1300 INJECTION INTRAVENOUS; SUBCUTANEOUS
Qty: 25000 | Refills: 0 | Status: DISCONTINUED | OUTPATIENT
Start: 2022-02-06 | End: 2022-02-07

## 2022-02-06 RX ADMIN — Medication 650 MILLIGRAM(S): at 00:42

## 2022-02-06 RX ADMIN — Medication 650 MILLIGRAM(S): at 09:05

## 2022-02-06 RX ADMIN — HEPARIN SODIUM 13 UNIT(S)/HR: 5000 INJECTION INTRAVENOUS; SUBCUTANEOUS at 19:06

## 2022-02-06 RX ADMIN — CARVEDILOL PHOSPHATE 12.5 MILLIGRAM(S): 80 CAPSULE, EXTENDED RELEASE ORAL at 19:12

## 2022-02-06 RX ADMIN — Medication 81 MILLIGRAM(S): at 12:29

## 2022-02-06 RX ADMIN — CEFEPIME 100 MILLIGRAM(S): 1 INJECTION, POWDER, FOR SOLUTION INTRAMUSCULAR; INTRAVENOUS at 12:28

## 2022-02-06 RX ADMIN — ATORVASTATIN CALCIUM 40 MILLIGRAM(S): 80 TABLET, FILM COATED ORAL at 21:19

## 2022-02-06 RX ADMIN — HEPARIN SODIUM 12 UNIT(S)/HR: 5000 INJECTION INTRAVENOUS; SUBCUTANEOUS at 09:05

## 2022-02-06 RX ADMIN — HEPARIN SODIUM 13 UNIT(S)/HR: 5000 INJECTION INTRAVENOUS; SUBCUTANEOUS at 15:18

## 2022-02-06 RX ADMIN — CEFEPIME 100 MILLIGRAM(S): 1 INJECTION, POWDER, FOR SOLUTION INTRAMUSCULAR; INTRAVENOUS at 00:02

## 2022-02-06 RX ADMIN — Medication 650 MILLIGRAM(S): at 00:02

## 2022-02-06 RX ADMIN — HEPARIN SODIUM 13 UNIT(S)/HR: 5000 INJECTION INTRAVENOUS; SUBCUTANEOUS at 20:37

## 2022-02-06 RX ADMIN — Medication 1 DROP(S): at 05:46

## 2022-02-06 RX ADMIN — Medication 650 MILLIGRAM(S): at 10:00

## 2022-02-06 RX ADMIN — LISINOPRIL 10 MILLIGRAM(S): 2.5 TABLET ORAL at 05:46

## 2022-02-06 RX ADMIN — CARVEDILOL PHOSPHATE 12.5 MILLIGRAM(S): 80 CAPSULE, EXTENDED RELEASE ORAL at 05:46

## 2022-02-06 RX ADMIN — Medication 1 TABLET(S): at 12:29

## 2022-02-06 RX ADMIN — Medication 1 DROP(S): at 19:11

## 2022-02-06 RX ADMIN — Medication 250 MILLIGRAM(S): at 11:30

## 2022-02-06 NOTE — PROGRESS NOTE ADULT - ASSESSMENT
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  - case cancelled 2/2 no MRI and CAMMY  - CAMMY/PVR pending  - LF MR: Abnormal marrow signal within the distal fifth metatarsal and the fifth digit. Given the adjacent skin wounds, findings are concerning for osteonecrosis. Abnormal marrow signal the first distal phalanx with overlying skin wound concerning for osteonecrosis. Abnormal marrow signal within the second and third distal phalanges, likelihood for osteomyelitis vs reactive osteitis. Abnormal marrow signal in the fourth metatarsal head.   - patient is rescheduled for left foot partial first and fifth ray resection at Noon on 2/7 with Dr. 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  - case cancelled 2/2 no MRI and CAMMY  - CAMMY/PVR: LABI 1.12  - LF MR: Abnormal marrow signal within the distal fifth metatarsal and the fifth digit. Given the adjacent skin wounds, findings are concerning for osteonecrosis. Abnormal marrow signal the first distal phalanx with overlying skin wound concerning for osteonecrosis. Abnormal marrow signal within the second and third distal phalanges, likelihood for osteomyelitis vs reactive osteitis. Abnormal marrow signal in the fourth metatarsal head.   - patient is rescheduled for left foot partial first and fifth ray resection at 12:30pm on 2/7 with Dr. Melissa  - medical optimization documented for surgical intervention under local anesthesia and light IV sedation documented on 2/3  - Obtain STAT COVID swab for surgery  - NPO after midnight   - discussed w/ attending

## 2022-02-06 NOTE — PROGRESS NOTE ADULT - ASSESSMENT
61M w/ hx of R AKA and L Fem-AKPop PTFE bypass for left foot dry gangrene who now presents for evaluation for possible podiatric intervention on first and fifth toes.    PLAN:  - Pain control PRN  - Continue home medications  - OR with Podiatry 2/7 for possible 1st/5th ray resection L foot; pending ABIs   - Per medicine, medically optimized for proposed procedure; RCRI: 0 indicating 3.9% 30 day risk of death, MI or cardiac arrest.  - ABX: Vanc/ Cefepime  - Heparin gtts    C Team/ Vascular Surgery  [id number]

## 2022-02-06 NOTE — PROGRESS NOTE ADULT - SUBJECTIVE AND OBJECTIVE BOX
Surgery Progress Note     Subjective/24hour Events: Patient seen and examined at the bedside this morning. No acute events overnight. Pain controlled.     Vital Signs:  Vital Signs Last 24 Hrs  T(C): 36.6 (06 Feb 2022 13:45), Max: 37 (05 Feb 2022 18:34)  T(F): 97.8 (06 Feb 2022 13:45), Max: 98.6 (05 Feb 2022 18:34)  HR: 82 (06 Feb 2022 13:45) (68 - 82)  BP: 119/65 (06 Feb 2022 13:45) (100/51 - 139/79)  BP(mean): --  RR: 18 (06 Feb 2022 13:45) (16 - 18)  SpO2: 100% (06 Feb 2022 13:45) (97% - 100%)        I&O's Detail    05 Feb 2022 07:01  -  06 Feb 2022 07:00  --------------------------------------------------------  IN:    Heparin: 132 mL    Oral Fluid: 480 mL  Total IN: 612 mL    OUT:    Voided (mL): 600 mL  Total OUT: 600 mL    Total NET: 12 mL      06 Feb 2022 07:01  -  06 Feb 2022 14:26  --------------------------------------------------------  IN:    Oral Fluid: 700 mL  Total IN: 700 mL    OUT:    Voided (mL): 600 mL  Total OUT: 600 mL    Total NET: 100 mL        PHYSICAL EXAM:    General: NAD, Sitting in bed comfortably  Cardio: Pulse regularly present  Resp: Unlabored breathing  GI/Abd: Soft, NT/ND, no rebound/guarding, no masses palpated  Extremities: Right - s/p AKA  Left - first and fifth digits dry gangrene, intact motor and sensory function. DP and PT signals    Labs:    02-06    138  |  104  |  29<H>  ----------------------------<  131<H>  4.9   |  24  |  0.88    Ca    9.6      06 Feb 2022 07:22  Phos  3.6     02-06  Mg     2.00     02-06      CAPILLARY BLOOD GLUCOSE      POCT Blood Glucose.: 146 mg/dL (06 Feb 2022 11:58)  POCT Blood Glucose.: 219 mg/dL (06 Feb 2022 08:58)  POCT Blood Glucose.: 134 mg/dL (06 Feb 2022 07:14)  POCT Blood Glucose.: 202 mg/dL (05 Feb 2022 21:59)  POCT Blood Glucose.: 129 mg/dL (05 Feb 2022 17:23)                            12.2   7.21  )-----------( 256      ( 06 Feb 2022 13:41 )             36.7     PT/INR - ( 05 Feb 2022 07:25 )   PT: 11.4 sec;   INR: 0.99 ratio         PTT - ( 06 Feb 2022 13:41 )  PTT:55.7 sec

## 2022-02-06 NOTE — PROGRESS NOTE ADULT - SUBJECTIVE AND OBJECTIVE BOX
Patient is a 61y old  Male who presents with a chief complaint of Left foot dry gangrene (05 Feb 2022 13:13)       INTERVAL HPI/OVERNIGHT EVENTS:  Patient seen and evaluated at bedside.  Pt is resting comfortable in NAD. Denies N/V/F/C.  Pain rated at X/10    Allergies    penicillin (Other)    Intolerances        Vital Signs Last 24 Hrs  T(C): 36.6 (06 Feb 2022 10:10), Max: 37 (05 Feb 2022 18:34)  T(F): 97.8 (06 Feb 2022 10:10), Max: 98.6 (05 Feb 2022 18:34)  HR: 73 (06 Feb 2022 10:10) (68 - 73)  BP: 129/69 (06 Feb 2022 10:10) (100/51 - 139/79)  BP(mean): --  RR: 16 (06 Feb 2022 10:10) (16 - 18)  SpO2: 100% (06 Feb 2022 10:10) (97% - 100%)    LABS:                        12.5   6.71  )-----------( 274      ( 06 Feb 2022 07:22 )             38.5     02-06    138  |  104  |  29<H>  ----------------------------<  131<H>  4.9   |  24  |  0.88    Ca    9.6      06 Feb 2022 07:22  Phos  3.6     02-06  Mg     2.00     02-06      PT/INR - ( 05 Feb 2022 07:25 )   PT: 11.4 sec;   INR: 0.99 ratio         PTT - ( 06 Feb 2022 07:22 )  PTT:49.8 sec    CAPILLARY BLOOD GLUCOSE      POCT Blood Glucose.: 146 mg/dL (06 Feb 2022 11:58)  POCT Blood Glucose.: 219 mg/dL (06 Feb 2022 08:58)  POCT Blood Glucose.: 134 mg/dL (06 Feb 2022 07:14)  POCT Blood Glucose.: 202 mg/dL (05 Feb 2022 21:59)  POCT Blood Glucose.: 129 mg/dL (05 Feb 2022 17:23)      Lower Extremity Physical Exam:  Vascular: DP/PT 0/4, B/L, CFT <3 seconds B/L, Temperature gradient warm to cool, B/L.   Neuro: Epicritic sensation intact to the level of digits, B/L.  Musculoskeletal/Ortho: R AKA  Skin: left foot distal hallux dry stable gangrene to the level of DIPJ, well demarcated, no acute SOI, left foot fifth digit distal dry stable gangrene to the of DIPJ with periwound erythema extending to the base of the digits, left foot lateral fifth MPJ dry stable eschar with periwound erythema and mild edema, no drainage, no tracking or tunneling or wet conversion noted.    RADIOLOGY & ADDITIONAL TESTS:

## 2022-02-06 NOTE — PROGRESS NOTE ADULT - SUBJECTIVE AND OBJECTIVE BOX
Patient is a 61y old  Male who presents with a chief complaint of Left foot dry gangrene (06 Feb 2022 14:26)      SUBJECTIVE / OVERNIGHT EVENTS:    No concerns overnight, no dyspnea/CP. Pending surgery 2/7.    MEDICATIONS  (STANDING):  artificial  tears Solution 1 Drop(s) Both EYES two times a day  aspirin enteric coated 81 milliGRAM(s) Oral daily  atorvastatin 40 milliGRAM(s) Oral at bedtime  carvedilol 12.5 milliGRAM(s) Oral every 12 hours  cefepime   IVPB 2000 milliGRAM(s) IV Intermittent every 12 hours  dextrose 40% Gel 15 Gram(s) Oral once  dextrose 5%. 1000 milliLiter(s) (50 mL/Hr) IV Continuous <Continuous>  dextrose 5%. 1000 milliLiter(s) (100 mL/Hr) IV Continuous <Continuous>  dextrose 50% Injectable 25 Gram(s) IV Push once  dextrose 50% Injectable 12.5 Gram(s) IV Push once  dextrose 50% Injectable 25 Gram(s) IV Push once  glucagon  Injectable 1 milliGRAM(s) IntraMuscular once  heparin  Infusion 1300 Unit(s)/Hr (13 mL/Hr) IV Continuous <Continuous>  insulin lispro (ADMELOG) corrective regimen sliding scale   SubCutaneous three times a day before meals  insulin lispro (ADMELOG) corrective regimen sliding scale   SubCutaneous at bedtime  lisinopril 10 milliGRAM(s) Oral daily  multivitamin 1 Tablet(s) Oral daily    MEDICATIONS  (PRN):  acetaminophen     Tablet .. 650 milliGRAM(s) Oral every 6 hours PRN Mild Pain (1 - 3)      Vital Signs Last 24 Hrs  T(C): 36.6 (06 Feb 2022 13:45), Max: 37 (05 Feb 2022 18:34)  T(F): 97.8 (06 Feb 2022 13:45), Max: 98.6 (05 Feb 2022 18:34)  HR: 82 (06 Feb 2022 13:45) (68 - 82)  BP: 119/65 (06 Feb 2022 13:45) (100/51 - 139/79)  BP(mean): --  RR: 18 (06 Feb 2022 13:45) (16 - 18)  SpO2: 100% (06 Feb 2022 13:45) (97% - 100%)  CAPILLARY BLOOD GLUCOSE      POCT Blood Glucose.: 146 mg/dL (06 Feb 2022 11:58)  POCT Blood Glucose.: 219 mg/dL (06 Feb 2022 08:58)  POCT Blood Glucose.: 134 mg/dL (06 Feb 2022 07:14)  POCT Blood Glucose.: 202 mg/dL (05 Feb 2022 21:59)  POCT Blood Glucose.: 129 mg/dL (05 Feb 2022 17:23)    I&O's Summary    05 Feb 2022 07:01  -  06 Feb 2022 07:00  --------------------------------------------------------  IN: 612 mL / OUT: 600 mL / NET: 12 mL    06 Feb 2022 07:01  -  06 Feb 2022 14:27  --------------------------------------------------------  IN: 700 mL / OUT: 600 mL / NET: 100 mL        PHYSICAL EXAM:  Vital Signs Last 24 Hrs  T(C): 36.6 (02-06-22 @ 13:45)  T(F): 97.8 (02-06-22 @ 13:45), Max: 98.6 (02-05-22 @ 18:34)  HR: 82 (02-06-22 @ 13:45) (68 - 82)  BP: 119/65 (02-06-22 @ 13:45)  BP(mean): --  RR: 18 (02-06-22 @ 13:45) (16 - 18)  SpO2: 100% (02-06-22 @ 13:45) (97% - 100%)  Wt(kg): --    02-05 @ 07:01  -  02-06 @ 07:00  --------------------------------------------------------  IN: 612 mL / OUT: 600 mL / NET: 12 mL    02-06 @ 07:01  -  02-06 @ 14:27  --------------------------------------------------------  IN: 700 mL / OUT: 600 mL / NET: 100 mL      Constitutional: NAD, awake and alert  EYES: EOMI  ENT:  Normal Hearing, no tonsillar exudates   Neck: Soft and supple , no thyromegaly   Respiratory: Breath sounds are clear bilaterally, No wheezing, rales or rhonchi  Cardiovascular: S1 and S2, regular rate and rhythm, no Murmurs, gallops or rubs, no JVD,    Gastrointestinal: Bowel Sounds present, soft, nontender, nondistended, no guarding, no rebound  Extremities: No cyanosis or clubbing; warm to touch  Vascular: 2+ peripheral pulses lower ex  Neurological: No focal deficits, CN II-XII intact bilaterally, sensation to light touch intact in all extremities, gait intact. Pupils are equally reactive to light and symmetrical in size.   Musculoskeletal: 5/5 strength b/l upper and lower extremities; no joint swelling.  Skin: No rashes  Psych: no depression or anhedonia, AAOx3  HEME: no bruises, no nose bleeds      LABS:                        12.2   7.21  )-----------( 256      ( 06 Feb 2022 13:41 )             36.7     02-06    138  |  104  |  29<H>  ----------------------------<  131<H>  4.9   |  24  |  0.88    Ca    9.6      06 Feb 2022 07:22  Phos  3.6     02-06  Mg     2.00     02-06      PT/INR - ( 05 Feb 2022 07:25 )   PT: 11.4 sec;   INR: 0.99 ratio         PTT - ( 06 Feb 2022 13:41 )  PTT:55.7 sec          RADIOLOGY & ADDITIONAL TESTS:    Imaging Personally Reviewed:    Consultant(s) Notes Reviewed:      Care Discussed with Consultants/Other Providers:

## 2022-02-06 NOTE — PROGRESS NOTE ADULT - ATTENDING COMMENTS
Skin: left foot distal hallux dry stable gangrene to the level of DIPJ, well demarcated, no acute SOI, left foot fifth digit distal dry stable gangrene to the of DIPJ with periwound erythema extending to the base of the digits, left foot lateral fifth MPJ dry stable eschar with periwound erythema and mild edema, no drainage, no tracking or tunneling or wet conversion noted.    planned amputation left hallux and 5 partial ray resection

## 2022-02-07 ENCOUNTER — TRANSCRIPTION ENCOUNTER (OUTPATIENT)
Age: 62
End: 2022-02-07

## 2022-02-07 ENCOUNTER — RESULT REVIEW (OUTPATIENT)
Age: 62
End: 2022-02-07

## 2022-02-07 DIAGNOSIS — N17.9 ACUTE KIDNEY FAILURE, UNSPECIFIED: ICD-10-CM

## 2022-02-07 LAB
ANION GAP SERPL CALC-SCNC: 11 MMOL/L — SIGNIFICANT CHANGE UP (ref 7–14)
APTT BLD: 32.3 SEC — SIGNIFICANT CHANGE UP (ref 27–36.3)
APTT BLD: 88.2 SEC — HIGH (ref 27–36.3)
BLD GP AB SCN SERPL QL: NEGATIVE — SIGNIFICANT CHANGE UP
BUN SERPL-MCNC: 36 MG/DL — HIGH (ref 7–23)
CALCIUM SERPL-MCNC: 9 MG/DL — SIGNIFICANT CHANGE UP (ref 8.4–10.5)
CHLORIDE SERPL-SCNC: 109 MMOL/L — HIGH (ref 98–107)
CO2 SERPL-SCNC: 21 MMOL/L — LOW (ref 22–31)
CREAT SERPL-MCNC: 1.2 MG/DL — SIGNIFICANT CHANGE UP (ref 0.5–1.3)
CULTURE RESULTS: SIGNIFICANT CHANGE UP
CULTURE RESULTS: SIGNIFICANT CHANGE UP
GLUCOSE SERPL-MCNC: 220 MG/DL — HIGH (ref 70–99)
HCT VFR BLD CALC: 35.9 % — LOW (ref 39–50)
HGB BLD-MCNC: 11.8 G/DL — LOW (ref 13–17)
INR BLD: 0.99 RATIO — SIGNIFICANT CHANGE UP (ref 0.88–1.16)
MCHC RBC-ENTMCNC: 31.2 PG — SIGNIFICANT CHANGE UP (ref 27–34)
MCHC RBC-ENTMCNC: 32.9 GM/DL — SIGNIFICANT CHANGE UP (ref 32–36)
MCV RBC AUTO: 95 FL — SIGNIFICANT CHANGE UP (ref 80–100)
NRBC # BLD: 0 /100 WBCS — SIGNIFICANT CHANGE UP
NRBC # FLD: 0 K/UL — SIGNIFICANT CHANGE UP
PLATELET # BLD AUTO: 247 K/UL — SIGNIFICANT CHANGE UP (ref 150–400)
POTASSIUM SERPL-MCNC: 4.4 MMOL/L — SIGNIFICANT CHANGE UP (ref 3.5–5.3)
POTASSIUM SERPL-SCNC: 4.4 MMOL/L — SIGNIFICANT CHANGE UP (ref 3.5–5.3)
PROTHROM AB SERPL-ACNC: 11.4 SEC — SIGNIFICANT CHANGE UP (ref 10.6–13.6)
RBC # BLD: 3.78 M/UL — LOW (ref 4.2–5.8)
RBC # FLD: 15 % — HIGH (ref 10.3–14.5)
RH IG SCN BLD-IMP: POSITIVE — SIGNIFICANT CHANGE UP
SODIUM SERPL-SCNC: 141 MMOL/L — SIGNIFICANT CHANGE UP (ref 135–145)
SPECIMEN SOURCE: SIGNIFICANT CHANGE UP
SPECIMEN SOURCE: SIGNIFICANT CHANGE UP
WBC # BLD: 7.51 K/UL — SIGNIFICANT CHANGE UP (ref 3.8–10.5)
WBC # FLD AUTO: 7.51 K/UL — SIGNIFICANT CHANGE UP (ref 3.8–10.5)

## 2022-02-07 PROCEDURE — 88305 TISSUE EXAM BY PATHOLOGIST: CPT | Mod: 26

## 2022-02-07 PROCEDURE — 88304 TISSUE EXAM BY PATHOLOGIST: CPT | Mod: 26

## 2022-02-07 PROCEDURE — 73630 X-RAY EXAM OF FOOT: CPT | Mod: 26,LT

## 2022-02-07 PROCEDURE — 88311 DECALCIFY TISSUE: CPT | Mod: 26

## 2022-02-07 PROCEDURE — 99233 SBSQ HOSP IP/OBS HIGH 50: CPT

## 2022-02-07 PROCEDURE — 88307 TISSUE EXAM BY PATHOLOGIST: CPT | Mod: 26

## 2022-02-07 RX ORDER — ONDANSETRON 8 MG/1
4 TABLET, FILM COATED ORAL ONCE
Refills: 0 | Status: DISCONTINUED | OUTPATIENT
Start: 2022-02-07 | End: 2022-02-07

## 2022-02-07 RX ORDER — HEPARIN SODIUM 5000 [USP'U]/ML
1600 INJECTION INTRAVENOUS; SUBCUTANEOUS
Qty: 25000 | Refills: 0 | Status: DISCONTINUED | OUTPATIENT
Start: 2022-02-07 | End: 2022-02-08

## 2022-02-07 RX ORDER — HEPARIN SODIUM 5000 [USP'U]/ML
1400 INJECTION INTRAVENOUS; SUBCUTANEOUS
Qty: 25000 | Refills: 0 | Status: DISCONTINUED | OUTPATIENT
Start: 2022-02-07 | End: 2022-02-07

## 2022-02-07 RX ORDER — OXYCODONE HYDROCHLORIDE 5 MG/1
5 TABLET ORAL ONCE
Refills: 0 | Status: DISCONTINUED | OUTPATIENT
Start: 2022-02-07 | End: 2022-02-07

## 2022-02-07 RX ORDER — FENTANYL CITRATE 50 UG/ML
25 INJECTION INTRAVENOUS
Refills: 0 | Status: DISCONTINUED | OUTPATIENT
Start: 2022-02-07 | End: 2022-02-07

## 2022-02-07 RX ADMIN — Medication 650 MILLIGRAM(S): at 22:19

## 2022-02-07 RX ADMIN — CARVEDILOL PHOSPHATE 12.5 MILLIGRAM(S): 80 CAPSULE, EXTENDED RELEASE ORAL at 19:23

## 2022-02-07 RX ADMIN — Medication 1 DROP(S): at 18:37

## 2022-02-07 RX ADMIN — CEFEPIME 100 MILLIGRAM(S): 1 INJECTION, POWDER, FOR SOLUTION INTRAMUSCULAR; INTRAVENOUS at 00:59

## 2022-02-07 RX ADMIN — OXYCODONE HYDROCHLORIDE 5 MILLIGRAM(S): 5 TABLET ORAL at 23:47

## 2022-02-07 RX ADMIN — Medication 650 MILLIGRAM(S): at 23:05

## 2022-02-07 RX ADMIN — CARVEDILOL PHOSPHATE 12.5 MILLIGRAM(S): 80 CAPSULE, EXTENDED RELEASE ORAL at 06:41

## 2022-02-07 RX ADMIN — Medication 1 DROP(S): at 06:41

## 2022-02-07 RX ADMIN — ATORVASTATIN CALCIUM 40 MILLIGRAM(S): 80 TABLET, FILM COATED ORAL at 22:18

## 2022-02-07 RX ADMIN — HEPARIN SODIUM 14 UNIT(S)/HR: 5000 INJECTION INTRAVENOUS; SUBCUTANEOUS at 21:26

## 2022-02-07 RX ADMIN — CEFEPIME 100 MILLIGRAM(S): 1 INJECTION, POWDER, FOR SOLUTION INTRAMUSCULAR; INTRAVENOUS at 23:48

## 2022-02-07 RX ADMIN — Medication 2: at 08:05

## 2022-02-07 RX ADMIN — LISINOPRIL 10 MILLIGRAM(S): 2.5 TABLET ORAL at 06:41

## 2022-02-07 RX ADMIN — Medication 650 MILLIGRAM(S): at 01:07

## 2022-02-07 RX ADMIN — HEPARIN SODIUM 14 UNIT(S)/HR: 5000 INJECTION INTRAVENOUS; SUBCUTANEOUS at 01:18

## 2022-02-07 RX ADMIN — Medication 650 MILLIGRAM(S): at 01:52

## 2022-02-07 NOTE — BRIEF OPERATIVE NOTE - NSICDXBRIEFPROCEDURE_GEN_ALL_CORE_FT
PROCEDURES:  Amputation, toe, 1 toe 07-Feb-2022 14:35:05  Emily Bolanos  Amputation, foot, ray, partial 07-Feb-2022 14:35:13  Emily Bolanos

## 2022-02-07 NOTE — DISCHARGE NOTE PROVIDER - NSDCCPTREATMENT_GEN_ALL_CORE_FT
PRINCIPAL PROCEDURE  Procedure: Amputation, toe, 1 toe  Findings and Treatment:       SECONDARY PROCEDURE  Procedure: Amputation, foot, ray, partial  Findings and Treatment:

## 2022-02-07 NOTE — PROGRESS NOTE ADULT - SUBJECTIVE AND OBJECTIVE BOX
CHIEF COMPLAINT: f/u     SUBJECTIVE / OVERNIGHT EVENTS: Patient seen and examined. No acute events overnight. Pain well controlled and patient without any complaints.  Pt comfortable in bed, awaiting OR today at noon.     MEDICATIONS  (STANDING):  artificial  tears Solution 1 Drop(s) Both EYES two times a day  aspirin enteric coated 81 milliGRAM(s) Oral daily  atorvastatin 40 milliGRAM(s) Oral at bedtime  carvedilol 12.5 milliGRAM(s) Oral every 12 hours  cefepime   IVPB 2000 milliGRAM(s) IV Intermittent every 12 hours  dextrose 40% Gel 15 Gram(s) Oral once  dextrose 5%. 1000 milliLiter(s) (50 mL/Hr) IV Continuous <Continuous>  dextrose 5%. 1000 milliLiter(s) (100 mL/Hr) IV Continuous <Continuous>  dextrose 50% Injectable 25 Gram(s) IV Push once  dextrose 50% Injectable 12.5 Gram(s) IV Push once  dextrose 50% Injectable 25 Gram(s) IV Push once  glucagon  Injectable 1 milliGRAM(s) IntraMuscular once  insulin lispro (ADMELOG) corrective regimen sliding scale   SubCutaneous three times a day before meals  insulin lispro (ADMELOG) corrective regimen sliding scale   SubCutaneous at bedtime  lisinopril 10 milliGRAM(s) Oral daily  multivitamin 1 Tablet(s) Oral daily    MEDICATIONS  (PRN):  acetaminophen     Tablet .. 650 milliGRAM(s) Oral every 6 hours PRN Mild Pain (1 - 3)      VITALS:  T(F): 98.2 (02-07-22 @ 11:09), Max: 98.9 (02-06-22 @ 21:15)  HR: 82 (02-07-22 @ 11:09) (73 - 85)  BP: 120/62 (02-07-22 @ 11:09) (112/65 - 135/78)  RR: 17 (02-07-22 @ 11:09) (16 - 18)  SpO2: 98% (02-07-22 @ 09:00)  Wt(kg): --      CAPILLARY BLOOD GLUCOSE      PHYSICAL EXAM:  GENERAL: NAD, on room air  EYES:  EOMI, sclera clear  ENMT: Moist mucous membranes, poor dentition   NECK: Supple, No JVD  RESPIRATORY: Clear to auscultation bilaterally; No rales, rhonchi, wheezing, or rubs  CARDIOVASCULAR: S1/S2, RRR, no murmurs appreciated  GASTROINTESTINAL: Soft, Nontender  EXTREMITIES:  right AKA, Left foot wrapped with kerlix  NERVOUS SYSTEM: awake, alert, No gross deficits  PSYCH: calm cooperative  SKIN: No rashes; no palpable lesions    LABS:              11.8                 141  | 21   | 36           7.51  >-----------< 247     ------------------------< 220                   35.9                 4.4  | 109  | 1.20                                         Ca 9.0   Mg x     Ph x            INR: 0.99 ratio;    PT: 11.4 sec;    PTT: 88.2 sec<H>                MICROBIOLOGY:        RADIOLOGY & ADDITIONAL TESTS:  Imaging Personally Reviewed: [ ] Yes    [ ] Consultant(s) Notes Reviewed:  [x] Care Discussed with Consultants/Other Providers: Vascular PA - discussed

## 2022-02-07 NOTE — PROGRESS NOTE ADULT - ASSESSMENT
61M w/ hx of R AKA and L Fem-AKPop PTFE bypass for left foot dry gangrene who now presents for evaluation for possible podiatric intervention on first and fifth toes.    PLAN:  - Pain control PRN  - Continue home medications  - OR with Podiatry 2/7 for possible 1st/5th ray resection L foot; pending ABIs   - Per medicine, medically optimized for proposed procedure; RCRI: 0 indicating 3.9% 30 day risk of death, MI or cardiac arrest.  - ABX: Vanc/ Cefepime  - Heparin gtts    C Team/ Vascular Surgery  [id number]      61M w/ hx of R AKA and L Fem-AKPop PTFE bypass for left foot dry gangrene who now presents for evaluation for possible podiatric intervention on first and fifth toes.    PLAN:    - Pain control PRN  - Continue home medications  - TODAY: OR with Podiatry 2/7 for possible 1st/5th ray resection L foot; pending ABIs   - Per medicine, medically optimized for proposed procedure; RCRI: 0 indicating 3.9% 30 day risk of death, MI or cardiac arrest.  - ABX: Vanc/ Cefepime  - Restart Heparin gtts after surgery    C Team/ Vascular Surgery  [id number]

## 2022-02-07 NOTE — BRIEF OPERATIVE NOTE - COMMENTS
s/p left foot partial hallux amputation and left foot first partial 5th ray resection, good intraop bleeding, good bone quality at the level of resection, low concern for residual infection and viability, discharge pending appearance on PO antibiotics

## 2022-02-07 NOTE — DISCHARGE NOTE PROVIDER - NSDCCPCAREPLAN_GEN_ALL_CORE_FT
PRINCIPAL DISCHARGE DIAGNOSIS  Diagnosis: Dry gangrene  Assessment and Plan of Treatment: WOUND CARE:  Please keep incisions clean and dry. Please do not Scrub or rub incisions. Do not use lotion or powder on incisions.   BATHING: You may shower and/or sponge bathe. You may use warm soapy water in the shower and rinse, pat dry.  ACTIVITY: No heavy lifting or straining. Otherwise, you may return to your usual level of physical activity. If you are taking narcotic pain medication DO NOT drive a car, operate machinery or make important decisions.  DIET: Return to your usual diet.  NOTIFY YOUR SURGEON IF YOU HAVE: any bleeding that does not stop, any pus draining from your wound(s), any fever (over 100.4 F) persistent nausea/vomiting, or if your pain is not controlled on your discharge pain medications, unable to urinate.  FOLLOW UP:  1. Please follow up with your primary care physician in one week regarding your hospitalization, bring copies of your discharge paperwork.  2. Please follow up with your vascular surgeon, Dr. Lundberg. Call to make an appointment.   3. 2. Please follow up with your podiatrist, . Call [phone] to make an appointment.

## 2022-02-07 NOTE — PROGRESS NOTE ADULT - ASSESSMENT
Plan:   To OR today at 12pm with Dr. Melissa for left foot partial 1st and 5th ray resection.   CXR on sunrise.  EKG on sunrise.  Medical/Cardiac clearance since 2/3 and documented in chart.  Consent signed and in chart.  Procedure was explained to patient in detail. All alternatives, risks and complications were discussed. All questions answered.

## 2022-02-07 NOTE — PROGRESS NOTE ADULT - SUBJECTIVE AND OBJECTIVE BOX
Patient is a 61y old  Male who presents with a chief complaint of Left foot dry gangrene (07 Feb 2022 04:20)      INTERVAL HPI/OVERNIGHT EVENTS:   Pt is scheduled for left foot partial 1st and 5th ray resection with Dr. Melissa at 12pm. Patient is aware of procedure and is NPO since midnight.    MEDICATIONS  (STANDING):  artificial  tears Solution 1 Drop(s) Both EYES two times a day  aspirin enteric coated 81 milliGRAM(s) Oral daily  atorvastatin 40 milliGRAM(s) Oral at bedtime  carvedilol 12.5 milliGRAM(s) Oral every 12 hours  cefepime   IVPB 2000 milliGRAM(s) IV Intermittent every 12 hours  dextrose 40% Gel 15 Gram(s) Oral once  dextrose 5%. 1000 milliLiter(s) (50 mL/Hr) IV Continuous <Continuous>  dextrose 5%. 1000 milliLiter(s) (100 mL/Hr) IV Continuous <Continuous>  dextrose 50% Injectable 25 Gram(s) IV Push once  dextrose 50% Injectable 12.5 Gram(s) IV Push once  dextrose 50% Injectable 25 Gram(s) IV Push once  glucagon  Injectable 1 milliGRAM(s) IntraMuscular once  insulin lispro (ADMELOG) corrective regimen sliding scale   SubCutaneous three times a day before meals  insulin lispro (ADMELOG) corrective regimen sliding scale   SubCutaneous at bedtime  lisinopril 10 milliGRAM(s) Oral daily  multivitamin 1 Tablet(s) Oral daily    MEDICATIONS  (PRN):  acetaminophen     Tablet .. 650 milliGRAM(s) Oral every 6 hours PRN Mild Pain (1 - 3)      Allergies    penicillin (Other)    Intolerances        Vital Signs Last 24 Hrs  T(C): 36.9 (07 Feb 2022 06:00), Max: 37.2 (06 Feb 2022 21:15)  T(F): 98.4 (07 Feb 2022 06:00), Max: 98.9 (06 Feb 2022 21:15)  HR: 73 (07 Feb 2022 06:00) (73 - 85)  BP: 135/78 (07 Feb 2022 06:00) (112/65 - 135/78)  BP(mean): --  RR: 17 (07 Feb 2022 06:00) (16 - 18)  SpO2: 99% (07 Feb 2022 06:00) (99% - 100%)    LABS:                        11.8   7.51  )-----------( 247      ( 07 Feb 2022 02:52 )             35.9     02-07    141  |  109<H>  |  36<H>  ----------------------------<  220<H>  4.4   |  21<L>  |  1.20    Ca    9.0      07 Feb 2022 02:52  Phos  3.6     02-06  Mg     2.00     02-06      PT/INR - ( 07 Feb 2022 02:52 )   PT: 11.4 sec;   INR: 0.99 ratio         PTT - ( 07 Feb 2022 02:52 )  PTT:88.2 sec    CAPILLARY BLOOD GLUCOSE      POCT Blood Glucose.: 175 mg/dL (07 Feb 2022 07:41)  POCT Blood Glucose.: 215 mg/dL (06 Feb 2022 22:46)  POCT Blood Glucose.: 210 mg/dL (06 Feb 2022 17:08)  POCT Blood Glucose.: 146 mg/dL (06 Feb 2022 11:58)  POCT Blood Glucose.: 219 mg/dL (06 Feb 2022 08:58)      RADIOLOGY & ADDITIONAL TESTS:

## 2022-02-07 NOTE — PROGRESS NOTE ADULT - SUBJECTIVE AND OBJECTIVE BOX
Surgery Progress Note     Subjective/24hour Events:   No acute events overnight. PTT was supra therapeutic, rate was decreased from 14 to 13  Patient seen and examined at the bedside this morning.     Vital Signs:  Vital Signs Last 24 Hrs  T(C): 36.6 (07 Feb 2022 01:38), Max: 37.2 (06 Feb 2022 21:15)  T(F): 97.9 (07 Feb 2022 01:38), Max: 98.9 (06 Feb 2022 21:15)  HR: 85 (07 Feb 2022 01:38) (69 - 85)  BP: 114/61 (07 Feb 2022 01:38) (112/65 - 129/69)  BP(mean): --  RR: 18 (07 Feb 2022 01:38) (16 - 18)  SpO2: 100% (07 Feb 2022 01:38) (99% - 100%)      I&O's Detail    05 Feb 2022 07:01  -  06 Feb 2022 07:00  --------------------------------------------------------  IN:    Heparin: 132 mL    Oral Fluid: 480 mL  Total IN: 612 mL    OUT:    Voided (mL): 600 mL  Total OUT: 600 mL    Total NET: 12 mL      06 Feb 2022 07:01  -  07 Feb 2022 04:22  --------------------------------------------------------  IN:    Oral Fluid: 700 mL  Total IN: 700 mL    OUT:    Voided (mL): 600 mL  Total OUT: 600 mL    Total NET: 100 mL              PHYSICAL EXAM:              Labs:    02-06    138  |  104  |  29<H>  ----------------------------<  131<H>  4.9   |  24  |  0.88    Ca    9.6      06 Feb 2022 07:22  Phos  3.6     02-06  Mg     2.00     02-06      CAPILLARY BLOOD GLUCOSE      POCT Blood Glucose.: 146 mg/dL (06 Feb 2022 11:58)  POCT Blood Glucose.: 219 mg/dL (06 Feb 2022 08:58)  POCT Blood Glucose.: 134 mg/dL (06 Feb 2022 07:14)  POCT Blood Glucose.: 202 mg/dL (05 Feb 2022 21:59)  POCT Blood Glucose.: 129 mg/dL (05 Feb 2022 17:23)                            12.2   7.21  )-----------( 256      ( 06 Feb 2022 13:41 )             36.7     PT/INR - ( 05 Feb 2022 07:25 )   PT: 11.4 sec;   INR: 0.99 ratio         PTT - ( 06 Feb 2022 13:41 )  PTT:55.7 sec       Surgery Progress Note     Subjective/24hour Events:   No acute events overnight. PTT was supra therapeutic, rate was decreased from 14 to 13. Patient seen and examined at the bedside this morning. Denies pain in lower extremities, sensory is intact.    Vital Signs:  Vital Signs Last 24 Hrs  T(C): 36.6 (07 Feb 2022 01:38), Max: 37.2 (06 Feb 2022 21:15)  T(F): 97.9 (07 Feb 2022 01:38), Max: 98.9 (06 Feb 2022 21:15)  HR: 85 (07 Feb 2022 01:38) (69 - 85)  BP: 114/61 (07 Feb 2022 01:38) (112/65 - 129/69)  BP(mean): --  RR: 18 (07 Feb 2022 01:38) (16 - 18)  SpO2: 100% (07 Feb 2022 01:38) (99% - 100%)      I&O's Detail    05 Feb 2022 07:01  -  06 Feb 2022 07:00  --------------------------------------------------------  IN:    Heparin: 132 mL    Oral Fluid: 480 mL  Total IN: 612 mL    OUT:    Voided (mL): 600 mL  Total OUT: 600 mL    Total NET: 12 mL      06 Feb 2022 07:01  -  07 Feb 2022 04:22  --------------------------------------------------------  IN:    Oral Fluid: 700 mL  Total IN: 700 mL    OUT:    Voided (mL): 600 mL  Total OUT: 600 mL    Total NET: 100 mL              PHYSICAL EXAM:              Labs:    02-06    138  |  104  |  29<H>  ----------------------------<  131<H>  4.9   |  24  |  0.88    Ca    9.6      06 Feb 2022 07:22  Phos  3.6     02-06  Mg     2.00     02-06      CAPILLARY BLOOD GLUCOSE      POCT Blood Glucose.: 146 mg/dL (06 Feb 2022 11:58)  POCT Blood Glucose.: 219 mg/dL (06 Feb 2022 08:58)  POCT Blood Glucose.: 134 mg/dL (06 Feb 2022 07:14)  POCT Blood Glucose.: 202 mg/dL (05 Feb 2022 21:59)  POCT Blood Glucose.: 129 mg/dL (05 Feb 2022 17:23)                            12.2   7.21  )-----------( 256      ( 06 Feb 2022 13:41 )             36.7     PT/INR - ( 05 Feb 2022 07:25 )   PT: 11.4 sec;   INR: 0.99 ratio         PTT - ( 06 Feb 2022 13:41 )  PTT:55.7 sec

## 2022-02-07 NOTE — DISCHARGE NOTE PROVIDER - NSDCMRMEDTOKEN_GEN_ALL_CORE_FT
Alogliptin 12.5 mg oral tablet: 1 tab(s) orally 2 times a day  aspirin 81 mg oral tablet: 1 tab(s) orally once a day  atorvastatin 40 mg oral tablet: 1 tab(s) orally once a day  Coreg 12.5 mg oral tablet: 1 tab(s) orally 2 times a day  Eliquis 2.5 mg oral tablet: 1 tab(s) orally 2 times a day  lisinopril 10 mg oral tablet: 1 tab(s) orally once a day  metFORMIN 1000 mg oral tablet: 1 tab(s) orally 2 times a day  Multiple Vitamins oral tablet: 1 tab(s) orally once a day  ocular lubricant ophthalmic solution: 2 drop(s) to each affected eye 2 times a day   acetaminophen 325 mg oral tablet: 2 tab(s) orally every 6 hours, As needed, Mild Pain (1 - 3)  Alogliptin 12.5 mg oral tablet: 1 tab(s) orally 2 times a day  aspirin 81 mg oral tablet: 1 tab(s) orally once a day  atorvastatin 40 mg oral tablet: 1 tab(s) orally once a day  Coreg 12.5 mg oral tablet: 1 tab(s) orally 2 times a day  doxycycline monohydrate 100 mg oral capsule: 1 cap(s) orally every 12 hours  Eliquis 2.5 mg oral tablet: 1 tab(s) orally 2 times a day  lisinopril 10 mg oral tablet: 1 tab(s) orally once a day  metFORMIN 1000 mg oral tablet: 1 tab(s) orally 2 times a day  Multiple Vitamins oral tablet: 1 tab(s) orally once a day  ocular lubricant ophthalmic solution: 2 drop(s) to each affected eye 2 times a day   acetaminophen 325 mg oral tablet: 2 tab(s) orally every 6 hours, As needed, Mild Pain (1 - 3)  Alogliptin 12.5 mg oral tablet: 1 tab(s) orally 2 times a day  aspirin 81 mg oral tablet: 1 tab(s) orally once a day  atorvastatin 40 mg oral tablet: 1 tab(s) orally once a day  Coreg 12.5 mg oral tablet: 1 tab(s) orally 2 times a day  doxycycline monohydrate 100 mg oral capsule: 1 cap(s) orally every 12 hours  Eliquis 2.5 mg oral tablet: 1 tab(s) orally 2 times a day  lisinopril 10 mg oral tablet: 1 tab(s) orally once a day  metFORMIN 1000 mg oral tablet: 1 tab(s) orally 2 times a day  Multiple Vitamins oral tablet: 1 tab(s) orally once a day  ocular lubricant ophthalmic solution: 2 drop(s) to each affected eye 2 times a day  oxyCODONE 5 mg oral tablet: 1 tab(s) orally every 6 hours, As needed, Moderate Pain (4 - 6)

## 2022-02-07 NOTE — DISCHARGE NOTE PROVIDER - HOSPITAL COURSE
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 Pt presented to [facility] on  after being referred by Podiatrist for potential surgical intervention on Left foot. In the ED, patient afebrile, hemodynamically stable, labs largely unremarkable. Podiatry evaluated patient and planning for surgical intervention this admission. Vascular surgery consulted for evaluation fo adequate arterial blood flow to foot. Patient admitted to C Team Surgery under the care of Dr. Lundberg. CAMMY left 1.12 with flat 1st digit waveform.   Left foot xray showed the following: Status post partial hallux resection through distal portion of proximal phalanx and partial 5th ray resection through midmetatarsal shaft with   sharp and smooth osseous stump margins and with postsurgical changes in the overlying soft tissues. No proximally tracking gas collections beyond the amputation margins and   no focal areas of osteomyelitis. Remainder of foot unchanged. Also correlate with intraoperative findings.  Left foor MRI showed the followin.  Skin wounds at the fifth digit and first digit with soft tissue   swelling and skin thickening concerning for cellulitis. No drainable   fluid collection is seen.  2.  Abnormal marrow signal within the distal fifth metatarsal and the   fifth digit. Given the adjacent skin wounds, findings are concerning for   osteonecrosis.  3.  Abnormal marrow signal the first distal phalanx with overlying skin   wound concerning for osteonecrosis.  4.  Abnormal marrow signal within the second and third distal phalanges.   Correlate clinically for overlying skin wound which would increase the   likelihood for osteomyelitis. Differential includes vascular   insufficiency and reactive osteitis.  5.  Abnormal marrow signal in the fourth metatarsal head. Favor   osteomyelitis given the lateral forefoot skin wound. Differential   includes reactive osteomyelitis.    Patient deemed medically optimized for podiatry procedure. Patient taken to OR on  and underwent s/p left foot partial hallux amputation and left foot first partial 5th ray resection. Patient tolerated operation well and there were no post-operative complications identified. Patient remained hemodynamically stable in the PACU and transferred to the surgical floor. Diet was restarted and advanced as tolerated. Pain control was transitioned from IV to PO pain meds. Patient seen by physical therapy post-operatively who recommended patient be discharge to rehab facility. At this time, patient is currently with adequate pain control, voiding, tolerating a regular diet and participating in PT. Pain well controlled on PO pain meds. Patient has been deemed stable for discharge to rehab with follow up as an outpatient.

## 2022-02-07 NOTE — DISCHARGE NOTE PROVIDER - CARE PROVIDERS DIRECT ADDRESSES
[email or web address]

## 2022-02-07 NOTE — PROGRESS NOTE ADULT - PROBLEM SELECTOR PLAN 1
pod plan possible partial first and fifth ray resection pending MRI/vasc sneha henry 2/7  Podiatry plan for surgical intervention under local anesthesia and light IV sedation  No evidence of clinical HF or anginal symptoms  Recent TTE and NST 9/2021 with normal LV function and no ischemia  BP and DM controlled  Patient is presently medically optimized for proposed procedure  RCRI: 0 indicating 0.4% 30 day risk of death, MI or cardiac arrest. Podiatry plan for surgical intervention under local anesthesia and light IV sedation  No evidence of clinical HF or anginal symptoms  Recent TTE and NST 9/2021 with normal LV function and no ischemia  BP and DM controlled  Patient is presently medically optimized for proposed procedure  RCRI: 0 indicating 0.4% 30 day risk of death, MI or cardiac arrest.

## 2022-02-07 NOTE — PROGRESS NOTE ADULT - PROBLEM SELECTOR PLAN 3
s/p Lt fem-AKpop bypass on 11/17  - OR with Podiatry 2/7 for possible 1st/5th ray resection L foot; pending ABIs   - pain control with oxy IR PRN  - wound care as per primary team. On cefepime per primary team  - c/w ASA  - Eliquis held in anticipation of OR, currently on heparin gtt s/p Lt fem-AKpop bypass on 11/17  - OR with Podiatry 2/7 for possible 1st/5th ray resection L foot; pending ABIs   - pain control with oxy IR PRN  - wound care as per primary team. On cefepime per primary team, s/p vanc   - c/w ASA  - Eliquis held in anticipation of OR, currently on heparin gtt

## 2022-02-07 NOTE — BRIEF OPERATIVE NOTE - SPECIMENS
Micro 1- left foot 5th met clean margin, Path 1- left foot distal phalanx, Path 2- left foot 5th met bone and soft tissue, Path 3- left foot 5th met clean bone margin

## 2022-02-07 NOTE — DISCHARGE NOTE PROVIDER - PROVIDER TOKENS
PROVIDER:[TOKEN:[[id number]],FOLLOWUP:[Routine]],PROVIDER:[TOKEN:[[id number]],FOLLOWUP:[1 week]]

## 2022-02-07 NOTE — DISCHARGE NOTE PROVIDER - CARE PROVIDER_API CALL
Autumn Lundberg)  Surgery  [address], [address]  [place] [address]  Phone: [phone]  Fax: [phone]  Follow Up Time: Routine    Sydnie Melissa (MICHELLE)  Podiatric Medicine and Surgery  [address]  [place] [address]  Phone: [phone]  Fax: [phone]  Follow Up Time: 1 week

## 2022-02-08 LAB
ANION GAP SERPL CALC-SCNC: 11 MMOL/L — SIGNIFICANT CHANGE UP (ref 7–14)
APTT BLD: 100.8 SEC — HIGH (ref 27–36.3)
APTT BLD: 107.1 SEC — HIGH (ref 27–36.3)
APTT BLD: >200 SEC — SIGNIFICANT CHANGE UP (ref 27–36.3)
BUN SERPL-MCNC: 29 MG/DL — HIGH (ref 7–23)
CALCIUM SERPL-MCNC: 9.2 MG/DL — SIGNIFICANT CHANGE UP (ref 8.4–10.5)
CHLORIDE SERPL-SCNC: 106 MMOL/L — SIGNIFICANT CHANGE UP (ref 98–107)
CO2 SERPL-SCNC: 21 MMOL/L — LOW (ref 22–31)
CREAT SERPL-MCNC: 0.97 MG/DL — SIGNIFICANT CHANGE UP (ref 0.5–1.3)
GLUCOSE SERPL-MCNC: 137 MG/DL — HIGH (ref 70–99)
HCT VFR BLD CALC: 36.1 % — LOW (ref 39–50)
HGB BLD-MCNC: 12 G/DL — LOW (ref 13–17)
MAGNESIUM SERPL-MCNC: 1.7 MG/DL — SIGNIFICANT CHANGE UP (ref 1.6–2.6)
MCHC RBC-ENTMCNC: 31.4 PG — SIGNIFICANT CHANGE UP (ref 27–34)
MCHC RBC-ENTMCNC: 33.2 GM/DL — SIGNIFICANT CHANGE UP (ref 32–36)
MCV RBC AUTO: 94.5 FL — SIGNIFICANT CHANGE UP (ref 80–100)
NRBC # BLD: 0 /100 WBCS — SIGNIFICANT CHANGE UP
NRBC # FLD: 0 K/UL — SIGNIFICANT CHANGE UP
PHOSPHATE SERPL-MCNC: 3.4 MG/DL — SIGNIFICANT CHANGE UP (ref 2.5–4.5)
PLATELET # BLD AUTO: 241 K/UL — SIGNIFICANT CHANGE UP (ref 150–400)
POTASSIUM SERPL-MCNC: 4.4 MMOL/L — SIGNIFICANT CHANGE UP (ref 3.5–5.3)
POTASSIUM SERPL-SCNC: 4.4 MMOL/L — SIGNIFICANT CHANGE UP (ref 3.5–5.3)
RBC # BLD: 3.82 M/UL — LOW (ref 4.2–5.8)
RBC # FLD: 15 % — HIGH (ref 10.3–14.5)
SARS-COV-2 RNA SPEC QL NAA+PROBE: SIGNIFICANT CHANGE UP
SODIUM SERPL-SCNC: 138 MMOL/L — SIGNIFICANT CHANGE UP (ref 135–145)
WBC # BLD: 8.15 K/UL — SIGNIFICANT CHANGE UP (ref 3.8–10.5)
WBC # FLD AUTO: 8.15 K/UL — SIGNIFICANT CHANGE UP (ref 3.8–10.5)

## 2022-02-08 PROCEDURE — 99232 SBSQ HOSP IP/OBS MODERATE 35: CPT

## 2022-02-08 RX ORDER — OXYCODONE HYDROCHLORIDE 5 MG/1
5 TABLET ORAL EVERY 6 HOURS
Refills: 0 | Status: DISCONTINUED | OUTPATIENT
Start: 2022-02-08 | End: 2022-02-12

## 2022-02-08 RX ORDER — MAGNESIUM SULFATE 500 MG/ML
2 VIAL (ML) INJECTION ONCE
Refills: 0 | Status: DISCONTINUED | OUTPATIENT
Start: 2022-02-08 | End: 2022-02-09

## 2022-02-08 RX ORDER — APIXABAN 2.5 MG/1
2.5 TABLET, FILM COATED ORAL
Refills: 0 | Status: DISCONTINUED | OUTPATIENT
Start: 2022-02-08 | End: 2022-02-12

## 2022-02-08 RX ORDER — HYDROMORPHONE HYDROCHLORIDE 2 MG/ML
0.5 INJECTION INTRAMUSCULAR; INTRAVENOUS; SUBCUTANEOUS ONCE
Refills: 0 | Status: DISCONTINUED | OUTPATIENT
Start: 2022-02-08 | End: 2022-02-08

## 2022-02-08 RX ADMIN — Medication 1 DROP(S): at 18:08

## 2022-02-08 RX ADMIN — APIXABAN 2.5 MILLIGRAM(S): 2.5 TABLET, FILM COATED ORAL at 18:16

## 2022-02-08 RX ADMIN — CARVEDILOL PHOSPHATE 12.5 MILLIGRAM(S): 80 CAPSULE, EXTENDED RELEASE ORAL at 18:08

## 2022-02-08 RX ADMIN — HEPARIN SODIUM 15 UNIT(S)/HR: 5000 INJECTION INTRAVENOUS; SUBCUTANEOUS at 09:34

## 2022-02-08 RX ADMIN — OXYCODONE HYDROCHLORIDE 5 MILLIGRAM(S): 5 TABLET ORAL at 18:16

## 2022-02-08 RX ADMIN — HYDROMORPHONE HYDROCHLORIDE 0.5 MILLIGRAM(S): 2 INJECTION INTRAMUSCULAR; INTRAVENOUS; SUBCUTANEOUS at 01:33

## 2022-02-08 RX ADMIN — Medication 81 MILLIGRAM(S): at 11:24

## 2022-02-08 RX ADMIN — OXYCODONE HYDROCHLORIDE 5 MILLIGRAM(S): 5 TABLET ORAL at 12:29

## 2022-02-08 RX ADMIN — HYDROMORPHONE HYDROCHLORIDE 0.5 MILLIGRAM(S): 2 INJECTION INTRAMUSCULAR; INTRAVENOUS; SUBCUTANEOUS at 02:53

## 2022-02-08 RX ADMIN — OXYCODONE HYDROCHLORIDE 5 MILLIGRAM(S): 5 TABLET ORAL at 01:11

## 2022-02-08 RX ADMIN — Medication 650 MILLIGRAM(S): at 22:01

## 2022-02-08 RX ADMIN — CARVEDILOL PHOSPHATE 12.5 MILLIGRAM(S): 80 CAPSULE, EXTENDED RELEASE ORAL at 07:02

## 2022-02-08 RX ADMIN — OXYCODONE HYDROCHLORIDE 5 MILLIGRAM(S): 5 TABLET ORAL at 11:23

## 2022-02-08 RX ADMIN — Medication 2: at 09:01

## 2022-02-08 RX ADMIN — ATORVASTATIN CALCIUM 40 MILLIGRAM(S): 80 TABLET, FILM COATED ORAL at 22:01

## 2022-02-08 RX ADMIN — Medication 100 MILLIGRAM(S): at 19:39

## 2022-02-08 RX ADMIN — Medication 650 MILLIGRAM(S): at 23:00

## 2022-02-08 RX ADMIN — Medication 650 MILLIGRAM(S): at 16:03

## 2022-02-08 RX ADMIN — Medication 1 TABLET(S): at 11:24

## 2022-02-08 RX ADMIN — CEFEPIME 100 MILLIGRAM(S): 1 INJECTION, POWDER, FOR SOLUTION INTRAMUSCULAR; INTRAVENOUS at 11:30

## 2022-02-08 RX ADMIN — Medication 2: at 18:16

## 2022-02-08 RX ADMIN — Medication 1 DROP(S): at 07:02

## 2022-02-08 RX ADMIN — Medication 2: at 12:09

## 2022-02-08 RX ADMIN — HEPARIN SODIUM 16 UNIT(S)/HR: 5000 INJECTION INTRAVENOUS; SUBCUTANEOUS at 00:12

## 2022-02-08 NOTE — PROGRESS NOTE ADULT - PROBLEM SELECTOR PLAN 1
s/p Lt fem-AKpop bypass on 11/17  s/p L foot partial hallux amp & L foot P5RR on 2/7   cefepime per primary team   pain control with oxy IR PRN  c/w ASA  Eliquis held preop, currently on heparin gtt. Transition back to Eliquis when ok with primary team  Per podiatry, stable d/c on PO doxy BID x10 days  PT eval pending

## 2022-02-08 NOTE — PHYSICAL THERAPY INITIAL EVALUATION ADULT - PATIENT PROFILE REVIEW, REHAB EVAL
PT orders received: ambulate as tolerated. Consult with NICK MENDOZA, patient may participate in PT evaluation./yes

## 2022-02-08 NOTE — PROGRESS NOTE ADULT - SUBJECTIVE AND OBJECTIVE BOX
Patient is a 61y old  Male who presents with a chief complaint of Left foot dry gangrene (08 Feb 2022 01:11)       INTERVAL HPI/OVERNIGHT EVENTS:  Patient seen and evaluated at bedside.  Pt is resting comfortable in NAD. Denies N/V/F/C.      Allergies    penicillin (Other)    Intolerances        Vital Signs Last 24 Hrs  T(C): 36.6 (08 Feb 2022 07:00), Max: 36.9 (07 Feb 2022 18:34)  T(F): 97.9 (08 Feb 2022 07:00), Max: 98.5 (07 Feb 2022 21:33)  HR: 69 (08 Feb 2022 07:00) (57 - 82)  BP: 147/84 (08 Feb 2022 07:00) (120/62 - 147/84)  BP(mean): 80 (07 Feb 2022 15:15) (76 - 80)  RR: 17 (08 Feb 2022 07:00) (13 - 17)  SpO2: 100% (08 Feb 2022 07:00) (98% - 100%)    LABS:                        12.0   8.15  )-----------( 241      ( 08 Feb 2022 05:39 )             36.1     02-08    138  |  106  |  29<H>  ----------------------------<  137<H>  4.4   |  21<L>  |  0.97    Ca    9.2      08 Feb 2022 05:39  Phos  3.4     02-08  Mg     1.70     02-08      PT/INR - ( 07 Feb 2022 02:52 )   PT: 11.4 sec;   INR: 0.99 ratio         PTT - ( 08 Feb 2022 05:39 )  PTT:107.1 sec    CAPILLARY BLOOD GLUCOSE      POCT Blood Glucose.: 168 mg/dL (08 Feb 2022 08:38)  POCT Blood Glucose.: 154 mg/dL (08 Feb 2022 07:22)  POCT Blood Glucose.: 134 mg/dL (07 Feb 2022 21:46)  POCT Blood Glucose.: 147 mg/dL (07 Feb 2022 16:41)  POCT Blood Glucose.: 119 mg/dL (07 Feb 2022 14:04)      Lower Extremity Physical Exam:  Vascular: DP/PT 0/4, B/L, CFT <3 seconds B/L, Temperature gradient warm to cool, B/L.   Neuro: Epicritic sensation intact to the level of digits, B/L.  Musculoskeletal/Ortho: R AKA  Skin: s/p L foot partial hallux amp: skin edges well coapted, sutures intact, flaps warm, no acute signs of infection or hematoma/seroma, s/p L foot P5RR flaps warm, sutures intact, no acute signs of infection, no sign of hematoma/seroma    RADIOLOGY & ADDITIONAL TESTS:  < from: Xray Foot AP + Lateral + Oblique, Left (02.07.22 @ 20:25) >    ACC: [id number] EXAM:  XR FOOT COMP MIN 3 VIEWS LT                          PROCEDURE DATE:  02/07/2022          INTERPRETATION:  CLINICAL INDICATION: baseline postoperative evaluation   status post left foot surgery    EXAM:  Portable frontal oblique and lateral left foot from 2/7/2022 at 2025.   Compared to preoperative study from 2/2/2022.    IMPRESSION:  Status post partial hallux resection through distal portion of proximal   phalanx and partial 5th ray resection through midmetatarsal shaft with   sharp and smooth osseous stump margins and with postsurgical changes in   the overlying soft tissues.    No proximally tracking gas collections beyond the amputation margins and   no focal areas of osteomyelitis.    Remainder of foot unchanged.    Also correlate with intraoperative findings.    --- End of Report ---            ERI PHILLIPS MD; Attending Radiologist  This document has been electronically signed. Feb 7 2022  8:33PM    < end of copied text >

## 2022-02-08 NOTE — PHYSICAL THERAPY INITIAL EVALUATION ADULT - PERTINENT HX OF CURRENT PROBLEM, REHAB EVAL
Pt is a 61 year old male presenting with a pre-operative diagnosis of left foot dry gangrene. Pt POD#1 s/p left foot partial hallux amputation and left foot first partial 5th ray resection. PMH listed below.

## 2022-02-08 NOTE — PROGRESS NOTE ADULT - ASSESSMENT
60 y/o M s/p L foot partial hallux amp & L foot P5RR  - pt seen and evaluated  - Afebrile, no leukocytosis  - s/p L foot partial hallux amp: skin edges well coapted, sutures intact, flaps warm, no acute signs of infection or hematoma/seroma, s/p L foot P5RR flaps warm, sutures intact, no acute signs of infection, no sign of hematoma/seroma  - OR pathology and culture pending  - per intra-op findings, low concern for residual bone infection & viability  - Pt is stable from pod for d/c on PO doxy BID x10 days  - Follow up information listed in additional instructions section of discharge note provider  - Discussed with attending

## 2022-02-08 NOTE — PHYSICAL THERAPY INITIAL EVALUATION ADULT - LEVEL OF CONSCIOUSNESS, REHAB EVAL
The patient's HeartMate LVAD was interrogated 4/10/2021  * Speed 9600 rpm   * Pulsatility index 4.3-5   * Power 6.4-6.9 Manuel   * Flow 5.8-6.5 L/minute   Fluid status: euvolemic   Alarms were reviewed, with no LVAD alarms or signs of pump malfunction.   The driveline exit site was covered, with dressing c/d/i.   All external components were inspected and showed no evidence of damage or malfunction, none replaced.   No changes to VAD settings made.      Shelia Means DNP, FNP-BC, CHFN  Advanced Heart Failure Nurse Practitioner  [facility]     alert

## 2022-02-08 NOTE — PHYSICAL THERAPY INITIAL EVALUATION ADULT - PHYSICAL ASSIST/NONPHYSICAL ASSIST: BED TO CHAIR, REHAB EVAL
Daily Note     Today's date: 2019  Patient name: Zev Ruiz  : 1967  MRN: [id number]  Referring provider: Esther Pederson MD  Dx:   Encounter Diagnosis     ICD-10-CM    1  Arthritis of right knee M17 11                   Subjective:  Pt reports feeling good after last PT session, but had mod medial knee pain the following day that has resolved mostly now  Objective: See treatment diary below      Assessment:  Pt had little pain today including with lunges that usually cause pain  Tolerated increased weights well  Pain continues to come and go without specific cause, although increased weight bearing does seem to increase symptoms  Plan:  Consider taping for support          Daily Treatment Record:      POC expires:   PRECAUTIONS: NONE  PMH: UNREMARKABLE      Manual          R knee PROM  af  th 2'        R knee distraction             R patellar mobs sup/inf  patella PROM af 5' th 5'        R H/S stretch   5'  5'        GISTM R quad    th             Exercise Diary          Bike  10' 10' 10' L3 10'        Step ups 6"  10xea D/C          Step downs 4"  10xea D/C          Mini Lunge  10xea NV 10 B 10 L/R        AROM knee flexion             Supine R straight leg  10x 30 2x15 3# 2x15        Reverse clam shell IR  10x NP 10 15        AAROM t-band IR             Lat stepping in squat at ll bars  3 laps  5 laps x 10ft 4 laps P TB 5 laps Blue TB        Sit-to-stand controlled sit  15x 10 10 10        Step overs up R, down L   8" 20  8" 20        8" step stretch R knee flex   10" 10 10 x10" 10" 10        LAQ R   30 3# x15 3# 30        Strap R H/S stretch supine   20" 10 20"x3 20" 5        Bridge with ball adduction   NV 10 x10" 5" 20        SAQ R   NV 3# x15 3# 30                                                                Modalities             CP R knee  Not needed
verbal cues/1 person assist

## 2022-02-08 NOTE — PROGRESS NOTE ADULT - SUBJECTIVE AND OBJECTIVE BOX
CHIEF COMPLAINT: f/u     SUBJECTIVE / OVERNIGHT EVENTS: Patient seen and examined. No acute events overnight. Pt reports pain at surgical site, reports relief with Morphine. Is currently ordered for oxycodone.    MEDICATIONS  (STANDING):  artificial  tears Solution 1 Drop(s) Both EYES two times a day  aspirin enteric coated 81 milliGRAM(s) Oral daily  atorvastatin 40 milliGRAM(s) Oral at bedtime  carvedilol 12.5 milliGRAM(s) Oral every 12 hours  cefepime   IVPB 2000 milliGRAM(s) IV Intermittent every 12 hours  dextrose 40% Gel 15 Gram(s) Oral once  dextrose 5%. 1000 milliLiter(s) (50 mL/Hr) IV Continuous <Continuous>  dextrose 5%. 1000 milliLiter(s) (100 mL/Hr) IV Continuous <Continuous>  dextrose 50% Injectable 25 Gram(s) IV Push once  dextrose 50% Injectable 12.5 Gram(s) IV Push once  dextrose 50% Injectable 25 Gram(s) IV Push once  glucagon  Injectable 1 milliGRAM(s) IntraMuscular once  heparin  Infusion 1600 Unit(s)/Hr (15 mL/Hr) IV Continuous <Continuous>  insulin lispro (ADMELOG) corrective regimen sliding scale   SubCutaneous three times a day before meals  insulin lispro (ADMELOG) corrective regimen sliding scale   SubCutaneous at bedtime  magnesium sulfate  IVPB 2 Gram(s) IV Intermittent once  multivitamin 1 Tablet(s) Oral daily    MEDICATIONS  (PRN):  acetaminophen     Tablet .. 650 milliGRAM(s) Oral every 6 hours PRN Mild Pain (1 - 3)  oxyCODONE    IR 5 milliGRAM(s) Oral every 6 hours PRN Moderate Pain (4 - 6)      VITALS:  T(F): 98.9 (02-08-22 @ 09:25), Max: 98.9 (02-08-22 @ 09:25)  HR: 89 (02-08-22 @ 09:25) (57 - 89)  BP: 128/66 (02-08-22 @ 09:25) (124/62 - 147/84)  RR: 18 (02-08-22 @ 09:25) (13 - 18)  SpO2: 98% (02-08-22 @ 09:25)  Wt(kg): --      CAPILLARY BLOOD GLUCOSE    PHYSICAL EXAM:  GENERAL: NAD, well-appearing   EYES:  EOMI, sclera clear  ENMT: Moist mucous membranes, poor dentition   NECK: Supple, No JVD  RESPIRATORY: Clear to auscultation bilaterally; No rales, rhonchi, wheezing, or rubs  CARDIOVASCULAR: S1/S2, RRR, no murmurs appreciated  GASTROINTESTINAL: Soft, Nontender  EXTREMITIES:  right AKA, Left foot wrapped, dressing c/d/i   NERVOUS SYSTEM: awake, alert, No gross deficits  PSYCH: calm cooperative  SKIN: No rashes; no palpable lesions    LABS:              12.0                 138  | 21   | 29           8.15  >-----------< 241     ------------------------< 137                   36.1                 4.4  | 106  | 0.97                                         Ca 9.2   Mg 1.70  Ph 3.4          INR: x    ;    PT: x    ;    PTT: 107.1 sec<H>                MICROBIOLOGY:        RADIOLOGY & ADDITIONAL TESTS:  Imaging Personally Reviewed: [ ] Yes    [ ] Consultant(s) Notes Reviewed:  [x] Care Discussed with Consultants/Other Providers: Vascular PA - discussed

## 2022-02-08 NOTE — PROGRESS NOTE ADULT - ASSESSMENT
61M w/ hx of R AKA and L Fem-AKPop PTFE bypass for left foot dry gangrene who now presents for evaluation for possible podiatric intervention on first and fifth toes. s/p 1st/5th ray resection L foot    PLAN:    - Pain control PRN  - Continue home medications  - ABX: Vanc/ Cefepime  - Continue Heparin gtts, fu PPT     C Team/ Vascular Surgery  [id number]      61M w/ hx of R AKA and L Fem-AKPop PTFE bypass for left foot dry gangrene who now presents for evaluation for possible podiatric intervention on first and fifth toes. s/p 1st/5th ray resection L foot.     PLAN:  - Pain control PRN  - Continue home medications  - Regular CC diet  - DM, monitor FS, ISS  - ABX: Cefepime  - Continue Heparin gtts, fu PTT   - Cont ASA  - PT consult    C Team/ Vascular Surgery  [id number]

## 2022-02-08 NOTE — PHYSICAL THERAPY INITIAL EVALUATION ADULT - LEVEL OF INDEPENDENCE: GAIT, REHAB EVAL
Pt without right LE prosthetic - instructed to have family member bring into hospital if possible to practice ambulation./unable to perform

## 2022-02-08 NOTE — PROGRESS NOTE ADULT - SUBJECTIVE AND OBJECTIVE BOX
Surgery Progress Note     Subjective/24hour Events:     Patient complained of sever pain not well controlled with PO Tylenol 1 dose of hydromorphone was give.  PTT was under therapeutic, rate was increased to 16. Patient Seen and examined at the bedside this morning.     Vital Signs Last 24 Hrs  T(C): 36.9 (07 Feb 2022 21:33), Max: 36.9 (07 Feb 2022 06:00)  T(F): 98.5 (07 Feb 2022 21:33), Max: 98.5 (07 Feb 2022 21:33)  HR: 73 (07 Feb 2022 21:33) (57 - 85)  BP: 135/81 (07 Feb 2022 21:33) (114/61 - 135/81)  BP(mean): 80 (07 Feb 2022 15:15) (76 - 80)  RR: 16 (07 Feb 2022 21:33) (13 - 18)  SpO2: 99% (07 Feb 2022 21:33) (98% - 100%)    I&O's Detail    05 Feb 2022 07:01  -  06 Feb 2022 07:00  --------------------------------------------------------  IN:    Heparin: 132 mL    Oral Fluid: 480 mL  Total IN: 612 mL    OUT:    Voided (mL): 600 mL  Total OUT: 600 mL    Total NET: 12 mL      06 Feb 2022 07:01  -  07 Feb 2022 04:22  --------------------------------------------------------  IN:    Oral Fluid: 700 mL  Total IN: 700 mL    OUT:    Voided (mL): 600 mL  Total OUT: 600 mL    Total NET: 100 mL              PHYSICAL EXAM:              Labs:    02-06    138  |  104  |  29<H>  ----------------------------<  131<H>  4.9   |  24  |  0.88    Ca    9.6      06 Feb 2022 07:22  Phos  3.6     02-06  Mg     2.00     02-06      CAPILLARY BLOOD GLUCOSE      POCT Blood Glucose.: 146 mg/dL (06 Feb 2022 11:58)  POCT Blood Glucose.: 219 mg/dL (06 Feb 2022 08:58)  POCT Blood Glucose.: 134 mg/dL (06 Feb 2022 07:14)  POCT Blood Glucose.: 202 mg/dL (05 Feb 2022 21:59)  POCT Blood Glucose.: 129 mg/dL (05 Feb 2022 17:23)                            12.2   7.21  )-----------( 256      ( 06 Feb 2022 13:41 )             36.7     PT/INR - ( 05 Feb 2022 07:25 )   PT: 11.4 sec;   INR: 0.99 ratio         PTT - ( 06 Feb 2022 13:41 )  PTT:55.7 sec       C TEAM Surgery Progress Note  Patient is a 61y old  Male who presents with a chief complaint of Left foot dry gangrene (08 Feb 2022 01:11)    INTERVAL EVENTS: Patient is POD#1 s/p 1st and 5th ray resection left foot with podiatry. Patient subtherapeutic then supratherapeutic on heparin gtt, rate adjusted per protocol.     SUBJECTIVE: Patient seen and examined at bedside with surgical team, patient complaining of pain at 1st ray resection site. Otherwise feels well. Denies fever, chills, CP, SOB, numbness, tingling, weakness.      OBJECTIVE:    Vital Signs Last 24 Hrs  T(C): 36.6 (08 Feb 2022 07:00), Max: 36.9 (07 Feb 2022 18:34)  T(F): 97.9 (08 Feb 2022 07:00), Max: 98.5 (07 Feb 2022 21:33)  HR: 69 (08 Feb 2022 07:00) (57 - 82)  BP: 147/84 (08 Feb 2022 07:00) (120/62 - 147/84)  BP(mean): 80 (07 Feb 2022 15:15) (76 - 80)  RR: 17 (08 Feb 2022 07:00) (13 - 17)  SpO2: 100% (08 Feb 2022 07:00) (98% - 100%)I&O's Detail    07 Feb 2022 07:01  -  08 Feb 2022 07:00  --------------------------------------------------------  IN:    Oral Fluid: 250 mL  Total IN: 250 mL    OUT:    Voided (mL): 1075 mL  Total OUT: 1075 mL    Total NET: -825 mL      MEDICATIONS  (STANDING):  artificial  tears Solution 1 Drop(s) Both EYES two times a day  aspirin enteric coated 81 milliGRAM(s) Oral daily  atorvastatin 40 milliGRAM(s) Oral at bedtime  carvedilol 12.5 milliGRAM(s) Oral every 12 hours  cefepime   IVPB 2000 milliGRAM(s) IV Intermittent every 12 hours  dextrose 40% Gel 15 Gram(s) Oral once  dextrose 5%. 1000 milliLiter(s) (50 mL/Hr) IV Continuous <Continuous>  dextrose 5%. 1000 milliLiter(s) (100 mL/Hr) IV Continuous <Continuous>  dextrose 50% Injectable 25 Gram(s) IV Push once  dextrose 50% Injectable 12.5 Gram(s) IV Push once  dextrose 50% Injectable 25 Gram(s) IV Push once  glucagon  Injectable 1 milliGRAM(s) IntraMuscular once  heparin  Infusion 1600 Unit(s)/Hr (15 mL/Hr) IV Continuous <Continuous>  insulin lispro (ADMELOG) corrective regimen sliding scale   SubCutaneous three times a day before meals  insulin lispro (ADMELOG) corrective regimen sliding scale   SubCutaneous at bedtime  magnesium sulfate  IVPB 2 Gram(s) IV Intermittent once  multivitamin 1 Tablet(s) Oral daily    MEDICATIONS  (PRN):  acetaminophen     Tablet .. 650 milliGRAM(s) Oral every 6 hours PRN Mild Pain (1 - 3)      PHYSICAL EXAM:  Constitutional: A&Ox3, NAD  Respiratory: Unlabored breathing  Abdomen: Soft, nondistended, NTTP. No rebound or guarding.  Extremities: R BKA. Left lower extremity warm, well perfused. Left foot with kerlix in place C/D/I.     LABS:                        12.0   8.15  )-----------( 241      ( 08 Feb 2022 05:39 )             36.1     02-08    138  |  106  |  29<H>  ----------------------------<  137<H>  4.4   |  21<L>  |  0.97    Ca    9.2      08 Feb 2022 05:39  Phos  3.4     02-08  Mg     1.70     02-08      PT/INR - ( 07 Feb 2022 02:52 )   PT: 11.4 sec;   INR: 0.99 ratio         PTT - ( 08 Feb 2022 05:39 )  PTT:107.1 sec          IMAGING:

## 2022-02-08 NOTE — PHYSICAL THERAPY INITIAL EVALUATION ADULT - ADDITIONAL COMMENTS
Prior to admission, pt residing with brother in a house, no stairs to enter from back door. Bedroom located on first level. Prior to admission, pt reports ambulating short distances with rolling walker and right prosthesis. Was in process of setting up home PT, however was having difficulty with insurance.     Pt was left semi-supine with head of bed elevated to 30°, all lines/tubes intact and call bell within reach, RN aware.

## 2022-02-09 LAB
ANION GAP SERPL CALC-SCNC: 12 MMOL/L — SIGNIFICANT CHANGE UP (ref 7–14)
APTT BLD: 32.3 SEC — SIGNIFICANT CHANGE UP (ref 27–36.3)
BUN SERPL-MCNC: 29 MG/DL — HIGH (ref 7–23)
CALCIUM SERPL-MCNC: 9.8 MG/DL — SIGNIFICANT CHANGE UP (ref 8.4–10.5)
CHLORIDE SERPL-SCNC: 101 MMOL/L — SIGNIFICANT CHANGE UP (ref 98–107)
CO2 SERPL-SCNC: 23 MMOL/L — SIGNIFICANT CHANGE UP (ref 22–31)
CREAT SERPL-MCNC: 0.89 MG/DL — SIGNIFICANT CHANGE UP (ref 0.5–1.3)
GLUCOSE SERPL-MCNC: 165 MG/DL — HIGH (ref 70–99)
HCT VFR BLD CALC: 37.8 % — LOW (ref 39–50)
HGB BLD-MCNC: 12.4 G/DL — LOW (ref 13–17)
INR BLD: 1.1 RATIO — SIGNIFICANT CHANGE UP (ref 0.88–1.16)
MAGNESIUM SERPL-MCNC: 1.7 MG/DL — SIGNIFICANT CHANGE UP (ref 1.6–2.6)
MCHC RBC-ENTMCNC: 30.8 PG — SIGNIFICANT CHANGE UP (ref 27–34)
MCHC RBC-ENTMCNC: 32.8 GM/DL — SIGNIFICANT CHANGE UP (ref 32–36)
MCV RBC AUTO: 94 FL — SIGNIFICANT CHANGE UP (ref 80–100)
NRBC # BLD: 0 /100 WBCS — SIGNIFICANT CHANGE UP
NRBC # FLD: 0 K/UL — SIGNIFICANT CHANGE UP
PHOSPHATE SERPL-MCNC: 3.6 MG/DL — SIGNIFICANT CHANGE UP (ref 2.5–4.5)
PLATELET # BLD AUTO: 268 K/UL — SIGNIFICANT CHANGE UP (ref 150–400)
POTASSIUM SERPL-MCNC: 4.5 MMOL/L — SIGNIFICANT CHANGE UP (ref 3.5–5.3)
POTASSIUM SERPL-SCNC: 4.5 MMOL/L — SIGNIFICANT CHANGE UP (ref 3.5–5.3)
PROTHROM AB SERPL-ACNC: 12.6 SEC — SIGNIFICANT CHANGE UP (ref 10.6–13.6)
RBC # BLD: 4.02 M/UL — LOW (ref 4.2–5.8)
RBC # FLD: 14.9 % — HIGH (ref 10.3–14.5)
SODIUM SERPL-SCNC: 136 MMOL/L — SIGNIFICANT CHANGE UP (ref 135–145)
WBC # BLD: 10.39 K/UL — SIGNIFICANT CHANGE UP (ref 3.8–10.5)
WBC # FLD AUTO: 10.39 K/UL — SIGNIFICANT CHANGE UP (ref 3.8–10.5)

## 2022-02-09 RX ORDER — OXYCODONE HYDROCHLORIDE 5 MG/1
1 TABLET ORAL
Qty: 0 | Refills: 0 | DISCHARGE
Start: 2022-02-09

## 2022-02-09 RX ORDER — ACETAMINOPHEN 500 MG
2 TABLET ORAL
Qty: 0 | Refills: 0 | DISCHARGE
Start: 2022-02-09

## 2022-02-09 RX ORDER — MAGNESIUM SULFATE 500 MG/ML
2 VIAL (ML) INJECTION ONCE
Refills: 0 | Status: COMPLETED | OUTPATIENT
Start: 2022-02-09 | End: 2022-02-09

## 2022-02-09 RX ADMIN — OXYCODONE HYDROCHLORIDE 5 MILLIGRAM(S): 5 TABLET ORAL at 14:39

## 2022-02-09 RX ADMIN — CARVEDILOL PHOSPHATE 12.5 MILLIGRAM(S): 80 CAPSULE, EXTENDED RELEASE ORAL at 05:58

## 2022-02-09 RX ADMIN — OXYCODONE HYDROCHLORIDE 5 MILLIGRAM(S): 5 TABLET ORAL at 22:00

## 2022-02-09 RX ADMIN — ATORVASTATIN CALCIUM 40 MILLIGRAM(S): 80 TABLET, FILM COATED ORAL at 21:56

## 2022-02-09 RX ADMIN — Medication 2: at 17:11

## 2022-02-09 RX ADMIN — OXYCODONE HYDROCHLORIDE 5 MILLIGRAM(S): 5 TABLET ORAL at 03:30

## 2022-02-09 RX ADMIN — Medication 100 MILLIGRAM(S): at 17:11

## 2022-02-09 RX ADMIN — Medication 2: at 08:15

## 2022-02-09 RX ADMIN — Medication 81 MILLIGRAM(S): at 11:40

## 2022-02-09 RX ADMIN — Medication 1 TABLET(S): at 11:40

## 2022-02-09 RX ADMIN — OXYCODONE HYDROCHLORIDE 5 MILLIGRAM(S): 5 TABLET ORAL at 08:51

## 2022-02-09 RX ADMIN — OXYCODONE HYDROCHLORIDE 5 MILLIGRAM(S): 5 TABLET ORAL at 15:09

## 2022-02-09 RX ADMIN — Medication 2: at 11:55

## 2022-02-09 RX ADMIN — OXYCODONE HYDROCHLORIDE 5 MILLIGRAM(S): 5 TABLET ORAL at 09:12

## 2022-02-09 RX ADMIN — CARVEDILOL PHOSPHATE 12.5 MILLIGRAM(S): 80 CAPSULE, EXTENDED RELEASE ORAL at 17:11

## 2022-02-09 RX ADMIN — Medication 650 MILLIGRAM(S): at 19:29

## 2022-02-09 RX ADMIN — Medication 25 GRAM(S): at 09:34

## 2022-02-09 RX ADMIN — Medication 650 MILLIGRAM(S): at 18:29

## 2022-02-09 RX ADMIN — Medication 100 MILLIGRAM(S): at 06:38

## 2022-02-09 RX ADMIN — Medication 1 DROP(S): at 05:58

## 2022-02-09 RX ADMIN — APIXABAN 2.5 MILLIGRAM(S): 2.5 TABLET, FILM COATED ORAL at 17:11

## 2022-02-09 RX ADMIN — Medication 1 DROP(S): at 17:11

## 2022-02-09 RX ADMIN — OXYCODONE HYDROCHLORIDE 5 MILLIGRAM(S): 5 TABLET ORAL at 22:53

## 2022-02-09 RX ADMIN — OXYCODONE HYDROCHLORIDE 5 MILLIGRAM(S): 5 TABLET ORAL at 02:22

## 2022-02-09 RX ADMIN — APIXABAN 2.5 MILLIGRAM(S): 2.5 TABLET, FILM COATED ORAL at 05:57

## 2022-02-09 NOTE — PROGRESS NOTE ADULT - ASSESSMENT
PLAN:        Vascular surgery  [id number] 61M w/ hx of R AKA and L Fem-AKPop PTFE bypass for left foot dry gangrene who now presents for evaluation for possible podiatric intervention on first and fifth toes. s/p 1st/5th ray resection L foot.     PLAN:  - Pain control PRN  - Continue home medications  - Regular CC diet  - DM, monitor FS, ISS  - ABX: Cefepime  - Continue Heparin gtts, fu PTT   - Cont ASA  - Dispo: Rehab today    C Team/ Vascular Surgery  [id number]  61M w/ hx of R AKA and L Fem-AKPop PTFE bypass for left foot dry gangrene who now presents for evaluation for possible podiatric intervention on first and fifth toes. s/p 1st/5th ray resection L foot.     PLAN:  - Pain control PRN  - Continue home medications  - Regular CC diet  - DM, monitor FS, ISS  - ABX: doxycycline  - eliquis BID  - Cont ASA  - Dispo: Rehab      C Team/ Vascular Surgery  [id number]

## 2022-02-09 NOTE — PROGRESS NOTE ADULT - SUBJECTIVE AND OBJECTIVE BOX
TEAM C Surgery Progress Note    INTERVAL EVENTS:   No acute events overnight.    SUBJECTIVE: Patient seen and examined at bedside with surgical team, patient without complaints. Denies fever, chills, CP, SOB nausea, vomiting.     OBJECTIVE:    Vital Signs Last 24 Hrs  T(C): 36.5 (09 Feb 2022 02:16), Max: 37.2 (08 Feb 2022 09:25)  T(F): 97.7 (09 Feb 2022 02:16), Max: 98.9 (08 Feb 2022 09:25)  HR: 77 (09 Feb 2022 02:16) (69 - 89)  BP: 128/60 (09 Feb 2022 02:16) (128/60 - 166/90)  BP(mean): --  RR: 18 (09 Feb 2022 02:16) (17 - 18)  SpO2: 99% (09 Feb 2022 02:16) (98% - 100%)I&O's Detail    07 Feb 2022 07:01  -  08 Feb 2022 07:00  --------------------------------------------------------  IN:    Oral Fluid: 250 mL  Total IN: 250 mL    OUT:    Voided (mL): 1075 mL  Total OUT: 1075 mL    Total NET: -825 mL      08 Feb 2022 07:01  -  09 Feb 2022 03:24  --------------------------------------------------------  IN:    Oral Fluid: 1300 mL  Total IN: 1300 mL    OUT:    Voided (mL): 750 mL  Total OUT: 750 mL    Total NET: 550 mL      MEDICATIONS  (STANDING):  apixaban 2.5 milliGRAM(s) Oral two times a day  artificial  tears Solution 1 Drop(s) Both EYES two times a day  aspirin enteric coated 81 milliGRAM(s) Oral daily  atorvastatin 40 milliGRAM(s) Oral at bedtime  carvedilol 12.5 milliGRAM(s) Oral every 12 hours  dextrose 40% Gel 15 Gram(s) Oral once  dextrose 5%. 1000 milliLiter(s) (50 mL/Hr) IV Continuous <Continuous>  dextrose 5%. 1000 milliLiter(s) (100 mL/Hr) IV Continuous <Continuous>  dextrose 50% Injectable 25 Gram(s) IV Push once  dextrose 50% Injectable 12.5 Gram(s) IV Push once  dextrose 50% Injectable 25 Gram(s) IV Push once  doxycycline hyclate Capsule 100 milliGRAM(s) Oral every 12 hours  glucagon  Injectable 1 milliGRAM(s) IntraMuscular once  insulin lispro (ADMELOG) corrective regimen sliding scale   SubCutaneous three times a day before meals  insulin lispro (ADMELOG) corrective regimen sliding scale   SubCutaneous at bedtime  magnesium sulfate  IVPB 2 Gram(s) IV Intermittent once  multivitamin 1 Tablet(s) Oral daily    MEDICATIONS  (PRN):  acetaminophen     Tablet .. 650 milliGRAM(s) Oral every 6 hours PRN Mild Pain (1 - 3)  oxyCODONE    IR 5 milliGRAM(s) Oral every 6 hours PRN Moderate Pain (4 - 6)      PHYSICAL EXAM:  Constitutional: A&Ox3, NAD  Respiratory: Unlabored breathing  Abdomen:   Extremities:       LABS:                        12.0   8.15  )-----------( 241      ( 08 Feb 2022 05:39 )             36.1     02-08    138  |  106  |  29<H>  ----------------------------<  137<H>  4.4   |  21<L>  |  0.97    Ca    9.2      08 Feb 2022 05:39  Phos  3.4     02-08  Mg     1.70     02-08      PTT - ( 08 Feb 2022 17:53 )  PTT:100.8 sec          IMAGING:     C TEAM Surgery Progress Note    INTERVAL EVENTS: POD#2 s/p 1st/5th left ray resection with podiatry. No acute events overnight.    SUBJECTIVE: Patient seen and examined at bedside with surgical team, patient without complaints. Right foot pain well controlled. Denies fever, chills, CP, SOB.    OBJECTIVE:    Vital Signs Last 24 Hrs  T(C): 36.5 (09 Feb 2022 02:16), Max: 37.2 (08 Feb 2022 09:25)  T(F): 97.7 (09 Feb 2022 02:16), Max: 98.9 (08 Feb 2022 09:25)  HR: 77 (09 Feb 2022 02:16) (69 - 89)  BP: 128/60 (09 Feb 2022 02:16) (128/60 - 166/90)  BP(mean): --  RR: 18 (09 Feb 2022 02:16) (17 - 18)  SpO2: 99% (09 Feb 2022 02:16) (98% - 100%)I&O's Detail    07 Feb 2022 07:01  -  08 Feb 2022 07:00  --------------------------------------------------------  IN:    Oral Fluid: 250 mL  Total IN: 250 mL    OUT:    Voided (mL): 1075 mL  Total OUT: 1075 mL    Total NET: -825 mL      08 Feb 2022 07:01  -  09 Feb 2022 03:24  --------------------------------------------------------  IN:    Oral Fluid: 1300 mL  Total IN: 1300 mL    OUT:    Voided (mL): 750 mL  Total OUT: 750 mL    Total NET: 550 mL      MEDICATIONS  (STANDING):  apixaban 2.5 milliGRAM(s) Oral two times a day  artificial  tears Solution 1 Drop(s) Both EYES two times a day  aspirin enteric coated 81 milliGRAM(s) Oral daily  atorvastatin 40 milliGRAM(s) Oral at bedtime  carvedilol 12.5 milliGRAM(s) Oral every 12 hours  dextrose 40% Gel 15 Gram(s) Oral once  dextrose 5%. 1000 milliLiter(s) (50 mL/Hr) IV Continuous <Continuous>  dextrose 5%. 1000 milliLiter(s) (100 mL/Hr) IV Continuous <Continuous>  dextrose 50% Injectable 25 Gram(s) IV Push once  dextrose 50% Injectable 12.5 Gram(s) IV Push once  dextrose 50% Injectable 25 Gram(s) IV Push once  doxycycline hyclate Capsule 100 milliGRAM(s) Oral every 12 hours  glucagon  Injectable 1 milliGRAM(s) IntraMuscular once  insulin lispro (ADMELOG) corrective regimen sliding scale   SubCutaneous three times a day before meals  insulin lispro (ADMELOG) corrective regimen sliding scale   SubCutaneous at bedtime  magnesium sulfate  IVPB 2 Gram(s) IV Intermittent once  multivitamin 1 Tablet(s) Oral daily    MEDICATIONS  (PRN):  acetaminophen     Tablet .. 650 milliGRAM(s) Oral every 6 hours PRN Mild Pain (1 - 3)  oxyCODONE    IR 5 milliGRAM(s) Oral every 6 hours PRN Moderate Pain (4 - 6)      PHYSICAL EXAM:  Constitutional: A&Ox3, NAD  Respiratory: Unlabored breathing  Abdomen: Soft, nondistended, NTTP. No rebound or guarding.  Extremities: R BKA. Left lower extremity warm, well perfused. Left foot with kerlix in place C/D/I.       LABS:                        12.0   8.15  )-----------( 241      ( 08 Feb 2022 05:39 )             36.1     02-08    138  |  106  |  29<H>  ----------------------------<  137<H>  4.4   |  21<L>  |  0.97    Ca    9.2      08 Feb 2022 05:39  Phos  3.4     02-08  Mg     1.70     02-08      PTT - ( 08 Feb 2022 17:53 )  PTT:100.8 sec               C TEAM Surgery Progress Note    INTERVAL EVENTS: POD#2 s/p 1st/5th left ray resection with podiatry. No acute events overnight.    SUBJECTIVE: Patient seen and examined at bedside with surgical team. Right foot pain well controlled at present, admits to pain mostly with dressing changes.  Offers no new complaints.     OBJECTIVE:    Vital Signs Last 24 Hrs  T(C): 36.8 (10 Feb 2022 05:19), Max: 37.6 (09 Feb 2022 18:22)  T(F): 98.3 (10 Feb 2022 05:19), Max: 99.6 (09 Feb 2022 18:22)  HR: 73 (10 Feb 2022 05:19) (73 - 101)  BP: 129/72 (10 Feb 2022 05:19) (111/70 - 151/85)  BP(mean): --  RR: 18 (10 Feb 2022 05:19) (18 - 18)  SpO2: 99% (10 Feb 2022 05:19) (98% - 99%)      09 Feb 2022 07:01  -  10 Feb 2022 07:00  --------------------------------------------------------  IN:    Oral Fluid: 995 mL  Total IN: 995 mL    OUT:    Blood Loss (mL): 0 mL    IV PiggyBack: 0 mL    Voided (mL): 1400 mL  Total OUT: 1400 mL    Total NET: -405 mL            MEDICATIONS  (STANDING):  apixaban 2.5 milliGRAM(s) Oral two times a day  artificial  tears Solution 1 Drop(s) Both EYES two times a day  aspirin enteric coated 81 milliGRAM(s) Oral daily  atorvastatin 40 milliGRAM(s) Oral at bedtime  carvedilol 12.5 milliGRAM(s) Oral every 12 hours  dextrose 40% Gel 15 Gram(s) Oral once  dextrose 5%. 1000 milliLiter(s) (50 mL/Hr) IV Continuous <Continuous>  dextrose 5%. 1000 milliLiter(s) (100 mL/Hr) IV Continuous <Continuous>  dextrose 50% Injectable 25 Gram(s) IV Push once  dextrose 50% Injectable 12.5 Gram(s) IV Push once  dextrose 50% Injectable 25 Gram(s) IV Push once  doxycycline hyclate Capsule 100 milliGRAM(s) Oral every 12 hours  glucagon  Injectable 1 milliGRAM(s) IntraMuscular once  insulin lispro (ADMELOG) corrective regimen sliding scale   SubCutaneous three times a day before meals  insulin lispro (ADMELOG) corrective regimen sliding scale   SubCutaneous at bedtime  magnesium sulfate  IVPB 2 Gram(s) IV Intermittent once  multivitamin 1 Tablet(s) Oral daily    MEDICATIONS  (PRN):  acetaminophen     Tablet .. 650 milliGRAM(s) Oral every 6 hours PRN Mild Pain (1 - 3)  oxyCODONE    IR 5 milliGRAM(s) Oral every 6 hours PRN Moderate Pain (4 - 6)      PHYSICAL EXAM:  Constitutional: A&Ox3, NAD  Respiratory: Unlabored breathing  Abdomen: Soft, nondistended, NTTP. No rebound or guarding.  Extremities: R BKA. Left lower extremity warm, well perfused. Left foot with kerlix in place C/D/I.         LABS:  cret                        12.4   10.39 )-----------( 268      ( 09 Feb 2022 07:29 )             37.8     02-09    136  |  101  |  29<H>  ----------------------------<  165<H>  4.5   |  23  |  0.89    Ca    9.8      09 Feb 2022 07:29  Phos  3.6     02-09  Mg     1.70     02-09      PT/INR - ( 09 Feb 2022 07:29 )   PT: 12.6 sec;   INR: 1.10 ratio         PTT - ( 09 Feb 2022 07:29 )  PTT:32.3 sec

## 2022-02-10 PROCEDURE — 99232 SBSQ HOSP IP/OBS MODERATE 35: CPT

## 2022-02-10 RX ADMIN — APIXABAN 2.5 MILLIGRAM(S): 2.5 TABLET, FILM COATED ORAL at 05:22

## 2022-02-10 RX ADMIN — CARVEDILOL PHOSPHATE 12.5 MILLIGRAM(S): 80 CAPSULE, EXTENDED RELEASE ORAL at 05:22

## 2022-02-10 RX ADMIN — Medication 2: at 12:39

## 2022-02-10 RX ADMIN — ATORVASTATIN CALCIUM 40 MILLIGRAM(S): 80 TABLET, FILM COATED ORAL at 21:06

## 2022-02-10 RX ADMIN — Medication 1 DROP(S): at 05:21

## 2022-02-10 RX ADMIN — CARVEDILOL PHOSPHATE 12.5 MILLIGRAM(S): 80 CAPSULE, EXTENDED RELEASE ORAL at 17:48

## 2022-02-10 RX ADMIN — OXYCODONE HYDROCHLORIDE 5 MILLIGRAM(S): 5 TABLET ORAL at 18:29

## 2022-02-10 RX ADMIN — OXYCODONE HYDROCHLORIDE 5 MILLIGRAM(S): 5 TABLET ORAL at 05:33

## 2022-02-10 RX ADMIN — Medication 1 TABLET(S): at 11:27

## 2022-02-10 RX ADMIN — Medication 100 MILLIGRAM(S): at 05:22

## 2022-02-10 RX ADMIN — OXYCODONE HYDROCHLORIDE 5 MILLIGRAM(S): 5 TABLET ORAL at 12:57

## 2022-02-10 RX ADMIN — Medication 81 MILLIGRAM(S): at 11:28

## 2022-02-10 RX ADMIN — OXYCODONE HYDROCHLORIDE 5 MILLIGRAM(S): 5 TABLET ORAL at 11:28

## 2022-02-10 RX ADMIN — Medication 4: at 08:15

## 2022-02-10 RX ADMIN — Medication 100 MILLIGRAM(S): at 17:47

## 2022-02-10 RX ADMIN — APIXABAN 2.5 MILLIGRAM(S): 2.5 TABLET, FILM COATED ORAL at 17:48

## 2022-02-10 RX ADMIN — OXYCODONE HYDROCHLORIDE 5 MILLIGRAM(S): 5 TABLET ORAL at 17:48

## 2022-02-10 RX ADMIN — OXYCODONE HYDROCHLORIDE 5 MILLIGRAM(S): 5 TABLET ORAL at 06:26

## 2022-02-10 NOTE — DIETITIAN INITIAL EVALUATION ADULT. - OTHER INFO
61M admitted with Lt foot gangrene.  PMH of HTN, DM, PVD, s/p R AKA for RLE gangrene 10/2021.  Pt AOx4 and reports 75% intake of meals with No GI distress (nausea/ vomiting/ diarrhea/ constipation) at present.  Adj BW- 117# due to Rt AKA.  Noted Lt foot gangrene but no edema as per nursing flow sheet.

## 2022-02-10 NOTE — PROGRESS NOTE ADULT - PROBLEM SELECTOR PROBLEM 2
PVD (peripheral vascular disease)
PVD (peripheral vascular disease)
RHINA (acute kidney injury)
PVD (peripheral vascular disease)

## 2022-02-10 NOTE — PROGRESS NOTE ADULT - SUBJECTIVE AND OBJECTIVE BOX
Patient is a 61y old  Male who presents with a chief complaint of Left foot dry gangrene (09 Feb 2022 03:24)       INTERVAL HPI/OVERNIGHT EVENTS:  Patient seen and evaluated at bedside.  Pt is resting comfortable in NAD. Denies N/V/F/C.     Allergies    penicillin (Other)    Intolerances        Vital Signs Last 24 Hrs  T(C): 36.7 (10 Feb 2022 09:52), Max: 37.6 (09 Feb 2022 18:22)  T(F): 98.1 (10 Feb 2022 09:52), Max: 99.6 (09 Feb 2022 18:22)  HR: 87 (10 Feb 2022 09:52) (73 - 101)  BP: 139/86 (10 Feb 2022 09:52) (111/70 - 145/97)  BP(mean): --  RR: 18 (10 Feb 2022 09:52) (18 - 18)  SpO2: 97% (10 Feb 2022 09:52) (97% - 99%)    LABS:                        12.4   10.39 )-----------( 268      ( 09 Feb 2022 07:29 )             37.8     02-09    136  |  101  |  29<H>  ----------------------------<  165<H>  4.5   |  23  |  0.89    Ca    9.8      09 Feb 2022 07:29  Phos  3.6     02-09  Mg     1.70     02-09      PT/INR - ( 09 Feb 2022 07:29 )   PT: 12.6 sec;   INR: 1.10 ratio         PTT - ( 09 Feb 2022 07:29 )  PTT:32.3 sec    CAPILLARY BLOOD GLUCOSE      POCT Blood Glucose.: 171 mg/dL (10 Feb 2022 12:11)  POCT Blood Glucose.: 217 mg/dL (10 Feb 2022 07:20)  POCT Blood Glucose.: 198 mg/dL (09 Feb 2022 21:13)  POCT Blood Glucose.: 155 mg/dL (09 Feb 2022 16:52)      Lower Extremity Physical Exam:  Vascular: DP/PT 0/4, B/L, CFT <3 seconds B/L, Temperature gradient warm to cool, B/L.   Neuro: Epicritic sensation intact to the level of digits, B/L.  Musculoskeletal/Ortho: R AKA  Skin: s/p L foot partial hallux amp: skin edges well coapted, sutures intact, flaps warm, no acute signs of infection or hematoma/seroma, s/p L foot P5RR flaps warm, sutures intact, no acute signs of infection, no sign of hematoma/seroma    RADIOLOGY & ADDITIONAL TESTS:

## 2022-02-10 NOTE — PROGRESS NOTE ADULT - PROBLEM SELECTOR PLAN 5
on heparin gtt  consistent carb diet
Eliquis   consistent carb diet    Dispo: Pending [facility]
on heparin gtt  consistent carb diet.
A1C: 5.8, on oral meds at home: alogliptin, metformin  Hold all oral meds while inpatient  FS controlled  Check FS achs, SSI premeals  c/w lipitor 40mg
on heparin gtt- held this AM  consistent carb diet.
on heparin gtt  consistent carb diet.

## 2022-02-10 NOTE — PROGRESS NOTE ADULT - PROBLEM SELECTOR PROBLEM 5
DM (diabetes mellitus)
Prophylactic measure
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
## 2022-02-10 NOTE — DIETITIAN INITIAL EVALUATION ADULT. - PERTINENT MEDS FT
MEDICATIONS  (STANDING):  apixaban 2.5 milliGRAM(s) Oral two times a day  artificial  tears Solution 1 Drop(s) Both EYES two times a day  aspirin enteric coated 81 milliGRAM(s) Oral daily  atorvastatin 40 milliGRAM(s) Oral at bedtime  carvedilol 12.5 milliGRAM(s) Oral every 12 hours  dextrose 40% Gel 15 Gram(s) Oral once  dextrose 5%. 1000 milliLiter(s) (100 mL/Hr) IV Continuous <Continuous>  dextrose 5%. 1000 milliLiter(s) (50 mL/Hr) IV Continuous <Continuous>  dextrose 50% Injectable 25 Gram(s) IV Push once  dextrose 50% Injectable 12.5 Gram(s) IV Push once  dextrose 50% Injectable 25 Gram(s) IV Push once  doxycycline hyclate Capsule 100 milliGRAM(s) Oral every 12 hours  glucagon  Injectable 1 milliGRAM(s) IntraMuscular once  insulin lispro (ADMELOG) corrective regimen sliding scale   SubCutaneous three times a day before meals  insulin lispro (ADMELOG) corrective regimen sliding scale   SubCutaneous at bedtime  multivitamin 1 Tablet(s) Oral daily

## 2022-02-10 NOTE — PROGRESS NOTE ADULT - PROBLEM SELECTOR PLAN 2
Cr 0.8 --> 1.2, likely dehydration and poor PO  -Monitor Cr  -Hold Lisinopril in AM, was already given this morning's dose  -Renally dose meds, avoid nephrotoxic agents
s/p Lt fem-AKpop bypass on 11/17  - CAMMY/PVR and MRI pending  - patient is scheduled for left foot partial first and fifth ray resection on 2/7 for left toe gangrene  - pain control with oxy IR PRN  - wound care as per primary team. On Vanc/cefepime per primary team  - c/w ASA  - Eliquis held in anticipation of OR, currently on heparin gtt will hold for OR
Cr 0.8 --> 1.2, likely dehydration and poor PO  Cr downtrending, 0.97 today   Will resume ACEi tomorrow if Cr continues to downtrend   Renally dose meds, avoid nephrotoxic agents
s/p Lt fem-AKpop bypass on 11/17  - CAMMY/PVR and MRI pending  - patient is scheduled for left foot partial first and fifth ray resection on 2/4 for left toe gangrene  - pain control with oxy IR PRN  - wound care as per primary team. On Vanc/cefepime per primary team  - c/w ASA  - Eliquis held in anticipation of OR, currently on heparin gtt presently on hold
s/p Lt fem-AKpop bypass on 11/17  - CAMMY/PVR and MRI pending  - patient is scheduled for left foot partial first and fifth ray resection on 2/7 for left toe gangrene  - pain control with oxy IR PRN  - wound care as per primary team. On Vanc/cefepime per primary team  - c/w ASA  - Eliquis held in anticipation of OR, currently on heparin gtt will hold for oR
Resolved   Cr 0.8 --> 1.2 --> 0.97 --> 0.8    Can resume Lisinopril   Renally dose meds, avoid nephrotoxic agents

## 2022-02-10 NOTE — PROGRESS NOTE ADULT - ASSESSMENT
60 y/o M s/p L foot partial hallux amp & L foot P5RR  - pt seen and evaluated  - Afebrile, no leukocytosis  - s/p L foot partial hallux amp: skin edges well coapted, sutures intact, flaps warm, no acute signs of infection or hematoma/seroma, s/p L foot P5RR flaps warm, sutures intact, no acute signs of infection, no sign of hematoma/seroma  - OR pathology pending; OR clean bone culture growing Staph epi prelim, likely contaminant  - per intra-op findings, low concern for residual bone infection & viability  - Pt is stable from pod for d/c on PO doxy BID x10 days  - Follow up information listed in additional instructions section of discharge note provider  - Discussed with attending

## 2022-02-10 NOTE — PROGRESS NOTE ADULT - PROBLEM SELECTOR PROBLEM 1
PVD (peripheral vascular disease)
Preoperative examination
PVD (peripheral vascular disease)

## 2022-02-10 NOTE — PROGRESS NOTE ADULT - PROBLEM SELECTOR PROBLEM 3
HTN (hypertension)
PVD (peripheral vascular disease)
HTN (hypertension)

## 2022-02-10 NOTE — PROGRESS NOTE ADULT - PROBLEM SELECTOR PLAN 4
A1C: 5.8, on oral meds at home: alogliptin, metformin  Hold all oral meds while inpatient  FS controlled  Check FS achs, SSI premeals  c/w lipitor 40mg
A1C: 5.8, on oral meds at home: alogliptin, metformin  Hold all oral meds while inpatient  FS controlled  Check FS achs, SSI premeals  c/w lipitor 40mg.
A1C: 5.8, on oral meds at home: alogliptin, metformin  Hold all oral meds while inpatient  FS controlled  Check FS achs, SSI premeals  c/w lipitor 40mg
BP at goal, goal SBP<140  c/w coreg  Hold Lisinopril for RHINA

## 2022-02-10 NOTE — PROGRESS NOTE ADULT - PROBLEM SELECTOR PLAN 1
s/p Lt fem-AKpop bypass on 11/17  s/p L foot partial hallux amp & L foot P5RR on 2/7   cefepime per primary team   pain control with oxy IR PRN  c/w ASA  Eliquis held preop, currently on heparin gtt. Transition back to Eliquis when ok with primary team  Per podiatry, stable d/c on PO doxy BID x10 days  PT eval pending s/p Lt fem-AKpop bypass on 11/17  s/p L foot partial hallux amp & L foot P5RR on 2/7   s/p Cefepime   pain control with oxy IR PRN  c/w ASA, Eliquis   Per podiatry, stable d/c on PO doxy BID x10 days  PT recommends MILA

## 2022-02-10 NOTE — DIETITIAN INITIAL EVALUATION ADULT. - PERTINENT LABORATORY DATA
02-09 Na 136 mmol/L Glu 165 mg/dL<H> K+ 4.5 mmol/L Cr 0.89 mg/dL BUN 29 mg/dL<H> Phos 3.6 mg/dL  02-10 @ 12:11 POCT 171 mg/dL  02-10 @ 07:20 POCT 217 mg/dL  02-09 @ 21:13 POCT 198 mg/dL  02-09 @ 16:52 POCT 155 mg/dL    (2/3/22) A1c- 5.8%

## 2022-02-11 RX ORDER — LISINOPRIL 2.5 MG/1
5 TABLET ORAL DAILY
Refills: 0 | Status: DISCONTINUED | OUTPATIENT
Start: 2022-02-11 | End: 2022-02-12

## 2022-02-11 RX ADMIN — Medication 2: at 07:50

## 2022-02-11 RX ADMIN — Medication 1 DROP(S): at 05:39

## 2022-02-11 RX ADMIN — OXYCODONE HYDROCHLORIDE 5 MILLIGRAM(S): 5 TABLET ORAL at 15:49

## 2022-02-11 RX ADMIN — OXYCODONE HYDROCHLORIDE 5 MILLIGRAM(S): 5 TABLET ORAL at 22:21

## 2022-02-11 RX ADMIN — OXYCODONE HYDROCHLORIDE 5 MILLIGRAM(S): 5 TABLET ORAL at 21:18

## 2022-02-11 RX ADMIN — APIXABAN 2.5 MILLIGRAM(S): 2.5 TABLET, FILM COATED ORAL at 17:19

## 2022-02-11 RX ADMIN — APIXABAN 2.5 MILLIGRAM(S): 2.5 TABLET, FILM COATED ORAL at 05:39

## 2022-02-11 RX ADMIN — ATORVASTATIN CALCIUM 40 MILLIGRAM(S): 80 TABLET, FILM COATED ORAL at 21:18

## 2022-02-11 RX ADMIN — CARVEDILOL PHOSPHATE 12.5 MILLIGRAM(S): 80 CAPSULE, EXTENDED RELEASE ORAL at 17:19

## 2022-02-11 RX ADMIN — Medication 100 MILLIGRAM(S): at 05:39

## 2022-02-11 RX ADMIN — Medication 1 TABLET(S): at 11:04

## 2022-02-11 RX ADMIN — Medication 100 MILLIGRAM(S): at 17:19

## 2022-02-11 RX ADMIN — Medication 2: at 12:07

## 2022-02-11 RX ADMIN — OXYCODONE HYDROCHLORIDE 5 MILLIGRAM(S): 5 TABLET ORAL at 13:58

## 2022-02-11 RX ADMIN — CARVEDILOL PHOSPHATE 12.5 MILLIGRAM(S): 80 CAPSULE, EXTENDED RELEASE ORAL at 05:39

## 2022-02-11 RX ADMIN — Medication 81 MILLIGRAM(S): at 11:04

## 2022-02-11 RX ADMIN — LISINOPRIL 5 MILLIGRAM(S): 2.5 TABLET ORAL at 11:04

## 2022-02-11 RX ADMIN — OXYCODONE HYDROCHLORIDE 5 MILLIGRAM(S): 5 TABLET ORAL at 07:36

## 2022-02-11 RX ADMIN — OXYCODONE HYDROCHLORIDE 5 MILLIGRAM(S): 5 TABLET ORAL at 01:38

## 2022-02-11 RX ADMIN — OXYCODONE HYDROCHLORIDE 5 MILLIGRAM(S): 5 TABLET ORAL at 00:29

## 2022-02-11 NOTE — PROGRESS NOTE ADULT - SUBJECTIVE AND OBJECTIVE BOX
TEAM B Surgery Progress Note    INTERVAL EVENTS:   No acute events overnight.    SUBJECTIVE: Patient seen and examined at bedside with surgical team    OBJECTIVE:    Vital Signs Last 24 Hrs  T(C): 36.8 (11 Feb 2022 02:37), Max: 37.6 (10 Feb 2022 17:44)  T(F): 98.3 (11 Feb 2022 02:37), Max: 99.7 (10 Feb 2022 17:44)  HR: 78 (11 Feb 2022 02:37) (73 - 94)  BP: 123/72 (11 Feb 2022 02:37) (118/74 - 139/86)  BP(mean): --  RR: 18 (11 Feb 2022 02:37) (18 - 18)  SpO2: 99% (11 Feb 2022 02:37) (97% - 99%)I&O's Detail    09 Feb 2022 07:01  -  10 Feb 2022 07:00  --------------------------------------------------------  IN:    Oral Fluid: 995 mL  Total IN: 995 mL    OUT:    Blood Loss (mL): 0 mL    IV PiggyBack: 0 mL    Voided (mL): 1400 mL  Total OUT: 1400 mL    Total NET: -405 mL      10 Feb 2022 07:01  -  11 Feb 2022 03:44  --------------------------------------------------------  IN:    Oral Fluid: 1300 mL  Total IN: 1300 mL    OUT:    Blood Loss (mL): 0 mL    IV PiggyBack: 0 mL    Voided (mL): 1675 mL  Total OUT: 1675 mL    Total NET: -375 mL      MEDICATIONS  (STANDING):  apixaban 2.5 milliGRAM(s) Oral two times a day  artificial  tears Solution 1 Drop(s) Both EYES two times a day  aspirin enteric coated 81 milliGRAM(s) Oral daily  atorvastatin 40 milliGRAM(s) Oral at bedtime  carvedilol 12.5 milliGRAM(s) Oral every 12 hours  dextrose 40% Gel 15 Gram(s) Oral once  dextrose 5%. 1000 milliLiter(s) (50 mL/Hr) IV Continuous <Continuous>  dextrose 5%. 1000 milliLiter(s) (100 mL/Hr) IV Continuous <Continuous>  dextrose 50% Injectable 25 Gram(s) IV Push once  dextrose 50% Injectable 12.5 Gram(s) IV Push once  dextrose 50% Injectable 25 Gram(s) IV Push once  doxycycline hyclate Capsule 100 milliGRAM(s) Oral every 12 hours  glucagon  Injectable 1 milliGRAM(s) IntraMuscular once  insulin lispro (ADMELOG) corrective regimen sliding scale   SubCutaneous at bedtime  insulin lispro (ADMELOG) corrective regimen sliding scale   SubCutaneous three times a day before meals  multivitamin 1 Tablet(s) Oral daily    MEDICATIONS  (PRN):  acetaminophen     Tablet .. 650 milliGRAM(s) Oral every 6 hours PRN Mild Pain (1 - 3)  oxyCODONE    IR 5 milliGRAM(s) Oral every 6 hours PRN Moderate Pain (4 - 6)      PHYSICAL EXAM:  Constitutional: A&Ox3, NAD  Respiratory: Unlabored breathing  Abdomen: Soft, nondistended, NTTP. No rebound or guarding.  Extremities:    LABS:                        12.4   10.39 )-----------( 268      ( 09 Feb 2022 07:29 )             37.8     02-09    136  |  101  |  29<H>  ----------------------------<  165<H>  4.5   |  23  |  0.89    Ca    9.8      09 Feb 2022 07:29  Phos  3.6     02-09  Mg     1.70     02-09      PT/INR - ( 09 Feb 2022 07:29 )   PT: 12.6 sec;   INR: 1.10 ratio         PTT - ( 09 Feb 2022 07:29 )  PTT:32.3 sec          IMAGING:     TEAM C Surgery Progress Note    INTERVAL EVENTS:   No acute events overnight.    SUBJECTIVE: Patient seen and examined at bedside with surgical team    OBJECTIVE:    Vital Signs Last 24 Hrs  T(C): 36.8 (11 Feb 2022 02:37), Max: 37.6 (10 Feb 2022 17:44)  T(F): 98.3 (11 Feb 2022 02:37), Max: 99.7 (10 Feb 2022 17:44)  HR: 78 (11 Feb 2022 02:37) (73 - 94)  BP: 123/72 (11 Feb 2022 02:37) (118/74 - 139/86)  BP(mean): --  RR: 18 (11 Feb 2022 02:37) (18 - 18)  SpO2: 99% (11 Feb 2022 02:37) (97% - 99%)I&O's Detail    09 Feb 2022 07:01  -  10 Feb 2022 07:00  --------------------------------------------------------  IN:    Oral Fluid: 995 mL  Total IN: 995 mL    OUT:    Blood Loss (mL): 0 mL    IV PiggyBack: 0 mL    Voided (mL): 1400 mL  Total OUT: 1400 mL    Total NET: -405 mL      10 Feb 2022 07:01  -  11 Feb 2022 03:44  --------------------------------------------------------  IN:    Oral Fluid: 1300 mL  Total IN: 1300 mL    OUT:    Blood Loss (mL): 0 mL    IV PiggyBack: 0 mL    Voided (mL): 1675 mL  Total OUT: 1675 mL    Total NET: -375 mL      MEDICATIONS  (STANDING):  apixaban 2.5 milliGRAM(s) Oral two times a day  artificial  tears Solution 1 Drop(s) Both EYES two times a day  aspirin enteric coated 81 milliGRAM(s) Oral daily  atorvastatin 40 milliGRAM(s) Oral at bedtime  carvedilol 12.5 milliGRAM(s) Oral every 12 hours  dextrose 40% Gel 15 Gram(s) Oral once  dextrose 5%. 1000 milliLiter(s) (50 mL/Hr) IV Continuous <Continuous>  dextrose 5%. 1000 milliLiter(s) (100 mL/Hr) IV Continuous <Continuous>  dextrose 50% Injectable 25 Gram(s) IV Push once  dextrose 50% Injectable 12.5 Gram(s) IV Push once  dextrose 50% Injectable 25 Gram(s) IV Push once  doxycycline hyclate Capsule 100 milliGRAM(s) Oral every 12 hours  glucagon  Injectable 1 milliGRAM(s) IntraMuscular once  insulin lispro (ADMELOG) corrective regimen sliding scale   SubCutaneous at bedtime  insulin lispro (ADMELOG) corrective regimen sliding scale   SubCutaneous three times a day before meals  multivitamin 1 Tablet(s) Oral daily    MEDICATIONS  (PRN):  acetaminophen     Tablet .. 650 milliGRAM(s) Oral every 6 hours PRN Mild Pain (1 - 3)  oxyCODONE    IR 5 milliGRAM(s) Oral every 6 hours PRN Moderate Pain (4 - 6)      PHYSICAL EXAM:  Constitutional: A&Ox3, NAD  Respiratory: Unlabored breathing  Abdomen: Soft, nondistended, NTTP. No rebound or guarding.  Extremities:    LABS:                        12.4   10.39 )-----------( 268      ( 09 Feb 2022 07:29 )             37.8     02-09    136  |  101  |  29<H>  ----------------------------<  165<H>  4.5   |  23  |  0.89    Ca    9.8      09 Feb 2022 07:29  Phos  3.6     02-09  Mg     1.70     02-09      PT/INR - ( 09 Feb 2022 07:29 )   PT: 12.6 sec;   INR: 1.10 ratio         PTT - ( 09 Feb 2022 07:29 )  PTT:32.3 sec          IMAGING:

## 2022-02-11 NOTE — PROVIDER CONTACT NOTE (OTHER) - ACTION/TREATMENT ORDERED:
Provider notified, awaiting further orders
none at this time.
Provider ordered to hold Vanco and will adjust dose in AM

## 2022-02-11 NOTE — PROGRESS NOTE ADULT - ASSESSMENT
PLAN:        vascular surgery   [id number]   Assessment	  61M w/ hx of R AKA and L Fem-AKPop PTFE bypass for left foot dry gangrene who now presents for evaluation for possible podiatric intervention on first and fifth toes. s/p 1st/5th ray resection L foot.     PLAN:  - Pain control PRN  - Continue home medications  - Regular CC diet  - DM, monitor FS, ISS  - ABX: doxycycline  - eliquis BID  - Cont ASA  - Dispo: Rehab      C Team/ Vascular Surgery  [id number]

## 2022-02-11 NOTE — PROVIDER CONTACT NOTE (OTHER) - SITUATION
Tissue culture taken from 2/7/22, result rare Staphylococcus epidermidis
Patient is on a patient-specific Heparin Drip. Patient's latest aPTT result 49.8.
Patient Vanco trough is 23.7. Patient is due for Vanco

## 2022-02-12 ENCOUNTER — TRANSCRIPTION ENCOUNTER (OUTPATIENT)
Age: 62
End: 2022-02-12

## 2022-02-12 VITALS
DIASTOLIC BLOOD PRESSURE: 76 MMHG | SYSTOLIC BLOOD PRESSURE: 130 MMHG | OXYGEN SATURATION: 98 % | HEART RATE: 88 BPM | TEMPERATURE: 98 F | RESPIRATION RATE: 17 BRPM

## 2022-02-12 LAB — SARS-COV-2 RNA SPEC QL NAA+PROBE: SIGNIFICANT CHANGE UP

## 2022-02-12 RX ADMIN — OXYCODONE HYDROCHLORIDE 5 MILLIGRAM(S): 5 TABLET ORAL at 03:27

## 2022-02-12 RX ADMIN — Medication 100 MILLIGRAM(S): at 05:39

## 2022-02-12 RX ADMIN — Medication 2: at 12:14

## 2022-02-12 RX ADMIN — Medication 1 DROP(S): at 05:39

## 2022-02-12 RX ADMIN — Medication 1 TABLET(S): at 12:15

## 2022-02-12 RX ADMIN — Medication 4: at 07:55

## 2022-02-12 RX ADMIN — OXYCODONE HYDROCHLORIDE 5 MILLIGRAM(S): 5 TABLET ORAL at 11:29

## 2022-02-12 RX ADMIN — LISINOPRIL 5 MILLIGRAM(S): 2.5 TABLET ORAL at 05:39

## 2022-02-12 RX ADMIN — Medication 81 MILLIGRAM(S): at 12:15

## 2022-02-12 RX ADMIN — APIXABAN 2.5 MILLIGRAM(S): 2.5 TABLET, FILM COATED ORAL at 05:39

## 2022-02-12 RX ADMIN — OXYCODONE HYDROCHLORIDE 5 MILLIGRAM(S): 5 TABLET ORAL at 04:30

## 2022-02-12 RX ADMIN — OXYCODONE HYDROCHLORIDE 5 MILLIGRAM(S): 5 TABLET ORAL at 10:24

## 2022-02-12 NOTE — PROGRESS NOTE ADULT - REASON FOR ADMISSION
Left foot dry gangrene

## 2022-02-12 NOTE — DISCHARGE NOTE NURSING/CASE MANAGEMENT/SOCIAL WORK - NSDCPEFALRISK_GEN_ALL_CORE
For information on Fall & Injury Prevention, visit: [email or web address] OR  [email or web address] OR  https://www.cdc.gov/steadi/patient.html

## 2022-02-12 NOTE — PROGRESS NOTE ADULT - SUBJECTIVE AND OBJECTIVE BOX
TEAM C Surgery Progress Note    INTERVAL EVENTS:   No acute events overnight.    SUBJECTIVE: Patient seen and examined at bedside with surgical team    OBJECTIVE:    Vital Signs Last 24 Hrs  T(C): 37.2 (11 Feb 2022 21:15), Max: 37.2 (11 Feb 2022 21:15)  T(F): 98.9 (11 Feb 2022 21:15), Max: 98.9 (11 Feb 2022 21:15)  HR: 99 (11 Feb 2022 21:15) (78 - 99)  BP: 106/67 (11 Feb 2022 21:15) (106/67 - 131/76)  BP(mean): --  RR: 18 (11 Feb 2022 21:15) (17 - 18)  SpO2: 97% (11 Feb 2022 21:15) (97% - 99%)I&O's Detail    10 Feb 2022 07:01  -  11 Feb 2022 07:00  --------------------------------------------------------  IN:    Oral Fluid: 1315 mL  Total IN: 1315 mL    OUT:    Blood Loss (mL): 0 mL    IV PiggyBack: 0 mL    Voided (mL): 1675 mL  Total OUT: 1675 mL    Total NET: -360 mL      11 Feb 2022 07:01  -  12 Feb 2022 01:49  --------------------------------------------------------  IN:    Oral Fluid: 315 mL  Total IN: 315 mL    OUT:    Blood Loss (mL): 0 mL    IV PiggyBack: 0 mL    Voided (mL): 375 mL  Total OUT: 375 mL    Total NET: -60 mL      MEDICATIONS  (STANDING):  apixaban 2.5 milliGRAM(s) Oral two times a day  artificial  tears Solution 1 Drop(s) Both EYES two times a day  aspirin enteric coated 81 milliGRAM(s) Oral daily  atorvastatin 40 milliGRAM(s) Oral at bedtime  carvedilol 12.5 milliGRAM(s) Oral every 12 hours  dextrose 40% Gel 15 Gram(s) Oral once  dextrose 50% Injectable 25 Gram(s) IV Push once  dextrose 50% Injectable 12.5 Gram(s) IV Push once  dextrose 50% Injectable 25 Gram(s) IV Push once  doxycycline hyclate Capsule 100 milliGRAM(s) Oral every 12 hours  glucagon  Injectable 1 milliGRAM(s) IntraMuscular once  insulin lispro (ADMELOG) corrective regimen sliding scale   SubCutaneous three times a day before meals  insulin lispro (ADMELOG) corrective regimen sliding scale   SubCutaneous at bedtime  lisinopril 5 milliGRAM(s) Oral daily  multivitamin 1 Tablet(s) Oral daily    MEDICATIONS  (PRN):  acetaminophen     Tablet .. 650 milliGRAM(s) Oral every 6 hours PRN Mild Pain (1 - 3)  oxyCODONE    IR 5 milliGRAM(s) Oral every 6 hours PRN Moderate Pain (4 - 6)      PHYSICAL EXAM:  Constitutional: A&Ox3, NAD  Respiratory: Unlabored breathing  Abdomen:   Extremities:     LABS:                    IMAGING:     Vascular Surgery Progress Note    SUBJECTIVE: Patient seen and examined at bedside on AM rounds. No acute overnight events.    OBJECTIVE:    Vital Signs Last 24 Hrs  T(C): 37.2 (11 Feb 2022 21:15), Max: 37.2 (11 Feb 2022 21:15)  T(F): 98.9 (11 Feb 2022 21:15), Max: 98.9 (11 Feb 2022 21:15)  HR: 99 (11 Feb 2022 21:15) (78 - 99)  BP: 106/67 (11 Feb 2022 21:15) (106/67 - 131/76)  BP(mean): --  RR: 18 (11 Feb 2022 21:15) (17 - 18)  SpO2: 97% (11 Feb 2022 21:15) (97% - 99%)I&O's Detail    10 Feb 2022 07:01  -  11 Feb 2022 07:00  --------------------------------------------------------  IN:    Oral Fluid: 1315 mL  Total IN: 1315 mL    OUT:    Blood Loss (mL): 0 mL    IV PiggyBack: 0 mL    Voided (mL): 1675 mL  Total OUT: 1675 mL    Total NET: -360 mL      11 Feb 2022 07:01  -  12 Feb 2022 01:49  --------------------------------------------------------  IN:    Oral Fluid: 315 mL  Total IN: 315 mL    OUT:    Blood Loss (mL): 0 mL    IV PiggyBack: 0 mL    Voided (mL): 375 mL  Total OUT: 375 mL    Total NET: -60 mL      MEDICATIONS  (STANDING):  apixaban 2.5 milliGRAM(s) Oral two times a day  artificial  tears Solution 1 Drop(s) Both EYES two times a day  aspirin enteric coated 81 milliGRAM(s) Oral daily  atorvastatin 40 milliGRAM(s) Oral at bedtime  carvedilol 12.5 milliGRAM(s) Oral every 12 hours  dextrose 40% Gel 15 Gram(s) Oral once  dextrose 50% Injectable 25 Gram(s) IV Push once  dextrose 50% Injectable 12.5 Gram(s) IV Push once  dextrose 50% Injectable 25 Gram(s) IV Push once  doxycycline hyclate Capsule 100 milliGRAM(s) Oral every 12 hours  glucagon  Injectable 1 milliGRAM(s) IntraMuscular once  insulin lispro (ADMELOG) corrective regimen sliding scale   SubCutaneous three times a day before meals  insulin lispro (ADMELOG) corrective regimen sliding scale   SubCutaneous at bedtime  lisinopril 5 milliGRAM(s) Oral daily  multivitamin 1 Tablet(s) Oral daily    MEDICATIONS  (PRN):  acetaminophen     Tablet .. 650 milliGRAM(s) Oral every 6 hours PRN Mild Pain (1 - 3)  oxyCODONE    IR 5 milliGRAM(s) Oral every 6 hours PRN Moderate Pain (4 - 6)      PHYSICAL EXAM:  Constitutional: A&Ox3, NAD  Respiratory: Unlabored breathing  Abdomen: Soft, nondistended, NTTP. No rebound or guarding.  Extremities: R BKA. Left lower extremity warm, well perfused. Left foot with kerlix in place C/D/I.       LABS:                    IMAGING:

## 2022-02-12 NOTE — DISCHARGE NOTE NURSING/CASE MANAGEMENT/SOCIAL WORK - PATIENT PORTAL LINK FT
You can access the FollowMyHealth Patient Portal offered by [facility] by registering at the following website: [email or web address]. By joining [employer]’s FollowMyHealth portal, you will also be able to view your health information using other applications (apps) compatible with our system.

## 2022-02-12 NOTE — PROGRESS NOTE ADULT - ASSESSMENT
61M w/ hx of R AKA and L Fem-AKPop PTFE bypass for left foot dry gangrene who now presents for evaluation for possible podiatric intervention on first and fifth toes. s/p 1st/5th ray resection L foot.     PLAN:  - Pain control PRN  - Continue home medications  - Regular CC diet  - DM, monitor FS, ISS  - ABX: doxycycline  - eliquis BID  - Cont ASA  - Dispo: Rehab      C Team/ Vascular Surgery  [id number]    61M w/ hx of R AKA and L Fem-AKPop PTFE bypass for left foot dry gangrene who now presents for evaluation for possible podiatric intervention on first and fifth toes. s/p 1st/5th ray resection L foot.     PLAN:  - Pain control PRN  - Continue home medications  - Regular CC diet  - DM, monitor FS, ISS  - ABX: doxycycline  - eliquis BID  - Cont ASA  - Dispo: Rehab      Vascular (C Team) Surgery  [id number]

## 2022-03-11 NOTE — ED PROVIDER NOTE - PHYSICAL EXAMINATION
Vital signs reviewed  GENERAL: Patient nontoxic appearing, NAD  HEAD: NCAT  EYES: Anicteric  ENT: MMM  NECK: Supple, non tender  RESPIRATORY: Normal respiratory effort. CTA B/L. No wheezing, rales, rhonchi  CARDIOVASCULAR: Regular rate and rhythm  ABDOMEN: Soft. Nondistended. Nontender. No guarding or rebound. No CVA tenderness.  MUSCULOSKELETAL/EXTREMITIES: Brisk cap refill. 2+ radial pulses. +dry area of necrosis anterior to right 5th digit with ulceration and area of wetness in webspace between 4th and 5th digit. +loss of sensation of right 5th digit. decrease cap refill. diminished pulses b/l  SKIN:  Warm and dry  NEURO: AAOx3.   PSYCHIATRIC: Cooperative. Affect appropriate. Mirvaso Counseling: Mirvaso is a topical medication which can decrease superficial blood flow where applied. Side effects are uncommon and include stinging, redness and allergic reactions.

## 2022-03-21 ENCOUNTER — APPOINTMENT (OUTPATIENT)
Dept: VASCULAR SURGERY | Facility: CLINIC | Age: 62
End: 2022-03-21
Payer: MEDICARE

## 2022-03-21 VITALS
SYSTOLIC BLOOD PRESSURE: 140 MMHG | TEMPERATURE: 97.88 F | BODY MASS INDEX: 21.34 KG/M2 | HEIGHT: 64 IN | WEIGHT: 125 LBS | DIASTOLIC BLOOD PRESSURE: 82 MMHG | HEART RATE: 72 BPM

## 2022-03-21 PROCEDURE — 99213 OFFICE O/P EST LOW 20 MIN: CPT

## 2022-03-21 PROCEDURE — 93926 LOWER EXTREMITY STUDY: CPT

## 2022-03-21 NOTE — ASSESSMENT
[FreeTextEntry1] : In summary, Mr. Patton presents s/p LLE bypass. A surveillance duplex showed patent bypass without flow limiting lesions. He should continue aspirin and statin therapy and follow up with podiatry as scheduled. He will return for surveillance duplex in three months.

## 2022-03-21 NOTE — HISTORY OF PRESENT ILLNESS
[FreeTextEntry1] : Mr. Patton presents in follow up s/p left femoral to above knee popliteal artery bypass with PTFE. Surgery was performed on 11/17/21. He is currently living with his brother [facility] During his last visit, he has dry gangrene of the left distal 1st and 5th toes and a new 5th metatarsal wound. He was admitted to [facility], where on 2/7/22 he underwent surgery with Dr. Melissa from podiatry with partial 1st and 5th toe amputation. He presents today for surveillance duplex. He reports that the left groin incision is entirely healed. His 1st toe amputation site has healed. He has a small residual wound of the distal aspect of amputated 5th toe and lateral aspect of the foot, for which he is following up with Dr. Melissa this week. He is otherwise without complaints. \par \par He is also s/p bilateral iliac artery kissing stents (10/6/21) via right groin cutdown/left percutaneous and right AKA (10/12/21).\par \par PMH: DMII, HTN, HLD\par \par Medications: Insulin, Aspirin, Lipitor, Carvedilol, Lisinopril\par \par All: PCN

## 2022-03-21 NOTE — PHYSICAL EXAM
[Normal Breath Sounds] : Normal breath sounds [Normal Heart Sounds] : normal heart sounds [2+] : left 2+ [de-identified] : NAD. [de-identified] : WNL. [FreeTextEntry1] : Well healed right groin incision. Right AKA is healed. No signs of infection.\par \par Left 1st toe amputation site is well healed. Distal dry gangrene at 5th toe amputation site and lateral left foot.

## 2022-05-10 NOTE — PROGRESS NOTE ADULT - PROBLEM SELECTOR PROBLEM 4
Pharmacy faxed in a request for prior authorization on:    Medication: Farxiga  Dosage: 10mg  Quantity requested:  90  Pharmacy for prescription has been selected.    Initiation of prior authorization needed.    
HTN (hypertension)
DM (diabetes mellitus)

## 2022-07-26 ENCOUNTER — APPOINTMENT (OUTPATIENT)
Dept: VASCULAR SURGERY | Facility: CLINIC | Age: 62
End: 2022-07-26

## 2022-07-26 VITALS
BODY MASS INDEX: 23.9 KG/M2 | WEIGHT: 140 LBS | HEART RATE: 94 BPM | DIASTOLIC BLOOD PRESSURE: 87 MMHG | HEIGHT: 64 IN | SYSTOLIC BLOOD PRESSURE: 147 MMHG | TEMPERATURE: 208.76 F

## 2022-07-26 PROCEDURE — 99213 OFFICE O/P EST LOW 20 MIN: CPT

## 2022-07-26 PROCEDURE — 93926 LOWER EXTREMITY STUDY: CPT

## 2022-07-28 NOTE — PHYSICAL EXAM
[Normal Breath Sounds] : Normal breath sounds [Normal Heart Sounds] : normal heart sounds [2+] : left 2+ [de-identified] : NAD. [de-identified] : WNL. [FreeTextEntry1] : Right AKA prosthesis in place. \par \par Left 1st and 5th toe amputation sites are well healed. All left leg and groin incisions are healed.

## 2022-07-28 NOTE — HISTORY OF PRESENT ILLNESS
[FreeTextEntry1] : Mr. Patton presents in follow up s/p left femoral to above knee popliteal artery bypass with PTFE. Surgery was performed on 11/17/21. He is also s/p amputation of left 1st and 5th toes by podiatry. All wounds are well healed and he denies any new wounds. He is ambulating with a prosthesis on the right lower extremity. He is otherwise without complaints and presents for a surveillance duplex of his bypass.\par \par He is also s/p bilateral iliac artery kissing stents (10/6/21) via right groin cutdown/left percutaneous and right AKA (10/12/21).\par \par PMH: DMII, HTN, HLD\par \par Medications: Insulin, Aspirin, Lipitor, Carvedilol, Lisinopril\par \par All: PCN

## 2022-07-28 NOTE — ASSESSMENT
[FreeTextEntry1] : In summary, Mr. Patton presents s/p left lower extremity bypass. A surveillance duplex showed patent bypass with no flow limiting lesions. He will follow up with repeat imaging in three months.

## 2022-08-15 ENCOUNTER — APPOINTMENT (OUTPATIENT)
Dept: PHYSICAL MEDICINE AND REHAB | Facility: CLINIC | Age: 62
End: 2022-08-15

## 2022-08-15 VITALS — HEART RATE: 89 BPM | SYSTOLIC BLOOD PRESSURE: 174 MMHG | DIASTOLIC BLOOD PRESSURE: 87 MMHG | OXYGEN SATURATION: 99 %

## 2022-08-15 DIAGNOSIS — I10 ESSENTIAL (PRIMARY) HYPERTENSION: ICD-10-CM

## 2022-08-15 DIAGNOSIS — Z82.49 FAMILY HISTORY OF ISCHEMIC HEART DISEASE AND OTHER DISEASES OF THE CIRCULATORY SYSTEM: ICD-10-CM

## 2022-08-15 DIAGNOSIS — E11.40 TYPE 2 DIABETES MELLITUS WITH DIABETIC NEUROPATHY, UNSPECIFIED: ICD-10-CM

## 2022-08-15 DIAGNOSIS — I73.9 PERIPHERAL VASCULAR DISEASE, UNSPECIFIED: ICD-10-CM

## 2022-08-15 PROCEDURE — 99214 OFFICE O/P EST MOD 30 MIN: CPT

## 2022-08-15 RX ORDER — ALCOHOL ANTISEPTIC PADS
70 PADS, MEDICATED (EA) TOPICAL
Qty: 300 | Refills: 0 | Status: ACTIVE | COMMUNITY
Start: 2022-03-30

## 2022-08-15 RX ORDER — BLOOD-GLUCOSE CONTROL, NORMAL
EACH MISCELLANEOUS
Qty: 1 | Refills: 0 | Status: ACTIVE | COMMUNITY
Start: 2022-03-30

## 2022-08-15 RX ORDER — CARVEDILOL 3.12 MG/1
TABLET, FILM COATED ORAL
Refills: 0 | Status: ACTIVE | COMMUNITY

## 2022-08-15 RX ORDER — BLOOD-GLUCOSE METER
EACH MISCELLANEOUS
Qty: 1 | Refills: 0 | Status: ACTIVE | COMMUNITY
Start: 2022-03-30

## 2022-08-15 RX ORDER — SITAGLIPTIN AND METFORMIN HYDROCHLORIDE 50; 1000 MG/1; MG/1
50-1000 TABLET, FILM COATED ORAL
Refills: 0 | Status: ACTIVE | COMMUNITY

## 2022-08-15 RX ORDER — BLOOD-GLUCOSE METER
EACH MISCELLANEOUS
Qty: 300 | Refills: 0 | Status: ACTIVE | COMMUNITY
Start: 2022-03-30

## 2022-08-15 RX ORDER — ATORVASTATIN CALCIUM 80 MG/1
TABLET, FILM COATED ORAL
Refills: 0 | Status: ACTIVE | COMMUNITY

## 2022-08-15 RX ORDER — BLOOD SUGAR DIAGNOSTIC
STRIP MISCELLANEOUS
Qty: 300 | Refills: 0 | Status: ACTIVE | COMMUNITY
Start: 2022-03-30

## 2022-08-15 RX ORDER — ONDANSETRON 4 MG/1
4 TABLET ORAL
Qty: 90 | Refills: 0 | Status: ACTIVE | COMMUNITY
Start: 2022-03-10

## 2022-08-15 RX ORDER — BLOOD-GLUCOSE METER
W/DEVICE EACH MISCELLANEOUS
Qty: 1 | Refills: 0 | Status: ACTIVE | COMMUNITY
Start: 2022-03-30

## 2022-08-15 RX ORDER — LISINOPRIL 30 MG/1
TABLET ORAL
Refills: 0 | Status: ACTIVE | COMMUNITY

## 2022-08-15 RX ORDER — APIXABAN 5 MG/1
TABLET, FILM COATED ORAL
Refills: 0 | Status: ACTIVE | COMMUNITY

## 2022-08-15 NOTE — ASSESSMENT
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  Patient requires replacement of the socket to improve prosthetic fit, comfort and safety of ambulation. Adjustments are noted to the socket however no further adjustments can be made and the socket requires replacement. Patient reports struggling to ambulate over variable terrain (grass, rock, gravel) at a variable vitor with his current weight-activated knee and K2 prosthetic foot. The patient has advanced from K2-K3 and requires a prosthesis equipped with K3 componentry to meet his needs at this time. He is highly motivated to maintain ambulation. The patient is currently experiencing M-L instability due to his ill-fitting socket and his prosthetic foot being too soft and he is at risk for falls. Patient is now capable of ambulation with variable vitor, but is limited to walking at one vitor because the weight-activated stance control knee on his existing prosthesis is incapable of keeping up with patient’s increased walking speed. He is now capable of ambulation over varying terrain such as grass, gravel, dirt, sand, and uneven pavement, but he cannot currently navigate these settings because the weight-activate stance control knee does not provide appropriate support. Patient cannot descend stairs in a step-over-step pattern because the weight-activated stance control knee on his existing prosthesis does not support this; the braking mechanism does not permit stair descent. Patient requires a microprocessor knee to provide variable vitor support and adequate stability over varied terrain, stairs, and ramps.  This will allow the patient to increase his walking speed, vary it from slow to fast at will, and navigate varied community settings and obstacles with increased security and safety. Patient’s existing  knee and K2-level prosthetic foot on his existing prosthesis are no longer appropriate. Patient requires a carbon fiber, energy storing, multiaxial dynamic response foot to provide increased medial and lateral stability and will enable the patient to walk more quickly, be more stable and secure when changing directions and walking speeds, and require less energy expenditure, allowing the patient to walk farther, faster, and for longer duration. The new foot will improve stability on driveways, ramps and stairs as well as on the uneven surfaces. The patient’s current foot is unable to assist with navigation over variable terrain with a variable vitor and requires replacement for a K3-level foot. The patient’s liners are stretched, thinning and are no longer the appropriate size and require replacement while the socks are threadbare. Patient will need a combination of gel socket insert and flexible inner socket/rigid frame design prosthesis (so that the rigid frame can be fenestrated and the flexible inner socket heated and pushed through the fenestrations in the frame) to relieve these bony prominences and permit the patient to walk farther and for longer periods of time without skin breakdown or excessive pressures. Please note that the flexible inner socket/rigid frame design is for the purpose of relieving bony prominences on the patient’s limb by fenestrating the socket and heating/pushing the flexible inner socket through the windows in the frame, and that the gel socket insert does not serve that same purpose. The rigid frame of the prosthesis must be constructed using ultralight materials (such as carbon fiber) laminated with acrylic resin.  This construction (method and materials) will provide increased strength to the external frame, as the socket will be fenestrated to reduce pressure on the residual limb.  Prescription provided for a right custom molded Endo skeletal AK prosthesis with a total contact acrylic socket, flexible inner socket, test socket, silicone seal-in liner, ultralight alignment both components, MPK knee unit, K3 prosthetic foot, outer protective cover, 6 multiple ply socks, 6 single ply socks.  Prescription provided to initiate a course of outpatient physical therapy with emphasis on gait training with his new right AKA prosthesis, see Rx.\par \par I spent a total of 45 minutes on the date of the encounter evaluating and treating the patient including a discussion of treatment options.\par \par \par

## 2022-08-15 NOTE — REVIEW OF SYSTEMS
[Patient Intake Form Reviewed] : Patient intake form was reviewed [Recent Change In Weight] : ~T recent weight change [Difficulty Walking] : difficulty walking [Negative] : Gastrointestinal [Fever] : no fever [Incontinence] : no incontinence [Muscle Weakness] : no muscle weakness [Skin Wound] : no skin wound

## 2022-08-15 NOTE — HISTORY OF PRESENT ILLNESS
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  Patient's current right AK prosthesis is approximately 2 to 3 months old and main complaint is that the prosthetic socket is tight and he is having difficulty donning the prosthesis.  Patient with occasional loss of suction.  Patient also notes some groin discomfort during ambulation, no skin breakdown, no falls reported.  No phantom pain.  Patient reports gaining approximately 22 pounds over the past few months.  Functionally he is an independent community ambulator with a rolling walker, patient is able to negotiate all environmental barriers such as curbs and ramps.  Patient is independent in all transfers and activities of daily living.  Patient lives with his brother in a private house with 4-5 step entry with handrail.  Patient is independent negotiating stairs.  No home health aide is present.  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

## 2022-08-15 NOTE — PHYSICAL EXAM
[FreeTextEntry1] : General: Well-developed male in no apparent distress.  Patient is awake, alert, and oriented x3.  Cooperative.\par HEENT: Normocephalic, atraumatic.  MMM.\par Lungs: Clear to auscultation.\par Cardiac: Regular rate and rhythm.\par Abdomen: Bowel sounds present, nondistended.\par Extremities: Mild pedal edema noted on the left.  Patient with previous left left fifth toe and partial first toe amputations which are well-healed.  No skin breakdown.\par \par Right AKA: Conical shape, no skin breakdown, no erythema, no callus.  No skin adhesions.  Distal/posterior wound invagination noted\par \par Motor:\par Both upper extremities: Tone normal, active range of motion within functional limits with 5/5 motor power throughout.\par Both lower extremities: Tone normal, active range of motion within functional limits with 5/5 motor power throughout.\par \par Sensory: Intact to light touch both lower extremities.  Diminished pinprick prick at the distal left lower extremity.\par Muscle stretch reflexes: 0 AJ on the left.\par \par Functional status: Patient ambulated with a right AK prosthesis with a silicone seal-in liner, stance phase control knee unit, independently with a rolling walker.  Patient maintains his right lower extremity in an abducted position due to antalgia at his groin.  Patient has a short step length on the right and flexion of the prostetic knee noted at the beginning of swing phase. Patient is a K3 ambulator.

## 2022-10-04 NOTE — ED ADULT NURSE REASSESSMENT NOTE - NS ED NURSE REASSESS COMMENT FT1
Vital signs as noted in flow sheet. No complaints of chest pain, headache, nausea, dizziness, vomiting  SOB, fever, chills verbalized. Pt resting comfortably in bed. NAD. Awaiting further orders. Will continue to monitor, Detail Level: Simple

## 2022-10-06 NOTE — ED ADULT NURSE NOTE - HOW OFTEN DO YOU HAVE A DRINK CONTAINING ALCOHOL?
Clinic Care Coordination Contact    Follow Up Progress Note      Assessment: CC attended office visit with pt and Dr. Hicks this date.  Please refer to Dr. Hicks's note for plan of care.  Suboxone dose is adequate and wants to continue on current dose.  Refuses vaccines.  Labs up to date.      Care Gaps:    Health Maintenance Due   Topic Date Due     NICOTINE/TOBACCO CESSATION COUNSELING Q 1 YR  Never done     COVID-19 Vaccine (1) Never done     COLORECTAL CANCER SCREENING  Never done     ZOSTER IMMUNIZATION (1 of 2) Never done     Pneumococcal Vaccine: 65+ Years (2 - PCV) 11/02/2012     MEDICARE ANNUAL WELLNESS VISIT  08/07/2019     AORTIC ANEURYSM SCREENING (SYSTEM ASSIGNED)  Never done     LUNG CANCER SCREENING  11/27/2019     DTAP/TDAP/TD IMMUNIZATION (2 - Td or Tdap) 10/30/2021     ADVANCE CARE PLANNING  01/18/2022     INFLUENZA VACCINE (1) 09/01/2022         Care Plans      Intervention/Education provided during outreach:  Continue to get out and do things that he enjoys.     Outreach Frequency: 3 months        Plan:   No change in treatment plan.    Care Coordinator will follow up in 3 months, sooner if he has concerns.    Margot Joel RN-BSN, [facility] Care Coordinator  [phone] opt. #3        
Never

## 2022-10-28 NOTE — PROGRESS NOTE ADULT - ASSESSMENT
57 y/o male pt with right foot non healing ulcer  - pt seen and evaluated   - stable eschar, unstagable, no clinical signs of infection   - Will order MRI to r/o OM  - non palpable R pulse, pt had bypass on RLE 1 month ago at outside hospital, recommend vascular consult.   - Possible OR pending MRI.  - please document medical clearance for local with sedation   - d/w attending -Sent in from rehab for palliative RT given worsening pain  - c/w fentanyl | Dilaudid  - Palliative care/onc consulted; appreciate recs  - Rad onc consulted

## 2022-10-31 ENCOUNTER — APPOINTMENT (OUTPATIENT)
Dept: VASCULAR SURGERY | Facility: CLINIC | Age: 62
End: 2022-10-31

## 2022-10-31 VITALS
WEIGHT: 140 LBS | HEIGHT: 64 IN | DIASTOLIC BLOOD PRESSURE: 82 MMHG | SYSTOLIC BLOOD PRESSURE: 137 MMHG | TEMPERATURE: 210.74 F | HEART RATE: 85 BPM | BODY MASS INDEX: 23.9 KG/M2

## 2022-10-31 PROCEDURE — 99213 OFFICE O/P EST LOW 20 MIN: CPT

## 2022-10-31 PROCEDURE — 93926 LOWER EXTREMITY STUDY: CPT

## 2022-10-31 NOTE — ASSESSMENT
[FreeTextEntry1] : In summary, Mr. Patton presents s/p left lower extremity bypass. A surveillance duplex showed patent bypass with no flow limiting lesions. He will follow up with repeat imaging in three months. He should continue Aspirin and statin therapy.\par \par

## 2022-10-31 NOTE — PHYSICAL EXAM
[Normal Breath Sounds] : Normal breath sounds [Normal Heart Sounds] : normal heart sounds [2+] : left 2+ [de-identified] : NAD. [de-identified] : WNL. [FreeTextEntry1] : Right AKA prosthesis in place. \par \par Left 1st and 5th toe amputation sites are well healed. All left leg and groin incisions are healed.

## 2023-02-06 ENCOUNTER — APPOINTMENT (OUTPATIENT)
Dept: VASCULAR SURGERY | Facility: CLINIC | Age: 63
End: 2023-02-06
Payer: MEDICARE

## 2023-02-06 VITALS
WEIGHT: 140 LBS | DIASTOLIC BLOOD PRESSURE: 83 MMHG | HEART RATE: 88 BPM | SYSTOLIC BLOOD PRESSURE: 142 MMHG | HEIGHT: 64 IN | BODY MASS INDEX: 23.9 KG/M2 | TEMPERATURE: 208.04 F

## 2023-02-06 PROCEDURE — 99213 OFFICE O/P EST LOW 20 MIN: CPT

## 2023-02-06 PROCEDURE — 93926 LOWER EXTREMITY STUDY: CPT

## 2023-02-06 NOTE — ASSESSMENT
[FreeTextEntry1] : In summary, Mr. Patton presents s/p left lower extremity bypass. A surveillance duplex showed patent bypass with no flow limiting lesions. He will follow up with repeat imaging in six months. He should continue Aspirin and statin therapy.\par

## 2023-02-06 NOTE — HISTORY OF PRESENT ILLNESS
[FreeTextEntry1] : Mr. Patton presents in follow up s/p left femoral to above knee popliteal artery bypass with PTFE. Surgery was performed on 11/17/21. He is also s/p amputation of left 1st and 5th toes by podiatry. All wounds are well healed and he denies any new wounds. He is ambulating with a prosthesis on the right lower extremity. He is otherwise without complaints and presents for a surveillance duplex of his bypass.\par \par He is also s/p bilateral iliac artery kissing stents (10/6/21) via right groin cutdown/left percutaneous and right AKA (10/12/21).\par \par PMH: DMII, HTN, HLD\par \par Medications: Insulin, Aspirin, Lipitor, Carvedilol, Lisinopril\par \par All: PCN [de-identified] : Mr. Patton returns for surveillance duplex of LLE bypass. He denies any lower extremity wounds or rest pain. He is ambulating with his right AKA prosthesis and will be working with a new physical therapist. He is taking his Aspirin and Lipitor and he is without complaints.

## 2023-02-06 NOTE — PHYSICAL EXAM
[Normal Breath Sounds] : Normal breath sounds [Normal Heart Sounds] : normal heart sounds [2+] : left 2+ [de-identified] : NAD. [de-identified] : WNL. [FreeTextEntry1] : Right AKA prosthesis in place. \par \par Left 1st and 5th toe amputation sites are well healed. All left leg and groin incisions are healed.

## 2023-05-02 NOTE — DISCHARGE NOTE ADULT - NSFTFSERV1RD_GEN_ALL_CORE
Airway    Performed by: Avery Lozada CRNA  Authorized by: Avery Lozada CRNA    Final Airway Type:  Supraglottic airway  Final Supraglottic Airway:  Flexible  SGA Size*:  2.5  Attempts*:  1  Number of Other Approaches Attempted:  0   Patient Identified, Procedure confirmed, Emergency equipment available and Safety protocols followed  Location:  OR  Urgency:  Elective  Indications for Airway Management:  Anesthesia  Spontaneous Ventilation: present    Sedation Level:  Anesthetized  Mask Difficulty Assessment:  0 - not attempted  Start Time: 5/2/2023 9:15 AM       observation and assessment

## 2023-06-09 NOTE — ED ADULT NURSE NOTE - NSFALLRSKOUTCOME_ED_ALL_ED
Health Maintenance Due   Topic Date Due   • Shingles Vaccine (1 of 2) Never done   • Hepatitis C Screening  Never done   • DTaP/Tdap/Td Vaccine (2 - Td or Tdap) 11/12/2013   • Colorectal Cancer Risk - Colonoscopy  02/27/2020   • COVID-19 Vaccine (3 - Booster for Pfizer series) 05/27/2021   • Medicare Advantage- Medicare Wellness Visit  01/01/2023   • Depression Screening  11/07/2023       Patient is due for topics as listed above but is not proceeding with Immunization(s) COVID-19, Dtap/Tdap/Td and Shingles, Colorectal Cancer Screening: Colonoscopy and MWV (Medicare Wellness Visit) at this time.      Fall Risk

## 2023-08-14 ENCOUNTER — APPOINTMENT (OUTPATIENT)
Dept: VASCULAR SURGERY | Facility: CLINIC | Age: 63
End: 2023-08-14

## 2023-11-03 NOTE — BRIEF OPERATIVE NOTE - OPERATION/FINDINGS
s/p left foot partial hallux amputation and left foot first partial 5th ray resection, good intraop bleeding, good bone quality at the level of resection, low concern for residual infection and viability Sepsis Criteria were met:

## 2023-11-15 NOTE — H&P ADULT - DOES THIS PATIENT HAVE A HISTORY OF OR HAS BEEN DX WITH HEART FAILURE?
Detail Level: Zone yes Detail Level: Generalized When Should The Patient Follow-Up For Their Next Full-Body Skin Exam?: 6 Months Detail Level: Detailed

## 2023-12-13 ENCOUNTER — APPOINTMENT (OUTPATIENT)
Dept: PHYSICAL MEDICINE AND REHAB | Facility: CLINIC | Age: 63
End: 2023-12-13
Payer: MEDICARE

## 2023-12-13 DIAGNOSIS — S78.111D COMPLETE TRAUMATIC AMPUTATION AT LVL BETWEEN RIGHT HIP AND KNEE, SUBSEQUENT ENCOUNTER: ICD-10-CM

## 2023-12-13 PROCEDURE — 99213 OFFICE O/P EST LOW 20 MIN: CPT

## 2023-12-14 NOTE — HISTORY OF PRESENT ILLNESS
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  Patient's current right AK prosthesis was received in August, 2022.  Patient's main complaint is that the prosthetic socket is too loose.  Patient feels instability at the right hip during ambulation and has been episodically losing suction to the prosthesis. Patient also notes minimal groin discomfort during ambulation, no skin breakdown, no falls reported.  No phantom pain.  Patient reports losing approximately 5 to 10 pounds. Functionally he is an independent community ambulator with a rollator walker, patient is able to negotiate all environmental barriers such as curbs and ramps.  Patient is independent in all transfers and activities of daily living.  Patient lives in assisted living.  No home health aide is present.  Patient has been receiving physical therapy at assisted living 2 times per week.

## 2023-12-14 NOTE — REVIEW OF SYSTEMS
[Difficulty Walking] : difficulty walking [Negative] : Gastrointestinal [Recent Change In Weight] : ~T recent weight change [Fever] : no fever [Incontinence] : no incontinence [Muscle Weakness] : no muscle weakness [Skin Wound] : no skin wound

## 2023-12-14 NOTE — PHYSICAL EXAM
[FreeTextEntry1] : General: Well-developed male in no apparent distress.  Patient is awake, alert, and oriented x3.  Cooperative. HEENT: Normocephalic, atraumatic.  MMM. Extremities: Mild pedal edema noted on the left.  Patient with previous left fifth toe and partial first toe amputations which are well-healed.  No skin breakdown.  Right AKA: Conical shape, no skin breakdown, no erythema, no callus.  No skin adhesions.  Distal/posterior wound invagination noted  Motor: Both upper extremities: Tone normal, active range of motion within functional limits with 5/5 motor power throughout. Both lower extremities: Tone normal, active range of motion within functional limits with 5/5 motor power throughout.  Sensory: Intact to light touch both lower extremities.  Diminished pinprick prick at the distal left lower extremity. Muscle stretch reflexes: 0 AJ on the left.  Functional status: Patient ambulated with a right AK prosthesis with a silicone seal-in liner, MPK unit, independently with a rollator walker.  Patient independent transfers.  Mild Trendelenburg gait with a rolling walker.  Patient is a K3 ambulator.

## 2023-12-14 NOTE — ASSESSMENT
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  Patient requires replacement of the socket to improve prosthetic fit, comfort and safety of ambulation. Adjustments are noted to the socket however no further adjustments can be made and the socket requires replacement.  The patient's liners are stretched, thinning and are no longer the appropriate size and require replacement while the socks are threadbare. Patient will need a combination of gel socket insert and flexible inner socket/rigid frame design prosthesis (so that the rigid frame can be fenestrated and the flexible inner socket heated and pushed through the fenestrations in the frame) to relieve these bony prominences and permit the patient to walk farther and for longer periods of time without skin breakdown or excessive pressures. Please note that the flexible inner socket/rigid frame design is for the purpose of relieving bony prominences on the patient's limb by fenestrating the socket and heating/pushing the flexible inner socket through the windows in the frame, and that the gel socket insert does not serve that same purpose. The rigid frame of the prosthesis must be constructed using ultralight materials (such as carbon fiber) laminated with acrylic resin. This construction (method and materials) will provide increased strength to the external frame, as the socket will be fenestrated to reduce pressure on the residual limb.  Patient requires an outer protective cover secondary to his underlying diabetes.  Patient is a K3 ambulator.  Prescription provided for a right custom molded endoskeletal AK socket replacement with a total contact acrylic socket, flexible inner socket, test socket, silicone seal-in liner, ultralight alignable components, outer protective cover, 6 multiple ply socks, 6 single ply socks.   I spent a total of 20 minutes on the date of the encounter evaluating and treating the patient including a discussion of treatment options.

## 2024-04-30 NOTE — PHYSICAL EXAM
04/30/2024      Sussy Costa  [address] [facility] [place] [address]          Hospital Medicine Dept.  [facility] [address] [place] [address] [phone] after hours  [phone] fax Sussy Costa has been hospitalized at the [facility] since 4/29/2024.  Please excuse the patient from duties.   Please contact me if you have any questions.                  __________________________  Albert Flores MD  04/30/2024            [de-identified] : NAD. [de-identified] : WNL. [FreeTextEntry1] : Well healed right groin incision. Right AKA is healed. No signs of infection.\par \par Left 1st toe distal dry gangrene and dry gangrene of 5th toe. Small area (1.5x1cm) of skin dehiscence in his groin with fibrinous tissue on top. This was debrided.

## 2024-08-27 NOTE — PROGRESS NOTE ADULT - SUBJECTIVE AND OBJECTIVE BOX
CHIEF COMPLAINT: f/u     SUBJECTIVE / OVERNIGHT EVENTS: Patient seen and examined. No acute events overnight. Pain well controlled and patient without any complaints.  Pending MILA, brother to bring pts prosthesis to facility.     MEDICATIONS  (STANDING):  apixaban 2.5 milliGRAM(s) Oral two times a day  artificial  tears Solution 1 Drop(s) Both EYES two times a day  aspirin enteric coated 81 milliGRAM(s) Oral daily  atorvastatin 40 milliGRAM(s) Oral at bedtime  carvedilol 12.5 milliGRAM(s) Oral every 12 hours  dextrose 40% Gel 15 Gram(s) Oral once  dextrose 5%. 1000 milliLiter(s) (100 mL/Hr) IV Continuous <Continuous>  dextrose 5%. 1000 milliLiter(s) (50 mL/Hr) IV Continuous <Continuous>  dextrose 50% Injectable 25 Gram(s) IV Push once  dextrose 50% Injectable 12.5 Gram(s) IV Push once  dextrose 50% Injectable 25 Gram(s) IV Push once  doxycycline hyclate Capsule 100 milliGRAM(s) Oral every 12 hours  glucagon  Injectable 1 milliGRAM(s) IntraMuscular once  insulin lispro (ADMELOG) corrective regimen sliding scale   SubCutaneous three times a day before meals  insulin lispro (ADMELOG) corrective regimen sliding scale   SubCutaneous at bedtime  multivitamin 1 Tablet(s) Oral daily    MEDICATIONS  (PRN):  acetaminophen     Tablet .. 650 milliGRAM(s) Oral every 6 hours PRN Mild Pain (1 - 3)  oxyCODONE    IR 5 milliGRAM(s) Oral every 6 hours PRN Moderate Pain (4 - 6)      VITALS:  T(F): 98.1 (02-10-22 @ 09:52), Max: 99.6 (02-09-22 @ 18:22)  HR: 87 (02-10-22 @ 09:52) (73 - 101)  BP: 139/86 (02-10-22 @ 09:52) (111/70 - 145/97)  RR: 18 (02-10-22 @ 09:52) (18 - 18)  SpO2: 97% (02-10-22 @ 09:52)  Wt(kg): --      CAPILLARY BLOOD GLUCOSE      PHYSICAL EXAM:  GENERAL: NAD, well-appearing   EYES:  EOMI, sclera clear  ENMT: Moist mucous membranes, poor dentition   NECK: Supple, No JVD  RESPIRATORY: Clear to auscultation bilaterally; No rales, rhonchi, wheezing, or rubs  CARDIOVASCULAR: S1/S2, RRR, no murmurs appreciated  GASTROINTESTINAL: Soft, Nontender  EXTREMITIES:  right AKA, Left foot wrapped, dressing c/d/i   NERVOUS SYSTEM: awake, alert, No gross deficits  PSYCH: calm cooperative  SKIN: No rashes; no palpable lesions  LABS:              12.4                 136  | 23   | 29           10.39 >-----------< 268     ------------------------< 165                   37.8                 4.5  | 101  | 0.89                                         Ca 9.8   Mg 1.70  Ph 3.6          INR: 1.10 ratio;    PT: 12.6 sec;    PTT: 32.3 sec                MICROBIOLOGY:        RADIOLOGY & ADDITIONAL TESTS:  Imaging Personally Reviewed: [x ] Yes    [x ] Consultant(s) Notes Reviewed:  [x] Care Discussed with Consultants/Other Providers: Urology PA - discussed   CHIEF COMPLAINT: f/u     SUBJECTIVE / OVERNIGHT EVENTS: Patient seen and examined. No acute events overnight. Pain well controlled and patient without any complaints.  Pending MILA, brother to bring pts prosthesis to facility.     MEDICATIONS  (STANDING):  apixaban 2.5 milliGRAM(s) Oral two times a day  artificial  tears Solution 1 Drop(s) Both EYES two times a day  aspirin enteric coated 81 milliGRAM(s) Oral daily  atorvastatin 40 milliGRAM(s) Oral at bedtime  carvedilol 12.5 milliGRAM(s) Oral every 12 hours  dextrose 40% Gel 15 Gram(s) Oral once  dextrose 5%. 1000 milliLiter(s) (100 mL/Hr) IV Continuous <Continuous>  dextrose 5%. 1000 milliLiter(s) (50 mL/Hr) IV Continuous <Continuous>  dextrose 50% Injectable 25 Gram(s) IV Push once  dextrose 50% Injectable 12.5 Gram(s) IV Push once  dextrose 50% Injectable 25 Gram(s) IV Push once  doxycycline hyclate Capsule 100 milliGRAM(s) Oral every 12 hours  glucagon  Injectable 1 milliGRAM(s) IntraMuscular once  insulin lispro (ADMELOG) corrective regimen sliding scale   SubCutaneous three times a day before meals  insulin lispro (ADMELOG) corrective regimen sliding scale   SubCutaneous at bedtime  multivitamin 1 Tablet(s) Oral daily    MEDICATIONS  (PRN):  acetaminophen     Tablet .. 650 milliGRAM(s) Oral every 6 hours PRN Mild Pain (1 - 3)  oxyCODONE    IR 5 milliGRAM(s) Oral every 6 hours PRN Moderate Pain (4 - 6)      VITALS:  T(F): 98.1 (02-10-22 @ 09:52), Max: 99.6 (02-09-22 @ 18:22)  HR: 87 (02-10-22 @ 09:52) (73 - 101)  BP: 139/86 (02-10-22 @ 09:52) (111/70 - 145/97)  RR: 18 (02-10-22 @ 09:52) (18 - 18)  SpO2: 97% (02-10-22 @ 09:52)  Wt(kg): --      CAPILLARY BLOOD GLUCOSE      PHYSICAL EXAM:  GENERAL: NAD, well-appearing   EYES:  EOMI, sclera clear  ENMT: Moist mucous membranes, poor dentition   NECK: Supple, No JVD  RESPIRATORY: Clear to auscultation bilaterally; No rales, rhonchi, wheezing, or rubs  CARDIOVASCULAR: S1/S2, RRR, no murmurs appreciated  GASTROINTESTINAL: Soft, Nontender  EXTREMITIES:  right AKA, Left foot wrapped, dressing c/d/i   NERVOUS SYSTEM: awake, alert, No gross deficits  PSYCH: calm cooperative  SKIN: No rashes; no palpable lesions  LABS:              12.4                 136  | 23   | 29           10.39 >-----------< 268     ------------------------< 165                   37.8                 4.5  | 101  | 0.89                                         Ca 9.8   Mg 1.70  Ph 3.6          INR: 1.10 ratio;    PT: 12.6 sec;    PTT: 32.3 sec                MICROBIOLOGY:        RADIOLOGY & ADDITIONAL TESTS:  Imaging Personally Reviewed: [x ] Yes    [x ] Consultant(s) Notes Reviewed:  [x] Care Discussed with Consultants/Other Providers: Vascular PA - discussed   Medications

## 2025-05-08 NOTE — PATIENT PROFILE ADULT - NSPROPTRIGHTSUPPORTNAME_GEN_A_NUR
[General Appearance - Alert] : alert [General Appearance - In No Acute Distress] : in no acute distress [Motor Strength] : muscle strength was normal in all four extremities [Sensation Tactile Decrease] : light touch was intact [2+] : Patella left 2+ Casey Patton

## 2025-06-30 NOTE — CONSULT NOTE ADULT - SUBJECTIVE AND OBJECTIVE BOX
Medication failed protocol.    Medication: omeprazole (PriLOSEC) 40 MG capsule   Medication Refill Protocol Failed.  Protocol approved due to: data identified in chart review.   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 Pt now presents to the ED after being referred by Podiatrist for potential surgical intervention on Left foot.    In the ED, patient afebrile, hemodynamically stable, labs largely unremarkable. Podiatry evaluated patient and planning for surgical intervention this admission. Vascular surgery consulted for evaluation fo adequate arterial blood flow to foot. (02 Feb 2022 14:36)    Medicine consulted for preop assessment with plans for left foot partial first and fifth ray resection by podiatry. Patient denies chest pain, SOB, N/V/D. Able to walk with prosthetic without chest pain. No prior cardiac problems.    Pain Symptoms if applicable:             	                         none	   mild         moderate         severe  	                            0	    1-3	     4-6	         7-10  Pain:  Location:	  Modifying factors:	  Associated symptoms:	    Allergies    penicillin (Other)    Intolerances        HOME MEDICATIONS: [x] Reviewed    MEDICATIONS  (STANDING):  artificial  tears Solution 1 Drop(s) Both EYES two times a day  aspirin enteric coated 81 milliGRAM(s) Oral daily  atorvastatin 40 milliGRAM(s) Oral at bedtime  carvedilol 12.5 milliGRAM(s) Oral every 12 hours  cefepime   IVPB 2000 milliGRAM(s) IV Intermittent every 12 hours  dextrose 40% Gel 15 Gram(s) Oral once  dextrose 5%. 1000 milliLiter(s) (50 mL/Hr) IV Continuous <Continuous>  dextrose 5%. 1000 milliLiter(s) (100 mL/Hr) IV Continuous <Continuous>  dextrose 50% Injectable 25 Gram(s) IV Push once  dextrose 50% Injectable 12.5 Gram(s) IV Push once  dextrose 50% Injectable 25 Gram(s) IV Push once  glucagon  Injectable 1 milliGRAM(s) IntraMuscular once  heparin  Infusion 1000 Unit(s)/Hr (10 mL/Hr) IV Continuous <Continuous>  insulin lispro (ADMELOG) corrective regimen sliding scale   SubCutaneous three times a day before meals  insulin lispro (ADMELOG) corrective regimen sliding scale   SubCutaneous at bedtime  lisinopril 10 milliGRAM(s) Oral daily  multivitamin 1 Tablet(s) Oral daily  vancomycin  IVPB 1000 milliGRAM(s) IV Intermittent every 12 hours    MEDICATIONS  (PRN):      PAST MEDICAL & SURGICAL HISTORY:  DM (diabetes mellitus)    HTN (hypertension)    HLD (hyperlipidemia)    CHF (congestive heart failure)    CKD (chronic kidney disease)    PVD (peripheral vascular disease)    S/P femoral-femoral bypass surgery  2018 - right leg    [X ] Reviewed     SOCIAL HISTORY:  [x] Substance abuse:  denies  [x] Tobacco: denies  [x] Alcohol use: denies    FAMILY HISTORY:  Family history of MI (myocardial infarction)  Father    FH: renal cell carcinoma  Sister - s/p nephrectomy    Family history of depression  Brother    FHx: diabetes mellitus  Paternal Grandfather    FH: heart disease  Mother    [x] No pertinent family history in first degree relatives     REVIEW OF SYSTEMS:    [x] All other ROS negative  [  ] Unable to obtain due to poor mental status    Vital Signs Last 24 Hrs  T(C): 36.8 (03 Feb 2022 10:04), Max: 37.2 (02 Feb 2022 22:20)  T(F): 98.2 (03 Feb 2022 10:04), Max: 99 (02 Feb 2022 22:20)  HR: 80 (03 Feb 2022 10:04) (77 - 84)  BP: 126/75 (03 Feb 2022 10:04) (117/60 - 136/72)  BP(mean): --  RR: 18 (03 Feb 2022 10:04) (18 - 20)  SpO2: 100% (03 Feb 2022 10:04) (99% - 100%)    PHYSICAL EXAM:    GENERAL: NAD, on room air  HEAD:  Atraumatic, Normocephalic  EYES: EOMI, PERRL,  sclera clear  ENMT: Moist mucous membranes  NECK: Supple, No JVD  RESPIRATORY: Clear to auscultation bilaterally; No rales, rhonchi, wheezing, or rubs  CARDIOVASCULAR: s1/s2, RRR, no murmurs aprpeciated  GASTROINTESTINAL: Soft, Nontender, Nondistended; Bowel sounds present  GENITOURINARY: no dunham  EXTREMITIES:  WWP, no edema, right AKA, left 1st toe necrosis noted, left foot wrapped with kerlix  NERVOUS SYSTEM:  Alert & Oriented X3; Moving all 4 extremities; No gross deficits  PSYCH: calm cooperative  SKIN: No rashes or lesions    LABS:                        12.2   6.95  )-----------( 283      ( 03 Feb 2022 07:02 )             37.6     02-03    137  |  103  |  24<H>  ----------------------------<  97  4.0   |  23  |  1.07    Ca    9.2      03 Feb 2022 07:02  Phos  3.2     02-03  Mg     1.90     02-03    TPro  6.9  /  Alb  3.8  /  TBili  <0.2  /  DBili  x   /  AST  21  /  ALT  24  /  AlkPhos  104  02-03    PT/INR - ( 02 Feb 2022 11:52 )   PT: 13.7 sec;   INR: 1.20 ratio         PTT - ( 03 Feb 2022 04:59 )  PTT:109.5 sec    CAPILLARY BLOOD GLUCOSE      POCT Blood Glucose.: 105 mg/dL (03 Feb 2022 08:38)      RADIOLOGY & ADDITIONAL STUDIES:    EKG:   Personally Reviewed:  [x] YES     Imaging:   Personally Reviewed:  [x] YES               [ ] Consultant(s) Notes Reviewed  [x] Care Discussed with Consultants/Other Providers:

## 2025-07-22 ENCOUNTER — TRANSCRIPTION ENCOUNTER (OUTPATIENT)
Age: 65
End: 2025-07-22

## 2025-08-19 ENCOUNTER — NON-APPOINTMENT (OUTPATIENT)
Age: 65
End: 2025-08-19

## 2025-08-20 ENCOUNTER — APPOINTMENT (OUTPATIENT)
Dept: PHYSICAL MEDICINE AND REHAB | Facility: CLINIC | Age: 65
End: 2025-08-20
Payer: MEDICARE

## 2025-08-20 DIAGNOSIS — S78.111D COMPLETE TRAUMATIC AMPUTATION AT LVL BETWEEN RIGHT HIP AND KNEE, SUBSEQUENT ENCOUNTER: ICD-10-CM

## 2025-08-20 PROCEDURE — 99213 OFFICE O/P EST LOW 20 MIN: CPT

## (undated) DEVICE — PACKING GAUZE PLAIN 2"

## (undated) DEVICE — CANISTER DISPOSABLE THIN WALL 3000CC

## (undated) DEVICE — SUT VICRYL 4-0 18" PS-2 UNDYED

## (undated) DEVICE — BLADE SURGICAL #15 CARBON

## (undated) DEVICE — SUT ETHILON 4-0 18" PS-2

## (undated) DEVICE — SYR LUER LOK 10CC

## (undated) DEVICE — DRSG XEROFORM 1 X 8"

## (undated) DEVICE — FRAZIER SUCTION TIP 8FR

## (undated) DEVICE — ELCTR BOVIE TIP BLADE INSULATED 2.8" EDGE WITH SAFETY

## (undated) DEVICE — VENODYNE/SCD SLEEVE CALF MEDIUM

## (undated) DEVICE — DRSG STERISTRIPS 0.25 X 3"

## (undated) DEVICE — DRSG KLING 4"

## (undated) DEVICE — DRAPE TOWEL BLUE 17" X 24"

## (undated) DEVICE — LABELS BLANK W PEN

## (undated) DEVICE — SUT VICRYL 3-0 18" PS-2 UNDYED

## (undated) DEVICE — PACK LIJ BASIC ORTHO

## (undated) DEVICE — PREP CHLORAPREP HI-LITE ORANGE 26ML

## (undated) DEVICE — TOURNIQUET CUFF 18" DUAL PORT DUAL BLADDER W PLC (BLACK)

## (undated) DEVICE — DRSG CURITY GAUZE SPONGE 4 X 4" 12-PLY

## (undated) DEVICE — ELCTR GROUNDING PAD ADULT COVIDIEN

## (undated) DEVICE — DRSG STOCKINETTE TUBULAR 6"

## (undated) DEVICE — NDL HYPO REGULAR BEVEL 25G X 1.5" (BLUE)

## (undated) DEVICE — HANDPIECE INTERPULSE W/ COAXIAL FAN SPRAY TIP

## (undated) DEVICE — DRSG STOCKINETTE IMPERVIOUS XL

## (undated) DEVICE — DRSG ACE BANDAGE 4" NS